# Patient Record
Sex: MALE | Race: WHITE | NOT HISPANIC OR LATINO | Employment: OTHER | URBAN - NONMETROPOLITAN AREA
[De-identification: names, ages, dates, MRNs, and addresses within clinical notes are randomized per-mention and may not be internally consistent; named-entity substitution may affect disease eponyms.]

---

## 2017-01-04 ENCOUNTER — TELEPHONE (OUTPATIENT)
Dept: FAMILY MEDICINE CLINIC | Facility: CLINIC | Age: 72
End: 2017-01-04

## 2017-01-18 ENCOUNTER — OFFICE VISIT (OUTPATIENT)
Dept: FAMILY MEDICINE CLINIC | Facility: CLINIC | Age: 72
End: 2017-01-18

## 2017-01-18 VITALS
HEART RATE: 95 BPM | BODY MASS INDEX: 26.94 KG/M2 | OXYGEN SATURATION: 97 % | SYSTOLIC BLOOD PRESSURE: 120 MMHG | DIASTOLIC BLOOD PRESSURE: 77 MMHG | TEMPERATURE: 97.9 F | WEIGHT: 172 LBS | RESPIRATION RATE: 17 BRPM

## 2017-01-18 DIAGNOSIS — E03.8 ADULT ONSET HYPOTHYROIDISM: ICD-10-CM

## 2017-01-18 DIAGNOSIS — D64.89 ANEMIA DUE TO OTHER CAUSE: ICD-10-CM

## 2017-01-18 DIAGNOSIS — N18.30 CHRONIC KIDNEY DISEASE, STAGE III (MODERATE) (HCC): ICD-10-CM

## 2017-01-18 DIAGNOSIS — E78.2 MIXED HYPERLIPIDEMIA: Primary | ICD-10-CM

## 2017-01-18 DIAGNOSIS — D63.1 ANEMIA IN CHRONIC KIDNEY DISEASE (CODE): ICD-10-CM

## 2017-01-18 PROCEDURE — 99213 OFFICE O/P EST LOW 20 MIN: CPT | Performed by: INTERNAL MEDICINE

## 2017-01-18 NOTE — MR AVS SNAPSHOT
Nestor Molinad Popeye   1/18/2017 1:00 PM   Office Visit    Dept Phone:  [phone]   Encounter #:  [id number]    Provider:  Roxanne Slater MD   Department:  [facility]                Your Full Care Plan              Your Updated Medication List          This list is accurate as of: 1/18/17  1:40 PM.  Always use your most recent med list.                Alcohol Wipes 70 % pads   1 pad 3 (Three) Times a Day.       allopurinol 100 MG tablet   Commonly known as:  ZYLOPRIM   Take 1 tablet by mouth Daily.       calcium acetate 667 MG capsule capsule   Commonly known as:  PHOS BINDER)       cephalexin 500 MG capsule   Commonly known as:  KEFLEX   Take 1 capsule by mouth Daily for 10 days.       doxazosin 4 MG tablet   Commonly known as:  CARDURA       ferrous sulfate 325 (65 FE) MG tablet       finasteride 5 MG tablet   Commonly known as:  PROSCAR   Take 1 tablet by mouth Daily.       FLUZONE HIGH-DOSE 0.5 ML suspension prefilled syringe injection   Generic drug:  influenza vac split high-dose       KEHINDE-PAYAL PO               You Were Diagnosed With        Codes Comments    Mixed hyperlipidemia    -  Primary ICD-10-CM: E78.2  ICD-9-CM: 272.2     Adult onset hypothyroidism     ICD-10-CM: E03.8  ICD-9-CM: 244.8     Chronic kidney disease, stage III (moderate)     ICD-10-CM: N18.3  ICD-9-CM: 585.3     Anemia due to other cause     ICD-10-CM: D64.89     Anemia in chronic kidney disease (CODE)     ICD-10-CM: D63.1  ICD-9-CM: 285.21, 585.9       Instructions     None    Patient Instructions History      Upcoming Appointments     Visit Type Date Time Department    OFFICE VISIT 1/18/2017  1:00 PM [facility]    IN OFFICE PROCEDURE 1/23/2017 10:30 AM [facility]    OFFICE VISIT 4/4/2017  2:00 PM [facility]      MyChart Signup     Our records indicate that you have declined [facility] MyChart signup. If you would like to sign up for MyChart,  please email [email or web address] or call [phone] to obtain an activation code.             Other Info from Your Visit           Your Appointments     Jan 23, 2017 10:30 AM EST   IN OFFICE PROCEDURE with NURSE UROLOGY [facility]   [facility] (--)    [address]  [facility] [id number] [phone]           Bring medication list, test results, and radiology films that apply.            Apr 04, 2017  2:00 PM EDT   Office Visit with Roxanne Slater MD   [facility] (--)    [address]  [facility] [place] [address] [phone]           Arrive 15 minutes prior to appointment.              Allergies     No Known Allergies      Reason for Visit     Hypertension           Vital Signs     Blood Pressure Pulse Temperature Respirations Weight Oxygen Saturation    120/77 95 97.9 °F (36.6 °C) (Oral) 17 172 lb (78 kg) 97%    Body Mass Index Smoking Status                26.94 kg/m2 Former Smoker          Problems and Diagnoses Noted     Mixed hyperlipidemia    Adult onset hypothyroidism        Chronic kidney disease, stage III (moderate)        Anemia due to other cause        Anemia in chronic kidney disease (CODE)

## 2017-01-18 NOTE — PROGRESS NOTES
Subjective   Nestor Nye is a 71 y.o. male.     Chief Complaint   Patient presents with   • Hypertension   • Hyperlipidemia       History of Present Illness   Patient did follow-up on the blood pressure which is stable.  His heart rate is noted to be elevated.  He is also to follow-up on his cholesterol is due for lab work.  He is also to follow-up on his thyroid.  He stopped taking thyroid medication.  Patient is also to follow-up on his kidney functions and his anemia secondary to it, he saw the nephrologist.  He also follows up with a urologist for his chronic catheter change secondary to his prostatic enlargement, he declines a colonoscopy and he has never had one    The following portions of the patient's history were reviewed and updated as appropriate: allergies, current medications, past family history, past medical history, past social history, past surgical history and problem list.    Review of Systems   Constitutional: Negative for appetite change, fatigue and fever.   HENT: Negative for congestion, ear discharge, ear pain, sinus pressure and sore throat.    Eyes: Negative for pain and discharge.   Respiratory: Negative for cough, shortness of breath and wheezing.    Cardiovascular: Negative for chest pain, palpitations and leg swelling.   Gastrointestinal: Negative for abdominal pain, constipation, diarrhea, nausea and vomiting.   Endocrine: Negative for cold intolerance and heat intolerance.   Genitourinary: Negative for dysuria and flank pain.   Musculoskeletal: Negative for arthralgias and joint swelling.   Skin: Negative for pallor and rash.   Allergic/Immunologic: Negative for environmental allergies and food allergies.   Neurological: Negative for dizziness, weakness and numbness.   Hematological: Negative for adenopathy. Does not bruise/bleed easily.   Psychiatric/Behavioral: Negative for behavioral problems and dysphoric mood. The patient is not nervous/anxious.          Current  Outpatient Prescriptions:   •  Alcohol Swabs (ALCOHOL WIPES) 70 % pads, 1 pad 3 (Three) Times a Day., Disp: 100 each, Rfl: 11  •  allopurinol (ZYLOPRIM) 100 MG tablet, Take 1 tablet by mouth Daily., Disp: 30 tablet, Rfl: 6  •  B Complex-C-Folic Acid (KEHINDE-PAYAL PO), Take 1 tablet by mouth Daily., Disp: , Rfl:   •  calcium acetate (PHOS BINDER,) 667 MG capsule capsule, 667 mg 3 (Three) Times a Day., Disp: , Rfl:   •  doxazosin (CARDURA) 4 MG tablet, Take 4 mg by mouth 2 (Two) Times a Day., Disp: , Rfl:   •  ferrous sulfate 325 (65 FE) MG tablet, Take 325 mg by mouth 2 (Two) Times a Day., Disp: , Rfl:   •  finasteride (PROSCAR) 5 MG tablet, Take 1 tablet by mouth Daily., Disp: 90 tablet, Rfl: 3  •  cephalexin (KEFLEX) 500 MG capsule, Take 1 capsule by mouth Daily for 10 days., Disp: 30 capsule, Rfl: 11  •  FLUZONE HIGH-DOSE 0.5 ML suspension prefilled syringe injection, , Disp: , Rfl:     Objective     Blood pressure 120/77, pulse 95, temperature 97.9 °F (36.6 °C), temperature source Oral, resp. rate 17, weight 172 lb (78 kg), SpO2 97 %.    Physical Exam   Constitutional: He is oriented to person, place, and time. He appears well-developed and well-nourished. No distress.   HENT:   Head: Normocephalic and atraumatic.   Right Ear: External ear normal.   Left Ear: External ear normal.   Nose: Nose normal.   Mouth/Throat: Oropharynx is clear and moist.   Eyes: Conjunctivae and EOM are normal. Pupils are equal, round, and reactive to light.   Neck: Normal range of motion. Neck supple. No thyromegaly present.   Cardiovascular: Normal rate, regular rhythm and normal heart sounds.    Pulmonary/Chest: Effort normal. No respiratory distress.   Abdominal: Soft. He exhibits no distension. There is no tenderness. There is no guarding.   Musculoskeletal: Normal range of motion. He exhibits no edema.   Lymphadenopathy:     He has no cervical adenopathy.   Neurological: He is alert and oriented to person, place, and time.   No gross  motor or sensory deficits   Skin: Skin is warm and dry. He is not diaphoretic. No erythema.   Psychiatric: He has a normal mood and affect.   Nursing note and vitals reviewed.      Results for orders placed or performed during the hospital encounter of 12/20/16   CBC (Hemogram)   Result Value Ref Range    WBC 4.7 (L) 4.8 - 10.8 THOUS    RBC 3.25 (L) 4.70 - 6.10 m/uL    Hemoglobin 10.0 (L) 14.0 - 18.0 g/dL    Hematocrit 31 (L) 42 - 52 %    MCV 96.0 (H) 80.0 - 94.0 fL    MCH 30.8 27.0 - 31.0 uug    MCHC 32.1 30.0 - 37.0 g/dL    RDW 13.0 11.5 - 14.5 %    Platelets 247 130 - 400 THOUS   CBC (Hemogram)   Result Value Ref Range    WBC 5.6 4.8 - 10.8 THOUS    RBC 3.38 (L) 4.70 - 6.10 m/uL    Hemoglobin 10.8 (L) 14.0 - 18.0 g/dL    Hematocrit 33 (L) 42 - 52 %    MCV 96.4 (H) 80.0 - 94.0 fL    MCH 32.0 (H) 27.0 - 31.0 uug    MCHC 33.1 30.0 - 37.0 g/dL    RDW 13.2 11.5 - 14.5 %    Platelets 234 130 - 400 THOUS         Assessment/Plan   Nestor was seen today for hypertension and hyperlipidemia.    Diagnoses and all orders for this visit:    Mixed hyperlipidemia  -     CBC & Differential; Standing  -     Comprehensive Metabolic Panel; Standing  -     Lipid Panel; Standing    Adult onset hypothyroidism  -     TSH; Standing    Chronic kidney disease, stage III (moderate)    Anemia due to other cause  -     Ferritin; Standing    Anemia in chronic kidney disease (CODE)   -     Ferritin; Standing    plan:  1.  Mixed hyperlipidemia: We will obtain fasting CMP and lipid panel.  Diet and excise counseled  2. Cri stage III: We will obtain labs, to follow-up with nephrology  3.anemia: We will obtain labs, most secondary to his kidney disease, patient declines colonoscopy.  He has never had one  4.  Hypothyroidism: We will obtain TSH         Roxanne Slater MD

## 2017-01-23 ENCOUNTER — LAB (OUTPATIENT)
Dept: LAB | Facility: HOSPITAL | Age: 72
End: 2017-01-23

## 2017-01-23 DIAGNOSIS — E78.2 MIXED HYPERLIPIDEMIA: ICD-10-CM

## 2017-01-23 LAB
BASOPHILS # BLD AUTO: 0.07 10*3/MM3 (ref 0–0.2)
BASOPHILS NFR BLD AUTO: 1.3 % (ref 0–2.5)
DEPRECATED RDW RBC AUTO: 44.2 FL (ref 37–54)
EOSINOPHIL # BLD AUTO: 0.3 10*3/MM3 (ref 0–0.7)
EOSINOPHIL NFR BLD AUTO: 5.5 % (ref 0–7)
ERYTHROCYTE [DISTWIDTH] IN BLOOD BY AUTOMATED COUNT: 12.9 % (ref 11.5–14.5)
HCT VFR BLD AUTO: 36.7 % (ref 42–52)
HGB BLD-MCNC: 12.1 G/DL (ref 14–18)
IMM GRANULOCYTES # BLD: 0.01 10*3/MM3 (ref 0–0.06)
IMM GRANULOCYTES NFR BLD: 0.2 % (ref 0–0.6)
LYMPHOCYTES # BLD AUTO: 1.25 10*3/MM3 (ref 0.6–3.4)
LYMPHOCYTES NFR BLD AUTO: 23 % (ref 10–50)
MCH RBC QN AUTO: 31.2 PG (ref 27–31)
MCHC RBC AUTO-ENTMCNC: 33 G/DL (ref 30–37)
MCV RBC AUTO: 94.6 FL (ref 80–94)
MONOCYTES # BLD AUTO: 0.92 10*3/MM3 (ref 0–0.9)
MONOCYTES NFR BLD AUTO: 16.9 % (ref 0–12)
NEUTROPHILS # BLD AUTO: 2.88 10*3/MM3 (ref 2–6.9)
NEUTROPHILS NFR BLD AUTO: 53.1 % (ref 37–80)
NRBC BLD MANUAL-RTO: 0 /100 WBC (ref 0–0)
PLATELET # BLD AUTO: 256 10*3/MM3 (ref 130–400)
PMV BLD AUTO: 10.9 FL (ref 6–12)
RBC # BLD AUTO: 3.88 10*6/MM3 (ref 4.7–6.1)
WBC NRBC COR # BLD: 5.43 10*3/MM3 (ref 4.8–10.8)

## 2017-01-23 PROCEDURE — 85025 COMPLETE CBC W/AUTO DIFF WBC: CPT | Performed by: INTERNAL MEDICINE

## 2017-01-23 PROCEDURE — 36415 COLL VENOUS BLD VENIPUNCTURE: CPT

## 2017-02-22 ENCOUNTER — PROCEDURE VISIT (OUTPATIENT)
Dept: UROLOGY | Facility: CLINIC | Age: 72
End: 2017-02-22

## 2017-02-22 DIAGNOSIS — R33.9 URINARY RETENTION: Primary | ICD-10-CM

## 2017-02-22 PROCEDURE — 51702 INSERT TEMP BLADDER CATH: CPT | Performed by: UROLOGY

## 2017-03-22 ENCOUNTER — PROCEDURE VISIT (OUTPATIENT)
Dept: UROLOGY | Facility: CLINIC | Age: 72
End: 2017-03-22

## 2017-03-22 DIAGNOSIS — R33.9 URINARY RETENTION: Primary | ICD-10-CM

## 2017-03-22 PROCEDURE — 51702 INSERT TEMP BLADDER CATH: CPT | Performed by: UROLOGY

## 2017-03-31 ENCOUNTER — TELEPHONE (OUTPATIENT)
Dept: FAMILY MEDICINE CLINIC | Facility: CLINIC | Age: 72
End: 2017-03-31

## 2017-03-31 RX ORDER — DOXAZOSIN MESYLATE 4 MG/1
4 TABLET ORAL 2 TIMES DAILY
Qty: 180 TABLET | Refills: 3 | Status: SHIPPED | OUTPATIENT
Start: 2017-03-31 | End: 2018-05-30 | Stop reason: SDUPTHER

## 2017-04-04 ENCOUNTER — OFFICE VISIT (OUTPATIENT)
Dept: FAMILY MEDICINE CLINIC | Facility: CLINIC | Age: 72
End: 2017-04-04

## 2017-04-04 VITALS
OXYGEN SATURATION: 96 % | TEMPERATURE: 97.7 F | WEIGHT: 174.5 LBS | BODY MASS INDEX: 27.33 KG/M2 | DIASTOLIC BLOOD PRESSURE: 84 MMHG | SYSTOLIC BLOOD PRESSURE: 122 MMHG | HEART RATE: 79 BPM | RESPIRATION RATE: 16 BRPM

## 2017-04-04 DIAGNOSIS — Z23 NEED FOR PNEUMOCOCCAL VACCINATION: ICD-10-CM

## 2017-04-04 DIAGNOSIS — N18.30 CHRONIC KIDNEY DISEASE, STAGE III (MODERATE) (HCC): ICD-10-CM

## 2017-04-04 DIAGNOSIS — M62.838 MUSCLE SPASMS OF NECK: ICD-10-CM

## 2017-04-04 DIAGNOSIS — E78.00 PURE HYPERCHOLESTEROLEMIA: Primary | ICD-10-CM

## 2017-04-04 PROCEDURE — 99214 OFFICE O/P EST MOD 30 MIN: CPT | Performed by: INTERNAL MEDICINE

## 2017-04-04 PROCEDURE — 90732 PPSV23 VACC 2 YRS+ SUBQ/IM: CPT | Performed by: INTERNAL MEDICINE

## 2017-04-04 PROCEDURE — G0009 ADMIN PNEUMOCOCCAL VACCINE: HCPCS | Performed by: INTERNAL MEDICINE

## 2017-04-04 RX ORDER — CYCLOBENZAPRINE HCL 10 MG
10 TABLET ORAL NIGHTLY PRN
Qty: 30 TABLET | Refills: 0 | Status: SHIPPED | OUTPATIENT
Start: 2017-04-04 | End: 2017-09-25

## 2017-04-04 NOTE — PROGRESS NOTES
Subjective   Nestor Nye is a 71 y.o. male.     Chief Complaint   Patient presents with   • Hyperlipidemia   • Hypertension       History of Present Illness   Patient did follow-up on the blood pressure which is stable.   He is also to follow-up on his cholesterol is due for lab work. He is also to follow-up on his thyroid. He stopped taking thyroid medication. Patient is also to follow-up on his kidney functions and his anemia secondary to it , he sees urology and nephrology , he has a stacy in place with bag , he declines a colonoscopy , he is diue for labs , he complains of neck muscle spasm and sometimes in his hands and toes, he needs a pneumovax    The following portions of the patient's history were reviewed and updated as appropriate: allergies, current medications, past family history, past medical history, past social history, past surgical history and problem list.    Review of Systems   Constitutional: Negative for appetite change, fatigue and fever.   HENT: Negative for congestion, ear discharge, ear pain, sinus pressure and sore throat.    Eyes: Negative for pain and discharge.   Respiratory: Negative for cough, shortness of breath and wheezing.    Cardiovascular: Negative for chest pain, palpitations and leg swelling.   Gastrointestinal: Negative for abdominal pain, constipation, diarrhea, nausea and vomiting.   Endocrine: Negative for cold intolerance and heat intolerance.   Genitourinary: Negative for dysuria and flank pain.   Musculoskeletal: Negative for arthralgias and joint swelling.        Muscle spasm   Skin: Negative for pallor and rash.   Allergic/Immunologic: Negative for environmental allergies and food allergies.   Neurological: Negative for dizziness, weakness and numbness.   Hematological: Negative for adenopathy. Does not bruise/bleed easily.   Psychiatric/Behavioral: Negative for behavioral problems and dysphoric mood. The patient is not nervous/anxious.          Current  Outpatient Prescriptions:   •  Alcohol Swabs (ALCOHOL WIPES) 70 % pads, 1 pad 3 (Three) Times a Day., Disp: 100 each, Rfl: 11  •  allopurinol (ZYLOPRIM) 100 MG tablet, Take 1 tablet by mouth Daily., Disp: 30 tablet, Rfl: 6  •  doxazosin (CARDURA) 4 MG tablet, Take 1 tablet by mouth 2 (Two) Times a Day., Disp: 180 tablet, Rfl: 3  •  ferrous sulfate 325 (65 FE) MG tablet, Take 325 mg by mouth Daily With Breakfast., Disp: , Rfl:   •  finasteride (PROSCAR) 5 MG tablet, Take 1 tablet by mouth Daily., Disp: 90 tablet, Rfl: 3  •  cyclobenzaprine (FLEXERIL) 10 MG tablet, Take 1 tablet by mouth At Night As Needed for Muscle Spasms., Disp: 30 tablet, Rfl: 0    Objective     Blood pressure 122/84, pulse 79, temperature 97.7 °F (36.5 °C), temperature source Oral, resp. rate 16, weight 174 lb 8 oz (79.2 kg), SpO2 96 %.    Physical Exam   Constitutional: He is oriented to person, place, and time. He appears well-developed and well-nourished. No distress.   HENT:   Head: Normocephalic and atraumatic.   Right Ear: External ear normal.   Left Ear: External ear normal.   Nose: Nose normal.   Mouth/Throat: Oropharynx is clear and moist.   Eyes: Conjunctivae and EOM are normal. Pupils are equal, round, and reactive to light.   Neck: Normal range of motion. Neck supple. No thyromegaly present.   Cardiovascular: Normal rate, regular rhythm and normal heart sounds.    Pulmonary/Chest: Effort normal. No respiratory distress.   Abdominal: Soft. He exhibits no distension. There is no tenderness. There is no guarding.   Musculoskeletal: He exhibits no edema.   Lymphadenopathy:     He has no cervical adenopathy.   Neurological: He is alert and oriented to person, place, and time.   No gross motor or sensory deficits   Skin: Skin is warm and dry. He is not diaphoretic. No erythema.   Psychiatric: He has a normal mood and affect.   Nursing note and vitals reviewed.      Results for orders placed or performed in visit on 01/23/17   CBC Auto  Differential   Result Value Ref Range    WBC 5.43 4.80 - 10.80 10*3/mm3    RBC 3.88 (L) 4.70 - 6.10 10*6/mm3    Hemoglobin 12.1 (L) 14.0 - 18.0 g/dL    Hematocrit 36.7 (L) 42.0 - 52.0 %    MCV 94.6 (H) 80.0 - 94.0 fL    MCH 31.2 (H) 27.0 - 31.0 pg    MCHC 33.0 30.0 - 37.0 g/dL    RDW 12.9 11.5 - 14.5 %    RDW-SD 44.2 37.0 - 54.0 fl    MPV 10.9 6.0 - 12.0 fL    Platelets 256 130 - 400 10*3/mm3    Neutrophil % 53.1 37.0 - 80.0 %    Lymphocyte % 23.0 10.0 - 50.0 %    Monocyte % 16.9 (H) 0.0 - 12.0 %    Eosinophil % 5.5 0.0 - 7.0 %    Basophil % 1.3 0.0 - 2.5 %    Immature Grans % 0.2 0.0 - 0.6 %    Neutrophils, Absolute 2.88 2.00 - 6.90 10*3/mm3    Lymphocytes, Absolute 1.25 0.60 - 3.40 10*3/mm3    Monocytes, Absolute 0.92 (H) 0.00 - 0.90 10*3/mm3    Eosinophils, Absolute 0.30 0.00 - 0.70 10*3/mm3    Basophils, Absolute 0.07 0.00 - 0.20 10*3/mm3    Immature Grans, Absolute 0.01 0.00 - 0.06 10*3/mm3    nRBC 0.0 0.0 - 0.0 /100 WBC         Assessment/Plan   Nestor was seen today for hyperlipidemia and hypertension.    Diagnoses and all orders for this visit:    Pure hypercholesterolemia    Chronic kidney disease, stage III (moderate)    Muscle spasms of neck  -     XR Spine Cervical 2 or 3 View; Future    Need for pneumococcal vaccination  -     Pneumococcal Polysaccharide Vaccine 23-Valent Greater Than or Equal To 1yo Subcutaneous / IM    Other orders  -     cyclobenzaprine (FLEXERIL) 10 MG tablet; Take 1 tablet by mouth At Night As Needed for Muscle Spasms.        plan:  1. Mixed hyperlipidemia: We will obtain fasting CMP and lipid panel. Diet and excise counseled  2. Cri stage III: We will obtain labs, to follow-up with nephrology  3. Muscle spasm : will give a trial with muscle relaxant and get neck xray  4. Need for pneumovax : given today       Roxanne Slater MD

## 2017-04-10 ENCOUNTER — HOSPITAL ENCOUNTER (OUTPATIENT)
Dept: GENERAL RADIOLOGY | Facility: HOSPITAL | Age: 72
Discharge: HOME | End: 2017-04-10
Admitting: INTERNAL MEDICINE

## 2017-04-10 ENCOUNTER — LAB (OUTPATIENT)
Dept: LAB | Facility: HOSPITAL | Age: 72
End: 2017-04-10

## 2017-04-10 DIAGNOSIS — E78.2 MIXED HYPERLIPIDEMIA: ICD-10-CM

## 2017-04-10 DIAGNOSIS — D64.89 ANEMIA DUE TO OTHER CAUSE: ICD-10-CM

## 2017-04-10 DIAGNOSIS — D63.1 ANEMIA IN CHRONIC KIDNEY DISEASE (CODE): ICD-10-CM

## 2017-04-10 DIAGNOSIS — E03.8 ADULT ONSET HYPOTHYROIDISM: ICD-10-CM

## 2017-04-10 DIAGNOSIS — M62.838 MUSCLE SPASMS OF NECK: ICD-10-CM

## 2017-04-10 LAB
ALBUMIN SERPL-MCNC: 4.5 G/DL (ref 3.5–5)
ALBUMIN/GLOB SERPL: 1.5 G/DL (ref 1–2)
ALP SERPL-CCNC: 86 U/L (ref 38–126)
ALT SERPL W P-5'-P-CCNC: 26 U/L (ref 13–69)
ANION GAP SERPL CALCULATED.3IONS-SCNC: 11.8 MMOL/L
AST SERPL-CCNC: 25 U/L (ref 15–46)
BASOPHILS # BLD AUTO: 0.08 10*3/MM3 (ref 0–0.2)
BASOPHILS NFR BLD AUTO: 1.2 % (ref 0–2.5)
BILIRUB SERPL-MCNC: 1 MG/DL (ref 0.2–1.3)
BUN BLD-MCNC: 29 MG/DL (ref 7–20)
BUN/CREAT SERPL: 12.6 (ref 6.3–21.9)
CALCIUM SPEC-SCNC: 9.6 MG/DL (ref 8.4–10.2)
CHLORIDE SERPL-SCNC: 109 MMOL/L (ref 98–107)
CHOLEST SERPL-MCNC: 187 MG/DL (ref 0–199)
CO2 SERPL-SCNC: 29 MMOL/L (ref 26–30)
CREAT BLD-MCNC: 2.3 MG/DL (ref 0.6–1.3)
DEPRECATED RDW RBC AUTO: 45.5 FL (ref 37–54)
EOSINOPHIL # BLD AUTO: 0.34 10*3/MM3 (ref 0–0.7)
EOSINOPHIL NFR BLD AUTO: 4.9 % (ref 0–7)
ERYTHROCYTE [DISTWIDTH] IN BLOOD BY AUTOMATED COUNT: 12.7 % (ref 11.5–14.5)
FERRITIN SERPL-MCNC: 178 NG/ML (ref 17.9–464)
GFR SERPL CREATININE-BSD FRML MDRD: 28 ML/MIN/1.73
GLOBULIN UR ELPH-MCNC: 3.1 GM/DL
GLUCOSE BLD-MCNC: 98 MG/DL (ref 74–98)
HCT VFR BLD AUTO: 42 % (ref 42–52)
HDLC SERPL-MCNC: 39 MG/DL (ref 40–60)
HGB BLD-MCNC: 14.1 G/DL (ref 14–18)
IMM GRANULOCYTES # BLD: 0.01 10*3/MM3 (ref 0–0.06)
IMM GRANULOCYTES NFR BLD: 0.1 % (ref 0–0.6)
LDLC SERPL CALC-MCNC: 118 MG/DL (ref 0–99)
LDLC/HDLC SERPL: 3.02 {RATIO}
LYMPHOCYTES # BLD AUTO: 1.92 10*3/MM3 (ref 0.6–3.4)
LYMPHOCYTES NFR BLD AUTO: 27.7 % (ref 10–50)
MCH RBC QN AUTO: 32.2 PG (ref 27–31)
MCHC RBC AUTO-ENTMCNC: 33.6 G/DL (ref 30–37)
MCV RBC AUTO: 95.9 FL (ref 80–94)
MONOCYTES # BLD AUTO: 0.64 10*3/MM3 (ref 0–0.9)
MONOCYTES NFR BLD AUTO: 9.2 % (ref 0–12)
NEUTROPHILS # BLD AUTO: 3.94 10*3/MM3 (ref 2–6.9)
NEUTROPHILS NFR BLD AUTO: 56.9 % (ref 37–80)
NRBC BLD MANUAL-RTO: 0 /100 WBC (ref 0–0)
PLATELET # BLD AUTO: 259 10*3/MM3 (ref 130–400)
PMV BLD AUTO: 10.5 FL (ref 6–12)
POTASSIUM BLD-SCNC: 4.8 MMOL/L (ref 3.5–5.1)
PROT SERPL-MCNC: 7.6 G/DL (ref 6.3–8.2)
RBC # BLD AUTO: 4.38 10*6/MM3 (ref 4.7–6.1)
SODIUM BLD-SCNC: 145 MMOL/L (ref 137–145)
TRIGL SERPL-MCNC: 151 MG/DL
TSH SERPL DL<=0.05 MIU/L-ACNC: 8.11 MIU/ML (ref 0.47–4.68)
VLDLC SERPL-MCNC: 30.2 MG/DL
WBC NRBC COR # BLD: 6.93 10*3/MM3 (ref 4.8–10.8)

## 2017-04-10 PROCEDURE — 80053 COMPREHEN METABOLIC PANEL: CPT | Performed by: INTERNAL MEDICINE

## 2017-04-10 PROCEDURE — 80061 LIPID PANEL: CPT | Performed by: INTERNAL MEDICINE

## 2017-04-10 PROCEDURE — 85025 COMPLETE CBC W/AUTO DIFF WBC: CPT | Performed by: INTERNAL MEDICINE

## 2017-04-10 PROCEDURE — 36415 COLL VENOUS BLD VENIPUNCTURE: CPT

## 2017-04-10 PROCEDURE — 82728 ASSAY OF FERRITIN: CPT | Performed by: INTERNAL MEDICINE

## 2017-04-10 PROCEDURE — 84443 ASSAY THYROID STIM HORMONE: CPT | Performed by: INTERNAL MEDICINE

## 2017-04-10 PROCEDURE — 72040 X-RAY EXAM NECK SPINE 2-3 VW: CPT

## 2017-04-14 RX ORDER — LEVOTHYROXINE SODIUM 0.03 MG/1
25 TABLET ORAL DAILY
Qty: 30 TABLET | Refills: 0 | Status: SHIPPED | OUTPATIENT
Start: 2017-04-14 | End: 2017-05-18 | Stop reason: SDUPTHER

## 2017-04-14 NOTE — PROGRESS NOTES
Changed dose on thyroid medication , sent new prescription , with no refills, recheck tsh in one month

## 2017-04-19 ENCOUNTER — PROCEDURE VISIT (OUTPATIENT)
Dept: UROLOGY | Facility: CLINIC | Age: 72
End: 2017-04-19

## 2017-04-19 DIAGNOSIS — R33.9 URINARY RETENTION: Primary | ICD-10-CM

## 2017-04-19 PROCEDURE — 51702 INSERT TEMP BLADDER CATH: CPT | Performed by: UROLOGY

## 2017-04-19 NOTE — PROGRESS NOTES
PATIENT CAME IN FOR A MONTHLY CATH CHANGE, STATING HE HAS SOME IRRITATION AROUND HIS PENIS, I TOLD HIM HE COULD TRY USING A LITTLE NEOSPORIN TO SEE IF IT HELPS.

## 2017-05-11 RX ORDER — LEVOTHYROXINE SODIUM 0.03 MG/1
25 TABLET ORAL DAILY
Qty: 30 TABLET | Refills: 0 | OUTPATIENT
Start: 2017-05-11

## 2017-05-17 ENCOUNTER — PROCEDURE VISIT (OUTPATIENT)
Dept: UROLOGY | Facility: CLINIC | Age: 72
End: 2017-05-17

## 2017-05-17 ENCOUNTER — LAB (OUTPATIENT)
Dept: LAB | Facility: HOSPITAL | Age: 72
End: 2017-05-17

## 2017-05-17 VITALS
RESPIRATION RATE: 18 BRPM | SYSTOLIC BLOOD PRESSURE: 133 MMHG | HEART RATE: 68 BPM | OXYGEN SATURATION: 95 % | DIASTOLIC BLOOD PRESSURE: 70 MMHG

## 2017-05-17 DIAGNOSIS — D63.1 ANEMIA IN CHRONIC KIDNEY DISEASE (CODE): ICD-10-CM

## 2017-05-17 DIAGNOSIS — E03.8 ADULT ONSET HYPOTHYROIDISM: ICD-10-CM

## 2017-05-17 DIAGNOSIS — E78.2 MIXED HYPERLIPIDEMIA: ICD-10-CM

## 2017-05-17 DIAGNOSIS — R33.9 URINARY RETENTION: Primary | ICD-10-CM

## 2017-05-17 DIAGNOSIS — D64.89 ANEMIA DUE TO OTHER CAUSE: ICD-10-CM

## 2017-05-17 LAB
ALBUMIN SERPL-MCNC: 4.2 G/DL (ref 3.5–5)
ALBUMIN/GLOB SERPL: 1.6 G/DL (ref 1–2)
ALP SERPL-CCNC: 83 U/L (ref 38–126)
ALT SERPL W P-5'-P-CCNC: 22 U/L (ref 13–69)
ANION GAP SERPL CALCULATED.3IONS-SCNC: 13.3 MMOL/L
AST SERPL-CCNC: 22 U/L (ref 15–46)
BASOPHILS # BLD AUTO: 0.09 10*3/MM3 (ref 0–0.2)
BASOPHILS NFR BLD AUTO: 1.4 % (ref 0–2.5)
BILIRUB SERPL-MCNC: 0.8 MG/DL (ref 0.2–1.3)
BUN BLD-MCNC: 26 MG/DL (ref 7–20)
BUN/CREAT SERPL: 11.8 (ref 6.3–21.9)
CALCIUM SPEC-SCNC: 9 MG/DL (ref 8.4–10.2)
CHLORIDE SERPL-SCNC: 108 MMOL/L (ref 98–107)
CHOLEST SERPL-MCNC: 164 MG/DL (ref 0–199)
CO2 SERPL-SCNC: 25 MMOL/L (ref 26–30)
CREAT BLD-MCNC: 2.2 MG/DL (ref 0.6–1.3)
DEPRECATED RDW RBC AUTO: 43.8 FL (ref 37–54)
EOSINOPHIL # BLD AUTO: 0.39 10*3/MM3 (ref 0–0.7)
EOSINOPHIL NFR BLD AUTO: 6.2 % (ref 0–7)
ERYTHROCYTE [DISTWIDTH] IN BLOOD BY AUTOMATED COUNT: 12.4 % (ref 11.5–14.5)
FERRITIN SERPL-MCNC: 134 NG/ML (ref 17.9–464)
GFR SERPL CREATININE-BSD FRML MDRD: 30 ML/MIN/1.73
GLOBULIN UR ELPH-MCNC: 2.7 GM/DL
GLUCOSE BLD-MCNC: 95 MG/DL (ref 74–98)
HCT VFR BLD AUTO: 38.6 % (ref 42–52)
HDLC SERPL-MCNC: 37 MG/DL (ref 40–60)
HGB BLD-MCNC: 13.5 G/DL (ref 14–18)
IMM GRANULOCYTES # BLD: 0.02 10*3/MM3 (ref 0–0.06)
IMM GRANULOCYTES NFR BLD: 0.3 % (ref 0–0.6)
LDLC SERPL CALC-MCNC: 98 MG/DL (ref 0–99)
LDLC/HDLC SERPL: 2.64 {RATIO}
LYMPHOCYTES # BLD AUTO: 1.79 10*3/MM3 (ref 0.6–3.4)
LYMPHOCYTES NFR BLD AUTO: 28.4 % (ref 10–50)
MCH RBC QN AUTO: 33.3 PG (ref 27–31)
MCHC RBC AUTO-ENTMCNC: 35 G/DL (ref 30–37)
MCV RBC AUTO: 95.1 FL (ref 80–94)
MONOCYTES # BLD AUTO: 0.63 10*3/MM3 (ref 0–0.9)
MONOCYTES NFR BLD AUTO: 10 % (ref 0–12)
NEUTROPHILS # BLD AUTO: 3.39 10*3/MM3 (ref 2–6.9)
NEUTROPHILS NFR BLD AUTO: 53.7 % (ref 37–80)
NRBC BLD MANUAL-RTO: 0 /100 WBC (ref 0–0)
PLATELET # BLD AUTO: 222 10*3/MM3 (ref 130–400)
PMV BLD AUTO: 10.8 FL (ref 6–12)
POTASSIUM BLD-SCNC: 4.3 MMOL/L (ref 3.5–5.1)
PROT SERPL-MCNC: 6.9 G/DL (ref 6.3–8.2)
RBC # BLD AUTO: 4.06 10*6/MM3 (ref 4.7–6.1)
SODIUM BLD-SCNC: 142 MMOL/L (ref 137–145)
TRIGL SERPL-MCNC: 147 MG/DL
TSH SERPL DL<=0.05 MIU/L-ACNC: 6.57 MIU/ML (ref 0.47–4.68)
VLDLC SERPL-MCNC: 29.4 MG/DL
WBC NRBC COR # BLD: 6.31 10*3/MM3 (ref 4.8–10.8)

## 2017-05-17 PROCEDURE — 36415 COLL VENOUS BLD VENIPUNCTURE: CPT

## 2017-05-17 PROCEDURE — 80061 LIPID PANEL: CPT | Performed by: INTERNAL MEDICINE

## 2017-05-17 PROCEDURE — 80053 COMPREHEN METABOLIC PANEL: CPT | Performed by: INTERNAL MEDICINE

## 2017-05-17 PROCEDURE — 82728 ASSAY OF FERRITIN: CPT | Performed by: INTERNAL MEDICINE

## 2017-05-17 PROCEDURE — 85025 COMPLETE CBC W/AUTO DIFF WBC: CPT | Performed by: INTERNAL MEDICINE

## 2017-05-17 PROCEDURE — 51702 INSERT TEMP BLADDER CATH: CPT | Performed by: UROLOGY

## 2017-05-17 PROCEDURE — 84443 ASSAY THYROID STIM HORMONE: CPT | Performed by: INTERNAL MEDICINE

## 2017-05-17 RX ORDER — CEPHALEXIN 500 MG/1
CAPSULE ORAL
Refills: 11 | COMMUNITY
Start: 2017-05-10 | End: 2017-12-19 | Stop reason: SDUPTHER

## 2017-05-18 RX ORDER — LEVOTHYROXINE SODIUM 0.05 MG/1
50 TABLET ORAL DAILY
Qty: 30 TABLET | Refills: 0 | Status: SHIPPED | OUTPATIENT
Start: 2017-05-18 | End: 2017-06-03 | Stop reason: SDUPTHER

## 2017-06-02 RX ORDER — LEVOTHYROXINE SODIUM 0.05 MG/1
TABLET ORAL
Qty: 30 TABLET | Refills: 0 | OUTPATIENT
Start: 2017-06-02

## 2017-06-05 RX ORDER — LEVOTHYROXINE SODIUM 0.05 MG/1
TABLET ORAL
Qty: 30 TABLET | Refills: 2 | Status: SHIPPED | OUTPATIENT
Start: 2017-06-05 | End: 2017-09-29 | Stop reason: SDUPTHER

## 2017-06-19 ENCOUNTER — OFFICE VISIT (OUTPATIENT)
Dept: UROLOGY | Facility: CLINIC | Age: 72
End: 2017-06-19

## 2017-06-19 VITALS
TEMPERATURE: 98.5 F | WEIGHT: 174 LBS | HEART RATE: 85 BPM | SYSTOLIC BLOOD PRESSURE: 136 MMHG | HEIGHT: 69 IN | BODY MASS INDEX: 25.77 KG/M2 | DIASTOLIC BLOOD PRESSURE: 87 MMHG | OXYGEN SATURATION: 94 %

## 2017-06-19 DIAGNOSIS — R33.9 URINARY RETENTION: ICD-10-CM

## 2017-06-19 DIAGNOSIS — R97.20 ELEVATED PROSTATE SPECIFIC ANTIGEN (PSA): Primary | ICD-10-CM

## 2017-06-19 DIAGNOSIS — N31.2 BLADDER, ATONIC: ICD-10-CM

## 2017-06-19 DIAGNOSIS — N40.1 BPH (BENIGN PROSTATIC HYPERTROPHY) WITH URINARY OBSTRUCTION: ICD-10-CM

## 2017-06-19 DIAGNOSIS — N13.8 BPH (BENIGN PROSTATIC HYPERTROPHY) WITH URINARY OBSTRUCTION: ICD-10-CM

## 2017-06-19 PROCEDURE — 99213 OFFICE O/P EST LOW 20 MIN: CPT | Performed by: UROLOGY

## 2017-06-19 PROCEDURE — 51702 INSERT TEMP BLADDER CATH: CPT | Performed by: UROLOGY

## 2017-06-19 NOTE — PROGRESS NOTES
Chief Complaint  Urinary Retention (6 month fup.)        CHERYL Nye is a 71 y.o. male who returns today for follow-up of his urinary retention that was present for years associated with BPH but more recently with gross overdistention of the bladder.  After 3 L was obtained with catheterization is not likely he will ever void normally again.  He had been on intermittent self catheter prior to coming here and was miserable and frankly seldom performed at frequent enough.  He is delighted with the indwelling catheter is being changed on a monthly basis through our office.  He continues his daily Proscar to shrink the gland and Keflex for chronic suppression.  His creatinine was as high as 20 1. but with catheter drainage and resolution of the hydronephrosis it's coming down to 2.  PSA was markedly elevated at 9.4 on previous check.    Vitals:    06/19/17 1118   BP: 136/87   Pulse: 85   Temp: 98.5 °F (36.9 °C)   SpO2: 94%       Past Medical History  Past Medical History:   Diagnosis Date   • Acute maxillary sinusitis    • Vocal cord anomaly        Past Surgical History  Past Surgical History:   Procedure Laterality Date   • BACK SURGERY     • BRAIN SURGERY     • EYE SURGERY     • OTHER SURGICAL HISTORY      Arm Excision       Medications  has a current medication list which includes the following prescription(s): alcohol wipes, allopurinol, cephalexin, cyclobenzaprine, doxazosin, ferrous sulfate, finasteride, and levothyroxine.      Allergies  No Known Allergies    Social History  Social History     Social History Narrative       Family History  He has no family history of bladder or kidney cancer  He has no family history of kidney stones      AUA Symptom Score:      Review of Systems  Review of Systems    Physical Exam  Physical Exam   Constitutional: He is oriented to person, place, and time. He appears well-developed and well-nourished.   HENT:   Head: Normocephalic and atraumatic.   Neck: Normal range of motion.    Pulmonary/Chest: Effort normal. No respiratory distress.   Abdominal: Soft. He exhibits no distension and no mass. There is no tenderness. No hernia.   Genitourinary: Rectum normal and prostate normal.   Musculoskeletal: Normal range of motion.   Lymphadenopathy:     He has no cervical adenopathy.   Neurological: He is alert and oriented to person, place, and time.   Skin: Skin is warm and dry.   Psychiatric: He has a normal mood and affect. His behavior is normal.   Vitals reviewed.      Labs Recent and today in the office:  Results for orders placed or performed in visit on 05/17/17   Ferritin   Result Value Ref Range    Ferritin 134.00 17.90 - 464.00 ng/mL   Comprehensive Metabolic Panel   Result Value Ref Range    Glucose 95 74 - 98 mg/dL    BUN 26 (H) 7 - 20 mg/dL    Creatinine 2.20 (H) 0.60 - 1.30 mg/dL    Sodium 142 137 - 145 mmol/L    Potassium 4.3 3.5 - 5.1 mmol/L    Chloride 108 (H) 98 - 107 mmol/L    CO2 25.0 (L) 26.0 - 30.0 mmol/L    Calcium 9.0 8.4 - 10.2 mg/dL    Total Protein 6.9 6.3 - 8.2 g/dL    Albumin 4.20 3.50 - 5.00 g/dL    ALT (SGPT) 22 13 - 69 U/L    AST (SGOT) 22 15 - 46 U/L    Alkaline Phosphatase 83 38 - 126 U/L    Total Bilirubin 0.8 0.2 - 1.3 mg/dL    eGFR Non African Amer 30 (L) >60 mL/min/1.73    Globulin 2.7 gm/dL    A/G Ratio 1.6 1.0 - 2.0 g/dL    BUN/Creatinine Ratio 11.8 6.3 - 21.9    Anion Gap 13.3 mmol/L   Lipid Panel   Result Value Ref Range    Total Cholesterol 164 0 - 199 mg/dL    Triglycerides 147 <150 mg/dL    HDL Cholesterol 37 (L) 40 - 60 mg/dL    LDL Cholesterol  98 0 - 99 mg/dL    VLDL Cholesterol 29.4 mg/dL    LDL/HDL Ratio 2.64    TSH   Result Value Ref Range    TSH 6.570 (H) 0.470 - 4.680 mIU/mL         Assessment & Plan  Fortunately his digital rectal exam is benign today so I recommended a total to free PSA.  He will continue his monthly catheter changes and chronic suppression and return to see me in 6 months.

## 2017-06-20 LAB
PSA FREE MFR SERPL: 25 %
PSA FREE SERPL-MCNC: 1.25 NG/ML
PSA SERPL-MCNC: 5 NG/ML (ref 0–4)

## 2017-06-24 ENCOUNTER — RESULTS ENCOUNTER (OUTPATIENT)
Dept: UROLOGY | Facility: CLINIC | Age: 72
End: 2017-06-24

## 2017-06-24 DIAGNOSIS — R97.20 ELEVATED PROSTATE SPECIFIC ANTIGEN (PSA): ICD-10-CM

## 2017-07-06 RX ORDER — ALLOPURINOL 100 MG/1
TABLET ORAL
Qty: 30 TABLET | Refills: 6 | Status: SHIPPED | OUTPATIENT
Start: 2017-07-06 | End: 2017-09-25

## 2017-07-19 ENCOUNTER — PROCEDURE VISIT (OUTPATIENT)
Dept: UROLOGY | Facility: CLINIC | Age: 72
End: 2017-07-19

## 2017-07-19 DIAGNOSIS — R33.9 URINARY RETENTION: Primary | ICD-10-CM

## 2017-07-19 PROCEDURE — 51702 INSERT TEMP BLADDER CATH: CPT | Performed by: UROLOGY

## 2017-07-19 NOTE — PROGRESS NOTES
Patient came in for monthly cath change, patient states he has noticed a strange smell to his urine, so culture is sent.

## 2017-07-21 LAB
BACTERIA UR CULT: NORMAL
BACTERIA UR CULT: NORMAL

## 2017-08-16 ENCOUNTER — PROCEDURE VISIT (OUTPATIENT)
Dept: UROLOGY | Facility: CLINIC | Age: 72
End: 2017-08-16

## 2017-08-16 DIAGNOSIS — R33.9 URINARY RETENTION: Primary | ICD-10-CM

## 2017-08-16 PROCEDURE — 51702 INSERT TEMP BLADDER CATH: CPT | Performed by: UROLOGY

## 2017-09-12 DIAGNOSIS — R33.9 URINARY RETENTION: Primary | ICD-10-CM

## 2017-09-13 ENCOUNTER — PROCEDURE VISIT (OUTPATIENT)
Dept: UROLOGY | Facility: CLINIC | Age: 72
End: 2017-09-13

## 2017-09-13 VITALS
HEART RATE: 89 BPM | DIASTOLIC BLOOD PRESSURE: 76 MMHG | SYSTOLIC BLOOD PRESSURE: 118 MMHG | TEMPERATURE: 98.7 F | OXYGEN SATURATION: 98 %

## 2017-09-13 DIAGNOSIS — R33.9 URINARY RETENTION: ICD-10-CM

## 2017-09-13 PROCEDURE — 51702 INSERT TEMP BLADDER CATH: CPT | Performed by: UROLOGY

## 2017-09-13 NOTE — PROGRESS NOTES
Patient came in for monthly cath change and patient is doing well, and tolerated the procedure well.

## 2017-09-25 ENCOUNTER — OFFICE VISIT (OUTPATIENT)
Dept: FAMILY MEDICINE CLINIC | Facility: CLINIC | Age: 72
End: 2017-09-25

## 2017-09-25 VITALS
HEART RATE: 89 BPM | SYSTOLIC BLOOD PRESSURE: 115 MMHG | DIASTOLIC BLOOD PRESSURE: 76 MMHG | OXYGEN SATURATION: 96 % | WEIGHT: 166 LBS | RESPIRATION RATE: 16 BRPM | BODY MASS INDEX: 24.51 KG/M2 | TEMPERATURE: 97.7 F

## 2017-09-25 DIAGNOSIS — E03.8 ADULT ONSET HYPOTHYROIDISM: ICD-10-CM

## 2017-09-25 DIAGNOSIS — N18.2 CHRONIC KIDNEY DISEASE, STAGE II (MILD): ICD-10-CM

## 2017-09-25 DIAGNOSIS — I10 BENIGN ESSENTIAL HYPERTENSION: Primary | ICD-10-CM

## 2017-09-25 LAB
ALBUMIN SERPL-MCNC: 4.5 G/DL (ref 3.5–5)
ALBUMIN/GLOB SERPL: 1.9 G/DL (ref 1–2)
ALP SERPL-CCNC: 83 U/L (ref 38–126)
ALT SERPL-CCNC: 28 U/L (ref 13–69)
AST SERPL-CCNC: 21 U/L (ref 15–46)
BASOPHILS # BLD AUTO: 0.07 10*3/MM3 (ref 0–0.2)
BASOPHILS NFR BLD AUTO: 0.9 % (ref 0–2.5)
BILIRUB SERPL-MCNC: 0.7 MG/DL (ref 0.2–1.3)
BUN SERPL-MCNC: 34 MG/DL (ref 7–20)
BUN/CREAT SERPL: 17.9 (ref 6.3–21.9)
CALCIUM SERPL-MCNC: 9.7 MG/DL (ref 8.4–10.2)
CHLORIDE SERPL-SCNC: 108 MMOL/L (ref 98–107)
CO2 SERPL-SCNC: 25 MMOL/L (ref 26–30)
CREAT SERPL-MCNC: 1.9 MG/DL (ref 0.6–1.3)
EOSINOPHIL # BLD AUTO: 0.17 10*3/MM3 (ref 0–0.7)
EOSINOPHIL NFR BLD AUTO: 2.2 % (ref 0–7)
ERYTHROCYTE [DISTWIDTH] IN BLOOD BY AUTOMATED COUNT: 13 % (ref 11.5–14.5)
GLOBULIN SER CALC-MCNC: 2.4 GM/DL
GLUCOSE SERPL-MCNC: 98 MG/DL (ref 74–98)
HCT VFR BLD AUTO: 42.4 % (ref 42–52)
HGB BLD-MCNC: 13.9 G/DL (ref 14–18)
IMM GRANULOCYTES # BLD: 0.03 10*3/MM3 (ref 0–0.06)
IMM GRANULOCYTES NFR BLD: 0.4 % (ref 0–0.6)
LYMPHOCYTES # BLD AUTO: 1.54 10*3/MM3 (ref 0.6–3.4)
LYMPHOCYTES NFR BLD AUTO: 19.8 % (ref 10–50)
MCH RBC QN AUTO: 31.7 PG (ref 27–31)
MCHC RBC AUTO-ENTMCNC: 32.8 G/DL (ref 30–37)
MCV RBC AUTO: 96.6 FL (ref 80–94)
MONOCYTES # BLD AUTO: 0.65 10*3/MM3 (ref 0–0.9)
MONOCYTES NFR BLD AUTO: 8.3 % (ref 0–12)
NEUTROPHILS # BLD AUTO: 5.33 10*3/MM3 (ref 2–6.9)
NEUTROPHILS NFR BLD AUTO: 68.4 % (ref 37–80)
NRBC BLD AUTO-RTO: 0 /100 WBC (ref 0–0)
PLATELET # BLD AUTO: 249 10*3/MM3 (ref 130–400)
POTASSIUM SERPL-SCNC: 5.3 MMOL/L (ref 3.5–5.1)
PROT SERPL-MCNC: 6.9 G/DL (ref 6.3–8.2)
RBC # BLD AUTO: 4.39 10*6/MM3 (ref 4.7–6.1)
SODIUM SERPL-SCNC: 145 MMOL/L (ref 137–145)
TSH SERPL DL<=0.005 MIU/L-ACNC: 3.82 MIU/ML (ref 0.47–4.68)
WBC # BLD AUTO: 7.79 10*3/MM3 (ref 4.8–10.8)

## 2017-09-25 PROCEDURE — 99213 OFFICE O/P EST LOW 20 MIN: CPT | Performed by: INTERNAL MEDICINE

## 2017-09-25 NOTE — PROGRESS NOTES
Subjective   Nestor Nye is a 71 y.o. male.     Chief Complaint   Patient presents with   • Hypothyroidism       History of Present Illness   Patient is here to follow-up on his blood pressure which is stable.  He is also due for labs for his thyroid.  He follows up with urology for his indwelling urinary catheter and prostatic enlargement ?  He is currently on Proscar.  I will inform him.  He takes Cardura for his prostate and does not follow his blood pressure, but it does help his blood pressure.  He has been on it for a while , he will discuss the same with his urologist    The following portions of the patient's history were reviewed and updated as appropriate: allergies, current medications, past family history, past medical history, past social history, past surgical history and problem list.    Review of Systems   Constitutional: Negative for appetite change, fatigue and fever.   HENT: Negative for congestion, ear discharge, ear pain, sinus pressure and sore throat.    Eyes: Negative for pain and discharge.   Respiratory: Negative for cough, shortness of breath and wheezing.    Cardiovascular: Negative for chest pain, palpitations and leg swelling.   Gastrointestinal: Negative for abdominal pain, constipation, diarrhea, nausea and vomiting.   Endocrine: Negative for cold intolerance and heat intolerance.   Genitourinary: Negative for dysuria and flank pain.   Musculoskeletal: Negative for arthralgias and joint swelling.   Skin: Negative for pallor and rash.   Allergic/Immunologic: Negative for environmental allergies and food allergies.   Neurological: Negative for dizziness, weakness and numbness.   Hematological: Negative for adenopathy. Does not bruise/bleed easily.   Psychiatric/Behavioral: Negative for behavioral problems and dysphoric mood. The patient is not nervous/anxious.          Current Outpatient Prescriptions:   •  Alcohol Swabs (ALCOHOL WIPES) 70 % pads, 1 pad 3 (Three) Times a Day.,  Disp: 100 each, Rfl: 11  •  cephalexin (KEFLEX) 500 MG capsule, , Disp: , Rfl: 11  •  doxazosin (CARDURA) 4 MG tablet, Take 1 tablet by mouth 2 (Two) Times a Day., Disp: 180 tablet, Rfl: 3  •  finasteride (PROSCAR) 5 MG tablet, Take 1 tablet by mouth Daily., Disp: 90 tablet, Rfl: 3  •  levothyroxine (SYNTHROID, LEVOTHROID) 50 MCG tablet, TAKE ONE TABLET BY MOUTH EVERY DAY, Disp: 30 tablet, Rfl: 2    Objective     Blood pressure 115/76, pulse 89, temperature 97.7 °F (36.5 °C), temperature source Oral, resp. rate 16, weight 166 lb (75.3 kg), SpO2 96 %.    Physical Exam   Constitutional: He is oriented to person, place, and time. He appears well-developed and well-nourished. No distress.   HENT:   Head: Normocephalic and atraumatic.   Right Ear: External ear normal.   Left Ear: External ear normal.   Nose: Nose normal.   Mouth/Throat: Oropharynx is clear and moist.   Eyes: Conjunctivae and EOM are normal. Pupils are equal, round, and reactive to light.   Neck: Normal range of motion. Neck supple. No thyromegaly present.   Cardiovascular: Normal rate, regular rhythm and normal heart sounds.    Pulmonary/Chest: Effort normal. No respiratory distress.   Abdominal: Soft. He exhibits no distension. There is no tenderness. There is no guarding.   Musculoskeletal: Normal range of motion. He exhibits no edema.   Lymphadenopathy:     He has no cervical adenopathy.   Neurological: He is alert and oriented to person, place, and time.   No gross motor or sensory deficits   Skin: Skin is warm and dry. He is not diaphoretic. No erythema.   Psychiatric: He has a normal mood and affect.   Nursing note and vitals reviewed.      Results for orders placed or performed in visit on 09/25/17   Comprehensive Metabolic Panel   Result Value Ref Range    Glucose 98 74 - 98 mg/dL    BUN 34 (H) 7 - 20 mg/dL    Creatinine 1.90 (H) 0.60 - 1.30 mg/dL    eGFR Non African Am 35 (L) >60 mL/min/1.73    eGFR African Am 43 (L) >60 mL/min/1.73     BUN/Creatinine Ratio 17.9 6.3 - 21.9    Sodium 145 137 - 145 mmol/L    Potassium 5.3 (H) 3.5 - 5.1 mmol/L    Chloride 108 (H) 98 - 107 mmol/L    Total CO2 25.0 (L) 26.0 - 30.0 mmol/L    Calcium 9.7 8.4 - 10.2 mg/dL    Total Protein 6.9 6.3 - 8.2 g/dL    Albumin 4.50 3.50 - 5.00 g/dL    Globulin 2.4 gm/dL    A/G Ratio 1.9 1.0 - 2.0 g/dL    Total Bilirubin 0.7 0.2 - 1.3 mg/dL    Alkaline Phosphatase 83 38 - 126 U/L    AST (SGOT) 21 15 - 46 U/L    ALT (SGPT) 28 13 - 69 U/L   TSH   Result Value Ref Range    TSH 3.820 0.470 - 4.680 mIU/mL   CBC & Differential   Result Value Ref Range    WBC 7.79 4.80 - 10.80 10*3/mm3    RBC 4.39 (L) 4.70 - 6.10 10*6/mm3    Hemoglobin 13.9 (L) 14.0 - 18.0 g/dL    Hematocrit 42.4 42.0 - 52.0 %    MCV 96.6 (H) 80.0 - 94.0 fL    MCH 31.7 (H) 27.0 - 31.0 pg    MCHC 32.8 30.0 - 37.0 g/dL    RDW 13.0 11.5 - 14.5 %    Platelets 249 130 - 400 10*3/mm3    Neutrophil Rel % 68.4 37.0 - 80.0 %    Lymphocyte Rel % 19.8 10.0 - 50.0 %    Monocyte Rel % 8.3 0.0 - 12.0 %    Eosinophil Rel % 2.2 0.0 - 7.0 %    Basophil Rel % 0.9 0.0 - 2.5 %    Neutrophils Absolute 5.33 2.00 - 6.90 10*3/mm3    Lymphocytes Absolute 1.54 0.60 - 3.40 10*3/mm3    Monocytes Absolute 0.65 0.00 - 0.90 10*3/mm3    Eosinophils Absolute 0.17 0.00 - 0.70 10*3/mm3    Basophils Absolute 0.07 0.00 - 0.20 10*3/mm3    Immature Granulocyte Rel % 0.4 0.0 - 0.6 %    Immature Grans Absolute 0.03 0.00 - 0.06 10*3/mm3    nRBC 0.0 0.0 - 0.0 /100 WBC         Assessment/Plan   Nestor was seen today for hypothyroidism.    Diagnoses and all orders for this visit:    Benign essential hypertension    Adult onset hypothyroidism  -     TSH    Chronic kidney disease, stage II (mild)  -     CBC & Differential  -     Comprehensive Metabolic Panel    Other orders  -     Cancel: Ferritin      Plan:  1. Benign essential hypertension: Improved.  Patient is on Cardura for his prostate and it is helping him with   His blood pressure will, monitor vitals and obtain  labs  2.  CRI, stage II: We will obtain labs  3.  Hypothyroidism: We will obtain labs           Roxanne Slater MD

## 2017-09-29 DIAGNOSIS — N18.2 CHRONIC KIDNEY DISEASE, STAGE II (MILD): Primary | ICD-10-CM

## 2017-09-29 RX ORDER — LEVOTHYROXINE SODIUM 0.05 MG/1
50 TABLET ORAL DAILY
Qty: 90 TABLET | Refills: 3 | Status: SHIPPED | OUTPATIENT
Start: 2017-09-29 | End: 2018-10-04 | Stop reason: SDUPTHER

## 2017-09-29 NOTE — PROGRESS NOTES
tsh ok , refilled thyroid medication , slight anemia may be related to his kidneys, fax labs to dr chew

## 2017-10-11 ENCOUNTER — PROCEDURE VISIT (OUTPATIENT)
Dept: UROLOGY | Facility: CLINIC | Age: 72
End: 2017-10-11

## 2017-10-11 DIAGNOSIS — R33.9 URINARY RETENTION: ICD-10-CM

## 2017-10-11 PROCEDURE — 51702 INSERT TEMP BLADDER CATH: CPT | Performed by: UROLOGY

## 2017-10-11 PROCEDURE — A5112 URINARY LEG BAG: HCPCS | Performed by: UROLOGY

## 2017-11-15 ENCOUNTER — PROCEDURE VISIT (OUTPATIENT)
Dept: UROLOGY | Facility: CLINIC | Age: 72
End: 2017-11-15

## 2017-11-15 DIAGNOSIS — R33.9 URINARY RETENTION: Primary | ICD-10-CM

## 2017-11-15 PROCEDURE — 51702 INSERT TEMP BLADDER CATH: CPT | Performed by: UROLOGY

## 2017-12-13 DIAGNOSIS — N40.1 BENIGN PROSTATIC HYPERPLASIA WITH URINARY OBSTRUCTION: ICD-10-CM

## 2017-12-13 DIAGNOSIS — N13.8 BENIGN PROSTATIC HYPERPLASIA WITH URINARY OBSTRUCTION: ICD-10-CM

## 2017-12-14 RX ORDER — FINASTERIDE 5 MG/1
TABLET, FILM COATED ORAL
Qty: 90 TABLET | Refills: 3 | Status: SHIPPED | OUTPATIENT
Start: 2017-12-14 | End: 2018-11-28 | Stop reason: SDUPTHER

## 2017-12-19 DIAGNOSIS — N39.0 CHRONIC UTI (URINARY TRACT INFECTION): Primary | ICD-10-CM

## 2017-12-20 ENCOUNTER — PROCEDURE VISIT (OUTPATIENT)
Dept: UROLOGY | Facility: CLINIC | Age: 72
End: 2017-12-20

## 2017-12-20 VITALS
TEMPERATURE: 98 F | OXYGEN SATURATION: 96 % | DIASTOLIC BLOOD PRESSURE: 71 MMHG | SYSTOLIC BLOOD PRESSURE: 118 MMHG | RESPIRATION RATE: 16 BRPM | HEART RATE: 71 BPM

## 2017-12-20 DIAGNOSIS — R33.9 URINARY RETENTION: Primary | ICD-10-CM

## 2017-12-20 PROCEDURE — 51702 INSERT TEMP BLADDER CATH: CPT | Performed by: UROLOGY

## 2017-12-20 RX ORDER — CEPHALEXIN 500 MG/1
CAPSULE ORAL
Qty: 30 CAPSULE | Refills: 11 | Status: SHIPPED | OUTPATIENT
Start: 2017-12-20 | End: 2018-02-26

## 2017-12-20 NOTE — PROGRESS NOTES
Patient came in for his monthly stacy cath change. Patient is doing well and tolerated the cath change well, he will return in 4 weeks for cath change.

## 2018-01-19 ENCOUNTER — PROCEDURE VISIT (OUTPATIENT)
Dept: UROLOGY | Facility: CLINIC | Age: 73
End: 2018-01-19

## 2018-01-19 ENCOUNTER — LAB (OUTPATIENT)
Dept: LAB | Facility: HOSPITAL | Age: 73
End: 2018-01-19

## 2018-01-19 VITALS
WEIGHT: 165 LBS | TEMPERATURE: 98.2 F | OXYGEN SATURATION: 99 % | RESPIRATION RATE: 18 BRPM | BODY MASS INDEX: 24.37 KG/M2 | SYSTOLIC BLOOD PRESSURE: 117 MMHG | DIASTOLIC BLOOD PRESSURE: 65 MMHG | HEART RATE: 78 BPM

## 2018-01-19 DIAGNOSIS — N40.2 PROSTATE NODULE: ICD-10-CM

## 2018-01-19 DIAGNOSIS — E78.2 MIXED HYPERLIPIDEMIA: ICD-10-CM

## 2018-01-19 DIAGNOSIS — R33.9 URINARY RETENTION: Primary | ICD-10-CM

## 2018-01-19 LAB
BASOPHILS # BLD AUTO: 0.1 10*3/MM3 (ref 0–0.2)
BASOPHILS NFR BLD AUTO: 1 % (ref 0–2.5)
DEPRECATED RDW RBC AUTO: 43.8 FL (ref 37–54)
EOSINOPHIL # BLD AUTO: 0.35 10*3/MM3 (ref 0–0.7)
EOSINOPHIL NFR BLD AUTO: 3.4 % (ref 0–7)
ERYTHROCYTE [DISTWIDTH] IN BLOOD BY AUTOMATED COUNT: 12.6 % (ref 11.5–14.5)
HCT VFR BLD AUTO: 41.6 % (ref 42–52)
HGB BLD-MCNC: 13.9 G/DL (ref 14–18)
IMM GRANULOCYTES # BLD: 0.04 10*3/MM3 (ref 0–0.06)
IMM GRANULOCYTES NFR BLD: 0.4 % (ref 0–0.6)
LYMPHOCYTES # BLD AUTO: 2.18 10*3/MM3 (ref 0.6–3.4)
LYMPHOCYTES NFR BLD AUTO: 21.2 % (ref 10–50)
MCH RBC QN AUTO: 31.7 PG (ref 27–31)
MCHC RBC AUTO-ENTMCNC: 33.4 G/DL (ref 30–37)
MCV RBC AUTO: 95 FL (ref 80–94)
MONOCYTES # BLD AUTO: 0.91 10*3/MM3 (ref 0–0.9)
MONOCYTES NFR BLD AUTO: 8.8 % (ref 0–12)
NEUTROPHILS # BLD AUTO: 6.71 10*3/MM3 (ref 2–6.9)
NEUTROPHILS NFR BLD AUTO: 65.2 % (ref 37–80)
NRBC BLD MANUAL-RTO: 0 /100 WBC (ref 0–0)
PLATELET # BLD AUTO: 244 10*3/MM3 (ref 130–400)
PMV BLD AUTO: 10.5 FL (ref 6–12)
RBC # BLD AUTO: 4.38 10*6/MM3 (ref 4.7–6.1)
WBC NRBC COR # BLD: 10.29 10*3/MM3 (ref 4.8–10.8)

## 2018-01-19 PROCEDURE — 99214 OFFICE O/P EST MOD 30 MIN: CPT | Performed by: UROLOGY

## 2018-01-19 PROCEDURE — 85025 COMPLETE CBC W/AUTO DIFF WBC: CPT

## 2018-01-19 PROCEDURE — 36415 COLL VENOUS BLD VENIPUNCTURE: CPT

## 2018-01-19 RX ORDER — ALLOPURINOL 100 MG/1
TABLET ORAL
COMMUNITY
Start: 2017-12-28 | End: 2019-01-30

## 2018-01-22 ENCOUNTER — OFFICE VISIT (OUTPATIENT)
Dept: INTERNAL MEDICINE | Facility: CLINIC | Age: 73
End: 2018-01-22

## 2018-01-22 VITALS
HEART RATE: 89 BPM | DIASTOLIC BLOOD PRESSURE: 83 MMHG | RESPIRATION RATE: 16 BRPM | TEMPERATURE: 98.3 F | SYSTOLIC BLOOD PRESSURE: 123 MMHG | WEIGHT: 177 LBS | OXYGEN SATURATION: 97 % | BODY MASS INDEX: 26.14 KG/M2

## 2018-01-22 DIAGNOSIS — E78.00 PURE HYPERCHOLESTEROLEMIA: ICD-10-CM

## 2018-01-22 DIAGNOSIS — E03.8 ADULT ONSET HYPOTHYROIDISM: ICD-10-CM

## 2018-01-22 DIAGNOSIS — I10 BENIGN ESSENTIAL HYPERTENSION: Primary | ICD-10-CM

## 2018-01-22 PROCEDURE — 99214 OFFICE O/P EST MOD 30 MIN: CPT | Performed by: INTERNAL MEDICINE

## 2018-01-22 RX ORDER — FERROUS SULFATE 325(65) MG
325 TABLET ORAL
COMMUNITY

## 2018-01-22 NOTE — PROGRESS NOTES
Subjective   Nestor Nye is a 72 y.o. male.     Chief Complaint   Patient presents with   • Hypertension   • Hypothyroidism   • Hyperlipidemia       History of Present Illness   Hpi:  Patient is here to follow up  on the  blood pressure and cholesterol, thyroid     . The patient  is taking the blood pressure  medications as prescribed and  has had no side effects. The patient  also needs refills on  medications . The patient  is also here to follow up  on  the  cholesterol  and  is trying to follow a diet , he saw dr frances and has a foleys in place , he has an appointment with nephrology    The following portions of the patient's history were reviewed and updated as appropriate: allergies, current medications, past family history, past medical history, past social history, past surgical history and problem list.    Review of Systems   Constitutional: Negative for appetite change, fatigue and fever.   HENT: Negative for congestion, ear discharge, ear pain, sinus pressure and sore throat.    Eyes: Negative for pain and discharge.   Respiratory: Negative for cough, shortness of breath and wheezing.    Cardiovascular: Negative for chest pain, palpitations and leg swelling.   Gastrointestinal: Negative for abdominal pain, constipation, diarrhea, nausea and vomiting.   Endocrine: Negative for cold intolerance and heat intolerance.   Genitourinary: Negative for dysuria and flank pain.   Musculoskeletal: Negative for arthralgias and joint swelling.   Skin: Negative for pallor and rash.   Allergic/Immunologic: Negative for environmental allergies and food allergies.   Neurological: Negative for dizziness, weakness and numbness.   Hematological: Negative for adenopathy. Does not bruise/bleed easily.   Psychiatric/Behavioral: Negative for behavioral problems and dysphoric mood. The patient is not nervous/anxious.          Current Outpatient Prescriptions:   •  Alcohol Swabs (ALCOHOL WIPES) 70 % pads, 1 pad 3 (Three)  Times a Day., Disp: 100 each, Rfl: 11  •  allopurinol (ZYLOPRIM) 100 MG tablet, , Disp: , Rfl:   •  cephalexin (KEFLEX) 500 MG capsule, TAKE ONE CAPSULE EVERY DAY, Disp: 30 capsule, Rfl: 11  •  doxazosin (CARDURA) 4 MG tablet, Take 1 tablet by mouth 2 (Two) Times a Day., Disp: 180 tablet, Rfl: 3  •  ferrous sulfate 325 (65 FE) MG tablet, Take 325 mg by mouth Daily With Breakfast., Disp: , Rfl:   •  finasteride (PROSCAR) 5 MG tablet, TAKE ONE TABLET EVERY DAY, Disp: 90 tablet, Rfl: 3  •  levothyroxine (SYNTHROID, LEVOTHROID) 50 MCG tablet, Take 1 tablet by mouth Daily., Disp: 90 tablet, Rfl: 3    Objective     Blood pressure 123/83, pulse 89, temperature 98.3 °F (36.8 °C), temperature source Oral, resp. rate 16, weight 80.3 kg (177 lb), SpO2 97 %.    Physical Exam   Constitutional: He is oriented to person, place, and time. He appears well-developed and well-nourished. No distress.   HENT:   Head: Normocephalic and atraumatic.   Right Ear: External ear normal.   Left Ear: External ear normal.   Nose: Nose normal.   Mouth/Throat: Oropharynx is clear and moist.   Eyes: Conjunctivae and EOM are normal. Pupils are equal, round, and reactive to light.   Neck: Normal range of motion. Neck supple. No thyromegaly present.   Cardiovascular: Normal rate, regular rhythm and normal heart sounds.    Pulmonary/Chest: Effort normal. No respiratory distress.   Abdominal: Soft. He exhibits no distension. There is no tenderness. There is no guarding.    Gillespie with cathetor   Musculoskeletal: Normal range of motion. He exhibits no edema.   Lymphadenopathy:     He has no cervical adenopathy.   Neurological: He is alert and oriented to person, place, and time.   No gross motor or sensory deficits   Skin: Skin is warm and dry. He is not diaphoretic. No erythema.   Psychiatric: He has a normal mood and affect.   Nursing note and vitals reviewed.      Results for orders placed or performed in visit on 01/19/18   CBC Auto Differential    Result Value Ref Range    WBC 10.29 4.80 - 10.80 10*3/mm3    RBC 4.38 (L) 4.70 - 6.10 10*6/mm3    Hemoglobin 13.9 (L) 14.0 - 18.0 g/dL    Hematocrit 41.6 (L) 42.0 - 52.0 %    MCV 95.0 (H) 80.0 - 94.0 fL    MCH 31.7 (H) 27.0 - 31.0 pg    MCHC 33.4 30.0 - 37.0 g/dL    RDW 12.6 11.5 - 14.5 %    RDW-SD 43.8 37.0 - 54.0 fl    MPV 10.5 6.0 - 12.0 fL    Platelets 244 130 - 400 10*3/mm3    Neutrophil % 65.2 37.0 - 80.0 %    Lymphocyte % 21.2 10.0 - 50.0 %    Monocyte % 8.8 0.0 - 12.0 %    Eosinophil % 3.4 0.0 - 7.0 %    Basophil % 1.0 0.0 - 2.5 %    Immature Grans % 0.4 0.0 - 0.6 %    Neutrophils, Absolute 6.71 2.00 - 6.90 10*3/mm3    Lymphocytes, Absolute 2.18 0.60 - 3.40 10*3/mm3    Monocytes, Absolute 0.91 (H) 0.00 - 0.90 10*3/mm3    Eosinophils, Absolute 0.35 0.00 - 0.70 10*3/mm3    Basophils, Absolute 0.10 0.00 - 0.20 10*3/mm3    Immature Grans, Absolute 0.04 0.00 - 0.06 10*3/mm3    nRBC 0.0 0.0 - 0.0 /100 WBC         Assessment/Plan   Nestor was seen today for hypertension, hypothyroidism and hyperlipidemia.    Diagnoses and all orders for this visit:    Benign essential hypertension    Pure hypercholesterolemia  -     CBC & Differential  -     Comprehensive Metabolic Panel  -     Lipid Panel    Adult onset hypothyroidism  -     TSH    Plan:  1.  Benign essential hypertension: Will continue current medication, low-sodium diet advised  2.mixed hyperlipidemia: We will obtain fasting CMP and lipid panel.  Diet and excise counseled, will continue current medication  3.  Hypothyroidism: We will obtain TSH and continue levothyroxine             Roxanne Slater MD

## 2018-02-09 RX ORDER — ALLOPURINOL 100 MG/1
TABLET ORAL
Qty: 30 TABLET | Refills: 6 | Status: SHIPPED | OUTPATIENT
Start: 2018-02-09 | End: 2018-05-23 | Stop reason: SDUPTHER

## 2018-02-21 ENCOUNTER — PROCEDURE VISIT (OUTPATIENT)
Dept: UROLOGY | Facility: CLINIC | Age: 73
End: 2018-02-21

## 2018-02-21 DIAGNOSIS — R33.9 URINARY RETENTION: Primary | ICD-10-CM

## 2018-02-21 PROCEDURE — 51702 INSERT TEMP BLADDER CATH: CPT | Performed by: UROLOGY

## 2018-02-21 NOTE — PROGRESS NOTES
Patient came in for monthly cath change and tolerated the procedure well. Patient will return in 1 month for his next cath change.

## 2018-02-26 ENCOUNTER — OFFICE VISIT (OUTPATIENT)
Dept: INTERNAL MEDICINE | Facility: CLINIC | Age: 73
End: 2018-02-26

## 2018-02-26 VITALS
RESPIRATION RATE: 16 BRPM | HEART RATE: 84 BPM | WEIGHT: 178 LBS | BODY MASS INDEX: 26.29 KG/M2 | OXYGEN SATURATION: 98 % | DIASTOLIC BLOOD PRESSURE: 73 MMHG | SYSTOLIC BLOOD PRESSURE: 114 MMHG | TEMPERATURE: 98 F

## 2018-02-26 DIAGNOSIS — Z00.00 ROUTINE GENERAL MEDICAL EXAMINATION AT A HEALTH CARE FACILITY: Primary | ICD-10-CM

## 2018-02-26 DIAGNOSIS — E03.8 ADULT ONSET HYPOTHYROIDISM: ICD-10-CM

## 2018-02-26 PROCEDURE — G0438 PPPS, INITIAL VISIT: HCPCS | Performed by: INTERNAL MEDICINE

## 2018-02-26 RX ORDER — CEPHALEXIN 500 MG/1
500 CAPSULE ORAL DAILY
COMMUNITY
End: 2018-06-06

## 2018-02-26 NOTE — PATIENT INSTRUCTIONS

## 2018-02-26 NOTE — PROGRESS NOTES
QUICK REFERENCE INFORMATION:  The ABCs of the Annual Wellness Visit    Initial Medicare Wellness Visit    HEALTH RISK ASSESSMENT    1945    Recent Hospitalizations:  No hospitalization(s) within the last year..        Current Medical Providers:  Patient Care Team:  Roxanne Slater MD as PCP - General (Geriatric Medicine)        Smoking Status:  History   Smoking Status   • Former Smoker   Smokeless Tobacco   • Not on file     Comment: Quit 2013       Alcohol Consumption:  History   Alcohol use Not on file       Depression Screen:   PHQ-2/PHQ-9 Depression Screening 2/26/2018   Little interest or pleasure in doing things 0   Feeling down, depressed, or hopeless 1   Total Score 1       Health Habits and Functional and Cognitive Screening:  Functional & Cognitive Status 2/26/2018   Do you have difficulty preparing food and eating? No   Do you have difficulty bathing yourself, getting dressed or grooming yourself? No   Do you have difficulty using the toilet? No   Do you have difficulty moving around from place to place? No   Do you have trouble with steps or getting out of a bed or a chair? No   In the past year have you fallen or experienced a near fall? No   Current Diet Well Balanced Diet   Dental Exam Not up to date   Eye Exam Not up to date   Exercise (times per week) 2 times per week   Current Exercise Activities Include Yard Work   Do you need help using the phone?  No   Are you deaf or do you have serious difficulty hearing?  No   Do you need help with transportation? Yes   Do you need help shopping? Yes   Do you need help preparing meals?  No   Do you need help with housework?  No   Do you need help with laundry? No   Do you need help taking your medications? Yes   Do you need help managing money? No   Have you felt unusual stress, anger or loneliness in the last month? Yes   Who do you live with? Alone   If you need help, do you have trouble finding someone available to you? No   Have you been bothered in  the last four weeks by sexual problems? No   Do you have difficulty concentrating, remembering or making decisions? No           Does the patient have evidence of cognitive impairment? No    Asiprin use counseling: Does not need ASA (and currently is not on it)      Recent Lab Results:  Reviewed labs  Visual Acuity:  No exam data present  [facility]    Age-appropriate Screening Schedule:  Refer to the list below for future screening recommendations based on patient's age, sex and/or medical conditions. Orders for these recommended tests are listed in the plan section. The patient has been provided with a written plan.    Health Maintenance   Topic Date Due   • TDAP/TD VACCINES (1 - Tdap) 11/11/1965   • ZOSTER VACCINE  11/04/2016   • PNEUMOCOCCAL VACCINES (65+ LOW/MEDIUM RISK) (2 of 2 - PCV13) 04/04/2018   • LIPID PANEL  05/17/2018   • COLONOSCOPY  04/04/2027   • INFLUENZA VACCINE  Completed        Subjective    Chief Complaint   Patient presents with   • Annual Exam   • Hypothyroidism       History of Present Illness    Nestor Nye is a 72 y.o. male who presents for an  Initial Annual Wellness Visit.Patient is also to follow-up on his thyroid.  He takes his medications as prescribed.  His labs have been reviewed today    The following portions of the patient's history were reviewed and updated as appropriate: allergies, current medications, past family history, past medical history, past social history, past surgical history and problem list.    Outpatient Medications Prior to Visit   Medication Sig Dispense Refill   • Alcohol Swabs (ALCOHOL WIPES) 70 % pads 1 pad 3 (Three) Times a Day. 100 each 11   • allopurinol (ZYLOPRIM) 100 MG tablet      • allopurinol (ZYLOPRIM) 100 MG tablet TAKE ONE TABLET BY MOUTH EVERY DAY 30 tablet 6   • doxazosin (CARDURA) 4 MG tablet Take 1 tablet by mouth 2 (Two) Times a Day. 180 tablet 3   • ferrous sulfate 325 (65 FE) MG tablet Take 325 mg by mouth Daily With  Breakfast.     • finasteride (PROSCAR) 5 MG tablet TAKE ONE TABLET EVERY DAY 90 tablet 3   • levothyroxine (SYNTHROID, LEVOTHROID) 50 MCG tablet Take 1 tablet by mouth Daily. 90 tablet 3   • cephalexin (KEFLEX) 500 MG capsule TAKE ONE CAPSULE EVERY DAY 30 capsule 11     No facility-administered medications prior to visit.        Patient Active Problem List   Diagnosis   • BPH (benign prostatic hyperplasia)   • Hypothyroidism   • Mixed hyperlipidemia   • Elevated PSA   • Benign essential hypertension       Advance Care Planning:  has NO advance directive - information provided to the patient today    Identification of Risk Factors:  Risk factors include: weight , unhealthy diet, cardiovascular risk, inactivity and polypharmacy.    Review of Systems   Constitutional: Negative for appetite change, fatigue and fever.   HENT: Negative for congestion, ear discharge, ear pain, sinus pressure and sore throat.    Eyes: Negative for pain and discharge.   Respiratory: Negative for cough, shortness of breath and wheezing.    Cardiovascular: Negative for chest pain, palpitations and leg swelling.   Gastrointestinal: Negative for abdominal pain, constipation, diarrhea, nausea and vomiting.   Endocrine: Negative for cold intolerance and heat intolerance.   Genitourinary: Negative for dysuria and flank pain.   Musculoskeletal: Negative for arthralgias and joint swelling.   Skin: Negative for pallor and rash.   Allergic/Immunologic: Negative for environmental allergies and food allergies.   Neurological: Negative for dizziness, weakness and numbness.   Hematological: Negative for adenopathy. Does not bruise/bleed easily.   Psychiatric/Behavioral: Negative for behavioral problems and dysphoric mood. The patient is not nervous/anxious.        Compared to one year ago, the patient feels his physical health is the same.  Compared to one year ago, the patient feels his mental health is the same.    Objective     Physical Exam    Constitutional: He is oriented to person, place, and time. He appears well-developed and well-nourished. No distress.   HENT:   Head: Normocephalic and atraumatic.   Right Ear: External ear normal.   Left Ear: External ear normal.   Nose: Nose normal.   Mouth/Throat: Oropharynx is clear and moist.   Eyes: Conjunctivae and EOM are normal. Pupils are equal, round, and reactive to light.   Neck: Normal range of motion. Neck supple. No thyromegaly present.   Cardiovascular: Normal rate, regular rhythm and normal heart sounds.    Pulmonary/Chest: Effort normal. No respiratory distress.   Abdominal: Soft. He exhibits no distension. There is no tenderness. There is no guarding.   Musculoskeletal: Normal range of motion. He exhibits no edema.   Lymphadenopathy:     He has no cervical adenopathy.   Neurological: He is alert and oriented to person, place, and time.   No gross motor or sensory deficits   Skin: Skin is warm and dry. He is not diaphoretic. No erythema.   Psychiatric: He has a normal mood and affect.   Nursing note and vitals reviewed.      Vitals:    02/26/18 1546   BP: 114/73   Pulse: 84   Resp: 16   Temp: 98 °F (36.7 °C)   TempSrc: Oral   SpO2: 98%   Weight: 80.7 kg (178 lb)       Body mass index is 26.29 kg/(m^2).  Discussed the patient's BMI with him. BMI is within normal parameters. No follow-up required.    Assessment/Plan   Patient Self-Management and Personalized Health Advice  The patient has been provided with information about: diet, exercise, weight management, prevention of cardiac or vascular disease and the relationship between weight and GERD and preventive services including:   · Diabetes screening, see lab orders, Exercise counseling provided, Fall Risk assessment done, Nutrition counseling provided.    Visit Diagnoses:    ICD-10-CM ICD-9-CM   1. Routine general medical examination at a health care facility Z00.00 V70.0   2. Adult onset hypothyroidism E03.8 244.8       plan:  1.physical:  Physical exam conducted today, reviewed fasting lab work,   Impression: healthy adult Patient. Currently, patient eats a healthy diet. Screening lab work includes glucose, lipid profile and 25-hydroxyvitamin D. The risks and benefits of immunizations were discussed and immunizations are up to date. Advice and education were given regarding nutrition and aerobic exercise. Patient discussion: discussed with the patient. , initial  Medicare Wellness Visit, with preventive exam as well as age and risk appropriate counseling completed.   Advance Directive Planning: paperwork and instructions were given to the patient.   Patient Discussion: plan discussed with the patient, follow-up visit needed in one year.   Medication Review and medication list updated  2. Hypothyroid : will get tsh and continue levothyroxine    No orders of the defined types were placed in this encounter.      Outpatient Encounter Prescriptions as of 2/26/2018   Medication Sig Dispense Refill   • Alcohol Swabs (ALCOHOL WIPES) 70 % pads 1 pad 3 (Three) Times a Day. 100 each 11   • allopurinol (ZYLOPRIM) 100 MG tablet      • allopurinol (ZYLOPRIM) 100 MG tablet TAKE ONE TABLET BY MOUTH EVERY DAY 30 tablet 6   • cephalexin (KEFLEX) 500 MG capsule Take 500 mg by mouth Daily.     • doxazosin (CARDURA) 4 MG tablet Take 1 tablet by mouth 2 (Two) Times a Day. 180 tablet 3   • ferrous sulfate 325 (65 FE) MG tablet Take 325 mg by mouth Daily With Breakfast.     • finasteride (PROSCAR) 5 MG tablet TAKE ONE TABLET EVERY DAY 90 tablet 3   • levothyroxine (SYNTHROID, LEVOTHROID) 50 MCG tablet Take 1 tablet by mouth Daily. 90 tablet 3   • [DISCONTINUED] cephalexin (KEFLEX) 500 MG capsule TAKE ONE CAPSULE EVERY DAY 30 capsule 11     No facility-administered encounter medications on file as of 2/26/2018.        Reviewed use of high risk medication in the elderly: yes  Reviewed for potential of harmful drug interactions in the elderly: yes    Follow Up:  Return in 1  year (on 2/26/2019) for Medicare Wellness.     An After Visit Summary and PPPS with all of these plans were given to the patient.

## 2018-02-27 ENCOUNTER — TELEPHONE (OUTPATIENT)
Dept: INTERNAL MEDICINE | Facility: CLINIC | Age: 73
End: 2018-02-27

## 2018-02-27 DIAGNOSIS — Z12.11 COLON CANCER SCREENING: Primary | ICD-10-CM

## 2018-03-01 ENCOUNTER — RESULTS ENCOUNTER (OUTPATIENT)
Dept: INTERNAL MEDICINE | Facility: CLINIC | Age: 73
End: 2018-03-01

## 2018-03-01 DIAGNOSIS — Z12.11 COLON CANCER SCREENING: ICD-10-CM

## 2018-03-22 ENCOUNTER — PROCEDURE VISIT (OUTPATIENT)
Dept: UROLOGY | Facility: CLINIC | Age: 73
End: 2018-03-22

## 2018-03-22 VITALS
OXYGEN SATURATION: 97 % | TEMPERATURE: 98.3 F | HEART RATE: 75 BPM | SYSTOLIC BLOOD PRESSURE: 126 MMHG | DIASTOLIC BLOOD PRESSURE: 74 MMHG | RESPIRATION RATE: 16 BRPM

## 2018-03-22 DIAGNOSIS — R33.9 URINARY RETENTION: Primary | ICD-10-CM

## 2018-03-22 PROCEDURE — 51702 INSERT TEMP BLADDER CATH: CPT | Performed by: UROLOGY

## 2018-04-18 ENCOUNTER — PROCEDURE VISIT (OUTPATIENT)
Dept: UROLOGY | Facility: CLINIC | Age: 73
End: 2018-04-18

## 2018-04-18 VITALS
HEART RATE: 73 BPM | DIASTOLIC BLOOD PRESSURE: 74 MMHG | SYSTOLIC BLOOD PRESSURE: 126 MMHG | TEMPERATURE: 98.5 F | OXYGEN SATURATION: 97 %

## 2018-04-18 DIAGNOSIS — R33.9 URINARY RETENTION: Primary | ICD-10-CM

## 2018-04-18 PROCEDURE — 51702 INSERT TEMP BLADDER CATH: CPT | Performed by: UROLOGY

## 2018-05-23 ENCOUNTER — PROCEDURE VISIT (OUTPATIENT)
Dept: UROLOGY | Facility: CLINIC | Age: 73
End: 2018-05-23

## 2018-05-23 ENCOUNTER — LAB (OUTPATIENT)
Dept: LAB | Facility: HOSPITAL | Age: 73
End: 2018-05-23

## 2018-05-23 VITALS
DIASTOLIC BLOOD PRESSURE: 74 MMHG | TEMPERATURE: 98.3 F | SYSTOLIC BLOOD PRESSURE: 126 MMHG | HEART RATE: 79 BPM | OXYGEN SATURATION: 97 %

## 2018-05-23 DIAGNOSIS — E78.2 MIXED HYPERLIPIDEMIA: ICD-10-CM

## 2018-05-23 DIAGNOSIS — R33.9 URINARY RETENTION: Primary | ICD-10-CM

## 2018-05-23 LAB
ALBUMIN SERPL-MCNC: 4.3 G/DL (ref 3.5–5)
ALBUMIN/GLOB SERPL: 1.7 G/DL (ref 1–2)
ALP SERPL-CCNC: 93 U/L (ref 38–126)
ALT SERPL W P-5'-P-CCNC: 29 U/L (ref 13–69)
ANION GAP SERPL CALCULATED.3IONS-SCNC: 18.2 MMOL/L (ref 10–20)
AST SERPL-CCNC: 19 U/L (ref 15–46)
BASOPHILS # BLD AUTO: 0.08 10*3/MM3 (ref 0–0.2)
BASOPHILS NFR BLD AUTO: 1.1 % (ref 0–2.5)
BILIRUB SERPL-MCNC: 0.6 MG/DL (ref 0.2–1.3)
BUN BLD-MCNC: 29 MG/DL (ref 7–20)
BUN/CREAT SERPL: 17.1 (ref 6.3–21.9)
CALCIUM SPEC-SCNC: 9.2 MG/DL (ref 8.4–10.2)
CHLORIDE SERPL-SCNC: 102 MMOL/L (ref 98–107)
CHOLEST SERPL-MCNC: 159 MG/DL (ref 0–199)
CO2 SERPL-SCNC: 27 MMOL/L (ref 26–30)
CREAT BLD-MCNC: 1.7 MG/DL (ref 0.6–1.3)
DEPRECATED RDW RBC AUTO: 44.9 FL (ref 37–54)
EOSINOPHIL # BLD AUTO: 0.26 10*3/MM3 (ref 0–0.7)
EOSINOPHIL NFR BLD AUTO: 3.6 % (ref 0–7)
ERYTHROCYTE [DISTWIDTH] IN BLOOD BY AUTOMATED COUNT: 12.8 % (ref 11.5–14.5)
GFR SERPL CREATININE-BSD FRML MDRD: 40 ML/MIN/1.73
GLOBULIN UR ELPH-MCNC: 2.5 GM/DL
GLUCOSE BLD-MCNC: 92 MG/DL (ref 74–98)
HCT VFR BLD AUTO: 41.4 % (ref 42–52)
HDLC SERPL-MCNC: 38 MG/DL (ref 40–60)
HGB BLD-MCNC: 14 G/DL (ref 14–18)
IMM GRANULOCYTES # BLD: 0.02 10*3/MM3 (ref 0–0.06)
IMM GRANULOCYTES NFR BLD: 0.3 % (ref 0–0.6)
LDLC SERPL CALC-MCNC: 97 MG/DL (ref 0–99)
LDLC/HDLC SERPL: 2.55 {RATIO}
LYMPHOCYTES # BLD AUTO: 1.77 10*3/MM3 (ref 0.6–3.4)
LYMPHOCYTES NFR BLD AUTO: 24.8 % (ref 10–50)
MCH RBC QN AUTO: 32.2 PG (ref 27–31)
MCHC RBC AUTO-ENTMCNC: 33.8 G/DL (ref 30–37)
MCV RBC AUTO: 95.2 FL (ref 80–94)
MONOCYTES # BLD AUTO: 0.71 10*3/MM3 (ref 0–0.9)
MONOCYTES NFR BLD AUTO: 9.9 % (ref 0–12)
NEUTROPHILS # BLD AUTO: 4.3 10*3/MM3 (ref 2–6.9)
NEUTROPHILS NFR BLD AUTO: 60.3 % (ref 37–80)
NRBC BLD MANUAL-RTO: 0 /100 WBC (ref 0–0)
PLATELET # BLD AUTO: 227 10*3/MM3 (ref 130–400)
PMV BLD AUTO: 10.7 FL (ref 6–12)
POTASSIUM BLD-SCNC: 5.2 MMOL/L (ref 3.5–5.1)
PROT SERPL-MCNC: 6.8 G/DL (ref 6.3–8.2)
RBC # BLD AUTO: 4.35 10*6/MM3 (ref 4.7–6.1)
SODIUM BLD-SCNC: 142 MMOL/L (ref 137–145)
TRIGL SERPL-MCNC: 121 MG/DL
TSH SERPL DL<=0.05 MIU/L-ACNC: 4.57 MIU/ML (ref 0.47–4.68)
VLDLC SERPL-MCNC: 24.2 MG/DL
WBC NRBC COR # BLD: 7.14 10*3/MM3 (ref 4.8–10.8)

## 2018-05-23 PROCEDURE — 84154 ASSAY OF PSA FREE: CPT | Performed by: UROLOGY

## 2018-05-23 PROCEDURE — 36415 COLL VENOUS BLD VENIPUNCTURE: CPT | Performed by: INTERNAL MEDICINE

## 2018-05-23 PROCEDURE — 84153 ASSAY OF PSA TOTAL: CPT | Performed by: UROLOGY

## 2018-05-23 PROCEDURE — 84443 ASSAY THYROID STIM HORMONE: CPT | Performed by: INTERNAL MEDICINE

## 2018-05-23 PROCEDURE — 80061 LIPID PANEL: CPT | Performed by: INTERNAL MEDICINE

## 2018-05-23 PROCEDURE — 80053 COMPREHEN METABOLIC PANEL: CPT | Performed by: INTERNAL MEDICINE

## 2018-05-23 PROCEDURE — 51702 INSERT TEMP BLADDER CATH: CPT | Performed by: UROLOGY

## 2018-05-23 PROCEDURE — 85025 COMPLETE CBC W/AUTO DIFF WBC: CPT

## 2018-05-24 LAB
PSA FREE MFR SERPL: 24.8 %
PSA FREE SERPL-MCNC: 1.09 NG/ML
PSA SERPL-MCNC: 4.4 NG/ML (ref 0–4)

## 2018-05-30 RX ORDER — DOXAZOSIN MESYLATE 4 MG/1
TABLET ORAL
Qty: 180 TABLET | Refills: 3 | Status: SHIPPED | OUTPATIENT
Start: 2018-05-30 | End: 2019-06-06 | Stop reason: SDUPTHER

## 2018-06-06 ENCOUNTER — OFFICE VISIT (OUTPATIENT)
Dept: INTERNAL MEDICINE | Facility: CLINIC | Age: 73
End: 2018-06-06

## 2018-06-06 VITALS
OXYGEN SATURATION: 97 % | SYSTOLIC BLOOD PRESSURE: 103 MMHG | BODY MASS INDEX: 26.58 KG/M2 | TEMPERATURE: 98 F | WEIGHT: 180 LBS | RESPIRATION RATE: 18 BRPM | HEART RATE: 90 BPM | DIASTOLIC BLOOD PRESSURE: 68 MMHG

## 2018-06-06 DIAGNOSIS — E78.2 MIXED HYPERLIPIDEMIA: Primary | ICD-10-CM

## 2018-06-06 DIAGNOSIS — E03.8 ADULT ONSET HYPOTHYROIDISM: ICD-10-CM

## 2018-06-06 DIAGNOSIS — N18.2 CHRONIC KIDNEY DISEASE, STAGE II (MILD): ICD-10-CM

## 2018-06-06 PROCEDURE — 99214 OFFICE O/P EST MOD 30 MIN: CPT | Performed by: INTERNAL MEDICINE

## 2018-06-06 NOTE — PROGRESS NOTES
Subjective   Nestor Nye is a 72 y.o. male.     Chief Complaint   Patient presents with   • Hyperlipidemia   • Hypothyroidism   • Chronic Kidney Disease       History of Present Illness   HPI:   Patient is here to follow-up on  the cri , he sees nephrology and urology , he has an indwelling stacy ,   Patient is also here to follow-up on   cholesterol and thyroid. Patient had labwork done    The following portions of the patient's history were reviewed and updated as appropriate: allergies, current medications, past family history, past medical history, past social history, past surgical history and problem list.    Review of Systems   Constitutional: Negative for appetite change, fatigue and fever.   HENT: Negative for congestion, ear discharge, ear pain, sinus pressure and sore throat.    Eyes: Negative for pain and discharge.   Respiratory: Negative for cough, shortness of breath and wheezing.    Cardiovascular: Negative for chest pain, palpitations and leg swelling.   Gastrointestinal: Negative for abdominal pain, constipation, diarrhea, nausea and vomiting.   Endocrine: Negative for cold intolerance and heat intolerance.   Genitourinary: Negative for dysuria and flank pain.   Musculoskeletal: Negative for arthralgias and joint swelling.   Skin: Negative for pallor and rash.   Allergic/Immunologic: Negative for environmental allergies and food allergies.   Neurological: Negative for dizziness, weakness and numbness.   Hematological: Negative for adenopathy. Does not bruise/bleed easily.   Psychiatric/Behavioral: Negative for behavioral problems and dysphoric mood. The patient is not nervous/anxious.          Current Outpatient Prescriptions:   •  Alcohol Swabs (ALCOHOL WIPES) 70 % pads, 1 pad 3 (Three) Times a Day., Disp: 100 each, Rfl: 11  •  allopurinol (ZYLOPRIM) 100 MG tablet, , Disp: , Rfl:   •  doxazosin (CARDURA) 4 MG tablet, TAKE ONE TABLET TWICE DAILY, Disp: 180 tablet, Rfl: 3  •  ferrous  sulfate 325 (65 FE) MG tablet, Take 325 mg by mouth Daily With Breakfast., Disp: , Rfl:   •  finasteride (PROSCAR) 5 MG tablet, TAKE ONE TABLET EVERY DAY, Disp: 90 tablet, Rfl: 3  •  levothyroxine (SYNTHROID, LEVOTHROID) 50 MCG tablet, Take 1 tablet by mouth Daily., Disp: 90 tablet, Rfl: 3    Objective     Blood pressure 103/68, pulse 90, temperature 98 °F (36.7 °C), temperature source Oral, resp. rate 18, weight 81.6 kg (180 lb), SpO2 97 %.    Physical Exam   Constitutional: He is oriented to person, place, and time. He appears well-developed and well-nourished. No distress.   HENT:   Head: Normocephalic and atraumatic.   Right Ear: External ear normal.   Left Ear: External ear normal.   Nose: Nose normal.   Mouth/Throat: Oropharynx is clear and moist.   Eyes: Conjunctivae and EOM are normal. Pupils are equal, round, and reactive to light.   Neck: Normal range of motion. Neck supple. No thyromegaly present.   Cardiovascular: Normal rate, regular rhythm and normal heart sounds.    Pulmonary/Chest: Effort normal. No respiratory distress.   Abdominal: Soft. He exhibits no distension. There is no tenderness. There is no guarding.   Musculoskeletal: Normal range of motion. He exhibits no edema.   Lymphadenopathy:     He has no cervical adenopathy.   Neurological: He is alert and oriented to person, place, and time.   No gross motor or sensory deficits   Skin: Skin is warm and dry. He is not diaphoretic. No erythema.   Psychiatric: He has a normal mood and affect.   Nursing note and vitals reviewed.      Results for orders placed or performed in visit on 05/23/18   CBC Auto Differential   Result Value Ref Range    WBC 7.14 4.80 - 10.80 10*3/mm3    RBC 4.35 (L) 4.70 - 6.10 10*6/mm3    Hemoglobin 14.0 14.0 - 18.0 g/dL    Hematocrit 41.4 (L) 42.0 - 52.0 %    MCV 95.2 (H) 80.0 - 94.0 fL    MCH 32.2 (H) 27.0 - 31.0 pg    MCHC 33.8 30.0 - 37.0 g/dL    RDW 12.8 11.5 - 14.5 %    RDW-SD 44.9 37.0 - 54.0 fl    MPV 10.7 6.0 - 12.0  fL    Platelets 227 130 - 400 10*3/mm3    Neutrophil % 60.3 37.0 - 80.0 %    Lymphocyte % 24.8 10.0 - 50.0 %    Monocyte % 9.9 0.0 - 12.0 %    Eosinophil % 3.6 0.0 - 7.0 %    Basophil % 1.1 0.0 - 2.5 %    Immature Grans % 0.3 0.0 - 0.6 %    Neutrophils, Absolute 4.30 2.00 - 6.90 10*3/mm3    Lymphocytes, Absolute 1.77 0.60 - 3.40 10*3/mm3    Monocytes, Absolute 0.71 0.00 - 0.90 10*3/mm3    Eosinophils, Absolute 0.26 0.00 - 0.70 10*3/mm3    Basophils, Absolute 0.08 0.00 - 0.20 10*3/mm3    Immature Grans, Absolute 0.02 0.00 - 0.06 10*3/mm3    nRBC 0.0 0.0 - 0.0 /100 WBC         Assessment/Plan   Nestor was seen today for hyperlipidemia, hypothyroidism and chronic kidney disease.    Diagnoses and all orders for this visit:    Mixed hyperlipidemia  -     CBC & Differential  -     Comprehensive Metabolic Panel  -     Lipid Panel    Adult onset hypothyroidism  -     TSH    Chronic kidney disease, stage II (mild)        Plan:  1. Cri stage 2 : labs reviewed, keep nephrology appointment    Creatinine 1.7  2. mixed hyperlipidemia: reviewed fasting CMP and lipid panel.  Diet and excise counseled, will continue current medication ,ldl 97  3.  Hypothyroid : reviewed labs   , continue levothyroxine         Roxanne Slater MD

## 2018-06-27 ENCOUNTER — PROCEDURE VISIT (OUTPATIENT)
Dept: UROLOGY | Facility: CLINIC | Age: 73
End: 2018-06-27

## 2018-06-27 DIAGNOSIS — R33.9 URINARY RETENTION: Primary | ICD-10-CM

## 2018-06-27 PROCEDURE — 51702 INSERT TEMP BLADDER CATH: CPT | Performed by: UROLOGY

## 2018-06-27 NOTE — PROGRESS NOTES
Patient came in for monthly cath change, patient tolerated the procedure well without any complications.

## 2018-07-18 ENCOUNTER — LAB (OUTPATIENT)
Dept: LAB | Facility: HOSPITAL | Age: 73
End: 2018-07-18

## 2018-07-18 ENCOUNTER — TRANSCRIBE ORDERS (OUTPATIENT)
Dept: LAB | Facility: HOSPITAL | Age: 73
End: 2018-07-18

## 2018-07-18 DIAGNOSIS — N18.30 CHRONIC KIDNEY DISEASE, STAGE III (MODERATE) (HCC): Primary | ICD-10-CM

## 2018-07-18 DIAGNOSIS — N18.30 CHRONIC KIDNEY DISEASE, STAGE III (MODERATE) (HCC): ICD-10-CM

## 2018-07-18 LAB
ALBUMIN SERPL-MCNC: 4.4 G/DL (ref 3.5–5)
ANION GAP SERPL CALCULATED.3IONS-SCNC: 11.1 MMOL/L (ref 10–20)
BUN BLD-MCNC: 25 MG/DL (ref 7–20)
BUN/CREAT SERPL: 14.7 (ref 6.3–21.9)
CALCIUM SPEC-SCNC: 9.2 MG/DL (ref 8.4–10.2)
CHLORIDE SERPL-SCNC: 105 MMOL/L (ref 98–107)
CO2 SERPL-SCNC: 29 MMOL/L (ref 26–30)
CREAT BLD-MCNC: 1.7 MG/DL (ref 0.6–1.3)
GFR SERPL CREATININE-BSD FRML MDRD: 40 ML/MIN/1.73
GLUCOSE BLD-MCNC: 57 MG/DL (ref 74–98)
PHOSPHATE SERPL-MCNC: 3.7 MG/DL (ref 2.5–4.5)
POTASSIUM BLD-SCNC: 4.1 MMOL/L (ref 3.5–5.1)
SODIUM BLD-SCNC: 141 MMOL/L (ref 137–145)
URATE SERPL-MCNC: 5.2 MG/DL (ref 2.5–8.5)

## 2018-07-18 PROCEDURE — 36415 COLL VENOUS BLD VENIPUNCTURE: CPT

## 2018-07-18 PROCEDURE — 83970 ASSAY OF PARATHORMONE: CPT

## 2018-07-18 PROCEDURE — 84550 ASSAY OF BLOOD/URIC ACID: CPT

## 2018-07-18 PROCEDURE — 80069 RENAL FUNCTION PANEL: CPT

## 2018-07-19 ENCOUNTER — PROCEDURE VISIT (OUTPATIENT)
Dept: UROLOGY | Facility: CLINIC | Age: 73
End: 2018-07-19

## 2018-07-19 VITALS
SYSTOLIC BLOOD PRESSURE: 100 MMHG | HEIGHT: 69 IN | DIASTOLIC BLOOD PRESSURE: 60 MMHG | OXYGEN SATURATION: 96 % | HEART RATE: 90 BPM | TEMPERATURE: 97.2 F

## 2018-07-19 DIAGNOSIS — N40.1 BPH WITH URINARY OBSTRUCTION: ICD-10-CM

## 2018-07-19 DIAGNOSIS — N13.8 BPH WITH URINARY OBSTRUCTION: ICD-10-CM

## 2018-07-19 DIAGNOSIS — N31.2 BLADDER, ATONIC: Primary | ICD-10-CM

## 2018-07-19 DIAGNOSIS — R33.9 URINARY RETENTION: ICD-10-CM

## 2018-07-19 LAB — PTH-INTACT SERPL-MCNC: 43 PG/ML (ref 15–65)

## 2018-07-19 PROCEDURE — 51702 INSERT TEMP BLADDER CATH: CPT | Performed by: UROLOGY

## 2018-07-19 PROCEDURE — 99213 OFFICE O/P EST LOW 20 MIN: CPT | Performed by: UROLOGY

## 2018-07-19 NOTE — PROGRESS NOTES
Chief Complaint  Urinary Retention (6 month follow-up with cath change) and Elevated PSA        HPI  Popeye is a 72 y.o. male who returns today for follow-up in urinary retention with an indwelling Gillespie catheter.  He had previously had a grossly overdistended bladder and had been using intermittent self catheter but enjoys the convenience of the permanent indwelling catheter and monthly catheter changes.  He is very active working in his garden and has minor irritation on the end of his penis from the Gillespie but no other complaints.    He's had a previous elevated PSA and underwent prostate biopsy by another urologist in the past which was fortunately negative for cancer.  His most recent checkup was in May with a total to free PSA indicating only 8% chance of cancer.  He's previously said he will never consent to another biopsy in any case.    Vitals:    07/19/18 1017   BP: 100/60   Pulse: 90   Temp: 97.2 °F (36.2 °C)   SpO2: 96%       Past Medical History  Past Medical History:   Diagnosis Date   • Acute maxillary sinusitis    • Vocal cord anomaly        Past Surgical History  Past Surgical History:   Procedure Laterality Date   • BACK SURGERY     • BRAIN SURGERY     • EYE SURGERY     • OTHER SURGICAL HISTORY      Arm Excision       Medications  has a current medication list which includes the following prescription(s): alcohol wipes, allopurinol, doxazosin, ferrous sulfate, finasteride, and levothyroxine.      Allergies  No Known Allergies    Social History  Social History     Social History Narrative   • No narrative on file       Family History  He has no family history of bladder or kidney cancer  He has no family history of kidney stones      AUA Symptom Score:      Review of Systems  Review of Systems   Constitutional: Negative.    Genitourinary: Positive for difficulty urinating and penile swelling.   All other systems reviewed and are negative.      Physical Exam  Physical Exam    Labs Recent and today  in the office:  Results for orders placed or performed in visit on 07/18/18   Renal Function Panel   Result Value Ref Range    Glucose 57 (L) 74 - 98 mg/dL    BUN 25 (H) 7 - 20 mg/dL    Creatinine 1.70 (H) 0.60 - 1.30 mg/dL    Sodium 141 137 - 145 mmol/L    Potassium 4.1 3.5 - 5.1 mmol/L    Chloride 105 98 - 107 mmol/L    CO2 29.0 26.0 - 30.0 mmol/L    Calcium 9.2 8.4 - 10.2 mg/dL    Albumin 4.40 3.50 - 5.00 g/dL    Phosphorus 3.7 2.5 - 4.5 mg/dL    Anion Gap 11.1 10.0 - 20.0 mmol/L    BUN/Creatinine Ratio 14.7 6.3 - 21.9    eGFR Non African Amer 40 (L) >60 mL/min/1.73   Uric Acid   Result Value Ref Range    Uric Acid 5.2 2.5 - 8.5 mg/dL         Assessment & Plan  #1 acute urinary retention: He has a permanent indwelling catheter which is being changed on a monthly basis.    #2 elevated PSA/enlarged prostate: Little risk of cancer at this point and it seems to be BPH    #3 neurogenic/atonic bladder: Patient will never void normally again but is happy with the catheter.  15 minutes is spent counseling the patient out of 20 minutes face time.

## 2018-08-15 ENCOUNTER — PROCEDURE VISIT (OUTPATIENT)
Dept: UROLOGY | Facility: CLINIC | Age: 73
End: 2018-08-15

## 2018-08-15 DIAGNOSIS — R35.0 URINARY FREQUENCY: Primary | ICD-10-CM

## 2018-08-15 DIAGNOSIS — R33.9 URINARY RETENTION: ICD-10-CM

## 2018-08-15 PROCEDURE — 51702 INSERT TEMP BLADDER CATH: CPT | Performed by: UROLOGY

## 2018-08-15 RX ORDER — CEPHALEXIN 500 MG/1
500 CAPSULE ORAL DAILY
Refills: 11 | COMMUNITY
Start: 2018-07-25 | End: 2019-01-30 | Stop reason: SDUPTHER

## 2018-08-15 NOTE — PROGRESS NOTES
Patient came in for monthly cath change, patient tolerated the procedure well and continues to take Keflex to prevent infection, patient will return in one month for cath change.

## 2018-09-19 ENCOUNTER — PROCEDURE VISIT (OUTPATIENT)
Dept: UROLOGY | Facility: CLINIC | Age: 73
End: 2018-09-19

## 2018-09-19 VITALS
DIASTOLIC BLOOD PRESSURE: 74 MMHG | SYSTOLIC BLOOD PRESSURE: 124 MMHG | OXYGEN SATURATION: 96 % | TEMPERATURE: 98.2 F | RESPIRATION RATE: 18 BRPM | HEART RATE: 66 BPM

## 2018-09-19 DIAGNOSIS — R33.9 URINARY RETENTION: Primary | ICD-10-CM

## 2018-09-19 PROCEDURE — 51702 INSERT TEMP BLADDER CATH: CPT | Performed by: UROLOGY

## 2018-09-19 NOTE — PROGRESS NOTES
Patient came in for stacy cath change, patient tolerated well and will return in 4 weeks for his next cath change.

## 2018-10-04 RX ORDER — LEVOTHYROXINE SODIUM 0.05 MG/1
TABLET ORAL
Qty: 90 TABLET | Refills: 1 | Status: SHIPPED | OUTPATIENT
Start: 2018-10-04 | End: 2018-10-19 | Stop reason: SDUPTHER

## 2018-10-17 ENCOUNTER — LAB (OUTPATIENT)
Dept: LAB | Facility: HOSPITAL | Age: 73
End: 2018-10-17

## 2018-10-17 ENCOUNTER — PROCEDURE VISIT (OUTPATIENT)
Dept: UROLOGY | Facility: CLINIC | Age: 73
End: 2018-10-17

## 2018-10-17 VITALS
DIASTOLIC BLOOD PRESSURE: 76 MMHG | BODY MASS INDEX: 26.66 KG/M2 | WEIGHT: 180 LBS | SYSTOLIC BLOOD PRESSURE: 134 MMHG | OXYGEN SATURATION: 97 % | HEIGHT: 69 IN | HEART RATE: 69 BPM | TEMPERATURE: 98.2 F

## 2018-10-17 DIAGNOSIS — R33.9 URINARY RETENTION: Primary | ICD-10-CM

## 2018-10-17 DIAGNOSIS — E78.2 MIXED HYPERLIPIDEMIA: ICD-10-CM

## 2018-10-17 LAB
ALBUMIN SERPL-MCNC: 4.3 G/DL (ref 3.5–5)
ALBUMIN/GLOB SERPL: 1.7 G/DL (ref 1–2)
ALP SERPL-CCNC: 70 U/L (ref 38–126)
ALT SERPL W P-5'-P-CCNC: 31 U/L (ref 13–69)
ANION GAP SERPL CALCULATED.3IONS-SCNC: 7.1 MMOL/L (ref 10–20)
AST SERPL-CCNC: 24 U/L (ref 15–46)
BASOPHILS # BLD AUTO: 0.1 10*3/MM3 (ref 0–0.2)
BASOPHILS NFR BLD AUTO: 1.4 % (ref 0–2.5)
BILIRUB SERPL-MCNC: 0.9 MG/DL (ref 0.2–1.3)
BUN BLD-MCNC: 28 MG/DL (ref 7–20)
BUN/CREAT SERPL: 15.6 (ref 6.3–21.9)
CALCIUM SPEC-SCNC: 9 MG/DL (ref 8.4–10.2)
CHLORIDE SERPL-SCNC: 105 MMOL/L (ref 98–107)
CHOLEST SERPL-MCNC: 151 MG/DL (ref 0–199)
CO2 SERPL-SCNC: 29 MMOL/L (ref 26–30)
CREAT BLD-MCNC: 1.8 MG/DL (ref 0.6–1.3)
DEPRECATED RDW RBC AUTO: 45.2 FL (ref 37–54)
EOSINOPHIL # BLD AUTO: 0.36 10*3/MM3 (ref 0–0.7)
EOSINOPHIL NFR BLD AUTO: 4.9 % (ref 0–7)
ERYTHROCYTE [DISTWIDTH] IN BLOOD BY AUTOMATED COUNT: 12.6 % (ref 11.5–14.5)
GFR SERPL CREATININE-BSD FRML MDRD: 37 ML/MIN/1.73
GLOBULIN UR ELPH-MCNC: 2.6 GM/DL
GLUCOSE BLD-MCNC: 144 MG/DL (ref 74–98)
HCT VFR BLD AUTO: 44 % (ref 42–52)
HDLC SERPL-MCNC: 38 MG/DL (ref 40–60)
HGB BLD-MCNC: 14.6 G/DL (ref 14–18)
IMM GRANULOCYTES # BLD: 0.03 10*3/MM3 (ref 0–0.06)
IMM GRANULOCYTES NFR BLD: 0.4 % (ref 0–0.6)
LDLC SERPL CALC-MCNC: 88 MG/DL (ref 0–99)
LDLC/HDLC SERPL: 2.33 {RATIO}
LYMPHOCYTES # BLD AUTO: 1.83 10*3/MM3 (ref 0.6–3.4)
LYMPHOCYTES NFR BLD AUTO: 25 % (ref 10–50)
MCH RBC QN AUTO: 32.4 PG (ref 27–31)
MCHC RBC AUTO-ENTMCNC: 33.2 G/DL (ref 30–37)
MCV RBC AUTO: 97.6 FL (ref 80–94)
MONOCYTES # BLD AUTO: 0.73 10*3/MM3 (ref 0–0.9)
MONOCYTES NFR BLD AUTO: 10 % (ref 0–12)
NEUTROPHILS # BLD AUTO: 4.27 10*3/MM3 (ref 2–6.9)
NEUTROPHILS NFR BLD AUTO: 58.3 % (ref 37–80)
NRBC BLD MANUAL-RTO: 0 /100 WBC (ref 0–0)
PLATELET # BLD AUTO: 212 10*3/MM3 (ref 130–400)
PMV BLD AUTO: 10.4 FL (ref 6–12)
POTASSIUM BLD-SCNC: 4.1 MMOL/L (ref 3.5–5.1)
PROT SERPL-MCNC: 6.9 G/DL (ref 6.3–8.2)
RBC # BLD AUTO: 4.51 10*6/MM3 (ref 4.7–6.1)
SODIUM BLD-SCNC: 137 MMOL/L (ref 137–145)
TRIGL SERPL-MCNC: 123 MG/DL
TSH SERPL DL<=0.05 MIU/L-ACNC: 5.6 MIU/ML (ref 0.47–4.68)
VLDLC SERPL-MCNC: 24.6 MG/DL
WBC NRBC COR # BLD: 7.32 10*3/MM3 (ref 4.8–10.8)

## 2018-10-17 PROCEDURE — 80053 COMPREHEN METABOLIC PANEL: CPT | Performed by: INTERNAL MEDICINE

## 2018-10-17 PROCEDURE — 85025 COMPLETE CBC W/AUTO DIFF WBC: CPT | Performed by: INTERNAL MEDICINE

## 2018-10-17 PROCEDURE — 36415 COLL VENOUS BLD VENIPUNCTURE: CPT | Performed by: INTERNAL MEDICINE

## 2018-10-17 PROCEDURE — 80061 LIPID PANEL: CPT | Performed by: INTERNAL MEDICINE

## 2018-10-17 PROCEDURE — 51702 INSERT TEMP BLADDER CATH: CPT | Performed by: UROLOGY

## 2018-10-17 PROCEDURE — 84443 ASSAY THYROID STIM HORMONE: CPT | Performed by: INTERNAL MEDICINE

## 2018-10-17 NOTE — PROGRESS NOTES
Patient came in for monthly cath change, patient tolerated the procedure well and will return in 1 month.

## 2018-10-19 DIAGNOSIS — E03.9 HYPOTHYROIDISM, UNSPECIFIED TYPE: Primary | ICD-10-CM

## 2018-10-19 RX ORDER — LEVOTHYROXINE SODIUM 0.07 MG/1
75 TABLET ORAL DAILY
Qty: 30 TABLET | Refills: 0 | Status: SHIPPED | OUTPATIENT
Start: 2018-10-19 | End: 2018-11-15 | Stop reason: SDUPTHER

## 2018-10-24 ENCOUNTER — OFFICE VISIT (OUTPATIENT)
Dept: INTERNAL MEDICINE | Facility: CLINIC | Age: 73
End: 2018-10-24

## 2018-10-24 VITALS
RESPIRATION RATE: 18 BRPM | SYSTOLIC BLOOD PRESSURE: 119 MMHG | OXYGEN SATURATION: 99 % | TEMPERATURE: 98 F | HEART RATE: 84 BPM | BODY MASS INDEX: 26.86 KG/M2 | DIASTOLIC BLOOD PRESSURE: 81 MMHG | WEIGHT: 182 LBS

## 2018-10-24 DIAGNOSIS — N18.30 CHRONIC KIDNEY DISEASE, STAGE III (MODERATE) (HCC): ICD-10-CM

## 2018-10-24 DIAGNOSIS — E03.8 ADULT ONSET HYPOTHYROIDISM: ICD-10-CM

## 2018-10-24 DIAGNOSIS — E78.2 MIXED HYPERLIPIDEMIA: Primary | ICD-10-CM

## 2018-10-24 PROCEDURE — 99214 OFFICE O/P EST MOD 30 MIN: CPT | Performed by: INTERNAL MEDICINE

## 2018-10-24 NOTE — PROGRESS NOTES
Subjective   Nestor Nye is a 72 y.o. male.     Chief Complaint   Patient presents with   • Hyperlipidemia   • Hypothyroidism   • Chronic Kidney Disease       History of Present Illness   HPI: Patient is here to follow up on  Thyroid .The patient is also here to follow up on the cholesterol and is trying to follow a diet. The patient had  lab work done .  He got a flu shot , he sees urology for his indwelling cathetor , he also is here to follow up on CRI and sees  Nephrology  In the next few months   Hyperlipidemia   Pertinent negatives include no chest pain or shortness of breath.        The following portions of the patient's history were reviewed and updated as appropriate: allergies, current medications, past family history, past medical history, past social history, past surgical history and problem list.    Review of Systems   Constitutional: Negative for appetite change, fatigue and fever.   HENT: Negative for congestion, ear discharge, ear pain, sinus pressure and sore throat.    Eyes: Negative for pain and discharge.   Respiratory: Negative for cough, shortness of breath and wheezing.    Cardiovascular: Negative for chest pain, palpitations and leg swelling.   Gastrointestinal: Negative for abdominal pain, constipation, diarrhea, nausea and vomiting.   Endocrine: Negative for cold intolerance and heat intolerance.   Genitourinary: Negative for dysuria and flank pain.   Musculoskeletal: Negative for arthralgias and joint swelling.   Skin: Negative for pallor and rash.   Allergic/Immunologic: Negative for environmental allergies and food allergies.   Neurological: Negative for dizziness, weakness and numbness.   Hematological: Negative for adenopathy. Does not bruise/bleed easily.   Psychiatric/Behavioral: Negative for behavioral problems and dysphoric mood. The patient is not nervous/anxious.          Current Outpatient Prescriptions:   •  Alcohol Swabs (ALCOHOL WIPES) 70 % pads, 1 pad 3 (Three)  Times a Day., Disp: 100 each, Rfl: 11  •  allopurinol (ZYLOPRIM) 100 MG tablet, , Disp: , Rfl:   •  cephalexin (KEFLEX) 500 MG capsule, Take 500 mg by mouth Daily., Disp: , Rfl: 11  •  doxazosin (CARDURA) 4 MG tablet, TAKE ONE TABLET TWICE DAILY, Disp: 180 tablet, Rfl: 3  •  ferrous sulfate 325 (65 FE) MG tablet, Take 325 mg by mouth Daily With Breakfast., Disp: , Rfl:   •  finasteride (PROSCAR) 5 MG tablet, TAKE ONE TABLET EVERY DAY, Disp: 90 tablet, Rfl: 3  •  levothyroxine (SYNTHROID, LEVOTHROID) 75 MCG tablet, Take 1 tablet by mouth Daily., Disp: 30 tablet, Rfl: 0    Objective     Blood pressure 119/81, pulse 84, temperature 98 °F (36.7 °C), temperature source Temporal Artery , resp. rate 18, weight 82.6 kg (182 lb), SpO2 99 %.    Physical Exam   Constitutional: He is oriented to person, place, and time. He appears well-developed and well-nourished. No distress.   HENT:   Head: Normocephalic and atraumatic.   Right Ear: External ear normal.   Left Ear: External ear normal.   Nose: Nose normal.   Mouth/Throat: Oropharynx is clear and moist.   Eyes: Pupils are equal, round, and reactive to light. Conjunctivae and EOM are normal.   Neck: Normal range of motion. Neck supple. No thyromegaly present.   Cardiovascular: Normal rate, regular rhythm and normal heart sounds.    Pulmonary/Chest: Effort normal. No respiratory distress.   Abdominal: Soft. He exhibits no distension. There is no tenderness. There is no guarding.   Musculoskeletal: Normal range of motion. He exhibits no edema.   Lymphadenopathy:     He has no cervical adenopathy.   Neurological: He is alert and oriented to person, place, and time.   No gross motor or sensory deficits   Skin: Skin is warm and dry. He is not diaphoretic. No erythema.   Psychiatric: He has a normal mood and affect.   Nursing note and vitals reviewed.      Results for orders placed or performed in visit on 07/18/18   PTH, Intact   Result Value Ref Range    PTH, Intact 43 15 - 65  pg/mL   Renal Function Panel   Result Value Ref Range    Glucose 57 (L) 74 - 98 mg/dL    BUN 25 (H) 7 - 20 mg/dL    Creatinine 1.70 (H) 0.60 - 1.30 mg/dL    Sodium 141 137 - 145 mmol/L    Potassium 4.1 3.5 - 5.1 mmol/L    Chloride 105 98 - 107 mmol/L    CO2 29.0 26.0 - 30.0 mmol/L    Calcium 9.2 8.4 - 10.2 mg/dL    Albumin 4.40 3.50 - 5.00 g/dL    Phosphorus 3.7 2.5 - 4.5 mg/dL    Anion Gap 11.1 10.0 - 20.0 mmol/L    BUN/Creatinine Ratio 14.7 6.3 - 21.9    eGFR Non African Amer 40 (L) >60 mL/min/1.73   Uric Acid   Result Value Ref Range    Uric Acid 5.2 2.5 - 8.5 mg/dL         Assessment/Plan   Nestor was seen today for hyperlipidemia, hypothyroidism and chronic kidney disease.    Diagnoses and all orders for this visit:    Mixed hyperlipidemia  -     CBC & Differential  -     Comprehensive Metabolic Panel  -     Lipid Panel    Adult onset hypothyroidism  -     TSH  -     TSH    Chronic kidney disease, stage III (moderate) (CMS/[facility])        Plan:  1.   mixed hyperlipidemia:  reviewed  fasting CMP and lipid panel.  Diet and excise counseled,     2 . hypothyroidism:  reviewed  tsh , and continue levothyroxine  3. Cri stage 3 : per nephrology , creatinine 1.8 , oral hydration ,          Roxanne Slater MD

## 2018-10-25 ENCOUNTER — TELEPHONE (OUTPATIENT)
Dept: INTERNAL MEDICINE | Facility: CLINIC | Age: 73
End: 2018-10-25

## 2018-10-25 DIAGNOSIS — E03.9 HYPOTHYROIDISM, UNSPECIFIED TYPE: Primary | ICD-10-CM

## 2018-11-15 RX ORDER — LEVOTHYROXINE SODIUM 0.07 MG/1
TABLET ORAL
Qty: 30 TABLET | Refills: 3 | Status: SHIPPED | OUTPATIENT
Start: 2018-11-15 | End: 2018-11-21 | Stop reason: SDUPTHER

## 2018-11-21 ENCOUNTER — APPOINTMENT (OUTPATIENT)
Dept: LAB | Facility: HOSPITAL | Age: 73
End: 2018-11-21

## 2018-11-21 ENCOUNTER — PROCEDURE VISIT (OUTPATIENT)
Dept: UROLOGY | Facility: CLINIC | Age: 73
End: 2018-11-21

## 2018-11-21 DIAGNOSIS — R33.9 URINARY RETENTION: Primary | ICD-10-CM

## 2018-11-21 LAB — TSH SERPL DL<=0.05 MIU/L-ACNC: 3.65 MIU/ML (ref 0.47–4.68)

## 2018-11-21 PROCEDURE — 84443 ASSAY THYROID STIM HORMONE: CPT | Performed by: INTERNAL MEDICINE

## 2018-11-21 PROCEDURE — 36415 COLL VENOUS BLD VENIPUNCTURE: CPT | Performed by: INTERNAL MEDICINE

## 2018-11-21 PROCEDURE — 51702 INSERT TEMP BLADDER CATH: CPT | Performed by: UROLOGY

## 2018-11-21 RX ORDER — LEVOTHYROXINE SODIUM 0.07 MG/1
75 TABLET ORAL DAILY
Qty: 30 TABLET | Refills: 5 | Status: SHIPPED | OUTPATIENT
Start: 2018-11-21 | End: 2019-04-18 | Stop reason: SDUPTHER

## 2018-11-21 NOTE — PROGRESS NOTES
Patient came in for monthly cath change, patient is doing well and tolerated the cath change well.

## 2018-11-28 DIAGNOSIS — N40.1 BENIGN PROSTATIC HYPERPLASIA WITH URINARY OBSTRUCTION: ICD-10-CM

## 2018-11-28 DIAGNOSIS — N13.8 BENIGN PROSTATIC HYPERPLASIA WITH URINARY OBSTRUCTION: ICD-10-CM

## 2018-11-28 RX ORDER — FINASTERIDE 5 MG/1
TABLET, FILM COATED ORAL
Qty: 90 TABLET | Refills: 3 | Status: SHIPPED | OUTPATIENT
Start: 2018-11-28 | End: 2019-06-04 | Stop reason: SDUPTHER

## 2018-12-05 DIAGNOSIS — N39.0 CHRONIC UTI (URINARY TRACT INFECTION): ICD-10-CM

## 2018-12-06 RX ORDER — CEPHALEXIN 500 MG/1
CAPSULE ORAL
Qty: 30 CAPSULE | Refills: 11 | Status: SHIPPED | OUTPATIENT
Start: 2018-12-06 | End: 2019-12-09 | Stop reason: SDUPTHER

## 2018-12-19 ENCOUNTER — PROCEDURE VISIT (OUTPATIENT)
Dept: UROLOGY | Facility: CLINIC | Age: 73
End: 2018-12-19

## 2018-12-19 DIAGNOSIS — R33.9 URINARY RETENTION: Primary | ICD-10-CM

## 2018-12-19 PROCEDURE — 51702 INSERT TEMP BLADDER CATH: CPT | Performed by: UROLOGY

## 2018-12-19 NOTE — PROGRESS NOTES
Patient came in for monthly cath change, patient is doing well and will return in a month to follow up with  and cath change.

## 2019-01-24 ENCOUNTER — LAB (OUTPATIENT)
Dept: LAB | Facility: HOSPITAL | Age: 74
End: 2019-01-24

## 2019-01-24 ENCOUNTER — OFFICE VISIT (OUTPATIENT)
Dept: UROLOGY | Facility: CLINIC | Age: 74
End: 2019-01-24

## 2019-01-24 DIAGNOSIS — R33.9 URINARY RETENTION: ICD-10-CM

## 2019-01-24 DIAGNOSIS — E78.2 MIXED HYPERLIPIDEMIA: ICD-10-CM

## 2019-01-24 DIAGNOSIS — R97.20 ELEVATED PROSTATE SPECIFIC ANTIGEN (PSA): ICD-10-CM

## 2019-01-24 DIAGNOSIS — N31.2 ATONIC NEUROGENIC BLADDER: ICD-10-CM

## 2019-01-24 DIAGNOSIS — R33.8 ACUTE URINARY RETENTION: Primary | ICD-10-CM

## 2019-01-24 LAB
ALBUMIN SERPL-MCNC: 4.5 G/DL (ref 3.5–5)
ALBUMIN/GLOB SERPL: 1.7 G/DL (ref 1–2)
ALP SERPL-CCNC: 77 U/L (ref 38–126)
ALT SERPL W P-5'-P-CCNC: 24 U/L (ref 13–69)
ANION GAP SERPL CALCULATED.3IONS-SCNC: 11.7 MMOL/L (ref 10–20)
AST SERPL-CCNC: 21 U/L (ref 15–46)
BASOPHILS # BLD AUTO: 0.09 10*3/MM3 (ref 0–0.2)
BASOPHILS NFR BLD AUTO: 1.3 % (ref 0–2.5)
BILIRUB SERPL-MCNC: 0.9 MG/DL (ref 0.2–1.3)
BUN BLD-MCNC: 27 MG/DL (ref 7–20)
BUN/CREAT SERPL: 15.9 (ref 6.3–21.9)
CALCIUM SPEC-SCNC: 9.2 MG/DL (ref 8.4–10.2)
CHLORIDE SERPL-SCNC: 105 MMOL/L (ref 98–107)
CHOLEST SERPL-MCNC: 172 MG/DL (ref 0–199)
CO2 SERPL-SCNC: 29 MMOL/L (ref 26–30)
CREAT BLD-MCNC: 1.7 MG/DL (ref 0.6–1.3)
DEPRECATED RDW RBC AUTO: 44.8 FL (ref 37–54)
EOSINOPHIL # BLD AUTO: 0.41 10*3/MM3 (ref 0–0.7)
EOSINOPHIL NFR BLD AUTO: 6 % (ref 0–7)
ERYTHROCYTE [DISTWIDTH] IN BLOOD BY AUTOMATED COUNT: 12.5 % (ref 11.5–14.5)
GFR SERPL CREATININE-BSD FRML MDRD: 40 ML/MIN/1.73
GLOBULIN UR ELPH-MCNC: 2.6 GM/DL
GLUCOSE BLD-MCNC: 93 MG/DL (ref 74–98)
HCT VFR BLD AUTO: 47 % (ref 42–52)
HDLC SERPL-MCNC: 38 MG/DL (ref 40–60)
HGB BLD-MCNC: 15.8 G/DL (ref 14–18)
IMM GRANULOCYTES # BLD AUTO: 0.01 10*3/MM3 (ref 0–0.06)
IMM GRANULOCYTES NFR BLD AUTO: 0.1 % (ref 0–0.6)
LDLC SERPL CALC-MCNC: 108 MG/DL (ref 0–99)
LDLC/HDLC SERPL: 2.83 {RATIO}
LYMPHOCYTES # BLD AUTO: 2.14 10*3/MM3 (ref 0.6–3.4)
LYMPHOCYTES NFR BLD AUTO: 31.4 % (ref 10–50)
MCH RBC QN AUTO: 32.6 PG (ref 27–31)
MCHC RBC AUTO-ENTMCNC: 33.6 G/DL (ref 30–37)
MCV RBC AUTO: 96.9 FL (ref 80–94)
MONOCYTES # BLD AUTO: 0.66 10*3/MM3 (ref 0–0.9)
MONOCYTES NFR BLD AUTO: 9.7 % (ref 0–12)
NEUTROPHILS # BLD AUTO: 3.5 10*3/MM3 (ref 2–6.9)
NEUTROPHILS NFR BLD AUTO: 51.5 % (ref 37–80)
NRBC BLD AUTO-RTO: 0 /100 WBC (ref 0–0)
PLATELET # BLD AUTO: 240 10*3/MM3 (ref 130–400)
PMV BLD AUTO: 10.3 FL (ref 6–12)
POTASSIUM BLD-SCNC: 4.7 MMOL/L (ref 3.5–5.1)
PROT SERPL-MCNC: 7.1 G/DL (ref 6.3–8.2)
RBC # BLD AUTO: 4.85 10*6/MM3 (ref 4.7–6.1)
SODIUM BLD-SCNC: 141 MMOL/L (ref 137–145)
TRIGL SERPL-MCNC: 132 MG/DL
TSH SERPL DL<=0.05 MIU/L-ACNC: 2.94 MIU/ML (ref 0.47–4.68)
VLDLC SERPL-MCNC: 26.4 MG/DL
WBC NRBC COR # BLD: 6.81 10*3/MM3 (ref 4.8–10.8)

## 2019-01-24 PROCEDURE — 85025 COMPLETE CBC W/AUTO DIFF WBC: CPT

## 2019-01-24 PROCEDURE — 80053 COMPREHEN METABOLIC PANEL: CPT | Performed by: INTERNAL MEDICINE

## 2019-01-24 PROCEDURE — 36415 COLL VENOUS BLD VENIPUNCTURE: CPT | Performed by: INTERNAL MEDICINE

## 2019-01-24 PROCEDURE — 51702 INSERT TEMP BLADDER CATH: CPT | Performed by: UROLOGY

## 2019-01-24 PROCEDURE — 99213 OFFICE O/P EST LOW 20 MIN: CPT | Performed by: UROLOGY

## 2019-01-24 PROCEDURE — 80061 LIPID PANEL: CPT | Performed by: INTERNAL MEDICINE

## 2019-01-24 PROCEDURE — 84443 ASSAY THYROID STIM HORMONE: CPT | Performed by: INTERNAL MEDICINE

## 2019-01-24 NOTE — PROGRESS NOTES
Chief Complaint  Urinary Retention (follow up 6 months and cath change.)        CHERYL Nye is a 73 y.o. male who returns today for follow-up of chronic urinary retention.  At one time his bladder was grossly overdistended with several liters and therefore is felt he has such a decompensated bladder he'll never void again.  He performed intermittent self catheter proximally 1 year but was unable to void a substantial amount spontaneously.  He enjoys the convenience of the indwelling catheter and does not want to resume his intermittent self catheter which was problematic and cause some bleeding for him.  He understands the complication of chronic infection from the indwelling Gillespie but he's actually done pretty well on daily Keflex.  She takes finasteride to keep the gland from growing.  He's had a elevation of his PSA in the past but previous biopsy done elsewhere was negative for cancer and he refuses repeating.  Today he declines digital rectal exam.    There were no vitals filed for this visit.    Past Medical History  Past Medical History:   Diagnosis Date   • Acute maxillary sinusitis    • Vocal cord anomaly        Past Surgical History  Past Surgical History:   Procedure Laterality Date   • BACK SURGERY     • BRAIN SURGERY     • EYE SURGERY     • OTHER SURGICAL HISTORY      Arm Excision       Medications  has a current medication list which includes the following prescription(s): alcohol wipes, cephalexin, cephalexin, doxazosin, ferrous sulfate, finasteride, levothyroxine, and allopurinol.      Allergies  No Known Allergies    Social History  Social History     Social History Narrative   • Not on file       Family History  He has no family history of bladder or kidney cancer  He has no family history of kidney stones      AUA Symptom Score:      Review of Systems  Review of Systems   Constitutional: Negative.    Genitourinary: Positive for difficulty urinating.   All other systems reviewed and are  negative.      Physical Exam  Physical Exam    Labs Recent and today in the office:  Results for orders placed or performed in visit on 01/24/19   CBC Auto Differential   Result Value Ref Range    WBC 6.81 4.80 - 10.80 10*3/mm3    RBC 4.85 4.70 - 6.10 10*6/mm3    Hemoglobin 15.8 14.0 - 18.0 g/dL    Hematocrit 47.0 42.0 - 52.0 %    MCV 96.9 (H) 80.0 - 94.0 fL    MCH 32.6 (H) 27.0 - 31.0 pg    MCHC 33.6 30.0 - 37.0 g/dL    RDW 12.5 11.5 - 14.5 %    RDW-SD 44.8 37.0 - 54.0 fl    MPV 10.3 6.0 - 12.0 fL    Platelets 240 130 - 400 10*3/mm3    Neutrophil % 51.5 37.0 - 80.0 %    Lymphocyte % 31.4 10.0 - 50.0 %    Monocyte % 9.7 0.0 - 12.0 %    Eosinophil % 6.0 0.0 - 7.0 %    Basophil % 1.3 0.0 - 2.5 %    Immature Grans % 0.1 0.0 - 0.6 %    Neutrophils, Absolute 3.50 2.00 - 6.90 10*3/mm3    Lymphocytes, Absolute 2.14 0.60 - 3.40 10*3/mm3    Monocytes, Absolute 0.66 0.00 - 0.90 10*3/mm3    Eosinophils, Absolute 0.41 0.00 - 0.70 10*3/mm3    Basophils, Absolute 0.09 0.00 - 0.20 10*3/mm3    Immature Grans, Absolute 0.01 0.00 - 0.06 10*3/mm3    nRBC 0.0 0.0 - 0.0 /100 WBC         Assessment & Plan  #1 chronic urinary retention: This is due primarily to a decompensated bladder probably with some prosthetic obstruction.  He has failed voiding trials and is unwilling to do intermittent self catheter so he will return monthly for catheter changes through the nurse in to see me in 6 months.    #2 elevated PSA: He's had previous biopsy was negative and refuses digital rectal exam and consideration of additional biopsy at this time.  He'll return in 6 months and we'll want to repeat his PSA.  15 minutes is spent counseling the patient out of a 20 minute visit.

## 2019-01-24 NOTE — PROGRESS NOTES
Patient also had his stacy cath changed today without any complications, patient takes daily keflex and this will cover his cath change.

## 2019-01-30 ENCOUNTER — OFFICE VISIT (OUTPATIENT)
Dept: INTERNAL MEDICINE | Facility: CLINIC | Age: 74
End: 2019-01-30

## 2019-01-30 VITALS
SYSTOLIC BLOOD PRESSURE: 109 MMHG | OXYGEN SATURATION: 97 % | DIASTOLIC BLOOD PRESSURE: 78 MMHG | RESPIRATION RATE: 16 BRPM | BODY MASS INDEX: 26.78 KG/M2 | TEMPERATURE: 98.1 F | WEIGHT: 180.8 LBS | HEIGHT: 69 IN | HEART RATE: 97 BPM

## 2019-01-30 DIAGNOSIS — E78.2 MIXED HYPERLIPIDEMIA: Primary | ICD-10-CM

## 2019-01-30 DIAGNOSIS — E03.8 ADULT ONSET HYPOTHYROIDISM: ICD-10-CM

## 2019-01-30 DIAGNOSIS — N18.30 CHRONIC KIDNEY DISEASE, STAGE III (MODERATE) (HCC): ICD-10-CM

## 2019-01-30 PROCEDURE — 99214 OFFICE O/P EST MOD 30 MIN: CPT | Performed by: INTERNAL MEDICINE

## 2019-02-20 ENCOUNTER — PROCEDURE VISIT (OUTPATIENT)
Dept: UROLOGY | Facility: CLINIC | Age: 74
End: 2019-02-20

## 2019-02-20 VITALS
DIASTOLIC BLOOD PRESSURE: 76 MMHG | TEMPERATURE: 98.3 F | OXYGEN SATURATION: 96 % | HEART RATE: 84 BPM | SYSTOLIC BLOOD PRESSURE: 136 MMHG

## 2019-02-20 DIAGNOSIS — R33.9 URINARY RETENTION: Primary | ICD-10-CM

## 2019-02-20 PROCEDURE — 51702 INSERT TEMP BLADDER CATH: CPT | Performed by: UROLOGY

## 2019-02-20 RX ORDER — PNEUMOCOCCAL 13-VALENT CONJUGATE VACCINE 2.2; 2.2; 2.2; 2.2; 2.2; 4.4; 2.2; 2.2; 2.2; 2.2; 2.2; 2.2; 2.2 UG/.5ML; UG/.5ML; UG/.5ML; UG/.5ML; UG/.5ML; UG/.5ML; UG/.5ML; UG/.5ML; UG/.5ML; UG/.5ML; UG/.5ML; UG/.5ML; UG/.5ML
INJECTION, SUSPENSION INTRAMUSCULAR
COMMUNITY
Start: 2019-02-09 | End: 2020-02-04

## 2019-02-20 NOTE — PROGRESS NOTES
Patient came in for monthly cath change and tolerated the procedure well. Patient will retuen in 4 weeks for his next stacy change, he also takes keflex at home to cover his cath.

## 2019-03-20 ENCOUNTER — PROCEDURE VISIT (OUTPATIENT)
Dept: UROLOGY | Facility: CLINIC | Age: 74
End: 2019-03-20

## 2019-03-20 DIAGNOSIS — R33.9 URINARY RETENTION: Primary | ICD-10-CM

## 2019-03-20 PROCEDURE — 51702 INSERT TEMP BLADDER CATH: CPT | Performed by: UROLOGY

## 2019-03-20 NOTE — PROGRESS NOTES
Patient came in for monthly cath change, patient tolerated the chage well without any complications. Patient has Keflex at home to take to cover his cath and will return in 4 weeks for his next change.

## 2019-04-16 ENCOUNTER — LAB (OUTPATIENT)
Dept: LAB | Facility: HOSPITAL | Age: 74
End: 2019-04-16

## 2019-04-16 DIAGNOSIS — E78.2 MIXED HYPERLIPIDEMIA: ICD-10-CM

## 2019-04-16 LAB
ALBUMIN SERPL-MCNC: 4.5 G/DL (ref 3.5–5)
ALBUMIN/GLOB SERPL: 1.8 G/DL (ref 1–2)
ALP SERPL-CCNC: 78 U/L (ref 38–126)
ALT SERPL W P-5'-P-CCNC: 27 U/L (ref 13–69)
ANION GAP SERPL CALCULATED.3IONS-SCNC: 17 MMOL/L (ref 10–20)
AST SERPL-CCNC: 20 U/L (ref 15–46)
BASOPHILS # BLD AUTO: 0.08 10*3/MM3 (ref 0–0.2)
BASOPHILS NFR BLD AUTO: 1.1 % (ref 0–1.5)
BILIRUB SERPL-MCNC: 0.8 MG/DL (ref 0.2–1.3)
BUN BLD-MCNC: 22 MG/DL (ref 7–20)
BUN/CREAT SERPL: 13.8 (ref 6.3–21.9)
CALCIUM SPEC-SCNC: 9.6 MG/DL (ref 8.4–10.2)
CHLORIDE SERPL-SCNC: 104 MMOL/L (ref 98–107)
CHOLEST SERPL-MCNC: 182 MG/DL (ref 0–199)
CO2 SERPL-SCNC: 28 MMOL/L (ref 26–30)
CREAT BLD-MCNC: 1.6 MG/DL (ref 0.6–1.3)
DEPRECATED RDW RBC AUTO: 44 FL (ref 37–54)
EOSINOPHIL # BLD AUTO: 0.27 10*3/MM3 (ref 0–0.4)
EOSINOPHIL NFR BLD AUTO: 3.8 % (ref 0.3–6.2)
ERYTHROCYTE [DISTWIDTH] IN BLOOD BY AUTOMATED COUNT: 12.5 % (ref 12.3–15.4)
GFR SERPL CREATININE-BSD FRML MDRD: 43 ML/MIN/1.73
GLOBULIN UR ELPH-MCNC: 2.5 GM/DL
GLUCOSE BLD-MCNC: 89 MG/DL (ref 74–98)
HCT VFR BLD AUTO: 46.7 % (ref 37.5–51)
HDLC SERPL-MCNC: 39 MG/DL (ref 40–60)
HGB BLD-MCNC: 15.8 G/DL (ref 13–17.7)
IMM GRANULOCYTES # BLD AUTO: 0.01 10*3/MM3 (ref 0–0.05)
IMM GRANULOCYTES NFR BLD AUTO: 0.1 % (ref 0–0.5)
LDLC SERPL CALC-MCNC: 113 MG/DL (ref 0–99)
LDLC/HDLC SERPL: 2.91 {RATIO}
LYMPHOCYTES # BLD AUTO: 2.04 10*3/MM3 (ref 0.7–3.1)
LYMPHOCYTES NFR BLD AUTO: 29.1 % (ref 19.6–45.3)
MCH RBC QN AUTO: 32.3 PG (ref 26.6–33)
MCHC RBC AUTO-ENTMCNC: 33.8 G/DL (ref 31.5–35.7)
MCV RBC AUTO: 95.5 FL (ref 79–97)
MONOCYTES # BLD AUTO: 0.73 10*3/MM3 (ref 0.1–0.9)
MONOCYTES NFR BLD AUTO: 10.4 % (ref 5–12)
NEUTROPHILS # BLD AUTO: 3.89 10*3/MM3 (ref 1.4–7)
NEUTROPHILS NFR BLD AUTO: 55.5 % (ref 42.7–76)
NRBC BLD AUTO-RTO: 0 /100 WBC (ref 0–0)
PLATELET # BLD AUTO: 219 10*3/MM3 (ref 140–450)
PMV BLD AUTO: 10.4 FL (ref 6–12)
POTASSIUM BLD-SCNC: 5 MMOL/L (ref 3.5–5.1)
PROT SERPL-MCNC: 7 G/DL (ref 6.3–8.2)
RBC # BLD AUTO: 4.89 10*6/MM3 (ref 4.14–5.8)
SODIUM BLD-SCNC: 144 MMOL/L (ref 137–145)
TRIGL SERPL-MCNC: 148 MG/DL
TSH SERPL DL<=0.05 MIU/L-ACNC: 4.06 MIU/ML (ref 0.47–4.68)
VLDLC SERPL-MCNC: 29.6 MG/DL
WBC NRBC COR # BLD: 7.02 10*3/MM3 (ref 3.4–10.8)

## 2019-04-16 PROCEDURE — 80053 COMPREHEN METABOLIC PANEL: CPT | Performed by: INTERNAL MEDICINE

## 2019-04-16 PROCEDURE — 80061 LIPID PANEL: CPT | Performed by: INTERNAL MEDICINE

## 2019-04-16 PROCEDURE — 36415 COLL VENOUS BLD VENIPUNCTURE: CPT | Performed by: INTERNAL MEDICINE

## 2019-04-16 PROCEDURE — 84443 ASSAY THYROID STIM HORMONE: CPT | Performed by: INTERNAL MEDICINE

## 2019-04-16 PROCEDURE — 85025 COMPLETE CBC W/AUTO DIFF WBC: CPT

## 2019-04-17 ENCOUNTER — PROCEDURE VISIT (OUTPATIENT)
Dept: UROLOGY | Facility: CLINIC | Age: 74
End: 2019-04-17

## 2019-04-17 DIAGNOSIS — R33.9 URINARY RETENTION: Primary | ICD-10-CM

## 2019-04-17 PROCEDURE — 51702 INSERT TEMP BLADDER CATH: CPT | Performed by: UROLOGY

## 2019-04-18 RX ORDER — LEVOTHYROXINE SODIUM 0.07 MG/1
75 TABLET ORAL DAILY
Qty: 30 TABLET | Refills: 6 | Status: SHIPPED | OUTPATIENT
Start: 2019-04-18 | End: 2019-08-27 | Stop reason: SDUPTHER

## 2019-04-23 ENCOUNTER — OFFICE VISIT (OUTPATIENT)
Dept: INTERNAL MEDICINE | Facility: CLINIC | Age: 74
End: 2019-04-23

## 2019-04-23 VITALS
OXYGEN SATURATION: 98 % | RESPIRATION RATE: 16 BRPM | HEART RATE: 86 BPM | BODY MASS INDEX: 27.11 KG/M2 | TEMPERATURE: 97 F | HEIGHT: 69 IN | SYSTOLIC BLOOD PRESSURE: 103 MMHG | DIASTOLIC BLOOD PRESSURE: 70 MMHG | WEIGHT: 183 LBS

## 2019-04-23 DIAGNOSIS — E03.8 ADULT ONSET HYPOTHYROIDISM: ICD-10-CM

## 2019-04-23 DIAGNOSIS — Z00.00 ROUTINE GENERAL MEDICAL EXAMINATION AT A HEALTH CARE FACILITY: Primary | ICD-10-CM

## 2019-04-23 DIAGNOSIS — E78.2 MIXED HYPERLIPIDEMIA: ICD-10-CM

## 2019-04-23 PROCEDURE — G0439 PPPS, SUBSEQ VISIT: HCPCS | Performed by: INTERNAL MEDICINE

## 2019-04-23 NOTE — PROGRESS NOTES
QUICK REFERENCE INFORMATION:  The ABCs of the Annual Wellness Visit    Subsequent Medicare Wellness Visit     HEALTH RISK ASSESSMENT    : 1945    Recent Hospitalizations:  No hospitalization(s) within the last year..      Current Medical Providers:  Patient Care Team:  Roxanne Slater MD as PCP - General (Geriatric Medicine)        Smoking Status:  Social History     Tobacco Use   Smoking Status Light Tobacco Smoker   • Types: Cigarettes, Electronic Cigarette   Smokeless Tobacco Never Used   Tobacco Comment    Quit        Alcohol Consumption:  Social History     Substance and Sexual Activity   Alcohol Use No   • Frequency: Never       Depression Screen:   PHQ-2/PHQ-9 Depression Screening 2019   Little interest or pleasure in doing things 0   Feeling down, depressed, or hopeless 0   Total Score 0       Health Habits and Functional and Cognitive Screening:  Functional & Cognitive Status 2019   Do you have difficulty preparing food and eating? No   Do you have difficulty bathing yourself, getting dressed or grooming yourself? No   Do you have difficulty using the toilet? No   Do you have difficulty moving around from place to place? No   Do you have trouble with steps or getting out of a bed or a chair? No   In the past year have you fallen or experienced a near fall? No   Current Diet Well Balanced Diet   Dental Exam Up to date   Eye Exam Not up to date   Exercise (times per week) 5 times per week   Current Exercise Activities Include Housecleaning   Do you need help using the phone?  No   Are you deaf or do you have serious difficulty hearing?  Yes   Do you need help with transportation? No   Do you need help shopping? No   Do you need help preparing meals?  No   Do you need help with housework?  No   Do you need help with laundry? No   Do you need help taking your medications? No   Do you need help managing money? No   Do you ever drive or ride in a car without wearing a seat belt? No   Have  you felt unusual stress, anger or loneliness in the last month? No   Who do you live with? Alone   If you need help, do you have trouble finding someone available to you? No   Have you been bothered in the last four weeks by sexual problems? No   Do you have difficulty concentrating, remembering or making decisions? -           Does the patient have evidence of cognitive impairment? No    Asiprin use counseling: Does not need ASA (and currently is not on it)      Recent Lab Results:    Lab Results   Component Value Date    GLU 98 09/25/2017        Lab Results   Component Value Date    CHOL 182 04/16/2019    TRIG 148 04/16/2019    HDL 39 (L) 04/16/2019    VLDL 29.6 04/16/2019    LDLHDL 2.91 04/16/2019           Age-appropriate Screening Schedule:  Refer to the list below for future screening recommendations based on patient's age, sex and/or medical conditions. Orders for these recommended tests are listed in the plan section. The patient has been provided with a written plan.    Health Maintenance   Topic Date Due   • TDAP/TD VACCINES (1 - Tdap) 11/11/1965   • ZOSTER VACCINE (1 of 2) 11/11/1996   • INFLUENZA VACCINE  08/01/2019   • LIPID PANEL  04/16/2020   • COLONOSCOPY  04/04/2027   • PNEUMOCOCCAL VACCINES (65+ LOW/MEDIUM RISK)  Completed        Chief Complaint   Patient presents with   • Medicare Wellness-subsequent   • Hyperlipidemia   • Hypothyroidism       Subjective   History of Present Illness    Nestor Nye is a 73 y.o. male who presents for an Annual Wellness Visit.   The patient is also here to follow up on the cholesterol and is trying to follow a diet. The patient is also here to follow up on thyroid and is  due to get lab work done .  The patient also needs refills on medications .   Hyperlipidemia   Pertinent negatives include no chest pain or shortness of breath.     The following portions of the patient's history were reviewed and updated as appropriate: allergies, current medications,  past family history, past medical history, past social history, past surgical history and problem list.    Outpatient Medications Prior to Visit   Medication Sig Dispense Refill   • Alcohol Swabs (ALCOHOL WIPES) 70 % pads 1 pad 3 (Three) Times a Day. 100 each 11   • cephalexin (KEFLEX) 500 MG capsule TAKE ONE CAPSULE BY MOUTH EVERY DAY 30 capsule 11   • doxazosin (CARDURA) 4 MG tablet TAKE ONE TABLET TWICE DAILY 180 tablet 3   • ferrous sulfate 325 (65 FE) MG tablet Take 325 mg by mouth Daily With Breakfast.     • finasteride (PROSCAR) 5 MG tablet TAKE ONE TABLET BY MOUTH EVERY DAY 90 tablet 3   • levothyroxine (SYNTHROID, LEVOTHROID) 75 MCG tablet Take 1 tablet by mouth Daily. 30 tablet 6   • PREVNAR 13 vaccine        No facility-administered medications prior to visit.        Patient Active Problem List   Diagnosis   • BPH (benign prostatic hyperplasia)   • Hypothyroidism   • Mixed hyperlipidemia   • Elevated PSA   • Benign essential hypertension       Advance Care Planning:  Patient does not have an advance directive - information provided to the patient today    Identification of Risk Factors:  Risk factors include: weight , unhealthy diet, cardiovascular risk, inactivity, increased fall risk, inadequate social support, isolation, depression and vision limitations.    Review of Systems   Constitutional: Negative for appetite change, fatigue and fever.   HENT: Negative for congestion, ear discharge, ear pain, sinus pressure and sore throat.    Eyes: Negative for pain and discharge.   Respiratory: Negative for cough, shortness of breath and wheezing.    Cardiovascular: Negative for chest pain, palpitations and leg swelling.   Gastrointestinal: Negative for abdominal pain, constipation, diarrhea, nausea and vomiting.   Endocrine: Negative for cold intolerance and heat intolerance.   Genitourinary: Negative for dysuria and flank pain.   Musculoskeletal: Negative for arthralgias and joint swelling.   Skin: Negative  "for pallor and rash.   Allergic/Immunologic: Negative for environmental allergies and food allergies.   Neurological: Negative for dizziness, weakness and numbness.   Hematological: Negative for adenopathy. Does not bruise/bleed easily.   Psychiatric/Behavioral: Negative for behavioral problems and dysphoric mood. The patient is not nervous/anxious.        Compared to one year ago, the patient feels his physical health is the same.  Compared to one year ago, the patient feels his mental health is the same.    Objective     Physical Exam   Constitutional: He is oriented to person, place, and time. He appears well-developed and well-nourished. No distress.   HENT:   Head: Normocephalic and atraumatic.   Right Ear: External ear normal.   Left Ear: External ear normal.   Nose: Nose normal.   Mouth/Throat: Oropharynx is clear and moist.   Eyes: Conjunctivae and EOM are normal. Pupils are equal, round, and reactive to light.   Neck: Normal range of motion. Neck supple. No thyromegaly present.   Cardiovascular: Normal rate, regular rhythm and normal heart sounds.   Pulmonary/Chest: Effort normal. No respiratory distress.   Abdominal: Soft. He exhibits no distension. There is no tenderness. There is no guarding.   Musculoskeletal: Normal range of motion. He exhibits no edema.   Lymphadenopathy:     He has no cervical adenopathy.   Neurological: He is alert and oriented to person, place, and time.   No gross motor or sensory deficits   Skin: Skin is warm and dry. He is not diaphoretic. No erythema.   Psychiatric: He has a normal mood and affect.   Nursing note and vitals reviewed.      Vitals:    04/23/19 1319   BP: 103/70   Pulse: 86   Resp: 16   Temp: 97 °F (36.1 °C)   SpO2: 98%   Weight: 83 kg (183 lb)   Height: 175.3 cm (69.02\")       Patient's Body mass index is 27.01 kg/m². BMI is within normal parameters. No follow-up required..      Assessment/Plan   Patient Self-Management and Personalized Health Advice  The patient " has been provided with information about: diet, exercise, weight management and fall prevention and preventive services including:   · Counseling for cardiovascular disease risk reduction, Exercise counseling provided, Fall Risk assessment done, Fall Risk plan of care done, Hepatitis B vaccine, Influenza vaccine, Nutrition counseling provided, Pneumococcal vaccine .    Visit Diagnoses:    ICD-10-CM ICD-9-CM   1. Routine general medical examination at a health care facility Z00.00 V70.0   2. Mixed hyperlipidemia E78.2 272.2   3. Adult onset hypothyroidism E03.8 244.8     Plan:  1.physical: Physical exam conducted today, reviewed fasting lab work,   Impression: healthy adult Patient. Currently, patient eats a healthy diet. Screening lab work includes glucose, lipid profile and complete blood count . The risks and benefits of immunizations were discussed and immunizations are up to date. Advice and education were given regarding nutrition and aerobic exercise. Patient discussion: discussed with the patient.  Annual Wellness Visit  with preventive exam as well as age and risk appropriate counseling completed.   Advance Directive Planning: paperwork and instructions were given to the patient.   Patient Discussion: plan discussed with the patient, follow-up visit needed in one year.   Medication Review and medication list updated   2.mixed hyperlipidemia:  reviewed  fasting CMP and lipid panel.  Diet and exercise counseled,     3.  hypothyroidism:  reviewed  tsh , and continue levothyroxine    Orders Placed This Encounter   Procedures   • Comprehensive Metabolic Panel   • Lipid Panel   • TSH   • CBC & Differential     Order Specific Question:   Manual Differential     Answer:   No       Outpatient Encounter Medications as of 4/23/2019   Medication Sig Dispense Refill   • Alcohol Swabs (ALCOHOL WIPES) 70 % pads 1 pad 3 (Three) Times a Day. 100 each 11   • cephalexin (KEFLEX) 500 MG capsule TAKE ONE CAPSULE BY MOUTH EVERY  DAY 30 capsule 11   • doxazosin (CARDURA) 4 MG tablet TAKE ONE TABLET TWICE DAILY 180 tablet 3   • ferrous sulfate 325 (65 FE) MG tablet Take 325 mg by mouth Daily With Breakfast.     • finasteride (PROSCAR) 5 MG tablet TAKE ONE TABLET BY MOUTH EVERY DAY 90 tablet 3   • levothyroxine (SYNTHROID, LEVOTHROID) 75 MCG tablet Take 1 tablet by mouth Daily. 30 tablet 6   • PREVNAR 13 vaccine        No facility-administered encounter medications on file as of 4/23/2019.        Reviewed use of high risk medication in the elderly: yes  Reviewed for potential of harmful drug interactions in the elderly: yes    Follow Up:  Return in 1 year (on 4/23/2020) for Medicare Wellness.     An After Visit Summary and PPPS with all of these plans were given to the patient.

## 2019-04-23 NOTE — PATIENT INSTRUCTIONS
Exercising to Stay Healthy  Exercising regularly is important. It has many health benefits, such as:  · Improving your overall fitness, flexibility, and endurance.  · Increasing your bone density.  · Helping with weight control.  · Decreasing your body fat.  · Increasing your muscle strength.  · Reducing stress and tension.  · Improving your overall health.    In order to become healthy and stay healthy, it is recommended that you do moderate-intensity and vigorous-intensity exercise. You can tell that you are exercising at a moderate intensity if you have a higher heart rate and faster breathing, but you are still able to hold a conversation. You can tell that you are exercising at a vigorous intensity if you are breathing much harder and faster and cannot hold a conversation while exercising.  How often should I exercise?  Choose an activity that you enjoy and set realistic goals. Your health care provider can help you to make an activity plan that works for you. Exercise regularly as directed by your health care provider. This may include:  · Doing resistance training twice each week, such as:  ? Push-ups.  ? Sit-ups.  ? Lifting weights.  ? Using resistance bands.  · Doing a given intensity of exercise for a given amount of time. Choose from these options:  ? 150 minutes of moderate-intensity exercise every week.  ? 75 minutes of vigorous-intensity exercise every week.  ? A mix of moderate-intensity and vigorous-intensity exercise every week.    Children, pregnant women, people who are out of shape, people who are overweight, and older adults may need to consult a health care provider for individual recommendations. If you have any sort of medical condition, be sure to consult your health care provider before starting a new exercise program.  What are some exercise ideas?  Some moderate-intensity exercise ideas include:  · Walking at a rate of 1 mile in 15  minutes.  · Biking.  · Hiking.  · Golfing.  · Dancing.    Some vigorous-intensity exercise ideas include:  · Walking at a rate of at least 4.5 miles per hour.  · Jogging or running at a rate of 5 miles per hour.  · Biking at a rate of at least 10 miles per hour.  · Lap swimming.  · Roller-skating or in-line skating.  · Cross-country skiing.  · Vigorous competitive sports, such as football, basketball, and soccer.  · Jumping rope.  · Aerobic dancing.    What are some everyday activities that can help me to get exercise?  · Yard work, such as:  ? Pushing a .  ? Raking and bagging leaves.  · Washing and waxing your car.  · Pushing a stroller.  · Shoveling snow.  · Gardening.  · Washing windows or floors.  How can I be more active in my day-to-day activities?  · Use the stairs instead of the elevator.  · Take a walk during your lunch break.  · If you drive, park your car farther away from work or school.  · If you take public transportation, get off one stop early and walk the rest of the way.  · Make all of your phone calls while standing up and walking around.  · Get up, stretch, and walk around every 30 minutes throughout the day.  What guidelines should I follow while exercising?  · Do not exercise so much that you hurt yourself, feel dizzy, or get very short of breath.  · Consult your health care provider before starting a new exercise program.  · Wear comfortable clothes and shoes with good support.  · Drink plenty of water while you exercise to prevent dehydration or heat stroke. Body water is lost during exercise and must be replaced.  · Work out until you breathe faster and your heart beats faster.  This information is not intended to replace advice given to you by your health care provider. Make sure you discuss any questions you have with your health care provider.  Document Released: 01/20/2012 Document Revised: 05/25/2017 Document Reviewed: 05/21/2015  [employer] Interactive Patient Education © 2018  [employer]    Medicare Wellness  Personal Prevention Plan of Service     Date of Office Visit:  2019  Encounter Provider:  Roxanne Slater MD  Place of Service:  [facility]  Patient Name: Nestor Nye  :  1945    As part of the Medicare Wellness portion of your visit today, we are providing you with this personalized preventive plan of services (PPPS). This plan is based upon recommendations of the [place] Preventive Services Task Force (USPSTF) and the Advisory Committee on Immunization Practices (ACIP).    This lists the preventive care services that should be considered, and provides dates of when you are due. Items listed as completed are up-to-date and do not require any further intervention.    Health Maintenance   Topic Date Due   • TDAP/TD VACCINES (1 - Tdap) 1965   • ZOSTER VACCINE (1 of 2) 1996   • HEPATITIS C SCREENING  2016   • AAA SCREEN (ONE-TIME)  2016   • MEDICARE ANNUAL WELLNESS  2019   • INFLUENZA VACCINE  2019   • LIPID PANEL  2020   • COLONOSCOPY  2027   • PNEUMOCOCCAL VACCINES (65+ LOW/MEDIUM RISK)  Completed       Orders Placed This Encounter   Procedures   • Comprehensive Metabolic Panel   • Lipid Panel   • TSH   • CBC & Differential     Order Specific Question:   Manual Differential     Answer:   No       Return in 1 year (on 2020) for Medicare Wellness.

## 2019-05-15 ENCOUNTER — PROCEDURE VISIT (OUTPATIENT)
Dept: UROLOGY | Facility: CLINIC | Age: 74
End: 2019-05-15

## 2019-05-15 DIAGNOSIS — R33.9 URINARY RETENTION: Primary | ICD-10-CM

## 2019-05-15 PROCEDURE — 51702 INSERT TEMP BLADDER CATH: CPT | Performed by: UROLOGY

## 2019-05-15 NOTE — PROGRESS NOTES
Patient came in for monthly cath change and tolerated the procedure well, he has antibiotics at home that he takes daily to prevent uti.

## 2019-06-04 DIAGNOSIS — N40.1 BENIGN PROSTATIC HYPERPLASIA WITH URINARY OBSTRUCTION: ICD-10-CM

## 2019-06-04 DIAGNOSIS — N13.8 BENIGN PROSTATIC HYPERPLASIA WITH URINARY OBSTRUCTION: ICD-10-CM

## 2019-06-04 RX ORDER — FINASTERIDE 5 MG/1
5 TABLET, FILM COATED ORAL DAILY
Qty: 90 TABLET | Refills: 3 | Status: SHIPPED | OUTPATIENT
Start: 2019-06-04 | End: 2021-04-20 | Stop reason: SDUPTHER

## 2019-06-06 RX ORDER — DOXAZOSIN MESYLATE 4 MG/1
4 TABLET ORAL 2 TIMES DAILY
Qty: 180 TABLET | Refills: 3 | Status: SHIPPED | OUTPATIENT
Start: 2019-06-06 | End: 2020-03-16 | Stop reason: SDUPTHER

## 2019-06-19 ENCOUNTER — PROCEDURE VISIT (OUTPATIENT)
Dept: UROLOGY | Facility: CLINIC | Age: 74
End: 2019-06-19

## 2019-06-19 DIAGNOSIS — R33.9 URINARY RETENTION: Primary | ICD-10-CM

## 2019-06-19 PROCEDURE — 51702 INSERT TEMP BLADDER CATH: CPT | Performed by: UROLOGY

## 2019-06-19 NOTE — PROGRESS NOTES
Patient came in for monthly cath change, tolerated the procedure well and he takes daily keflex to prevent infection. Patient will return in one month for cath change and to see  for follow up.

## 2019-07-25 ENCOUNTER — OFFICE VISIT (OUTPATIENT)
Dept: UROLOGY | Facility: CLINIC | Age: 74
End: 2019-07-25

## 2019-07-25 VITALS
WEIGHT: 183 LBS | TEMPERATURE: 97.8 F | HEART RATE: 80 BPM | BODY MASS INDEX: 27.01 KG/M2 | OXYGEN SATURATION: 96 % | SYSTOLIC BLOOD PRESSURE: 122 MMHG | DIASTOLIC BLOOD PRESSURE: 76 MMHG

## 2019-07-25 DIAGNOSIS — N40.1 BPH WITH URINARY OBSTRUCTION: ICD-10-CM

## 2019-07-25 DIAGNOSIS — R97.20 ELEVATED PROSTATE SPECIFIC ANTIGEN (PSA): ICD-10-CM

## 2019-07-25 DIAGNOSIS — R33.8 ACUTE URINARY RETENTION: Primary | ICD-10-CM

## 2019-07-25 DIAGNOSIS — N13.8 BPH WITH URINARY OBSTRUCTION: ICD-10-CM

## 2019-07-25 PROCEDURE — 99213 OFFICE O/P EST LOW 20 MIN: CPT | Performed by: UROLOGY

## 2019-07-25 NOTE — PROGRESS NOTES
Chief Complaint  Urinary Retention (6 month follow up with  and also monthly cath change.)        CHERYL Nye is a 73 y.o. male who returns today for follow-up of 2 urological concerns.  He is in chronic urinary retention having had his bladder grossly over distended at one point with several liters in the bladder.  He tried intermittent self cath for a year but was unable to void a substantial amount and has found a great convenience in the indwelling Gillespie catheter.  Is being changed on a monthly basis through our office.  He states he is followed by Dr. Snow for chronic renal insufficiency which is improved since insertion of the Gillespie catheter.  He continues his Keflex 1 Daily to help prevent infection and Proscar to keep the gland from growing.  He has a past history of elevated PSA but previous biopsy was negative for cancer.  He declines my offer of digital rectal exam today because he has diarrhea and is afraid of having accidents on the way home.  This is always been benign in the past.    Vitals:    07/25/19 1052   BP: 122/76   Pulse: 80   Temp: 97.8 °F (36.6 °C)   SpO2: 96%       Past Medical History  Past Medical History:   Diagnosis Date   • Acute maxillary sinusitis    • Vocal cord anomaly        Past Surgical History  Past Surgical History:   Procedure Laterality Date   • BACK SURGERY     • BRAIN SURGERY     • EYE SURGERY     • OTHER SURGICAL HISTORY      Arm Excision       Medications  has a current medication list which includes the following prescription(s): alcohol wipes, cephalexin, doxazosin, ferrous sulfate, finasteride, levothyroxine, and prevnar 13.      Allergies  No Known Allergies    Social History  Social History     Social History Narrative   • Not on file       Family History  He has no family history of bladder or kidney cancer  He has no family history of kidney stones      AUA Symptom Score:      Review of Systems  Review of Systems   Constitutional: Negative.     Genitourinary: Positive for difficulty urinating.   All other systems reviewed and are negative.      Physical Exam  Physical Exam   Constitutional: He is oriented to person, place, and time. He appears well-developed and well-nourished.   HENT:   Head: Normocephalic and atraumatic.   Neck: Normal range of motion.   Pulmonary/Chest: Effort normal. No respiratory distress.   Abdominal: Soft. He exhibits no distension and no mass. There is no tenderness. No hernia.   Musculoskeletal: Normal range of motion.   Lymphadenopathy:     He has no cervical adenopathy.   Neurological: He is alert and oriented to person, place, and time.   Skin: Skin is warm and dry.   Psychiatric: He has a normal mood and affect. His behavior is normal.   Vitals reviewed.      Labs Recent and today in the office:  Results for orders placed or performed in visit on 04/16/19   CBC Auto Differential   Result Value Ref Range    WBC 7.02 3.40 - 10.80 10*3/mm3    RBC 4.89 4.14 - 5.80 10*6/mm3    Hemoglobin 15.8 13.0 - 17.7 g/dL    Hematocrit 46.7 37.5 - 51.0 %    MCV 95.5 79.0 - 97.0 fL    MCH 32.3 26.6 - 33.0 pg    MCHC 33.8 31.5 - 35.7 g/dL    RDW 12.5 12.3 - 15.4 %    RDW-SD 44.0 37.0 - 54.0 fl    MPV 10.4 6.0 - 12.0 fL    Platelets 219 140 - 450 10*3/mm3    Neutrophil % 55.5 42.7 - 76.0 %    Lymphocyte % 29.1 19.6 - 45.3 %    Monocyte % 10.4 5.0 - 12.0 %    Eosinophil % 3.8 0.3 - 6.2 %    Basophil % 1.1 0.0 - 1.5 %    Immature Grans % 0.1 0.0 - 0.5 %    Neutrophils, Absolute 3.89 1.40 - 7.00 10*3/mm3    Lymphocytes, Absolute 2.04 0.70 - 3.10 10*3/mm3    Monocytes, Absolute 0.73 0.10 - 0.90 10*3/mm3    Eosinophils, Absolute 0.27 0.00 - 0.40 10*3/mm3    Basophils, Absolute 0.08 0.00 - 0.20 10*3/mm3    Immature Grans, Absolute 0.01 0.00 - 0.05 10*3/mm3    nRBC 0.0 0.0 - 0.0 /100 WBC         Assessment & Plan  Acute urinary retention: The patient has been unable to void for years since his bladder was grossly over distended so he has a  decompensated bladder in addition to static obstruction at this point.  He has failed voiding trials and does not like intermittent self cath so he has an indwelling Gillespie catheter changed on a monthly basis.  He understands the need for hygiene and taking Keflex to help minimize the infection.    BPH with obstruction/elevated PSA: Digital rectal exam is been benign in the past but he declines today because of diarrhea.  He has not had a PSA checked recently but has had previous biopsy was negative for cancer.  Blood is repeated today and unless there is a exponential change she will return on a routine basis.  10 minutes is spent counseling the patient out of a 30-minute visit.

## 2019-07-26 LAB
PSA FREE MFR SERPL: 23.6 %
PSA FREE SERPL-MCNC: 1.18 NG/ML
PSA SERPL-MCNC: 5 NG/ML (ref 0–4)

## 2019-08-20 ENCOUNTER — LAB (OUTPATIENT)
Dept: LAB | Facility: HOSPITAL | Age: 74
End: 2019-08-20

## 2019-08-20 DIAGNOSIS — E78.2 MIXED HYPERLIPIDEMIA: ICD-10-CM

## 2019-08-20 LAB
ALBUMIN SERPL-MCNC: 4.7 G/DL (ref 3.5–5.2)
ALBUMIN/GLOB SERPL: 2.2 G/DL
ALP SERPL-CCNC: 83 U/L (ref 39–117)
ALT SERPL W P-5'-P-CCNC: 11 U/L (ref 1–41)
ANION GAP SERPL CALCULATED.3IONS-SCNC: 12.9 MMOL/L (ref 5–15)
AST SERPL-CCNC: 12 U/L (ref 1–40)
BASOPHILS # BLD AUTO: 0.07 10*3/MM3 (ref 0–0.2)
BASOPHILS NFR BLD AUTO: 1.2 % (ref 0–1.5)
BILIRUB SERPL-MCNC: 0.9 MG/DL (ref 0.2–1.2)
BUN BLD-MCNC: 19 MG/DL (ref 8–23)
BUN/CREAT SERPL: 12.4 (ref 7–25)
CALCIUM SPEC-SCNC: 9.2 MG/DL (ref 8.6–10.5)
CHLORIDE SERPL-SCNC: 103 MMOL/L (ref 98–107)
CHOLEST SERPL-MCNC: 152 MG/DL (ref 0–200)
CO2 SERPL-SCNC: 25.1 MMOL/L (ref 22–29)
CREAT BLD-MCNC: 1.53 MG/DL (ref 0.76–1.27)
DEPRECATED RDW RBC AUTO: 47 FL (ref 37–54)
EOSINOPHIL # BLD AUTO: 0.24 10*3/MM3 (ref 0–0.4)
EOSINOPHIL NFR BLD AUTO: 4 % (ref 0.3–6.2)
ERYTHROCYTE [DISTWIDTH] IN BLOOD BY AUTOMATED COUNT: 12.9 % (ref 12.3–15.4)
GFR SERPL CREATININE-BSD FRML MDRD: 45 ML/MIN/1.73
GLOBULIN UR ELPH-MCNC: 2.1 GM/DL
GLUCOSE BLD-MCNC: 93 MG/DL (ref 65–99)
HCT VFR BLD AUTO: 48.5 % (ref 37.5–51)
HDLC SERPL-MCNC: 42 MG/DL (ref 40–60)
HGB BLD-MCNC: 15.5 G/DL (ref 13–17.7)
IMM GRANULOCYTES # BLD AUTO: 0.01 10*3/MM3 (ref 0–0.05)
IMM GRANULOCYTES NFR BLD AUTO: 0.2 % (ref 0–0.5)
LDLC SERPL CALC-MCNC: 97 MG/DL (ref 0–100)
LDLC/HDLC SERPL: 2.31 {RATIO}
LYMPHOCYTES # BLD AUTO: 1.57 10*3/MM3 (ref 0.7–3.1)
LYMPHOCYTES NFR BLD AUTO: 25.9 % (ref 19.6–45.3)
MCH RBC QN AUTO: 31.4 PG (ref 26.6–33)
MCHC RBC AUTO-ENTMCNC: 32 G/DL (ref 31.5–35.7)
MCV RBC AUTO: 98.4 FL (ref 79–97)
MONOCYTES # BLD AUTO: 0.62 10*3/MM3 (ref 0.1–0.9)
MONOCYTES NFR BLD AUTO: 10.2 % (ref 5–12)
NEUTROPHILS # BLD AUTO: 3.56 10*3/MM3 (ref 1.7–7)
NEUTROPHILS NFR BLD AUTO: 58.5 % (ref 42.7–76)
NRBC BLD AUTO-RTO: 0 /100 WBC (ref 0–0.2)
PLATELET # BLD AUTO: 236 10*3/MM3 (ref 140–450)
PMV BLD AUTO: 10.9 FL (ref 6–12)
POTASSIUM BLD-SCNC: 4.9 MMOL/L (ref 3.5–5.2)
PROT SERPL-MCNC: 6.8 G/DL (ref 6–8.5)
RBC # BLD AUTO: 4.93 10*6/MM3 (ref 4.14–5.8)
SODIUM BLD-SCNC: 141 MMOL/L (ref 136–145)
TRIGL SERPL-MCNC: 65 MG/DL (ref 0–150)
TSH SERPL DL<=0.05 MIU/L-ACNC: 2.98 MIU/ML (ref 0.27–4.2)
VLDLC SERPL-MCNC: 13 MG/DL (ref 5–40)
WBC NRBC COR # BLD: 6.07 10*3/MM3 (ref 3.4–10.8)

## 2019-08-20 PROCEDURE — 84443 ASSAY THYROID STIM HORMONE: CPT | Performed by: INTERNAL MEDICINE

## 2019-08-20 PROCEDURE — 85025 COMPLETE CBC W/AUTO DIFF WBC: CPT

## 2019-08-20 PROCEDURE — 80053 COMPREHEN METABOLIC PANEL: CPT | Performed by: INTERNAL MEDICINE

## 2019-08-20 PROCEDURE — 80061 LIPID PANEL: CPT | Performed by: INTERNAL MEDICINE

## 2019-08-20 PROCEDURE — 36415 COLL VENOUS BLD VENIPUNCTURE: CPT | Performed by: INTERNAL MEDICINE

## 2019-08-21 ENCOUNTER — PROCEDURE VISIT (OUTPATIENT)
Dept: UROLOGY | Facility: CLINIC | Age: 74
End: 2019-08-21

## 2019-08-21 VITALS
HEART RATE: 86 BPM | OXYGEN SATURATION: 96 % | WEIGHT: 183 LBS | BODY MASS INDEX: 27.11 KG/M2 | DIASTOLIC BLOOD PRESSURE: 81 MMHG | SYSTOLIC BLOOD PRESSURE: 126 MMHG | HEIGHT: 69 IN

## 2019-08-21 DIAGNOSIS — R33.9 URINARY RETENTION: Primary | ICD-10-CM

## 2019-08-21 PROCEDURE — 51702 INSERT TEMP BLADDER CATH: CPT | Performed by: UROLOGY

## 2019-08-21 NOTE — PROGRESS NOTES
Patient came in for monthly cath change today, patient takes a daily antibiotic to prevent infection.  Patient tolerated the cath change well and will return in 4 weeks.

## 2019-08-27 ENCOUNTER — OFFICE VISIT (OUTPATIENT)
Dept: INTERNAL MEDICINE | Facility: CLINIC | Age: 74
End: 2019-08-27

## 2019-08-27 VITALS
TEMPERATURE: 97.1 F | RESPIRATION RATE: 16 BRPM | OXYGEN SATURATION: 97 % | BODY MASS INDEX: 27.11 KG/M2 | HEART RATE: 86 BPM | HEIGHT: 69 IN | SYSTOLIC BLOOD PRESSURE: 135 MMHG | DIASTOLIC BLOOD PRESSURE: 90 MMHG | WEIGHT: 183 LBS

## 2019-08-27 DIAGNOSIS — E03.8 ADULT ONSET HYPOTHYROIDISM: ICD-10-CM

## 2019-08-27 DIAGNOSIS — E78.2 MIXED HYPERLIPIDEMIA: Primary | ICD-10-CM

## 2019-08-27 DIAGNOSIS — N18.30 CHRONIC KIDNEY DISEASE, STAGE III (MODERATE) (HCC): ICD-10-CM

## 2019-08-27 PROCEDURE — 99214 OFFICE O/P EST MOD 30 MIN: CPT | Performed by: INTERNAL MEDICINE

## 2019-08-27 RX ORDER — LEVOTHYROXINE SODIUM 0.07 MG/1
75 TABLET ORAL DAILY
Qty: 30 TABLET | Refills: 6 | Status: SHIPPED | OUTPATIENT
Start: 2019-08-27 | End: 2020-01-29 | Stop reason: SDUPTHER

## 2019-08-27 NOTE — PROGRESS NOTES
Subjective   Nestor Nye is a 73 y.o. male.     Chief Complaint   Patient presents with   • Hyperlipidemia   • Hypothyroidism   • Chronic Kidney Disease       History of Present Illness   HPI: Patient is here to follow up on the   thyroid and cri , he saw nephrology and urology  The patient is also here to follow up on the cholesterol and is trying to follow a diet. The patient had  lab work done .     Hyperlipidemia   Pertinent negatives include no chest pain or shortness of breath.       The following portions of the patient's history were reviewed and updated as appropriate: allergies, current medications, past family history, past medical history, past social history, past surgical history and problem list.    Review of Systems   Constitutional: Negative for appetite change, fatigue and fever.   HENT: Negative for congestion, ear discharge, ear pain, sinus pressure and sore throat.    Eyes: Negative for pain and discharge.   Respiratory: Negative for cough, shortness of breath and wheezing.    Cardiovascular: Negative for chest pain, palpitations and leg swelling.   Gastrointestinal: Negative for abdominal pain, constipation, diarrhea, nausea and vomiting.   Endocrine: Negative for cold intolerance and heat intolerance.   Genitourinary: Negative for dysuria and flank pain.   Musculoskeletal: Negative for arthralgias and joint swelling.   Skin: Negative for pallor and rash.   Allergic/Immunologic: Negative for environmental allergies and food allergies.   Neurological: Negative for dizziness, weakness and numbness.   Hematological: Negative for adenopathy. Does not bruise/bleed easily.   Psychiatric/Behavioral: Negative for behavioral problems and dysphoric mood. The patient is not nervous/anxious.          Current Outpatient Medications:   •  Alcohol Swabs (ALCOHOL WIPES) 70 % pads, 1 pad 3 (Three) Times a Day., Disp: 100 each, Rfl: 11  •  cephalexin (KEFLEX) 500 MG capsule, TAKE ONE CAPSULE BY MOUTH  "EVERY DAY, Disp: 30 capsule, Rfl: 11  •  doxazosin (CARDURA) 4 MG tablet, Take 1 tablet by mouth 2 (Two) Times a Day., Disp: 180 tablet, Rfl: 3  •  ferrous sulfate 325 (65 FE) MG tablet, Take 325 mg by mouth Daily With Breakfast., Disp: , Rfl:   •  finasteride (PROSCAR) 5 MG tablet, Take 1 tablet by mouth Daily., Disp: 90 tablet, Rfl: 3  •  levothyroxine (SYNTHROID, LEVOTHROID) 75 MCG tablet, Take 1 tablet by mouth Daily., Disp: 30 tablet, Rfl: 6  •  PREVNAR 13 vaccine, , Disp: , Rfl:     Objective     Blood pressure 135/90, pulse 86, temperature 97.1 °F (36.2 °C), resp. rate 16, height 175.3 cm (69.02\"), weight 83 kg (183 lb), SpO2 97 %.    Physical Exam   Constitutional: He is oriented to person, place, and time. He appears well-developed and well-nourished. No distress.   HENT:   Head: Normocephalic and atraumatic.   Right Ear: External ear normal.   Left Ear: External ear normal.   Nose: Nose normal.   Mouth/Throat: Oropharynx is clear and moist.   Eyes: Conjunctivae and EOM are normal. Pupils are equal, round, and reactive to light.    Bilateral eye lid deformity   Neck: Normal range of motion. Neck supple. No thyromegaly present.   Cardiovascular: Normal rate, regular rhythm and normal heart sounds.   Pulmonary/Chest: Effort normal. No respiratory distress.   Abdominal: Soft. He exhibits no distension. There is no tenderness. There is no guarding.   Musculoskeletal: Normal range of motion. He exhibits no edema.   Lymphadenopathy:     He has no cervical adenopathy.   Neurological: He is alert and oriented to person, place, and time.   No gross motor or sensory deficits   Skin: Skin is warm and dry. He is not diaphoretic. No erythema.   Psychiatric: He has a normal mood and affect.   Nursing note and vitals reviewed.    Patient's Body mass index is 27.01 kg/m². BMI is within normal parameters. No follow-up required..      Results for orders placed or performed in visit on 08/20/19   CBC Auto Differential "   Result Value Ref Range    WBC 6.07 3.40 - 10.80 10*3/mm3    RBC 4.93 4.14 - 5.80 10*6/mm3    Hemoglobin 15.5 13.0 - 17.7 g/dL    Hematocrit 48.5 37.5 - 51.0 %    MCV 98.4 (H) 79.0 - 97.0 fL    MCH 31.4 26.6 - 33.0 pg    MCHC 32.0 31.5 - 35.7 g/dL    RDW 12.9 12.3 - 15.4 %    RDW-SD 47.0 37.0 - 54.0 fl    MPV 10.9 6.0 - 12.0 fL    Platelets 236 140 - 450 10*3/mm3    Neutrophil % 58.5 42.7 - 76.0 %    Lymphocyte % 25.9 19.6 - 45.3 %    Monocyte % 10.2 5.0 - 12.0 %    Eosinophil % 4.0 0.3 - 6.2 %    Basophil % 1.2 0.0 - 1.5 %    Immature Grans % 0.2 0.0 - 0.5 %    Neutrophils, Absolute 3.56 1.70 - 7.00 10*3/mm3    Lymphocytes, Absolute 1.57 0.70 - 3.10 10*3/mm3    Monocytes, Absolute 0.62 0.10 - 0.90 10*3/mm3    Eosinophils, Absolute 0.24 0.00 - 0.40 10*3/mm3    Basophils, Absolute 0.07 0.00 - 0.20 10*3/mm3    Immature Grans, Absolute 0.01 0.00 - 0.05 10*3/mm3    nRBC 0.0 0.0 - 0.2 /100 WBC         Assessment/Plan   Nestor was seen today for hyperlipidemia, hypothyroidism and chronic kidney disease.    Diagnoses and all orders for this visit:    Mixed hyperlipidemia  -     CBC & Differential  -     Comprehensive Metabolic Panel  -     Lipid Panel  -     CBC & Differential  -     Comprehensive Metabolic Panel  -     Lipid Panel    Adult onset hypothyroidism  -     TSH  -     TSH    Chronic kidney disease, stage III (moderate) (CMS/HCC)    Other orders  -     levothyroxine (SYNTHROID, LEVOTHROID) 75 MCG tablet; Take 1 tablet by mouth Daily.        Plan:  1.   .mixed hyperlipidemia:  reviewed  fasting CMP and lipid panel.  Diet and exercise counseled,    2 hypothyroidism:  reviewed  tsh , and continue levothyroxine  3. Cri stage 3 : creatinine 1.5 , monitor bmp , per nephrology, oral hydration          Roxanne D'Mcduffie, MD

## 2019-09-18 ENCOUNTER — PROCEDURE VISIT (OUTPATIENT)
Dept: UROLOGY | Facility: CLINIC | Age: 74
End: 2019-09-18

## 2019-09-18 DIAGNOSIS — R33.9 URINARY RETENTION: Primary | ICD-10-CM

## 2019-09-18 PROCEDURE — 51702 INSERT TEMP BLADDER CATH: CPT | Performed by: UROLOGY

## 2019-09-18 NOTE — PROGRESS NOTES
Patient in office today for monthly catheter change due to urinary retention. Patient tolerated procedure well. Patient will return in 1 month for montly change.

## 2019-10-16 ENCOUNTER — PROCEDURE VISIT (OUTPATIENT)
Dept: UROLOGY | Facility: CLINIC | Age: 74
End: 2019-10-16

## 2019-10-16 DIAGNOSIS — R33.9 URINARY RETENTION: Primary | ICD-10-CM

## 2019-10-16 PROCEDURE — 51702 INSERT TEMP BLADDER CATH: CPT | Performed by: UROLOGY

## 2019-11-20 ENCOUNTER — PROCEDURE VISIT (OUTPATIENT)
Dept: UROLOGY | Facility: CLINIC | Age: 74
End: 2019-11-20

## 2019-11-20 DIAGNOSIS — R33.9 URINARY RETENTION: Primary | ICD-10-CM

## 2019-11-20 PROCEDURE — 51702 INSERT TEMP BLADDER CATH: CPT | Performed by: UROLOGY

## 2019-12-09 DIAGNOSIS — N39.0 CHRONIC UTI (URINARY TRACT INFECTION): ICD-10-CM

## 2019-12-18 ENCOUNTER — PROCEDURE VISIT (OUTPATIENT)
Dept: UROLOGY | Facility: CLINIC | Age: 74
End: 2019-12-18

## 2019-12-18 DIAGNOSIS — R33.9 URINARY RETENTION: Primary | ICD-10-CM

## 2019-12-18 PROCEDURE — 51702 INSERT TEMP BLADDER CATH: CPT | Performed by: UROLOGY

## 2019-12-18 NOTE — PROGRESS NOTES
Patient came in for monthly cath change and tolerated the procedure well, and has antibiotics at home to cover the procedure, patient will return in one month.

## 2020-01-06 RX ORDER — CEPHALEXIN 500 MG/1
500 CAPSULE ORAL DAILY
Qty: 30 CAPSULE | Refills: 11 | Status: SHIPPED | OUTPATIENT
Start: 2020-01-06 | End: 2021-01-14

## 2020-01-15 ENCOUNTER — PROCEDURE VISIT (OUTPATIENT)
Dept: UROLOGY | Facility: CLINIC | Age: 75
End: 2020-01-15

## 2020-01-15 DIAGNOSIS — R33.9 URINARY RETENTION: ICD-10-CM

## 2020-01-15 PROCEDURE — 51702 INSERT TEMP BLADDER CATH: CPT | Performed by: UROLOGY

## 2020-01-15 NOTE — PROGRESS NOTES
Patient came in for monthly cath change and tolerated the procedure well, and has antibiotics at home to cover the procedure, patient will return in one month.  BH/CMA

## 2020-01-27 ENCOUNTER — APPOINTMENT (OUTPATIENT)
Dept: LAB | Facility: HOSPITAL | Age: 75
End: 2020-01-27

## 2020-01-27 LAB
ALBUMIN SERPL-MCNC: 4.5 G/DL (ref 3.5–5.2)
ALBUMIN/GLOB SERPL: 1.9 G/DL
ALP SERPL-CCNC: 94 U/L (ref 39–117)
ALT SERPL W P-5'-P-CCNC: 13 U/L (ref 1–41)
ANION GAP SERPL CALCULATED.3IONS-SCNC: 12.2 MMOL/L (ref 5–15)
AST SERPL-CCNC: 14 U/L (ref 1–40)
BASOPHILS # BLD AUTO: 0.04 10*3/MM3 (ref 0–0.2)
BASOPHILS NFR BLD AUTO: 0.6 % (ref 0–1.5)
BILIRUB SERPL-MCNC: 0.8 MG/DL (ref 0.2–1.2)
BUN BLD-MCNC: 21 MG/DL (ref 8–23)
BUN/CREAT SERPL: 14 (ref 7–25)
CALCIUM SPEC-SCNC: 9.2 MG/DL (ref 8.6–10.5)
CHLORIDE SERPL-SCNC: 100 MMOL/L (ref 98–107)
CHOLEST SERPL-MCNC: 178 MG/DL (ref 0–200)
CO2 SERPL-SCNC: 25.8 MMOL/L (ref 22–29)
CREAT BLD-MCNC: 1.5 MG/DL (ref 0.76–1.27)
DEPRECATED RDW RBC AUTO: 41.7 FL (ref 37–54)
EOSINOPHIL # BLD AUTO: 0.2 10*3/MM3 (ref 0–0.4)
EOSINOPHIL NFR BLD AUTO: 3.1 % (ref 0.3–6.2)
ERYTHROCYTE [DISTWIDTH] IN BLOOD BY AUTOMATED COUNT: 12.5 % (ref 12.3–15.4)
GFR SERPL CREATININE-BSD FRML MDRD: 46 ML/MIN/1.73
GLOBULIN UR ELPH-MCNC: 2.4 GM/DL
GLUCOSE BLD-MCNC: 96 MG/DL (ref 65–99)
HCT VFR BLD AUTO: 48.4 % (ref 37.5–51)
HDLC SERPL-MCNC: 36 MG/DL (ref 40–60)
HGB BLD-MCNC: 16.5 G/DL (ref 13–17.7)
IMM GRANULOCYTES # BLD AUTO: 0.01 10*3/MM3 (ref 0–0.05)
IMM GRANULOCYTES NFR BLD AUTO: 0.2 % (ref 0–0.5)
LDLC SERPL CALC-MCNC: 118 MG/DL (ref 0–100)
LDLC/HDLC SERPL: 3.29 {RATIO}
LYMPHOCYTES # BLD AUTO: 2.1 10*3/MM3 (ref 0.7–3.1)
LYMPHOCYTES NFR BLD AUTO: 32.8 % (ref 19.6–45.3)
MCH RBC QN AUTO: 31.6 PG (ref 26.6–33)
MCHC RBC AUTO-ENTMCNC: 34.1 G/DL (ref 31.5–35.7)
MCV RBC AUTO: 92.7 FL (ref 79–97)
MONOCYTES # BLD AUTO: 0.58 10*3/MM3 (ref 0.1–0.9)
MONOCYTES NFR BLD AUTO: 9 % (ref 5–12)
NEUTROPHILS # BLD AUTO: 3.48 10*3/MM3 (ref 1.7–7)
NEUTROPHILS NFR BLD AUTO: 54.3 % (ref 42.7–76)
NRBC BLD AUTO-RTO: 0 /100 WBC (ref 0–0.2)
PLATELET # BLD AUTO: 236 10*3/MM3 (ref 140–450)
PMV BLD AUTO: 10.8 FL (ref 6–12)
POTASSIUM BLD-SCNC: 4.7 MMOL/L (ref 3.5–5.2)
PROT SERPL-MCNC: 6.9 G/DL (ref 6–8.5)
RBC # BLD AUTO: 5.22 10*6/MM3 (ref 4.14–5.8)
SODIUM BLD-SCNC: 138 MMOL/L (ref 136–145)
TRIGL SERPL-MCNC: 118 MG/DL (ref 0–150)
TSH SERPL DL<=0.05 MIU/L-ACNC: 4.47 UIU/ML (ref 0.27–4.2)
VLDLC SERPL-MCNC: 23.6 MG/DL (ref 5–40)
WBC NRBC COR # BLD: 6.41 10*3/MM3 (ref 3.4–10.8)

## 2020-01-27 PROCEDURE — 84443 ASSAY THYROID STIM HORMONE: CPT | Performed by: INTERNAL MEDICINE

## 2020-01-27 PROCEDURE — 80061 LIPID PANEL: CPT | Performed by: INTERNAL MEDICINE

## 2020-01-27 PROCEDURE — 85025 COMPLETE CBC W/AUTO DIFF WBC: CPT | Performed by: INTERNAL MEDICINE

## 2020-01-27 PROCEDURE — 80053 COMPREHEN METABOLIC PANEL: CPT | Performed by: INTERNAL MEDICINE

## 2020-01-27 PROCEDURE — 36415 COLL VENOUS BLD VENIPUNCTURE: CPT | Performed by: INTERNAL MEDICINE

## 2020-01-29 DIAGNOSIS — E03.9 HYPOTHYROIDISM, UNSPECIFIED TYPE: Primary | ICD-10-CM

## 2020-01-29 RX ORDER — LEVOTHYROXINE SODIUM 88 UG/1
88 TABLET ORAL DAILY
Qty: 30 TABLET | Refills: 0 | Status: SHIPPED | OUTPATIENT
Start: 2020-01-29 | End: 2020-02-20 | Stop reason: SDUPTHER

## 2020-02-04 ENCOUNTER — OFFICE VISIT (OUTPATIENT)
Dept: INTERNAL MEDICINE | Facility: CLINIC | Age: 75
End: 2020-02-04

## 2020-02-04 VITALS
HEART RATE: 96 BPM | WEIGHT: 181 LBS | HEIGHT: 69 IN | SYSTOLIC BLOOD PRESSURE: 99 MMHG | RESPIRATION RATE: 16 BRPM | BODY MASS INDEX: 26.81 KG/M2 | TEMPERATURE: 97.8 F | OXYGEN SATURATION: 98 % | DIASTOLIC BLOOD PRESSURE: 81 MMHG

## 2020-02-04 DIAGNOSIS — E78.2 MIXED HYPERLIPIDEMIA: ICD-10-CM

## 2020-02-04 DIAGNOSIS — I10 BENIGN ESSENTIAL HYPERTENSION: Primary | ICD-10-CM

## 2020-02-04 DIAGNOSIS — E03.8 ADULT ONSET HYPOTHYROIDISM: ICD-10-CM

## 2020-02-04 DIAGNOSIS — N18.2 CHRONIC KIDNEY DISEASE, STAGE II (MILD): ICD-10-CM

## 2020-02-04 PROCEDURE — 99214 OFFICE O/P EST MOD 30 MIN: CPT | Performed by: INTERNAL MEDICINE

## 2020-02-04 NOTE — PROGRESS NOTES
Subjective   Nestor Nye is a 74 y.o. male.     Chief Complaint   Patient presents with   • Hypertension   • Hyperlipidemia   • Hypothyroidism   • Chronic Kidney Disease       History of Present Illness   HPI: Patient is here to follow up on the blood pressure  The patient is taking the blood pressure medications as prescribed and has had no side effects. The patient is also here to follow up on the cholesterol and is trying to follow a diet. The patient is also to follow-up on thyroid and CRI and had   lab work done .  The patient also needs refills on medications .   Hyperlipidemia   Pertinent negatives include no chest pain or shortness of breath.   Hypertension   Pertinent negatives include no chest pain, palpitations or shortness of breath.    The following portions of the patient's history were reviewed and updated as appropriate: allergies, current medications, past family history, past medical history, past social history, past surgical history and problem list.    Review of Systems   Constitutional: Negative for appetite change, fatigue and fever.   HENT: Negative for congestion, ear discharge, ear pain, sinus pressure and sore throat.    Eyes: Negative for pain and discharge.   Respiratory: Negative for cough, shortness of breath and wheezing.    Cardiovascular: Negative for chest pain, palpitations and leg swelling.   Gastrointestinal: Negative for abdominal pain, constipation, diarrhea, nausea and vomiting.   Endocrine: Negative for cold intolerance and heat intolerance.   Genitourinary: Negative for dysuria and flank pain.   Musculoskeletal: Negative for arthralgias and joint swelling.   Skin: Negative for pallor and rash.   Allergic/Immunologic: Negative for environmental allergies and food allergies.   Neurological: Negative for dizziness, weakness and numbness.   Hematological: Negative for adenopathy. Does not bruise/bleed easily.   Psychiatric/Behavioral: Negative for behavioral problems  "and dysphoric mood. The patient is not nervous/anxious.          Current Outpatient Medications:   •  Alcohol Swabs (ALCOHOL WIPES) 70 % pads, 1 pad 3 (Three) Times a Day., Disp: 100 each, Rfl: 11  •  cephalexin (KEFLEX) 500 MG capsule, Take 1 capsule by mouth Daily., Disp: 30 capsule, Rfl: 11  •  doxazosin (CARDURA) 4 MG tablet, Take 1 tablet by mouth 2 (Two) Times a Day., Disp: 180 tablet, Rfl: 3  •  ferrous sulfate 325 (65 FE) MG tablet, Take 325 mg by mouth Daily With Breakfast., Disp: , Rfl:   •  finasteride (PROSCAR) 5 MG tablet, Take 1 tablet by mouth Daily., Disp: 90 tablet, Rfl: 3  •  levothyroxine (SYNTHROID, LEVOTHROID) 88 MCG tablet, Take 1 tablet by mouth Daily., Disp: 30 tablet, Rfl: 0    Objective     Blood pressure 99/81, pulse 96, temperature 97.8 °F (36.6 °C), resp. rate 16, height 175.3 cm (69.02\"), weight 82.1 kg (181 lb), SpO2 98 %.    Physical Exam   Constitutional: He is oriented to person, place, and time. He appears well-developed and well-nourished. No distress.   HENT:   Head: Normocephalic and atraumatic.   Right Ear: External ear normal.   Left Ear: External ear normal.   Nose: Nose normal.   Mouth/Throat: Oropharynx is clear and moist.   Eyes: Pupils are equal, round, and reactive to light. Conjunctivae and EOM are normal.   Neck: Normal range of motion. Neck supple. No thyromegaly present.   Cardiovascular: Normal rate, regular rhythm and normal heart sounds.   Pulmonary/Chest: Effort normal. No respiratory distress.   Abdominal: Soft. He exhibits no distension. There is no tenderness. There is no guarding.   Musculoskeletal: Normal range of motion. He exhibits no edema.   Lymphadenopathy:     He has no cervical adenopathy.   Neurological: He is alert and oriented to person, place, and time.   No gross motor or sensory deficits   Skin: Skin is warm and dry. He is not diaphoretic. No erythema.   Psychiatric: He has a normal mood and affect.   Nursing note and vitals " reviewed.    Patient's Body mass index is 26.72 kg/m². BMI is within normal parameters. No follow-up required..      Results for orders placed or performed in visit on 08/27/19   Comprehensive Metabolic Panel   Result Value Ref Range    Glucose 96 65 - 99 mg/dL    BUN 21 8 - 23 mg/dL    Creatinine 1.50 (H) 0.76 - 1.27 mg/dL    Sodium 138 136 - 145 mmol/L    Potassium 4.7 3.5 - 5.2 mmol/L    Chloride 100 98 - 107 mmol/L    CO2 25.8 22.0 - 29.0 mmol/L    Calcium 9.2 8.6 - 10.5 mg/dL    Total Protein 6.9 6.0 - 8.5 g/dL    Albumin 4.50 3.50 - 5.20 g/dL    ALT (SGPT) 13 1 - 41 U/L    AST (SGOT) 14 1 - 40 U/L    Alkaline Phosphatase 94 39 - 117 U/L    Total Bilirubin 0.8 0.2 - 1.2 mg/dL    eGFR Non African Amer 46 (L) >60 mL/min/1.73    Globulin 2.4 gm/dL    A/G Ratio 1.9 g/dL    BUN/Creatinine Ratio 14.0 7.0 - 25.0    Anion Gap 12.2 5.0 - 15.0 mmol/L   Lipid Panel   Result Value Ref Range    Total Cholesterol 178 0 - 200 mg/dL    Triglycerides 118 0 - 150 mg/dL    HDL Cholesterol 36 (L) 40 - 60 mg/dL    LDL Cholesterol  118 (H) 0 - 100 mg/dL    VLDL Cholesterol 23.6 5 - 40 mg/dL    LDL/HDL Ratio 3.29    TSH   Result Value Ref Range    TSH 4.470 (H) 0.270 - 4.200 uIU/mL   CBC Auto Differential   Result Value Ref Range    WBC 6.41 3.40 - 10.80 10*3/mm3    RBC 5.22 4.14 - 5.80 10*6/mm3    Hemoglobin 16.5 13.0 - 17.7 g/dL    Hematocrit 48.4 37.5 - 51.0 %    MCV 92.7 79.0 - 97.0 fL    MCH 31.6 26.6 - 33.0 pg    MCHC 34.1 31.5 - 35.7 g/dL    RDW 12.5 12.3 - 15.4 %    RDW-SD 41.7 37.0 - 54.0 fl    MPV 10.8 6.0 - 12.0 fL    Platelets 236 140 - 450 10*3/mm3    Neutrophil % 54.3 42.7 - 76.0 %    Lymphocyte % 32.8 19.6 - 45.3 %    Monocyte % 9.0 5.0 - 12.0 %    Eosinophil % 3.1 0.3 - 6.2 %    Basophil % 0.6 0.0 - 1.5 %    Immature Grans % 0.2 0.0 - 0.5 %    Neutrophils, Absolute 3.48 1.70 - 7.00 10*3/mm3    Lymphocytes, Absolute 2.10 0.70 - 3.10 10*3/mm3    Monocytes, Absolute 0.58 0.10 - 0.90 10*3/mm3    Eosinophils, Absolute 0.20  0.00 - 0.40 10*3/mm3    Basophils, Absolute 0.04 0.00 - 0.20 10*3/mm3    Immature Grans, Absolute 0.01 0.00 - 0.05 10*3/mm3    nRBC 0.0 0.0 - 0.2 /100 WBC         Assessment/Plan   Nestor was seen today for hypertension, hyperlipidemia, hypothyroidism and chronic kidney disease.    Diagnoses and all orders for this visit:    Benign essential hypertension    Mixed hyperlipidemia  -     Cancel: CBC & Differential  -     Cancel: Comprehensive Metabolic Panel  -     Cancel: Lipid Panel  -     CBC & Differential  -     Comprehensive Metabolic Panel  -     Lipid Panel    Adult onset hypothyroidism  -     Cancel: TSH  -     TSH  -     TSH    Chronic kidney disease, stage II (mild)      Plan:  1.  Benign essential hypertension: Will continue current medication, low-sodium diet advised  2.mixed hyperlipidemia:  reviewed  fasting CMP and lipid panel.  Diet and exercise counseled,   3. Cri : oral hydration , monitor bmp , creatinine 1.5  4. hypothyroidism:  reviewed  tsh , and continue levothyroxine             Roxanne Slater MD

## 2020-02-18 RX ORDER — LEVOTHYROXINE SODIUM 88 UG/1
88 TABLET ORAL DAILY
Qty: 30 TABLET | Refills: 0 | Status: CANCELLED | OUTPATIENT
Start: 2020-02-18

## 2020-02-19 ENCOUNTER — APPOINTMENT (OUTPATIENT)
Dept: LAB | Facility: HOSPITAL | Age: 75
End: 2020-02-19

## 2020-02-19 LAB — TSH SERPL DL<=0.05 MIU/L-ACNC: 2.55 UIU/ML (ref 0.27–4.2)

## 2020-02-19 PROCEDURE — 84443 ASSAY THYROID STIM HORMONE: CPT | Performed by: INTERNAL MEDICINE

## 2020-02-20 RX ORDER — LEVOTHYROXINE SODIUM 88 UG/1
88 TABLET ORAL DAILY
Qty: 90 TABLET | Refills: 2 | Status: SHIPPED | OUTPATIENT
Start: 2020-02-20 | End: 2020-05-19 | Stop reason: SDUPTHER

## 2020-03-16 RX ORDER — DOXAZOSIN MESYLATE 4 MG/1
4 TABLET ORAL 2 TIMES DAILY
Qty: 180 TABLET | Refills: 3 | Status: SHIPPED | OUTPATIENT
Start: 2020-03-16 | End: 2021-01-06 | Stop reason: SDUPTHER

## 2020-03-16 NOTE — TELEPHONE ENCOUNTER
[facility] CALLED REQUESTING REFILL FOR   doxazosin (CARDURA) 4 MG tablet.    CALL BACK: [phone]     [id number] - [place], [place] - [address] [place] [address]

## 2020-03-18 ENCOUNTER — PROCEDURE VISIT (OUTPATIENT)
Dept: UROLOGY | Facility: CLINIC | Age: 75
End: 2020-03-18

## 2020-03-18 DIAGNOSIS — R33.9 URINARY RETENTION: Primary | ICD-10-CM

## 2020-03-18 PROCEDURE — 51702 INSERT TEMP BLADDER CATH: CPT | Performed by: UROLOGY

## 2020-03-18 NOTE — PROGRESS NOTES
Patient came in for monthly cath change and tolerated the procedure well, patient will return in 1 month.

## 2020-04-15 ENCOUNTER — PROCEDURE VISIT (OUTPATIENT)
Dept: UROLOGY | Facility: CLINIC | Age: 75
End: 2020-04-15

## 2020-04-15 VITALS — WEIGHT: 181 LBS | BODY MASS INDEX: 26.81 KG/M2 | RESPIRATION RATE: 17 BRPM | HEIGHT: 69 IN | TEMPERATURE: 97.6 F

## 2020-04-15 DIAGNOSIS — R33.9 URINARY RETENTION: ICD-10-CM

## 2020-04-15 PROCEDURE — 51702 INSERT TEMP BLADDER CATH: CPT | Performed by: UROLOGY

## 2020-04-15 NOTE — PROGRESS NOTES
Patient came in for monthly catheter change and tolerated the procedure well. He has antibiotics at home to cover the procedure, patient will return in one month.  NC

## 2020-05-05 ENCOUNTER — APPOINTMENT (OUTPATIENT)
Dept: LAB | Facility: HOSPITAL | Age: 75
End: 2020-05-05

## 2020-05-05 LAB
ALBUMIN SERPL-MCNC: 4.2 G/DL (ref 3.5–5.2)
ALBUMIN/GLOB SERPL: 1.4 G/DL
ALP SERPL-CCNC: 84 U/L (ref 39–117)
ALT SERPL W P-5'-P-CCNC: 14 U/L (ref 1–41)
ANION GAP SERPL CALCULATED.3IONS-SCNC: 12.3 MMOL/L (ref 5–15)
AST SERPL-CCNC: 16 U/L (ref 1–40)
BASOPHILS # BLD AUTO: 0.08 10*3/MM3 (ref 0–0.2)
BASOPHILS NFR BLD AUTO: 1 % (ref 0–1.5)
BILIRUB SERPL-MCNC: 0.8 MG/DL (ref 0.2–1.2)
BUN BLD-MCNC: 26 MG/DL (ref 8–23)
BUN/CREAT SERPL: 16.7 (ref 7–25)
CALCIUM SPEC-SCNC: 9.2 MG/DL (ref 8.6–10.5)
CHLORIDE SERPL-SCNC: 103 MMOL/L (ref 98–107)
CHOLEST SERPL-MCNC: 174 MG/DL (ref 0–200)
CO2 SERPL-SCNC: 24.7 MMOL/L (ref 22–29)
CREAT BLD-MCNC: 1.56 MG/DL (ref 0.76–1.27)
DEPRECATED RDW RBC AUTO: 43.9 FL (ref 37–54)
EOSINOPHIL # BLD AUTO: 0.39 10*3/MM3 (ref 0–0.4)
EOSINOPHIL NFR BLD AUTO: 5.1 % (ref 0.3–6.2)
ERYTHROCYTE [DISTWIDTH] IN BLOOD BY AUTOMATED COUNT: 12.7 % (ref 12.3–15.4)
GFR SERPL CREATININE-BSD FRML MDRD: 44 ML/MIN/1.73
GLOBULIN UR ELPH-MCNC: 2.9 GM/DL
GLUCOSE BLD-MCNC: 102 MG/DL (ref 65–99)
HCT VFR BLD AUTO: 48.2 % (ref 37.5–51)
HDLC SERPL-MCNC: 42 MG/DL (ref 40–60)
HGB BLD-MCNC: 16.3 G/DL (ref 13–17.7)
IMM GRANULOCYTES # BLD AUTO: 0.02 10*3/MM3 (ref 0–0.05)
IMM GRANULOCYTES NFR BLD AUTO: 0.3 % (ref 0–0.5)
LDLC SERPL CALC-MCNC: 108 MG/DL (ref 0–100)
LDLC/HDLC SERPL: 2.58 {RATIO}
LYMPHOCYTES # BLD AUTO: 2.33 10*3/MM3 (ref 0.7–3.1)
LYMPHOCYTES NFR BLD AUTO: 30.2 % (ref 19.6–45.3)
MCH RBC QN AUTO: 32 PG (ref 26.6–33)
MCHC RBC AUTO-ENTMCNC: 33.8 G/DL (ref 31.5–35.7)
MCV RBC AUTO: 94.5 FL (ref 79–97)
MONOCYTES # BLD AUTO: 0.74 10*3/MM3 (ref 0.1–0.9)
MONOCYTES NFR BLD AUTO: 9.6 % (ref 5–12)
NEUTROPHILS # BLD AUTO: 4.15 10*3/MM3 (ref 1.7–7)
NEUTROPHILS NFR BLD AUTO: 53.8 % (ref 42.7–76)
NRBC BLD AUTO-RTO: 0 /100 WBC (ref 0–0.2)
PLATELET # BLD AUTO: 252 10*3/MM3 (ref 140–450)
PMV BLD AUTO: 10.9 FL (ref 6–12)
POTASSIUM BLD-SCNC: 5 MMOL/L (ref 3.5–5.2)
PROT SERPL-MCNC: 7.1 G/DL (ref 6–8.5)
RBC # BLD AUTO: 5.1 10*6/MM3 (ref 4.14–5.8)
SODIUM BLD-SCNC: 140 MMOL/L (ref 136–145)
TRIGL SERPL-MCNC: 119 MG/DL (ref 0–150)
TSH SERPL DL<=0.05 MIU/L-ACNC: 2.95 UIU/ML (ref 0.27–4.2)
VLDLC SERPL-MCNC: 23.8 MG/DL (ref 5–40)
WBC NRBC COR # BLD: 7.71 10*3/MM3 (ref 3.4–10.8)

## 2020-05-05 PROCEDURE — 84443 ASSAY THYROID STIM HORMONE: CPT | Performed by: INTERNAL MEDICINE

## 2020-05-05 PROCEDURE — 80053 COMPREHEN METABOLIC PANEL: CPT | Performed by: INTERNAL MEDICINE

## 2020-05-05 PROCEDURE — 85025 COMPLETE CBC W/AUTO DIFF WBC: CPT | Performed by: INTERNAL MEDICINE

## 2020-05-05 PROCEDURE — 36415 COLL VENOUS BLD VENIPUNCTURE: CPT | Performed by: INTERNAL MEDICINE

## 2020-05-05 PROCEDURE — 80061 LIPID PANEL: CPT | Performed by: INTERNAL MEDICINE

## 2020-05-19 ENCOUNTER — OFFICE VISIT (OUTPATIENT)
Dept: INTERNAL MEDICINE | Facility: CLINIC | Age: 75
End: 2020-05-19

## 2020-05-19 DIAGNOSIS — E78.2 MIXED HYPERLIPIDEMIA: ICD-10-CM

## 2020-05-19 DIAGNOSIS — Z00.00 ROUTINE GENERAL MEDICAL EXAMINATION AT A HEALTH CARE FACILITY: Primary | ICD-10-CM

## 2020-05-19 DIAGNOSIS — E03.8 ADULT ONSET HYPOTHYROIDISM: ICD-10-CM

## 2020-05-19 DIAGNOSIS — I10 BENIGN ESSENTIAL HYPERTENSION: ICD-10-CM

## 2020-05-19 PROCEDURE — G0439 PPPS, SUBSEQ VISIT: HCPCS | Performed by: INTERNAL MEDICINE

## 2020-05-19 RX ORDER — LEVOTHYROXINE SODIUM 88 UG/1
88 TABLET ORAL DAILY
Qty: 90 TABLET | Refills: 2 | Status: SHIPPED | OUTPATIENT
Start: 2020-05-19 | End: 2021-04-16

## 2020-05-19 NOTE — PROGRESS NOTES
The ABCs of the Annual Wellness Visit  Subsequent Medicare Wellness Visit    Chief Complaint   Patient presents with   • Medicare Wellness-subsequent   • Hypertension   • Hyperlipidemia   • Hypothyroidism       Subjective   History of Present Illness:  Nestor Nye is a 74 y.o. male who presents for a Subsequent Medicare Wellness Visit. Patient is here to follow up on the blood pressure  The patient is taking the blood pressure medications as prescribed and has had no side effects. The patient is also here to follow up on the cholesterol and is trying to follow a diet. The patient is also to follow-up on thyroid and  Had  lab work done .  The patient also needs refills on medications .   Hyperlipidemia   Pertinent negatives include no chest pain or shortness of breath.   Hypertension   Pertinent negatives include no chest pain, palpitations or shortness of breath.    HEALTH RISK ASSESSMENT    Recent Hospitalizations:  No hospitalization(s) within the last year.    Current Medical Providers:  Patient Care Team:  Roxanne Slater MD as PCP - General (Geriatric Medicine)    Smoking Status:  Social History     Tobacco Use   Smoking Status Light Tobacco Smoker   • Types: Cigarettes, Electronic Cigarette   Smokeless Tobacco Never Used   Tobacco Comment    Quit 2013       Alcohol Consumption:  Social History     Substance and Sexual Activity   Alcohol Use No   • Frequency: Never       Depression Screen:   PHQ-2/PHQ-9 Depression Screening 5/19/2020   Little interest or pleasure in doing things 0   Feeling down, depressed, or hopeless 0   Total Score 0       Fall Risk Screen:  PILARADI Fall Risk Assessment was completed, and patient is at LOW risk for falls.Assessment completed on:5/19/2020    Health Habits and Functional and Cognitive Screening:  Functional & Cognitive Status 5/19/2020   Do you have difficulty preparing food and eating? No   Do you have difficulty bathing yourself, getting dressed or grooming yourself?  No   Do you have difficulty using the toilet? No   Do you have difficulty moving around from place to place? No   Do you have trouble with steps or getting out of a bed or a chair? No   Current Diet Well Balanced Diet   Dental Exam Not up to date   Eye Exam Up to date   Exercise (times per week) 0 times per week   Current Exercise Activities Include No Regular Exercise   Do you need help using the phone?  No   Are you deaf or do you have serious difficulty hearing?  No   Do you need help with transportation? No   Do you need help shopping? No   Do you need help preparing meals?  No   Do you need help with housework?  No   Do you need help with laundry? No   Do you need help taking your medications? No   Do you need help managing money? No   Do you ever drive or ride in a car without wearing a seat belt? No   Have you felt unusual stress, anger or loneliness in the last month? No   Who do you live with? Alone   If you need help, do you have trouble finding someone available to you? No   Have you been bothered in the last four weeks by sexual problems? No   Do you have difficulty concentrating, remembering or making decisions? No         Does the patient have evidence of cognitive impairment? No    Asprin use counseling:Does not need ASA (and currently is not on it)    Age-appropriate Screening Schedule:  Refer to the list below for future screening recommendations based on patient's age, sex and/or medical conditions. Orders for these recommended tests are listed in the plan section. The patient has been provided with a written plan.    Health Maintenance   Topic Date Due   • TDAP/TD VACCINES (1 - Tdap) 11/11/1956   • ZOSTER VACCINE (1 of 2) 11/11/1996   • INFLUENZA VACCINE  08/01/2020   • LIPID PANEL  05/05/2021   • COLONOSCOPY  04/04/2027   • PNEUMOCOCCAL VACCINE (65+ HIGH RISK)  Completed          The following portions of the patient's history were reviewed and updated as appropriate: allergies, current  medications, past family history, past medical history, past social history, past surgical history and problem list.    Outpatient Medications Prior to Visit   Medication Sig Dispense Refill   • Alcohol Swabs (ALCOHOL WIPES) 70 % pads 1 pad 3 (Three) Times a Day. 100 each 11   • cephalexin (KEFLEX) 500 MG capsule Take 1 capsule by mouth Daily. 30 capsule 11   • doxazosin (CARDURA) 4 MG tablet Take 1 tablet by mouth 2 (Two) Times a Day. 180 tablet 3   • ferrous sulfate 325 (65 FE) MG tablet Take 325 mg by mouth Daily With Breakfast.     • finasteride (PROSCAR) 5 MG tablet Take 1 tablet by mouth Daily. 90 tablet 3   • levothyroxine (SYNTHROID, LEVOTHROID) 88 MCG tablet Take 1 tablet by mouth Daily. 90 tablet 2     No facility-administered medications prior to visit.        Patient Active Problem List   Diagnosis   • BPH (benign prostatic hyperplasia)   • Hypothyroidism   • Mixed hyperlipidemia   • Elevated PSA   • Benign essential hypertension       Advanced Care Planning:  ACP discussion was held with the patient during this visit. Patient does not have an advance directive, information provided.    Review of Systems   Constitutional: Negative for appetite change, fatigue and fever.   HENT: Negative for congestion, ear discharge, ear pain, sinus pressure and sore throat.    Eyes: Negative for pain and discharge.   Respiratory: Negative for cough, shortness of breath and wheezing.    Cardiovascular: Negative for chest pain, palpitations and leg swelling.   Gastrointestinal: Negative for abdominal pain, constipation, diarrhea, nausea and vomiting.   Endocrine: Negative for cold intolerance and heat intolerance.   Genitourinary: Negative for dysuria and flank pain.   Musculoskeletal: Negative for arthralgias and joint swelling.   Skin: Negative for pallor and rash.   Allergic/Immunologic: Negative for environmental allergies and food allergies.   Neurological: Negative for dizziness, weakness and numbness.  "  Hematological: Negative for adenopathy. Does not bruise/bleed easily.   Psychiatric/Behavioral: Negative for behavioral problems and dysphoric mood. The patient is not nervous/anxious.        Compared to one year ago, the patient feels his physical health is the same.  Compared to one year ago, the patient feels his mental health is the same.    Reviewed chart for potential of high risk medication in the elderly: yes  Reviewed chart for potential of harmful drug interactions in the elderly:yes    Objective         Vitals:    05/19/20 1033   BP: 100/70   Pulse: 88   Resp: 12   Temp: 97.5 °F (36.4 °C)   TempSrc: Temporal   SpO2: 98%   Weight: 82.5 kg (181 lb 12.8 oz)   Height: 175.3 cm (69.02\")   PainSc: 0-No pain       Body mass index is 26.83 kg/m².  Discussed the patient's BMI with him. The BMI is in the acceptable range.    Physical Exam   Constitutional: He is oriented to person, place, and time. He appears well-developed and well-nourished. No distress.   HENT:   Head: Normocephalic and atraumatic.   Right Ear: External ear normal.   Left Ear: External ear normal.   Nose: Nose normal.   Mouth/Throat: Oropharynx is clear and moist.   Eyes: Pupils are equal, round, and reactive to light. EOM are normal. Right conjunctiva is injected. Left conjunctiva is injected.   Neck: Normal range of motion. Neck supple. No thyromegaly present.   Cardiovascular: Normal rate, regular rhythm and normal heart sounds.   Pulmonary/Chest: Effort normal and breath sounds normal. No respiratory distress. He has no wheezes.   Abdominal: Soft. Bowel sounds are normal. He exhibits no distension. There is no tenderness. There is no guarding.   Musculoskeletal: Normal range of motion. He exhibits no edema.   Lymphadenopathy:     He has no cervical adenopathy.   Neurological: He is alert and oriented to person, place, and time.   No gross  motor or sensory deficits   Skin: Skin is warm. He is not diaphoretic. No erythema.   Psychiatric: He " has a normal mood and affect.   Nursing note and vitals reviewed.      Lab Results   Component Value Date    TRIG 119 05/05/2020    HDL 42 05/05/2020     (H) 05/05/2020    VLDL 23.8 05/05/2020        Assessment/Plan   Medicare Risks and Personalized Health Plan  CMS Preventative Services Quick Reference  Advance Directive Discussion  Cardiovascular risk  Dementia/Memory   Diabetic Lab Screening   Fall Risk  Glaucoma Risk  Hearing Problem    The above risks/problems have been discussed with the patient.  Pertinent information has been shared with the patient in the After Visit Summary.  Follow up plans and orders are seen below in the Assessment/Plan Section.    Diagnoses and all orders for this visit:    1. Routine general medical examination at a health care facility (Primary)    2. Benign essential hypertension    3. Mixed hyperlipidemia  -     CBC & Differential  -     Comprehensive Metabolic Panel  -     Lipid Panel    4. Adult onset hypothyroidism  -     TSH    Other orders  -     levothyroxine (SYNTHROID, LEVOTHROID) 88 MCG tablet; Take 1 tablet by mouth Daily.  Dispense: 90 tablet; Refill: 2      Plan:  1.physical: Physical exam conducted today, reviewed fasting lab work,   Impression: healthy adult Patient. Currently, patient eats a healthy diet. Screening lab work includes glucose, lipid profile and complete blood count . The risks and benefits of immunizations were discussed and immunizations are up to date. Advice and education were given regarding nutrition and aerobic exercise. Patient discussion: discussed with the patient.  Annual Wellness Visit  with preventive exam as well as age and risk appropriate counseling completed.   Advance Directive Planning: paperwork and instructions were given to the patient.   Patient Discussion: plan discussed with the patient, follow-up visit needed in one year. Medication Review and medication list updated  2.  Benign essential hypertension: Will continue  current medication, low-sodium diet advised, Counseled to regularly check BP at home with goal averaging <130/80.   3.mixed hyperlipidemia:  reviewed  fasting CMP and lipid panel.  Diet and exercise counseled,  Will continue current medications   4. hypothyroidism:  reviewed  tsh , and continue levothyroxine    Follow Up:  Return in 1 year (on 5/19/2021) for Medicare Wellness.     An After Visit Summary and PPPS were given to the patient.

## 2020-05-19 NOTE — PATIENT INSTRUCTIONS
Medicare Wellness  Personal Prevention Plan of Service     Date of Office Visit:  2020  Encounter Provider:  Roxanne Slater MD  Place of Service:  [facility]  Patient Name: Nestor Nye  :  1945    As part of the Medicare Wellness portion of your visit today, we are providing you with this personalized preventive plan of services (PPPS). This plan is based upon recommendations of the [place] Preventive Services Task Force (USPSTF) and the Advisory Committee on Immunization Practices (ACIP).    This lists the preventive care services that should be considered, and provides dates of when you are due. Items listed as completed are up-to-date and do not require any further intervention.    Health Maintenance   Topic Date Due   • TDAP/TD VACCINES (1 - Tdap) 1957   • ZOSTER VACCINE (1 of 2) 1996   • HEPATITIS C SCREENING  2016   • AAA SCREEN (ONE-TIME)  2016   • INFLUENZA VACCINE  2020   • LIPID PANEL  2021   • MEDICARE ANNUAL WELLNESS  2021   • COLONOSCOPY  2027   • PNEUMOCOCCAL VACCINE (65+ HIGH RISK)  Completed       Orders Placed This Encounter   Procedures   • Comprehensive Metabolic Panel   • Lipid Panel   • TSH   • CBC & Differential     Order Specific Question:   Manual Differential     Answer:   No       Return in about 19 weeks (around 2020).          Exercising to Stay Healthy  To become healthy and stay healthy, it is recommended that you do moderate-intensity and vigorous-intensity exercise. You can tell that you are exercising at a moderate intensity if your heart starts beating faster and you start breathing faster but can still hold a conversation. You can tell that you are exercising at a vigorous intensity if you are breathing much harder and faster and cannot hold a conversation while exercising.  Exercising regularly is important. It has many health benefits, such as:  · Improving overall  fitness, flexibility, and endurance.  · Increasing bone density.  · Helping with weight control.  · Decreasing body fat.  · Increasing muscle strength.  · Reducing stress and tension.  · Improving overall health.  How often should I exercise?  Choose an activity that you enjoy, and set realistic goals. Your health care provider can help you make an activity plan that works for you.  Exercise regularly as told by your health care provider. This may include:  · Doing strength training two times a week, such as:  ? Lifting weights.  ? Using resistance bands.  ? Push-ups.  ? Sit-ups.  ? Yoga.  · Doing a certain intensity of exercise for a given amount of time. Choose from these options:  ? A total of 150 minutes of moderate-intensity exercise every week.  ? A total of 75 minutes of vigorous-intensity exercise every week.  ? A mix of moderate-intensity and vigorous-intensity exercise every week.  Children, pregnant women, people who have not exercised regularly, people who are overweight, and older adults may need to talk with a health care provider about what activities are safe to do. If you have a medical condition, be sure to talk with your health care provider before you start a new exercise program.  What are some exercise ideas?  Moderate-intensity exercise ideas include:  · Walking 1 mile (1.6 km) in about 15 minutes.  · Biking.  · Hiking.  · Golfing.  · Dancing.  · Water aerobics.  Vigorous-intensity exercise ideas include:  · Walking 4.5 miles (7.2 km) or more in about 1 hour.  · Jogging or running 5 miles (8 km) in about 1 hour.  · Biking 10 miles (16.1 km) or more in about 1 hour.  · Lap swimming.  · Roller-skating or in-line skating.  · Cross-country skiing.  · Vigorous competitive sports, such as football, basketball, and soccer.  · Jumping rope.  · Aerobic dancing.  What are some everyday activities that can help me to get exercise?  · Yard work, such as:  ? Pushing a .  ? Raking and bagging  leaves.  · Washing your car.  · Pushing a stroller.  · Shoveling snow.  · Gardening.  · Washing windows or floors.  How can I be more active in my day-to-day activities?  · Use stairs instead of an elevator.  · Take a walk during your lunch break.  · If you drive, park your car farther away from your work or school.  · If you take public transportation, get off one stop early and walk the rest of the way.  · Stand up or walk around during all of your indoor phone calls.  · Get up, stretch, and walk around every 30 minutes throughout the day.  · Enjoy exercise with a friend. Support to continue exercising will help you keep a regular routine of activity.  What guidelines can I follow while exercising?  · Before you start a new exercise program, talk with your health care provider.  · Do not exercise so much that you hurt yourself, feel dizzy, or get very short of breath.  · Wear comfortable clothes and wear shoes with good support.  · Drink plenty of water while you exercise to prevent dehydration or heat stroke.  · Work out until your breathing and your heartbeat get faster.  Where to find more information  · [place] Department of Health and Human Services: www.hhs.gov  · Centers for Disease Control and Prevention (CDC): www.cdc.gov  Summary  · Exercising regularly is important. It will improve your overall fitness, flexibility, and endurance.  · Regular exercise also will improve your overall health. It can help you control your weight, reduce stress, and improve your bone density.  · Do not exercise so much that you hurt yourself, feel dizzy, or get very short of breath.  · Before you start a new exercise program, talk with your health care provider.  This information is not intended to replace advice given to you by your health care provider. Make sure you discuss any questions you have with your health care provider.  Document Released: 01/20/2012 Document Revised: 11/08/2018 Document Reviewed: 11/08/2018  [employer]  Interactive Patient Education © 2020 [employer]      Exercising to Stay Healthy  To become healthy and stay healthy, it is recommended that you do moderate-intensity and vigorous-intensity exercise. You can tell that you are exercising at a moderate intensity if your heart starts beating faster and you start breathing faster but can still hold a conversation. You can tell that you are exercising at a vigorous intensity if you are breathing much harder and faster and cannot hold a conversation while exercising.  Exercising regularly is important. It has many health benefits, such as:  · Improving overall fitness, flexibility, and endurance.  · Increasing bone density.  · Helping with weight control.  · Decreasing body fat.  · Increasing muscle strength.  · Reducing stress and tension.  · Improving overall health.  How often should I exercise?  Choose an activity that you enjoy, and set realistic goals. Your health care provider can help you make an activity plan that works for you.  Exercise regularly as told by your health care provider. This may include:  · Doing strength training two times a week, such as:  ? Lifting weights.  ? Using resistance bands.  ? Push-ups.  ? Sit-ups.  ? Yoga.  · Doing a certain intensity of exercise for a given amount of time. Choose from these options:  ? A total of 150 minutes of moderate-intensity exercise every week.  ? A total of 75 minutes of vigorous-intensity exercise every week.  ? A mix of moderate-intensity and vigorous-intensity exercise every week.  Children, pregnant women, people who have not exercised regularly, people who are overweight, and older adults may need to talk with a health care provider about what activities are safe to do. If you have a medical condition, be sure to talk with your health care provider before you start a new exercise program.  What are some exercise ideas?  Moderate-intensity exercise ideas include:  · Walking 1 mile (1.6 km) in about 15  minutes.  · Biking.  · Hiking.  · Golfing.  · Dancing.  · Water aerobics.  Vigorous-intensity exercise ideas include:  · Walking 4.5 miles (7.2 km) or more in about 1 hour.  · Jogging or running 5 miles (8 km) in about 1 hour.  · Biking 10 miles (16.1 km) or more in about 1 hour.  · Lap swimming.  · Roller-skating or in-line skating.  · Cross-country skiing.  · Vigorous competitive sports, such as football, basketball, and soccer.  · Jumping rope.  · Aerobic dancing.  What are some everyday activities that can help me to get exercise?  · Yard work, such as:  ? Pushing a .  ? Raking and bagging leaves.  · Washing your car.  · Pushing a stroller.  · Shoveling snow.  · Gardening.  · Washing windows or floors.  How can I be more active in my day-to-day activities?  · Use stairs instead of an elevator.  · Take a walk during your lunch break.  · If you drive, park your car farther away from your work or school.  · If you take public transportation, get off one stop early and walk the rest of the way.  · Stand up or walk around during all of your indoor phone calls.  · Get up, stretch, and walk around every 30 minutes throughout the day.  · Enjoy exercise with a friend. Support to continue exercising will help you keep a regular routine of activity.  What guidelines can I follow while exercising?  · Before you start a new exercise program, talk with your health care provider.  · Do not exercise so much that you hurt yourself, feel dizzy, or get very short of breath.  · Wear comfortable clothes and wear shoes with good support.  · Drink plenty of water while you exercise to prevent dehydration or heat stroke.  · Work out until your breathing and your heartbeat get faster.  Where to find more information  · U.[place]. Department of Health and Human Services: www.hhs.gov  · Centers for Disease Control and Prevention (CDC): www.cdc.gov  Summary  · Exercising regularly is important. It will improve your overall fitness,  flexibility, and endurance.  · Regular exercise also will improve your overall health. It can help you control your weight, reduce stress, and improve your bone density.  · Do not exercise so much that you hurt yourself, feel dizzy, or get very short of breath.  · Before you start a new exercise program, talk with your health care provider.  This information is not intended to replace advice given to you by your health care provider. Make sure you discuss any questions you have with your health care provider.  Document Released: 01/20/2012 Document Revised: 11/30/2018 Document Reviewed: 11/08/2018  Elsevier Patient Education © 2020 [employer]

## 2020-05-20 ENCOUNTER — PROCEDURE VISIT (OUTPATIENT)
Dept: UROLOGY | Facility: CLINIC | Age: 75
End: 2020-05-20

## 2020-05-20 DIAGNOSIS — R33.9 URINARY RETENTION: Primary | ICD-10-CM

## 2020-05-20 PROCEDURE — 51702 INSERT TEMP BLADDER CATH: CPT | Performed by: UROLOGY

## 2020-05-20 NOTE — PROGRESS NOTES
Patient came in for monthly cath change and tolerated the procedure well, patient states he is doing well. Patient will return in 4 weeks for cath change.

## 2020-05-26 VITALS
BODY MASS INDEX: 26.93 KG/M2 | OXYGEN SATURATION: 98 % | SYSTOLIC BLOOD PRESSURE: 100 MMHG | DIASTOLIC BLOOD PRESSURE: 70 MMHG | HEIGHT: 69 IN | HEART RATE: 88 BPM | WEIGHT: 181.8 LBS | TEMPERATURE: 97.5 F | RESPIRATION RATE: 12 BRPM

## 2020-06-17 ENCOUNTER — PROCEDURE VISIT (OUTPATIENT)
Dept: UROLOGY | Facility: CLINIC | Age: 75
End: 2020-06-17

## 2020-06-17 DIAGNOSIS — R33.9 URINARY RETENTION: ICD-10-CM

## 2020-06-17 PROCEDURE — 51702 INSERT TEMP BLADDER CATH: CPT | Performed by: UROLOGY

## 2020-06-17 NOTE — PROGRESS NOTES
Patient came in for monthly cath change and tolerated the procedure well, patient states he is doing well. Patient will return to clinic  in 4 weeks for cath change.  PABLITO Loja

## 2020-08-19 ENCOUNTER — PROCEDURE VISIT (OUTPATIENT)
Dept: UROLOGY | Facility: CLINIC | Age: 75
End: 2020-08-19

## 2020-08-19 DIAGNOSIS — R33.9 URINARY RETENTION: Primary | ICD-10-CM

## 2020-08-19 PROCEDURE — 51702 INSERT TEMP BLADDER CATH: CPT | Performed by: UROLOGY

## 2020-08-26 ENCOUNTER — TELEPHONE (OUTPATIENT)
Dept: UROLOGY | Facility: CLINIC | Age: 75
End: 2020-08-26

## 2020-08-26 NOTE — TELEPHONE ENCOUNTER
Patient called stating he has a little blood around the cath and seen some in his underwear and wanted to make sure there is no big cause for concern. I told patient the cath is probably irritating or causing the prostate to bleed a little and that I would check with  to see what he wants to do.

## 2020-08-27 ENCOUNTER — TELEPHONE (OUTPATIENT)
Dept: UROLOGY | Facility: CLINIC | Age: 75
End: 2020-08-27

## 2020-08-27 NOTE — TELEPHONE ENCOUNTER
Mr. Nye had some bleeding after the last catheter change and requested further discussion.  On our telephone conversation today he is asking about the possibility of a UroLift procedure to see if we can get his catheter out.  My concern is his bladder has been grossly over distended in the past and I would recommend urodynamic evaluation to see if he has adequate bladder function to even consider a procedure on his prostate.  He is very interested so we will set up urodynamic evaluation and he can follow-up with Dr. Rey to see if he is a candidate for UroLift procedure.

## 2020-08-28 ENCOUNTER — TELEPHONE (OUTPATIENT)
Dept: UROLOGY | Facility: CLINIC | Age: 75
End: 2020-08-28

## 2020-09-16 ENCOUNTER — PROCEDURE VISIT (OUTPATIENT)
Dept: UROLOGY | Facility: CLINIC | Age: 75
End: 2020-09-16

## 2020-09-16 DIAGNOSIS — R33.9 URINARY RETENTION: ICD-10-CM

## 2020-09-16 PROCEDURE — 51702 INSERT TEMP BLADDER CATH: CPT | Performed by: UROLOGY

## 2020-09-16 NOTE — PROGRESS NOTES
Patient came in for monthly cath change, patient did well and has antibiotics at home that he takes for each cath change.

## 2020-09-22 ENCOUNTER — LAB (OUTPATIENT)
Dept: LAB | Facility: HOSPITAL | Age: 75
End: 2020-09-22

## 2020-09-22 LAB
ALBUMIN SERPL-MCNC: 4.5 G/DL (ref 3.5–5.2)
ALBUMIN/GLOB SERPL: 1.7 G/DL
ALP SERPL-CCNC: 93 U/L (ref 39–117)
ALT SERPL W P-5'-P-CCNC: 13 U/L (ref 1–41)
ANION GAP SERPL CALCULATED.3IONS-SCNC: 8.2 MMOL/L (ref 5–15)
AST SERPL-CCNC: 13 U/L (ref 1–40)
BASOPHILS # BLD AUTO: 0.1 10*3/MM3 (ref 0–0.2)
BASOPHILS NFR BLD AUTO: 1.4 % (ref 0–1.5)
BILIRUB SERPL-MCNC: 0.8 MG/DL (ref 0–1.2)
BUN SERPL-MCNC: 21 MG/DL (ref 8–23)
BUN/CREAT SERPL: 14.2 (ref 7–25)
CALCIUM SPEC-SCNC: 9.3 MG/DL (ref 8.6–10.5)
CHLORIDE SERPL-SCNC: 105 MMOL/L (ref 98–107)
CHOLEST SERPL-MCNC: 166 MG/DL (ref 0–200)
CO2 SERPL-SCNC: 27.8 MMOL/L (ref 22–29)
CREAT SERPL-MCNC: 1.48 MG/DL (ref 0.76–1.27)
DEPRECATED RDW RBC AUTO: 43.9 FL (ref 37–54)
EOSINOPHIL # BLD AUTO: 0.27 10*3/MM3 (ref 0–0.4)
EOSINOPHIL NFR BLD AUTO: 3.7 % (ref 0.3–6.2)
ERYTHROCYTE [DISTWIDTH] IN BLOOD BY AUTOMATED COUNT: 12.7 % (ref 12.3–15.4)
GFR SERPL CREATININE-BSD FRML MDRD: 47 ML/MIN/1.73
GLOBULIN UR ELPH-MCNC: 2.6 GM/DL
GLUCOSE SERPL-MCNC: 96 MG/DL (ref 65–99)
HCT VFR BLD AUTO: 48.2 % (ref 37.5–51)
HDLC SERPL-MCNC: 40 MG/DL (ref 40–60)
HGB BLD-MCNC: 16.4 G/DL (ref 13–17.7)
IMM GRANULOCYTES # BLD AUTO: 0.02 10*3/MM3 (ref 0–0.05)
IMM GRANULOCYTES NFR BLD AUTO: 0.3 % (ref 0–0.5)
LDLC SERPL CALC-MCNC: 107 MG/DL (ref 0–100)
LDLC/HDLC SERPL: 2.68 {RATIO}
LYMPHOCYTES # BLD AUTO: 2.2 10*3/MM3 (ref 0.7–3.1)
LYMPHOCYTES NFR BLD AUTO: 30.4 % (ref 19.6–45.3)
MCH RBC QN AUTO: 32.2 PG (ref 26.6–33)
MCHC RBC AUTO-ENTMCNC: 34 G/DL (ref 31.5–35.7)
MCV RBC AUTO: 94.5 FL (ref 79–97)
MONOCYTES # BLD AUTO: 0.74 10*3/MM3 (ref 0.1–0.9)
MONOCYTES NFR BLD AUTO: 10.2 % (ref 5–12)
NEUTROPHILS NFR BLD AUTO: 3.91 10*3/MM3 (ref 1.7–7)
NEUTROPHILS NFR BLD AUTO: 54 % (ref 42.7–76)
NRBC BLD AUTO-RTO: 0 /100 WBC (ref 0–0.2)
PLATELET # BLD AUTO: 250 10*3/MM3 (ref 140–450)
PMV BLD AUTO: 10.8 FL (ref 6–12)
POTASSIUM SERPL-SCNC: 4.6 MMOL/L (ref 3.5–5.2)
PROT SERPL-MCNC: 7.1 G/DL (ref 6–8.5)
RBC # BLD AUTO: 5.1 10*6/MM3 (ref 4.14–5.8)
SODIUM SERPL-SCNC: 141 MMOL/L (ref 136–145)
TRIGL SERPL-MCNC: 94 MG/DL (ref 0–150)
TSH SERPL DL<=0.05 MIU/L-ACNC: 2.85 UIU/ML (ref 0.27–4.2)
VLDLC SERPL-MCNC: 18.8 MG/DL (ref 5–40)
WBC # BLD AUTO: 7.24 10*3/MM3 (ref 3.4–10.8)

## 2020-09-22 PROCEDURE — 80061 LIPID PANEL: CPT | Performed by: INTERNAL MEDICINE

## 2020-09-22 PROCEDURE — 85025 COMPLETE CBC W/AUTO DIFF WBC: CPT | Performed by: INTERNAL MEDICINE

## 2020-09-22 PROCEDURE — 80053 COMPREHEN METABOLIC PANEL: CPT | Performed by: INTERNAL MEDICINE

## 2020-09-22 PROCEDURE — 36415 COLL VENOUS BLD VENIPUNCTURE: CPT | Performed by: INTERNAL MEDICINE

## 2020-09-22 PROCEDURE — 84443 ASSAY THYROID STIM HORMONE: CPT | Performed by: INTERNAL MEDICINE

## 2020-09-29 ENCOUNTER — OFFICE VISIT (OUTPATIENT)
Dept: INTERNAL MEDICINE | Facility: CLINIC | Age: 75
End: 2020-09-29

## 2020-09-29 VITALS
HEIGHT: 69 IN | WEIGHT: 183 LBS | OXYGEN SATURATION: 98 % | HEART RATE: 86 BPM | RESPIRATION RATE: 16 BRPM | TEMPERATURE: 98 F | DIASTOLIC BLOOD PRESSURE: 73 MMHG | SYSTOLIC BLOOD PRESSURE: 104 MMHG | BODY MASS INDEX: 27.11 KG/M2

## 2020-09-29 DIAGNOSIS — E78.2 MIXED HYPERLIPIDEMIA: ICD-10-CM

## 2020-09-29 DIAGNOSIS — E03.8 ADULT ONSET HYPOTHYROIDISM: ICD-10-CM

## 2020-09-29 DIAGNOSIS — I10 BENIGN ESSENTIAL HYPERTENSION: Primary | ICD-10-CM

## 2020-09-29 PROCEDURE — 99214 OFFICE O/P EST MOD 30 MIN: CPT | Performed by: INTERNAL MEDICINE

## 2020-10-21 ENCOUNTER — PROCEDURE VISIT (OUTPATIENT)
Dept: UROLOGY | Facility: CLINIC | Age: 75
End: 2020-10-21

## 2020-10-21 DIAGNOSIS — R33.9 URINARY RETENTION: Primary | ICD-10-CM

## 2020-10-21 PROCEDURE — 51702 INSERT TEMP BLADDER CATH: CPT | Performed by: UROLOGY

## 2020-11-10 ENCOUNTER — PROCEDURE VISIT (OUTPATIENT)
Dept: UROLOGY | Facility: CLINIC | Age: 75
End: 2020-11-10

## 2020-11-10 DIAGNOSIS — R33.9 URINARY RETENTION: Primary | ICD-10-CM

## 2020-11-10 PROCEDURE — 51702 INSERT TEMP BLADDER CATH: CPT | Performed by: UROLOGY

## 2020-11-10 NOTE — PROGRESS NOTES
Patient called stating his cath was giving him problems, felt some pain up inside the bladder area. Patient came in and after I changed his stacy it began to drain a lot of urine and the patient had immediate relief, I told the patient I believe his cath was either kinked off or stopped up although the urine was clear and had no foul odor or blood. Patient will return in four week for his next cath change.  AR/CMA

## 2020-12-06 ENCOUNTER — HOSPITAL ENCOUNTER (EMERGENCY)
Facility: HOSPITAL | Age: 75
Discharge: HOME | End: 2020-12-06
Attending: EMERGENCY MEDICINE | Admitting: EMERGENCY MEDICINE

## 2020-12-06 VITALS
WEIGHT: 181.2 LBS | SYSTOLIC BLOOD PRESSURE: 104 MMHG | HEART RATE: 101 BPM | RESPIRATION RATE: 20 BRPM | TEMPERATURE: 97.8 F | DIASTOLIC BLOOD PRESSURE: 85 MMHG | BODY MASS INDEX: 26.84 KG/M2 | OXYGEN SATURATION: 96 % | HEIGHT: 69 IN

## 2020-12-06 DIAGNOSIS — T83.091A OBSTRUCTION OF FOLEY CATHETER, INITIAL ENCOUNTER (HCC): Primary | ICD-10-CM

## 2020-12-06 DIAGNOSIS — N39.0 ACUTE UTI: ICD-10-CM

## 2020-12-06 LAB
BACTERIA UR QL AUTO: ABNORMAL /HPF
BILIRUB UR QL STRIP: NEGATIVE
CLARITY UR: ABNORMAL
COLOR UR: YELLOW
GLUCOSE UR STRIP-MCNC: NEGATIVE MG/DL
HGB UR QL STRIP.AUTO: ABNORMAL
HYALINE CASTS UR QL AUTO: ABNORMAL /LPF
KETONES UR QL STRIP: NEGATIVE
LEUKOCYTE ESTERASE UR QL STRIP.AUTO: ABNORMAL
NITRITE UR QL STRIP: NEGATIVE
PH UR STRIP.AUTO: 7.5 [PH] (ref 5–8)
PROT UR QL STRIP: ABNORMAL
RBC # UR: ABNORMAL /HPF
REF LAB TEST METHOD: ABNORMAL
SP GR UR STRIP: 1.01 (ref 1–1.03)
SQUAMOUS #/AREA URNS HPF: ABNORMAL /HPF
UROBILINOGEN UR QL STRIP: ABNORMAL
WBC UR QL AUTO: ABNORMAL /HPF

## 2020-12-06 PROCEDURE — 81001 URINALYSIS AUTO W/SCOPE: CPT | Performed by: NURSE PRACTITIONER

## 2020-12-06 PROCEDURE — P9612 CATHETERIZE FOR URINE SPEC: HCPCS

## 2020-12-06 PROCEDURE — 87081 CULTURE SCREEN ONLY: CPT | Performed by: NURSE PRACTITIONER

## 2020-12-06 PROCEDURE — 87186 SC STD MICRODIL/AGAR DIL: CPT | Performed by: NURSE PRACTITIONER

## 2020-12-06 PROCEDURE — 87077 CULTURE AEROBIC IDENTIFY: CPT | Performed by: NURSE PRACTITIONER

## 2020-12-06 PROCEDURE — 99283 EMERGENCY DEPT VISIT LOW MDM: CPT

## 2020-12-06 PROCEDURE — 87086 URINE CULTURE/COLONY COUNT: CPT | Performed by: NURSE PRACTITIONER

## 2020-12-06 RX ORDER — CEPHALEXIN 500 MG/1
500 CAPSULE ORAL 2 TIMES DAILY
Qty: 14 CAPSULE | Refills: 0 | Status: SHIPPED | OUTPATIENT
Start: 2020-12-06 | End: 2021-02-02

## 2020-12-06 NOTE — ED NOTES
Attempted to flush pts cath. unsuccessful. cath. was d/c and a new cath. was placed with sterile technique. Pt tolerated well. Urine specimen sent to lab.      Zora Hammer RN  12/06/20 8428

## 2020-12-06 NOTE — ED PROVIDER NOTES
"Subjective   History of Present Illness  This is a 75 year old male who comes in today complaining of low urine output since this morning. He reports he has a indwelling urinary catheter and it occasionally gets \"clogged\" and has to be changed. He denies any fever or chills. He does complain of dysuria.   Review of Systems   Constitutional: Negative.    HENT: Negative.    Eyes: Negative.    Respiratory: Negative.    Cardiovascular: Negative.    Gastrointestinal: Negative.    Genitourinary: Positive for difficulty urinating and dysuria.   Skin: Negative.    Neurological: Negative.    Psychiatric/Behavioral: Negative.        Past Medical History:   Diagnosis Date   • Acute maxillary sinusitis    • Renal disorder    • Vocal cord anomaly        No Known Allergies    Past Surgical History:   Procedure Laterality Date   • BACK SURGERY     • BRAIN SURGERY     • EYE SURGERY     • OTHER SURGICAL HISTORY      Arm Excision       Family History   Problem Relation Age of Onset   • Arthritis Mother    • Prostate cancer Brother    • Arthritis Father        Social History     Socioeconomic History   • Marital status:      Spouse name: Not on file   • Number of children: Not on file   • Years of education: Not on file   • Highest education level: Not on file   Tobacco Use   • Smoking status: Light Tobacco Smoker     Types: Cigarettes, Electronic Cigarette   • Smokeless tobacco: Never Used   • Tobacco comment: Quit 2013   Substance and Sexual Activity   • Alcohol use: No     Frequency: Never   • Drug use: No   • Sexual activity: Defer           Objective   Physical Exam  Vitals signs and nursing note reviewed.   Constitutional:       Appearance: Normal appearance. He is normal weight.   Neurological:      Mental Status: He is alert.     GEN: No acute distress  Head: Normocephalic, atraumatic  Eyes: Pupils equal round reactive to light  ENT: Posterior pharynx normal in appearance, oral mucosa is moist  Chest: Nontender to " palpation  Cardiovascular: Regular rate  Lungs: Clear to auscultation bilaterally  Abdomen: Soft, nontender, nondistended, no peritoneal signs, trace of urine in leg bag  Extremities: No edema, normal appearance  Neuro: GCS 15  Psych: Mood and affect are appropriate      Procedures           ED Course  ED Course as of Dec 06 1429   Sun Dec 06, 2020   1428 Gillespie catheter changed and noted UTI on urinalysis. I have advised him to follow up with his PCP in the next 24-48 hours. I have given him strict return to care instructions and he is agreeable to this plan of care.     [TW]      ED Course User Index  [TW] María Rodríguez, LANNY                                           MDM  Number of Diagnoses or Management Options     Amount and/or Complexity of Data Reviewed  Clinical lab tests: ordered and reviewed  Review and summarize past medical records: yes  Discuss the patient with other providers: yes    Risk of Complications, Morbidity, and/or Mortality  Presenting problems: low  Diagnostic procedures: low  Management options: low        Final diagnoses:   Obstruction of Gillespie catheter, initial encounter (CMS/[facility])   Acute UTI            María Rodríguez APRN  12/06/20 1429       María Rodríguez APRN  12/06/20 1429

## 2020-12-09 LAB
BACTERIA SPEC AEROBE CULT: ABNORMAL
BACTERIA SPEC AEROBE CULT: ABNORMAL

## 2020-12-16 ENCOUNTER — PROCEDURE VISIT (OUTPATIENT)
Dept: UROLOGY | Facility: CLINIC | Age: 75
End: 2020-12-16

## 2020-12-16 DIAGNOSIS — R33.9 URINARY RETENTION: Primary | ICD-10-CM

## 2020-12-16 PROCEDURE — 51702 INSERT TEMP BLADDER CATH: CPT | Performed by: UROLOGY

## 2021-01-06 RX ORDER — DOXAZOSIN MESYLATE 4 MG/1
4 TABLET ORAL 2 TIMES DAILY
Qty: 180 TABLET | Refills: 1 | Status: SHIPPED | OUTPATIENT
Start: 2021-01-06 | End: 2021-06-15 | Stop reason: SDUPTHER

## 2021-01-06 NOTE — TELEPHONE ENCOUNTER
DELETE AFTER REVIEWING: Telephone encounter to be sent to the clinical pool.    Pharmacy Name: [id number] - [place], [place] - [address] [phone] [facility] [phone]      Pharmacy representative name: SHERRY    Pharmacy representative phone number: [phone]    What medication are you calling in regards to: doxazosin (CARDURA) 4 MG tablet    What question does the pharmacy have: NEEDS REFILL    Who is the provider that prescribed the medication: CYNDY BACK    Additional notes:    Problem: Discharge Planning  Goal: *Discharge to safe environment  Outcome: Progressing Towards Goal

## 2021-01-12 ENCOUNTER — LAB (OUTPATIENT)
Dept: LAB | Facility: HOSPITAL | Age: 76
End: 2021-01-12

## 2021-01-12 ENCOUNTER — HOSPITAL ENCOUNTER (EMERGENCY)
Facility: HOSPITAL | Age: 76
Discharge: HOME | End: 2021-01-12
Attending: EMERGENCY MEDICINE | Admitting: EMERGENCY MEDICINE

## 2021-01-12 VITALS
SYSTOLIC BLOOD PRESSURE: 128 MMHG | OXYGEN SATURATION: 98 % | DIASTOLIC BLOOD PRESSURE: 89 MMHG | WEIGHT: 178 LBS | HEIGHT: 68 IN | BODY MASS INDEX: 26.98 KG/M2 | HEART RATE: 96 BPM | RESPIRATION RATE: 18 BRPM | TEMPERATURE: 97.8 F

## 2021-01-12 DIAGNOSIS — T83.9XXA FOLEY CATHETER PROBLEM, INITIAL ENCOUNTER (HCC): Primary | ICD-10-CM

## 2021-01-12 LAB
ALBUMIN SERPL-MCNC: 4.6 G/DL (ref 3.5–5.2)
ALBUMIN/GLOB SERPL: 2 G/DL
ALP SERPL-CCNC: 89 U/L (ref 39–117)
ALT SERPL W P-5'-P-CCNC: 13 U/L (ref 1–41)
ANION GAP SERPL CALCULATED.3IONS-SCNC: 10.1 MMOL/L (ref 5–15)
AST SERPL-CCNC: 15 U/L (ref 1–40)
BASOPHILS # BLD AUTO: 0.12 10*3/MM3 (ref 0–0.2)
BASOPHILS NFR BLD AUTO: 1.4 % (ref 0–1.5)
BILIRUB SERPL-MCNC: 0.6 MG/DL (ref 0–1.2)
BUN SERPL-MCNC: 21 MG/DL (ref 8–23)
BUN/CREAT SERPL: 13.2 (ref 7–25)
CALCIUM SPEC-SCNC: 9.2 MG/DL (ref 8.6–10.5)
CHLORIDE SERPL-SCNC: 101 MMOL/L (ref 98–107)
CHOLEST SERPL-MCNC: 175 MG/DL (ref 0–200)
CO2 SERPL-SCNC: 26.9 MMOL/L (ref 22–29)
CREAT SERPL-MCNC: 1.59 MG/DL (ref 0.76–1.27)
DEPRECATED RDW RBC AUTO: 42.6 FL (ref 37–54)
EOSINOPHIL # BLD AUTO: 0.15 10*3/MM3 (ref 0–0.4)
EOSINOPHIL NFR BLD AUTO: 1.8 % (ref 0.3–6.2)
ERYTHROCYTE [DISTWIDTH] IN BLOOD BY AUTOMATED COUNT: 12.4 % (ref 12.3–15.4)
GFR SERPL CREATININE-BSD FRML MDRD: 43 ML/MIN/1.73
GLOBULIN UR ELPH-MCNC: 2.3 GM/DL
GLUCOSE SERPL-MCNC: 95 MG/DL (ref 65–99)
HCT VFR BLD AUTO: 47.2 % (ref 37.5–51)
HDLC SERPL-MCNC: 44 MG/DL (ref 40–60)
HGB BLD-MCNC: 16.4 G/DL (ref 13–17.7)
IMM GRANULOCYTES # BLD AUTO: 0.04 10*3/MM3 (ref 0–0.05)
IMM GRANULOCYTES NFR BLD AUTO: 0.5 % (ref 0–0.5)
LDLC SERPL CALC-MCNC: 114 MG/DL (ref 0–100)
LDLC/HDLC SERPL: 2.55 {RATIO}
LYMPHOCYTES # BLD AUTO: 1.43 10*3/MM3 (ref 0.7–3.1)
LYMPHOCYTES NFR BLD AUTO: 17.1 % (ref 19.6–45.3)
MCH RBC QN AUTO: 32.8 PG (ref 26.6–33)
MCHC RBC AUTO-ENTMCNC: 34.7 G/DL (ref 31.5–35.7)
MCV RBC AUTO: 94.4 FL (ref 79–97)
MONOCYTES # BLD AUTO: 0.76 10*3/MM3 (ref 0.1–0.9)
MONOCYTES NFR BLD AUTO: 9.1 % (ref 5–12)
NEUTROPHILS NFR BLD AUTO: 5.86 10*3/MM3 (ref 1.7–7)
NEUTROPHILS NFR BLD AUTO: 70.1 % (ref 42.7–76)
NRBC BLD AUTO-RTO: 0 /100 WBC (ref 0–0.2)
PLATELET # BLD AUTO: 253 10*3/MM3 (ref 140–450)
PMV BLD AUTO: 10.9 FL (ref 6–12)
POTASSIUM SERPL-SCNC: 4.5 MMOL/L (ref 3.5–5.2)
PROT SERPL-MCNC: 6.9 G/DL (ref 6–8.5)
RBC # BLD AUTO: 5 10*6/MM3 (ref 4.14–5.8)
SODIUM SERPL-SCNC: 138 MMOL/L (ref 136–145)
TRIGL SERPL-MCNC: 94 MG/DL (ref 0–150)
TSH SERPL DL<=0.05 MIU/L-ACNC: 3.25 UIU/ML (ref 0.27–4.2)
VLDLC SERPL-MCNC: 17 MG/DL (ref 5–40)
WBC # BLD AUTO: 8.36 10*3/MM3 (ref 3.4–10.8)

## 2021-01-12 PROCEDURE — 80053 COMPREHEN METABOLIC PANEL: CPT | Performed by: INTERNAL MEDICINE

## 2021-01-12 PROCEDURE — 99283 EMERGENCY DEPT VISIT LOW MDM: CPT

## 2021-01-12 PROCEDURE — 99282 EMERGENCY DEPT VISIT SF MDM: CPT

## 2021-01-12 PROCEDURE — 96372 THER/PROPH/DIAG INJ SC/IM: CPT

## 2021-01-12 PROCEDURE — 80061 LIPID PANEL: CPT | Performed by: INTERNAL MEDICINE

## 2021-01-12 PROCEDURE — 51702 INSERT TEMP BLADDER CATH: CPT

## 2021-01-12 PROCEDURE — 85025 COMPLETE CBC W/AUTO DIFF WBC: CPT | Performed by: INTERNAL MEDICINE

## 2021-01-12 PROCEDURE — 36415 COLL VENOUS BLD VENIPUNCTURE: CPT | Performed by: INTERNAL MEDICINE

## 2021-01-12 PROCEDURE — 84443 ASSAY THYROID STIM HORMONE: CPT | Performed by: INTERNAL MEDICINE

## 2021-01-12 RX ORDER — LIDOCAINE HYDROCHLORIDE 20 MG/ML
JELLY TOPICAL AS NEEDED
Status: DISCONTINUED | OUTPATIENT
Start: 2021-01-12 | End: 2021-01-12 | Stop reason: HOSPADM

## 2021-01-12 RX ADMIN — LIDOCAINE HYDROCHLORIDE: 20 JELLY TOPICAL at 09:06

## 2021-01-12 NOTE — ED PROVIDER NOTES
Subjective   History of Present Illness    Chief Complaint: Urinary retention  History of Present Illness: 75-year-old male with longstanding Gillespie catheter, history of BPH, here with obstructive flow from his catheter which began this morning.  Complains of feeling of distention, urge to urinate, states that he has had issues where catheter has to be changed periodically.  Was last changed a month ago.  No reported fever vomiting hematuria  Onset: This morning  Duration: Persist  Exacerbating / Alleviating factors: None  Associated symptoms: None      Nurses Notes reviewed and agree, including vitals, allergies, social history and prior medical history.     REVIEW OF SYSTEMS: All systems reviewed and not pertinent unless noted.    Positive for: Gillespie catheter complication, urinary retention,     Negative for: Fever flank pain hematuria cloudy urine, nausea vomiting  Review of Systems    Past Medical History:   Diagnosis Date   • Acute maxillary sinusitis    • Renal disorder    • Vocal cord anomaly        No Known Allergies    Past Surgical History:   Procedure Laterality Date   • BACK SURGERY     • BRAIN SURGERY     • EYE SURGERY     • OTHER SURGICAL HISTORY      Arm Excision       Family History   Problem Relation Age of Onset   • Arthritis Mother    • Prostate cancer Brother    • Arthritis Father        Social History     Socioeconomic History   • Marital status:      Spouse name: Not on file   • Number of children: Not on file   • Years of education: Not on file   • Highest education level: Not on file   Tobacco Use   • Smoking status: Light Tobacco Smoker     Types: Cigarettes, Electronic Cigarette   • Smokeless tobacco: Never Used   • Tobacco comment: Quit 2013   Substance and Sexual Activity   • Alcohol use: No     Frequency: Never   • Drug use: No   • Sexual activity: Defer           Objective   Physical Exam    GENERAL APPEARANCE: Well developed, 75-year-old male,  in mild discomfort.  VITAL SIGNS:  per nursing, reviewed and noted  SKIN: exposed skin with no rashes, ulcerations or petechiae.  Head: Normocephalic, atraumatic.   EYES: perrla. EOMI.  ENT: Normal voice.  Patient maintained wearing a mask throughout patient encounter due to coronavirus pandemic  LUNGS:  No increased work of breathing. No retractions.   CARDIOVASCULAR:  regular rate and rhythm, no murmurs.  Good Peripheral pulses. Good cap refill to extremities.   ABDOMEN: Soft, prepubic tenderness to palpation normal bowel sounds. No hernia. No ascites.  MUSCULOSKELETAL:  No tenderness. Full ROM. Strength and tone normal.  NEUROLOGIC: Alert, oriented x 3. No gross deficits. GCS 15.   NECK: Supple, symmetric. No tenderness, no masses. Full ROM  Back: full rom, no paraspinal spasm. No CVA tenderness.   PSYCH: appropriate affect.  : Bladder tenderness with distention, no CVA tenderness      Procedures     No attending physician procedures were performed on this patient.      ED Course      Patient Gillespie catheter change by nursing staff with 16 [place] coudé.  Successful urine output 500 mL.  Appears well nontoxic.  Deferred urinalysis as patient has longstanding Gillespie catheter and colonization likely.  Advised outpatient follow-up with urologist and return precautions were discussed.                                     MDM    Final diagnoses:   Gillespie catheter problem, initial encounter (CMS/[facility])            Edward Beck DO  01/12/21 0902

## 2021-01-13 DIAGNOSIS — N39.0 CHRONIC UTI (URINARY TRACT INFECTION): ICD-10-CM

## 2021-01-14 RX ORDER — CEPHALEXIN 500 MG/1
CAPSULE ORAL
Qty: 30 CAPSULE | Refills: 11 | Status: SHIPPED | OUTPATIENT
Start: 2021-01-14 | End: 2021-02-02

## 2021-01-21 ENCOUNTER — HOSPITAL ENCOUNTER (EMERGENCY)
Facility: HOSPITAL | Age: 76
Discharge: HOME | End: 2021-01-21
Attending: EMERGENCY MEDICINE | Admitting: EMERGENCY MEDICINE

## 2021-01-21 VITALS
HEART RATE: 96 BPM | WEIGHT: 178 LBS | SYSTOLIC BLOOD PRESSURE: 110 MMHG | TEMPERATURE: 97.6 F | DIASTOLIC BLOOD PRESSURE: 81 MMHG | BODY MASS INDEX: 26.98 KG/M2 | HEIGHT: 68 IN | RESPIRATION RATE: 18 BRPM | OXYGEN SATURATION: 95 %

## 2021-01-21 DIAGNOSIS — T83.511A URINARY TRACT INFECTION ASSOCIATED WITH INDWELLING URETHRAL CATHETER, INITIAL ENCOUNTER (HCC): ICD-10-CM

## 2021-01-21 DIAGNOSIS — N39.0 URINARY TRACT INFECTION ASSOCIATED WITH INDWELLING URETHRAL CATHETER, INITIAL ENCOUNTER (HCC): ICD-10-CM

## 2021-01-21 DIAGNOSIS — R33.8 ACUTE URINARY RETENTION: Primary | ICD-10-CM

## 2021-01-21 LAB
BACTERIA UR QL AUTO: ABNORMAL /HPF
BILIRUB UR QL STRIP: NEGATIVE
CLARITY UR: ABNORMAL
COLOR UR: YELLOW
GLUCOSE UR STRIP-MCNC: NEGATIVE MG/DL
HGB UR QL STRIP.AUTO: ABNORMAL
HYALINE CASTS UR QL AUTO: ABNORMAL /LPF
KETONES UR QL STRIP: NEGATIVE
LEUKOCYTE ESTERASE UR QL STRIP.AUTO: ABNORMAL
NITRITE UR QL STRIP: POSITIVE
PH UR STRIP.AUTO: 6.5 [PH] (ref 5–8)
PROT UR QL STRIP: ABNORMAL
RBC # UR: ABNORMAL /HPF
REF LAB TEST METHOD: ABNORMAL
SP GR UR STRIP: 1.01 (ref 1–1.03)
SQUAMOUS #/AREA URNS HPF: ABNORMAL /HPF
UROBILINOGEN UR QL STRIP: ABNORMAL
WBC UR QL AUTO: ABNORMAL /HPF

## 2021-01-21 PROCEDURE — 51702 INSERT TEMP BLADDER CATH: CPT

## 2021-01-21 PROCEDURE — 81001 URINALYSIS AUTO W/SCOPE: CPT | Performed by: EMERGENCY MEDICINE

## 2021-01-21 PROCEDURE — 99283 EMERGENCY DEPT VISIT LOW MDM: CPT

## 2021-01-21 PROCEDURE — 87086 URINE CULTURE/COLONY COUNT: CPT | Performed by: EMERGENCY MEDICINE

## 2021-01-21 RX ORDER — SULFAMETHOXAZOLE AND TRIMETHOPRIM 800; 160 MG/1; MG/1
1 TABLET ORAL ONCE
Status: COMPLETED | OUTPATIENT
Start: 2021-01-21 | End: 2021-01-21

## 2021-01-21 RX ORDER — LIDOCAINE HYDROCHLORIDE 20 MG/ML
JELLY TOPICAL AS NEEDED
Status: DISCONTINUED | OUTPATIENT
Start: 2021-01-21 | End: 2021-01-22 | Stop reason: HOSPADM

## 2021-01-21 RX ORDER — SULFAMETHOXAZOLE AND TRIMETHOPRIM 800; 160 MG/1; MG/1
1 TABLET ORAL 2 TIMES DAILY
Qty: 6 TABLET | Refills: 0 | Status: SHIPPED | OUTPATIENT
Start: 2021-01-21 | End: 2021-02-02

## 2021-01-21 RX ADMIN — SULFAMETHOXAZOLE AND TRIMETHOPRIM 160 MG: 800; 160 TABLET ORAL at 23:27

## 2021-01-21 RX ADMIN — LIDOCAINE HYDROCHLORIDE 1 ML: 20 JELLY TOPICAL at 21:31

## 2021-01-22 NOTE — ED PROVIDER NOTES
Subjective   History of Present Illness    Chief Complaint: Urinary retention  History of Present Illness: 75-year-old male with chronic indwelling Gillespie, presents with acute urinary retention for the past 2 to 3 hours.  States that he has his Gillespie catheter changed monthly but did not have an appointment with his urologist until the 10th.  Has had issues with Gillespie complications in the past  Onset: Today  Duration: Persist  Exacerbating / Alleviating factors: None  Associated symptoms: None      Nurses Notes reviewed and agree, including vitals, allergies, social history and prior medical history.     REVIEW OF SYSTEMS: All systems reviewed and not pertinent unless noted.    Positive for: Urinary retention with indwelling Gillespie    Negative for: Fever flank pain hematuria  Review of Systems    Past Medical History:   Diagnosis Date   • Acute maxillary sinusitis    • Renal disorder    • Vocal cord anomaly        No Known Allergies    Past Surgical History:   Procedure Laterality Date   • BACK SURGERY     • BRAIN SURGERY     • EYE SURGERY     • OTHER SURGICAL HISTORY      Arm Excision       Family History   Problem Relation Age of Onset   • Arthritis Mother    • Prostate cancer Brother    • Arthritis Father        Social History     Socioeconomic History   • Marital status:      Spouse name: Not on file   • Number of children: Not on file   • Years of education: Not on file   • Highest education level: Not on file   Tobacco Use   • Smoking status: Light Tobacco Smoker     Types: Cigarettes, Electronic Cigarette   • Smokeless tobacco: Never Used   • Tobacco comment: Quit 2013   Substance and Sexual Activity   • Alcohol use: No     Frequency: Never   • Drug use: No   • Sexual activity: Defer           Objective   Physical Exam    GENERAL APPEARANCE: Well developed, 75-year-old white male,  in no acute distress.  VITAL SIGNS: per nursing, reviewed and noted  SKIN: exposed skin with no rashes, ulcerations or  petechiae.  Head: Normocephalic, atraumatic.   EYES: perrla. EOMI.  ENT: Normal voice.  Patient maintained wearing a mask throughout patient encounter due to coronavirus pandemic  LUNGS:  No increased work of breathing. No retractions.   CARDIOVASCULAR:  regular rate and rhythm, no murmurs.  Good Peripheral pulses. Good cap refill to extremities.   ABDOMEN: Soft, nontender, normal bowel sounds. No hernia. No ascites.  MUSCULOSKELETAL:  No tenderness. Full ROM. Strength and tone normal.  NEUROLOGIC: Alert, oriented x 3. No gross deficits. GCS 15.   NECK: Supple, symmetric. No tenderness, no masses. Full ROM  Back: full rom, no paraspinal spasm. No CVA tenderness.   PSYCH: appropriate affect.  : no bladder tenderness or distention, no CVA tenderness only catheter in place with no urine output           ED Course  ED Course as of Jan 21 2259   Thu Jan 21, 2021   2259 WBC, UA(!): 6-12 [PF]   2259 Bacteria, UA(!): 1+ [PF]   2259 Blood, UA(!): Large (3+) [PF]   2259 Protein, UA(!): 100 mg/dL (2+) [PF]   2259 Leukocytes, UA(!): Large (3+) [PF]   2259 Nitrite, UA(!): Positive [PF]      ED Course User Index  [PF] Edward Beck DO      Patient had Gillespie catheter change with successful urine output.     Urinalysis suggestive of UTI given large leukocytes positive nitrites large blood 612 white cells 1+ bacteria.  Will add Bactrim.  Outpatient follow return precautions discussed.  Urine culture pending.                                [facility]    Final diagnoses:   Acute urinary retention   Urinary tract infection associated with indwelling urethral catheter, initial encounter (CMS/[facility])            Edward Beck DO  01/21/21 2259

## 2021-01-23 LAB — BACTERIA SPEC AEROBE CULT: ABNORMAL

## 2021-02-02 ENCOUNTER — OFFICE VISIT (OUTPATIENT)
Dept: INTERNAL MEDICINE | Facility: CLINIC | Age: 76
End: 2021-02-02

## 2021-02-02 VITALS
TEMPERATURE: 97.3 F | BODY MASS INDEX: 26.83 KG/M2 | WEIGHT: 177 LBS | DIASTOLIC BLOOD PRESSURE: 73 MMHG | RESPIRATION RATE: 16 BRPM | SYSTOLIC BLOOD PRESSURE: 115 MMHG | HEIGHT: 68 IN | HEART RATE: 93 BPM | OXYGEN SATURATION: 97 %

## 2021-02-02 DIAGNOSIS — E03.8 ADULT ONSET HYPOTHYROIDISM: ICD-10-CM

## 2021-02-02 DIAGNOSIS — I10 BENIGN ESSENTIAL HYPERTENSION: Primary | ICD-10-CM

## 2021-02-02 DIAGNOSIS — E78.2 MIXED HYPERLIPIDEMIA: ICD-10-CM

## 2021-02-02 DIAGNOSIS — N18.30 STAGE 3 CHRONIC KIDNEY DISEASE, UNSPECIFIED WHETHER STAGE 3A OR 3B CKD (HCC): ICD-10-CM

## 2021-02-02 PROCEDURE — 99214 OFFICE O/P EST MOD 30 MIN: CPT | Performed by: INTERNAL MEDICINE

## 2021-02-02 NOTE — PATIENT INSTRUCTIONS
MyPlate from USDA    MyPlate is an outline of a general healthy diet based on the 2010 Dietary Guidelines for Americans, from the U.[place]. Department of Agriculture (USDA). It sets guidelines for how much food you should eat from each food group based on your age, sex, and level of physical activity.  What are tips for following MyPlate?  To follow MyPlate recommendations:  · Eat a wide variety of fruits and vegetables, grains, and protein foods.  · Serve smaller portions and eat less food throughout the day.  · Limit portion sizes to avoid overeating.  · Enjoy your food.  · Get at least 150 minutes of exercise every week. This is about 30 minutes each day, 5 or more days per week.  It can be difficult to have every meal look like MyPlate. Think about MyPlate as eating guidelines for an entire day, rather than each individual meal.  Fruits and vegetables  · Make half of your plate fruits and vegetables.  · Eat many different colors of fruits and vegetables each day.  · For a 2,000 calorie daily food plan, eat:  ? 2½ cups of vegetables every day.  ? 2 cups of fruit every day.  · 1 cup is equal to:  ? 1 cup raw or cooked vegetables.  ? 1 cup raw fruit.  ? 1 medium-sized orange, apple, or banana.  ? 1 cup 100% fruit or vegetable juice.  ? 2 cups raw leafy greens, such as lettuce, spinach, or kale.  ? ½ cup dried fruit.  Grains  · One fourth of your plate should be grains.  · Make at least half of the grains you eat each day whole grains.  · For a 2,000 calorie daily food plan, eat 6 oz of grains every day.  · 1 oz is equal to:  ? 1 slice bread.  ? 1 cup cereal.  ? ½ cup cooked rice, cereal, or pasta.  Protein  · One fourth of your plate should be protein.  · Eat a wide variety of protein foods, including meat, poultry, fish, eggs, beans, nuts, and tofu.  · For a 2,000 calorie daily food plan, eat 5½ oz of protein every day.  · 1 oz is equal to:  ? 1 oz meat, poultry, or fish.  ? ¼ cup cooked beans.  ? 1 egg.  ? ½ oz nuts  or seeds.  ? 1 Tbsp peanut butter.  Dairy  · Drink fat-free or low-fat (1%) milk.  · Eat or drink dairy as a side to meals.  · For a 2,000 calorie daily food plan, eat or drink 3 cups of dairy every day.  · 1 cup is equal to:  ? 1 cup milk, yogurt, cottage cheese, or soy milk (soy beverage).  ? 2 oz processed cheese.  ? 1½ oz natural cheese.  Fats, oils, salt, and sugars  · Only small amounts of oils are recommended.  · Avoid foods that are high in calories and low in nutritional value (empty calories), like foods high in fat or added sugars.  · Choose foods that are low in salt (sodium). Choose foods that have less than 140 milligrams (mg) of sodium per serving.  · Drink water instead of sugary drinks. Drink enough water each day to keep your urine pale yellow.  Where to find support  · Work with your health care provider or a nutrition specialist (dietitian) to develop a customized eating plan that is right for you.  · Download an lizy (mobile application) to help you track your daily food intake.  Where to find more information  · Go to ChooseMyPlate.gov for more information.  Summary  · MyPlate is a general guideline for healthy eating from the USDA. It is based on the 2010 Dietary Guidelines for Americans.  · In general, fruits and vegetables should take up ½ of your plate, grains should take up ¼ of your plate, and protein should take up ¼ of your plate.  This information is not intended to replace advice given to you by your health care provider. Make sure you discuss any questions you have with your health care provider.  Document Revised: 05/21/2020 Document Reviewed: 03/19/2018  Elsevier Patient Education © 2020 [employer] Inc.

## 2021-02-02 NOTE — PROGRESS NOTES
"Chief Complaint  Hypertension, Hyperlipidemia, Chronic Kidney Disease, and Hypothyroidism    Subjective          Nestor Nye presents to [facility] for   History of Present Illness  HPI: Patient is here to follow up on the blood pressure  The patient is taking the blood pressure medications as prescribed and has had no side effects. The patient is also here to follow up on the cholesterol and is trying to follow a diet. The patient is  also here to follow up on thyroid and  Cri and had lab work done .  The patient also needs refills on medications .  He follows up with urology as he has a indwelling Gillespie catheter and has repeated UTIs, he also sees nephrology  Hyperlipidemia   Pertinent negatives include no chest pain or shortness of breath.   Hypertension   Pertinent negatives include no chest pain, palpitations or shortness of breath.    Objective   Vital Signs:   /73   Pulse 93   Temp 97.3 °F (36.3 °C)   Resp 16   Ht 172.7 cm (67.99\")   Wt 80.3 kg (177 lb)   SpO2 97%   BMI 26.92 kg/m²     Physical Exam  Vitals signs and nursing note reviewed.   Constitutional:       General: He is not in acute distress.     Appearance: Normal appearance. He is not diaphoretic.   HENT:      Head: Normocephalic and atraumatic.      Right Ear: External ear normal.      Left Ear: External ear normal.      Nose: Nose normal.   Eyes:      Extraocular Movements: Extraocular movements intact.      Comments: Lower Eye lid eversion and conjuctiva injection , bilateral   Neck:      Musculoskeletal: Neck supple.      Trachea: Trachea normal.   Cardiovascular:      Rate and Rhythm: Normal rate and regular rhythm.      Heart sounds: Normal heart sounds.   Pulmonary:      Effort: Pulmonary effort is normal. No respiratory distress.   Abdominal:      General: Abdomen is flat.   Musculoskeletal:      Comments: Moves all limbs   Skin:     General: Skin is warm and dry.      Findings: No " erythema.   Neurological:      Mental Status: He is alert and oriented to person, place, and time.      Comments: No gross motor or sensory deficits        Result Review :     Common labs    Common Labsle 5/5/20 5/5/20 5/5/20 9/22/20 9/22/20 9/22/20 1/12/21 1/12/21 1/12/21    1003 1003 1003 0958 0958 0958 0935 0935 0935   BUN  26 (A)   21   21    Creatinine  1.56 (A)   1.48 (A)   1.59 (A)    eGFR Non  Am  44 (A)   47 (A)   43 (A)    Sodium  140   141   138    Potassium  5.0   4.6   4.5    Chloride  103   105   101    Calcium  9.2   9.3   9.2    Albumin  4.20   4.50   4.60    Total Bilirubin  0.8   0.8   0.6    Alkaline Phosphatase  84   93   89    AST (SGOT)  16   13   15    ALT (SGPT)  14   13   13    WBC   7.71 7.24   8.36     Hemoglobin   16.3 16.4   16.4     Hematocrit   48.2 48.2   47.2     Platelets   252 250   253     Triglycerides 119     94   94   HDL Cholesterol 42     40   44   LDL Cholesterol  108 (A)     107 (A)   114 (A)   (A) Abnormal value       Comments are available for some flowsheets but are not being displayed.                     Assessment and Plan    Problem List Items Addressed This Visit        Cardiac and Vasculature    Mixed hyperlipidemia    Relevant Orders    CBC & Differential    Comprehensive Metabolic Panel    Lipid Panel    Benign essential hypertension - Primary      Other Visit Diagnoses     Adult onset hypothyroidism        Relevant Orders    TSH    Stage 3 chronic kidney disease, unspecified whether stage 3a or 3b CKD (CMS/[facility])          Plan:  1.  Benign essential hypertension: Will continue current medication, low-sodium diet advised, Counseled to regularly check BP at home with goal averaging <130/80.   2.mixed hyperlipidemia:  reviewed  fasting CMP and lipid panel.  Diet and exercise counseled,  Will continue current medications  3.  hypothyroidism:  reviewed  tsh , and continue levothyroxine  4. CRI stage 3 : Labs reviewed, oral hydration, to continue per  nephrology  I spent 30 minutes caring for Nestor on this date of service. This time includes time spent by me in the following activities:preparing for the visit, reviewing tests, performing a medically appropriate examination and/or evaluation , counseling and educating the patient/family/caregiver, ordering medications, tests, or procedures and documenting information in the medical record  Follow Up   Return in about 4 months (around 6/3/2021) for Medicare Wellness.  Patient was given instructions and counseling regarding his condition or for health maintenance advice. Please see specific information pulled into the AVS if appropriate.

## 2021-02-10 ENCOUNTER — PROCEDURE VISIT (OUTPATIENT)
Dept: UROLOGY | Facility: CLINIC | Age: 76
End: 2021-02-10

## 2021-02-10 DIAGNOSIS — N40.0 BENIGN PROSTATIC HYPERPLASIA, UNSPECIFIED WHETHER LOWER URINARY TRACT SYMPTOMS PRESENT: ICD-10-CM

## 2021-02-10 PROCEDURE — 51701 INSERT BLADDER CATHETER: CPT | Performed by: UROLOGY

## 2021-02-10 NOTE — PROGRESS NOTES
Chief Complaint  Urinary Retention (monthly cath change/talk to Dr. Jones.)        CHERYL Nye is a 75 y.o. male who and chronic urinary retention but has been handled for some time with an indwelling Gillespie catheter.  He failed several voiding trials and has a history of his bladder with prolonged gross over distention and probable decompensation.  He was informed about the option of a UroLift procedure but I had suggested he has a low chance of success in light of his poor bladder function and had recommended urodynamics prior to the procedure.  Unfortunately that has become unavailable since of the COVID-19 restrictions.  For now he will continue his monthly catheter change and return in 6 months for follow-up.    There were no vitals filed for this visit.    Past Medical History  Past Medical History:   Diagnosis Date   • Acute maxillary sinusitis    • Renal disorder    • Vocal cord anomaly        Past Surgical History  Past Surgical History:   Procedure Laterality Date   • BACK SURGERY     • BRAIN SURGERY     • EYE SURGERY     • OTHER SURGICAL HISTORY      Arm Excision       Medications  has a current medication list which includes the following prescription(s): alcohol wipes, doxazosin, ferrous sulfate, finasteride, and levothyroxine.      Allergies  No Known Allergies    Social History  Social History     Socioeconomic History   • Marital status:      Spouse name: Not on file   • Number of children: Not on file   • Years of education: Not on file   • Highest education level: Not on file   Tobacco Use   • Smoking status: Light Tobacco Smoker     Types: Cigarettes, Electronic Cigarette   • Smokeless tobacco: Never Used   • Tobacco comment: Quit 2013   Substance and Sexual Activity   • Alcohol use: No     Frequency: Never   • Drug use: No   • Sexual activity: Defer       Family History  He has no family history of bladder or kidney cancer  He has no family history of kidney stones      AUA Symptom  Score:      Review of Systems  Review of Systems    Physical Exam  Physical Exam    Labs Recent and today in the office:  Results for orders placed or performed during the hospital encounter of 01/21/21   Urine Culture - Urine, Urine, Random Void    Specimen: Urine, Random Void   Result Value Ref Range    Urine Culture >100,000 CFU/mL Mixed Gram Negative Karrie (A)    Urinalysis With Culture If Indicated - Urine, Random Void    Specimen: Urine, Random Void   Result Value Ref Range    Color, UA Yellow Yellow, Straw    Appearance, UA Turbid (A) Clear    pH, UA 6.5 5.0 - 8.0    Specific Gravity, UA 1.015 1.005 - 1.030    Glucose, UA Negative Negative    Ketones, UA Negative Negative    Bilirubin, UA Negative Negative    Blood, UA Large (3+) (A) Negative    Protein,  mg/dL (2+) (A) Negative    Leuk Esterase, UA Large (3+) (A) Negative    Nitrite, UA Positive (A) Negative    Urobilinogen, UA 0.2 E.U./dL 0.2 - 1.0 E.U./dL   Urinalysis, Microscopic Only - Urine, Random Void    Specimen: Urine, Random Void   Result Value Ref Range    RBC, UA 6-12 (A) None Seen /HPF    WBC, UA 6-12 (A) None Seen /HPF    Bacteria, UA 1+ (A) None Seen /HPF    Squamous Epithelial Cells, UA 0-2 None Seen, 0-2 /HPF    Hyaline Casts, UA None Seen None Seen /LPF    Methodology Manual Light Microscopy          Assessment & Plan  Chronic urinary retention: Return on a monthly basis for catheter insertion and in 6 months to see the provider.

## 2021-02-23 ENCOUNTER — TRANSCRIBE ORDERS (OUTPATIENT)
Dept: LAB | Facility: HOSPITAL | Age: 76
End: 2021-02-23

## 2021-02-23 ENCOUNTER — LAB (OUTPATIENT)
Dept: LAB | Facility: HOSPITAL | Age: 76
End: 2021-02-23

## 2021-02-23 DIAGNOSIS — N18.30 MALIGNANT HYPERTENSIVE HEART AND CHRONIC KIDNEY DISEASE STAGE III (HCC): ICD-10-CM

## 2021-02-23 DIAGNOSIS — N18.30 MALIGNANT HYPERTENSIVE HEART AND CHRONIC KIDNEY DISEASE STAGE III (HCC): Primary | ICD-10-CM

## 2021-02-23 DIAGNOSIS — I13.10 MALIGNANT HYPERTENSIVE HEART AND CHRONIC KIDNEY DISEASE STAGE III (HCC): Primary | ICD-10-CM

## 2021-02-23 DIAGNOSIS — I13.10 MALIGNANT HYPERTENSIVE HEART AND CHRONIC KIDNEY DISEASE STAGE III (HCC): ICD-10-CM

## 2021-02-23 LAB
BASOPHILS # BLD AUTO: 0.06 10*3/MM3 (ref 0–0.2)
BASOPHILS NFR BLD AUTO: 1 % (ref 0–1.5)
DEPRECATED RDW RBC AUTO: 44.6 FL (ref 37–54)
EOSINOPHIL # BLD AUTO: 0.11 10*3/MM3 (ref 0–0.4)
EOSINOPHIL NFR BLD AUTO: 1.9 % (ref 0.3–6.2)
ERYTHROCYTE [DISTWIDTH] IN BLOOD BY AUTOMATED COUNT: 12.5 % (ref 12.3–15.4)
HCT VFR BLD AUTO: 45.7 % (ref 37.5–51)
HGB BLD-MCNC: 15.6 G/DL (ref 13–17.7)
IMM GRANULOCYTES # BLD AUTO: 0.01 10*3/MM3 (ref 0–0.05)
IMM GRANULOCYTES NFR BLD AUTO: 0.2 % (ref 0–0.5)
LYMPHOCYTES # BLD AUTO: 1.87 10*3/MM3 (ref 0.7–3.1)
LYMPHOCYTES NFR BLD AUTO: 31.7 % (ref 19.6–45.3)
MCH RBC QN AUTO: 32.8 PG (ref 26.6–33)
MCHC RBC AUTO-ENTMCNC: 34.1 G/DL (ref 31.5–35.7)
MCV RBC AUTO: 96.2 FL (ref 79–97)
MONOCYTES # BLD AUTO: 0.62 10*3/MM3 (ref 0.1–0.9)
MONOCYTES NFR BLD AUTO: 10.5 % (ref 5–12)
NEUTROPHILS NFR BLD AUTO: 3.22 10*3/MM3 (ref 1.7–7)
NEUTROPHILS NFR BLD AUTO: 54.7 % (ref 42.7–76)
NRBC BLD AUTO-RTO: 0 /100 WBC (ref 0–0.2)
PLATELET # BLD AUTO: 245 10*3/MM3 (ref 140–450)
PMV BLD AUTO: 11.1 FL (ref 6–12)
RBC # BLD AUTO: 4.75 10*6/MM3 (ref 4.14–5.8)
WBC # BLD AUTO: 5.89 10*3/MM3 (ref 3.4–10.8)

## 2021-02-23 PROCEDURE — 85025 COMPLETE CBC W/AUTO DIFF WBC: CPT | Performed by: INTERNAL MEDICINE

## 2021-02-23 PROCEDURE — 80061 LIPID PANEL: CPT | Performed by: INTERNAL MEDICINE

## 2021-02-23 PROCEDURE — 36415 COLL VENOUS BLD VENIPUNCTURE: CPT | Performed by: INTERNAL MEDICINE

## 2021-02-23 PROCEDURE — 83970 ASSAY OF PARATHORMONE: CPT

## 2021-02-23 PROCEDURE — 80053 COMPREHEN METABOLIC PANEL: CPT | Performed by: INTERNAL MEDICINE

## 2021-02-23 PROCEDURE — 84550 ASSAY OF BLOOD/URIC ACID: CPT

## 2021-02-23 PROCEDURE — 84443 ASSAY THYROID STIM HORMONE: CPT | Performed by: INTERNAL MEDICINE

## 2021-02-24 LAB
ALBUMIN SERPL-MCNC: 4.4 G/DL (ref 3.5–5.2)
ALBUMIN/GLOB SERPL: 1.8 G/DL
ALP SERPL-CCNC: 92 U/L (ref 39–117)
ALT SERPL W P-5'-P-CCNC: 8 U/L (ref 1–41)
ANION GAP SERPL CALCULATED.3IONS-SCNC: 7.9 MMOL/L (ref 5–15)
AST SERPL-CCNC: 12 U/L (ref 1–40)
BILIRUB SERPL-MCNC: 0.8 MG/DL (ref 0–1.2)
BUN SERPL-MCNC: 17 MG/DL (ref 8–23)
BUN/CREAT SERPL: 12.1 (ref 7–25)
CALCIUM SPEC-SCNC: 9.1 MG/DL (ref 8.6–10.5)
CHLORIDE SERPL-SCNC: 102 MMOL/L (ref 98–107)
CHOLEST SERPL-MCNC: 158 MG/DL (ref 0–200)
CO2 SERPL-SCNC: 26.1 MMOL/L (ref 22–29)
CREAT SERPL-MCNC: 1.41 MG/DL (ref 0.76–1.27)
GFR SERPL CREATININE-BSD FRML MDRD: 49 ML/MIN/1.73
GLOBULIN UR ELPH-MCNC: 2.4 GM/DL
GLUCOSE SERPL-MCNC: 66 MG/DL (ref 65–99)
HDLC SERPL-MCNC: 38 MG/DL (ref 40–60)
LDLC SERPL CALC-MCNC: 103 MG/DL (ref 0–100)
LDLC/HDLC SERPL: 2.68 {RATIO}
POTASSIUM SERPL-SCNC: 4.9 MMOL/L (ref 3.5–5.2)
PROT SERPL-MCNC: 6.8 G/DL (ref 6–8.5)
PTH-INTACT SERPL-MCNC: 59.1 PG/ML (ref 15–65)
SODIUM SERPL-SCNC: 136 MMOL/L (ref 136–145)
TRIGL SERPL-MCNC: 90 MG/DL (ref 0–150)
TSH SERPL DL<=0.05 MIU/L-ACNC: 2.47 UIU/ML (ref 0.27–4.2)
URATE SERPL-MCNC: 6.3 MG/DL (ref 3.4–7)
VLDLC SERPL-MCNC: 17 MG/DL (ref 5–40)

## 2021-03-09 ENCOUNTER — HOSPITAL ENCOUNTER (EMERGENCY)
Facility: HOSPITAL | Age: 76
Discharge: HOME | End: 2021-03-09
Attending: STUDENT IN AN ORGANIZED HEALTH CARE EDUCATION/TRAINING PROGRAM | Admitting: STUDENT IN AN ORGANIZED HEALTH CARE EDUCATION/TRAINING PROGRAM

## 2021-03-09 VITALS
SYSTOLIC BLOOD PRESSURE: 133 MMHG | HEIGHT: 68 IN | RESPIRATION RATE: 18 BRPM | TEMPERATURE: 98.9 F | HEART RATE: 108 BPM | WEIGHT: 176.6 LBS | DIASTOLIC BLOOD PRESSURE: 92 MMHG | BODY MASS INDEX: 26.76 KG/M2 | OXYGEN SATURATION: 96 %

## 2021-03-09 DIAGNOSIS — R33.9 URINARY RETENTION: Primary | ICD-10-CM

## 2021-03-09 DIAGNOSIS — T83.9XXA FOLEY CATHETER PROBLEM, INITIAL ENCOUNTER (HCC): ICD-10-CM

## 2021-03-09 PROCEDURE — 99282 EMERGENCY DEPT VISIT SF MDM: CPT

## 2021-03-10 NOTE — ED PROVIDER NOTES
Subjective   75-year-old male who presents with a Gillespie that is not working.  The patient has had an indwelling catheter for years and follows with Dr. Jones monthly to have it swapped out.  He states the last time it became blocked like this he had to have the catheter swapped.  Currently he does not report pain, but does state it will become painful if he does not get this taken care of.  Patient has no other symptoms at this time.  He has no fever, chills, sweats, chest pain, shortness of breath no abdominal pain, nausea vomiting or diarrhea.          Review of Systems   All other systems reviewed and are negative.      Past Medical History:   Diagnosis Date   • Acute maxillary sinusitis    • Renal disorder    • Vocal cord anomaly        No Known Allergies    Past Surgical History:   Procedure Laterality Date   • BACK SURGERY     • BRAIN SURGERY     • EYE SURGERY     • OTHER SURGICAL HISTORY      Arm Excision       Family History   Problem Relation Age of Onset   • Arthritis Mother    • Prostate cancer Brother    • Arthritis Father        Social History     Socioeconomic History   • Marital status:      Spouse name: Not on file   • Number of children: Not on file   • Years of education: Not on file   • Highest education level: Not on file   Tobacco Use   • Smoking status: Light Tobacco Smoker     Types: Cigarettes, Electronic Cigarette   • Smokeless tobacco: Never Used   • Tobacco comment: Quit 2013   Substance and Sexual Activity   • Alcohol use: No   • Drug use: No   • Sexual activity: Defer           Objective   Physical Exam  Vitals and nursing note reviewed.     GEN: No acute distress  Head: Normocephalic, atraumatic  Eyes: Pupils equal round reactive to light  ENT: Posterior pharynx normal in appearance, oral mucosa is moist  Chest: Nontender to palpation  Cardiovascular: Mild tachycardia in the low 100s  Lungs: Clear to auscultation bilaterally  Abdomen: Soft, nontender, nondistended, no peritoneal  signs  Extremities: No edema, normal appearance  Neuro: GCS 15  Psych: Mood and affect are appropriate    Procedures           ED Course                                           MDM  Number of Diagnoses or Management Options  Diagnosis management comments: We will swap out the patient's Gillespie.  Instructed to follow-up with urology as planned.       Amount and/or Complexity of Data Reviewed  Decide to obtain previous medical records or to obtain history from someone other than the patient: yes  Obtain history from someone other than the patient: yes  Review and summarize past medical records: yes        Final diagnoses:   Urinary retention   Gillespie catheter problem, initial encounter (CMS/[facility])            Tahir Curry MD  03/09/21 [id number]       Tahir Curry MD  03/09/21 [id number]

## 2021-03-19 ENCOUNTER — TELEPHONE (OUTPATIENT)
Dept: UROLOGY | Facility: CLINIC | Age: 76
End: 2021-03-19

## 2021-03-19 ENCOUNTER — PROCEDURE VISIT (OUTPATIENT)
Dept: UROLOGY | Facility: CLINIC | Age: 76
End: 2021-03-19

## 2021-03-19 VITALS
OXYGEN SATURATION: 96 % | WEIGHT: 176 LBS | BODY MASS INDEX: 26.67 KG/M2 | RESPIRATION RATE: 16 BRPM | DIASTOLIC BLOOD PRESSURE: 82 MMHG | HEART RATE: 86 BPM | TEMPERATURE: 97.3 F | SYSTOLIC BLOOD PRESSURE: 136 MMHG | HEIGHT: 68 IN

## 2021-03-19 DIAGNOSIS — R33.9 URINARY RETENTION: ICD-10-CM

## 2021-03-19 PROCEDURE — 99214 OFFICE O/P EST MOD 30 MIN: CPT | Performed by: UROLOGY

## 2021-03-19 PROCEDURE — 51798 US URINE CAPACITY MEASURE: CPT | Performed by: UROLOGY

## 2021-03-19 PROCEDURE — 51702 INSERT TEMP BLADDER CATH: CPT | Performed by: UROLOGY

## 2021-03-19 RX ORDER — CEPHALEXIN 500 MG/1
CAPSULE ORAL
COMMUNITY
Start: 2021-03-17 | End: 2021-09-14

## 2021-03-19 RX ORDER — NITROFURANTOIN 25; 75 MG/1; MG/1
100 CAPSULE ORAL 2 TIMES DAILY
Qty: 14 CAPSULE | Refills: 0 | Status: SHIPPED | OUTPATIENT
Start: 2021-03-19 | End: 2021-05-27

## 2021-03-19 NOTE — PROGRESS NOTES
Chief Complaint  Chief Complaint   Patient presents with   • Cath Change due to not draining/cath flushed     Pain/tenderness in the bladder area.        Referring Provider:  No ref. provider found    HPI  Mr. Nye is a 75 y.o. male with below past medical history who presents with dysfunction of his chronic indwelling urinary catheter. He has followed with my partner, Dr. Jones, in the past and has had chronic monthly catheter changes for some time for suspected neurogenic bladder/urinary retention. He comes in today due to his catheter being clogged and not draining. This was changed and is now draining, though he does complain of some suprapubic soreness.     He has an elevated PSA, but has had a prior negative biopsy with Dr. Magana. He states that he has had an indwelling catheter for approximately 2 years now with Dr. Jones. He has been interested in bladder outlet procedures, but there was concern about him having neurogenic bladder. He has never had urodynamics. He used to self-catheterize multiple times a day, but had resistance, difficulty, and bleeding in the past. As such, he has been on the chronic Gillespie catheter for quite a w    Past Medical History  Past Medical History:   Diagnosis Date   • Acute maxillary sinusitis    • Renal disorder    • Vocal cord anomaly        Past Surgical History  Past Surgical History:   Procedure Laterality Date   • BACK SURGERY     • BRAIN SURGERY     • EYE SURGERY     • OTHER SURGICAL HISTORY      Arm Excision       Medications    Current Outpatient Medications:   •  Alcohol Swabs (ALCOHOL WIPES) 70 % pads, 1 pad 3 (Three) Times a Day., Disp: 100 each, Rfl: 11  •  cephalexin (KEFLEX) 500 MG capsule, , Disp: , Rfl:   •  doxazosin (CARDURA) 4 MG tablet, Take 1 tablet by mouth 2 (Two) Times a Day., Disp: 180 tablet, Rfl: 1  •  ferrous sulfate 325 (65 FE) MG tablet, Take 325 mg by mouth Daily With Breakfast., Disp: , Rfl:   •  finasteride (PROSCAR) 5 MG tablet, Take 1  "tablet by mouth Daily., Disp: 90 tablet, Rfl: 3  •  levothyroxine (SYNTHROID, LEVOTHROID) 88 MCG tablet, Take 1 tablet by mouth Daily., Disp: 90 tablet, Rfl: 2    Allergies  No Known Allergies    Social History  Social History     Socioeconomic History   • Marital status:      Spouse name: Not on file   • Number of children: Not on file   • Years of education: Not on file   • Highest education level: Not on file   Tobacco Use   • Smoking status: Light Tobacco Smoker     Types: Cigarettes, Electronic Cigarette   • Smokeless tobacco: Never Used   • Tobacco comment: Quit 2013   Substance and Sexual Activity   • Alcohol use: No   • Drug use: No   • Sexual activity: Defer       Family History  Family History   Problem Relation Age of Onset   • Arthritis Mother    • Prostate cancer Brother    • Arthritis Father        Review of Systems  Review of systems was notable for hypertension and elevated PSA.     Physical Exam  Visit Vitals  /82   Pulse 86   Temp 97.3 °F (36.3 °C) (Temporal)   Resp 16   Ht 172.7 cm (68\")   Wt 79.8 kg (176 lb)   SpO2 96%   BMI 26.76 kg/m²     Physical exam was performed and was notable for normal habitus and sunken eyes.    Genitourinary  Deferred      Labs  Lab Results   Component Value Date    PSA 5.0 (H) 07/25/2019    PSA 4.4 (H) 05/23/2018    PSA 5.0 (H) 06/19/2017       Lab Results   Component Value Date    GLUCOSE 66 02/23/2021    CALCIUM 9.1 02/23/2021     02/23/2021    K 4.9 02/23/2021    CO2 26.1 02/23/2021     02/23/2021    BUN 17 02/23/2021    CREATININE 1.41 (H) 02/23/2021    EGFRIFAFRI 43 (L) 09/25/2017    EGFRIFNONA 49 (L) 02/23/2021    BCR 12.1 02/23/2021    ANIONGAP 7.9 02/23/2021       Lab Results   Component Value Date    WBC 5.89 02/23/2021    HGB 15.6 02/23/2021    HCT 45.7 02/23/2021    MCV 96.2 02/23/2021     02/23/2021       Brief Urine Lab Results  (Last result in the past 365 days)      Color   Clarity   Blood   Leuk Est   Nitrite   Protein  "  CREAT   Urine HCG        01/21/21 2140 Yellow Turbid Large (3+) Large (3+) Positive 100 mg/dL (2+)                Radiologic Studies  No Images in the past 120 days found..    PVR  Post-void residual was measured after the catheter was placed to confirm that it was in the correct location - 20 cc      Assessment  Mr. Nye is a 75 y.o. male who presents with longstanding urinary retention in his indwelling Gillespie catheter, after a failed clean intermittent catheterization several years ago. He is interested to see if he still has any residual bladder function or detrusor activity and to see if he could possibly benefit from a bladder outlet procedure. He has experienced acute worsening of his chronic condition.    Plan  1.  Follow up in 4 weeks for cystoscopy and urodynamics to evaluate his outlet as well as evaluate any detrusor function.  2. He is at risk for urinary infection due to his chronic indwelling catheter, therefore, I will prescribe prophylactic antibiotics prior to his cystoscopy.    Scribed for Prieto eRy MD by ENOC GUTIERREZ.  03/19/21   19:55 EDT    I have personally performed the services described in this document as scribed by the above individual, and it is both accurate and complete.  Prieto Rey MD  3/20/2021  21:25 EDT

## 2021-04-14 ENCOUNTER — PROCEDURE VISIT (OUTPATIENT)
Dept: UROLOGY | Facility: CLINIC | Age: 76
End: 2021-04-14

## 2021-04-14 VITALS
DIASTOLIC BLOOD PRESSURE: 78 MMHG | HEIGHT: 68 IN | RESPIRATION RATE: 16 BRPM | OXYGEN SATURATION: 96 % | BODY MASS INDEX: 26.67 KG/M2 | HEART RATE: 74 BPM | SYSTOLIC BLOOD PRESSURE: 136 MMHG | TEMPERATURE: 98.2 F | WEIGHT: 176 LBS

## 2021-04-14 DIAGNOSIS — R33.9 URINARY RETENTION: Primary | ICD-10-CM

## 2021-04-14 PROCEDURE — 51702 INSERT TEMP BLADDER CATH: CPT | Performed by: UROLOGY

## 2021-04-14 NOTE — PROGRESS NOTES
Patient came in for monthly cath change today stating he has done very well since his last cath change without any trips to the ER. Patient tolerated the cath procedure well and will return in four weeks.

## 2021-04-16 DIAGNOSIS — E03.9 HYPOTHYROIDISM, UNSPECIFIED TYPE: Primary | ICD-10-CM

## 2021-04-16 RX ORDER — LEVOTHYROXINE SODIUM 88 UG/1
TABLET ORAL
Qty: 90 TABLET | Refills: 1 | Status: SHIPPED | OUTPATIENT
Start: 2021-04-16 | End: 2021-06-15 | Stop reason: SDUPTHER

## 2021-04-16 RX ORDER — DOXAZOSIN MESYLATE 4 MG/1
TABLET ORAL
Qty: 180 TABLET | Refills: 3 | OUTPATIENT
Start: 2021-04-16

## 2021-04-20 DIAGNOSIS — N13.8 BENIGN PROSTATIC HYPERPLASIA WITH URINARY OBSTRUCTION: ICD-10-CM

## 2021-04-20 DIAGNOSIS — N40.1 BENIGN PROSTATIC HYPERPLASIA WITH URINARY OBSTRUCTION: ICD-10-CM

## 2021-04-21 RX ORDER — FINASTERIDE 5 MG/1
5 TABLET, FILM COATED ORAL DAILY
Qty: 90 TABLET | Refills: 3 | Status: SHIPPED | OUTPATIENT
Start: 2021-04-21 | End: 2021-11-29 | Stop reason: SDUPTHER

## 2021-04-22 ENCOUNTER — PROCEDURE VISIT (OUTPATIENT)
Dept: UROLOGY | Facility: CLINIC | Age: 76
End: 2021-04-22

## 2021-04-22 DIAGNOSIS — R33.9 URINARY RETENTION: Primary | ICD-10-CM

## 2021-04-22 PROCEDURE — 52000 CYSTOURETHROSCOPY: CPT | Performed by: UROLOGY

## 2021-04-22 NOTE — PROGRESS NOTES
Chief Complaint  No chief complaint on file.       Referring Provider:  No ref. provider found    HPI  Mr. Nye is a 75 y.o. male with below past medical history who presents with dysfunction of his chronic indwelling urinary catheter. He has followed with my partner, Dr. Jones, in the past and has had chronic monthly catheter changes for some time for suspected neurogenic bladder/urinary retention. He comes in today due to his catheter being clogged and not draining. This was changed and is now draining, though he does complain of some suprapubic soreness.     He has an elevated PSA, but has had a prior negative biopsy with Dr. Jones. He states that he has had an indwelling catheter for approximately 2 years now with Dr. Jones. He has been interested in bladder outlet procedures, but there was concern about him having neurogenic bladder. He has never had urodynamics. He used to self-catheterize multiple times a day, but had resistance, difficulty, and bleeding in the past. As such, he has been on the chronic Gillespie catheter for quite a w    Past Medical History  Past Medical History:   Diagnosis Date   • Acute maxillary sinusitis    • Renal disorder    • Vocal cord anomaly        Past Surgical History  Past Surgical History:   Procedure Laterality Date   • BACK SURGERY     • BRAIN SURGERY     • EYE SURGERY     • OTHER SURGICAL HISTORY      Arm Excision       Medications    Current Outpatient Medications:   •  Alcohol Swabs (ALCOHOL WIPES) 70 % pads, 1 pad 3 (Three) Times a Day., Disp: 100 each, Rfl: 11  •  cephalexin (KEFLEX) 500 MG capsule, , Disp: , Rfl:   •  doxazosin (CARDURA) 4 MG tablet, Take 1 tablet by mouth 2 (Two) Times a Day., Disp: 180 tablet, Rfl: 1  •  ferrous sulfate 325 (65 FE) MG tablet, Take 325 mg by mouth Daily With Breakfast., Disp: , Rfl:   •  finasteride (PROSCAR) 5 MG tablet, Take 1 tablet by mouth Daily., Disp: 90 tablet, Rfl: 3  •  levothyroxine (SYNTHROID, LEVOTHROID) 88 MCG tablet,  TAKE ONE TABLET BY MOUTH EVERY DAY, Disp: 90 tablet, Rfl: 1  •  nitrofurantoin, macrocrystal-monohydrate, (Macrobid) 100 MG capsule, Take 1 capsule by mouth 2 (Two) Times a Day. Start the day prior to the cystoscopy, Disp: 14 capsule, Rfl: 0    Allergies  No Known Allergies    Social History  Social History     Socioeconomic History   • Marital status:      Spouse name: Not on file   • Number of children: Not on file   • Years of education: Not on file   • Highest education level: Not on file   Tobacco Use   • Smoking status: Light Tobacco Smoker     Types: Cigarettes, Electronic Cigarette   • Smokeless tobacco: Never Used   • Tobacco comment: Quit 2013   Substance and Sexual Activity   • Alcohol use: No   • Drug use: No   • Sexual activity: Defer       Family History  Family History   Problem Relation Age of Onset   • Arthritis Mother    • Prostate cancer Brother    • Arthritis Father        Review of Systems  Review of systems was notable for hypertension and elevated PSA.     Physical Exam  There were no vitals taken for this visit.  Physical exam was performed and was notable for normal habitus and sunken eyes.    Genitourinary  Deferred      Labs  Lab Results   Component Value Date    PSA 5.0 (H) 07/25/2019    PSA 4.4 (H) 05/23/2018    PSA 5.0 (H) 06/19/2017       Lab Results   Component Value Date    GLUCOSE 66 02/23/2021    CALCIUM 9.1 02/23/2021     02/23/2021    K 4.9 02/23/2021    CO2 26.1 02/23/2021     02/23/2021    BUN 17 02/23/2021    CREATININE 1.41 (H) 02/23/2021    EGFRIFAFRI 43 (L) 09/25/2017    EGFRIFNONA 49 (L) 02/23/2021    BCR 12.1 02/23/2021    ANIONGAP 7.9 02/23/2021       Lab Results   Component Value Date    WBC 5.89 02/23/2021    HGB 15.6 02/23/2021    HCT 45.7 02/23/2021    MCV 96.2 02/23/2021     02/23/2021       Brief Urine Lab Results  (Last result in the past 365 days)      Color   Clarity   Blood   Leuk Est   Nitrite   Protein   CREAT   Urine HCG         01/21/21 2140 Yellow Turbid Large (3+) Large (3+) Positive 100 mg/dL (2+)                Radiologic Studies  No Images in the past 120 days found..      Preprocedure diagnosis  Urinary retention    Postprocedure diagnosis  Same    Procedure  Flexible Cystourethroscopy    Attending surgeon  Prieto Rey MD    Anesthesia  2% lidocaine jelly intraurethrally    Complications  None    Indications  75 y.o. male undergoing a flexible cystoscopy for the above mentioned indications.  Informed consent was obtained.      Findings  Cystoscopic findings included one right and left ureteral orifice in the normal anatomic position with normal bladder mucosa and no tumors, masses or stones. The urethral urothelium was within normal limits with no strictures.  There was a very large prominent median lobe.  The lateral lobes were also very large and obstructive in appearance.      Procedure  The patient was placed in supine position and prepped and draped in sterile fashion with lidocaine jelly per urethra for anesthesia.  A timeout was performed.  The 14F flexible cystoscope was lubricated and gently placed through the penile urethra and into the bladder.  The bladder was completely visualized.  The cystoscope was retroflexed and the bladder neck and prostate visualized.  The cystoscope was slowly withdrawn while visualizing the urethra and the procedure terminated.  The patient tolerated the procedure well.      TRUS OF PROSTATE    Preoperative diagnosis  LUTS    Postoperative diagnosis  Same    Procedure  1.  Transrectal ultrasound of the prostate    Attending Surgeon  Prieto Rey MD    Anesthesia  2% lidocaine jelly, intrarectal instillation, 10mL    Complications  None    Specimen  None    Indications  Mr. Nye is a 75 y.o. male with LUTS.  He presents for prostate ultrasound to evaluate prostate size.    Procedure  The patient was positioned and prepped in a left lateral position with lower extremities flexed.   Lidocaine jelly, 2%, was injected per rectum. A digital rectal exam was performed which demonstrated a smooth prostate without nodules or induration. The ThetaRay E8CS rectal ultrasound probe was slowly introduced into the rectum without difficulty.  The prostate and seminal vesicles were inspected systematically using cross and sagittal views with the ultrasound. A median lobe was  seen.  The dimensions of the prostate were measured, for a calculated volume of 144 mL.  The seminal vesicles appeared normal.  The rectal ultrasound probe was removed.  The patient tolerated the procedure well.    A Gillespie catheter was replaced after the procedures.      Assessment  Mr. Nye is a 75 y.o. male who presents with longstanding urinary retention  indwelling Gillespie catheter, after a failed clean intermittent catheterization several years ago. He is interested to see if he still has any residual bladder function or detrusor activity and to see if he could possibly benefit from a bladder outlet procedure. He has experienced acute worsening of his chronic condition.  Cystoscopy today they demonstrated a very large median lobe and large lateral lobes, and transrectal ultrasound demonstrated that his prostate was 144 cc in size.    Plan  1.  Follow up for urodynamics to evaluate detrusor function.  Then he will see me afterwards and we will discuss simple prostatectomy if his bladder has any function left.

## 2021-05-27 ENCOUNTER — OFFICE VISIT (OUTPATIENT)
Dept: UROLOGY | Facility: CLINIC | Age: 76
End: 2021-05-27

## 2021-05-27 VITALS
HEIGHT: 68 IN | WEIGHT: 176 LBS | OXYGEN SATURATION: 99 % | RESPIRATION RATE: 17 BRPM | BODY MASS INDEX: 26.67 KG/M2 | TEMPERATURE: 97.1 F | HEART RATE: 89 BPM

## 2021-05-27 DIAGNOSIS — E03.8 ADULT ONSET HYPOTHYROIDISM: ICD-10-CM

## 2021-05-27 DIAGNOSIS — E78.2 MIXED HYPERLIPIDEMIA: Primary | ICD-10-CM

## 2021-05-27 DIAGNOSIS — R33.9 URINARY RETENTION: Primary | ICD-10-CM

## 2021-05-27 PROCEDURE — 99214 OFFICE O/P EST MOD 30 MIN: CPT | Performed by: UROLOGY

## 2021-05-27 RX ORDER — NITROFURANTOIN 25; 75 MG/1; MG/1
100 CAPSULE ORAL DAILY
Qty: 30 CAPSULE | Refills: 3 | Status: SHIPPED | OUTPATIENT
Start: 2021-05-27 | End: 2021-09-24 | Stop reason: SDUPTHER

## 2021-05-27 RX ORDER — SODIUM CHLORIDE 9 MG/ML
100 INJECTION, SOLUTION INTRAVENOUS CONTINUOUS
Status: CANCELLED | OUTPATIENT
Start: 2021-05-27

## 2021-05-27 NOTE — PROGRESS NOTES
Chief Complaint  Chief Complaint   Patient presents with   • Follow-up     UDS results        Referring Provider:  No ref. provider found    HPI  Mr. Nye is a 75 y.o. male with below past medical history who presents with dysfunction of his chronic indwelling urinary catheter. He has followed with my partner, Dr. Jones, in the past and has had chronic monthly catheter changes for some time for suspected neurogenic bladder/urinary retention. He comes in today due to his catheter being clogged and not draining. This was changed and is now draining, though he does complain of some suprapubic soreness.     He has an elevated PSA, but has had a prior negative biopsy with Dr. Jones. He states that he has had an indwelling catheter for approximately 2 years now with Dr. Jones. He has been interested in bladder outlet procedures, but there was concern about him having neurogenic bladder.     He now presents for follow-up.  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    Past Medical History  Past Medical History:   Diagnosis Date   • Acute maxillary sinusitis    • Renal disorder    • Vocal cord anomaly      Past Surgical History  Past Surgical History:   Procedure Laterality Date   • BACK SURGERY     • BRAIN SURGERY     • EYE SURGERY     • OTHER SURGICAL HISTORY      Arm Excision       Medications    Current Outpatient Medications:   •  Alcohol Swabs (ALCOHOL WIPES) 70 % pads, 1 pad 3 (Three) Times a Day., Disp: 100 each, Rfl: 11  •  cephalexin (KEFLEX) 500 MG capsule, , Disp: , Rfl:   •  doxazosin (CARDURA) 4 MG tablet, Take 1 tablet by mouth 2 (Two) Times a Day., Disp: 180 tablet, Rfl: 1  •  ferrous sulfate 325 (65 FE) MG tablet, Take 325 mg by mouth Daily With Breakfast., Disp: , Rfl:   •  finasteride (PROSCAR) 5 MG tablet, Take 1 tablet by mouth Daily., Disp: 90 tablet, Rfl: 3  •  levothyroxine (SYNTHROID, LEVOTHROID) 88 MCG tablet, TAKE ONE TABLET BY MOUTH  "EVERY DAY, Disp: 90 tablet, Rfl: 1  •  nitrofurantoin, macrocrystal-monohydrate, (Macrobid) 100 MG capsule, Take 1 capsule by mouth 2 (Two) Times a Day. Start the day prior to the cystoscopy, Disp: 14 capsule, Rfl: 0    Allergies  No Known Allergies    Social History  Social History     Socioeconomic History   • Marital status:      Spouse name: Not on file   • Number of children: Not on file   • Years of education: Not on file   • Highest education level: Not on file   Tobacco Use   • Smoking status: Light Tobacco Smoker     Types: Cigarettes, Electronic Cigarette   • Smokeless tobacco: Never Used   • Tobacco comment: Quit 2013   Substance and Sexual Activity   • Alcohol use: No   • Drug use: No   • Sexual activity: Defer       Family History  Family History   Problem Relation Age of Onset   • Arthritis Mother    • Prostate cancer Brother    • Arthritis Father        Review of Systems  Review of systems was notable for hypertension and elevated PSA.     Physical Exam  Visit Vitals  Pulse 89   Temp 97.1 °F (36.2 °C)   Resp 17   Ht 172.7 cm (67.99\")   Wt 79.8 kg (176 lb)   SpO2 99%   BMI 26.77 kg/m²     Physical exam was performed and was notable for normal habitus and sunken eyes.      Labs  Lab Results   Component Value Date    PSA 5.0 (H) 07/25/2019    PSA 4.4 (H) 05/23/2018    PSA 5.0 (H) 06/19/2017       Lab Results   Component Value Date    GLUCOSE 66 02/23/2021    CALCIUM 9.1 02/23/2021     02/23/2021    K 4.9 02/23/2021    CO2 26.1 02/23/2021     02/23/2021    BUN 17 02/23/2021    CREATININE 1.41 (H) 02/23/2021    EGFRIFAFRI 43 (L) 09/25/2017    EGFRIFNONA 49 (L) 02/23/2021    BCR 12.1 02/23/2021    ANIONGAP 7.9 02/23/2021       Lab Results   Component Value Date    WBC 5.89 02/23/2021    HGB 15.6 02/23/2021    HCT 45.7 02/23/2021    MCV 96.2 02/23/2021     02/23/2021       Brief Urine Lab Results  (Last result in the past 365 days)      Color   Clarity   Blood   Leuk Est   Nitrite  "  Protein   CREAT   Urine HCG        01/21/21 2140 Yellow Turbid Large (3+) Large (3+) Positive 100 mg/dL (2+)                Radiologic Studies  No Images in the past 120 days found..      Assessment  Mr. Nye is a 75 y.o. male who presents with longstanding urinary retention  indwelling Gillespie catheter, after a failed clean intermittent catheterization (I consider this a severe exacerbation of his chronic illness). He is interested to see if he still has any residual bladder function or detrusor activity and to see if he could possibly benefit from a bladder outlet procedure. He has experienced acute worsening of his chronic condition.  Cystoscopy  they demonstrated a very large median lobe and large lateral lobes, and transrectal ultrasound demonstrated that his prostate was 144 cc in size.  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    Plan  1.  Schedule simple prostatectomy 8/17.  This is a major elective surgery.  Risk factors are advanced age.

## 2021-06-08 ENCOUNTER — LAB (OUTPATIENT)
Dept: LAB | Facility: HOSPITAL | Age: 76
End: 2021-06-08

## 2021-06-08 LAB
ALBUMIN SERPL-MCNC: 4.3 G/DL (ref 3.5–5.2)
ALBUMIN/GLOB SERPL: 1.7 G/DL
ALP SERPL-CCNC: 94 U/L (ref 39–117)
ALT SERPL W P-5'-P-CCNC: 12 U/L (ref 1–41)
ANION GAP SERPL CALCULATED.3IONS-SCNC: 9.4 MMOL/L (ref 5–15)
AST SERPL-CCNC: 12 U/L (ref 1–40)
BASOPHILS # BLD AUTO: 0.09 10*3/MM3 (ref 0–0.2)
BASOPHILS NFR BLD AUTO: 1 % (ref 0–1.5)
BILIRUB SERPL-MCNC: 0.4 MG/DL (ref 0–1.2)
BUN SERPL-MCNC: 13 MG/DL (ref 8–23)
BUN/CREAT SERPL: 9 (ref 7–25)
CALCIUM SPEC-SCNC: 9 MG/DL (ref 8.6–10.5)
CHLORIDE SERPL-SCNC: 104 MMOL/L (ref 98–107)
CHOLEST SERPL-MCNC: 152 MG/DL (ref 0–200)
CO2 SERPL-SCNC: 26.6 MMOL/L (ref 22–29)
CREAT SERPL-MCNC: 1.44 MG/DL (ref 0.76–1.27)
DEPRECATED RDW RBC AUTO: 45.6 FL (ref 37–54)
EOSINOPHIL # BLD AUTO: 0.31 10*3/MM3 (ref 0–0.4)
EOSINOPHIL NFR BLD AUTO: 3.4 % (ref 0.3–6.2)
ERYTHROCYTE [DISTWIDTH] IN BLOOD BY AUTOMATED COUNT: 12.8 % (ref 12.3–15.4)
GFR SERPL CREATININE-BSD FRML MDRD: 48 ML/MIN/1.73
GLOBULIN UR ELPH-MCNC: 2.6 GM/DL
GLUCOSE SERPL-MCNC: 67 MG/DL (ref 65–99)
HCT VFR BLD AUTO: 43.6 % (ref 37.5–51)
HDLC SERPL-MCNC: 39 MG/DL (ref 40–60)
HGB BLD-MCNC: 14.8 G/DL (ref 13–17.7)
IMM GRANULOCYTES # BLD AUTO: 0.04 10*3/MM3 (ref 0–0.05)
IMM GRANULOCYTES NFR BLD AUTO: 0.4 % (ref 0–0.5)
LDLC SERPL CALC-MCNC: 92 MG/DL (ref 0–100)
LDLC/HDLC SERPL: 2.3 {RATIO}
LYMPHOCYTES # BLD AUTO: 1.69 10*3/MM3 (ref 0.7–3.1)
LYMPHOCYTES NFR BLD AUTO: 18.4 % (ref 19.6–45.3)
MCH RBC QN AUTO: 33 PG (ref 26.6–33)
MCHC RBC AUTO-ENTMCNC: 33.9 G/DL (ref 31.5–35.7)
MCV RBC AUTO: 97.1 FL (ref 79–97)
MONOCYTES # BLD AUTO: 0.96 10*3/MM3 (ref 0.1–0.9)
MONOCYTES NFR BLD AUTO: 10.5 % (ref 5–12)
NEUTROPHILS NFR BLD AUTO: 6.08 10*3/MM3 (ref 1.7–7)
NEUTROPHILS NFR BLD AUTO: 66.3 % (ref 42.7–76)
NRBC BLD AUTO-RTO: 0 /100 WBC (ref 0–0.2)
PLATELET # BLD AUTO: 252 10*3/MM3 (ref 140–450)
PMV BLD AUTO: 10.9 FL (ref 6–12)
POTASSIUM SERPL-SCNC: 4.6 MMOL/L (ref 3.5–5.2)
PROT SERPL-MCNC: 6.9 G/DL (ref 6–8.5)
RBC # BLD AUTO: 4.49 10*6/MM3 (ref 4.14–5.8)
SODIUM SERPL-SCNC: 140 MMOL/L (ref 136–145)
TRIGL SERPL-MCNC: 116 MG/DL (ref 0–150)
VLDLC SERPL-MCNC: 21 MG/DL (ref 5–40)
WBC # BLD AUTO: 9.17 10*3/MM3 (ref 3.4–10.8)

## 2021-06-08 PROCEDURE — 85025 COMPLETE CBC W/AUTO DIFF WBC: CPT | Performed by: INTERNAL MEDICINE

## 2021-06-08 PROCEDURE — 84443 ASSAY THYROID STIM HORMONE: CPT | Performed by: INTERNAL MEDICINE

## 2021-06-08 PROCEDURE — 36415 COLL VENOUS BLD VENIPUNCTURE: CPT | Performed by: INTERNAL MEDICINE

## 2021-06-08 PROCEDURE — 80061 LIPID PANEL: CPT | Performed by: INTERNAL MEDICINE

## 2021-06-08 PROCEDURE — 80053 COMPREHEN METABOLIC PANEL: CPT | Performed by: INTERNAL MEDICINE

## 2021-06-09 LAB — TSH SERPL DL<=0.05 MIU/L-ACNC: 2.42 UIU/ML (ref 0.27–4.2)

## 2021-06-15 ENCOUNTER — OFFICE VISIT (OUTPATIENT)
Dept: INTERNAL MEDICINE | Facility: CLINIC | Age: 76
End: 2021-06-15

## 2021-06-15 VITALS
SYSTOLIC BLOOD PRESSURE: 114 MMHG | HEART RATE: 87 BPM | TEMPERATURE: 97.3 F | BODY MASS INDEX: 26.07 KG/M2 | HEIGHT: 68 IN | OXYGEN SATURATION: 95 % | RESPIRATION RATE: 14 BRPM | WEIGHT: 172 LBS | DIASTOLIC BLOOD PRESSURE: 73 MMHG

## 2021-06-15 DIAGNOSIS — N18.30 STAGE 3 CHRONIC KIDNEY DISEASE, UNSPECIFIED WHETHER STAGE 3A OR 3B CKD (HCC): ICD-10-CM

## 2021-06-15 DIAGNOSIS — I10 BENIGN ESSENTIAL HYPERTENSION: ICD-10-CM

## 2021-06-15 DIAGNOSIS — E78.2 MIXED HYPERLIPIDEMIA: ICD-10-CM

## 2021-06-15 DIAGNOSIS — Z00.00 ROUTINE GENERAL MEDICAL EXAMINATION AT A HEALTH CARE FACILITY: Primary | ICD-10-CM

## 2021-06-15 DIAGNOSIS — E03.8 ADULT ONSET HYPOTHYROIDISM: ICD-10-CM

## 2021-06-15 PROCEDURE — G0439 PPPS, SUBSEQ VISIT: HCPCS | Performed by: INTERNAL MEDICINE

## 2021-06-15 PROCEDURE — 99214 OFFICE O/P EST MOD 30 MIN: CPT | Performed by: INTERNAL MEDICINE

## 2021-06-15 PROCEDURE — 1170F FXNL STATUS ASSESSED: CPT | Performed by: INTERNAL MEDICINE

## 2021-06-15 PROCEDURE — 1159F MED LIST DOCD IN RCRD: CPT | Performed by: INTERNAL MEDICINE

## 2021-06-15 RX ORDER — DOXAZOSIN MESYLATE 4 MG/1
4 TABLET ORAL 2 TIMES DAILY
Qty: 180 TABLET | Refills: 2 | Status: SHIPPED | OUTPATIENT
Start: 2021-06-15 | End: 2022-06-23

## 2021-06-15 RX ORDER — LEVOTHYROXINE SODIUM 88 UG/1
88 TABLET ORAL DAILY
Qty: 90 TABLET | Refills: 2 | Status: SHIPPED | OUTPATIENT
Start: 2021-06-15 | End: 2022-07-20 | Stop reason: SDUPTHER

## 2021-06-15 NOTE — PROGRESS NOTES
The ABCs of the Annual Wellness Visit  Subsequent Medicare Wellness Visit    Chief Complaint   Patient presents with   • Medicare Wellness-subsequent   • Hyperlipidemia   • Hypothyroidism       Subjective   History of Present Illness:  Nestor Nye is a 75 y.o. male who presents for a Subsequent Medicare Wellness Visit and physical.  Patient is here to follow-up on blood pressure and is taking medication as prescribed is also to follow-up on cholesterol and thyroid and had lab work done he is also to follow-up on CKD and sees nephrology, he follows up with urology for BPH and has an indwelling Gillespie's catheter and is scheduled in August for a prostatectomy    HEALTH RISK ASSESSMENT    Recent Hospitalizations:  No hospitalization(s) within the last year.    Current Medical Providers:  Patient Care Team:  Roxanne Slater MD as PCP - General (Internal Medicine)    Smoking Status:  Social History     Tobacco Use   Smoking Status Light Tobacco Smoker   • Types: Cigarettes, Electronic Cigarette   Smokeless Tobacco Never Used   Tobacco Comment    Quit 2013       Alcohol Consumption:  Social History     Substance and Sexual Activity   Alcohol Use No       Depression Screen:   PHQ-2/PHQ-9 Depression Screening 6/15/2021   Little interest or pleasure in doing things 0   Feeling down, depressed, or hopeless 0   Total Score 0       Fall Risk Screen:  STEADI Fall Risk Assessment was completed, and patient is at LOW risk for falls.Assessment completed on:6/15/2021    Health Habits and Functional and Cognitive Screening:  Functional & Cognitive Status 6/15/2021   Do you have difficulty preparing food and eating? No   Do you have difficulty bathing yourself, getting dressed or grooming yourself? No   Do you have difficulty using the toilet? No   Do you have difficulty moving around from place to place? No   Do you have trouble with steps or getting out of a bed or a chair? No   Current Diet Well Balanced Diet   Dental Exam  Not up to date   Eye Exam Not up to date   Exercise (times per week) 5 times per week   Current Exercise Activities Include Yard Work   Do you need help using the phone?  No   Are you deaf or do you have serious difficulty hearing?  No   Do you need help with transportation? Yes   Do you need help shopping? No   Do you need help preparing meals?  No   Do you need help with housework?  No   Do you need help with laundry? No   Do you need help taking your medications? No   Do you need help managing money? No   Do you ever drive or ride in a car without wearing a seat belt? No   Have you felt unusual stress, anger or loneliness in the last month? No   Who do you live with? Alone   If you need help, do you have trouble finding someone available to you? No   Have you been bothered in the last four weeks by sexual problems? No   Do you have difficulty concentrating, remembering or making decisions? No         Does the patient have evidence of cognitive impairment? No    Asprin use counseling:Does not need ASA (and currently is not on it)    Age-appropriate Screening Schedule:  Refer to the list below for future screening recommendations based on patient's age, sex and/or medical conditions. Orders for these recommended tests are listed in the plan section. The patient has been provided with a written plan.    Health Maintenance   Topic Date Due   • TDAP/TD VACCINES (1 - Tdap) Never done   • ZOSTER VACCINE (1 of 2) Never done   • INFLUENZA VACCINE  08/01/2021   • LIPID PANEL  06/08/2022          The following portions of the patient's history were reviewed and updated as appropriate: allergies, current medications, past family history, past medical history, past social history, past surgical history and problem list.    Outpatient Medications Prior to Visit   Medication Sig Dispense Refill   • Alcohol Swabs (ALCOHOL WIPES) 70 % pads 1 pad 3 (Three) Times a Day. 100 each 11   • cephalexin (KEFLEX) 500 MG capsule      •  ferrous sulfate 325 (65 FE) MG tablet Take 325 mg by mouth Daily With Breakfast.     • finasteride (PROSCAR) 5 MG tablet Take 1 tablet by mouth Daily. 90 tablet 3   • nitrofurantoin, macrocrystal-monohydrate, (Macrobid) 100 MG capsule Take 1 capsule by mouth Daily. 30 capsule 3   • doxazosin (CARDURA) 4 MG tablet Take 1 tablet by mouth 2 (Two) Times a Day. 180 tablet 1   • levothyroxine (SYNTHROID, LEVOTHROID) 88 MCG tablet TAKE ONE TABLET BY MOUTH EVERY DAY 90 tablet 1     No facility-administered medications prior to visit.       Patient Active Problem List   Diagnosis   • BPH (benign prostatic hyperplasia)   • Hypothyroidism   • Mixed hyperlipidemia   • Elevated PSA   • Benign essential hypertension   • Urinary retention       Advanced Care Planning:  ACP discussion was held with the patient during this visit. Patient does not have an advance directive, declines further assistance.    Review of Systems   Constitutional: Negative for appetite change, fatigue and fever.   HENT: Negative for congestion, ear discharge, ear pain, sinus pressure and sore throat.    Eyes: Negative for pain and discharge.   Respiratory: Negative for cough, shortness of breath and wheezing.    Cardiovascular: Negative for chest pain, palpitations and leg swelling.   Gastrointestinal: Negative for abdominal pain, constipation, diarrhea, nausea and vomiting.   Endocrine: Negative for cold intolerance and heat intolerance.   Genitourinary: Negative for dysuria and flank pain.   Musculoskeletal: Negative for arthralgias and joint swelling.   Skin: Negative for pallor and rash.   Allergic/Immunologic: Negative for environmental allergies and food allergies.   Neurological: Negative for dizziness, weakness and numbness.   Hematological: Negative for adenopathy. Does not bruise/bleed easily.   Psychiatric/Behavioral: Negative for behavioral problems and dysphoric mood. The patient is not nervous/anxious.        Compared to one year ago, the  "patient feels his physical health is worse.  Compared to one year ago, the patient feels his mental health is the same.    Reviewed chart for potential of high risk medication in the elderly: not applicable  Reviewed chart for potential of harmful drug interactions in the elderly:not applicable    Objective         Vitals:    06/15/21 1501   BP: 114/73   Pulse: 87   Resp: 14   Temp: 97.3 °F (36.3 °C)   SpO2: 95%   Weight: 78 kg (172 lb)   Height: 172.7 cm (67.99\")   PainSc: 0-No pain       Body mass index is 26.16 kg/m².  Discussed the patient's BMI with him. The BMI is in the acceptable range.    Physical Exam  Vitals and nursing note reviewed.   Constitutional:       General: He is not in acute distress.     Appearance: Normal appearance. He is not diaphoretic.   HENT:      Head: Normocephalic and atraumatic.      Right Ear: External ear normal.      Left Ear: External ear normal.      Nose: Nose normal.   Eyes:      Comments: Lower Eye lid eversion and conjuctiva injection , bilateral   Neck:      Trachea: Trachea normal.   Cardiovascular:      Rate and Rhythm: Normal rate and regular rhythm.      Heart sounds: Normal heart sounds.   Pulmonary:      Effort: Pulmonary effort is normal. No respiratory distress.   Abdominal:      General: Abdomen is flat.   Musculoskeletal:      Cervical back: Neck supple.      Comments: Moves all limbs   Skin:     General: Skin is warm and dry.      Findings: No erythema.   Neurological:      Mental Status: He is alert and oriented to person, place, and time.      Comments: No gross motor or sensory deficits         Lab Results   Component Value Date    TRIG 116 06/08/2021    HDL 39 (L) 06/08/2021    LDL 92 06/08/2021    VLDL 21 06/08/2021        Assessment/Plan   Medicare Risks and Personalized Health Plan  CMS Preventative Services Quick Reference  Advance Directive Discussion  Cardiovascular risk  Diabetic Lab Screening   Fall Risk  Glaucoma Risk  Hearing Problem  Prostate " Cancer Screening     The above risks/problems have been discussed with the patient.  Pertinent information has been shared with the patient in the After Visit Summary.  Follow up plans and orders are seen below in the Assessment/Plan Section.    Diagnoses and all orders for this visit:    1. Routine general medical examination at a health care facility (Primary)    2. Benign essential hypertension    3. Mixed hyperlipidemia  -     CBC & Differential  -     Comprehensive Metabolic Panel  -     Lipid Panel    4. Adult onset hypothyroidism  -     levothyroxine (SYNTHROID, LEVOTHROID) 88 MCG tablet; Take 1 tablet by mouth Daily.  Dispense: 90 tablet; Refill: 2  -     TSH    5. Stage 3 chronic kidney disease, unspecified whether stage 3a or 3b CKD (CMS/HCC)    Other orders  -     doxazosin (CARDURA) 4 MG tablet; Take 1 tablet by mouth 2 (Two) Times a Day.  Dispense: 180 tablet; Refill: 2    Plan:  1.physical: Physical exam conducted today, reviewed fasting lab work,   Impression: healthy adult Patient. Currently, patient eats a healthy diet. Screening lab work includes glucose, lipid profile and complete blood count . The risks and benefits of immunizations were discussed and immunizations are up to date. Advice and education were given regarding nutrition and aerobic exercise. Patient discussion: discussed with the patient.  Annual Wellness Visit, physical with preventive exam as well as age and risk appropriate counseling completed.   Advance Directive Planning: paperwork and instructions were given to the patient.   Patient Discussion: plan discussed with the patient, follow-up visit needed in one year. Medication Review and medication list updated  2.  Benign essential hypertension: Will continue current medication, low-sodium diet advised, Counseled to regularly check BP at home with goal averaging <130/80.   3.mixed hyperlipidemia:  reviewed  fasting CMP and lipid panel.  Diet and exercise counseled,  Will continue  current medications  4. hypothyroidism:  reviewed  tsh , and continue levothyroxine  5.  Stage III CKD: To follow-up with nephrology, labs reviewed  Follow Up:  Return in 1 year (on 6/15/2022) for Medicare Wellness.     An After Visit Summary and PPPS were given to the patient.

## 2021-06-15 NOTE — PATIENT INSTRUCTIONS
Medicare Wellness  Personal Prevention Plan of Service     Date of Office Visit:  06/15/2021  Encounter Provider:  Roxanne Slater MD  Place of Service:  [facility]  Patient Name: Nestor Nye  :  1945    As part of the Medicare Wellness portion of your visit today, we are providing you with this personalized preventive plan of services (PPPS). This plan is based upon recommendations of the [place] Preventive Services Task Force (USPSTF) and the Advisory Committee on Immunization Practices (ACIP).    This lists the preventive care services that should be considered, and provides dates of when you are due. Items listed as completed are up-to-date and do not require any further intervention.    Health Maintenance   Topic Date Due   • TDAP/TD VACCINES (1 - Tdap) Never done   • ZOSTER VACCINE (1 of 2) Never done   • HEPATITIS C SCREENING  Never done   • AAA SCREEN (ONE-TIME)  Never done   • COVID-19 Vaccine (2 - Moderna 2-dose series) 2021   • ANNUAL WELLNESS VISIT  2021   • INFLUENZA VACCINE  2021   • LIPID PANEL  2022   • COLORECTAL CANCER SCREENING  2027   • Pneumococcal Vaccine 65+  Completed       No orders of the defined types were placed in this encounter.      No follow-ups on file.      MyPlate from USDA    MyPlate is an outline of a general healthy diet based on the 2010 Dietary Guidelines for Americans, from the U.S. Department of Agriculture (USDA). It sets guidelines for how much food you should eat from each food group based on your age, sex, and level of physical activity.  What are tips for following MyPlate?  To follow MyPlate recommendations:  · Eat a wide variety of fruits and vegetables, grains, and protein foods.  · Serve smaller portions and eat less food throughout the day.  · Limit portion sizes to avoid overeating.  · Enjoy your food.  · Get at least 150 minutes of exercise every week. This is about 30 minutes  each day, 5 or more days per week.  It can be difficult to have every meal look like MyPlate. Think about MyPlate as eating guidelines for an entire day, rather than each individual meal.  Fruits and vegetables  · Make half of your plate fruits and vegetables.  · Eat many different colors of fruits and vegetables each day.  · For a 2,000 calorie daily food plan, eat:  ? 2½ cups of vegetables every day.  ? 2 cups of fruit every day.  · 1 cup is equal to:  ? 1 cup raw or cooked vegetables.  ? 1 cup raw fruit.  ? 1 medium-sized orange, apple, or banana.  ? 1 cup 100% fruit or vegetable juice.  ? 2 cups raw leafy greens, such as lettuce, spinach, or kale.  ? ½ cup dried fruit.  Grains  · One fourth of your plate should be grains.  · Make at least half of the grains you eat each day whole grains.  · For a 2,000 calorie daily food plan, eat 6 oz of grains every day.  · 1 oz is equal to:  ? 1 slice bread.  ? 1 cup cereal.  ? ½ cup cooked rice, cereal, or pasta.  Protein  · One fourth of your plate should be protein.  · Eat a wide variety of protein foods, including meat, poultry, fish, eggs, beans, nuts, and tofu.  · For a 2,000 calorie daily food plan, eat 5½ oz of protein every day.  · 1 oz is equal to:  ? 1 oz meat, poultry, or fish.  ? ¼ cup cooked beans.  ? 1 egg.  ? ½ oz nuts or seeds.  ? 1 Tbsp peanut butter.  Dairy  · Drink fat-free or low-fat (1%) milk.  · Eat or drink dairy as a side to meals.  · For a 2,000 calorie daily food plan, eat or drink 3 cups of dairy every day.  · 1 cup is equal to:  ? 1 cup milk, yogurt, cottage cheese, or soy milk (soy beverage).  ? 2 oz processed cheese.  ? 1½ oz natural cheese.  Fats, oils, salt, and sugars  · Only small amounts of oils are recommended.  · Avoid foods that are high in calories and low in nutritional value (empty calories), like foods high in fat or added sugars.  · Choose foods that are low in salt (sodium). Choose foods that have less than 140 milligrams (mg) of  sodium per serving.  · Drink water instead of sugary drinks. Drink enough water each day to keep your urine pale yellow.  Where to find support  · Work with your health care provider or a nutrition specialist (dietitian) to develop a customized eating plan that is right for you.  · Download an lizy (mobile application) to help you track your daily food intake.  Where to find more information  · Go to ChooseMyPlate.gov for more information.  Summary  · MyPlate is a general guideline for healthy eating from the USDA. It is based on the 2010 Dietary Guidelines for Americans.  · In general, fruits and vegetables should take up ½ of your plate, grains should take up ¼ of your plate, and protein should take up ¼ of your plate.  This information is not intended to replace advice given to you by your health care provider. Make sure you discuss any questions you have with your health care provider.  Document Revised: 05/21/2020 Document Reviewed: 03/19/2018  [employer] Patient Education © 2021 [employer]      Exercising to Stay Healthy  To become healthy and stay healthy, it is recommended that you do moderate-intensity and vigorous-intensity exercise. You can tell that you are exercising at a moderate intensity if your heart starts beating faster and you start breathing faster but can still hold a conversation. You can tell that you are exercising at a vigorous intensity if you are breathing much harder and faster and cannot hold a conversation while exercising.  Exercising regularly is important. It has many health benefits, such as:  · Improving overall fitness, flexibility, and endurance.  · Increasing bone density.  · Helping with weight control.  · Decreasing body fat.  · Increasing muscle strength.  · Reducing stress and tension.  · Improving overall health.  How often should I exercise?  Choose an activity that you enjoy, and set realistic goals. Your health care provider can help you make an activity plan that works  for you.  Exercise regularly as told by your health care provider. This may include:  · Doing strength training two times a week, such as:  ? Lifting weights.  ? Using resistance bands.  ? Push-ups.  ? Sit-ups.  ? Yoga.  · Doing a certain intensity of exercise for a given amount of time. Choose from these options:  ? A total of 150 minutes of moderate-intensity exercise every week.  ? A total of 75 minutes of vigorous-intensity exercise every week.  ? A mix of moderate-intensity and vigorous-intensity exercise every week.  Children, pregnant women, people who have not exercised regularly, people who are overweight, and older adults may need to talk with a health care provider about what activities are safe to do. If you have a medical condition, be sure to talk with your health care provider before you start a new exercise program.  What are some exercise ideas?  Moderate-intensity exercise ideas include:  · Walking 1 mile (1.6 km) in about 15 minutes.  · Biking.  · Hiking.  · Golfing.  · Dancing.  · Water aerobics.  Vigorous-intensity exercise ideas include:  · Walking 4.5 miles (7.2 km) or more in about 1 hour.  · Jogging or running 5 miles (8 km) in about 1 hour.  · Biking 10 miles (16.1 km) or more in about 1 hour.  · Lap swimming.  · Roller-skating or in-line skating.  · Cross-country skiing.  · Vigorous competitive sports, such as football, basketball, and soccer.  · Jumping rope.  · Aerobic dancing.  What are some everyday activities that can help me to get exercise?  · Yard work, such as:  ? Pushing a .  ? Raking and bagging leaves.  · Washing your car.  · Pushing a stroller.  · Shoveling snow.  · Gardening.  · Washing windows or floors.  How can I be more active in my day-to-day activities?  · Use stairs instead of an elevator.  · Take a walk during your lunch break.  · If you drive, park your car farther away from your work or school.  · If you take public transportation, get off one stop early  and walk the rest of the way.  · Stand up or walk around during all of your indoor phone calls.  · Get up, stretch, and walk around every 30 minutes throughout the day.  · Enjoy exercise with a friend. Support to continue exercising will help you keep a regular routine of activity.  What guidelines can I follow while exercising?  · Before you start a new exercise program, talk with your health care provider.  · Do not exercise so much that you hurt yourself, feel dizzy, or get very short of breath.  · Wear comfortable clothes and wear shoes with good support.  · Drink plenty of water while you exercise to prevent dehydration or heat stroke.  · Work out until your breathing and your heartbeat get faster.  Where to find more information  · [place] Department of Health and Human Services: www.hhs.gov  · Centers for Disease Control and Prevention (CDC): www.cdc.gov  Summary  · Exercising regularly is important. It will improve your overall fitness, flexibility, and endurance.  · Regular exercise also will improve your overall health. It can help you control your weight, reduce stress, and improve your bone density.  · Do not exercise so much that you hurt yourself, feel dizzy, or get very short of breath.  · Before you start a new exercise program, talk with your health care provider.  This information is not intended to replace advice given to you by your health care provider. Make sure you discuss any questions you have with your health care provider.  Document Revised: 11/30/2018 Document Reviewed: 11/08/2018  Elsevier Patient Education © 2021 [employer]

## 2021-07-07 ENCOUNTER — PROCEDURE VISIT (OUTPATIENT)
Dept: UROLOGY | Facility: CLINIC | Age: 76
End: 2021-07-07

## 2021-07-07 DIAGNOSIS — R33.9 URINARY RETENTION: ICD-10-CM

## 2021-07-07 PROCEDURE — 51702 INSERT TEMP BLADDER CATH: CPT | Performed by: UROLOGY

## 2021-07-07 NOTE — PROGRESS NOTES
Patient came in for monthly stacy cath change, urine was clear and pt is having no problems or complications, pt is covered with oral antibiotics at home. Patient will return in 4 weeks. AR/CMA

## 2021-08-04 ENCOUNTER — PROCEDURE VISIT (OUTPATIENT)
Dept: UROLOGY | Facility: CLINIC | Age: 76
End: 2021-08-04

## 2021-08-04 VITALS — BODY MASS INDEX: 26.07 KG/M2 | HEIGHT: 68 IN | WEIGHT: 172 LBS

## 2021-08-04 DIAGNOSIS — R33.9 URINARY RETENTION: Primary | ICD-10-CM

## 2021-08-04 PROCEDURE — 99024 POSTOP FOLLOW-UP VISIT: CPT | Performed by: UROLOGY

## 2021-08-10 ENCOUNTER — APPOINTMENT (OUTPATIENT)
Dept: PREADMISSION TESTING | Facility: HOSPITAL | Age: 76
End: 2021-08-10

## 2021-08-20 ENCOUNTER — APPOINTMENT (OUTPATIENT)
Dept: LAB | Facility: HOSPITAL | Age: 76
End: 2021-08-20

## 2021-09-01 ENCOUNTER — OFFICE VISIT (OUTPATIENT)
Dept: UROLOGY | Facility: CLINIC | Age: 76
End: 2021-09-01

## 2021-09-01 DIAGNOSIS — R33.9 URINARY RETENTION: Primary | ICD-10-CM

## 2021-09-01 PROCEDURE — 51701 INSERT BLADDER CATHETER: CPT | Performed by: UROLOGY

## 2021-09-01 NOTE — PROGRESS NOTES
Patient came in for a month stacy cath change, pt tolerated the procedure well and sterile technique was performed. Patient has antibiotics at home to cover cath changes, pt will return in 4 weeks and as needed for care.

## 2021-09-07 ENCOUNTER — TELEPHONE (OUTPATIENT)
Dept: UROLOGY | Facility: CLINIC | Age: 76
End: 2021-09-07

## 2021-09-07 NOTE — TELEPHONE ENCOUNTER
Have him come see me in the office next week.  His surgery is not until the end of the month.  If he is having any severe pain or swelling he needs to come in to the office sooner.

## 2021-09-07 NOTE — TELEPHONE ENCOUNTER
Nestor Peters called stating he had a little accident with a shopping cart that hit him in the testicle area and pushed his testicle up inside of him, he wants to know if you can still do his prostatectomy surgery that is scheduled later this week. Pt has not been seen for this accident.  Please Advise.  Yong

## 2021-09-14 ENCOUNTER — APPOINTMENT (OUTPATIENT)
Dept: PREADMISSION TESTING | Facility: HOSPITAL | Age: 76
End: 2021-09-14

## 2021-09-14 ENCOUNTER — PRE-ADMISSION TESTING (OUTPATIENT)
Dept: PREADMISSION TESTING | Facility: HOSPITAL | Age: 76
End: 2021-09-14

## 2021-09-14 VITALS — WEIGHT: 171 LBS | BODY MASS INDEX: 25.33 KG/M2 | HEIGHT: 69 IN

## 2021-09-14 LAB
QT INTERVAL: 360 MS
QTC INTERVAL: 442 MS

## 2021-09-14 PROCEDURE — 85025 COMPLETE CBC W/AUTO DIFF WBC: CPT | Performed by: UROLOGY

## 2021-09-14 PROCEDURE — 93005 ELECTROCARDIOGRAM TRACING: CPT

## 2021-09-14 PROCEDURE — 80048 BASIC METABOLIC PNL TOTAL CA: CPT | Performed by: UROLOGY

## 2021-09-14 NOTE — NURSING NOTE
Patient presents for Pat visit for surgery of Super pubic Prostatectomy with Dr. Rey on 9/28/21. Patient denies any heart history or high blood pressure, just problem he is being seen for (enlarged prostate). Preliminary EKG reads Normal Sinus Rhythm, Possible Left atrial enlargement, Inferior infarct, age undetermined.  EKG and patient history called to Adrien Bonner CRNA who states patient okay for planned surgery.

## 2021-09-23 ENCOUNTER — OFFICE VISIT (OUTPATIENT)
Dept: UROLOGY | Facility: CLINIC | Age: 76
End: 2021-09-23

## 2021-09-23 VITALS
SYSTOLIC BLOOD PRESSURE: 106 MMHG | HEART RATE: 113 BPM | WEIGHT: 171 LBS | DIASTOLIC BLOOD PRESSURE: 78 MMHG | TEMPERATURE: 96.6 F | OXYGEN SATURATION: 94 % | HEIGHT: 69 IN | BODY MASS INDEX: 25.33 KG/M2

## 2021-09-23 DIAGNOSIS — K40.90 NON-RECURRENT UNILATERAL INGUINAL HERNIA WITHOUT OBSTRUCTION OR GANGRENE: Primary | ICD-10-CM

## 2021-09-23 PROCEDURE — 99214 OFFICE O/P EST MOD 30 MIN: CPT | Performed by: UROLOGY

## 2021-09-23 NOTE — PROGRESS NOTES
"Chief Complaint   Patient presents with   • Follow-up     Patient in office for Follow Up on Retention.        HPI  Ms. Nye is a 75 y.o. male with history below in assessment, who presents for follow up.     At this visit he complains of acute new right groin pain, and thinks he may have a hernia.    Past Medical History:   Diagnosis Date   • Acute maxillary sinusitis    • Arthritis    • Enlarged prostate    • Gout    • Renal disorder    • Rheumatoid arthritis (CMS/HCC)    • Vocal cord anomaly 2007    had problem with enlarge vocal cord after low back surgery, changed his voice since then       Past Surgical History:   Procedure Laterality Date   • BACK SURGERY     • BELPHAROPTOSIS REPAIR     • BRAIN SURGERY      electrical shock which resulted with fall and head injury   • OTHER SURGICAL HISTORY Right     Arm Excision-metal after injury         Current Outpatient Medications:   •  Alcohol Swabs (ALCOHOL WIPES) 70 % pads, 1 pad 3 (Three) Times a Day., Disp: 100 each, Rfl: 11  •  doxazosin (CARDURA) 4 MG tablet, Take 1 tablet by mouth 2 (Two) Times a Day., Disp: 180 tablet, Rfl: 2  •  ferrous sulfate 325 (65 FE) MG tablet, Take 325 mg by mouth Daily With Breakfast., Disp: , Rfl:   •  finasteride (PROSCAR) 5 MG tablet, Take 1 tablet by mouth Daily., Disp: 90 tablet, Rfl: 3  •  levothyroxine (SYNTHROID, LEVOTHROID) 88 MCG tablet, Take 1 tablet by mouth Daily., Disp: 90 tablet, Rfl: 2  •  nitrofurantoin, macrocrystal-monohydrate, (Macrobid) 100 MG capsule, Take 1 capsule by mouth Daily., Disp: 30 capsule, Rfl: 3     Physical Exam  Visit Vitals  /78   Pulse 113   Temp 96.6 °F (35.9 °C)   Ht 175.3 cm (69\")   Wt 77.6 kg (171 lb)   SpO2 94%   BMI 25.25 kg/m²     Physical exam demonstrates right inguinal hernia.    Labs  Brief Urine Lab Results  (Last result in the past 365 days)      Color   Clarity   Blood   Leuk Est   Nitrite   Protein   CREAT   Urine HCG        01/21/21 2140 Yellow Turbid Large (3+) Large (3+) " Positive 100 mg/dL (2+)               Lab Results   Component Value Date    GLUCOSE 90 09/14/2021    CALCIUM 9.0 09/14/2021     09/14/2021    K 4.2 09/14/2021    CO2 23.1 09/14/2021     09/14/2021    BUN 16 09/14/2021    CREATININE 1.87 (H) 09/14/2021    EGFRIFAFRI 43 (L) 09/25/2017    EGFRIFNONA 35 (L) 09/14/2021    BCR 8.6 09/14/2021    ANIONGAP 11.9 09/14/2021       Lab Results   Component Value Date    WBC 6.36 09/14/2021    HGB 13.9 09/14/2021    HCT 40.6 09/14/2021    MCV 94.9 09/14/2021     09/14/2021       Urine Culture    Urine Culture 12/6/20 12/6/20 1/21/21    1411 1411    Urine Culture 50,000 CFU/mL Klebsiella pneumoniae CRE (A) 50,000 CFU/mL Morganella morganii ssp sibonii (A) >100,000 CFU/mL Mixed Gram Negative Karrie (A)   (A) Abnormal value       Comments are available for some flowsheets but are not being displayed.              Lab Results   Component Value Date    PSA 5.0 (H) 07/25/2019    PSA 4.4 (H) 05/23/2018    PSA 5.0 (H) 06/19/2017       No results found for: TESTOSTERONE     Radiographic Studies  No Images in the past 120 days found..    I have reviewed above labs and imaging.     Assessment  75 y.o. male with longstanding urinary retention  indwelling Gillespie catheter, after a failed clean intermittent catheterization (I consider this a severe exacerbation of his chronic illness). He is interested to see if he still has any residual bladder function or detrusor activity and to see if he could possibly benefit from a bladder outlet procedure. He has experienced acute worsening of his chronic condition.  Cystoscopy demonstrated a very large median lobe and large lateral lobes, and transrectal ultrasound demonstrated that his prostate was 144 cc in size.  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    He recently had trauma with a shopping cart and presents today for evaluation prior to his upcoming major elective surgery.  He complains of new  onset right groin pain and has a new hernia on exam.  This is causing moderate to severe pain at times.    Plan  1.  I have discussed his case with Dr. Rosey Glover with general surgery, and she has recommended hernia repair prior to scheduled open simple prostatectomy in November.  The external mesh that was placed would not be near the prostatectomy incision.  The bladder would be open at the time of prostatectomy, so hernia repair would not be ideal in that setting, additionally patient is in pain.  I will refer him to her.

## 2021-09-24 ENCOUNTER — APPOINTMENT (OUTPATIENT)
Dept: LAB | Facility: HOSPITAL | Age: 76
End: 2021-09-24

## 2021-09-24 DIAGNOSIS — R33.9 URINARY RETENTION: ICD-10-CM

## 2021-09-24 RX ORDER — NITROFURANTOIN 25; 75 MG/1; MG/1
100 CAPSULE ORAL DAILY
Qty: 30 CAPSULE | Refills: 3 | Status: SHIPPED | OUTPATIENT
Start: 2021-09-24 | End: 2022-04-11 | Stop reason: SDUPTHER

## 2021-09-27 ENCOUNTER — TELEPHONE (OUTPATIENT)
Dept: INTERNAL MEDICINE | Facility: CLINIC | Age: 76
End: 2021-09-27

## 2021-09-27 NOTE — TELEPHONE ENCOUNTER
Caller: Nestor Nye    Relationship: Self    Best call back number: [phone]    What is the best time to reach you: ANYTIME     Who are you requesting to speak with (clinical staff, provider,  specific staff member): DOCTOR NAZANIN    What was the call regarding: PATIENT STATES THAT HE HAD A VERY LITTLE BOWEL MOVEMENT THIS MORNING AND HE HAS HERNIA. HE WOULD LIKE SOME ADVICE ON WHAT HE SHOULD DO TO KEEP FROM HAVING ANY  DAMAGE TO HIS BOWELS    Do you require a callback: YES

## 2021-10-04 NOTE — PROGRESS NOTES
Subjective   Nestor Nye is a 75 y.o. male.   Chief Complaint   Patient presents with   • Hernia     right inguinal      History of Present Illness   Mr. Nye is a 75-year-old gentleman referred for evaluation of a right inguinal hernia.  He has a history of longstanding urinary retention, and currently has an indwelling Gillespie catheter after failing trial of CIC at home.  He is under the care of urology for this problem, and is scheduled to undergo prostatectomy in the future.  He recently was seen by his urologist in preparation for this procedure and noted that he had sustained trauma to the groin area after being hit with a shopping cart after which time he developed acute right-sided inguinal pain, and was found to have hernia on exam.  The patient reports that this occurred about 3 weeks ago.  He has never had a prior hernia repair.    The following portions of the patient's history were reviewed and updated as appropriate: allergies, current medications, past family history, past medical history, past social history, past surgical history and problem list.    Patient Active Problem List   Diagnosis   • BPH (benign prostatic hyperplasia)   • Hypothyroidism   • Mixed hyperlipidemia   • Elevated PSA   • Benign essential hypertension   • Urinary retention       Past Medical History:   Diagnosis Date   • Acute maxillary sinusitis    • Arthritis    • Enlarged prostate    • Gout    • Renal disorder    • Rheumatoid arthritis (HCC)    • Vocal cord anomaly 2007    had problem with enlarge vocal cord after low back surgery, changed his voice since then       Past Surgical History:   Procedure Laterality Date   • BELPHAROPTOSIS REPAIR Bilateral    • CRANIOTOMY FOR SUBDURAL HEMATOMA      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     • LUMBAR SPINE SURGERY     • SKIN GRAFT Right     RUE skin  grafting after electrocution   • THORACIC SPINE SURGERY         Medications:     Current Outpatient Medications:   •  Alcohol Swabs (ALCOHOL WIPES) 70 % pads, 1 pad 3 (Three) Times a Day., Disp: 100 each, Rfl: 11  •  doxazosin (CARDURA) 4 MG tablet, Take 1 tablet by mouth 2 (Two) Times a Day., Disp: 180 tablet, Rfl: 2  •  ferrous sulfate 325 (65 FE) MG tablet, Take 325 mg by mouth Daily With Breakfast., Disp: , Rfl:   •  finasteride (PROSCAR) 5 MG tablet, Take 1 tablet by mouth Daily., Disp: 90 tablet, Rfl: 3  •  levothyroxine (SYNTHROID, LEVOTHROID) 88 MCG tablet, Take 1 tablet by mouth Daily., Disp: 90 tablet, Rfl: 2  •  nitrofurantoin, macrocrystal-monohydrate, (Macrobid) 100 MG capsule, Take 1 capsule by mouth Daily., Disp: 30 capsule, Rfl: 3    Allergies:   No Known Allergies      Family History   Problem Relation Age of Onset   • Arthritis Mother    • Prostate cancer Brother    • Arthritis Father        Social History     Socioeconomic History   • Marital status:    Tobacco Use   • Smoking status: Heavy Tobacco Smoker     Packs/day: 0.50     Years: 30.00     Pack years: 15.00     Types: Cigarettes, Electronic Cigarette   • Smokeless tobacco: Never Used   • Tobacco comment: Quit 2013   Vaping Use   • Vaping Use: Some days   • Substances: Nicotine   • Devices: Pre-filled or refillable cartridge   Substance and Sexual Activity   • Drug use: No   • Sexual activity: Defer       Review of Systems   Constitutional: Negative for chills, fever and unexpected weight change.   HENT: Negative for trouble swallowing and voice change.    Eyes: Negative for visual disturbance.   Respiratory: Negative for apnea, cough, chest tightness, shortness of breath and wheezing.    Cardiovascular: Negative for chest pain, palpitations and leg swelling.   Gastrointestinal: Negative for abdominal distention, abdominal pain, anal bleeding, blood in stool, constipation, diarrhea, nausea, rectal pain and vomiting.   Endocrine:  "Negative for cold intolerance and heat intolerance.   Genitourinary: Negative for difficulty urinating, dysuria, flank pain, scrotal swelling and testicular pain.   Musculoskeletal: Negative for back pain, gait problem and joint swelling.   Skin: Negative for color change, rash and wound.   Neurological: Negative for dizziness, syncope, speech difficulty, weakness, numbness and headaches.   Hematological: Negative for adenopathy. Does not bruise/bleed easily.   Psychiatric/Behavioral: Negative for confusion. The patient is not nervous/anxious.        Objective    /68   Pulse 93   Temp 98 °F (36.7 °C) (Temporal)   Resp 18   Ht 175.3 cm (69\")   Wt 75.7 kg (166 lb 12.8 oz)   SpO2 98%   BMI 24.63 kg/m²     Physical Exam  Constitutional:       Appearance: He is well-developed.   HENT:      Head: Normocephalic and atraumatic.   Eyes:      General: No scleral icterus.     Pupils: Pupils are equal, round, and reactive to light.   Cardiovascular:      Rate and Rhythm: Regular rhythm.   Pulmonary:      Effort: Pulmonary effort is normal.   Abdominal:      General: There is no distension.      Palpations: Abdomen is soft.      Tenderness: There is no abdominal tenderness.      Hernia: A hernia is present. Hernia is present in the right inguinal area.      Comments: Moderate right inguinal hernia, reducible.   Skin:     General: Skin is warm and dry.   Neurological:      Mental Status: He is alert and oriented to person, place, and time.   Psychiatric:         Behavior: Behavior normal.         Assessment/Plan   Diagnoses and all orders for this visit:    1. Non-recurrent unilateral inguinal hernia without obstruction or gangrene (Primary)          Mr. Nye 75-year-old gentleman with a moderate sized right inguinal hernia, which is reducible on exam in the office today.  He is having symptoms related to this, specifically pain, and we discussed surgical repair.  We discussed the risk, benefits, and alternatives " to the surgical procedure including recurrent hernias, bleeding, infection, chronic groin or testicular pain, and the use of mesh prosthesis, and complications related to this such as chronic infection, fistula formation, the need for explantation, etc.  We also discussed the risk related to anesthesia.  He understands this and is willing to proceed.  My office will make arrangements for scheduling, and preadmission testing.

## 2021-10-05 ENCOUNTER — TELEPHONE (OUTPATIENT)
Dept: SURGERY | Facility: CLINIC | Age: 76
End: 2021-10-05

## 2021-10-05 ENCOUNTER — OFFICE VISIT (OUTPATIENT)
Dept: SURGERY | Facility: CLINIC | Age: 76
End: 2021-10-05

## 2021-10-05 VITALS
BODY MASS INDEX: 24.71 KG/M2 | TEMPERATURE: 98 F | HEART RATE: 93 BPM | DIASTOLIC BLOOD PRESSURE: 68 MMHG | OXYGEN SATURATION: 98 % | RESPIRATION RATE: 18 BRPM | HEIGHT: 69 IN | WEIGHT: 166.8 LBS | SYSTOLIC BLOOD PRESSURE: 120 MMHG

## 2021-10-05 DIAGNOSIS — K40.90 NON-RECURRENT UNILATERAL INGUINAL HERNIA WITHOUT OBSTRUCTION OR GANGRENE: Primary | ICD-10-CM

## 2021-10-05 PROCEDURE — 99203 OFFICE O/P NEW LOW 30 MIN: CPT | Performed by: SURGERY

## 2021-10-20 ENCOUNTER — APPOINTMENT (OUTPATIENT)
Dept: PREADMISSION TESTING | Facility: HOSPITAL | Age: 76
End: 2021-10-20

## 2021-10-22 DIAGNOSIS — Z01.818 PREOP TESTING: Primary | ICD-10-CM

## 2021-10-27 ENCOUNTER — PROCEDURE VISIT (OUTPATIENT)
Dept: UROLOGY | Facility: CLINIC | Age: 76
End: 2021-10-27

## 2021-10-27 DIAGNOSIS — R33.9 URINARY RETENTION: Primary | ICD-10-CM

## 2021-10-27 PROCEDURE — 51701 INSERT BLADDER CATHETER: CPT | Performed by: UROLOGY

## 2021-10-27 NOTE — PROGRESS NOTES
Patient here for catheter change. This was done without complications. 16F coude tip catheter was placed.

## 2021-10-28 ENCOUNTER — PREP FOR SURGERY (OUTPATIENT)
Dept: OTHER | Facility: HOSPITAL | Age: 76
End: 2021-10-28

## 2021-10-28 ENCOUNTER — PRE-ADMISSION TESTING (OUTPATIENT)
Dept: PREADMISSION TESTING | Facility: HOSPITAL | Age: 76
End: 2021-10-28

## 2021-10-28 VITALS — HEIGHT: 69 IN | WEIGHT: 166.8 LBS | BODY MASS INDEX: 24.71 KG/M2

## 2021-10-28 DIAGNOSIS — Z01.818 PRE-OP TESTING: Primary | ICD-10-CM

## 2021-10-28 DIAGNOSIS — K40.90 RIGHT INGUINAL HERNIA: Primary | ICD-10-CM

## 2021-10-28 LAB
DEPRECATED RDW RBC AUTO: 44 FL (ref 37–54)
ERYTHROCYTE [DISTWIDTH] IN BLOOD BY AUTOMATED COUNT: 12.6 % (ref 12.3–15.4)
HCT VFR BLD AUTO: 41.2 % (ref 37.5–51)
HGB BLD-MCNC: 14.1 G/DL (ref 13–17.7)
MCH RBC QN AUTO: 32.6 PG (ref 26.6–33)
MCHC RBC AUTO-ENTMCNC: 34.2 G/DL (ref 31.5–35.7)
MCV RBC AUTO: 95.2 FL (ref 79–97)
PLATELET # BLD AUTO: 263 10*3/MM3 (ref 140–450)
PMV BLD AUTO: 10.5 FL (ref 6–12)
RBC # BLD AUTO: 4.33 10*6/MM3 (ref 4.14–5.8)
WBC # BLD AUTO: 7.56 10*3/MM3 (ref 3.4–10.8)

## 2021-10-28 PROCEDURE — 36415 COLL VENOUS BLD VENIPUNCTURE: CPT

## 2021-10-28 PROCEDURE — C9803 HOPD COVID-19 SPEC COLLECT: HCPCS

## 2021-10-28 PROCEDURE — 85027 COMPLETE CBC AUTOMATED: CPT

## 2021-10-28 PROCEDURE — U0004 COV-19 TEST NON-CDC HGH THRU: HCPCS

## 2021-10-28 RX ORDER — SODIUM CHLORIDE 0.9 % (FLUSH) 0.9 %
10 SYRINGE (ML) INJECTION EVERY 12 HOURS SCHEDULED
Status: CANCELLED | OUTPATIENT
Start: 2021-10-28

## 2021-10-28 RX ORDER — HEPARIN SODIUM 5000 [USP'U]/ML
5000 INJECTION, SOLUTION INTRAVENOUS; SUBCUTANEOUS ONCE
Status: CANCELLED | OUTPATIENT
Start: 2021-10-28 | End: 2021-10-28

## 2021-10-28 RX ORDER — CEFAZOLIN SODIUM 2 G/50ML
2 SOLUTION INTRAVENOUS ONCE
Status: CANCELLED | OUTPATIENT
Start: 2021-10-28 | End: 2021-10-28

## 2021-10-28 RX ORDER — SODIUM CHLORIDE, SODIUM LACTATE, POTASSIUM CHLORIDE, CALCIUM CHLORIDE 600; 310; 30; 20 MG/100ML; MG/100ML; MG/100ML; MG/100ML
50 INJECTION, SOLUTION INTRAVENOUS CONTINUOUS
Status: CANCELLED | OUTPATIENT
Start: 2021-10-28

## 2021-10-28 RX ORDER — SODIUM CHLORIDE 0.9 % (FLUSH) 0.9 %
10 SYRINGE (ML) INJECTION AS NEEDED
Status: CANCELLED | OUTPATIENT
Start: 2021-10-28

## 2021-10-29 LAB — SARS-COV-2 RNA PNL SPEC NAA+PROBE: NOT DETECTED

## 2021-11-01 ENCOUNTER — APPOINTMENT (OUTPATIENT)
Dept: LAB | Facility: HOSPITAL | Age: 76
End: 2021-11-01

## 2021-11-01 ENCOUNTER — HOSPITAL ENCOUNTER (OUTPATIENT)
Facility: HOSPITAL | Age: 76
Setting detail: HOSPITAL OUTPATIENT SURGERY
Discharge: HOME | End: 2021-11-01
Attending: SURGERY | Admitting: SURGERY

## 2021-11-01 ENCOUNTER — ANESTHESIA EVENT (OUTPATIENT)
Dept: PERIOP | Facility: HOSPITAL | Age: 76
End: 2021-11-01

## 2021-11-01 ENCOUNTER — ANESTHESIA (OUTPATIENT)
Dept: PERIOP | Facility: HOSPITAL | Age: 76
End: 2021-11-01

## 2021-11-01 VITALS
SYSTOLIC BLOOD PRESSURE: 131 MMHG | TEMPERATURE: 97.9 F | HEART RATE: 84 BPM | RESPIRATION RATE: 18 BRPM | OXYGEN SATURATION: 94 % | DIASTOLIC BLOOD PRESSURE: 75 MMHG

## 2021-11-01 DIAGNOSIS — K40.90 RIGHT INGUINAL HERNIA: ICD-10-CM

## 2021-11-01 PROCEDURE — 94799 UNLISTED PULMONARY SVC/PX: CPT

## 2021-11-01 PROCEDURE — 88302 TISSUE EXAM BY PATHOLOGIST: CPT | Performed by: SURGERY

## 2021-11-01 PROCEDURE — 25010000002 HYDROMORPHONE 1 MG/ML SOLUTION: Performed by: NURSE ANESTHETIST, CERTIFIED REGISTERED

## 2021-11-01 PROCEDURE — 0 CEFAZOLIN SODIUM-DEXTROSE 2-3 GM-%(50ML) RECONSTITUTED SOLUTION: Performed by: SURGERY

## 2021-11-01 PROCEDURE — 25010000002 HEPARIN (PORCINE) PER 1000 UNITS: Performed by: SURGERY

## 2021-11-01 PROCEDURE — 25010000002 DEXAMETHASONE PER 1 MG: Performed by: NURSE ANESTHETIST, CERTIFIED REGISTERED

## 2021-11-01 PROCEDURE — C1781 MESH (IMPLANTABLE): HCPCS | Performed by: SURGERY

## 2021-11-01 PROCEDURE — 49505 PRP I/HERN INIT REDUC >5 YR: CPT | Performed by: SURGERY

## 2021-11-01 PROCEDURE — 25010000002 PROPOFOL 200 MG/20ML EMULSION: Performed by: NURSE ANESTHETIST, CERTIFIED REGISTERED

## 2021-11-01 PROCEDURE — 0 LIDOCAINE 1 % SOLUTION: Performed by: SURGERY

## 2021-11-01 PROCEDURE — 25010000002 FENTANYL CITRATE (PF) 100 MCG/2ML SOLUTION: Performed by: NURSE ANESTHETIST, CERTIFIED REGISTERED

## 2021-11-01 PROCEDURE — 25010000002 ONDANSETRON PER 1 MG: Performed by: NURSE ANESTHETIST, CERTIFIED REGISTERED

## 2021-11-01 DEVICE — MESH PROLN 3X6: Type: IMPLANTABLE DEVICE | Site: INGUINAL | Status: FUNCTIONAL

## 2021-11-01 RX ORDER — ONDANSETRON 2 MG/ML
4 INJECTION INTRAMUSCULAR; INTRAVENOUS ONCE AS NEEDED
Status: DISCONTINUED | OUTPATIENT
Start: 2021-11-01 | End: 2021-11-01 | Stop reason: HOSPADM

## 2021-11-01 RX ORDER — HEPARIN SODIUM 5000 [USP'U]/ML
5000 INJECTION, SOLUTION INTRAVENOUS; SUBCUTANEOUS ONCE
Status: COMPLETED | OUTPATIENT
Start: 2021-11-01 | End: 2021-11-01

## 2021-11-01 RX ORDER — LIDOCAINE HYDROCHLORIDE 10 MG/ML
INJECTION, SOLUTION INFILTRATION; PERINEURAL AS NEEDED
Status: DISCONTINUED | OUTPATIENT
Start: 2021-11-01 | End: 2021-11-01 | Stop reason: HOSPADM

## 2021-11-01 RX ORDER — IPRATROPIUM BROMIDE AND ALBUTEROL SULFATE 2.5; .5 MG/3ML; MG/3ML
3 SOLUTION RESPIRATORY (INHALATION) ONCE AS NEEDED
Status: DISCONTINUED | OUTPATIENT
Start: 2021-11-01 | End: 2021-11-01 | Stop reason: HOSPADM

## 2021-11-01 RX ORDER — DEXAMETHASONE SODIUM PHOSPHATE 4 MG/ML
INJECTION, SOLUTION INTRA-ARTICULAR; INTRALESIONAL; INTRAMUSCULAR; INTRAVENOUS; SOFT TISSUE AS NEEDED
Status: DISCONTINUED | OUTPATIENT
Start: 2021-11-01 | End: 2021-11-01 | Stop reason: SURG

## 2021-11-01 RX ORDER — SODIUM CHLORIDE 0.9 % (FLUSH) 0.9 %
10 SYRINGE (ML) INJECTION AS NEEDED
Status: DISCONTINUED | OUTPATIENT
Start: 2021-11-01 | End: 2021-11-01 | Stop reason: HOSPADM

## 2021-11-01 RX ORDER — HYDROCODONE BITARTRATE AND ACETAMINOPHEN 7.5; 325 MG/1; MG/1
1 TABLET ORAL ONCE AS NEEDED
Status: DISCONTINUED | OUTPATIENT
Start: 2021-11-01 | End: 2021-11-01 | Stop reason: HOSPADM

## 2021-11-01 RX ORDER — BUPIVACAINE HYDROCHLORIDE 2.5 MG/ML
INJECTION, SOLUTION EPIDURAL; INFILTRATION; INTRACAUDAL AS NEEDED
Status: DISCONTINUED | OUTPATIENT
Start: 2021-11-01 | End: 2021-11-01 | Stop reason: HOSPADM

## 2021-11-01 RX ORDER — LIDOCAINE HYDROCHLORIDE 20 MG/ML
INJECTION, SOLUTION INTRAVENOUS AS NEEDED
Status: DISCONTINUED | OUTPATIENT
Start: 2021-11-01 | End: 2021-11-01 | Stop reason: SURG

## 2021-11-01 RX ORDER — CEFAZOLIN SODIUM 2 G/50ML
2 SOLUTION INTRAVENOUS ONCE
Status: COMPLETED | OUTPATIENT
Start: 2021-11-01 | End: 2021-11-01

## 2021-11-01 RX ORDER — ONDANSETRON 2 MG/ML
INJECTION INTRAMUSCULAR; INTRAVENOUS AS NEEDED
Status: DISCONTINUED | OUTPATIENT
Start: 2021-11-01 | End: 2021-11-01 | Stop reason: SURG

## 2021-11-01 RX ORDER — FENTANYL CITRATE 50 UG/ML
INJECTION, SOLUTION INTRAMUSCULAR; INTRAVENOUS AS NEEDED
Status: DISCONTINUED | OUTPATIENT
Start: 2021-11-01 | End: 2021-11-01 | Stop reason: SURG

## 2021-11-01 RX ORDER — SODIUM CHLORIDE 0.9 % (FLUSH) 0.9 %
10 SYRINGE (ML) INJECTION EVERY 12 HOURS SCHEDULED
Status: DISCONTINUED | OUTPATIENT
Start: 2021-11-01 | End: 2021-11-01 | Stop reason: HOSPADM

## 2021-11-01 RX ORDER — BUPIVACAINE HCL/0.9 % NACL/PF 0.125 %
PLASTIC BAG, INJECTION (ML) EPIDURAL AS NEEDED
Status: DISCONTINUED | OUTPATIENT
Start: 2021-11-01 | End: 2021-11-01 | Stop reason: SURG

## 2021-11-01 RX ORDER — HYDROCODONE BITARTRATE AND ACETAMINOPHEN 7.5; 325 MG/1; MG/1
1 TABLET ORAL EVERY 4 HOURS PRN
Qty: 15 TABLET | Refills: 0 | Status: SHIPPED | OUTPATIENT
Start: 2021-11-01 | End: 2022-03-28

## 2021-11-01 RX ORDER — SODIUM CHLORIDE, SODIUM LACTATE, POTASSIUM CHLORIDE, CALCIUM CHLORIDE 600; 310; 30; 20 MG/100ML; MG/100ML; MG/100ML; MG/100ML
50 INJECTION, SOLUTION INTRAVENOUS CONTINUOUS
Status: DISCONTINUED | OUTPATIENT
Start: 2021-11-01 | End: 2021-11-01 | Stop reason: HOSPADM

## 2021-11-01 RX ORDER — PROPOFOL 10 MG/ML
INJECTION, EMULSION INTRAVENOUS AS NEEDED
Status: DISCONTINUED | OUTPATIENT
Start: 2021-11-01 | End: 2021-11-01 | Stop reason: SURG

## 2021-11-01 RX ORDER — MAGNESIUM HYDROXIDE 1200 MG/15ML
LIQUID ORAL AS NEEDED
Status: DISCONTINUED | OUTPATIENT
Start: 2021-11-01 | End: 2021-11-01 | Stop reason: HOSPADM

## 2021-11-01 RX ORDER — EPHEDRINE SULFATE 5 MG/ML
INJECTION INTRAVENOUS AS NEEDED
Status: DISCONTINUED | OUTPATIENT
Start: 2021-11-01 | End: 2021-11-01 | Stop reason: SURG

## 2021-11-01 RX ADMIN — Medication 100 MCG: at 13:29

## 2021-11-01 RX ADMIN — FENTANYL CITRATE 50 MCG: 50 INJECTION INTRAMUSCULAR; INTRAVENOUS at 13:59

## 2021-11-01 RX ADMIN — DEXAMETHASONE SODIUM PHOSPHATE 4 MG: 4 INJECTION, SOLUTION INTRAMUSCULAR; INTRAVENOUS at 14:42

## 2021-11-01 RX ADMIN — CEFAZOLIN SODIUM 2 G: 2 SOLUTION INTRAVENOUS at 13:28

## 2021-11-01 RX ADMIN — HYDROMORPHONE HYDROCHLORIDE 0.25 MG: 1 INJECTION, SOLUTION INTRAMUSCULAR; INTRAVENOUS; SUBCUTANEOUS at 15:23

## 2021-11-01 RX ADMIN — FENTANYL CITRATE 50 MCG: 50 INJECTION INTRAMUSCULAR; INTRAVENOUS at 13:43

## 2021-11-01 RX ADMIN — ONDANSETRON 4 MG: 2 INJECTION INTRAMUSCULAR; INTRAVENOUS at 14:42

## 2021-11-01 RX ADMIN — EPHEDRINE SULFATE 10 MG: 5 INJECTION INTRAVENOUS at 14:16

## 2021-11-01 RX ADMIN — EPHEDRINE SULFATE 10 MG: 5 INJECTION INTRAVENOUS at 13:31

## 2021-11-01 RX ADMIN — LIDOCAINE HYDROCHLORIDE 60 MG: 20 INJECTION, SOLUTION INTRAVENOUS at 13:23

## 2021-11-01 RX ADMIN — SODIUM CHLORIDE, POTASSIUM CHLORIDE, SODIUM LACTATE AND CALCIUM CHLORIDE 50 ML/HR: 600; 310; 30; 20 INJECTION, SOLUTION INTRAVENOUS at 11:40

## 2021-11-01 RX ADMIN — HEPARIN SODIUM 5000 UNITS: 5000 INJECTION INTRAVENOUS; SUBCUTANEOUS at 13:12

## 2021-11-01 RX ADMIN — PROPOFOL 150 MG: 10 INJECTION, EMULSION INTRAVENOUS at 13:23

## 2021-11-01 NOTE — DISCHARGE INSTRUCTIONS
PATIENT INSTRUCTIONS  HERNIA    FOLLOW-UP: Please make an appointment with your physician in 1 week. Call your physician immediately if you have any fevers greater than 102.5 degrees Fahrenheit, drainage from your wound that is not clear or looks infected, persistent bleeding, increasing abdominal pain, problems urinating, or persistent nausea/vomiting.     WOUND CARE INSTRUCTIONS: Keep a dry clean dressing on the wound if there is drainage. The initial bandage may be removed after 24 hours. Once the wound has quit draining, you may leave it open to air. If clothing rubs against the wound or causes irritation and the wound is not draining, you may cover it with a dry dressing during the daytime. Try to keep the wound dry, and avoid ointments on the wound unless directed to do so. If the wound becomes bright red and painful or starts to drain infected material that is not clear, please contact your physician immediately. If the wound is mildly pink and has a thick firm ridge underneath it, this is normal and is referred to as a healing ridge. This will resolve over the next 4-6 weeks.    DIET: You may eat any foods that you can tolerate. It is a good idea to eat a high fiber diet, and take in plenty of fluids to prevent constipation. If you do become constipated, you may want to take a mild laxative or take Dulcolax tablets on a daily basis until your bowel habits are regular. After recent abdominal surgery, constipation can be very uncomfortable and straining.    ACTIVITY: You are encouraged to cough and take deep breaths, or if you were given one, use your incentive spirometer every 15-30 minutes when awake. This will help prevent respiratory complications and low grade fevers post-operatively. You may want to hug a pillow when coughing and sneezing to add additional support to the surgical area which will decrease pain during these times. You are encouraged to walk and engage in light activity for the next two  weeks. You should not lift more than 20 pounds during this time frame as it could put you at increased risk for a hernia recurrence. Twenty pounds is roughly equivalent to a plastic bag of groceries.     MEDICATIONS: Try to take narcotic medications and anti-inflammatory medications, such as Tylenol, ibuprofen, Naprosyn, etc., with food. This will minimize stomach upset from the medication. Should you develop nausea and vomiting from the pain medication, or develop a rash, please discontinue the medication and contact your physician. You should not drive, make important decisions, or operate machinery when taking narcotic pain medication.    QUESTIONS: Please feel free to call your physician or the hospital  if you have any questions, and they will be glad to assist you.      ===================================================================================================  Please follow all post op instructions and follow up appointment time from your physician's office included in your discharge packet.    Use your ice pack as instructed, do not use continuously.    Follow your physicians instructions as previously directed.    REST TODAY    No pushing, pulling, tugging,  heavy lifting, or strenuous activity.  No major decision making, driving, or drinking alcoholic beverages for 24 hours. ( due to the medications you have  received)  Always use good hand hygiene/washing techniques.  NO driving while taking pain medications.    * if you have an incision:  Check your incision area every day for signs of infection.   Check for:  * more redness, swelling, or pain  *more fluid or blood  *warmth  *pus or bad smell.          May splint abdomen when moving, coughing, sneezing, laughing, or increasing activity as comfort measure.      Apply ice to incision site as directed, remove, and reapply.  Do not use ice continuously.

## 2021-11-01 NOTE — ANESTHESIA POSTPROCEDURE EVALUATION
Patient: Nestor Nye    Procedure Summary     Date: 11/01/21 Room / Location: [facility] OR 4 / [facility] OR    Anesthesia Start: 1317 Anesthesia Stop:     Procedure: INGUINAL HERNIA REPAIR WITH MESH AND EXCISION OF SPERMATIC CORD LIPOMA (Right Abdomen) Diagnosis:       Right inguinal hernia      (Right inguinal hernia [K40.90])    Surgeons: Rosey Glover MD Provider: Peggy Wells CRNA    Anesthesia Type: general ASA Status: 3          Anesthesia Type: general    Vitals  HR 76  Sat 100  Rersp 12  /72  Temp 97.1      Post Anesthesia Care and Evaluation    Patient location during evaluation: bedside  Patient participation: complete - patient participated  Level of consciousness: awake and alert  Pain score: 0  Pain management: adequate  Airway patency: patent  Anesthetic complications: No anesthetic complications  PONV Status: none  Cardiovascular status: acceptable  Respiratory status: acceptable and face mask  Hydration status: acceptable

## 2021-11-01 NOTE — ANESTHESIA PREPROCEDURE EVALUATION
Anesthesia Evaluation     Patient summary reviewed and Nursing notes reviewed   no history of anesthetic complications:  NPO Solid Status: > 8 hours  NPO Liquid Status: > 8 hours           Airway   Mallampati: II  TM distance: >3 FB  Neck ROM: full  no difficulty expected  Dental - normal exam     Pulmonary - normal exam   (+) a smoker Current Abstained day of surgery,   Cardiovascular - normal exam    ECG reviewed  Rhythm: regular  Rate: normal    (+) hypertension, hyperlipidemia,       Neuro/Psych- negative ROS  GI/Hepatic/Renal/Endo    (+)   renal disease CRI,     Musculoskeletal     Abdominal    Substance History - negative use     OB/GYN negative ob/gyn ROS         Other   arthritis,      ROS/Med Hx Other: Denies any cardiac problems.  Has stacy catheter change every month due to enlarged prostate.    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                  Anesthesia Plan    ASA 3     general   (Risks and benefits discussed including risk of aspiration, recall and dental damage. All patient questions answered.    Patient told that either a breathing mask or a breathing tube will be used to manage the airway.    Will continue with plan of care.)  intravenous induction     Anesthetic plan, all risks, benefits, and alternatives have been provided, discussed and informed consent has been obtained with: patient.

## 2021-11-03 NOTE — PROGRESS NOTES
"Subjective   Nestor Charles Nye is a 75 y.o. male.   Chief Complaint   Patient presents with   • Post-op Follow-up     Right inguinal hernia 1 week     History of Present Illness   Mr. Nye is a 76-year-old gentleman who comes the office today for routine postoperative follow-up after undergoing a right inguinal hernia repair with mesh with excision of a spermatic cord lipoma.  This was performed on 11/1/2021.  Pathology demonstrated a benign hernia sac with adjacent benign fibroadipose tissue.    The patient has no particular complaints today, and reports that he is doing well.  He does have some residual soreness in the groin, as expected.  He denies fevers or chills.  He denies drainage from the wound.  He denies difficulty with tolerating a diet or moving his bowels.    The following portions of the patient's history were reviewed and updated as appropriate: allergies, current medications, past family history, past medical history, past social history, past surgical history and problem list.      Objective    /64   Pulse 98   Temp 97.3 °F (36.3 °C) (Temporal)   Resp 18   Ht 175.3 cm (69\")   Wt 75.7 kg (166 lb 14.2 oz)   SpO2 99%   BMI 24.65 kg/m²     Physical Exam  Constitutional:       Appearance: He is well-developed.   HENT:      Head: Normocephalic and atraumatic.   Cardiovascular:      Rate and Rhythm: Regular rhythm.   Pulmonary:      Effort: Pulmonary effort is normal.   Abdominal:      General: There is no distension.      Palpations: Abdomen is soft.      Tenderness: There is no abdominal tenderness.      Comments: Right groin incision with palpable healing ridge.  Steri-Strips in place.  No periwound cellulitis or erythema.  No significant swelling of the genitalia.  No ecchymoses in this region.   Skin:     General: Skin is warm and dry.   Neurological:      Mental Status: He is alert and oriented to person, place, and time.   Psychiatric:         Behavior: Behavior normal. "         Assessment/Plan   Diagnoses and all orders for this visit:    1. S/P inguinal hernia repair using synthetic patch (Primary)    I had the pleasure of seeing Nestor Nye in follow-up today for the first  postoperative visit following right inguinal hernia repair with mesh and excision of the spermatic cord lipoma.  Overall, Nestor Nye  is enjoying uncomplicated recovery.  We discussed the patient's ongoing activity restrictions, and management of his surgical site.  I will plan to see him back in my office in 2 weeks for what I anticipate will be his final follow-up visit.  He knows to call with any problems in the interim.

## 2021-11-06 LAB
LAB AP CASE REPORT: NORMAL
PATH REPORT.FINAL DX SPEC: NORMAL

## 2021-11-09 ENCOUNTER — OFFICE VISIT (OUTPATIENT)
Dept: SURGERY | Facility: CLINIC | Age: 76
End: 2021-11-09

## 2021-11-09 VITALS
WEIGHT: 166.89 LBS | HEART RATE: 98 BPM | DIASTOLIC BLOOD PRESSURE: 64 MMHG | SYSTOLIC BLOOD PRESSURE: 128 MMHG | RESPIRATION RATE: 18 BRPM | BODY MASS INDEX: 24.72 KG/M2 | TEMPERATURE: 97.3 F | OXYGEN SATURATION: 99 % | HEIGHT: 69 IN

## 2021-11-09 DIAGNOSIS — Z87.19 S/P INGUINAL HERNIA REPAIR USING SYNTHETIC PATCH: Primary | ICD-10-CM

## 2021-11-09 DIAGNOSIS — Z98.890 S/P INGUINAL HERNIA REPAIR USING SYNTHETIC PATCH: Primary | ICD-10-CM

## 2021-11-09 PROCEDURE — 99024 POSTOP FOLLOW-UP VISIT: CPT | Performed by: SURGERY

## 2021-11-23 ENCOUNTER — OFFICE VISIT (OUTPATIENT)
Dept: SURGERY | Facility: CLINIC | Age: 76
End: 2021-11-23

## 2021-11-23 VITALS
SYSTOLIC BLOOD PRESSURE: 118 MMHG | HEART RATE: 87 BPM | RESPIRATION RATE: 18 BRPM | WEIGHT: 166.89 LBS | DIASTOLIC BLOOD PRESSURE: 68 MMHG | OXYGEN SATURATION: 98 % | BODY MASS INDEX: 24.72 KG/M2 | HEIGHT: 69 IN | TEMPERATURE: 97.3 F

## 2021-11-23 DIAGNOSIS — Z98.890 S/P INGUINAL HERNIA REPAIR USING SYNTHETIC PATCH: Primary | ICD-10-CM

## 2021-11-23 DIAGNOSIS — Z87.19 S/P INGUINAL HERNIA REPAIR USING SYNTHETIC PATCH: Primary | ICD-10-CM

## 2021-11-23 PROBLEM — K40.90 RIGHT INGUINAL HERNIA: Status: RESOLVED | Noted: 2021-10-28 | Resolved: 2021-11-23

## 2021-11-23 PROCEDURE — 99024 POSTOP FOLLOW-UP VISIT: CPT | Performed by: SURGERY

## 2021-11-24 NOTE — OP NOTE
PROCEDURE DATE: 11/1/2021    SURGEON: Rosey Glover MD, FACS    PREOPERATIVE DIAGNOSIS: Right inguinal hernia    POSTOPERATIVE DIAGNOSIS: Same and spermatic cord lipoma    PROCEDURE: Right inguinal hernia repair with mesh, excision of spermatic cord lipoma    ANESTHESIA: General    EBL: minimal    SPECIMENS: [08]   A Hernia, Sac Tissue · TISSUE PATHOLOGY EXAM   Celena Sung RN 11/1/21 7934 No    Description: Hernia sac and lipoma of spermatic cord    This specimen was not marked as sent.         INDICATIONS FOR THE PROCEDURE:  Mr. Nye is a 76-year-old gentleman recently sent to me for evaluation of a bulge in the right groin.  He was found on exam to have a symptomatic, but reducible right inguinal hernia.  It was noted at that time that he was using a chronic indwelling Gillespie catheter for management of urinary retention.  He was offered repair of his symptomatic inguinal hernia, and counseled regarding the risk, benefits, and alternatives to the proposed surgical procedure.  He elected to proceed with repair, and is now being brought to the operating room for the scheduled surgical procedure having provided informed consent.    DESCRIPTION OF THE PROCEDURE:  The patient was seen and examined in the preoperative holding area on the day of surgery and there are no changes to the medical history.  The patient's right side was marked preoperatively in the holding area and the patient was then taken to the operating room where he was placed supine on the operating table.  A timeout was performed using the WHO checklist.    Following the satisfactory induction of anesthesia a local field block was produced by raising skin wheals along the proposed skin incision is well as along the vertical line lateral to that as well as a horizontal line superior to it.  A skin wheal was also raised one centimeter lateral and superior to the anterior superior iliac spine and a fascial injection of lidocaine was made to block  the ilioinguinal nerve.  Additional local anesthesia was injected throughout the procedure under the external oblique aponeurosis, at the internal ring, and as needed.  A total of 20 mL's of lidocaine were injected.    A skin crease incision was then made using a 10 blade knife and deepened through Wong's and Camper's fascia using the Bovie electrocautery.  Hemostasis was ensured along the way.  The aponeurosis of the external oblique was encountered and was cleaned in order to expose the external ring.  An incision was then made in the midportion of the external oblique aponeurosis following the direction of its fibers.  Once this was done flaps of the external oblique were developed cephalad and inferiorly taking care to avoid the ilioinguinal nerve.    The spermatic cord was identified and was gently dissected free at the level of the pubic tubercle and encircled with a Penrose drain.  Attention was then directed to the anteromedial aspect of the cord, where an indirect hernia sac was identified.  The sac was carefully dissected free of the cord down to the level of the internal ring.  The vas and the testicular vessels were identified and protected from harm.  A lipomatous mass was found along the cord, and this was excised with cautery and passed off the field as specimen.  The sac was opened and its contents were reduced.  A finger was passed into the peritoneal cavity and the floor of the inguinal canal was assessed and found to be weak.  The hernia sac was then ligated using a 3-0 Vicryl suture.  The redundant sac was excised and submitted to pathology for further evaluation.  The stump of the sac was checked for hemostasis and then allowed to retract into the abdomen.     Attention was then turned to the floor of the inguinal canal, which appeared to be grossly weakened with a direct hernia defect.  A polypropylene mesh was then cut to the appropriate size with an oval medial portion of the longitudinal  lateral opening.  Beginning at the pubic tubercle, the mesh was sutured to the inguinal ligament inferiorly, and the conjoined tendon superiorly using an interrupted 0 Prolene suture.  Care was taken to ensure the mesh was placed in a relaxed fashion avoiding excessive tension, and that no neurovascular structures were caught in the repair.  Laterally, the tails of mesh were crossed and the internal ring was re-created allowing for the passage of my fifth finger tip.    Hemostasis was again checked and found to be excellent.  The penrose drain was removed.  The external oblique aponeurosis was closed with a running suture of 2-0 Vicryl.  Care was again taken to avoid the ilioinguinal nerve.  Wong's fascia was then closed with running 3-0 Vicryl suture and the skin was closed using a 4-0 Vicryl placed in a subcuticular fashion.  Mastisol and Steri-Strips were applied to the wound.  Additional 0.25% Marcaine was injected for postoperative analgesia.  The testis was then pulled back down into its anatomic position in the scrotum.  The patient tolerated the procedure well and there were no immediate complications.      All sponge, needle, and instrument  counts were correct the conclusion of the procedure.  Dr. Glover was present scrubbed for the procedure and its entirety.  The patient was awakened from anesthesia and taken recovery room in good condition

## 2021-11-29 DIAGNOSIS — N13.8 BENIGN PROSTATIC HYPERPLASIA WITH URINARY OBSTRUCTION: ICD-10-CM

## 2021-11-29 DIAGNOSIS — N40.1 BENIGN PROSTATIC HYPERPLASIA WITH URINARY OBSTRUCTION: ICD-10-CM

## 2021-11-29 RX ORDER — FINASTERIDE 5 MG/1
5 TABLET, FILM COATED ORAL DAILY
Qty: 90 TABLET | Refills: 3 | Status: SHIPPED | OUTPATIENT
Start: 2021-11-29 | End: 2022-12-20

## 2021-12-21 ENCOUNTER — TELEPHONE (OUTPATIENT)
Dept: UROLOGY | Facility: CLINIC | Age: 76
End: 2021-12-21

## 2021-12-21 NOTE — TELEPHONE ENCOUNTER
[facility] called regarding Nestor Nye. The last refill for Macrobid ran out this month, they want to know if he needs a refill or to discontinue. Please advise.

## 2021-12-29 ENCOUNTER — PROCEDURE VISIT (OUTPATIENT)
Dept: UROLOGY | Facility: CLINIC | Age: 76
End: 2021-12-29

## 2021-12-29 DIAGNOSIS — R33.9 URINARY RETENTION: ICD-10-CM

## 2021-12-29 PROCEDURE — 51701 INSERT BLADDER CATHETER: CPT | Performed by: UROLOGY

## 2021-12-29 NOTE — PROGRESS NOTES
Patient in office for monthly catheter change visit. Old catheter was removed and a new 16fr coude one was placed. Patient tolerated procedure well no complications noted in office.  PABLITO Loja

## 2022-02-05 ENCOUNTER — HOSPITAL ENCOUNTER (EMERGENCY)
Facility: HOSPITAL | Age: 77
Discharge: HOME | End: 2022-02-05
Attending: FAMILY MEDICINE | Admitting: FAMILY MEDICINE

## 2022-02-05 VITALS
HEIGHT: 67 IN | HEART RATE: 88 BPM | OXYGEN SATURATION: 100 % | BODY MASS INDEX: 25.11 KG/M2 | DIASTOLIC BLOOD PRESSURE: 87 MMHG | TEMPERATURE: 98.1 F | RESPIRATION RATE: 18 BRPM | SYSTOLIC BLOOD PRESSURE: 133 MMHG | WEIGHT: 160 LBS

## 2022-02-05 DIAGNOSIS — T83.9XXA PROBLEM WITH FOLEY CATHETER, INITIAL ENCOUNTER: Primary | ICD-10-CM

## 2022-02-05 PROCEDURE — 99282 EMERGENCY DEPT VISIT SF MDM: CPT

## 2022-02-05 NOTE — ED PROVIDER NOTES
Subjective   76-year-old male with past medical history of arthritis, BPH, gout, rheumatoid arthritis presents emergency department with a Gillespie catheter dysfunction.  Patient reports that he gets his Gillespie catheter changed monthly.  Patient reports that this morning around 3:30 AM he noticed that his Gillespie catheter was clogged and not draining.  Patient reports he continued to have bladder spasms and discomfort so he decided to come in and be evaluated and get his Gillespie catheter changed.  Patient reports that he has been here before for the same thing.      History provided by:  Patient  Illness  Location:  Gillespie cath not draining  Severity:  Moderate  Onset quality:  Gradual  Timing:  Constant  Progression:  Unchanged  Chronicity:  Recurrent  Context:  See hpi  Relieved by:  Nothing  Worsened by:  Nothing  Ineffective treatments:  None  Associated symptoms: no abdominal pain, no chest pain and no fever        Review of Systems   Constitutional: Negative.  Negative for fever.   HENT: Negative.    Respiratory: Negative.    Cardiovascular: Negative.  Negative for chest pain.   Gastrointestinal: Negative.  Negative for abdominal pain.   Endocrine: Negative.    Genitourinary: Negative.  Negative for dysuria.   Skin: Negative.    Neurological: Negative.    Psychiatric/Behavioral: Negative.    All other systems reviewed and are negative.      Past Medical History:   Diagnosis Date   • Acute maxillary sinusitis    • Arthritis    • Disease of thyroid gland    • Enlarged prostate    • Gout    • Renal disorder    • Rheumatoid arthritis (HCC)    • Right inguinal hernia 10/28/2021    Added automatically from request for surgery [id number]   • Vocal cord anomaly 2007    had problem with enlarge vocal cord after low back surgery, changed his voice since then       No Known Allergies    Past Surgical History:   Procedure Laterality Date   • BELPHAROPTOSIS REPAIR Bilateral    • CRANIOTOMY FOR SUBDURAL HEMATOMA      resulted from 20  foot 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     • INGUINAL HERNIA REPAIR Right 11/1/2021    Procedure: INGUINAL HERNIA REPAIR WITH MESH AND EXCISION OF SPERMATIC CORD LIPOMA;  Surgeon: Rosey Glover MD;  Location: [facility];  Service: General;  Laterality: Right;   • LUMBAR SPINE SURGERY     • SKIN GRAFT Right     RUE skin grafting after electrocution   • THORACIC SPINE SURGERY         Family History   Problem Relation Age of Onset   • Arthritis Mother    • Prostate cancer Brother    • Arthritis Father        Social History     Socioeconomic History   • Marital status:    Tobacco Use   • Smoking status: Heavy Tobacco Smoker     Packs/day: 0.50     Years: 30.00     Pack years: 15.00     Types: Cigarettes, Electronic Cigarette   • Smokeless tobacco: Never Used   • Tobacco comment: Quit 2013   Vaping Use   • Vaping Use: Some days   • Substances: Nicotine   • Devices: Pre-filled or refillable cartridge   Substance and Sexual Activity   • Drug use: No   • Sexual activity: Defer           Objective   Physical Exam  Vitals and nursing note reviewed.   Constitutional:       Appearance: Normal appearance.   HENT:      Nose: Nose normal.      Mouth/Throat:      Mouth: Mucous membranes are moist.   Eyes:      Extraocular Movements: Extraocular movements intact.      Pupils: Pupils are equal, round, and reactive to light.   Cardiovascular:      Rate and Rhythm: Normal rate and regular rhythm.   Pulmonary:      Effort: Pulmonary effort is normal.      Breath sounds: Normal breath sounds.   Abdominal:      General: Abdomen is flat. Bowel sounds are normal.      Palpations: Abdomen is soft.   Musculoskeletal:         General: Normal range of motion.      Cervical back: Normal range of motion.   Skin:     General: Skin is warm.      Capillary Refill: Capillary refill takes less than 2 seconds.   Neurological:      General:  No focal deficit present.      Mental Status: He is alert.   Psychiatric:         Mood and Affect: Mood normal.         Behavior: Behavior normal.         Thought Content: Thought content normal.         Judgment: Judgment normal.         Procedures           ED Course                                                 MDM  Number of Diagnoses or Management Options  Problem with Gillespie catheter, initial encounter (HCC): new and requires workup     Amount and/or Complexity of Data Reviewed  Clinical lab tests: reviewed  Tests in the radiology section of CPT®: reviewed  Tests in the medicine section of CPT®: reviewed  Independent visualization of images, tracings, or specimens: yes        Final diagnoses:   Problem with Gillespie catheter, initial encounter (HCC)       ED Disposition  ED Disposition     ED Disposition Condition Comment    Discharge Stable           Roxanne Slater MD  [address] [place] [id number] [phone]               Medication List      No changes were made to your prescriptions during this visit.          Valerie Oswald DO  02/07/22 0746

## 2022-02-07 ENCOUNTER — TELEPHONE (OUTPATIENT)
Dept: UROLOGY | Facility: CLINIC | Age: 77
End: 2022-02-07

## 2022-02-07 NOTE — TELEPHONE ENCOUNTER
Caller: ERVIN MACKEY    Relationship to patient: SELF    Best call back number: [phone]    Patient is needing: PATIENT HAD AN EMERGENCY CATHETER CHANGE ON 2/5/22 AND STATED THAT HE HAS AN APPT WITH DR. SNOW ON 2/16/22, BUT NEEDS TO RESCHEDULE THE APPT TO AROUND 3/2/22. PATIENT IS REQUESTING A CALL BACK TO SCHEDULE.

## 2022-02-18 ENCOUNTER — TELEPHONE (OUTPATIENT)
Dept: UROLOGY | Facility: CLINIC | Age: 77
End: 2022-02-18

## 2022-02-18 NOTE — TELEPHONE ENCOUNTER
Caller: ERVIN MACKEY    Relationship to patient: SELF    Best call back number: [phone]    Patient is needing: HAS APPT FOR CATH ON 3/2 BUT WOULD LIKE TO CONSULT WITH DR SNOW AT SAME APPT.  HE IS WAITING FOR YOU CALL

## 2022-03-21 ENCOUNTER — TELEPHONE (OUTPATIENT)
Dept: UROLOGY | Facility: CLINIC | Age: 77
End: 2022-03-21

## 2022-03-21 NOTE — TELEPHONE ENCOUNTER
Hub staff attempted to follow warm transfer process and was unsuccessful     Caller: Nestor Nye    Relationship to patient: Self    Best call back number: [phone]  Patient is needing: PT WOULD LIKE A SOONER APPT. PER PT IS HAVING PROBLEMS WITH CATH

## 2022-03-22 ENCOUNTER — PROCEDURE VISIT (OUTPATIENT)
Dept: UROLOGY | Facility: CLINIC | Age: 77
End: 2022-03-22

## 2022-03-22 DIAGNOSIS — R33.9 URINARY RETENTION: ICD-10-CM

## 2022-03-22 PROCEDURE — 51701 INSERT BLADDER CATHETER: CPT | Performed by: PHYSICIAN ASSISTANT

## 2022-03-28 ENCOUNTER — OFFICE VISIT (OUTPATIENT)
Dept: INTERNAL MEDICINE | Facility: CLINIC | Age: 77
End: 2022-03-28

## 2022-03-28 VITALS
HEART RATE: 93 BPM | SYSTOLIC BLOOD PRESSURE: 119 MMHG | DIASTOLIC BLOOD PRESSURE: 77 MMHG | HEIGHT: 67 IN | TEMPERATURE: 96.9 F | OXYGEN SATURATION: 96 % | WEIGHT: 160 LBS | RESPIRATION RATE: 16 BRPM | BODY MASS INDEX: 25.11 KG/M2

## 2022-03-28 DIAGNOSIS — R19.8 CHANGE IN BOWEL FUNCTION: ICD-10-CM

## 2022-03-28 DIAGNOSIS — R63.4 WEIGHT LOSS: ICD-10-CM

## 2022-03-28 DIAGNOSIS — R21 RASH AND NONSPECIFIC SKIN ERUPTION: ICD-10-CM

## 2022-03-28 DIAGNOSIS — I10 BENIGN ESSENTIAL HYPERTENSION: Primary | ICD-10-CM

## 2022-03-28 PROCEDURE — 99214 OFFICE O/P EST MOD 30 MIN: CPT | Performed by: INTERNAL MEDICINE

## 2022-03-28 NOTE — PROGRESS NOTES
"Chief Complaint  Hypertension    Subjective          Nestor Nye presents to [facility]  History of Present Illness  HPI: Patient is here to follow up on the blood pressure  The patient is taking the blood pressure medications as prescribed and has had no side effects. The patient is also here  Complains of change oin bowel habits with diarrhea and constipation , he denies abdomen pain . He has never had a colonoscopy , only had a cologuard, he states he had a right inguinal hernia in  and then his GI symptoms started , he complains of a rash on his left hand and right forearm and back  Hypertension   Pertinent negatives include no chest pain, palpitations or shortness of breath.    Objective      Vital Signs:   /77   Pulse 93   Temp 96.9 °F (36.1 °C)   Resp 16   Ht 170.2 cm (67.01\")   Wt 72.6 kg (160 lb)   SpO2 96%   BMI 25.05 kg/m²     BMI is above normal parameters. Recommendations: educational material discussed/shared in after visit summary, exercise counseling/recommendations and nutrition counseling/recommendations       Physical Exam  Vitals and nursing note reviewed.   Constitutional:       General: He is not in acute distress.     Appearance: Normal appearance. He is not diaphoretic.   HENT:      Head: Normocephalic and atraumatic.      Right Ear: External ear normal.      Left Ear: External ear normal.      Nose: Nose normal.   Eyes:      Extraocular Movements: Extraocular movements intact.      Conjunctiva/sclera: Conjunctivae normal.   Neck:      Trachea: Trachea normal.   Cardiovascular:      Rate and Rhythm: Normal rate and regular rhythm.      Heart sounds: Normal heart sounds.   Pulmonary:      Effort: Pulmonary effort is normal. No respiratory distress.   Abdominal:      General: Abdomen is flat.   Musculoskeletal:      Cervical back: Neck supple.      Comments: Moves all limbs   Skin:     General: Skin is warm and dry.      Findings: " Rash present. No erythema.      Comments: Left hand, right forearm and back- rash   Neurological:      Mental Status: He is alert and oriented to person, place, and time.      Comments: No gross motor or sensory deficits        Result Review :     CMP    CMP 6/8/21 9/14/21   Glucose 67 90   BUN 13 16   Creatinine 1.44 (A) 1.87 (A)   eGFR Non  Am 48 (A) 35 (A)   Sodium 140 138   Potassium 4.6 4.2   Chloride 104 103   Calcium 9.0 9.0   Albumin 4.30    Total Bilirubin 0.4    Alkaline Phosphatase 94    AST (SGOT) 12    ALT (SGPT) 12    (A) Abnormal value            CBC    CBC 6/8/21 9/14/21 10/28/21   WBC 9.17 6.36 7.56   RBC 4.49 4.28 4.33   Hemoglobin 14.8 13.9 14.1   Hematocrit 43.6 40.6 41.2   MCV 97.1 (A) 94.9 95.2   MCH 33.0 32.5 32.6   MCHC 33.9 34.2 34.2   RDW 12.8 12.9 12.6   Platelets 252 238 263   (A) Abnormal value                 Admission (Discharged) with Rosey Glover MD (11/01/2021)       Assessment and Plan    Diagnoses and all orders for this visit:    1. Benign essential hypertension (Primary)    2. Change in bowel function  -     Ambulatory Referral to Gastroenterology  -     CT Abdomen Pelvis Without Contrast    3. Weight loss  -     Ambulatory Referral to Gastroenterology  -     CT Abdomen Pelvis Without Contrast    4. Rash and nonspecific skin eruption  -     Ambulatory Referral to Dermatology    Plan:  1.  Benign essential hypertension: Will continue current medication, low-sodium diet advised, Counseled to regularly check BP at home with goal averaging <130/80.   2.  Change in bowel habits : refer to gi and will get ct scans  3. weight loss: refer to gi and will get ct scans, will obtain labs  4. Rash : refer to dermatology  I spent 30 minutes caring for Nestor on this date of service. This time includes time spent by me in the following activities:preparing for the visit, reviewing tests, performing a medically appropriate examination and/or evaluation , counseling and educating the  patient/family/caregiver, ordering medications, tests, or procedures and documenting information in the medical record  Follow Up    Patient was given instructions and counseling regarding his condition or for health maintenance advice. Please see specific information pulled into the AVS if appropriate.

## 2022-03-30 ENCOUNTER — TELEPHONE (OUTPATIENT)
Dept: NEUROLOGY | Facility: OTHER | Age: 77
End: 2022-03-30

## 2022-04-03 ENCOUNTER — HOSPITAL ENCOUNTER (EMERGENCY)
Facility: HOSPITAL | Age: 77
Discharge: HOME | End: 2022-04-03
Attending: EMERGENCY MEDICINE | Admitting: EMERGENCY MEDICINE

## 2022-04-03 VITALS
HEIGHT: 68 IN | SYSTOLIC BLOOD PRESSURE: 122 MMHG | DIASTOLIC BLOOD PRESSURE: 77 MMHG | OXYGEN SATURATION: 96 % | BODY MASS INDEX: 25.01 KG/M2 | TEMPERATURE: 97.8 F | HEART RATE: 84 BPM | WEIGHT: 165 LBS | RESPIRATION RATE: 16 BRPM

## 2022-04-03 DIAGNOSIS — T83.9XXA PROBLEM WITH FOLEY CATHETER, INITIAL ENCOUNTER: Primary | ICD-10-CM

## 2022-04-03 LAB
BACTERIA UR QL AUTO: ABNORMAL /HPF
BILIRUB UR QL STRIP: NEGATIVE
CLARITY UR: ABNORMAL
COLOR UR: YELLOW
GLUCOSE UR STRIP-MCNC: NEGATIVE MG/DL
HGB UR QL STRIP.AUTO: ABNORMAL
HYALINE CASTS UR QL AUTO: ABNORMAL /LPF
KETONES UR QL STRIP: NEGATIVE
LEUKOCYTE ESTERASE UR QL STRIP.AUTO: ABNORMAL
NITRITE UR QL STRIP: POSITIVE
PH UR STRIP.AUTO: 7 [PH] (ref 5–8)
PROT UR QL STRIP: NEGATIVE
RBC # UR STRIP: ABNORMAL /HPF
REF LAB TEST METHOD: ABNORMAL
SP GR UR STRIP: <=1.005 (ref 1–1.03)
SQUAMOUS #/AREA URNS HPF: ABNORMAL /HPF
UROBILINOGEN UR QL STRIP: ABNORMAL
WBC # UR STRIP: ABNORMAL /HPF

## 2022-04-03 PROCEDURE — P9612 CATHETERIZE FOR URINE SPEC: HCPCS

## 2022-04-03 PROCEDURE — 51702 INSERT TEMP BLADDER CATH: CPT

## 2022-04-03 PROCEDURE — 99283 EMERGENCY DEPT VISIT LOW MDM: CPT

## 2022-04-03 PROCEDURE — 81001 URINALYSIS AUTO W/SCOPE: CPT | Performed by: PHYSICIAN ASSISTANT

## 2022-04-03 PROCEDURE — 87086 URINE CULTURE/COLONY COUNT: CPT | Performed by: PHYSICIAN ASSISTANT

## 2022-04-03 NOTE — ED PROVIDER NOTES
"Subjective   Chief Complaint: Problem with urinary catheter  History of Present Illness: 76-year-old male comes in for evaluation of the complaint.  He has a history of BPH and has a chronic indwelling Gillespie catheter.  He states he gets this changed once a month.  He did change week ago.  He states since then he has had difficulty with clogging with \"sediment\" he denies any abdominal pain but states he has had some spasms due to the urine flowing.  He states he will have to push on his abdomen and bear down forcefully to try to advance urine through the catheter.  He is here requesting his catheter to be changed.  Onset: Today  Timing: Ongoing  Exacerbating / Alleviating factors: None  Associated symptoms: None      Nurses Notes reviewed and agree, including vitals, allergies, social history and prior medical history.          Review of Systems   Constitutional: Negative.    HENT: Negative.    Eyes: Negative.    Respiratory: Negative.    Cardiovascular: Negative.    Gastrointestinal: Negative.    Genitourinary: Positive for difficulty urinating.   Musculoskeletal: Negative.    Skin: Negative.    Allergic/Immunologic: Negative.    Neurological: Negative.    Psychiatric/Behavioral: Negative.    All other systems reviewed and are negative.      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     • INGUINAL HERNIA REPAIR Right 11/1/2021    Procedure: INGUINAL HERNIA REPAIR WITH MESH AND EXCISION OF SPERMATIC CORD LIPOMA;  Surgeon: Rosey Glover MD;  Location: [facility];  Service: General;  Laterality: Right;   • LUMBAR SPINE SURGERY     • SKIN GRAFT Right     RUE skin grafting after electrocution   • THORACIC SPINE SURGERY         Family History   Problem Relation Age of Onset   • Arthritis Mother    • Prostate cancer Brother    • Arthritis Father        Social History     Socioeconomic History   • Marital status:    Tobacco Use   • Smoking status: Heavy Tobacco Smoker     Packs/day: 0.50     Years: 30.00     Pack years: 15.00     Types: Cigarettes, Electronic Cigarette   • Smokeless tobacco: Never Used   • Tobacco comment: Quit 2013   Vaping Use   • Vaping Use: Some days   • Substances: Nicotine   • Devices: Pre-filled or refillable cartridge   Substance and Sexual Activity   • Drug use: No   • Sexual activity: Defer           Objective   Physical Exam  Vitals and nursing note reviewed.   Constitutional:       General: He is not in acute distress.     Appearance: Normal appearance. He is normal weight. He is not ill-appearing, toxic-appearing or diaphoretic.   HENT:      Head: Normocephalic and atraumatic.      Nose: Nose normal.   Eyes:      Extraocular Movements: Extraocular movements intact.   Cardiovascular:      Rate and Rhythm: Normal rate and regular rhythm.   Pulmonary:      Effort: Pulmonary effort is normal.   Abdominal:      General: Abdomen is flat.      Palpations: Abdomen is soft.      Tenderness: There is no abdominal tenderness.   Genitourinary:     Penis: Normal.       Comments: Gillespie catheter in place without signs of complication  Musculoskeletal:         General: Normal range of motion.      Cervical back: Normal range of motion.   Skin:     General: Skin is warm and dry.   Neurological:      General: No focal deficit  present.      Mental Status: He is alert. Mental status is at baseline.   Psychiatric:         Mood and Affect: Mood normal.         Behavior: Behavior normal.         Procedures           ED Course  ED Course as of 04/03/22 2020   Sun Apr 03, 2022 2004 Nitrite, UA(!): Positive [TM]   2007 Leukocytes, UA(!): Large (3+) [TM]   2007 Nitrite, UA(!): Positive [TM]      ED Course User Index  [TM] Norman Herbert PA-C                                                 [facility]    Final diagnoses:   Problem with Gillespie catheter, initial encounter (HCC)       ED Disposition  ED Disposition     ED Disposition   Discharge    Condition   Stable    Comment   --             Prieto Rey MD  [address] [address] [phone]      As needed    [facility] Emergency Department  [address] [facility] [address] [phone]    If symptoms worsen         Medication List      No changes were made to your prescriptions during this visit.          Norman Herbert PA-C  04/03/22 2020

## 2022-04-05 ENCOUNTER — HOSPITAL ENCOUNTER (OUTPATIENT)
Dept: CT IMAGING | Facility: HOSPITAL | Age: 77
Discharge: HOME | End: 2022-04-05

## 2022-04-05 ENCOUNTER — LAB (OUTPATIENT)
Dept: LAB | Facility: HOSPITAL | Age: 77
End: 2022-04-05

## 2022-04-05 LAB
ALBUMIN SERPL-MCNC: 4.8 G/DL (ref 3.5–5.2)
ALBUMIN/GLOB SERPL: 2.2 G/DL
ALP SERPL-CCNC: 85 U/L (ref 39–117)
ALT SERPL W P-5'-P-CCNC: 13 U/L (ref 1–41)
ANION GAP SERPL CALCULATED.3IONS-SCNC: 10.3 MMOL/L (ref 5–15)
AST SERPL-CCNC: 16 U/L (ref 1–40)
BACTERIA SPEC AEROBE CULT: NORMAL
BASOPHILS # BLD AUTO: 0.09 10*3/MM3 (ref 0–0.2)
BASOPHILS NFR BLD AUTO: 1.4 % (ref 0–1.5)
BILIRUB SERPL-MCNC: 0.8 MG/DL (ref 0–1.2)
BUN SERPL-MCNC: 20 MG/DL (ref 8–23)
BUN/CREAT SERPL: 11.8 (ref 7–25)
CALCIUM SPEC-SCNC: 9.5 MG/DL (ref 8.6–10.5)
CHLORIDE SERPL-SCNC: 104 MMOL/L (ref 98–107)
CHOLEST SERPL-MCNC: 174 MG/DL (ref 0–200)
CO2 SERPL-SCNC: 25.7 MMOL/L (ref 22–29)
CREAT SERPL-MCNC: 1.69 MG/DL (ref 0.76–1.27)
DEPRECATED RDW RBC AUTO: 45.8 FL (ref 37–54)
EGFRCR SERPLBLD CKD-EPI 2021: 41.8 ML/MIN/1.73
EOSINOPHIL # BLD AUTO: 0.11 10*3/MM3 (ref 0–0.4)
EOSINOPHIL NFR BLD AUTO: 1.7 % (ref 0.3–6.2)
ERYTHROCYTE [DISTWIDTH] IN BLOOD BY AUTOMATED COUNT: 12.6 % (ref 12.3–15.4)
GLOBULIN UR ELPH-MCNC: 2.2 GM/DL
GLUCOSE SERPL-MCNC: 103 MG/DL (ref 65–99)
HCT VFR BLD AUTO: 46.5 % (ref 37.5–51)
HDLC SERPL-MCNC: 45 MG/DL (ref 40–60)
HGB BLD-MCNC: 15.4 G/DL (ref 13–17.7)
IMM GRANULOCYTES # BLD AUTO: 0.02 10*3/MM3 (ref 0–0.05)
IMM GRANULOCYTES NFR BLD AUTO: 0.3 % (ref 0–0.5)
LDLC SERPL CALC-MCNC: 112 MG/DL (ref 0–100)
LDLC/HDLC SERPL: 2.46 {RATIO}
LYMPHOCYTES # BLD AUTO: 1.91 10*3/MM3 (ref 0.7–3.1)
LYMPHOCYTES NFR BLD AUTO: 29 % (ref 19.6–45.3)
MCH RBC QN AUTO: 32.8 PG (ref 26.6–33)
MCHC RBC AUTO-ENTMCNC: 33.1 G/DL (ref 31.5–35.7)
MCV RBC AUTO: 98.9 FL (ref 79–97)
MONOCYTES # BLD AUTO: 0.66 10*3/MM3 (ref 0.1–0.9)
MONOCYTES NFR BLD AUTO: 10 % (ref 5–12)
NEUTROPHILS NFR BLD AUTO: 3.79 10*3/MM3 (ref 1.7–7)
NEUTROPHILS NFR BLD AUTO: 57.6 % (ref 42.7–76)
NRBC BLD AUTO-RTO: 0 /100 WBC (ref 0–0.2)
PLATELET # BLD AUTO: 254 10*3/MM3 (ref 140–450)
PMV BLD AUTO: 11 FL (ref 6–12)
POTASSIUM SERPL-SCNC: 5 MMOL/L (ref 3.5–5.2)
PROT SERPL-MCNC: 7 G/DL (ref 6–8.5)
RBC # BLD AUTO: 4.7 10*6/MM3 (ref 4.14–5.8)
SODIUM SERPL-SCNC: 140 MMOL/L (ref 136–145)
TRIGL SERPL-MCNC: 91 MG/DL (ref 0–150)
TSH SERPL DL<=0.05 MIU/L-ACNC: 3.02 UIU/ML (ref 0.27–4.2)
VLDLC SERPL-MCNC: 17 MG/DL (ref 5–40)
WBC NRBC COR # BLD: 6.58 10*3/MM3 (ref 3.4–10.8)

## 2022-04-05 PROCEDURE — 85025 COMPLETE CBC W/AUTO DIFF WBC: CPT | Performed by: INTERNAL MEDICINE

## 2022-04-05 PROCEDURE — 80053 COMPREHEN METABOLIC PANEL: CPT | Performed by: INTERNAL MEDICINE

## 2022-04-05 PROCEDURE — 36415 COLL VENOUS BLD VENIPUNCTURE: CPT | Performed by: INTERNAL MEDICINE

## 2022-04-05 PROCEDURE — 84443 ASSAY THYROID STIM HORMONE: CPT | Performed by: INTERNAL MEDICINE

## 2022-04-05 PROCEDURE — 80061 LIPID PANEL: CPT | Performed by: INTERNAL MEDICINE

## 2022-04-05 PROCEDURE — 74176 CT ABD & PELVIS W/O CONTRAST: CPT

## 2022-04-11 ENCOUNTER — OFFICE VISIT (OUTPATIENT)
Dept: UROLOGY | Facility: CLINIC | Age: 77
End: 2022-04-11

## 2022-04-11 VITALS
SYSTOLIC BLOOD PRESSURE: 110 MMHG | WEIGHT: 165 LBS | HEART RATE: 93 BPM | HEIGHT: 68 IN | DIASTOLIC BLOOD PRESSURE: 80 MMHG | BODY MASS INDEX: 25.01 KG/M2 | TEMPERATURE: 96.4 F | OXYGEN SATURATION: 98 %

## 2022-04-11 DIAGNOSIS — K59.00 CONSTIPATION, UNSPECIFIED CONSTIPATION TYPE: Primary | ICD-10-CM

## 2022-04-11 DIAGNOSIS — R33.9 URINARY RETENTION: ICD-10-CM

## 2022-04-11 PROCEDURE — 99214 OFFICE O/P EST MOD 30 MIN: CPT | Performed by: UROLOGY

## 2022-04-11 RX ORDER — NITROFURANTOIN 25; 75 MG/1; MG/1
100 CAPSULE ORAL DAILY
Qty: 30 CAPSULE | Refills: 3 | Status: SHIPPED | OUTPATIENT
Start: 2022-04-11 | End: 2022-08-15

## 2022-04-11 RX ORDER — POLYETHYLENE GLYCOL 3350 17 G/17G
17 POWDER, FOR SOLUTION ORAL DAILY
Qty: 30 PACKET | Refills: 11 | Status: SHIPPED | OUTPATIENT
Start: 2022-04-11 | End: 2022-07-27

## 2022-04-11 RX ORDER — SODIUM CHLORIDE 9 MG/ML
125 INJECTION, SOLUTION INTRAVENOUS CONTINUOUS
Status: CANCELLED | OUTPATIENT
Start: 2022-04-11

## 2022-04-11 NOTE — PROGRESS NOTES
Chief Complaint   Patient presents with   • Benign Prostatic Hypertrophy     Patient here to discuss prostatectomy.        HPI  Ms. Nye is a 76 y.o. male with history below in assessment, who presents for follow up.     At this visit patient continues to be catheter dependent.    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     • INGUINAL HERNIA REPAIR Right 11/1/2021    Procedure: INGUINAL HERNIA REPAIR WITH MESH AND EXCISION OF SPERMATIC CORD LIPOMA;  Surgeon: Rosey Glover MD;  Location: [facility];  Service: General;  Laterality: Right;   • LUMBAR SPINE SURGERY     • SKIN GRAFT Right     RUE skin grafting after electrocution   • THORACIC SPINE SURGERY           Current Outpatient Medications:   •  Alcohol Swabs (ALCOHOL WIPES) 70 % pads, 1 pad 3 (Three) Times a Day., Disp: 100 each, Rfl: 11  •  doxazosin (CARDURA) 4 MG tablet, Take 1 tablet by mouth 2 (Two) Times a Day., Disp: 180 tablet, Rfl: 2  •  ferrous sulfate 325 (65 FE) MG tablet, Take 325 mg by mouth Daily With Breakfast., Disp: , Rfl:   •  finasteride (PROSCAR) 5 MG tablet, Take 1 tablet by mouth Daily., Disp: 90 tablet, Rfl: 3  •  levothyroxine (SYNTHROID, LEVOTHROID) 88 MCG tablet, Take 1 tablet by mouth Daily., Disp: 90 tablet, Rfl: 2  •  nitrofurantoin, macrocrystal-monohydrate, (Macrobid)  "100 MG capsule, Take 1 capsule by mouth Daily., Disp: 30 capsule, Rfl: 3     Physical Exam  Visit Vitals  /80 (BP Location: Right arm, Patient Position: Sitting, Cuff Size: Adult)   Pulse 93   Temp 96.4 °F (35.8 °C) (Infrared)   Ht 172.7 cm (68\")   Wt 74.8 kg (165 lb)   SpO2 98%   BMI 25.09 kg/m²       Labs  Brief Urine Lab Results  (Last result in the past 365 days)      Color   Clarity   Blood   Leuk Est   Nitrite   Protein   CREAT   Urine HCG        04/03/22 1954 Yellow   Cloudy   Small (1+)   Large (3+)   Positive   Negative                 Lab Results   Component Value Date    GLUCOSE 103 (H) 04/05/2022    CALCIUM 9.5 04/05/2022     04/05/2022    K 5.0 04/05/2022    CO2 25.7 04/05/2022     04/05/2022    BUN 20 04/05/2022    CREATININE 1.69 (H) 04/05/2022    EGFRIFAFRI 43 (L) 09/25/2017    EGFRIFNONA 35 (L) 09/14/2021    BCR 11.8 04/05/2022    ANIONGAP 10.3 04/05/2022       Lab Results   Component Value Date    WBC 6.58 04/05/2022    HGB 15.4 04/05/2022    HCT 46.5 04/05/2022    MCV 98.9 (H) 04/05/2022     04/05/2022       Urine Culture    Urine Culture 4/3/22   Urine Culture 50,000 CFU/mL Mixed Karrie Isolated              Lab Results   Component Value Date    PSA 5.0 (H) 07/25/2019    PSA 4.4 (H) 05/23/2018    PSA 5.0 (H) 06/19/2017             Radiographic Studies  CT Abdomen Pelvis Without Contrast    Result Date: 4/5/2022  No acute intra-abdominal or pelvic process.     443.06 mGy.cm. 10.37 mGy  This study was performed with techniques to keep radiation doses as low as reasonably achievable (ALARA). Individualized dose reduction techniques using automated exposure control or adjustment of mA and/or kV according to the patient size were employed.  This report was signed and finalized on 4/5/2022 11:13 AM by Jean Claude Whitney M.D..        I have reviewed above labs and imaging.     Assessment  76 y.o. male with history of chronic lower urinary tract symptoms, urinary retention, large " prostate over 140 cc.  He was found to have a hernia that was repaired by Dr. Glover on 11/1/2021. Dependent on indwelling catheter.  This is exacerbation of his chronic condition.  Would very much like to schedule simple prostatectomy as soon as possible.    Plan  1. Schedule Simple prostatectomy 5/25/22 w/ dr glover.  This is elective major surgery.  I have refilled his daily prophylactic Macrobid until then due to indwelling catheter, which is a risk factor for postoperative infection.

## 2022-04-13 ENCOUNTER — APPOINTMENT (OUTPATIENT)
Dept: CT IMAGING | Facility: HOSPITAL | Age: 77
End: 2022-04-13

## 2022-04-13 ENCOUNTER — APPOINTMENT (OUTPATIENT)
Dept: GENERAL RADIOLOGY | Facility: HOSPITAL | Age: 77
End: 2022-04-13

## 2022-04-13 ENCOUNTER — HOSPITAL ENCOUNTER (INPATIENT)
Facility: HOSPITAL | Age: 77
LOS: 9 days | Discharge: HOME HEALTH | End: 2022-04-22
Attending: EMERGENCY MEDICINE | Admitting: SURGERY

## 2022-04-13 DIAGNOSIS — N39.0 ACUTE UTI: ICD-10-CM

## 2022-04-13 DIAGNOSIS — K56.609 SMALL BOWEL OBSTRUCTION: Primary | ICD-10-CM

## 2022-04-13 DIAGNOSIS — R33.9 URINARY RETENTION: ICD-10-CM

## 2022-04-13 LAB
ALBUMIN SERPL-MCNC: 4.5 G/DL (ref 3.5–5.2)
ALBUMIN/GLOB SERPL: 1.9 G/DL
ALP SERPL-CCNC: 99 U/L (ref 39–117)
ALT SERPL W P-5'-P-CCNC: 11 U/L (ref 1–41)
ANION GAP SERPL CALCULATED.3IONS-SCNC: 11.4 MMOL/L (ref 5–15)
AST SERPL-CCNC: 14 U/L (ref 1–40)
BACTERIA UR QL AUTO: ABNORMAL /HPF
BASOPHILS # BLD AUTO: 0.02 10*3/MM3 (ref 0–0.2)
BASOPHILS NFR BLD AUTO: 0.2 % (ref 0–1.5)
BILIRUB SERPL-MCNC: 0.8 MG/DL (ref 0–1.2)
BILIRUB UR QL STRIP: NEGATIVE
BUN SERPL-MCNC: 22 MG/DL (ref 8–23)
BUN/CREAT SERPL: 13.3 (ref 7–25)
CALCIUM SPEC-SCNC: 9.3 MG/DL (ref 8.6–10.5)
CHLORIDE SERPL-SCNC: 99 MMOL/L (ref 98–107)
CLARITY UR: ABNORMAL
CO2 SERPL-SCNC: 24.6 MMOL/L (ref 22–29)
COLOR UR: YELLOW
CREAT SERPL-MCNC: 1.65 MG/DL (ref 0.76–1.27)
DEPRECATED RDW RBC AUTO: 43.5 FL (ref 37–54)
EGFRCR SERPLBLD CKD-EPI 2021: 43 ML/MIN/1.73
EOSINOPHIL # BLD AUTO: 0 10*3/MM3 (ref 0–0.4)
EOSINOPHIL NFR BLD AUTO: 0 % (ref 0.3–6.2)
ERYTHROCYTE [DISTWIDTH] IN BLOOD BY AUTOMATED COUNT: 12.5 % (ref 12.3–15.4)
GLOBULIN UR ELPH-MCNC: 2.4 GM/DL
GLUCOSE SERPL-MCNC: 128 MG/DL (ref 65–99)
GLUCOSE UR STRIP-MCNC: NEGATIVE MG/DL
HCT VFR BLD AUTO: 44.4 % (ref 37.5–51)
HGB BLD-MCNC: 15.5 G/DL (ref 13–17.7)
HGB UR QL STRIP.AUTO: ABNORMAL
HYALINE CASTS UR QL AUTO: ABNORMAL /LPF
IMM GRANULOCYTES # BLD AUTO: 0.05 10*3/MM3 (ref 0–0.05)
IMM GRANULOCYTES NFR BLD AUTO: 0.5 % (ref 0–0.5)
KETONES UR QL STRIP: ABNORMAL
LEUKOCYTE ESTERASE UR QL STRIP.AUTO: ABNORMAL
LIPASE SERPL-CCNC: 57 U/L (ref 13–60)
LYMPHOCYTES # BLD AUTO: 0.89 10*3/MM3 (ref 0.7–3.1)
LYMPHOCYTES NFR BLD AUTO: 8.8 % (ref 19.6–45.3)
MCH RBC QN AUTO: 33 PG (ref 26.6–33)
MCHC RBC AUTO-ENTMCNC: 34.9 G/DL (ref 31.5–35.7)
MCV RBC AUTO: 94.7 FL (ref 79–97)
MONOCYTES # BLD AUTO: 0.54 10*3/MM3 (ref 0.1–0.9)
MONOCYTES NFR BLD AUTO: 5.3 % (ref 5–12)
NEUTROPHILS NFR BLD AUTO: 8.62 10*3/MM3 (ref 1.7–7)
NEUTROPHILS NFR BLD AUTO: 85.2 % (ref 42.7–76)
NITRITE UR QL STRIP: POSITIVE
NRBC BLD AUTO-RTO: 0 /100 WBC (ref 0–0.2)
PH UR STRIP.AUTO: >=9 [PH] (ref 5–8)
PLATELET # BLD AUTO: 242 10*3/MM3 (ref 140–450)
PMV BLD AUTO: 10.1 FL (ref 6–12)
POTASSIUM SERPL-SCNC: 4.7 MMOL/L (ref 3.5–5.2)
PROT SERPL-MCNC: 6.9 G/DL (ref 6–8.5)
PROT UR QL STRIP: ABNORMAL
RBC # BLD AUTO: 4.69 10*6/MM3 (ref 4.14–5.8)
RBC # UR STRIP: ABNORMAL /HPF
REF LAB TEST METHOD: ABNORMAL
SODIUM SERPL-SCNC: 135 MMOL/L (ref 136–145)
SP GR UR STRIP: 1.02 (ref 1–1.03)
SQUAMOUS #/AREA URNS HPF: ABNORMAL /HPF
TRI-PHOS CRY URNS QL MICRO: ABNORMAL /HPF
UROBILINOGEN UR QL STRIP: ABNORMAL
WBC # UR STRIP: ABNORMAL /HPF
WBC NRBC COR # BLD: 10.12 10*3/MM3 (ref 3.4–10.8)

## 2022-04-13 PROCEDURE — 87635 SARS-COV-2 COVID-19 AMP PRB: CPT | Performed by: FAMILY MEDICINE

## 2022-04-13 PROCEDURE — 25010000002 HEPARIN (PORCINE) PER 1000 UNITS: Performed by: FAMILY MEDICINE

## 2022-04-13 PROCEDURE — 80053 COMPREHEN METABOLIC PANEL: CPT

## 2022-04-13 PROCEDURE — 87086 URINE CULTURE/COLONY COUNT: CPT | Performed by: FAMILY MEDICINE

## 2022-04-13 PROCEDURE — 87186 SC STD MICRODIL/AGAR DIL: CPT | Performed by: FAMILY MEDICINE

## 2022-04-13 PROCEDURE — 74176 CT ABD & PELVIS W/O CONTRAST: CPT

## 2022-04-13 PROCEDURE — 87077 CULTURE AEROBIC IDENTIFY: CPT | Performed by: FAMILY MEDICINE

## 2022-04-13 PROCEDURE — 83690 ASSAY OF LIPASE: CPT

## 2022-04-13 PROCEDURE — 99222 1ST HOSP IP/OBS MODERATE 55: CPT | Performed by: SURGERY

## 2022-04-13 PROCEDURE — 81001 URINALYSIS AUTO W/SCOPE: CPT

## 2022-04-13 PROCEDURE — 25010000002 ONDANSETRON PER 1 MG

## 2022-04-13 PROCEDURE — 99223 1ST HOSP IP/OBS HIGH 75: CPT | Performed by: FAMILY MEDICINE

## 2022-04-13 PROCEDURE — 74018 RADEX ABDOMEN 1 VIEW: CPT

## 2022-04-13 PROCEDURE — 25010000002 CEFTRIAXONE SODIUM-DEXTROSE 1-3.74 GM-%(50ML) RECONSTITUTED SOLUTION

## 2022-04-13 PROCEDURE — 25010000002 MORPHINE PER 10 MG: Performed by: EMERGENCY MEDICINE

## 2022-04-13 PROCEDURE — 99284 EMERGENCY DEPT VISIT MOD MDM: CPT

## 2022-04-13 PROCEDURE — 85025 COMPLETE CBC W/AUTO DIFF WBC: CPT

## 2022-04-13 RX ORDER — ONDANSETRON 2 MG/ML
4 INJECTION INTRAMUSCULAR; INTRAVENOUS EVERY 6 HOURS PRN
Status: DISCONTINUED | OUTPATIENT
Start: 2022-04-13 | End: 2022-04-16

## 2022-04-13 RX ORDER — CEFTRIAXONE 1 G/50ML
1 INJECTION, SOLUTION INTRAVENOUS EVERY 24 HOURS
Status: DISCONTINUED | OUTPATIENT
Start: 2022-04-14 | End: 2022-04-16 | Stop reason: ALTCHOICE

## 2022-04-13 RX ORDER — LEVOTHYROXINE SODIUM 88 UG/1
88 TABLET ORAL DAILY
Status: DISCONTINUED | OUTPATIENT
Start: 2022-04-14 | End: 2022-04-16

## 2022-04-13 RX ORDER — FINASTERIDE 5 MG/1
5 TABLET, FILM COATED ORAL DAILY
Status: DISCONTINUED | OUTPATIENT
Start: 2022-04-14 | End: 2022-04-16

## 2022-04-13 RX ORDER — SODIUM CHLORIDE 0.9 % (FLUSH) 0.9 %
3 SYRINGE (ML) INJECTION EVERY 12 HOURS SCHEDULED
Status: DISCONTINUED | OUTPATIENT
Start: 2022-04-13 | End: 2022-04-16

## 2022-04-13 RX ORDER — ACETAMINOPHEN 160 MG/5ML
650 SOLUTION ORAL EVERY 4 HOURS PRN
Status: DISCONTINUED | OUTPATIENT
Start: 2022-04-13 | End: 2022-04-16

## 2022-04-13 RX ORDER — ACETAMINOPHEN 325 MG/1
650 TABLET ORAL EVERY 4 HOURS PRN
Status: DISCONTINUED | OUTPATIENT
Start: 2022-04-13 | End: 2022-04-16

## 2022-04-13 RX ORDER — ACETAMINOPHEN 650 MG/1
650 SUPPOSITORY RECTAL EVERY 4 HOURS PRN
Status: DISCONTINUED | OUTPATIENT
Start: 2022-04-13 | End: 2022-04-16

## 2022-04-13 RX ORDER — TERAZOSIN 5 MG/1
5 CAPSULE ORAL NIGHTLY
Refills: 2 | Status: DISCONTINUED | OUTPATIENT
Start: 2022-04-13 | End: 2022-04-16

## 2022-04-13 RX ORDER — HEPARIN SODIUM 5000 [USP'U]/ML
5000 INJECTION, SOLUTION INTRAVENOUS; SUBCUTANEOUS EVERY 12 HOURS SCHEDULED
Status: DISCONTINUED | OUTPATIENT
Start: 2022-04-13 | End: 2022-04-16

## 2022-04-13 RX ORDER — MORPHINE SULFATE 4 MG/ML
4 INJECTION, SOLUTION INTRAMUSCULAR; INTRAVENOUS ONCE
Status: COMPLETED | OUTPATIENT
Start: 2022-04-13 | End: 2022-04-13

## 2022-04-13 RX ORDER — SODIUM CHLORIDE 0.9 % (FLUSH) 0.9 %
10 SYRINGE (ML) INJECTION AS NEEDED
Status: DISCONTINUED | OUTPATIENT
Start: 2022-04-13 | End: 2022-04-16

## 2022-04-13 RX ORDER — POTASSIUM CHLORIDE 750 MG/1
40 CAPSULE, EXTENDED RELEASE ORAL ONCE
Status: DISCONTINUED | OUTPATIENT
Start: 2022-04-13 | End: 2022-04-13

## 2022-04-13 RX ORDER — SODIUM CHLORIDE 0.9 % (FLUSH) 0.9 %
3-10 SYRINGE (ML) INJECTION AS NEEDED
Status: DISCONTINUED | OUTPATIENT
Start: 2022-04-13 | End: 2022-04-16

## 2022-04-13 RX ORDER — PANTOPRAZOLE SODIUM 40 MG/10ML
40 INJECTION, POWDER, LYOPHILIZED, FOR SOLUTION INTRAVENOUS
Status: DISCONTINUED | OUTPATIENT
Start: 2022-04-14 | End: 2022-04-16

## 2022-04-13 RX ORDER — SODIUM CHLORIDE 9 MG/ML
100 INJECTION, SOLUTION INTRAVENOUS CONTINUOUS
Status: DISCONTINUED | OUTPATIENT
Start: 2022-04-13 | End: 2022-04-16

## 2022-04-13 RX ORDER — CEFTRIAXONE 1 G/50ML
1 INJECTION, SOLUTION INTRAVENOUS ONCE
Status: COMPLETED | OUTPATIENT
Start: 2022-04-13 | End: 2022-04-13

## 2022-04-13 RX ORDER — ONDANSETRON 2 MG/ML
4 INJECTION INTRAMUSCULAR; INTRAVENOUS ONCE
Status: COMPLETED | OUTPATIENT
Start: 2022-04-13 | End: 2022-04-13

## 2022-04-13 RX ADMIN — SODIUM CHLORIDE 100 ML/HR: 9 INJECTION, SOLUTION INTRAVENOUS at 22:51

## 2022-04-13 RX ADMIN — Medication 3 ML: at 22:51

## 2022-04-13 RX ADMIN — ONDANSETRON 4 MG: 2 INJECTION INTRAMUSCULAR; INTRAVENOUS at 17:47

## 2022-04-13 RX ADMIN — CEFTRIAXONE 1 G: 1 INJECTION, SOLUTION INTRAVENOUS at 19:49

## 2022-04-13 RX ADMIN — SODIUM CHLORIDE 500 ML: 9 INJECTION, SOLUTION INTRAVENOUS at 17:45

## 2022-04-13 RX ADMIN — HEPARIN SODIUM 5000 UNITS: 5000 INJECTION INTRAVENOUS; SUBCUTANEOUS at 22:51

## 2022-04-13 RX ADMIN — MORPHINE SULFATE 4 MG: 4 INJECTION, SOLUTION INTRAMUSCULAR; INTRAVENOUS at 19:29

## 2022-04-14 ENCOUNTER — APPOINTMENT (OUTPATIENT)
Dept: GENERAL RADIOLOGY | Facility: HOSPITAL | Age: 77
End: 2022-04-14

## 2022-04-14 LAB
ALBUMIN SERPL-MCNC: 4.1 G/DL (ref 3.5–5.2)
ALBUMIN/GLOB SERPL: 1.9 G/DL
ALP SERPL-CCNC: 91 U/L (ref 39–117)
ALT SERPL W P-5'-P-CCNC: 10 U/L (ref 1–41)
ANION GAP SERPL CALCULATED.3IONS-SCNC: 8.8 MMOL/L (ref 5–15)
AST SERPL-CCNC: 14 U/L (ref 1–40)
BACTERIA UR QL AUTO: ABNORMAL /HPF
BASOPHILS # BLD AUTO: 0.03 10*3/MM3 (ref 0–0.2)
BASOPHILS NFR BLD AUTO: 0.3 % (ref 0–1.5)
BILIRUB SERPL-MCNC: 0.7 MG/DL (ref 0–1.2)
BILIRUB UR QL STRIP: NEGATIVE
BUN SERPL-MCNC: 20 MG/DL (ref 8–23)
BUN/CREAT SERPL: 12.3 (ref 7–25)
CALCIUM SPEC-SCNC: 8.8 MG/DL (ref 8.6–10.5)
CHLORIDE SERPL-SCNC: 106 MMOL/L (ref 98–107)
CLARITY UR: ABNORMAL
CO2 SERPL-SCNC: 26.2 MMOL/L (ref 22–29)
COLOR UR: YELLOW
CREAT SERPL-MCNC: 1.62 MG/DL (ref 0.76–1.27)
DEPRECATED RDW RBC AUTO: 44.7 FL (ref 37–54)
EGFRCR SERPLBLD CKD-EPI 2021: 44 ML/MIN/1.73
EOSINOPHIL # BLD AUTO: 0.05 10*3/MM3 (ref 0–0.4)
EOSINOPHIL NFR BLD AUTO: 0.6 % (ref 0.3–6.2)
ERYTHROCYTE [DISTWIDTH] IN BLOOD BY AUTOMATED COUNT: 12.7 % (ref 12.3–15.4)
GLOBULIN UR ELPH-MCNC: 2.2 GM/DL
GLUCOSE SERPL-MCNC: 98 MG/DL (ref 65–99)
GLUCOSE UR STRIP-MCNC: NEGATIVE MG/DL
HCT VFR BLD AUTO: 42.8 % (ref 37.5–51)
HGB BLD-MCNC: 14.6 G/DL (ref 13–17.7)
HGB UR QL STRIP.AUTO: ABNORMAL
HYALINE CASTS UR QL AUTO: ABNORMAL /LPF
IMM GRANULOCYTES # BLD AUTO: 0.02 10*3/MM3 (ref 0–0.05)
IMM GRANULOCYTES NFR BLD AUTO: 0.2 % (ref 0–0.5)
KETONES UR QL STRIP: NEGATIVE
LEUKOCYTE ESTERASE UR QL STRIP.AUTO: ABNORMAL
LYMPHOCYTES # BLD AUTO: 1.47 10*3/MM3 (ref 0.7–3.1)
LYMPHOCYTES NFR BLD AUTO: 16.9 % (ref 19.6–45.3)
MCH RBC QN AUTO: 32.5 PG (ref 26.6–33)
MCHC RBC AUTO-ENTMCNC: 34.1 G/DL (ref 31.5–35.7)
MCV RBC AUTO: 95.3 FL (ref 79–97)
MONOCYTES # BLD AUTO: 0.77 10*3/MM3 (ref 0.1–0.9)
MONOCYTES NFR BLD AUTO: 8.8 % (ref 5–12)
NEUTROPHILS NFR BLD AUTO: 6.37 10*3/MM3 (ref 1.7–7)
NEUTROPHILS NFR BLD AUTO: 73.2 % (ref 42.7–76)
NITRITE UR QL STRIP: POSITIVE
NRBC BLD AUTO-RTO: 0 /100 WBC (ref 0–0.2)
PH UR STRIP.AUTO: >=9 [PH] (ref 5–8)
PLATELET # BLD AUTO: 203 10*3/MM3 (ref 140–450)
PMV BLD AUTO: 11 FL (ref 6–12)
POTASSIUM SERPL-SCNC: 4.8 MMOL/L (ref 3.5–5.2)
PROT SERPL-MCNC: 6.3 G/DL (ref 6–8.5)
PROT UR QL STRIP: ABNORMAL
RBC # BLD AUTO: 4.49 10*6/MM3 (ref 4.14–5.8)
RBC # UR STRIP: ABNORMAL /HPF
REF LAB TEST METHOD: ABNORMAL
SARS-COV-2 RNA PNL SPEC NAA+PROBE: NOT DETECTED
SODIUM SERPL-SCNC: 141 MMOL/L (ref 136–145)
SP GR UR STRIP: 1.01 (ref 1–1.03)
SQUAMOUS #/AREA URNS HPF: ABNORMAL /HPF
TRI-PHOS CRY URNS QL MICRO: ABNORMAL /HPF
UROBILINOGEN UR QL STRIP: ABNORMAL
WBC # UR STRIP: ABNORMAL /HPF
WBC NRBC COR # BLD: 8.71 10*3/MM3 (ref 3.4–10.8)

## 2022-04-14 PROCEDURE — 25010000002 HEPARIN (PORCINE) PER 1000 UNITS: Performed by: FAMILY MEDICINE

## 2022-04-14 PROCEDURE — 81001 URINALYSIS AUTO W/SCOPE: CPT | Performed by: FAMILY MEDICINE

## 2022-04-14 PROCEDURE — 74022 RADEX COMPL AQT ABD SERIES: CPT

## 2022-04-14 PROCEDURE — 25010000002 CEFTRIAXONE SODIUM-DEXTROSE 1-3.74 GM-%(50ML) RECONSTITUTED SOLUTION: Performed by: FAMILY MEDICINE

## 2022-04-14 PROCEDURE — 80053 COMPREHEN METABOLIC PANEL: CPT | Performed by: FAMILY MEDICINE

## 2022-04-14 PROCEDURE — 99232 SBSQ HOSP IP/OBS MODERATE 35: CPT | Performed by: SURGERY

## 2022-04-14 PROCEDURE — 85025 COMPLETE CBC W/AUTO DIFF WBC: CPT | Performed by: FAMILY MEDICINE

## 2022-04-14 PROCEDURE — 25010000002 MORPHINE SULFATE (PF) 2 MG/ML SOLUTION: Performed by: FAMILY MEDICINE

## 2022-04-14 PROCEDURE — 87086 URINE CULTURE/COLONY COUNT: CPT | Performed by: FAMILY MEDICINE

## 2022-04-14 PROCEDURE — 99232 SBSQ HOSP IP/OBS MODERATE 35: CPT | Performed by: NURSE PRACTITIONER

## 2022-04-14 PROCEDURE — 97165 OT EVAL LOW COMPLEX 30 MIN: CPT

## 2022-04-14 PROCEDURE — 97161 PT EVAL LOW COMPLEX 20 MIN: CPT

## 2022-04-14 RX ORDER — IPRATROPIUM BROMIDE AND ALBUTEROL SULFATE 2.5; .5 MG/3ML; MG/3ML
3 SOLUTION RESPIRATORY (INHALATION)
Status: DISCONTINUED | OUTPATIENT
Start: 2022-04-14 | End: 2022-04-14

## 2022-04-14 RX ORDER — MORPHINE SULFATE 2 MG/ML
2 INJECTION, SOLUTION INTRAMUSCULAR; INTRAVENOUS EVERY 4 HOURS PRN
Status: DISCONTINUED | OUTPATIENT
Start: 2022-04-14 | End: 2022-04-16

## 2022-04-14 RX ORDER — NALOXONE HCL 0.4 MG/ML
0.4 VIAL (ML) INJECTION
Status: DISCONTINUED | OUTPATIENT
Start: 2022-04-14 | End: 2022-04-16

## 2022-04-14 RX ADMIN — Medication 3 ML: at 20:15

## 2022-04-14 RX ADMIN — Medication 3 ML: at 09:51

## 2022-04-14 RX ADMIN — HEPARIN SODIUM 5000 UNITS: 5000 INJECTION INTRAVENOUS; SUBCUTANEOUS at 09:52

## 2022-04-14 RX ADMIN — TERAZOSIN HYDROCHLORIDE 5 MG: 5 CAPSULE ORAL at 20:14

## 2022-04-14 RX ADMIN — CEFTRIAXONE 1 G: 1 INJECTION, SOLUTION INTRAVENOUS at 20:14

## 2022-04-14 RX ADMIN — SODIUM CHLORIDE 100 ML/HR: 9 INJECTION, SOLUTION INTRAVENOUS at 09:22

## 2022-04-14 RX ADMIN — MORPHINE SULFATE 2 MG: 2 INJECTION, SOLUTION INTRAMUSCULAR; INTRAVENOUS at 04:20

## 2022-04-14 RX ADMIN — SODIUM CHLORIDE 100 ML/HR: 9 INJECTION, SOLUTION INTRAVENOUS at 21:22

## 2022-04-14 RX ADMIN — FINASTERIDE 5 MG: 5 TABLET, FILM COATED ORAL at 09:51

## 2022-04-14 RX ADMIN — PANTOPRAZOLE SODIUM 40 MG: 40 INJECTION, POWDER, FOR SOLUTION INTRAVENOUS at 05:53

## 2022-04-14 RX ADMIN — MORPHINE SULFATE 2 MG: 2 INJECTION, SOLUTION INTRAMUSCULAR; INTRAVENOUS at 21:22

## 2022-04-14 RX ADMIN — HEPARIN SODIUM 5000 UNITS: 5000 INJECTION INTRAVENOUS; SUBCUTANEOUS at 20:14

## 2022-04-15 ENCOUNTER — ANESTHESIA EVENT (OUTPATIENT)
Dept: PERIOP | Facility: HOSPITAL | Age: 77
End: 2022-04-15

## 2022-04-15 ENCOUNTER — APPOINTMENT (OUTPATIENT)
Dept: CT IMAGING | Facility: HOSPITAL | Age: 77
End: 2022-04-15

## 2022-04-15 LAB
ANION GAP SERPL CALCULATED.3IONS-SCNC: 10.4 MMOL/L (ref 5–15)
BACTERIA SPEC AEROBE CULT: NORMAL
BUN SERPL-MCNC: 20 MG/DL (ref 8–23)
BUN/CREAT SERPL: 12.7 (ref 7–25)
CALCIUM SPEC-SCNC: 8.6 MG/DL (ref 8.6–10.5)
CHLORIDE SERPL-SCNC: 105 MMOL/L (ref 98–107)
CO2 SERPL-SCNC: 24.6 MMOL/L (ref 22–29)
CREAT SERPL-MCNC: 1.57 MG/DL (ref 0.76–1.27)
DEPRECATED RDW RBC AUTO: 44.9 FL (ref 37–54)
EGFRCR SERPLBLD CKD-EPI 2021: 45.7 ML/MIN/1.73
ERYTHROCYTE [DISTWIDTH] IN BLOOD BY AUTOMATED COUNT: 12.7 % (ref 12.3–15.4)
GLUCOSE SERPL-MCNC: 97 MG/DL (ref 65–99)
HCT VFR BLD AUTO: 42.8 % (ref 37.5–51)
HGB BLD-MCNC: 14.5 G/DL (ref 13–17.7)
MCH RBC QN AUTO: 32.5 PG (ref 26.6–33)
MCHC RBC AUTO-ENTMCNC: 33.9 G/DL (ref 31.5–35.7)
MCV RBC AUTO: 96 FL (ref 79–97)
PLATELET # BLD AUTO: 224 10*3/MM3 (ref 140–450)
PMV BLD AUTO: 10.2 FL (ref 6–12)
POTASSIUM SERPL-SCNC: 4.4 MMOL/L (ref 3.5–5.2)
RBC # BLD AUTO: 4.46 10*6/MM3 (ref 4.14–5.8)
SODIUM SERPL-SCNC: 140 MMOL/L (ref 136–145)
WBC NRBC COR # BLD: 7.59 10*3/MM3 (ref 3.4–10.8)

## 2022-04-15 PROCEDURE — 25010000002 MORPHINE SULFATE (PF) 2 MG/ML SOLUTION: Performed by: FAMILY MEDICINE

## 2022-04-15 PROCEDURE — 74177 CT ABD & PELVIS W/CONTRAST: CPT

## 2022-04-15 PROCEDURE — 25010000002 CEFTRIAXONE SODIUM-DEXTROSE 1-3.74 GM-%(50ML) RECONSTITUTED SOLUTION: Performed by: FAMILY MEDICINE

## 2022-04-15 PROCEDURE — 25010000002 HEPARIN (PORCINE) PER 1000 UNITS: Performed by: FAMILY MEDICINE

## 2022-04-15 PROCEDURE — 85027 COMPLETE CBC AUTOMATED: CPT | Performed by: NURSE PRACTITIONER

## 2022-04-15 PROCEDURE — 0 DIATRIZOATE MEGLUMINE & SODIUM PER 1 ML: Performed by: FAMILY MEDICINE

## 2022-04-15 PROCEDURE — 25010000002 IOPAMIDOL 61 % SOLUTION: Performed by: FAMILY MEDICINE

## 2022-04-15 PROCEDURE — 99024 POSTOP FOLLOW-UP VISIT: CPT | Performed by: SURGERY

## 2022-04-15 PROCEDURE — 80048 BASIC METABOLIC PNL TOTAL CA: CPT | Performed by: NURSE PRACTITIONER

## 2022-04-15 PROCEDURE — 99232 SBSQ HOSP IP/OBS MODERATE 35: CPT | Performed by: NURSE PRACTITIONER

## 2022-04-15 RX ADMIN — Medication 3 ML: at 08:06

## 2022-04-15 RX ADMIN — HEPARIN SODIUM 5000 UNITS: 5000 INJECTION INTRAVENOUS; SUBCUTANEOUS at 21:09

## 2022-04-15 RX ADMIN — FINASTERIDE 5 MG: 5 TABLET, FILM COATED ORAL at 08:05

## 2022-04-15 RX ADMIN — MORPHINE SULFATE 2 MG: 2 INJECTION, SOLUTION INTRAMUSCULAR; INTRAVENOUS at 16:11

## 2022-04-15 RX ADMIN — CEFTRIAXONE 1 G: 1 INJECTION, SOLUTION INTRAVENOUS at 21:09

## 2022-04-15 RX ADMIN — LEVOTHYROXINE SODIUM 88 MCG: 88 TABLET ORAL at 05:43

## 2022-04-15 RX ADMIN — IOPAMIDOL 100 ML: 612 INJECTION, SOLUTION INTRAVENOUS at 16:27

## 2022-04-15 RX ADMIN — PANTOPRAZOLE SODIUM 40 MG: 40 INJECTION, POWDER, FOR SOLUTION INTRAVENOUS at 05:43

## 2022-04-15 RX ADMIN — HEPARIN SODIUM 5000 UNITS: 5000 INJECTION INTRAVENOUS; SUBCUTANEOUS at 08:05

## 2022-04-15 RX ADMIN — Medication 3 ML: at 21:10

## 2022-04-15 RX ADMIN — SODIUM CHLORIDE 100 ML/HR: 9 INJECTION, SOLUTION INTRAVENOUS at 21:12

## 2022-04-15 RX ADMIN — DIATRIZOATE MEGLUMINE AND DIATRIZOATE SODIUM 30 ML: 660; 100 LIQUID ORAL; RECTAL at 16:26

## 2022-04-16 ENCOUNTER — ANESTHESIA EVENT CONVERTED (OUTPATIENT)
Dept: ANESTHESIOLOGY | Facility: HOSPITAL | Age: 77
End: 2022-04-16

## 2022-04-16 ENCOUNTER — ANESTHESIA (OUTPATIENT)
Dept: PERIOP | Facility: HOSPITAL | Age: 77
End: 2022-04-16

## 2022-04-16 PROBLEM — N39.0 ACUTE UTI: Status: ACTIVE | Noted: 2022-04-16

## 2022-04-16 LAB
ANION GAP SERPL CALCULATED.3IONS-SCNC: 10 MMOL/L (ref 5–15)
BACTERIA SPEC AEROBE CULT: NORMAL
BUN SERPL-MCNC: 18 MG/DL (ref 8–23)
BUN/CREAT SERPL: 12.7 (ref 7–25)
CALCIUM SPEC-SCNC: 8.5 MG/DL (ref 8.6–10.5)
CHLORIDE SERPL-SCNC: 106 MMOL/L (ref 98–107)
CO2 SERPL-SCNC: 23 MMOL/L (ref 22–29)
CREAT SERPL-MCNC: 1.42 MG/DL (ref 0.76–1.27)
DEPRECATED RDW RBC AUTO: 43 FL (ref 37–54)
EGFRCR SERPLBLD CKD-EPI 2021: 51.5 ML/MIN/1.73
ERYTHROCYTE [DISTWIDTH] IN BLOOD BY AUTOMATED COUNT: 12.3 % (ref 12.3–15.4)
GLUCOSE SERPL-MCNC: 91 MG/DL (ref 65–99)
HCT VFR BLD AUTO: 38.9 % (ref 37.5–51)
HGB BLD-MCNC: 13.6 G/DL (ref 13–17.7)
MCH RBC QN AUTO: 32.9 PG (ref 26.6–33)
MCHC RBC AUTO-ENTMCNC: 35 G/DL (ref 31.5–35.7)
MCV RBC AUTO: 94.2 FL (ref 79–97)
PLATELET # BLD AUTO: 214 10*3/MM3 (ref 140–450)
PMV BLD AUTO: 10.6 FL (ref 6–12)
POTASSIUM SERPL-SCNC: 4.5 MMOL/L (ref 3.5–5.2)
RBC # BLD AUTO: 4.13 10*6/MM3 (ref 4.14–5.8)
SODIUM SERPL-SCNC: 139 MMOL/L (ref 136–145)
WBC NRBC COR # BLD: 7.71 10*3/MM3 (ref 3.4–10.8)

## 2022-04-16 PROCEDURE — 25010000002 FENTANYL CITRATE (PF) 250 MCG/5ML SOLUTION: Performed by: NURSE ANESTHETIST, CERTIFIED REGISTERED

## 2022-04-16 PROCEDURE — 99232 SBSQ HOSP IP/OBS MODERATE 35: CPT | Performed by: NURSE PRACTITIONER

## 2022-04-16 PROCEDURE — 80048 BASIC METABOLIC PNL TOTAL CA: CPT | Performed by: NURSE PRACTITIONER

## 2022-04-16 PROCEDURE — 25010000002 ONDANSETRON PER 1 MG: Performed by: NURSE ANESTHETIST, CERTIFIED REGISTERED

## 2022-04-16 PROCEDURE — 88307 TISSUE EXAM BY PATHOLOGIST: CPT | Performed by: SURGERY

## 2022-04-16 PROCEDURE — 25010000002 DEXAMETHASONE PER 1 MG: Performed by: NURSE ANESTHETIST, CERTIFIED REGISTERED

## 2022-04-16 PROCEDURE — 25010000002 HYDROMORPHONE PER 4 MG: Performed by: NURSE ANESTHETIST, CERTIFIED REGISTERED

## 2022-04-16 PROCEDURE — 0 CEFAZOLIN SODIUM-DEXTROSE 1-4 GM-%(50ML) RECONSTITUTED SOLUTION: Performed by: SURGERY

## 2022-04-16 PROCEDURE — 99232 SBSQ HOSP IP/OBS MODERATE 35: CPT | Performed by: SURGERY

## 2022-04-16 PROCEDURE — 25010000002 PROPOFOL 200 MG/20ML EMULSION: Performed by: NURSE ANESTHETIST, CERTIFIED REGISTERED

## 2022-04-16 PROCEDURE — C1889 IMPLANT/INSERT DEVICE, NOC: HCPCS | Performed by: SURGERY

## 2022-04-16 PROCEDURE — 85027 COMPLETE CBC AUTOMATED: CPT | Performed by: NURSE PRACTITIONER

## 2022-04-16 PROCEDURE — 0DSA0ZZ REPOSITION JEJUNUM, OPEN APPROACH: ICD-10-PCS | Performed by: SURGERY

## 2022-04-16 PROCEDURE — 25010000002 ROPIVACAINE PER 1 MG: Performed by: NURSE ANESTHETIST, CERTIFIED REGISTERED

## 2022-04-16 PROCEDURE — 0DBA0ZZ EXCISION OF JEJUNUM, OPEN APPROACH: ICD-10-PCS | Performed by: SURGERY

## 2022-04-16 PROCEDURE — 25010000002 MORPHINE SULFATE (PF) 2 MG/ML SOLUTION: Performed by: FAMILY MEDICINE

## 2022-04-16 PROCEDURE — 25010000002 SUCCINYLCHOLINE PER 20 MG: Performed by: NURSE ANESTHETIST, CERTIFIED REGISTERED

## 2022-04-16 PROCEDURE — 25010000002 HYDROMORPHONE 1 MG/ML SOLUTION: Performed by: SURGERY

## 2022-04-16 PROCEDURE — 25010000002 MORPHINE PER 10 MG: Performed by: FAMILY MEDICINE

## 2022-04-16 PROCEDURE — 44120 REMOVAL OF SMALL INTESTINE: CPT | Performed by: SURGERY

## 2022-04-16 PROCEDURE — 25010000002 PIPERACILLIN SOD-TAZOBACTAM PER 1 G: Performed by: SURGERY

## 2022-04-16 PROCEDURE — 44050 REDUCE BOWEL OBSTRUCTION: CPT | Performed by: SURGERY

## 2022-04-16 DEVICE — PROXIMATE LINEAR CUTTER RELOAD, BLUE, 75MM
Type: IMPLANTABLE DEVICE | Site: ABDOMEN | Status: FUNCTIONAL
Brand: PROXIMATE

## 2022-04-16 DEVICE — PROXIMATE RELOADABLE LINEAR CUTTER WITH SAFETY LOCK-OUT, 75MM
Type: IMPLANTABLE DEVICE | Site: ABDOMEN | Status: FUNCTIONAL
Brand: PROXIMATE

## 2022-04-16 RX ORDER — HYDROMORPHONE HCL 110MG/55ML
1 PATIENT CONTROLLED ANALGESIA SYRINGE INTRAVENOUS
Status: DISCONTINUED | OUTPATIENT
Start: 2022-04-16 | End: 2022-04-16

## 2022-04-16 RX ORDER — PROPOFOL 10 MG/ML
INJECTION, EMULSION INTRAVENOUS AS NEEDED
Status: DISCONTINUED | OUTPATIENT
Start: 2022-04-16 | End: 2022-04-16 | Stop reason: SURG

## 2022-04-16 RX ORDER — ONDANSETRON 2 MG/ML
INJECTION INTRAMUSCULAR; INTRAVENOUS AS NEEDED
Status: DISCONTINUED | OUTPATIENT
Start: 2022-04-16 | End: 2022-04-16 | Stop reason: SURG

## 2022-04-16 RX ORDER — SODIUM CHLORIDE 9 MG/ML
INJECTION, SOLUTION INTRAVENOUS CONTINUOUS PRN
Status: DISCONTINUED | OUTPATIENT
Start: 2022-04-16 | End: 2022-04-16 | Stop reason: SURG

## 2022-04-16 RX ORDER — CEFAZOLIN SODIUM 2 G/50ML
2 SOLUTION INTRAVENOUS
Status: DISCONTINUED | OUTPATIENT
Start: 2022-04-16 | End: 2022-04-16

## 2022-04-16 RX ORDER — DEXAMETHASONE SODIUM PHOSPHATE 4 MG/ML
INJECTION, SOLUTION INTRA-ARTICULAR; INTRALESIONAL; INTRAMUSCULAR; INTRAVENOUS; SOFT TISSUE AS NEEDED
Status: DISCONTINUED | OUTPATIENT
Start: 2022-04-16 | End: 2022-04-16 | Stop reason: SURG

## 2022-04-16 RX ORDER — FENTANYL CITRATE 50 UG/ML
INJECTION, SOLUTION INTRAMUSCULAR; INTRAVENOUS AS NEEDED
Status: DISCONTINUED | OUTPATIENT
Start: 2022-04-16 | End: 2022-04-16 | Stop reason: SURG

## 2022-04-16 RX ORDER — SODIUM CHLORIDE 0.9 % (FLUSH) 0.9 %
3 SYRINGE (ML) INJECTION EVERY 12 HOURS SCHEDULED
Status: DISCONTINUED | OUTPATIENT
Start: 2022-04-16 | End: 2022-04-22 | Stop reason: HOSPADM

## 2022-04-16 RX ORDER — PANTOPRAZOLE SODIUM 40 MG/10ML
40 INJECTION, POWDER, LYOPHILIZED, FOR SOLUTION INTRAVENOUS
Status: DISCONTINUED | OUTPATIENT
Start: 2022-04-16 | End: 2022-04-16 | Stop reason: SDUPTHER

## 2022-04-16 RX ORDER — SUCCINYLCHOLINE CHLORIDE 20 MG/ML
INJECTION INTRAMUSCULAR; INTRAVENOUS AS NEEDED
Status: DISCONTINUED | OUTPATIENT
Start: 2022-04-16 | End: 2022-04-16 | Stop reason: SURG

## 2022-04-16 RX ORDER — CEFAZOLIN SODIUM 1 G/50ML
1 SOLUTION INTRAVENOUS ONCE
Status: COMPLETED | OUTPATIENT
Start: 2022-04-16 | End: 2022-04-16

## 2022-04-16 RX ORDER — DEXTROSE AND SODIUM CHLORIDE 5; .45 G/100ML; G/100ML
100 INJECTION, SOLUTION INTRAVENOUS CONTINUOUS
Status: DISCONTINUED | OUTPATIENT
Start: 2022-04-16 | End: 2022-04-20

## 2022-04-16 RX ORDER — ROCURONIUM BROMIDE 10 MG/ML
INJECTION, SOLUTION INTRAVENOUS AS NEEDED
Status: DISCONTINUED | OUTPATIENT
Start: 2022-04-16 | End: 2022-04-16 | Stop reason: SURG

## 2022-04-16 RX ORDER — MAGNESIUM HYDROXIDE 1200 MG/15ML
LIQUID ORAL AS NEEDED
Status: DISCONTINUED | OUTPATIENT
Start: 2022-04-16 | End: 2022-04-16 | Stop reason: HOSPADM

## 2022-04-16 RX ORDER — NALOXONE HCL 0.4 MG/ML
0.1 VIAL (ML) INJECTION
Status: DISCONTINUED | OUTPATIENT
Start: 2022-04-16 | End: 2022-04-22 | Stop reason: HOSPADM

## 2022-04-16 RX ORDER — ROPIVACAINE HYDROCHLORIDE 2 MG/ML
INJECTION, SOLUTION EPIDURAL; INFILTRATION; PERINEURAL
Status: COMPLETED | OUTPATIENT
Start: 2022-04-16 | End: 2022-04-16

## 2022-04-16 RX ORDER — SODIUM CHLORIDE 0.9 % (FLUSH) 0.9 %
3-10 SYRINGE (ML) INJECTION AS NEEDED
Status: DISCONTINUED | OUTPATIENT
Start: 2022-04-16 | End: 2022-04-22 | Stop reason: HOSPADM

## 2022-04-16 RX ORDER — LIDOCAINE HYDROCHLORIDE 20 MG/ML
INJECTION, SOLUTION INTRAVENOUS AS NEEDED
Status: DISCONTINUED | OUTPATIENT
Start: 2022-04-16 | End: 2022-04-16 | Stop reason: SURG

## 2022-04-16 RX ORDER — SODIUM CHLORIDE 9 MG/ML
100 INJECTION, SOLUTION INTRAVENOUS CONTINUOUS
Status: DISCONTINUED | OUTPATIENT
Start: 2022-04-16 | End: 2022-04-16

## 2022-04-16 RX ORDER — HYDROMORPHONE HCL 110MG/55ML
PATIENT CONTROLLED ANALGESIA SYRINGE INTRAVENOUS AS NEEDED
Status: DISCONTINUED | OUTPATIENT
Start: 2022-04-16 | End: 2022-04-16 | Stop reason: SURG

## 2022-04-16 RX ORDER — SODIUM CHLORIDE 9 MG/ML
125 INJECTION, SOLUTION INTRAVENOUS CONTINUOUS
Status: DISCONTINUED | OUTPATIENT
Start: 2022-04-16 | End: 2022-04-16

## 2022-04-16 RX ORDER — NALOXONE HCL 0.4 MG/ML
0.1 VIAL (ML) INJECTION
Status: DISCONTINUED | OUTPATIENT
Start: 2022-04-16 | End: 2022-04-16

## 2022-04-16 RX ADMIN — SODIUM CHLORIDE 100 ML/HR: 9 INJECTION, SOLUTION INTRAVENOUS at 07:15

## 2022-04-16 RX ADMIN — DEXAMETHASONE SODIUM PHOSPHATE 4 MG: 4 INJECTION, SOLUTION INTRAMUSCULAR; INTRAVENOUS at 10:03

## 2022-04-16 RX ADMIN — FENTANYL CITRATE 100 MCG: 50 INJECTION, SOLUTION INTRAMUSCULAR; INTRAVENOUS at 10:04

## 2022-04-16 RX ADMIN — ONDANSETRON 4 MG: 2 INJECTION INTRAMUSCULAR; INTRAVENOUS at 10:03

## 2022-04-16 RX ADMIN — HYDROMORPHONE HYDROCHLORIDE 1 MG: 1 INJECTION, SOLUTION INTRAMUSCULAR; INTRAVENOUS; SUBCUTANEOUS at 23:04

## 2022-04-16 RX ADMIN — MORPHINE SULFATE 2 MG: 2 INJECTION, SOLUTION INTRAMUSCULAR; INTRAVENOUS at 08:59

## 2022-04-16 RX ADMIN — PANTOPRAZOLE SODIUM 40 MG: 40 INJECTION, POWDER, FOR SOLUTION INTRAVENOUS at 07:14

## 2022-04-16 RX ADMIN — DEXTROSE AND SODIUM CHLORIDE 100 ML/HR: 5; 450 INJECTION, SOLUTION INTRAVENOUS at 23:04

## 2022-04-16 RX ADMIN — HYDROMORPHONE HYDROCHLORIDE 0.5 MG: 2 INJECTION, SOLUTION INTRAMUSCULAR; INTRAVENOUS; SUBCUTANEOUS at 10:37

## 2022-04-16 RX ADMIN — DEXTROSE AND SODIUM CHLORIDE 100 ML/HR: 5; 450 INJECTION, SOLUTION INTRAVENOUS at 11:37

## 2022-04-16 RX ADMIN — Medication 3 ML: at 13:27

## 2022-04-16 RX ADMIN — FENTANYL CITRATE 100 MCG: 50 INJECTION, SOLUTION INTRAMUSCULAR; INTRAVENOUS at 09:44

## 2022-04-16 RX ADMIN — HYDROMORPHONE HYDROCHLORIDE 0.5 MG: 2 INJECTION, SOLUTION INTRAMUSCULAR; INTRAVENOUS; SUBCUTANEOUS at 10:24

## 2022-04-16 RX ADMIN — TAZOBACTAM SODIUM AND PIPERACILLIN SODIUM 3.38 G: 375; 3 INJECTION, SOLUTION INTRAVENOUS at 15:48

## 2022-04-16 RX ADMIN — FENTANYL CITRATE 50 MCG: 50 INJECTION, SOLUTION INTRAMUSCULAR; INTRAVENOUS at 10:11

## 2022-04-16 RX ADMIN — ROPIVACAINE HYDROCHLORIDE 50 ML: 2 INJECTION, SOLUTION EPIDURAL; INFILTRATION at 10:48

## 2022-04-16 RX ADMIN — MORPHINE SULFATE 2 MG: 2 INJECTION, SOLUTION INTRAMUSCULAR; INTRAVENOUS at 02:26

## 2022-04-16 RX ADMIN — ROCURONIUM BROMIDE 30 MG: 10 INJECTION INTRAVENOUS at 10:03

## 2022-04-16 RX ADMIN — HYDROMORPHONE HYDROCHLORIDE 0.5 MG: 2 INJECTION, SOLUTION INTRAMUSCULAR; INTRAVENOUS; SUBCUTANEOUS at 10:20

## 2022-04-16 RX ADMIN — PROPOFOL 150 MG: 10 INJECTION, EMULSION INTRAVENOUS at 09:46

## 2022-04-16 RX ADMIN — SODIUM CHLORIDE: 9 INJECTION, SOLUTION INTRAVENOUS at 09:24

## 2022-04-16 RX ADMIN — SUGAMMADEX 300 MG: 100 INJECTION, SOLUTION INTRAVENOUS at 10:42

## 2022-04-16 RX ADMIN — SUCCINYLCHOLINE CHLORIDE 160 MG: 20 INJECTION, SOLUTION INTRAMUSCULAR; INTRAVENOUS at 09:46

## 2022-04-16 RX ADMIN — Medication 3 ML: at 09:05

## 2022-04-16 RX ADMIN — HYDROMORPHONE HYDROCHLORIDE 1 MG: 1 INJECTION, SOLUTION INTRAMUSCULAR; INTRAVENOUS; SUBCUTANEOUS at 16:03

## 2022-04-16 RX ADMIN — CEFAZOLIN SODIUM 1 G: 1 SOLUTION INTRAVENOUS at 09:36

## 2022-04-16 RX ADMIN — HYDROMORPHONE HYDROCHLORIDE 0.5 MG: 2 INJECTION, SOLUTION INTRAMUSCULAR; INTRAVENOUS; SUBCUTANEOUS at 10:30

## 2022-04-16 RX ADMIN — LIDOCAINE HYDROCHLORIDE 60 MG: 20 INJECTION, SOLUTION INTRAVENOUS at 09:44

## 2022-04-16 NOTE — ANESTHESIA POSTPROCEDURE EVALUATION
Patient: Nestor Nye    Procedure Summary     Date: 04/16/22 Room / Location: [facility] OR  /  [facility]    Anesthesia Start: 0926 Anesthesia Stop: 1058    Procedure: LAPAROTOMY EXPLORATORY WITH SMALL BOWEL OBSTRUCTION (N/A Abdomen) Diagnosis:       Small bowel obstruction (HCC)      (Small bowel obstruction (HCC) [K56.609])    Surgeons: Maximo Bennett MD Provider: Chaz Ross CRNA    Anesthesia Type: general, general with block ASA Status: 3 - Emergent          Anesthesia Type: general, general with block    Vitals  Vitals Value Taken Time   /77 04/16/22 1058   Temp     Pulse 86 04/16/22 1058   Resp     SpO2 95 % 04/16/22 1058   Vitals shown include unvalidated device data.        Post Anesthesia Care and Evaluation    Patient location during evaluation: PACU  Patient participation: complete - patient participated  Level of consciousness: awake and alert and sleepy but conscious  Pain score: 2  Pain management: adequate  Airway patency: patent  Anesthetic complications: No anesthetic complications  PONV Status: none  Cardiovascular status: acceptable  Respiratory status: acceptable and face mask  Hydration status: acceptable

## 2022-04-16 NOTE — ANESTHESIA PROCEDURE NOTES
Peripheral Block      Patient reassessed immediately prior to procedure    Reason for block: at surgeon's request and post-op pain management  Preanesthetic Checklist  Completed: patient identified, IV checked, site marked, risks and benefits discussed, surgical consent, monitors and equipment checked, pre-op evaluation and timeout performed  Prep:  Pt Position: supine  Sterile barriers:gloves, cap, sterile barriers and mask  Prep: ChloraPrep  Patient monitoring: blood pressure monitoring, continuous pulse oximetry and EKG  Procedure    Guidance:ultrasound guided  Images:still images obtained    Laterality:Bilateral  Block Type:TAP  Injection Technique:single-shot  Needle Type:echogenic  Needle Gauge:21 G  Resistance on Injection: none    Medications Used: ropivacaine (NAROPIN) 0.2% injection, 50 mL      Medications  Comment:Block Injection: LA dose divided between Right and Left Block  Adjuncts per total volume of LA:    Decadron 10 mg PSF      If required, intravenous sedation was given -- see meds on anesthesia record.    Post Assessment  Injection Assessment: negative aspiration for heme, no paresthesia on injection and incremental injection  Patient Tolerance:comfortable throughout block  Complications:no  Additional Notes  Procedure:      BILATERAL TAP BLOCKS                             Patient analgesia was achieved with General Anesthesia    The pt was placed in the Supine Position and under Ultrasound guidance, an echogenic or touhy needle was advanced with Normal Saline hydro dissection of tissue.  The Internal Oblique and Transversus Abdominus muscles were visualized.  At or before the aponeurosis of Internal Oblique, the local anesthetic spread was visualized in the Transversus Abdominus Plane. Injection was made incrementally with aspiration every 5 mls.  There was no intravascular injection;  injection pressure was normal; there was no neural injection; and the procedure was completed without  difficulty.

## 2022-04-16 NOTE — ANESTHESIA PREPROCEDURE EVALUATION
Anesthesia Evaluation     Patient summary reviewed and Nursing notes reviewed   NPO Solid Status: > 8 hours  NPO Liquid Status: > 8 hours           Airway   Mallampati: II  TM distance: >3 FB  Neck ROM: full  Possible difficult intubation  Dental      Pulmonary    (+) a smoker Former, COPD, shortness of breath,     ROS comment: Vocal cord damage form previous surgery   Cardiovascular     (+) hypertension, hyperlipidemia,       Neuro/Psych  GI/Hepatic/Renal/Endo    (+)   renal disease, thyroid problem     Musculoskeletal     Abdominal    Substance History      OB/GYN          Other   arthritis,                      Anesthesia Plan    ASA 3 - emergent     general and general with block     intravenous induction     Anesthetic plan, all risks, benefits, and alternatives have been provided, discussed and informed consent has been obtained with: patient.    Plan discussed with CRNA.        CODE STATUS:    Code Status (Patient has no pulse and is not breathing): CPR (Attempt to Resuscitate)  Medical Interventions (Patient has pulse or is breathing): Full Support

## 2022-04-16 NOTE — ANESTHESIA PROCEDURE NOTES
Airway  Urgency: elective    Date/Time: 4/16/2022 9:33 AM  Airway not difficult    General Information and Staff    Patient location during procedure: OR  CRNA: Chaz Ross CRNA    Indications and Patient Condition  Indications for airway management: airway protection    Preoxygenated: yes  MILS maintained throughout  Mask difficulty assessment: 0 - not attempted    Final Airway Details  Final airway type: endotracheal airway      Successful airway: ETT  Cuffed: yes   Successful intubation technique: video laryngoscopy  Endotracheal tube insertion site: oral  Blade: Roche  Blade size: 2  ETT size (mm): 7.5  Cormack-Lehane Classification: grade I - full view of glottis  Placement verified by: chest auscultation and capnometry   Number of attempts at approach: 1  Assessment: lips, teeth, and gum same as pre-op and atraumatic intubation    Additional Comments  Abnormal vocal cords noted

## 2022-04-17 LAB
ANION GAP SERPL CALCULATED.3IONS-SCNC: 10.9 MMOL/L (ref 5–15)
BASOPHILS # BLD AUTO: 0.01 10*3/MM3 (ref 0–0.2)
BASOPHILS NFR BLD AUTO: 0.1 % (ref 0–1.5)
BUN SERPL-MCNC: 20 MG/DL (ref 8–23)
BUN/CREAT SERPL: 13.2 (ref 7–25)
CALCIUM SPEC-SCNC: 8.2 MG/DL (ref 8.6–10.5)
CHLORIDE SERPL-SCNC: 106 MMOL/L (ref 98–107)
CO2 SERPL-SCNC: 20.1 MMOL/L (ref 22–29)
CREAT SERPL-MCNC: 1.52 MG/DL (ref 0.76–1.27)
DEPRECATED RDW RBC AUTO: 43.5 FL (ref 37–54)
EGFRCR SERPLBLD CKD-EPI 2021: 47.5 ML/MIN/1.73
EOSINOPHIL # BLD AUTO: 0 10*3/MM3 (ref 0–0.4)
EOSINOPHIL NFR BLD AUTO: 0 % (ref 0.3–6.2)
ERYTHROCYTE [DISTWIDTH] IN BLOOD BY AUTOMATED COUNT: 12.5 % (ref 12.3–15.4)
GLUCOSE SERPL-MCNC: 126 MG/DL (ref 65–99)
HCT VFR BLD AUTO: 38.1 % (ref 37.5–51)
HGB BLD-MCNC: 13.2 G/DL (ref 13–17.7)
IMM GRANULOCYTES # BLD AUTO: 0.07 10*3/MM3 (ref 0–0.05)
IMM GRANULOCYTES NFR BLD AUTO: 0.6 % (ref 0–0.5)
LYMPHOCYTES # BLD AUTO: 0.99 10*3/MM3 (ref 0.7–3.1)
LYMPHOCYTES NFR BLD AUTO: 7.8 % (ref 19.6–45.3)
MCH RBC QN AUTO: 32.8 PG (ref 26.6–33)
MCHC RBC AUTO-ENTMCNC: 34.6 G/DL (ref 31.5–35.7)
MCV RBC AUTO: 94.8 FL (ref 79–97)
MONOCYTES # BLD AUTO: 1.53 10*3/MM3 (ref 0.1–0.9)
MONOCYTES NFR BLD AUTO: 12 % (ref 5–12)
NEUTROPHILS NFR BLD AUTO: 10.12 10*3/MM3 (ref 1.7–7)
NEUTROPHILS NFR BLD AUTO: 79.5 % (ref 42.7–76)
NRBC BLD AUTO-RTO: 0 /100 WBC (ref 0–0.2)
PLATELET # BLD AUTO: 204 10*3/MM3 (ref 140–450)
PMV BLD AUTO: 10.4 FL (ref 6–12)
POTASSIUM SERPL-SCNC: 4.4 MMOL/L (ref 3.5–5.2)
RBC # BLD AUTO: 4.02 10*6/MM3 (ref 4.14–5.8)
SODIUM SERPL-SCNC: 137 MMOL/L (ref 136–145)
WBC NRBC COR # BLD: 12.72 10*3/MM3 (ref 3.4–10.8)

## 2022-04-17 PROCEDURE — 25010000002 PIPERACILLIN SOD-TAZOBACTAM PER 1 G: Performed by: SURGERY

## 2022-04-17 PROCEDURE — 80048 BASIC METABOLIC PNL TOTAL CA: CPT | Performed by: SURGERY

## 2022-04-17 PROCEDURE — 25010000002 HYDROMORPHONE 1 MG/ML SOLUTION: Performed by: SURGERY

## 2022-04-17 PROCEDURE — 99232 SBSQ HOSP IP/OBS MODERATE 35: CPT | Performed by: NURSE PRACTITIONER

## 2022-04-17 PROCEDURE — 99024 POSTOP FOLLOW-UP VISIT: CPT | Performed by: SURGERY

## 2022-04-17 PROCEDURE — 85025 COMPLETE CBC W/AUTO DIFF WBC: CPT | Performed by: SURGERY

## 2022-04-17 RX ADMIN — HYDROMORPHONE HYDROCHLORIDE 1 MG: 1 INJECTION, SOLUTION INTRAMUSCULAR; INTRAVENOUS; SUBCUTANEOUS at 19:11

## 2022-04-17 RX ADMIN — TAZOBACTAM SODIUM AND PIPERACILLIN SODIUM 3.38 G: 375; 3 INJECTION, SOLUTION INTRAVENOUS at 23:05

## 2022-04-17 RX ADMIN — HYDROMORPHONE HYDROCHLORIDE 1 MG: 1 INJECTION, SOLUTION INTRAMUSCULAR; INTRAVENOUS; SUBCUTANEOUS at 07:53

## 2022-04-17 RX ADMIN — TAZOBACTAM SODIUM AND PIPERACILLIN SODIUM 3.38 G: 375; 3 INJECTION, SOLUTION INTRAVENOUS at 02:15

## 2022-04-17 RX ADMIN — HYDROMORPHONE HYDROCHLORIDE 1 MG: 1 INJECTION, SOLUTION INTRAMUSCULAR; INTRAVENOUS; SUBCUTANEOUS at 23:02

## 2022-04-17 RX ADMIN — TAZOBACTAM SODIUM AND PIPERACILLIN SODIUM 3.38 G: 375; 3 INJECTION, SOLUTION INTRAVENOUS at 07:52

## 2022-04-17 RX ADMIN — TAZOBACTAM SODIUM AND PIPERACILLIN SODIUM 3.38 G: 375; 3 INJECTION, SOLUTION INTRAVENOUS at 17:16

## 2022-04-17 RX ADMIN — HYDROMORPHONE HYDROCHLORIDE 1 MG: 1 INJECTION, SOLUTION INTRAMUSCULAR; INTRAVENOUS; SUBCUTANEOUS at 03:20

## 2022-04-17 RX ADMIN — Medication 3 ML: at 08:05

## 2022-04-17 RX ADMIN — HYDROMORPHONE HYDROCHLORIDE 1 MG: 1 INJECTION, SOLUTION INTRAMUSCULAR; INTRAVENOUS; SUBCUTANEOUS at 13:28

## 2022-04-17 RX ADMIN — DEXTROSE AND SODIUM CHLORIDE 100 ML/HR: 5; 450 INJECTION, SOLUTION INTRAVENOUS at 19:12

## 2022-04-18 LAB
ANION GAP SERPL CALCULATED.3IONS-SCNC: 7.8 MMOL/L (ref 5–15)
BUN SERPL-MCNC: 17 MG/DL (ref 8–23)
BUN/CREAT SERPL: 10.4 (ref 7–25)
CALCIUM SPEC-SCNC: 8.3 MG/DL (ref 8.6–10.5)
CHLORIDE SERPL-SCNC: 107 MMOL/L (ref 98–107)
CO2 SERPL-SCNC: 23.2 MMOL/L (ref 22–29)
CREAT SERPL-MCNC: 1.64 MG/DL (ref 0.76–1.27)
DEPRECATED RDW RBC AUTO: 44.1 FL (ref 37–54)
EGFRCR SERPLBLD CKD-EPI 2021: 43.4 ML/MIN/1.73
ERYTHROCYTE [DISTWIDTH] IN BLOOD BY AUTOMATED COUNT: 12.7 % (ref 12.3–15.4)
GLUCOSE SERPL-MCNC: 97 MG/DL (ref 65–99)
HCT VFR BLD AUTO: 38.5 % (ref 37.5–51)
HGB BLD-MCNC: 13.5 G/DL (ref 13–17.7)
MCH RBC QN AUTO: 33.1 PG (ref 26.6–33)
MCHC RBC AUTO-ENTMCNC: 35.1 G/DL (ref 31.5–35.7)
MCV RBC AUTO: 94.4 FL (ref 79–97)
PLATELET # BLD AUTO: 223 10*3/MM3 (ref 140–450)
PMV BLD AUTO: 10.8 FL (ref 6–12)
POTASSIUM SERPL-SCNC: 4 MMOL/L (ref 3.5–5.2)
RBC # BLD AUTO: 4.08 10*6/MM3 (ref 4.14–5.8)
SODIUM SERPL-SCNC: 138 MMOL/L (ref 136–145)
WBC NRBC COR # BLD: 8.99 10*3/MM3 (ref 3.4–10.8)

## 2022-04-18 PROCEDURE — 25010000002 HYDROMORPHONE 1 MG/ML SOLUTION: Performed by: SURGERY

## 2022-04-18 PROCEDURE — 94799 UNLISTED PULMONARY SVC/PX: CPT

## 2022-04-18 PROCEDURE — 94761 N-INVAS EAR/PLS OXIMETRY MLT: CPT

## 2022-04-18 PROCEDURE — 80048 BASIC METABOLIC PNL TOTAL CA: CPT | Performed by: SURGERY

## 2022-04-18 PROCEDURE — 94640 AIRWAY INHALATION TREATMENT: CPT

## 2022-04-18 PROCEDURE — 85027 COMPLETE CBC AUTOMATED: CPT | Performed by: SURGERY

## 2022-04-18 PROCEDURE — 25010000002 PIPERACILLIN SOD-TAZOBACTAM PER 1 G: Performed by: SURGERY

## 2022-04-18 PROCEDURE — 99024 POSTOP FOLLOW-UP VISIT: CPT | Performed by: SURGERY

## 2022-04-18 PROCEDURE — 99232 SBSQ HOSP IP/OBS MODERATE 35: CPT | Performed by: NURSE PRACTITIONER

## 2022-04-18 RX ORDER — IPRATROPIUM BROMIDE AND ALBUTEROL SULFATE 2.5; .5 MG/3ML; MG/3ML
3 SOLUTION RESPIRATORY (INHALATION)
Status: DISCONTINUED | OUTPATIENT
Start: 2022-04-18 | End: 2022-04-19

## 2022-04-18 RX ADMIN — IPRATROPIUM BROMIDE AND ALBUTEROL SULFATE 3 ML: 2.5; .5 SOLUTION RESPIRATORY (INHALATION) at 12:20

## 2022-04-18 RX ADMIN — HYDROMORPHONE HYDROCHLORIDE 1 MG: 1 INJECTION, SOLUTION INTRAMUSCULAR; INTRAVENOUS; SUBCUTANEOUS at 20:45

## 2022-04-18 RX ADMIN — IPRATROPIUM BROMIDE AND ALBUTEROL SULFATE 3 ML: 2.5; .5 SOLUTION RESPIRATORY (INHALATION) at 19:49

## 2022-04-18 RX ADMIN — TAZOBACTAM SODIUM AND PIPERACILLIN SODIUM 3.38 G: 375; 3 INJECTION, SOLUTION INTRAVENOUS at 08:01

## 2022-04-18 RX ADMIN — Medication 3 ML: at 09:00

## 2022-04-18 RX ADMIN — Medication 3 ML: at 20:46

## 2022-04-18 RX ADMIN — HYDROMORPHONE HYDROCHLORIDE 1 MG: 1 INJECTION, SOLUTION INTRAMUSCULAR; INTRAVENOUS; SUBCUTANEOUS at 10:01

## 2022-04-18 RX ADMIN — DEXTROSE AND SODIUM CHLORIDE 100 ML/HR: 5; 450 INJECTION, SOLUTION INTRAVENOUS at 05:48

## 2022-04-19 LAB
ANION GAP SERPL CALCULATED.3IONS-SCNC: 11.2 MMOL/L (ref 5–15)
BUN SERPL-MCNC: 17 MG/DL (ref 8–23)
BUN/CREAT SERPL: 12 (ref 7–25)
CALCIUM SPEC-SCNC: 8.6 MG/DL (ref 8.6–10.5)
CHLORIDE SERPL-SCNC: 104 MMOL/L (ref 98–107)
CO2 SERPL-SCNC: 22.8 MMOL/L (ref 22–29)
CREAT SERPL-MCNC: 1.42 MG/DL (ref 0.76–1.27)
EGFRCR SERPLBLD CKD-EPI 2021: 51.5 ML/MIN/1.73
GLUCOSE SERPL-MCNC: 90 MG/DL (ref 65–99)
POTASSIUM SERPL-SCNC: 4.2 MMOL/L (ref 3.5–5.2)
SODIUM SERPL-SCNC: 138 MMOL/L (ref 136–145)

## 2022-04-19 PROCEDURE — 80048 BASIC METABOLIC PNL TOTAL CA: CPT | Performed by: NURSE PRACTITIONER

## 2022-04-19 PROCEDURE — 99232 SBSQ HOSP IP/OBS MODERATE 35: CPT | Performed by: NURSE PRACTITIONER

## 2022-04-19 PROCEDURE — 25010000002 HYDROMORPHONE 1 MG/ML SOLUTION: Performed by: SURGERY

## 2022-04-19 RX ORDER — IPRATROPIUM BROMIDE AND ALBUTEROL SULFATE 2.5; .5 MG/3ML; MG/3ML
3 SOLUTION RESPIRATORY (INHALATION) EVERY 6 HOURS PRN
Status: DISCONTINUED | OUTPATIENT
Start: 2022-04-19 | End: 2022-04-22 | Stop reason: HOSPADM

## 2022-04-19 RX ADMIN — HYDROMORPHONE HYDROCHLORIDE 1 MG: 1 INJECTION, SOLUTION INTRAMUSCULAR; INTRAVENOUS; SUBCUTANEOUS at 09:49

## 2022-04-19 RX ADMIN — HYDROMORPHONE HYDROCHLORIDE 1 MG: 1 INJECTION, SOLUTION INTRAMUSCULAR; INTRAVENOUS; SUBCUTANEOUS at 19:55

## 2022-04-19 RX ADMIN — Medication 3 ML: at 19:56

## 2022-04-19 RX ADMIN — DEXTROSE AND SODIUM CHLORIDE 100 ML/HR: 5; 450 INJECTION, SOLUTION INTRAVENOUS at 19:59

## 2022-04-20 LAB
ANION GAP SERPL CALCULATED.3IONS-SCNC: 7.5 MMOL/L (ref 5–15)
BUN SERPL-MCNC: 12 MG/DL (ref 8–23)
BUN/CREAT SERPL: 9.8 (ref 7–25)
CALCIUM SPEC-SCNC: 8.6 MG/DL (ref 8.6–10.5)
CHLORIDE SERPL-SCNC: 106 MMOL/L (ref 98–107)
CO2 SERPL-SCNC: 22.5 MMOL/L (ref 22–29)
CREAT SERPL-MCNC: 1.22 MG/DL (ref 0.76–1.27)
EGFRCR SERPLBLD CKD-EPI 2021: 61.8 ML/MIN/1.73
GLUCOSE SERPL-MCNC: 109 MG/DL (ref 65–99)
POTASSIUM SERPL-SCNC: 4.1 MMOL/L (ref 3.5–5.2)
SODIUM SERPL-SCNC: 136 MMOL/L (ref 136–145)

## 2022-04-20 PROCEDURE — 80048 BASIC METABOLIC PNL TOTAL CA: CPT | Performed by: NURSE PRACTITIONER

## 2022-04-20 PROCEDURE — 99024 POSTOP FOLLOW-UP VISIT: CPT | Performed by: SURGERY

## 2022-04-20 PROCEDURE — 99232 SBSQ HOSP IP/OBS MODERATE 35: CPT | Performed by: NURSE PRACTITIONER

## 2022-04-20 PROCEDURE — 25010000002 HYDROMORPHONE 1 MG/ML SOLUTION: Performed by: SURGERY

## 2022-04-20 RX ADMIN — DEXTROSE AND SODIUM CHLORIDE 100 ML/HR: 5; 450 INJECTION, SOLUTION INTRAVENOUS at 05:55

## 2022-04-20 RX ADMIN — HYDROMORPHONE HYDROCHLORIDE 1 MG: 1 INJECTION, SOLUTION INTRAMUSCULAR; INTRAVENOUS; SUBCUTANEOUS at 18:49

## 2022-04-20 RX ADMIN — Medication 3 ML: at 20:45

## 2022-04-20 RX ADMIN — HYDROMORPHONE HYDROCHLORIDE 1 MG: 1 INJECTION, SOLUTION INTRAMUSCULAR; INTRAVENOUS; SUBCUTANEOUS at 20:43

## 2022-04-20 RX ADMIN — HYDROMORPHONE HYDROCHLORIDE 1 MG: 1 INJECTION, SOLUTION INTRAMUSCULAR; INTRAVENOUS; SUBCUTANEOUS at 05:55

## 2022-04-21 LAB
ANION GAP SERPL CALCULATED.3IONS-SCNC: 8.3 MMOL/L (ref 5–15)
BUN SERPL-MCNC: 15 MG/DL (ref 8–23)
BUN/CREAT SERPL: 11.6 (ref 7–25)
CALCIUM SPEC-SCNC: 8.9 MG/DL (ref 8.6–10.5)
CHLORIDE SERPL-SCNC: 104 MMOL/L (ref 98–107)
CO2 SERPL-SCNC: 24.7 MMOL/L (ref 22–29)
CREAT SERPL-MCNC: 1.29 MG/DL (ref 0.76–1.27)
DEPRECATED RDW RBC AUTO: 43.7 FL (ref 37–54)
EGFRCR SERPLBLD CKD-EPI 2021: 57.8 ML/MIN/1.73
ERYTHROCYTE [DISTWIDTH] IN BLOOD BY AUTOMATED COUNT: 12.5 % (ref 12.3–15.4)
GLUCOSE SERPL-MCNC: 95 MG/DL (ref 65–99)
HCT VFR BLD AUTO: 40 % (ref 37.5–51)
HGB BLD-MCNC: 13.8 G/DL (ref 13–17.7)
LAB AP CASE REPORT: NORMAL
MCH RBC QN AUTO: 33.1 PG (ref 26.6–33)
MCHC RBC AUTO-ENTMCNC: 34.5 G/DL (ref 31.5–35.7)
MCV RBC AUTO: 95.9 FL (ref 79–97)
PATH REPORT.FINAL DX SPEC: NORMAL
PLATELET # BLD AUTO: 258 10*3/MM3 (ref 140–450)
PMV BLD AUTO: 10.1 FL (ref 6–12)
POTASSIUM SERPL-SCNC: 3.8 MMOL/L (ref 3.5–5.2)
RBC # BLD AUTO: 4.17 10*6/MM3 (ref 4.14–5.8)
SODIUM SERPL-SCNC: 137 MMOL/L (ref 136–145)
WBC NRBC COR # BLD: 8.9 10*3/MM3 (ref 3.4–10.8)

## 2022-04-21 PROCEDURE — 25010000002 ONDANSETRON PER 1 MG: Performed by: NURSE PRACTITIONER

## 2022-04-21 PROCEDURE — 25010000002 HYDROMORPHONE 1 MG/ML SOLUTION: Performed by: SURGERY

## 2022-04-21 PROCEDURE — 80048 BASIC METABOLIC PNL TOTAL CA: CPT | Performed by: NURSE PRACTITIONER

## 2022-04-21 PROCEDURE — 99024 POSTOP FOLLOW-UP VISIT: CPT | Performed by: SURGERY

## 2022-04-21 PROCEDURE — 85027 COMPLETE CBC AUTOMATED: CPT | Performed by: NURSE PRACTITIONER

## 2022-04-21 PROCEDURE — 99232 SBSQ HOSP IP/OBS MODERATE 35: CPT | Performed by: NURSE PRACTITIONER

## 2022-04-21 RX ORDER — ONDANSETRON 2 MG/ML
4 INJECTION INTRAMUSCULAR; INTRAVENOUS EVERY 6 HOURS PRN
Status: DISCONTINUED | OUTPATIENT
Start: 2022-04-21 | End: 2022-04-22 | Stop reason: HOSPADM

## 2022-04-21 RX ORDER — HYDROCODONE BITARTRATE AND ACETAMINOPHEN 7.5; 325 MG/1; MG/1
1 TABLET ORAL EVERY 6 HOURS PRN
Qty: 15 TABLET | Refills: 0 | Status: ON HOLD | OUTPATIENT
Start: 2022-04-21 | End: 2022-05-09 | Stop reason: SDUPTHER

## 2022-04-21 RX ADMIN — HYDROMORPHONE HYDROCHLORIDE 1 MG: 1 INJECTION, SOLUTION INTRAMUSCULAR; INTRAVENOUS; SUBCUTANEOUS at 19:54

## 2022-04-21 RX ADMIN — HYDROMORPHONE HYDROCHLORIDE 1 MG: 1 INJECTION, SOLUTION INTRAMUSCULAR; INTRAVENOUS; SUBCUTANEOUS at 05:33

## 2022-04-21 RX ADMIN — ONDANSETRON 4 MG: 2 INJECTION INTRAMUSCULAR; INTRAVENOUS at 11:06

## 2022-04-21 RX ADMIN — Medication 3 ML: at 19:55

## 2022-04-21 RX ADMIN — Medication 3 ML: at 09:25

## 2022-04-22 ENCOUNTER — HOME HEALTH ADMISSION (OUTPATIENT)
Dept: HOME HEALTH SERVICES | Facility: HOME HEALTHCARE | Age: 77
End: 2022-04-22

## 2022-04-22 ENCOUNTER — READMISSION MANAGEMENT (OUTPATIENT)
Dept: CALL CENTER | Facility: HOSPITAL | Age: 77
End: 2022-04-22

## 2022-04-22 ENCOUNTER — APPOINTMENT (OUTPATIENT)
Dept: GENERAL RADIOLOGY | Facility: HOSPITAL | Age: 77
End: 2022-04-22

## 2022-04-22 VITALS
TEMPERATURE: 97.4 F | DIASTOLIC BLOOD PRESSURE: 72 MMHG | OXYGEN SATURATION: 91 % | RESPIRATION RATE: 20 BRPM | SYSTOLIC BLOOD PRESSURE: 134 MMHG | HEART RATE: 71 BPM | WEIGHT: 157.63 LBS | BODY MASS INDEX: 23.89 KG/M2 | HEIGHT: 68 IN

## 2022-04-22 LAB
ANION GAP SERPL CALCULATED.3IONS-SCNC: 7.6 MMOL/L (ref 5–15)
BUN SERPL-MCNC: 16 MG/DL (ref 8–23)
BUN/CREAT SERPL: 11 (ref 7–25)
CALCIUM SPEC-SCNC: 8.6 MG/DL (ref 8.6–10.5)
CHLORIDE SERPL-SCNC: 105 MMOL/L (ref 98–107)
CO2 SERPL-SCNC: 27.4 MMOL/L (ref 22–29)
CREAT SERPL-MCNC: 1.46 MG/DL (ref 0.76–1.27)
DEPRECATED RDW RBC AUTO: 42.1 FL (ref 37–54)
EGFRCR SERPLBLD CKD-EPI 2021: 49.8 ML/MIN/1.73
ERYTHROCYTE [DISTWIDTH] IN BLOOD BY AUTOMATED COUNT: 12.1 % (ref 12.3–15.4)
GLUCOSE SERPL-MCNC: 90 MG/DL (ref 65–99)
HCT VFR BLD AUTO: 39 % (ref 37.5–51)
HGB BLD-MCNC: 13.6 G/DL (ref 13–17.7)
MCH RBC QN AUTO: 32.7 PG (ref 26.6–33)
MCHC RBC AUTO-ENTMCNC: 34.9 G/DL (ref 31.5–35.7)
MCV RBC AUTO: 93.8 FL (ref 79–97)
PLATELET # BLD AUTO: 261 10*3/MM3 (ref 140–450)
PMV BLD AUTO: 10.2 FL (ref 6–12)
POTASSIUM SERPL-SCNC: 4.3 MMOL/L (ref 3.5–5.2)
RBC # BLD AUTO: 4.16 10*6/MM3 (ref 4.14–5.8)
SODIUM SERPL-SCNC: 140 MMOL/L (ref 136–145)
WBC NRBC COR # BLD: 7.81 10*3/MM3 (ref 3.4–10.8)

## 2022-04-22 PROCEDURE — 99024 POSTOP FOLLOW-UP VISIT: CPT | Performed by: SURGERY

## 2022-04-22 PROCEDURE — 99238 HOSP IP/OBS DSCHRG MGMT 30/<: CPT | Performed by: NURSE PRACTITIONER

## 2022-04-22 PROCEDURE — 80048 BASIC METABOLIC PNL TOTAL CA: CPT | Performed by: NURSE PRACTITIONER

## 2022-04-22 PROCEDURE — 85027 COMPLETE CBC AUTOMATED: CPT | Performed by: NURSE PRACTITIONER

## 2022-04-22 PROCEDURE — 74022 RADEX COMPL AQT ABD SERIES: CPT

## 2022-04-23 NOTE — OUTREACH NOTE
Prep Survey    Flowsheet Row Responses   [facility] patient discharged from? [facility]   Is LACE score < 7 ? No   Emergency Room discharge w/ pulse ox? No   Eligibility [facility]   Date of Admission 04/13/22   Date of Discharge 04/22/22   Discharge Disposition Home-Health Care Sv   Discharge diagnosis SBO s/p exploratory lap with adhesive band and small bowel resection, UTI   Does the patient have one of the following disease processes/diagnoses(primary or secondary)? General Surgery   Does the patient have Home health ordered? Yes   What is the Home health agency?   [facility]    Is there a DME ordered? No   Prep survey completed? Yes          JULY VIRK - Registered Nurse

## 2022-04-24 ENCOUNTER — HOME CARE VISIT (OUTPATIENT)
Dept: HOME HEALTH SERVICES | Facility: HOME HEALTHCARE | Age: 77
End: 2022-04-24

## 2022-04-24 VITALS
HEART RATE: 76 BPM | RESPIRATION RATE: 16 BRPM | HEIGHT: 68 IN | TEMPERATURE: 98.2 F | BODY MASS INDEX: 23.49 KG/M2 | SYSTOLIC BLOOD PRESSURE: 126 MMHG | OXYGEN SATURATION: 95 % | DIASTOLIC BLOOD PRESSURE: 78 MMHG | WEIGHT: 155 LBS

## 2022-04-24 PROCEDURE — G0299 HHS/HOSPICE OF RN EA 15 MIN: HCPCS

## 2022-04-25 ENCOUNTER — TRANSITIONAL CARE MANAGEMENT TELEPHONE ENCOUNTER (OUTPATIENT)
Dept: CALL CENTER | Facility: HOSPITAL | Age: 77
End: 2022-04-25

## 2022-04-25 NOTE — HOME HEALTH
Pt is a 75 yo white male admitted to [facility] for services of SN/PT due to recent hospitalization from 4/13-4/22/22 due to UTI. Pt had complaints of Abd pain and found a small bowel obstruction and on 4/16/22 had an ex-lap with adhesive band & small bowel resection. Pt has chronic urinary retention with chronic stacy. Pt lives alone and has a brother that assist him as needed. Pt has a cane but does not use at this time. Pt has an incision to his midabdomen, at umbilicus that has 11 staples intact. No s/sx of infection noted. Pt stated he smokes 3 cigars a day.

## 2022-04-25 NOTE — OUTREACH NOTE
Call Center TCM Note    Flowsheet Row Responses   [facility] patient discharged from? [facility]   Does the patient have one of the following disease processes/diagnoses(primary or secondary)? General Surgery   TCM attempt successful? Yes   Call start time 1540   Call end time 1544   Discharge diagnosis SBO s/p exploratory lap with adhesive band and small bowel resection, UTI   Meds reviewed with patient/caregiver? Yes   Is the patient having any side effects they believe may be caused by any medication additions or changes? No   Does the patient have all medications related to this admission filled (includes all antibiotics, pain medications, etc.) Yes   Is the patient taking all medications as directed (includes completed medication regime)? Yes   Does the patient have a follow up appointment scheduled with their surgeon? Yes  [Surgeon 4/27/22]   Has the patient kept scheduled appointments due by today? Yes   Comments Hospital PCP FOLLOW UP APPOINTMENT IS 4/27/22@0900am   What is the Home health agency?   [facility]    Has home health visited the patient within 72 hours of discharge? Yes   Home health comments PT, OT and Nursing services ordered   Psychosocial issues? No   Comments Patient has chronic f/c    Did the patient receive a copy of their discharge instructions? Yes   Nursing interventions Reviewed instructions with patient   What is the patient's perception of their health status since discharge? Improving  [Patient reports he is weak after surgery, hospitalization.  He has minimal pain and only takes narcotics at bedtime, has had a BM QD since discharge and tolerating po intake w/o incident. ]   Nursing interventions Nurse provided patient education  [routine post-op care reviewed]   Is the patient /caregiver able to teach back basic post-op care? Continue use of incentive spirometry at least 1 week post discharge, Lifting as instructed by MD in discharge instructions, Practice 'cough and deep  breath', Keep incision areas clean,dry and protected, No tub bath, swimming, or hot tub until instructed by MD   Is the patient/caregiver able to teach back signs and symptoms of incisional infection? Increased redness, swelling or pain at the incisonal site, Increased drainage or bleeding, Incisional warmth, Pus or odor from incision, Fever  [no s/s infection noted to site]   Is the patient/caregiver able to teach back steps to recovery at home? Set small, achievable goals for return to baseline health, Rest and rebuild strength, gradually increase activity   Is the patient/caregiver able to teach back the hierarchy of who to call/visit for symptoms/problems? PCP, Specialist, Home health nurse, Urgent Care, ED, 911 Yes   TCM call completed? Yes          Erin Bell RN    4/25/2022, 15:48 EDT

## 2022-04-26 ENCOUNTER — HOME CARE VISIT (OUTPATIENT)
Dept: HOME HEALTH SERVICES | Facility: HOME HEALTHCARE | Age: 77
End: 2022-04-26

## 2022-04-26 VITALS
TEMPERATURE: 98 F | SYSTOLIC BLOOD PRESSURE: 110 MMHG | OXYGEN SATURATION: 96 % | HEART RATE: 90 BPM | RESPIRATION RATE: 16 BRPM | DIASTOLIC BLOOD PRESSURE: 64 MMHG

## 2022-04-26 PROCEDURE — G0495 RN CARE TRAIN/EDU IN HH: HCPCS

## 2022-04-26 NOTE — HOME HEALTH
"Patient reports \"feeling better but sore\". Patient has several follow up dr simeon tomorrow. Patient goes to urologist to get catheter change. pt requesta d/c from agency."

## 2022-04-27 ENCOUNTER — OFFICE VISIT (OUTPATIENT)
Dept: SURGERY | Facility: CLINIC | Age: 77
End: 2022-04-27

## 2022-04-27 ENCOUNTER — HOME CARE VISIT (OUTPATIENT)
Dept: HOME HEALTH SERVICES | Facility: HOME HEALTHCARE | Age: 77
End: 2022-04-27

## 2022-04-27 VITALS
SYSTOLIC BLOOD PRESSURE: 120 MMHG | BODY MASS INDEX: 23.31 KG/M2 | HEART RATE: 69 BPM | HEIGHT: 68 IN | TEMPERATURE: 97.5 F | DIASTOLIC BLOOD PRESSURE: 64 MMHG | RESPIRATION RATE: 18 BRPM | OXYGEN SATURATION: 97 % | WEIGHT: 153.8 LBS

## 2022-04-27 DIAGNOSIS — Z48.89 POSTOPERATIVE VISIT: Primary | ICD-10-CM

## 2022-04-27 PROCEDURE — 99024 POSTOP FOLLOW-UP VISIT: CPT | Performed by: SURGERY

## 2022-04-27 NOTE — PROGRESS NOTES
"Patient: Nestor Nye    YOB: 1945    Date: 04/27/2022    Primary Care Provider: Roxanne Slater MD    Chief Complaint   Patient presents with   • Post-op     exploratory laparoscopy with small bowel resection       History of present illness:  I saw the patient in the office today as a followup from their recent exploratory laparoscopy with small bowel resection which was done 04/16/2022.  Pathology report showed serosal fibrous adhesions with mild acute inflammation.  They state that they have done well and are having no complaints.    Vital Signs:   Vitals:    04/27/22 1327   BP: 120/64   Pulse: 69   Resp: 18   Temp: 97.5 °F (36.4 °C)   TempSrc: Temporal   SpO2: 97%   Weight: 69.8 kg (153 lb 12.8 oz)   Height: 172.7 cm (68\")       Physical Exam:   General Appearance:    Alert, cooperative, in no acute distress   Abdomen:     no masses, no organomegaly, soft non-tender, non-distended, no guarding, wounds are well healed     Assessment / Plan:    1. Postoperative visit        I did discuss the situation with the patient today in the office and they have done well from their recent exploratory laparoscopy with small bowel resection.  I have released the patient back to normal activity, they understand that they need to be careful about heavy lifting.  I need to see the patient back in the office only if they are having further problems, they know to call me if they are.      Electronically signed by Maximo Bennett MD  04/27/22                    "

## 2022-04-28 NOTE — CASE COMMUNICATION
Patient missed a PT home visit for evaluation from [facility] on 4/27/2022.     Reason:patient refused visit/would not be home/at an MD appt.      For your records only.   As per home health protocol, MD must be notified of missed/cancelled visits; therefore the prescribed frequency was not met.

## 2022-05-03 ENCOUNTER — READMISSION MANAGEMENT (OUTPATIENT)
Dept: CALL CENTER | Facility: HOSPITAL | Age: 77
End: 2022-05-03

## 2022-05-03 NOTE — OUTREACH NOTE
General Surgery Week 2 Survey    Flowsheet Row Responses   [facility] discharged from? [facility]   Does the patient have one of the following disease processes/diagnoses(primary or secondary)? General Surgery   Week 2 attempt successful? Yes   Call start time 1017   Call end time 1020   Discharge diagnosis SBO s/p exploratory lap with adhesive band and small bowel resection, UTI   Is patient permission given to speak with other caregiver? No   Meds reviewed with patient/caregiver? Yes   Is the patient taking all medications as directed (includes completed medication regime)? Yes   Does the patient have a follow up appointment scheduled with their surgeon? Yes   Has the patient kept scheduled appointments due by today? Yes   What is the Home health agency?   [facility]    DME comments Patient reports that he uses a cane when he is out of the house.    Psychosocial issues? No   Did the patient receive a copy of their discharge instructions? Yes   Nursing interventions Reviewed instructions with patient   What is the patient's perception of their health status since discharge? Improving   Nursing interventions Nurse provided patient education   Is the patient /caregiver able to teach back basic post-op care? Keep incision areas clean,dry and protected, Do not remove steri-strips   Is the patient/caregiver able to teach back signs and symptoms of incisional infection? Increased redness, swelling or pain at the incisonal site, Increased drainage or bleeding, Fever   Is the patient/caregiver able to teach back steps to recovery at home? Set small, achievable goals for return to baseline health, Rest and rebuild strength, gradually increase activity, Eat a well-balance diet   Is the patient/caregiver able to teach back the hierarchy of who to call/visit for symptoms/problems? PCP, Specialist, Home health nurse, Urgent Care, ED, 911 Yes   Week 2 call completed? Yes   Wrap up additional comments Patient reports  that he is doing well. Denies any further questions or needs today.           LENORE GRACE - Registered Nurse

## 2022-05-04 ENCOUNTER — APPOINTMENT (OUTPATIENT)
Dept: GENERAL RADIOLOGY | Facility: HOSPITAL | Age: 77
End: 2022-05-04

## 2022-05-04 ENCOUNTER — ANESTHESIA EVENT (OUTPATIENT)
Dept: PERIOP | Facility: HOSPITAL | Age: 77
End: 2022-05-04

## 2022-05-04 ENCOUNTER — APPOINTMENT (OUTPATIENT)
Dept: CT IMAGING | Facility: HOSPITAL | Age: 77
End: 2022-05-04

## 2022-05-04 ENCOUNTER — HOSPITAL ENCOUNTER (INPATIENT)
Facility: HOSPITAL | Age: 77
LOS: 5 days | Discharge: HOME | End: 2022-05-09
Attending: EMERGENCY MEDICINE | Admitting: SURGERY

## 2022-05-04 ENCOUNTER — TELEPHONE (OUTPATIENT)
Dept: SURGERY | Facility: CLINIC | Age: 77
End: 2022-05-04

## 2022-05-04 ENCOUNTER — READMISSION MANAGEMENT (OUTPATIENT)
Dept: CALL CENTER | Facility: HOSPITAL | Age: 77
End: 2022-05-04

## 2022-05-04 ENCOUNTER — ANESTHESIA (OUTPATIENT)
Dept: PERIOP | Facility: HOSPITAL | Age: 77
End: 2022-05-04

## 2022-05-04 DIAGNOSIS — E86.0 DEHYDRATION: ICD-10-CM

## 2022-05-04 DIAGNOSIS — N17.9 ACUTE KIDNEY INJURY: ICD-10-CM

## 2022-05-04 DIAGNOSIS — R33.9 URINARY RETENTION: ICD-10-CM

## 2022-05-04 DIAGNOSIS — R11.2 NAUSEA AND VOMITING, UNSPECIFIED VOMITING TYPE: ICD-10-CM

## 2022-05-04 DIAGNOSIS — K56.609 SMALL BOWEL OBSTRUCTION: Primary | ICD-10-CM

## 2022-05-04 DIAGNOSIS — I95.9 HYPOTENSION, UNSPECIFIED HYPOTENSION TYPE: ICD-10-CM

## 2022-05-04 LAB
ABO GROUP BLD: NORMAL
ABO GROUP BLD: NORMAL
ALBUMIN SERPL-MCNC: 4.4 G/DL (ref 3.5–5.2)
ALBUMIN/GLOB SERPL: 1.4 G/DL
ALP SERPL-CCNC: 103 U/L (ref 39–117)
ALT SERPL W P-5'-P-CCNC: 8 U/L (ref 1–41)
ANION GAP SERPL CALCULATED.3IONS-SCNC: 15.4 MMOL/L (ref 5–15)
AST SERPL-CCNC: 11 U/L (ref 1–40)
BACTERIA UR QL AUTO: ABNORMAL /HPF
BASOPHILS # BLD AUTO: 0.06 10*3/MM3 (ref 0–0.2)
BASOPHILS NFR BLD AUTO: 0.6 % (ref 0–1.5)
BILIRUB SERPL-MCNC: 0.7 MG/DL (ref 0–1.2)
BILIRUB UR QL STRIP: NEGATIVE
BLD GP AB SCN SERPL QL: NEGATIVE
BUN SERPL-MCNC: 22 MG/DL (ref 8–23)
BUN/CREAT SERPL: 10.7 (ref 7–25)
CALCIUM SPEC-SCNC: 10.2 MG/DL (ref 8.6–10.5)
CHLORIDE SERPL-SCNC: 99 MMOL/L (ref 98–107)
CLARITY UR: ABNORMAL
CO2 SERPL-SCNC: 25.6 MMOL/L (ref 22–29)
COLOR UR: YELLOW
CREAT SERPL-MCNC: 2.06 MG/DL (ref 0.76–1.27)
D DIMER PPP FEU-MCNC: 2.79 MCGFEU/ML (ref 0–0.57)
D-LACTATE SERPL-SCNC: 1.1 MMOL/L (ref 0.5–2)
D-LACTATE SERPL-SCNC: 3.6 MMOL/L (ref 0.5–2)
DEPRECATED RDW RBC AUTO: 43.8 FL (ref 37–54)
EGFRCR SERPLBLD CKD-EPI 2021: 33 ML/MIN/1.73
EOSINOPHIL # BLD AUTO: 0.02 10*3/MM3 (ref 0–0.4)
EOSINOPHIL NFR BLD AUTO: 0.2 % (ref 0.3–6.2)
ERYTHROCYTE [DISTWIDTH] IN BLOOD BY AUTOMATED COUNT: 12.4 % (ref 12.3–15.4)
GLOBULIN UR ELPH-MCNC: 3.1 GM/DL
GLUCOSE SERPL-MCNC: 129 MG/DL (ref 65–99)
GLUCOSE UR STRIP-MCNC: ABNORMAL MG/DL
HCT VFR BLD AUTO: 45.7 % (ref 37.5–51)
HGB BLD-MCNC: 15.6 G/DL (ref 13–17.7)
HGB UR QL STRIP.AUTO: NEGATIVE
HOLD SPECIMEN: NORMAL
HOLD SPECIMEN: NORMAL
HYALINE CASTS UR QL AUTO: ABNORMAL /LPF
IMM GRANULOCYTES # BLD AUTO: 0.03 10*3/MM3 (ref 0–0.05)
IMM GRANULOCYTES NFR BLD AUTO: 0.3 % (ref 0–0.5)
KETONES UR QL STRIP: NEGATIVE
LEUKOCYTE ESTERASE UR QL STRIP.AUTO: ABNORMAL
LYMPHOCYTES # BLD AUTO: 1.67 10*3/MM3 (ref 0.7–3.1)
LYMPHOCYTES NFR BLD AUTO: 17.6 % (ref 19.6–45.3)
MCH RBC QN AUTO: 32.6 PG (ref 26.6–33)
MCHC RBC AUTO-ENTMCNC: 34.1 G/DL (ref 31.5–35.7)
MCV RBC AUTO: 95.4 FL (ref 79–97)
MONOCYTES # BLD AUTO: 0.76 10*3/MM3 (ref 0.1–0.9)
MONOCYTES NFR BLD AUTO: 8 % (ref 5–12)
NEUTROPHILS NFR BLD AUTO: 6.95 10*3/MM3 (ref 1.7–7)
NEUTROPHILS NFR BLD AUTO: 73.3 % (ref 42.7–76)
NITRITE UR QL STRIP: POSITIVE
NRBC BLD AUTO-RTO: 0 /100 WBC (ref 0–0.2)
PH UR STRIP.AUTO: >=9 [PH] (ref 5–8)
PLATELET # BLD AUTO: 386 10*3/MM3 (ref 140–450)
PMV BLD AUTO: 10 FL (ref 6–12)
POTASSIUM SERPL-SCNC: 4.2 MMOL/L (ref 3.5–5.2)
PROT SERPL-MCNC: 7.5 G/DL (ref 6–8.5)
PROT UR QL STRIP: ABNORMAL
RBC # BLD AUTO: 4.79 10*6/MM3 (ref 4.14–5.8)
RBC # UR STRIP: ABNORMAL /HPF
REF LAB TEST METHOD: ABNORMAL
RH BLD: POSITIVE
RH BLD: POSITIVE
SARS-COV-2 RNA PNL SPEC NAA+PROBE: NOT DETECTED
SODIUM SERPL-SCNC: 140 MMOL/L (ref 136–145)
SP GR UR STRIP: 1.02 (ref 1–1.03)
SQUAMOUS #/AREA URNS HPF: ABNORMAL /HPF
T&S EXPIRATION DATE: NORMAL
TRI-PHOS CRY URNS QL MICRO: ABNORMAL /HPF
UROBILINOGEN UR QL STRIP: ABNORMAL
WBC # UR STRIP: ABNORMAL /HPF
WBC NRBC COR # BLD: 9.49 10*3/MM3 (ref 3.4–10.8)
WHOLE BLOOD HOLD SPECIMEN: NORMAL
WHOLE BLOOD HOLD SPECIMEN: NORMAL

## 2022-05-04 PROCEDURE — 25010000002 SUCCINYLCHOLINE PER 20 MG: Performed by: NURSE ANESTHETIST, CERTIFIED REGISTERED

## 2022-05-04 PROCEDURE — 99222 1ST HOSP IP/OBS MODERATE 55: CPT | Performed by: FAMILY MEDICINE

## 2022-05-04 PROCEDURE — 87186 SC STD MICRODIL/AGAR DIL: CPT | Performed by: FAMILY MEDICINE

## 2022-05-04 PROCEDURE — 87086 URINE CULTURE/COLONY COUNT: CPT | Performed by: FAMILY MEDICINE

## 2022-05-04 PROCEDURE — 25010000002 DEXAMETHASONE PER 1 MG: Performed by: NURSE ANESTHETIST, CERTIFIED REGISTERED

## 2022-05-04 PROCEDURE — 25010000002 ONDANSETRON PER 1 MG: Performed by: EMERGENCY MEDICINE

## 2022-05-04 PROCEDURE — 44120 REMOVAL OF SMALL INTESTINE: CPT | Performed by: SURGERY

## 2022-05-04 PROCEDURE — 80053 COMPREHEN METABOLIC PANEL: CPT | Performed by: EMERGENCY MEDICINE

## 2022-05-04 PROCEDURE — 93005 ELECTROCARDIOGRAM TRACING: CPT

## 2022-05-04 PROCEDURE — 86901 BLOOD TYPING SEROLOGIC RH(D): CPT

## 2022-05-04 PROCEDURE — 99223 1ST HOSP IP/OBS HIGH 75: CPT | Performed by: SURGERY

## 2022-05-04 PROCEDURE — 25010000002 HYDROMORPHONE 1 MG/ML SOLUTION: Performed by: NURSE ANESTHETIST, CERTIFIED REGISTERED

## 2022-05-04 PROCEDURE — 25010000002 ONDANSETRON PER 1 MG: Performed by: NURSE ANESTHETIST, CERTIFIED REGISTERED

## 2022-05-04 PROCEDURE — 0DJD4ZZ INSPECTION OF LOWER INTESTINAL TRACT, PERCUTANEOUS ENDOSCOPIC APPROACH: ICD-10-PCS | Performed by: SURGERY

## 2022-05-04 PROCEDURE — 25010000002 FENTANYL CITRATE (PF) 100 MCG/2ML SOLUTION: Performed by: NURSE ANESTHETIST, CERTIFIED REGISTERED

## 2022-05-04 PROCEDURE — 25010000002 MIDAZOLAM PER 1MG: Performed by: NURSE ANESTHETIST, CERTIFIED REGISTERED

## 2022-05-04 PROCEDURE — 25010000002 PIPERACILLIN SOD-TAZOBACTAM PER 1 G: Performed by: EMERGENCY MEDICINE

## 2022-05-04 PROCEDURE — 25010000002 HYDROMORPHONE 1 MG/ML SOLUTION: Performed by: SURGERY

## 2022-05-04 PROCEDURE — 87077 CULTURE AEROBIC IDENTIFY: CPT | Performed by: FAMILY MEDICINE

## 2022-05-04 PROCEDURE — 71275 CT ANGIOGRAPHY CHEST: CPT

## 2022-05-04 PROCEDURE — 25010000002 IOPAMIDOL 61 % SOLUTION: Performed by: EMERGENCY MEDICINE

## 2022-05-04 PROCEDURE — 0DBA0ZZ EXCISION OF JEJUNUM, OPEN APPROACH: ICD-10-PCS | Performed by: SURGERY

## 2022-05-04 PROCEDURE — 25010000002 PROPOFOL 200 MG/20ML EMULSION: Performed by: NURSE ANESTHETIST, CERTIFIED REGISTERED

## 2022-05-04 PROCEDURE — 88307 TISSUE EXAM BY PATHOLOGIST: CPT

## 2022-05-04 PROCEDURE — 85025 COMPLETE CBC W/AUTO DIFF WBC: CPT | Performed by: EMERGENCY MEDICINE

## 2022-05-04 PROCEDURE — 86900 BLOOD TYPING SEROLOGIC ABO: CPT | Performed by: UROLOGY

## 2022-05-04 PROCEDURE — 25010000002 MORPHINE PER 10 MG: Performed by: EMERGENCY MEDICINE

## 2022-05-04 PROCEDURE — 86850 RBC ANTIBODY SCREEN: CPT | Performed by: UROLOGY

## 2022-05-04 PROCEDURE — 74177 CT ABD & PELVIS W/CONTRAST: CPT

## 2022-05-04 PROCEDURE — 83605 ASSAY OF LACTIC ACID: CPT | Performed by: EMERGENCY MEDICINE

## 2022-05-04 PROCEDURE — 74018 RADEX ABDOMEN 1 VIEW: CPT

## 2022-05-04 PROCEDURE — 86900 BLOOD TYPING SEROLOGIC ABO: CPT

## 2022-05-04 PROCEDURE — 81001 URINALYSIS AUTO W/SCOPE: CPT | Performed by: EMERGENCY MEDICINE

## 2022-05-04 PROCEDURE — 85379 FIBRIN DEGRADATION QUANT: CPT | Performed by: EMERGENCY MEDICINE

## 2022-05-04 PROCEDURE — 87635 SARS-COV-2 COVID-19 AMP PRB: CPT | Performed by: EMERGENCY MEDICINE

## 2022-05-04 PROCEDURE — 86901 BLOOD TYPING SEROLOGIC RH(D): CPT | Performed by: UROLOGY

## 2022-05-04 PROCEDURE — 99284 EMERGENCY DEPT VISIT MOD MDM: CPT

## 2022-05-04 DEVICE — PROXIMATE RELOADABLE LINEAR CUTTER WITH SAFETY LOCK-OUT, 75MM
Type: IMPLANTABLE DEVICE | Site: ABDOMEN | Status: FUNCTIONAL
Brand: PROXIMATE

## 2022-05-04 DEVICE — PROXIMATE LINEAR CUTTER RELOAD, BLUE, 75MM
Type: IMPLANTABLE DEVICE | Site: ABDOMEN | Status: FUNCTIONAL
Brand: PROXIMATE

## 2022-05-04 RX ORDER — SODIUM CHLORIDE 0.9 % (FLUSH) 0.9 %
3-10 SYRINGE (ML) INJECTION AS NEEDED
Status: DISCONTINUED | OUTPATIENT
Start: 2022-05-04 | End: 2022-05-09 | Stop reason: HOSPADM

## 2022-05-04 RX ORDER — MORPHINE SULFATE 4 MG/ML
4 INJECTION, SOLUTION INTRAMUSCULAR; INTRAVENOUS ONCE
Status: COMPLETED | OUTPATIENT
Start: 2022-05-04 | End: 2022-05-04

## 2022-05-04 RX ORDER — SODIUM CHLORIDE 0.9 % (FLUSH) 0.9 %
3 SYRINGE (ML) INJECTION EVERY 12 HOURS SCHEDULED
Status: DISCONTINUED | OUTPATIENT
Start: 2022-05-04 | End: 2022-05-09 | Stop reason: HOSPADM

## 2022-05-04 RX ORDER — BUPIVACAINE HCL/0.9 % NACL/PF 0.125 %
PLASTIC BAG, INJECTION (ML) EPIDURAL AS NEEDED
Status: DISCONTINUED | OUTPATIENT
Start: 2022-05-04 | End: 2022-05-04 | Stop reason: SURG

## 2022-05-04 RX ORDER — MAGNESIUM HYDROXIDE 1200 MG/15ML
LIQUID ORAL AS NEEDED
Status: DISCONTINUED | OUTPATIENT
Start: 2022-05-04 | End: 2022-05-04 | Stop reason: HOSPADM

## 2022-05-04 RX ORDER — NALOXONE HCL 0.4 MG/ML
0.1 VIAL (ML) INJECTION
Status: DISCONTINUED | OUTPATIENT
Start: 2022-05-04 | End: 2022-05-09 | Stop reason: HOSPADM

## 2022-05-04 RX ORDER — SODIUM CHLORIDE 0.9 % (FLUSH) 0.9 %
10 SYRINGE (ML) INJECTION AS NEEDED
Status: DISCONTINUED | OUTPATIENT
Start: 2022-05-04 | End: 2022-05-04

## 2022-05-04 RX ORDER — ONDANSETRON 2 MG/ML
4 INJECTION INTRAMUSCULAR; INTRAVENOUS ONCE AS NEEDED
Status: DISCONTINUED | OUTPATIENT
Start: 2022-05-04 | End: 2022-05-04 | Stop reason: HOSPADM

## 2022-05-04 RX ORDER — DEXTROSE AND SODIUM CHLORIDE 5; .45 G/100ML; G/100ML
50 INJECTION, SOLUTION INTRAVENOUS CONTINUOUS
Status: DISCONTINUED | OUTPATIENT
Start: 2022-05-04 | End: 2022-05-09

## 2022-05-04 RX ORDER — HYDROMORPHONE HCL 110MG/55ML
1 PATIENT CONTROLLED ANALGESIA SYRINGE INTRAVENOUS
Status: DISCONTINUED | OUTPATIENT
Start: 2022-05-04 | End: 2022-05-04

## 2022-05-04 RX ORDER — SODIUM CHLORIDE 9 MG/ML
125 INJECTION, SOLUTION INTRAVENOUS CONTINUOUS
Status: DISCONTINUED | OUTPATIENT
Start: 2022-05-04 | End: 2022-05-04

## 2022-05-04 RX ORDER — FENTANYL CITRATE 50 UG/ML
INJECTION, SOLUTION INTRAMUSCULAR; INTRAVENOUS AS NEEDED
Status: DISCONTINUED | OUTPATIENT
Start: 2022-05-04 | End: 2022-05-04 | Stop reason: SURG

## 2022-05-04 RX ORDER — ONDANSETRON 2 MG/ML
4 INJECTION INTRAMUSCULAR; INTRAVENOUS ONCE
Status: COMPLETED | OUTPATIENT
Start: 2022-05-04 | End: 2022-05-04

## 2022-05-04 RX ORDER — BUPIVACAINE HYDROCHLORIDE 5 MG/ML
INJECTION, SOLUTION EPIDURAL; INTRACAUDAL AS NEEDED
Status: DISCONTINUED | OUTPATIENT
Start: 2022-05-04 | End: 2022-05-04 | Stop reason: HOSPADM

## 2022-05-04 RX ORDER — ROCURONIUM BROMIDE 10 MG/ML
INJECTION, SOLUTION INTRAVENOUS AS NEEDED
Status: DISCONTINUED | OUTPATIENT
Start: 2022-05-04 | End: 2022-05-04 | Stop reason: SURG

## 2022-05-04 RX ORDER — DEXAMETHASONE SODIUM PHOSPHATE 4 MG/ML
INJECTION, SOLUTION INTRA-ARTICULAR; INTRALESIONAL; INTRAMUSCULAR; INTRAVENOUS; SOFT TISSUE AS NEEDED
Status: DISCONTINUED | OUTPATIENT
Start: 2022-05-04 | End: 2022-05-04 | Stop reason: SURG

## 2022-05-04 RX ORDER — PROPOFOL 10 MG/ML
INJECTION, EMULSION INTRAVENOUS AS NEEDED
Status: DISCONTINUED | OUTPATIENT
Start: 2022-05-04 | End: 2022-05-04 | Stop reason: SURG

## 2022-05-04 RX ORDER — LIDOCAINE HYDROCHLORIDE 20 MG/ML
INJECTION, SOLUTION INTRAVENOUS AS NEEDED
Status: DISCONTINUED | OUTPATIENT
Start: 2022-05-04 | End: 2022-05-04 | Stop reason: SURG

## 2022-05-04 RX ORDER — ONDANSETRON 2 MG/ML
INJECTION INTRAMUSCULAR; INTRAVENOUS AS NEEDED
Status: DISCONTINUED | OUTPATIENT
Start: 2022-05-04 | End: 2022-05-04 | Stop reason: SURG

## 2022-05-04 RX ORDER — SUCCINYLCHOLINE CHLORIDE 20 MG/ML
INJECTION INTRAMUSCULAR; INTRAVENOUS AS NEEDED
Status: DISCONTINUED | OUTPATIENT
Start: 2022-05-04 | End: 2022-05-04 | Stop reason: SURG

## 2022-05-04 RX ORDER — EPHEDRINE SULFATE 5 MG/ML
INJECTION INTRAVENOUS AS NEEDED
Status: DISCONTINUED | OUTPATIENT
Start: 2022-05-04 | End: 2022-05-04 | Stop reason: SURG

## 2022-05-04 RX ORDER — SODIUM CHLORIDE, SODIUM LACTATE, POTASSIUM CHLORIDE, CALCIUM CHLORIDE 600; 310; 30; 20 MG/100ML; MG/100ML; MG/100ML; MG/100ML
INJECTION, SOLUTION INTRAVENOUS CONTINUOUS PRN
Status: DISCONTINUED | OUTPATIENT
Start: 2022-05-04 | End: 2022-05-04 | Stop reason: SURG

## 2022-05-04 RX ORDER — MIDAZOLAM HYDROCHLORIDE 2 MG/2ML
INJECTION, SOLUTION INTRAMUSCULAR; INTRAVENOUS AS NEEDED
Status: DISCONTINUED | OUTPATIENT
Start: 2022-05-04 | End: 2022-05-04 | Stop reason: SURG

## 2022-05-04 RX ADMIN — SODIUM CHLORIDE 1000 ML: 9 INJECTION, SOLUTION INTRAVENOUS at 10:21

## 2022-05-04 RX ADMIN — HYDROMORPHONE HYDROCHLORIDE 0.5 MG: 1 INJECTION, SOLUTION INTRAMUSCULAR; INTRAVENOUS; SUBCUTANEOUS at 20:31

## 2022-05-04 RX ADMIN — HYDROMORPHONE HYDROCHLORIDE 1 MG: 1 INJECTION, SOLUTION INTRAMUSCULAR; INTRAVENOUS; SUBCUTANEOUS at 23:17

## 2022-05-04 RX ADMIN — DEXAMETHASONE SODIUM PHOSPHATE 8 MG: 4 INJECTION, SOLUTION INTRAMUSCULAR; INTRAVENOUS at 18:52

## 2022-05-04 RX ADMIN — ROCURONIUM BROMIDE 10 MG: 10 INJECTION INTRAVENOUS at 18:50

## 2022-05-04 RX ADMIN — TAZOBACTAM SODIUM AND PIPERACILLIN SODIUM 3.38 G: 375; 3 INJECTION, SOLUTION INTRAVENOUS at 14:47

## 2022-05-04 RX ADMIN — EPHEDRINE SULFATE 10 MG: 5 INJECTION INTRAVENOUS at 18:55

## 2022-05-04 RX ADMIN — HYDROMORPHONE HYDROCHLORIDE 0.5 MG: 1 INJECTION, SOLUTION INTRAMUSCULAR; INTRAVENOUS; SUBCUTANEOUS at 21:14

## 2022-05-04 RX ADMIN — ROCURONIUM BROMIDE 40 MG: 10 INJECTION INTRAVENOUS at 19:06

## 2022-05-04 RX ADMIN — Medication 3 ML: at 22:00

## 2022-05-04 RX ADMIN — PROPOFOL 150 MG: 10 INJECTION, EMULSION INTRAVENOUS at 18:50

## 2022-05-04 RX ADMIN — SODIUM CHLORIDE, POTASSIUM CHLORIDE, SODIUM LACTATE AND CALCIUM CHLORIDE: 600; 310; 30; 20 INJECTION, SOLUTION INTRAVENOUS at 18:41

## 2022-05-04 RX ADMIN — SUGAMMADEX 200 MG: 100 INJECTION, SOLUTION INTRAVENOUS at 20:08

## 2022-05-04 RX ADMIN — SODIUM CHLORIDE, POTASSIUM CHLORIDE, SODIUM LACTATE AND CALCIUM CHLORIDE: 600; 310; 30; 20 INJECTION, SOLUTION INTRAVENOUS at 20:03

## 2022-05-04 RX ADMIN — HYDROMORPHONE HYDROCHLORIDE 0.5 MG: 1 INJECTION, SOLUTION INTRAMUSCULAR; INTRAVENOUS; SUBCUTANEOUS at 20:45

## 2022-05-04 RX ADMIN — FENTANYL CITRATE 100 MCG: 50 INJECTION INTRAMUSCULAR; INTRAVENOUS at 18:41

## 2022-05-04 RX ADMIN — Medication 100 MCG: at 19:05

## 2022-05-04 RX ADMIN — LIDOCAINE HYDROCHLORIDE 40 MG: 20 INJECTION, SOLUTION INTRAVENOUS at 18:50

## 2022-05-04 RX ADMIN — MORPHINE SULFATE 4 MG: 4 INJECTION, SOLUTION INTRAMUSCULAR; INTRAVENOUS at 17:10

## 2022-05-04 RX ADMIN — ONDANSETRON 4 MG: 2 INJECTION INTRAMUSCULAR; INTRAVENOUS at 18:52

## 2022-05-04 RX ADMIN — MORPHINE SULFATE 4 MG: 4 INJECTION, SOLUTION INTRAMUSCULAR; INTRAVENOUS at 13:59

## 2022-05-04 RX ADMIN — ONDANSETRON 4 MG: 2 INJECTION INTRAMUSCULAR; INTRAVENOUS at 10:21

## 2022-05-04 RX ADMIN — SUCCINYLCHOLINE CHLORIDE 160 MG: 20 INJECTION, SOLUTION INTRAMUSCULAR; INTRAVENOUS at 18:50

## 2022-05-04 RX ADMIN — SODIUM CHLORIDE, POTASSIUM CHLORIDE, SODIUM LACTATE AND CALCIUM CHLORIDE: 600; 310; 30; 20 INJECTION, SOLUTION INTRAVENOUS at 19:09

## 2022-05-04 RX ADMIN — IOPAMIDOL 100 ML: 612 INJECTION, SOLUTION INTRAVENOUS at 11:14

## 2022-05-04 RX ADMIN — SODIUM CHLORIDE 125 ML/HR: 9 INJECTION, SOLUTION INTRAVENOUS at 14:47

## 2022-05-04 RX ADMIN — ONDANSETRON 4 MG: 2 INJECTION INTRAMUSCULAR; INTRAVENOUS at 17:09

## 2022-05-04 RX ADMIN — EPHEDRINE SULFATE 10 MG: 5 INJECTION INTRAVENOUS at 18:58

## 2022-05-04 RX ADMIN — ONDANSETRON 4 MG: 2 INJECTION INTRAMUSCULAR; INTRAVENOUS at 12:55

## 2022-05-04 RX ADMIN — MIDAZOLAM HYDROCHLORIDE 2 MG: 1 INJECTION, SOLUTION INTRAMUSCULAR; INTRAVENOUS at 18:41

## 2022-05-04 RX ADMIN — SODIUM CHLORIDE 1000 ML: 9 INJECTION, SOLUTION INTRAVENOUS at 12:24

## 2022-05-04 NOTE — TELEPHONE ENCOUNTER
Pt is s/p exploratory lap with small bowel resection performed 04/16/2022, he called the office c/o abdominal pain with nausea.  I asked pt to go to [facility] for evaluation.  Pt stated that he will go asap, Dr. Bennett was informed.

## 2022-05-04 NOTE — ANESTHESIA PROCEDURE NOTES
Airway  Urgency: elective    Date/Time: 5/4/2022 6:51 PM  Airway not difficult    General Information and Staff    Patient location during procedure: OR  CRNA/CAA: Figueroa Low, RENEA    Indications and Patient Condition  Indications for airway management: airway protection    Preoxygenated: yes  Mask difficulty assessment: 0 - not attempted    Final Airway Details  Final airway type: endotracheal airway      Successful airway: ETT  Cuffed: yes   Successful intubation technique: direct laryngoscopy and RSI  Endotracheal tube insertion site: oral  Blade: Roche  Blade size: 2  ETT size (mm): 7.0  Cormack-Lehane Classification: grade I - full view of glottis  Placement verified by: chest auscultation and capnometry   Measured from: lips  ETT/EBT  to lips (cm): 21  Number of attempts at approach: 1    Additional Comments  Dentition and Lips as preoperative assesment

## 2022-05-04 NOTE — ANESTHESIA PREPROCEDURE EVALUATION
Anesthesia Evaluation     Patient summary reviewed and Nursing notes reviewed   no history of anesthetic complications:  NPO Solid Status: > 8 hours  NPO Liquid Status: > 8 hours           Airway   Mallampati: II  TM distance: >3 FB  Neck ROM: full  Possible difficult intubation  Dental - normal exam     Pulmonary    (+) a smoker Former, COPD, shortness of breath,     ROS comment: Vocal cord damage form previous surgery   Cardiovascular - normal exam    ECG reviewed    (+) hypertension well controlled less than 2 medications, hyperlipidemia,     ROS comment: EKG 2021: Normal sinus rhythm  Possible Left atrial enlargement  Inferior infarct , age undetermined  Abnormal ECG    Neuro/Psych- negative ROS  GI/Hepatic/Renal/Endo    (+)   renal disease, thyroid problem hypothyroidism    Musculoskeletal     Abdominal    Substance History      OB/GYN          Other   arthritis (RA),      ROS/Med Hx Other: Vocal cord anomaly    15.6/45.7  K 4.2      Phys Exam Other: NGT in place                Anesthesia Plan    ASA 3 - emergent     general   Rapid sequence(Risks and benefits discussed including risk of aspiration, recall and dental damage. All patient questions answered. Will continue with POC.)  intravenous induction     Anesthetic plan, all risks, benefits, and alternatives have been provided, discussed and informed consent has been obtained with: patient.    Plan discussed with CRNA.        CODE STATUS:

## 2022-05-05 LAB
ANION GAP SERPL CALCULATED.3IONS-SCNC: 13.8 MMOL/L (ref 5–15)
BASOPHILS # BLD AUTO: 0.02 10*3/MM3 (ref 0–0.2)
BASOPHILS NFR BLD AUTO: 0.2 % (ref 0–1.5)
BUN SERPL-MCNC: 23 MG/DL (ref 8–23)
BUN/CREAT SERPL: 12.7 (ref 7–25)
CALCIUM SPEC-SCNC: 8.5 MG/DL (ref 8.6–10.5)
CHLORIDE SERPL-SCNC: 105 MMOL/L (ref 98–107)
CO2 SERPL-SCNC: 22.2 MMOL/L (ref 22–29)
CREAT SERPL-MCNC: 1.81 MG/DL (ref 0.76–1.27)
DEPRECATED RDW RBC AUTO: 45.4 FL (ref 37–54)
EGFRCR SERPLBLD CKD-EPI 2021: 38.5 ML/MIN/1.73
EOSINOPHIL # BLD AUTO: 0 10*3/MM3 (ref 0–0.4)
EOSINOPHIL NFR BLD AUTO: 0 % (ref 0.3–6.2)
ERYTHROCYTE [DISTWIDTH] IN BLOOD BY AUTOMATED COUNT: 12.7 % (ref 12.3–15.4)
GLUCOSE SERPL-MCNC: 139 MG/DL (ref 65–99)
HCT VFR BLD AUTO: 37.4 % (ref 37.5–51)
HGB BLD-MCNC: 12.8 G/DL (ref 13–17.7)
IMM GRANULOCYTES # BLD AUTO: 0.05 10*3/MM3 (ref 0–0.05)
IMM GRANULOCYTES NFR BLD AUTO: 0.4 % (ref 0–0.5)
LYMPHOCYTES # BLD AUTO: 0.75 10*3/MM3 (ref 0.7–3.1)
LYMPHOCYTES NFR BLD AUTO: 6.5 % (ref 19.6–45.3)
MCH RBC QN AUTO: 33.2 PG (ref 26.6–33)
MCHC RBC AUTO-ENTMCNC: 34.2 G/DL (ref 31.5–35.7)
MCV RBC AUTO: 96.9 FL (ref 79–97)
MONOCYTES # BLD AUTO: 0.84 10*3/MM3 (ref 0.1–0.9)
MONOCYTES NFR BLD AUTO: 7.3 % (ref 5–12)
NEUTROPHILS NFR BLD AUTO: 85.6 % (ref 42.7–76)
NEUTROPHILS NFR BLD AUTO: 9.86 10*3/MM3 (ref 1.7–7)
NRBC BLD AUTO-RTO: 0 /100 WBC (ref 0–0.2)
PLATELET # BLD AUTO: 299 10*3/MM3 (ref 140–450)
PMV BLD AUTO: 10.4 FL (ref 6–12)
POTASSIUM SERPL-SCNC: 5 MMOL/L (ref 3.5–5.2)
RBC # BLD AUTO: 3.86 10*6/MM3 (ref 4.14–5.8)
SODIUM SERPL-SCNC: 141 MMOL/L (ref 136–145)
WBC NRBC COR # BLD: 11.52 10*3/MM3 (ref 3.4–10.8)

## 2022-05-05 PROCEDURE — 99232 SBSQ HOSP IP/OBS MODERATE 35: CPT | Performed by: NURSE PRACTITIONER

## 2022-05-05 PROCEDURE — 99024 POSTOP FOLLOW-UP VISIT: CPT | Performed by: SURGERY

## 2022-05-05 PROCEDURE — 25010000002 MORPHINE SULFATE (PF) 2 MG/ML SOLUTION: Performed by: SURGERY

## 2022-05-05 PROCEDURE — 85025 COMPLETE CBC W/AUTO DIFF WBC: CPT | Performed by: SURGERY

## 2022-05-05 PROCEDURE — 80048 BASIC METABOLIC PNL TOTAL CA: CPT | Performed by: SURGERY

## 2022-05-05 PROCEDURE — 25010000002 PIPERACILLIN SOD-TAZOBACTAM PER 1 G: Performed by: FAMILY MEDICINE

## 2022-05-05 PROCEDURE — 25010000002 HYDROMORPHONE 1 MG/ML SOLUTION: Performed by: SURGERY

## 2022-05-05 PROCEDURE — 25010000002 MORPHINE PER 10 MG: Performed by: SURGERY

## 2022-05-05 RX ORDER — MORPHINE SULFATE 2 MG/ML
2 INJECTION, SOLUTION INTRAMUSCULAR; INTRAVENOUS
Status: DISCONTINUED | OUTPATIENT
Start: 2022-05-05 | End: 2022-05-06

## 2022-05-05 RX ORDER — LEVOTHYROXINE SODIUM 88 UG/1
88 TABLET ORAL DAILY
Status: DISCONTINUED | OUTPATIENT
Start: 2022-05-05 | End: 2022-05-09 | Stop reason: HOSPADM

## 2022-05-05 RX ADMIN — HYDROMORPHONE HYDROCHLORIDE 1 MG: 1 INJECTION, SOLUTION INTRAMUSCULAR; INTRAVENOUS; SUBCUTANEOUS at 21:37

## 2022-05-05 RX ADMIN — HYDROMORPHONE HYDROCHLORIDE 1 MG: 1 INJECTION, SOLUTION INTRAMUSCULAR; INTRAVENOUS; SUBCUTANEOUS at 01:45

## 2022-05-05 RX ADMIN — HYDROMORPHONE HYDROCHLORIDE 1 MG: 1 INJECTION, SOLUTION INTRAMUSCULAR; INTRAVENOUS; SUBCUTANEOUS at 05:09

## 2022-05-05 RX ADMIN — HYDROMORPHONE HYDROCHLORIDE 1 MG: 1 INJECTION, SOLUTION INTRAMUSCULAR; INTRAVENOUS; SUBCUTANEOUS at 23:42

## 2022-05-05 RX ADMIN — HYDROMORPHONE HYDROCHLORIDE 1 MG: 1 INJECTION, SOLUTION INTRAMUSCULAR; INTRAVENOUS; SUBCUTANEOUS at 11:28

## 2022-05-05 RX ADMIN — DEXTROSE AND SODIUM CHLORIDE 100 ML/HR: 5; 450 INJECTION, SOLUTION INTRAVENOUS at 18:19

## 2022-05-05 RX ADMIN — MORPHINE SULFATE 2 MG: 2 INJECTION, SOLUTION INTRAMUSCULAR; INTRAVENOUS at 18:19

## 2022-05-05 RX ADMIN — TAZOBACTAM SODIUM AND PIPERACILLIN SODIUM 3.38 G: 375; 3 INJECTION, SOLUTION INTRAVENOUS at 01:46

## 2022-05-05 RX ADMIN — TAZOBACTAM SODIUM AND PIPERACILLIN SODIUM 3.38 G: 375; 3 INJECTION, SOLUTION INTRAVENOUS at 08:32

## 2022-05-05 RX ADMIN — HYDROMORPHONE HYDROCHLORIDE 1 MG: 1 INJECTION, SOLUTION INTRAMUSCULAR; INTRAVENOUS; SUBCUTANEOUS at 16:43

## 2022-05-05 RX ADMIN — HYDROMORPHONE HYDROCHLORIDE 1 MG: 1 INJECTION, SOLUTION INTRAMUSCULAR; INTRAVENOUS; SUBCUTANEOUS at 19:32

## 2022-05-05 RX ADMIN — HYDROMORPHONE HYDROCHLORIDE 1 MG: 1 INJECTION, SOLUTION INTRAMUSCULAR; INTRAVENOUS; SUBCUTANEOUS at 07:12

## 2022-05-05 RX ADMIN — Medication 3 ML: at 08:32

## 2022-05-05 RX ADMIN — TAZOBACTAM SODIUM AND PIPERACILLIN SODIUM 3.38 G: 375; 3 INJECTION, SOLUTION INTRAVENOUS at 16:37

## 2022-05-05 RX ADMIN — MORPHINE SULFATE 2 MG: 2 INJECTION, SOLUTION INTRAMUSCULAR; INTRAVENOUS at 09:26

## 2022-05-05 RX ADMIN — MORPHINE SULFATE 2 MG: 2 INJECTION, SOLUTION INTRAMUSCULAR; INTRAVENOUS at 13:41

## 2022-05-05 NOTE — ANESTHESIA POSTPROCEDURE EVALUATION
Patient: Nestor Nye    Procedure Summary     Date: 05/04/22 Room / Location: [facility] OR 4 / [facility]    Anesthesia Start: 1841 Anesthesia Stop:     Procedure: DIAGNOSTIC LAPAROSCOPY CONVERTED TO EXPLORATORY LAPAROTOMY (N/A Abdomen) Diagnosis:       Small bowel obstruction (HCC)      (Small bowel obstruction (HCC) [K56.609])    Surgeons: Maximo Bennett MD Provider: Figueroa Low CRNA    Anesthesia Type: general ASA Status: 3 - Emergent          Anesthesia Type: general    Vitals  Temp 97.3  HR 93  Sat 99  Resp 15  /88        Post Anesthesia Care and Evaluation    Patient location during evaluation: PACU  Patient participation: complete - patient participated  Level of consciousness: awake and alert and sleepy but conscious  Pain score: 0  Pain management: satisfactory to patient  Airway patency: patent  Anesthetic complications: No anesthetic complications  PONV Status: none  Cardiovascular status: acceptable and stable  Respiratory status: acceptable and face mask  Hydration status: acceptable

## 2022-05-05 NOTE — OUTREACH NOTE
General Surgery Week 3 Survey    Flowsheet Row Responses   [facility] [place], operated by [facility] facility patient discharged from? [facility]   Does the patient have one of the following disease processes/diagnoses(primary or secondary)? General Surgery   Week 3 attempt successful? No   Revoke Readmitted          MARCY KNIGHT - Registered Nurse

## 2022-05-06 LAB
ANION GAP SERPL CALCULATED.3IONS-SCNC: 7.7 MMOL/L (ref 5–15)
BUN SERPL-MCNC: 17 MG/DL (ref 8–23)
BUN/CREAT SERPL: 9.4 (ref 7–25)
CALCIUM SPEC-SCNC: 8.3 MG/DL (ref 8.6–10.5)
CHLORIDE SERPL-SCNC: 105 MMOL/L (ref 98–107)
CO2 SERPL-SCNC: 28.3 MMOL/L (ref 22–29)
CREAT SERPL-MCNC: 1.81 MG/DL (ref 0.76–1.27)
DEPRECATED RDW RBC AUTO: 45.1 FL (ref 37–54)
EGFRCR SERPLBLD CKD-EPI 2021: 38.5 ML/MIN/1.73
ERYTHROCYTE [DISTWIDTH] IN BLOOD BY AUTOMATED COUNT: 12.5 % (ref 12.3–15.4)
GLUCOSE SERPL-MCNC: 97 MG/DL (ref 65–99)
HCT VFR BLD AUTO: 36.6 % (ref 37.5–51)
HGB BLD-MCNC: 12.2 G/DL (ref 13–17.7)
MCH RBC QN AUTO: 32.6 PG (ref 26.6–33)
MCHC RBC AUTO-ENTMCNC: 33.3 G/DL (ref 31.5–35.7)
MCV RBC AUTO: 97.9 FL (ref 79–97)
PLATELET # BLD AUTO: 288 10*3/MM3 (ref 140–450)
PMV BLD AUTO: 10.1 FL (ref 6–12)
POTASSIUM SERPL-SCNC: 3.9 MMOL/L (ref 3.5–5.2)
RBC # BLD AUTO: 3.74 10*6/MM3 (ref 4.14–5.8)
REF LAB TEST METHOD: NORMAL
SODIUM SERPL-SCNC: 141 MMOL/L (ref 136–145)
WBC NRBC COR # BLD: 9.31 10*3/MM3 (ref 3.4–10.8)

## 2022-05-06 PROCEDURE — 25010000002 HYDROMORPHONE 1 MG/ML SOLUTION: Performed by: SURGERY

## 2022-05-06 PROCEDURE — 80048 BASIC METABOLIC PNL TOTAL CA: CPT | Performed by: SURGERY

## 2022-05-06 PROCEDURE — 85027 COMPLETE CBC AUTOMATED: CPT | Performed by: SURGERY

## 2022-05-06 PROCEDURE — 97161 PT EVAL LOW COMPLEX 20 MIN: CPT

## 2022-05-06 PROCEDURE — 99232 SBSQ HOSP IP/OBS MODERATE 35: CPT | Performed by: NURSE PRACTITIONER

## 2022-05-06 PROCEDURE — 99024 POSTOP FOLLOW-UP VISIT: CPT | Performed by: SURGERY

## 2022-05-06 PROCEDURE — 25010000002 MORPHINE PER 10 MG: Performed by: SURGERY

## 2022-05-06 PROCEDURE — 25010000002 PIPERACILLIN SOD-TAZOBACTAM PER 1 G: Performed by: FAMILY MEDICINE

## 2022-05-06 PROCEDURE — 97165 OT EVAL LOW COMPLEX 30 MIN: CPT

## 2022-05-06 RX ORDER — MORPHINE SULFATE 2 MG/ML
2 INJECTION, SOLUTION INTRAMUSCULAR; INTRAVENOUS
Status: DISCONTINUED | OUTPATIENT
Start: 2022-05-06 | End: 2022-05-09

## 2022-05-06 RX ADMIN — MORPHINE SULFATE 2 MG: 2 INJECTION, SOLUTION INTRAMUSCULAR; INTRAVENOUS at 20:30

## 2022-05-06 RX ADMIN — TAZOBACTAM SODIUM AND PIPERACILLIN SODIUM 3.38 G: 375; 3 INJECTION, SOLUTION INTRAVENOUS at 02:18

## 2022-05-06 RX ADMIN — DEXTROSE AND SODIUM CHLORIDE 100 ML/HR: 5; 450 INJECTION, SOLUTION INTRAVENOUS at 10:46

## 2022-05-06 RX ADMIN — Medication 3 ML: at 08:35

## 2022-05-06 RX ADMIN — DEXTROSE AND SODIUM CHLORIDE 100 ML/HR: 5; 450 INJECTION, SOLUTION INTRAVENOUS at 22:09

## 2022-05-06 RX ADMIN — TAZOBACTAM SODIUM AND PIPERACILLIN SODIUM 3.38 G: 375; 3 INJECTION, SOLUTION INTRAVENOUS at 16:30

## 2022-05-06 RX ADMIN — HYDROMORPHONE HYDROCHLORIDE 1 MG: 1 INJECTION, SOLUTION INTRAMUSCULAR; INTRAVENOUS; SUBCUTANEOUS at 02:19

## 2022-05-06 RX ADMIN — LEVOTHYROXINE SODIUM 88 MCG: 88 TABLET ORAL at 08:47

## 2022-05-06 RX ADMIN — TAZOBACTAM SODIUM AND PIPERACILLIN SODIUM 3.38 G: 375; 3 INJECTION, SOLUTION INTRAVENOUS at 08:34

## 2022-05-06 RX ADMIN — HYDROMORPHONE HYDROCHLORIDE 1 MG: 1 INJECTION, SOLUTION INTRAMUSCULAR; INTRAVENOUS; SUBCUTANEOUS at 04:42

## 2022-05-06 RX ADMIN — MORPHINE SULFATE 2 MG: 2 INJECTION, SOLUTION INTRAMUSCULAR; INTRAVENOUS at 10:46

## 2022-05-06 RX ADMIN — Medication 3 ML: at 20:30

## 2022-05-07 LAB
ANION GAP SERPL CALCULATED.3IONS-SCNC: 9.7 MMOL/L (ref 5–15)
BACTERIA SPEC AEROBE CULT: ABNORMAL
BUN SERPL-MCNC: 14 MG/DL (ref 8–23)
BUN/CREAT SERPL: 9.5 (ref 7–25)
CALCIUM SPEC-SCNC: 7.9 MG/DL (ref 8.6–10.5)
CHLORIDE SERPL-SCNC: 106 MMOL/L (ref 98–107)
CO2 SERPL-SCNC: 23.3 MMOL/L (ref 22–29)
CREAT SERPL-MCNC: 1.47 MG/DL (ref 0.76–1.27)
DEPRECATED RDW RBC AUTO: 43.2 FL (ref 37–54)
EGFRCR SERPLBLD CKD-EPI 2021: 49.4 ML/MIN/1.73
ERYTHROCYTE [DISTWIDTH] IN BLOOD BY AUTOMATED COUNT: 12.3 % (ref 12.3–15.4)
GLUCOSE SERPL-MCNC: 105 MG/DL (ref 65–99)
HCT VFR BLD AUTO: 34.8 % (ref 37.5–51)
HGB BLD-MCNC: 11.8 G/DL (ref 13–17.7)
MCH RBC QN AUTO: 32.4 PG (ref 26.6–33)
MCHC RBC AUTO-ENTMCNC: 33.9 G/DL (ref 31.5–35.7)
MCV RBC AUTO: 95.6 FL (ref 79–97)
PLATELET # BLD AUTO: 256 10*3/MM3 (ref 140–450)
PMV BLD AUTO: 9.9 FL (ref 6–12)
POTASSIUM SERPL-SCNC: 3.9 MMOL/L (ref 3.5–5.2)
RBC # BLD AUTO: 3.64 10*6/MM3 (ref 4.14–5.8)
SODIUM SERPL-SCNC: 139 MMOL/L (ref 136–145)
WBC NRBC COR # BLD: 7.95 10*3/MM3 (ref 3.4–10.8)

## 2022-05-07 PROCEDURE — 25010000002 MORPHINE SULFATE (PF) 2 MG/ML SOLUTION: Performed by: SURGERY

## 2022-05-07 PROCEDURE — 80048 BASIC METABOLIC PNL TOTAL CA: CPT | Performed by: SURGERY

## 2022-05-07 PROCEDURE — 25010000002 PIPERACILLIN SOD-TAZOBACTAM PER 1 G: Performed by: FAMILY MEDICINE

## 2022-05-07 PROCEDURE — 99024 POSTOP FOLLOW-UP VISIT: CPT | Performed by: SURGERY

## 2022-05-07 PROCEDURE — 99232 SBSQ HOSP IP/OBS MODERATE 35: CPT | Performed by: NURSE PRACTITIONER

## 2022-05-07 PROCEDURE — 97110 THERAPEUTIC EXERCISES: CPT

## 2022-05-07 PROCEDURE — 85027 COMPLETE CBC AUTOMATED: CPT | Performed by: NURSE PRACTITIONER

## 2022-05-07 RX ADMIN — LEVOTHYROXINE SODIUM 88 MCG: 88 TABLET ORAL at 08:42

## 2022-05-07 RX ADMIN — MORPHINE SULFATE 2 MG: 2 INJECTION, SOLUTION INTRAMUSCULAR; INTRAVENOUS at 15:27

## 2022-05-07 RX ADMIN — TAZOBACTAM SODIUM AND PIPERACILLIN SODIUM 3.38 G: 375; 3 INJECTION, SOLUTION INTRAVENOUS at 08:45

## 2022-05-07 RX ADMIN — DEXTROSE AND SODIUM CHLORIDE 100 ML/HR: 5; 450 INJECTION, SOLUTION INTRAVENOUS at 08:45

## 2022-05-07 RX ADMIN — Medication 3 ML: at 08:42

## 2022-05-07 RX ADMIN — TAZOBACTAM SODIUM AND PIPERACILLIN SODIUM 3.38 G: 375; 3 INJECTION, SOLUTION INTRAVENOUS at 01:26

## 2022-05-07 RX ADMIN — DEXTROSE AND SODIUM CHLORIDE 50 ML/HR: 5; 450 INJECTION, SOLUTION INTRAVENOUS at 21:39

## 2022-05-07 RX ADMIN — TAZOBACTAM SODIUM AND PIPERACILLIN SODIUM 3.38 G: 375; 3 INJECTION, SOLUTION INTRAVENOUS at 17:53

## 2022-05-07 RX ADMIN — Medication 3 ML: at 20:26

## 2022-05-08 LAB
ANION GAP SERPL CALCULATED.3IONS-SCNC: 9.2 MMOL/L (ref 5–15)
BUN SERPL-MCNC: 11 MG/DL (ref 8–23)
BUN/CREAT SERPL: 7.4 (ref 7–25)
CALCIUM SPEC-SCNC: 8.2 MG/DL (ref 8.6–10.5)
CHLORIDE SERPL-SCNC: 106 MMOL/L (ref 98–107)
CO2 SERPL-SCNC: 23.8 MMOL/L (ref 22–29)
CREAT SERPL-MCNC: 1.49 MG/DL (ref 0.76–1.27)
EGFRCR SERPLBLD CKD-EPI 2021: 48.6 ML/MIN/1.73
GLUCOSE SERPL-MCNC: 100 MG/DL (ref 65–99)
POTASSIUM SERPL-SCNC: 3.9 MMOL/L (ref 3.5–5.2)
SODIUM SERPL-SCNC: 139 MMOL/L (ref 136–145)

## 2022-05-08 PROCEDURE — 25010000002 CEFTRIAXONE SODIUM-DEXTROSE 1-3.74 GM-%(50ML) RECONSTITUTED SOLUTION: Performed by: NURSE PRACTITIONER

## 2022-05-08 PROCEDURE — 25010000002 PIPERACILLIN SOD-TAZOBACTAM PER 1 G: Performed by: FAMILY MEDICINE

## 2022-05-08 PROCEDURE — 25010000002 ENOXAPARIN PER 10 MG: Performed by: NURSE PRACTITIONER

## 2022-05-08 PROCEDURE — 99232 SBSQ HOSP IP/OBS MODERATE 35: CPT | Performed by: NURSE PRACTITIONER

## 2022-05-08 PROCEDURE — 99024 POSTOP FOLLOW-UP VISIT: CPT | Performed by: SURGERY

## 2022-05-08 PROCEDURE — 25010000002 MORPHINE PER 10 MG: Performed by: SURGERY

## 2022-05-08 PROCEDURE — 80048 BASIC METABOLIC PNL TOTAL CA: CPT | Performed by: NURSE PRACTITIONER

## 2022-05-08 RX ORDER — CEFTRIAXONE 1 G/50ML
1 INJECTION, SOLUTION INTRAVENOUS EVERY 24 HOURS
Status: DISCONTINUED | OUTPATIENT
Start: 2022-05-08 | End: 2022-05-09

## 2022-05-08 RX ORDER — ENOXAPARIN SODIUM 100 MG/ML
40 INJECTION SUBCUTANEOUS EVERY 24 HOURS
Status: DISCONTINUED | OUTPATIENT
Start: 2022-05-08 | End: 2022-05-09 | Stop reason: HOSPADM

## 2022-05-08 RX ADMIN — MORPHINE SULFATE 2 MG: 2 INJECTION, SOLUTION INTRAMUSCULAR; INTRAVENOUS at 21:02

## 2022-05-08 RX ADMIN — Medication 3 ML: at 21:02

## 2022-05-08 RX ADMIN — DEXTROSE AND SODIUM CHLORIDE 50 ML/HR: 5; 450 INJECTION, SOLUTION INTRAVENOUS at 16:04

## 2022-05-08 RX ADMIN — ENOXAPARIN SODIUM 40 MG: 40 INJECTION SUBCUTANEOUS at 13:59

## 2022-05-08 RX ADMIN — LEVOTHYROXINE SODIUM 88 MCG: 88 TABLET ORAL at 08:33

## 2022-05-08 RX ADMIN — TAZOBACTAM SODIUM AND PIPERACILLIN SODIUM 3.38 G: 375; 3 INJECTION, SOLUTION INTRAVENOUS at 02:09

## 2022-05-08 RX ADMIN — MORPHINE SULFATE 2 MG: 2 INJECTION, SOLUTION INTRAMUSCULAR; INTRAVENOUS at 02:09

## 2022-05-08 RX ADMIN — MORPHINE SULFATE 2 MG: 2 INJECTION, SOLUTION INTRAMUSCULAR; INTRAVENOUS at 12:48

## 2022-05-08 RX ADMIN — CEFTRIAXONE 1 G: 1 INJECTION, SOLUTION INTRAVENOUS at 16:04

## 2022-05-08 RX ADMIN — Medication 3 ML: at 08:33

## 2022-05-08 RX ADMIN — TAZOBACTAM SODIUM AND PIPERACILLIN SODIUM 3.38 G: 375; 3 INJECTION, SOLUTION INTRAVENOUS at 08:33

## 2022-05-09 ENCOUNTER — READMISSION MANAGEMENT (OUTPATIENT)
Dept: CALL CENTER | Facility: HOSPITAL | Age: 77
End: 2022-05-09

## 2022-05-09 VITALS
DIASTOLIC BLOOD PRESSURE: 90 MMHG | WEIGHT: 169.53 LBS | TEMPERATURE: 98 F | SYSTOLIC BLOOD PRESSURE: 111 MMHG | BODY MASS INDEX: 25.69 KG/M2 | HEART RATE: 62 BPM | RESPIRATION RATE: 18 BRPM | OXYGEN SATURATION: 93 % | HEIGHT: 68 IN

## 2022-05-09 LAB
ANION GAP SERPL CALCULATED.3IONS-SCNC: 10.2 MMOL/L (ref 5–15)
BUN SERPL-MCNC: 10 MG/DL (ref 8–23)
BUN/CREAT SERPL: 7 (ref 7–25)
CALCIUM SPEC-SCNC: 8.5 MG/DL (ref 8.6–10.5)
CHLORIDE SERPL-SCNC: 104 MMOL/L (ref 98–107)
CO2 SERPL-SCNC: 24.8 MMOL/L (ref 22–29)
CREAT SERPL-MCNC: 1.43 MG/DL (ref 0.76–1.27)
DEPRECATED RDW RBC AUTO: 40.7 FL (ref 37–54)
EGFRCR SERPLBLD CKD-EPI 2021: 51.1 ML/MIN/1.73
ERYTHROCYTE [DISTWIDTH] IN BLOOD BY AUTOMATED COUNT: 11.9 % (ref 12.3–15.4)
GLUCOSE SERPL-MCNC: 98 MG/DL (ref 65–99)
HCT VFR BLD AUTO: 36.3 % (ref 37.5–51)
HGB BLD-MCNC: 12.7 G/DL (ref 13–17.7)
MCH RBC QN AUTO: 32.6 PG (ref 26.6–33)
MCHC RBC AUTO-ENTMCNC: 35 G/DL (ref 31.5–35.7)
MCV RBC AUTO: 93.3 FL (ref 79–97)
PLATELET # BLD AUTO: 290 10*3/MM3 (ref 140–450)
PMV BLD AUTO: 10.1 FL (ref 6–12)
POTASSIUM SERPL-SCNC: 3.8 MMOL/L (ref 3.5–5.2)
RBC # BLD AUTO: 3.89 10*6/MM3 (ref 4.14–5.8)
SODIUM SERPL-SCNC: 139 MMOL/L (ref 136–145)
WBC NRBC COR # BLD: 6.3 10*3/MM3 (ref 3.4–10.8)

## 2022-05-09 PROCEDURE — 25010000002 ENOXAPARIN PER 10 MG: Performed by: NURSE PRACTITIONER

## 2022-05-09 PROCEDURE — 99232 SBSQ HOSP IP/OBS MODERATE 35: CPT | Performed by: NURSE PRACTITIONER

## 2022-05-09 PROCEDURE — 25010000002 MORPHINE PER 10 MG: Performed by: SURGERY

## 2022-05-09 PROCEDURE — 97110 THERAPEUTIC EXERCISES: CPT

## 2022-05-09 PROCEDURE — 85027 COMPLETE CBC AUTOMATED: CPT | Performed by: NURSE PRACTITIONER

## 2022-05-09 PROCEDURE — 80048 BASIC METABOLIC PNL TOTAL CA: CPT | Performed by: NURSE PRACTITIONER

## 2022-05-09 PROCEDURE — 99024 POSTOP FOLLOW-UP VISIT: CPT | Performed by: SURGERY

## 2022-05-09 RX ORDER — CEFDINIR 300 MG/1
300 CAPSULE ORAL 2 TIMES DAILY
Qty: 6 CAPSULE | Refills: 0 | Status: SHIPPED | OUTPATIENT
Start: 2022-05-09 | End: 2022-07-27

## 2022-05-09 RX ORDER — CEFTRIAXONE 1 G/50ML
1 INJECTION, SOLUTION INTRAVENOUS EVERY 24 HOURS
Status: DISCONTINUED | OUTPATIENT
Start: 2022-05-09 | End: 2022-05-09 | Stop reason: HOSPADM

## 2022-05-09 RX ORDER — HYDROCODONE BITARTRATE AND ACETAMINOPHEN 7.5; 325 MG/1; MG/1
1 TABLET ORAL EVERY 6 HOURS PRN
Qty: 15 TABLET | Refills: 0 | Status: SHIPPED | OUTPATIENT
Start: 2022-05-09 | End: 2022-07-27

## 2022-05-09 RX ADMIN — LEVOTHYROXINE SODIUM 88 MCG: 88 TABLET ORAL at 08:42

## 2022-05-09 RX ADMIN — MORPHINE SULFATE 2 MG: 2 INJECTION, SOLUTION INTRAMUSCULAR; INTRAVENOUS at 12:19

## 2022-05-09 RX ADMIN — Medication 3 ML: at 08:42

## 2022-05-09 RX ADMIN — ENOXAPARIN SODIUM 40 MG: 40 INJECTION SUBCUTANEOUS at 13:00

## 2022-05-09 NOTE — OUTREACH NOTE
Prep Survey    Flowsheet Row Responses   [facility] facility patient discharged from? [place]   Is LACE score < 7 ? No   Emergency Room discharge w/ pulse ox? No   Eligibility [facility]   Date of Admission 05/04/22   Date of Discharge 05/09/22   Discharge Disposition Home or Self Care   Discharge diagnosis DIAGNOSTIC LAPAROSCOPY  Small bowel obstruction Acute UTI   Does the patient have one of the following disease processes/diagnoses(primary or secondary)? General Surgery   Does the patient have Home health ordered? No   Is there a DME ordered? No   Prep survey completed? Yes          LENORE URENA - Registered Nurse

## 2022-05-10 ENCOUNTER — TRANSITIONAL CARE MANAGEMENT TELEPHONE ENCOUNTER (OUTPATIENT)
Dept: CALL CENTER | Facility: HOSPITAL | Age: 77
End: 2022-05-10

## 2022-05-10 NOTE — OUTREACH NOTE
Call Center TCM Note    Flowsheet Row Responses   [facility] patient discharged from? [facility]   Does the patient have one of the following disease processes/diagnoses(primary or secondary)? General Surgery   TCM attempt successful? Yes   Call start time 1001   Call end time 1011   Discharge diagnosis DIAGNOSTIC LAPAROSCOPY  Small bowel obstruction Acute UTI   Person spoke with today (if not patient) and relationship Patient   Meds reviewed with patient/caregiver? Yes   Is the patient having any side effects they believe may be caused by any medication additions or changes? No   Does the patient have all medications related to this admission filled (includes all antibiotics, pain medications, etc.) Yes   Is the patient taking all medications as directed (includes completed medication regime)? Yes   Does the patient have a follow up appointment scheduled with their surgeon? Yes  [5/16/22]   Has the patient kept scheduled appointments due by today? N/A   Comments [facility] appt on 5/16/22 at 10:00 AM   What is the Home health agency?   [facility]    Has home health visited the patient within 72 hours of discharge? No   Psychosocial issues? No   Did the patient receive a copy of their discharge instructions? Yes   Nursing interventions Reviewed instructions with patient   What is the patient's perception of their health status since discharge? Improving   Nursing interventions Nurse provided patient education   Is the patient /caregiver able to teach back basic post-op care? Take showers only when approved by MD-sponge bathe until then, No tub bath, swimming, or hot tub until instructed by MD, Keep incision areas clean,dry and protected, Lifting as instructed by MD in discharge instructions   Is the patient/caregiver able to teach back signs and symptoms of incisional infection? Increased redness, swelling or pain at the incisonal site, Increased drainage or bleeding, Incisional warmth, Pus or odor from  incision, Fever   Is the patient/caregiver able to teach back steps to recovery at home? Rest and rebuild strength, gradually increase activity, Eat a well-balance diet   If the patient is a current smoker, are they able to teach back resources for cessation? Not a smoker   Is the patient/caregiver able to teach back the hierarchy of who to call/visit for symptoms/problems? PCP, Specialist, Home health nurse, Urgent Care, ED, 911 Yes   TCM call completed? Yes   Wrap up additional comments Pt states he is doing better with less pain, but feels a little weak/shaky. Reviewed AVS with patient, and educated pt on diet. Pt verbalized understanding. Pt verified PCP hospital fu appt/Post-Op appt on 5/16/22. Questions/concerns addressed.          Radha Mccartney RN    5/10/2022, 10:14 EDT

## 2022-05-13 NOTE — PROGRESS NOTES
Patient: Nestor Nye    YOB: 1945    Date: 05/16/2022    Primary Care Provider: Roxanne Slater MD    Chief Complaint   Patient presents with   • Follow-up     Bowel obstruction       SUBJECTIVE:    History of present illness:   The patient is in the office today for a follow up after hospitalization for a bowel obstruction. He still complains of weakness. He states he has abdominal pain. He states bowels are moving normal.     The following portions of the patient's history were reviewed and updated as appropriate: allergies, current medications, past family history, past medical history, past social history, past surgical history and problem list.    Review of Systems   Constitutional: Negative for chills, fever and unexpected weight change.   HENT: Negative for trouble swallowing and voice change.    Eyes: Negative for visual disturbance.   Respiratory: Negative for apnea, cough, chest tightness, shortness of breath and wheezing.    Cardiovascular: Negative for chest pain, palpitations and leg swelling.   Gastrointestinal: Negative for abdominal distention, abdominal pain, anal bleeding, blood in stool, constipation, diarrhea, nausea, rectal pain and vomiting.   Endocrine: Negative for cold intolerance and heat intolerance.   Genitourinary: Negative for difficulty urinating, dysuria, flank pain, scrotal swelling and testicular pain.   Musculoskeletal: Negative for back pain, gait problem and joint swelling.   Skin: Negative for color change, rash and wound.   Neurological: Negative for dizziness, syncope, speech difficulty, weakness, numbness and headaches.   Hematological: Negative for adenopathy. Does not bruise/bleed easily.   Psychiatric/Behavioral: Negative for confusion. The patient is not nervous/anxious.        History:  Past Medical History:   Diagnosis Date   • Acute maxillary sinusitis    • Arthritis    • Disease of thyroid gland    • Enlarged prostate    • GERD  (gastroesophageal reflux disease)    • Gout    • Impaired functional mobility, balance, gait, and endurance    • PONV (postoperative nausea and vomiting)    • Renal disorder    • Rheumatoid arthritis (HCC)    • Right inguinal hernia 10/28/2021    Added automatically from request for surgery [id number]   • Vocal cord anomaly 2007    had problem with enlarge vocal cord after low back surgery, changed his voice since then       Past Surgical History:   Procedure Laterality Date   • ABDOMINAL SURGERY     • BACK SURGERY     • BELPHAROPTOSIS REPAIR Bilateral    • BRAIN SURGERY     • COLON SURGERY     • CRANIOTOMY FOR SUBDURAL HEMATOMA      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   • DIAGNOSTIC LAPAROSCOPY N/A 5/4/2022    Procedure: DIAGNOSTIC LAPAROSCOPY CONVERTED TO EXPLORATORY LAPAROTOMY WITH SMALL BOWEL RESECTION;  Surgeon: Maximo Bennett MD;  Location: [facility] OR;  Service: General;  Laterality: N/A;  CONVERTED TO EXPLORATORY LAPAROTOMY AT 1912   • EXPLORATORY LAPAROTOMY N/A 04/16/2022    Procedure: LAPAROTOMY EXPLORATORY WITH SMALL BOWEL OBSTRUCTION;  Surgeon: Maximo Bennett MD;  Location: [facility] OR;  Service: General;  Laterality: N/A;   • EYE SURGERY      on upper etelids   • FOREIGN BODY REMOVAL Right     removal of metal fragments from RUQ after electrocution and fall.     • HERNIA REPAIR     • INGUINAL HERNIA REPAIR Right 11/01/2021    Procedure: INGUINAL HERNIA REPAIR WITH MESH AND EXCISION OF SPERMATIC CORD LIPOMA;  Surgeon: Rosey Glover MD;  Location: [facility];  Service: General;  Laterality: Right;   • LUMBAR SPINE SURGERY     • SKIN GRAFT Right     RUE skin grafting after electrocution   • THORACIC SPINE SURGERY         Family History   Problem Relation Age of Onset   • Arthritis Mother    • Prostate cancer Brother    • Arthritis Father        Social History     Tobacco Use   • Smoking status: Heavy Tobacco Smoker     Packs/day: 0.50     Years: 30.00     Pack years: 15.00     Types:  "Cigarettes, Electronic Cigarette   • Smokeless tobacco: Never Used   • Tobacco comment: Quit 2013   Vaping Use   • Vaping Use: Some days   • Substances: Nicotine   Substance Use Topics   • Alcohol use: Not Currently   • Drug use: No       Medications:   Current Outpatient Medications:   •  doxazosin (CARDURA) 4 MG tablet, Take 1 tablet by mouth 2 (Two) Times a Day., Disp: 180 tablet, Rfl: 2  •  ferrous sulfate 325 (65 FE) MG tablet, Take 325 mg by mouth Daily With Breakfast., Disp: , Rfl:   •  finasteride (PROSCAR) 5 MG tablet, Take 1 tablet by mouth Daily., Disp: 90 tablet, Rfl: 3  •  HYDROcodone-acetaminophen (NORCO) 7.5-325 MG per tablet, Take 1 tablet by mouth Every 6 (Six) Hours As Needed for Moderate Pain, Disp: 15 tablet, Rfl: 0  •  levothyroxine (SYNTHROID, LEVOTHROID) 88 MCG tablet, Take 1 tablet by mouth Daily., Disp: 90 tablet, Rfl: 2  •  nitrofurantoin, macrocrystal-monohydrate, (Macrobid) 100 MG capsule, Take 1 capsule by mouth Daily., Disp: 30 capsule, Rfl: 3  •  Alcohol Swabs (ALCOHOL WIPES) 70 % pads, 1 pad 3 (Three) Times a Day. (Patient taking differently: 1 pad 3 (Three) Times a Day. Cleans around stacy catheter with them.), Disp: 100 each, Rfl: 11  •  cefdinir (OMNICEF) 300 MG capsule, Take 1 capsule by mouth 2 (Two) Times a Day., Disp: 6 capsule, Rfl: 0  •  ondansetron ODT (ZOFRAN-ODT) 8 MG disintegrating tablet, Place 1 tablet on the tongue Every 8 (Eight) Hours As Needed for Nausea or Vomiting., Disp: 21 tablet, Rfl: 0  •  polyethylene glycol (MIRALAX) 17 g packet, Take 17 g by mouth Daily., Disp: 30 packet, Rfl: 11       Allergies: No Known Allergies    OBJECTIVE:    Vital Signs:  Vitals:    05/16/22 1557   BP: 102/70   Pulse: 85   Temp: 98.2 °F (36.8 °C)   SpO2: 97%   Weight: 66.5 kg (146 lb 9.6 oz)   Height: 172.7 cm (68\")       Physical Exam:   General Appearance:    Alert, cooperative, in no acute distress   Head:    Normocephalic, without obvious abnormality, atraumatic   Eyes:           "  Lids and lashes normal, conjunctivae and sclerae normal, no   icterus, no pallor, corneas clear, PERRL   Ears:    Ears appear intact with no abnormalities noted   Throat:   No oral lesions, no thrush, oral mucosa moist   Neck:   No adenopathy, supple, trachea midline, no thyromegaly,  no JVD   Lungs:     Clear to auscultation,respirations regular, even and                  unlabored    Heart:    Regular rhythm and normal rate, normal S1 and S2, no            murmur   Abdomen:     no masses, no organomegaly, soft non-tender, non-distended, no guarding, wounds look good, no redness or drainage.   Extremities:   Moves all extremities well, no edema, no cyanosis, no             redness   Pulses:   Pulses palpable and equal bilaterally   Skin:   No bleeding, bruising or rash   Lymph nodes:   No palpable adenopathy   Neurologic:   Cranial nerves 2 - 12 grossly intact, sensation intact  Psychiatric: No evidence of depression or anxiety   Results Review:   I reviewed the patient's new clinical results.    Review of Systems was reviewed and confirmed as accurate as documented by the MA.    ASSESSMENT/PLAN:    1. Postoperative visit        I recommend a colonoscopy for further evaluation. The procedure was explained as well as the risks which include but are not limited to bleeding, infection, perforation, abdominal pain etc. The patient understands these risks and the procedure and wishes to proceed.  Patient went to limit activity for 4 more weeks.  Follow-up as needed.     Electronically signed by Jie Adrian MD  05/16/22  10:31 EDT

## 2022-05-16 ENCOUNTER — OFFICE VISIT (OUTPATIENT)
Dept: SURGERY | Facility: CLINIC | Age: 77
End: 2022-05-16

## 2022-05-16 ENCOUNTER — OFFICE VISIT (OUTPATIENT)
Dept: INTERNAL MEDICINE | Facility: CLINIC | Age: 77
End: 2022-05-16

## 2022-05-16 VITALS
TEMPERATURE: 98.2 F | WEIGHT: 146.6 LBS | OXYGEN SATURATION: 97 % | BODY MASS INDEX: 22.22 KG/M2 | SYSTOLIC BLOOD PRESSURE: 102 MMHG | HEIGHT: 68 IN | DIASTOLIC BLOOD PRESSURE: 70 MMHG | HEART RATE: 85 BPM

## 2022-05-16 VITALS
HEIGHT: 68 IN | TEMPERATURE: 97.8 F | BODY MASS INDEX: 21.82 KG/M2 | WEIGHT: 144 LBS | SYSTOLIC BLOOD PRESSURE: 118 MMHG | HEART RATE: 82 BPM | DIASTOLIC BLOOD PRESSURE: 62 MMHG | OXYGEN SATURATION: 94 %

## 2022-05-16 DIAGNOSIS — Z09 HOSPITAL DISCHARGE FOLLOW-UP: Primary | ICD-10-CM

## 2022-05-16 DIAGNOSIS — N40.0 ENLARGED PROSTATE: ICD-10-CM

## 2022-05-16 DIAGNOSIS — R63.4 WEIGHT LOSS: ICD-10-CM

## 2022-05-16 DIAGNOSIS — E03.9 HYPOTHYROIDISM, UNSPECIFIED TYPE: ICD-10-CM

## 2022-05-16 DIAGNOSIS — R97.20 ELEVATED PSA: ICD-10-CM

## 2022-05-16 DIAGNOSIS — N18.30 STAGE 3 CHRONIC KIDNEY DISEASE, UNSPECIFIED WHETHER STAGE 3A OR 3B CKD: ICD-10-CM

## 2022-05-16 DIAGNOSIS — Z90.49 STATUS POST SMALL BOWEL RESECTION: ICD-10-CM

## 2022-05-16 DIAGNOSIS — Z48.89 POSTOPERATIVE VISIT: Primary | ICD-10-CM

## 2022-05-16 DIAGNOSIS — I10 BENIGN ESSENTIAL HYPERTENSION: ICD-10-CM

## 2022-05-16 LAB
ALBUMIN SERPL-MCNC: 4.1 G/DL (ref 3.5–5.2)
ALBUMIN/GLOB SERPL: 1.6 G/DL
ALP SERPL-CCNC: 73 U/L (ref 39–117)
ALT SERPL-CCNC: 20 U/L (ref 1–41)
AST SERPL-CCNC: 14 U/L (ref 1–40)
BASOPHILS # BLD AUTO: 0.08 10*3/MM3 (ref 0–0.2)
BASOPHILS NFR BLD AUTO: 1.3 % (ref 0–1.5)
BILIRUB SERPL-MCNC: 0.6 MG/DL (ref 0–1.2)
BUN SERPL-MCNC: 23 MG/DL (ref 8–23)
BUN/CREAT SERPL: 13.9 (ref 7–25)
CALCIUM SERPL-MCNC: 9.9 MG/DL (ref 8.6–10.5)
CHLORIDE SERPL-SCNC: 102 MMOL/L (ref 98–107)
CO2 SERPL-SCNC: 24.7 MMOL/L (ref 22–29)
CREAT SERPL-MCNC: 1.65 MG/DL (ref 0.76–1.27)
EGFRCR SERPLBLD CKD-EPI 2021: 43 ML/MIN/1.73
EOSINOPHIL # BLD AUTO: 0.21 10*3/MM3 (ref 0–0.4)
EOSINOPHIL NFR BLD AUTO: 3.4 % (ref 0.3–6.2)
ERYTHROCYTE [DISTWIDTH] IN BLOOD BY AUTOMATED COUNT: 12.6 % (ref 12.3–15.4)
GLOBULIN SER CALC-MCNC: 2.5 GM/DL
GLUCOSE SERPL-MCNC: 102 MG/DL (ref 65–99)
HCT VFR BLD AUTO: 42.2 % (ref 37.5–51)
HGB BLD-MCNC: 13.8 G/DL (ref 13–17.7)
IMM GRANULOCYTES # BLD AUTO: 0.02 10*3/MM3 (ref 0–0.05)
IMM GRANULOCYTES NFR BLD AUTO: 0.3 % (ref 0–0.5)
LYMPHOCYTES # BLD AUTO: 1.81 10*3/MM3 (ref 0.7–3.1)
LYMPHOCYTES NFR BLD AUTO: 29.4 % (ref 19.6–45.3)
MCH RBC QN AUTO: 32.5 PG (ref 26.6–33)
MCHC RBC AUTO-ENTMCNC: 32.7 G/DL (ref 31.5–35.7)
MCV RBC AUTO: 99.3 FL (ref 79–97)
MONOCYTES # BLD AUTO: 0.65 10*3/MM3 (ref 0.1–0.9)
MONOCYTES NFR BLD AUTO: 10.6 % (ref 5–12)
NEUTROPHILS # BLD AUTO: 3.39 10*3/MM3 (ref 1.7–7)
NEUTROPHILS NFR BLD AUTO: 55 % (ref 42.7–76)
NRBC BLD AUTO-RTO: 0 /100 WBC (ref 0–0.2)
PLATELET # BLD AUTO: 279 10*3/MM3 (ref 140–450)
POTASSIUM SERPL-SCNC: 5.2 MMOL/L (ref 3.5–5.2)
PROT SERPL-MCNC: 6.6 G/DL (ref 6–8.5)
RBC # BLD AUTO: 4.25 10*6/MM3 (ref 4.14–5.8)
SODIUM SERPL-SCNC: 139 MMOL/L (ref 136–145)
WBC # BLD AUTO: 6.16 10*3/MM3 (ref 3.4–10.8)

## 2022-05-16 PROCEDURE — 99024 POSTOP FOLLOW-UP VISIT: CPT | Performed by: SURGERY

## 2022-05-16 PROCEDURE — 99214 OFFICE O/P EST MOD 30 MIN: CPT | Performed by: NURSE PRACTITIONER

## 2022-05-16 NOTE — PROGRESS NOTES
Follow Up Office Visit      Patient Name: Nestor Nye  : 1945   MRN: [id number]     Chief Complaint:    Chief Complaint   Patient presents with   • Hospital Follow Up Visit     SBO       History of Present Illness: Nestor Nye is a 76 y.o. male who is here today for follow up of hospital discharge, s/p 2 bowel resections. Eating several small meals a day.  Having bowel movements daily, usually smaller in caliber and looser than prior to surgery.  Today had a normal bowel movement, he feels better since defecation.    Patient is a 76 years old male with a past medical history of enlarged prostate with chronic stacy catheter, rheumatoid arthritis and hypothyroidism who presented to the ER with a chief complaint of intractable nausea and abdominal pain.  Patient was recently admitted to the hospital for small bowel obstruction and had exploratory laparotomy with small bowel resection due to ischemia on 2022 by Dr. Bennett.  Patient was taken to the OR again by Dr. Bennett after he was diagnosed with another small bowel obstruction, patient underwent an exploratory laparotomy with small bowel resection in a different area. Patient has a history of chronic urinary retention with enlarged prostate and has a Stacy catheter chronically.  Patient follows up with Dr. Rey.  Patient was planned for prostatectomy on 2022.       Labs reviewed and were significant for glucose of 129, creatinine 2.06 (was 1.46 on ) lactate 3.6, elevated D-dimer to 2.79.  WBC WNL.  Urinalysis showed nitrates positive, leukocytes 3+, protein 3+, glucose trace, WBC 3-5, bacteria 2+, RBC 6/12. And underwent another exploratory lobotomy CTA chest showed1. No evidence of pulmonary embolism. 2. No evidence of pneumonia CT abdomen with contrast showed Findings compatible with high-grade small bowel obstruction involving mid jejunum and central abdomen.  Patient taken for exploratory laparotomy with  small bowel resection for SBO caused by adhesions to anterior abdominal wall with Dr. Bennett 5/4/2022 and tolerated well. Discharged on 5/9/22.          Subjective      I have reviewed and the following portions of the patient's history were updated as appropriate: past family history, past medical history, past social history, past surgical history and problem list.      Current Outpatient Medications:   •  Alcohol Swabs (ALCOHOL WIPES) 70 % pads, 1 pad 3 (Three) Times a Day. (Patient taking differently: 1 pad 3 (Three) Times a Day. Cleans around stacy catheter with them.), Disp: 100 each, Rfl: 11  •  cefdinir (OMNICEF) 300 MG capsule, Take 1 capsule by mouth 2 (Two) Times a Day., Disp: 6 capsule, Rfl: 0  •  doxazosin (CARDURA) 4 MG tablet, Take 1 tablet by mouth 2 (Two) Times a Day., Disp: 180 tablet, Rfl: 2  •  ferrous sulfate 325 (65 FE) MG tablet, Take 325 mg by mouth Daily With Breakfast., Disp: , Rfl:   •  finasteride (PROSCAR) 5 MG tablet, Take 1 tablet by mouth Daily., Disp: 90 tablet, Rfl: 3  •  HYDROcodone-acetaminophen (NORCO) 7.5-325 MG per tablet, Take 1 tablet by mouth Every 6 (Six) Hours As Needed for Moderate Pain, Disp: 15 tablet, Rfl: 0  •  levoFLOXacin (LEVAQUIN) 250 MG tablet, Take 1 tablet by mouth Daily for 5 days., Disp: 5 tablet, Rfl: 0  •  levothyroxine (SYNTHROID, LEVOTHROID) 88 MCG tablet, Take 1 tablet by mouth Daily., Disp: 90 tablet, Rfl: 2  •  nitrofurantoin, macrocrystal-monohydrate, (Macrobid) 100 MG capsule, Take 1 capsule by mouth Daily., Disp: 30 capsule, Rfl: 3  •  ondansetron ODT (ZOFRAN-ODT) 8 MG disintegrating tablet, Place 1 tablet on the tongue Every 8 (Eight) Hours As Needed for Nausea or Vomiting., Disp: 21 tablet, Rfl: 0  •  polyethylene glycol (MIRALAX) 17 g packet, Take 17 g by mouth Daily., Disp: 30 packet, Rfl: 11    No Known Allergies    Objective     Physical Exam:  Vital Signs:   Vitals:    05/16/22 1011   BP: 118/62   Pulse: 82   Temp: 97.8 °F (36.6 °C)   SpO2:  "94%   Weight: 65.3 kg (144 lb)   Height: 172.7 cm (67.99\")     Body mass index is 21.9 kg/m².    Physical Exam  Constitutional:       Appearance: He is not ill-appearing.   HENT:      Head: Normocephalic.      Right Ear: External ear normal.      Left Ear: External ear normal.   Eyes:      Conjunctiva/sclera: Conjunctivae normal.      Pupils: Pupils are equal, round, and reactive to light.   Cardiovascular:      Rate and Rhythm: Normal rate and regular rhythm.      Pulses:           Radial pulses are 2+ on the right side and 2+ on the left side.        Dorsalis pedis pulses are 2+ on the right side and 2+ on the left side.      Heart sounds: Normal heart sounds.   Pulmonary:      Effort: Pulmonary effort is normal.      Breath sounds: Normal breath sounds.   Abdominal:      General: Bowel sounds are normal.      Palpations: Abdomen is soft.      Tenderness: There is abdominal tenderness.      Comments: Well approximated incisions   Musculoskeletal:      Cervical back: Normal range of motion and neck supple.   Skin:     General: Skin is warm.      Capillary Refill: Capillary refill takes less than 2 seconds.   Neurological:      Mental Status: He is alert and oriented to person, place, and time.      Coordination: Coordination normal.      Gait: Gait normal.   Psychiatric:         Attention and Perception: Attention normal.         Mood and Affect: Mood and affect normal.         Speech: Speech normal.         Behavior: Behavior normal.       Assessment / Plan      Assessment/Plan:   Diagnoses and all orders for this visit:    1. Hospital discharge follow-up (Primary)    2. Benign essential hypertension        - Follow heart healthy diet.  Keep sodium intake < 1500 mg per day.  Avoid processed & fast foods.          - Exercise as tolerated, with a goal of 30 minutes of moderate exercise most days.         - Take medications as prescribed.    3. Weight loss  -     CBC & Differential  - Monitor for continuing weight " loss.  If appetite decreased, return to clinic for further evaluation     4. Stage 3 chronic kidney disease, unspecified whether stage 3a or 3b CKD (HCC)  -     Comprehensive Metabolic Panel    5. Hypothyroidism, unspecified type  -     TSH  - Continue levothyroxine at current dose    6. Status post small bowel resection       -  Follow up with Dr Paz, surgeon     7. Elevated PSA       - Follow up with Dr Rey, urology     8. Enlarged prostate             Follow Up:   No follow-ups on file.    Patient was given instructions and counseling regarding his condition or for health maintenance advice. Please see specific information pulled into the AVS if appropriate.       [facility] [facility]     Please note that portions of this note may have been completed with a voice recognition program. Efforts were made to edit dictation, but occasionally words are mistranscribed.

## 2022-05-18 ENCOUNTER — READMISSION MANAGEMENT (OUTPATIENT)
Dept: CALL CENTER | Facility: HOSPITAL | Age: 77
End: 2022-05-18

## 2022-05-18 NOTE — OUTREACH NOTE
General Surgery Week 2 Survey    Flowsheet Row Responses   [facility] patient discharged from? [facility]   Does the patient have one of the following disease processes/diagnoses(primary or secondary)? General Surgery   Week 2 attempt successful? Yes   Call start time 1518   Call end time 1521   Discharge diagnosis DIAGNOSTIC LAPAROSCOPY  Small bowel obstruction Acute UTI   Meds reviewed with patient/caregiver? Yes   Is the patient having any side effects they believe may be caused by any medication additions or changes? No   Does the patient have all medications related to this admission filled (includes all antibiotics, pain medications, etc.) Yes   Is the patient taking all medications as directed (includes completed medication regime)? Yes   Does the patient have a follow up appointment scheduled with their surgeon? Yes   Has the patient kept scheduled appointments due by today? Yes   What is the Home health agency?   [facility]    Home health comments Patient reports he declined any  services   Did the patient receive a copy of their discharge instructions? Yes   Nursing interventions Reviewed instructions with patient   What is the patient's perception of their health status since discharge? Improving   Nursing interventions Nurse provided patient education   Is the patient/caregiver able to teach back signs and symptoms of incisional infection? Fever, Pus or odor from incision, Increased drainage or bleeding, Increased redness, swelling or pain at the incisonal site   Is the patient/caregiver able to teach back steps to recovery at home? Set small, achievable goals for return to baseline health, Rest and rebuild strength, gradually increase activity, Eat a well-balance diet   If the patient is a current smoker, are they able to teach back resources for cessation? Not a smoker   Is the patient/caregiver able to teach back the hierarchy of who to call/visit for symptoms/problems? PCP, Specialist, Home  health nurse, Urgent Care, ED, 911 Yes   Week 2 call completed? Yes          CHRISTIAN RUIZ - Registered Nurse

## 2022-05-19 PROCEDURE — G0180 MD CERTIFICATION HHA PATIENT: HCPCS | Performed by: INTERNAL MEDICINE

## 2022-05-24 ENCOUNTER — HOSPITAL ENCOUNTER (EMERGENCY)
Facility: HOSPITAL | Age: 77
Discharge: HOME | End: 2022-05-25
Attending: EMERGENCY MEDICINE | Admitting: EMERGENCY MEDICINE

## 2022-05-24 DIAGNOSIS — T83.091A OBSTRUCTED FOLEY CATHETER, INITIAL ENCOUNTER: ICD-10-CM

## 2022-05-24 DIAGNOSIS — R33.9 URINARY RETENTION: Primary | ICD-10-CM

## 2022-05-24 LAB
BACTERIA UR QL AUTO: ABNORMAL /HPF
BILIRUB UR QL STRIP: NEGATIVE
CLARITY UR: CLEAR
COLOR UR: YELLOW
GLUCOSE UR STRIP-MCNC: NEGATIVE MG/DL
HGB UR QL STRIP.AUTO: ABNORMAL
HYALINE CASTS UR QL AUTO: ABNORMAL /LPF
KETONES UR QL STRIP: NEGATIVE
LEUKOCYTE ESTERASE UR QL STRIP.AUTO: ABNORMAL
NITRITE UR QL STRIP: POSITIVE
PH UR STRIP.AUTO: 7.5 [PH] (ref 5–8)
PROT UR QL STRIP: NEGATIVE
RBC # UR STRIP: ABNORMAL /HPF
REF LAB TEST METHOD: ABNORMAL
SP GR UR STRIP: 1.01 (ref 1–1.03)
SQUAMOUS #/AREA URNS HPF: ABNORMAL /HPF
UROBILINOGEN UR QL STRIP: ABNORMAL
WBC # UR STRIP: ABNORMAL /HPF

## 2022-05-24 PROCEDURE — 81001 URINALYSIS AUTO W/SCOPE: CPT | Performed by: EMERGENCY MEDICINE

## 2022-05-24 PROCEDURE — 99283 EMERGENCY DEPT VISIT LOW MDM: CPT

## 2022-05-25 VITALS
SYSTOLIC BLOOD PRESSURE: 132 MMHG | HEART RATE: 92 BPM | HEIGHT: 69 IN | RESPIRATION RATE: 18 BRPM | TEMPERATURE: 98.3 F | WEIGHT: 150 LBS | DIASTOLIC BLOOD PRESSURE: 84 MMHG | OXYGEN SATURATION: 98 % | BODY MASS INDEX: 22.22 KG/M2

## 2022-05-25 RX ORDER — LEVOFLOXACIN 500 MG/1
500 TABLET, FILM COATED ORAL ONCE
Status: COMPLETED | OUTPATIENT
Start: 2022-05-24 | End: 2022-05-25

## 2022-05-25 RX ORDER — LEVOFLOXACIN 250 MG/1
250 TABLET ORAL DAILY
Qty: 5 TABLET | Refills: 0 | Status: SHIPPED | OUTPATIENT
Start: 2022-05-25 | End: 2022-05-30

## 2022-05-25 RX ADMIN — LEVOFLOXACIN 500 MG: 500 TABLET, FILM COATED ORAL at 00:29

## 2022-05-25 NOTE — ED PROVIDER NOTES
Subjective   76-year-old male presenting with Gillespie catheter obstruction.  He states that for the last few hours his Gillespie catheter has not been draining.  He has chronic issues with urinary retention.  He complains of suprapubic discomfort.  No fevers or other complaints.          Review of Systems   Constitutional: Negative.    HENT: Negative.    Eyes: Negative.    Respiratory: Negative.    Cardiovascular: Negative.    Gastrointestinal: Negative.    Genitourinary: Positive for difficulty urinating.   Musculoskeletal: Negative.    Skin: Negative.    Neurological: Negative.    Psychiatric/Behavioral: Negative.        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 surgery, changed his voice since then       No Known Allergies    Past Surgical History:   Procedure Laterality Date   • ABDOMINAL SURGERY     • BACK SURGERY     • BELPHAROPTOSIS REPAIR Bilateral    • BRAIN SURGERY     • COLON SURGERY     • CRANIOTOMY FOR SUBDURAL HEMATOMA      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   • DIAGNOSTIC LAPAROSCOPY N/A 5/4/2022    Procedure: DIAGNOSTIC LAPAROSCOPY CONVERTED TO EXPLORATORY LAPAROTOMY WITH SMALL BOWEL RESECTION;  Surgeon: Maximo Bennett MD;  Location: [facility];  Service: General;  Laterality: N/A;  CONVERTED TO EXPLORATORY LAPAROTOMY AT 1912   • EXPLORATORY LAPAROTOMY N/A 04/16/2022    Procedure: LAPAROTOMY EXPLORATORY WITH SMALL BOWEL OBSTRUCTION;  Surgeon: Maximo Bennett MD;  Location: [facility];  Service: General;   Laterality: N/A;   • EYE SURGERY      on upper etelids   • FOREIGN BODY REMOVAL Right     removal of metal fragments from RUQ after electrocution and fall.     • HERNIA REPAIR     • INGUINAL HERNIA REPAIR Right 11/01/2021    Procedure: INGUINAL HERNIA REPAIR WITH MESH AND EXCISION OF SPERMATIC CORD LIPOMA;  Surgeon: Rosey Glover MD;  Location: [facility];  Service: General;  Laterality: Right;   • LUMBAR SPINE SURGERY     • SKIN GRAFT Right     RUE skin grafting after electrocution   • THORACIC SPINE SURGERY         Family History   Problem Relation Age of Onset   • Arthritis Mother    • Prostate cancer Brother    • Arthritis Father        Social History     Socioeconomic History   • Marital status:    Tobacco Use   • Smoking status: Heavy Tobacco Smoker     Packs/day: 0.50     Years: 30.00     Pack years: 15.00     Types: Cigarettes, Electronic Cigarette   • Smokeless tobacco: Never Used   • Tobacco comment: Quit 2013   Vaping Use   • Vaping Use: Some days   • Substances: Nicotine   Substance and Sexual Activity   • Alcohol use: Not Currently   • Drug use: No   • Sexual activity: Defer           Objective   Physical Exam  Vitals reviewed.   Constitutional:       General: He is not in acute distress.     Appearance: Normal appearance. He is not ill-appearing, toxic-appearing or diaphoretic.   HENT:      Head: Normocephalic and atraumatic.      Right Ear: External ear normal.      Left Ear: External ear normal.      Nose: Nose normal.   Eyes:      Extraocular Movements: Extraocular movements intact.   Cardiovascular:      Rate and Rhythm: Normal rate and regular rhythm.      Pulses: Normal pulses.      Heart sounds: Normal heart sounds.   Pulmonary:      Effort: Pulmonary effort is normal. No respiratory distress.      Breath sounds: Normal breath sounds.   Abdominal:      General: Bowel sounds are normal.      Comments: Mild suprapubic fullness and tenderness   Genitourinary:     Penis: Normal.     Musculoskeletal:         General: Normal range of motion.      Cervical back: Normal range of motion and neck supple.   Skin:     General: Skin is warm and dry.      Capillary Refill: Capillary refill takes less than 2 seconds.      Findings: No rash.   Neurological:      General: No focal deficit present.      Mental Status: He is alert and oriented to person, place, and time.   Psychiatric:         Mood and Affect: Mood normal.         Behavior: Behavior normal.         Procedures           ED Course                                                 MDM  Number of Diagnoses or Management Options  Obstructed Gillespie catheter, initial encounter ([facility])  Urinary retention  Diagnosis management comments: 76-year-old male with urinary retention.  Well-developed, well-nourished elderly man in no distress with exam as above.  He has normal vital signs.  He did not want us to flush his catheter and instead requested that we replace it.  Catheter was replaced with large amount of urine out.  He feels improved.  Urinalysis does appear to be infected, last urine culture was sensitive to fluoroquinolones so we will start course of Levaquin.  I do feel he is safe for discharge home.  He is happy with this plan.    DDx: Urinary retention, UTI      Final diagnoses:   Urinary retention   Obstructed Gillespie catheter, initial encounter ([facility])          Simon Salazar MD  05/25/22 0015

## 2022-05-27 ENCOUNTER — READMISSION MANAGEMENT (OUTPATIENT)
Dept: CALL CENTER | Facility: HOSPITAL | Age: 77
End: 2022-05-27

## 2022-05-27 NOTE — OUTREACH NOTE
General Surgery Week 3 Survey    Flowsheet Row Responses   [facility] patient discharged from? [facility]   Does the patient have one of the following disease processes/diagnoses(primary or secondary)? General Surgery   Week 3 attempt successful? Yes   Call start time 1138   Call end time 1141   Discharge diagnosis DIAGNOSTIC LAPAROSCOPY  Small bowel obstruction Acute UTI   Person spoke with today (if not patient) and relationship Patient   Meds reviewed with patient/caregiver? Yes   Is the patient taking all medications as directed (includes completed medication regime)? Yes   Does the patient have a follow up appointment scheduled with their surgeon? Yes   Has the patient kept scheduled appointments due by today? Yes   Has home health visited the patient within 72 hours of discharge? N/A   Psychosocial issues? No   Did the patient receive a copy of their discharge instructions? Yes   What is the patient's perception of their health status since discharge? Improving   Is the patient/caregiver able to teach back signs and symptoms of incisional infection? --  [Reports healing well. ]   Is the patient/caregiver able to teach back steps to recovery at home? Set small, achievable goals for return to baseline health   Is the patient/caregiver able to teach back the hierarchy of who to call/visit for symptoms/problems? PCP, Specialist, Home health nurse, Urgent Care, ED, 911 Yes   Week 3 call completed? Yes   Wrap up additional comments Denies any questions or needs today.           LENORE GRACE - Registered Nurse

## 2022-05-31 ENCOUNTER — TELEPHONE (OUTPATIENT)
Dept: SURGERY | Facility: OTHER | Age: 77
End: 2022-05-31

## 2022-05-31 NOTE — TELEPHONE ENCOUNTER
Caller: ERVIN MACKEY    Relationship to patient: SELF    Best call back number: [phone]    Patient is needing:NEEDS TO RESCHEDULE CATH CHANGE FOR TOMORROW.  WOULD LIKE TO SCHEDULE FOR June 8TH AT 10:30-PLEASE CALL PATIENT BACK TO CONFIRM  (TRIED TO WARM TRANSFER PATIENT AND NO ONE ANSWERED)

## 2022-06-21 ENCOUNTER — OFFICE VISIT (OUTPATIENT)
Dept: INTERNAL MEDICINE | Facility: CLINIC | Age: 77
End: 2022-06-21

## 2022-06-21 VITALS
HEART RATE: 83 BPM | TEMPERATURE: 97.3 F | SYSTOLIC BLOOD PRESSURE: 116 MMHG | WEIGHT: 148 LBS | RESPIRATION RATE: 16 BRPM | OXYGEN SATURATION: 96 % | HEIGHT: 69 IN | DIASTOLIC BLOOD PRESSURE: 74 MMHG | BODY MASS INDEX: 21.92 KG/M2

## 2022-06-21 DIAGNOSIS — Z91.81 AT MODERATE RISK FOR FALL: ICD-10-CM

## 2022-06-21 DIAGNOSIS — I10 BENIGN ESSENTIAL HYPERTENSION: ICD-10-CM

## 2022-06-21 DIAGNOSIS — E03.8 ADULT ONSET HYPOTHYROIDISM: ICD-10-CM

## 2022-06-21 DIAGNOSIS — Z00.00 MEDICARE ANNUAL WELLNESS VISIT, SUBSEQUENT: Primary | ICD-10-CM

## 2022-06-21 DIAGNOSIS — E78.2 MIXED HYPERLIPIDEMIA: ICD-10-CM

## 2022-06-21 DIAGNOSIS — Z00.00 ROUTINE GENERAL MEDICAL EXAMINATION AT A HEALTH CARE FACILITY: ICD-10-CM

## 2022-06-21 PROCEDURE — 99397 PER PM REEVAL EST PAT 65+ YR: CPT | Performed by: INTERNAL MEDICINE

## 2022-06-21 PROCEDURE — 1170F FXNL STATUS ASSESSED: CPT | Performed by: INTERNAL MEDICINE

## 2022-06-21 PROCEDURE — G0439 PPPS, SUBSEQ VISIT: HCPCS | Performed by: INTERNAL MEDICINE

## 2022-06-21 PROCEDURE — 1159F MED LIST DOCD IN RCRD: CPT | Performed by: INTERNAL MEDICINE

## 2022-06-21 NOTE — PATIENT INSTRUCTIONS
Advance Care Planning and Advance Directives     You make decisions on a daily basis - decisions about where you want to live, your career, your home, your life. Perhaps one of the most important decisions you face is your choice for future medical care. Take time to talk with your family and your healthcare team and start planning today.  Advance Care Planning is a process that can help you:  · Understand possible future healthcare decisions in light of your own experiences  · Reflect on those decision in light of your goals and values  · Discuss your decisions with those closest to you and the healthcare professionals that care for you  · Make a plan by creating a document that reflects your wishes    Surrogate Decision Maker  In the event of a medical emergency, which has left you unable to communicate or to make your own decisions, you would need someone to make decisions for you.  It is important to discuss your preferences for medical treatment with this person while you are in good health.     Qualities of a surrogate decision maker:  • Willing to take on this role and responsibility  • Knows what you want for future medical care  • Willing to follow your wishes even if they don't agree with them  • Able to make difficult medical decisions under stressful circumstances    Advance Directives  These are legal documents you can create that will guide your healthcare team and decision maker(s) when needed. These documents can be stored in the electronic medical record.    · Living Will - a legal document to guide your care if you have a terminal condition or a serious illness and are unable to communicate. States vary by statute in document names/types, but most forms may include one or more of the following:        -  Directions regarding life-prolonging treatments        -  Directions regarding artificially provided nutrition/hydration        -  Choosing a healthcare decision maker        -  Direction  regarding organ/tissue donation    · Durable Power of  for Healthcare - this document names an -in-fact to make medical decisions for you, but it may also allow this person to make personal and financial decisions for you. Please seek the advice of an  if you need this type of document.    **Advance Directives are not required and no one may discriminate against you if you do not sign one.    Medical Orders  Many states allow specific forms/orders signed by your physician to record your wishes for medical treatment in your current state of health. This form, signed in personal communication with your physician, addresses resuscitation and other medical interventions that you may or may not want.      For more information or to schedule a time with a [facility] Advance Care Planning Facilitator contact: [facility] or call [phone] and someone will contact you directly.    Fall Prevention in the Home, Adult  Falls can cause injuries and can affect people from all age groups. There are many simple things that you can do to make your home safe and to help prevent falls. Ask for help when making these changes, if needed.  What actions can I take to prevent falls?  General instructions  · Use good lighting in all rooms. Replace any light bulbs that burn out.  · Turn on lights if it is dark. Use night-lights.  · Place frequently used items in easy-to-reach places. Lower the shelves around your home if necessary.  · Set up furniture so that there are clear paths around it. Avoid moving your furniture around.  · Remove throw rugs and other tripping hazards from the floor.  · Avoid walking on wet floors.  · Fix any uneven floor surfaces.  · Add color or contrast paint or tape to grab bars and handrails in your home. Place contrasting color strips on the first and last steps of stairways.  · When you use a stepladder, make sure that it is completely opened and that the sides are  firmly locked. Have someone hold the ladder while you are using it. Do not climb a closed stepladder.  · Be aware of any and all pets.  What can I do in the bathroom?         · Keep the floor dry. Immediately clean up any water that spills onto the floor.  · Remove soap buildup in the tub or shower on a regular basis.  · Use non-skid mats or decals on the floor of the tub or shower.  · Attach bath mats securely with double-sided, non-slip rug tape.  · If you need to sit down while you are in the shower, use a plastic, non-slip stool.  · Install grab bars by the toilet and in the tub and shower. Do not use towel bars as grab bars.  What can I do in the bedroom?  · Make sure that a bedside light is easy to reach.  · Do not use oversized bedding that drapes onto the floor.  · Have a firm chair that has side arms to use for getting dressed.  What can I do in the kitchen?  · Clean up any spills right away.  · If you need to reach for something above you, use a sturdy step stool that has a grab bar.  · Keep electrical cables out of the way.  · Do not use floor polish or wax that makes floors slippery. If you must use wax, make sure that it is non-skid floor wax.  What can I do in the stairways?  · Do not leave any items on the stairs.  · Make sure that you have a light switch at the top of the stairs and the bottom of the stairs. Have them installed if you do not have them.  · Make sure that there are handrails on both sides of the stairs. Fix handrails that are broken or loose. Make sure that handrails are as long as the stairways.  · Install non-slip stair treads on all stairs in your home.  · Avoid having throw rugs at the top or bottom of stairways, or secure the rugs with carpet tape to prevent them from moving.  · Choose a carpet design that does not hide the edge of steps on the stairway.  · Check any carpeting to make sure that it is firmly attached to the stairs. Fix any carpet that is loose or worn.  What can I  do on the outside of my home?  · Use bright outdoor lighting.  · Regularly repair the edges of walkways and driveways and fix any cracks.  · Remove high doorway thresholds.  · Trim any shrubbery on the main path into your home.  · Regularly check that handrails are securely fastened and in good repair. Both sides of any steps should have handrails.  · Install guardrails along the edges of any raised decks or porches.  · Clear walkways of debris and clutter, including tools and rocks.  · Have leaves, snow, and ice cleared regularly.  · Use sand or salt on walkways during winter months.  · In the garage, clean up any spills right away, including grease or oil spills.  What other actions can I take?  · Wear closed-toe shoes that fit well and support your feet. Wear shoes that have rubber soles or low heels.  · Use mobility aids as needed, such as canes, walkers, scooters, and crutches.  · Review your medicines with your health care provider. Some medicines can cause dizziness or changes in blood pressure, which increase your risk of falling.  Talk with your health care provider about other ways that you can decrease your risk of falls. This may include working with a physical therapist or  to improve your strength, balance, and endurance.  Where to find more information  · Centers for Disease Control and Prevention, [place]: https://www.cdc.gov  · [place] [employer]: https://iq4lkom.dennis.nih.gov  Contact a health care provider if:  · You are afraid of falling at home.  · You feel weak, drowsy, or dizzy at home.  · You fall at home.  Summary  · There are many simple things that you can do to make your home safe and to help prevent falls.  · Ways to make your home safe include removing tripping hazards and installing grab bars in the bathroom.  · Ask for help when making these changes in your home.  This information is not intended to replace advice given to you by your health care provider. Make sure you  discuss any questions you have with your health care provider.  Document Revised: 11/30/2018 Document Reviewed: 08/02/2018  [employer] Patient Education © 2021 [employer]      Sit-to-Stand Exercise    The sit-to-stand exercise (also known as the chair stand or chair rise exercise) strengthens your lower body and helps you maintain or improve your mobility and independence. The goal is to do the sit-to-stand exercise without using your hands. This will be easier as you become stronger. You should always talk with your health care provider before starting any exercise program, especially if you have had recent surgery.  Do the exercise exactly as told by your health care provider and adjust it as directed. It is normal to feel mild stretching, pulling, tightness, or discomfort as you do this exercise, but you should stop right away if you feel sudden pain or your pain gets worse. Do not begin doing this exercise until told by your health care provider.  What the sit-to-stand exercise does  The sit-to-stand exercise helps to strengthen the muscles in your thighs and the muscles in the center of your body that give you stability (core muscles). This exercise is especially helpful if:  · You have had knee or hip surgery.  · You have trouble getting up from a chair, out of a car, or off the toilet.  How to do the sit-to-stand exercise  1. Sit toward the front edge of a sturdy chair without armrests. Your knees should be bent and your feet should be flat on the floor and shoulder-width apart.  2. Place your hands lightly on each side of the seat. Keep your back and neck as straight as possible, with your chest slightly forward.  3. Breathe in slowly. Lean forward and slightly shift your weight to the front of your feet.  4. Breathe out as you slowly stand up. Use your hands as little as possible.  5. Stand and pause for a full breath in and out.  6. Breathe in as you sit down slowly. Tighten your core and abdominal  muscles to control your lowering as much as possible.  7. Breathe out slowly.  8. Do this exercise 10-15 times. If needed, do it fewer times until you build up strength.  9. Rest for 1 minute, then do another set of 10-15 repetitions.  To change the difficulty of the sit-to-stand exercise  · If the exercise is too difficult, use a chair with sturdy armrests, and push off the armrests to help you come to the standing position. You can also use the armrests to help slowly lower yourself back to sitting. As this gets easier, try to use your arms less. You can also place a firm cushion or pillow on the chair to make the surface higher.  · If this exercise is too easy, do not use your arms to help raise or lower yourself. You can also wear a weighted vest, use hand weights, increase your repetitions, or try a lower chair.  General tips  · You may feel tired when starting an exercise routine. This is normal.  · You may have muscle soreness that lasts a few days. This is normal. As you get stronger, you may not feel muscle soreness.  · Use smooth, steady movements.  · Do not  hold your breath during strength exercises. This can cause unsafe changes in your blood pressure.  · Breathe in slowly through your nose, and breathe out slowly through your mouth.  Summary  · Strengthening your lower body is an important step to help you move safely and independently.  · The sit-to-stand exercise helps strengthen the muscles in your thighs and core.  · You should always talk with your health care provider before starting any exercise program, especially if you have had recent surgery.  This information is not intended to replace advice given to you by your health care provider. Make sure you discuss any questions you have with your health care provider.  Document Revised: 10/16/2019 Document Reviewed: 02/08/2018  [employer] Patient Education © 2021 [employer]    You are due for adacel Tdap vaccination. (provides protection against  tetanus, diptheria and whooping cough) Please  get the immunization at your local pharmacy at your earliest convenience. This immunization will next be due in 10 years. Please click on the link for more information about this vaccine.    [facility] Tdap Vaccine Information    You are due for Shingrix vaccination series ( the newest shingles vaccine).  It is a two shot series spaced 2-6 months apart. Please get this vaccine series started at your earliest convenience at your local pharmacy to help avoid shingles outbreak. It is more effective than the old Zostavax vaccine and is recommended even if you have had the Zostavax vaccine in the past.  Once the Shingrix series is completed, it does not need to be repeated.   For more information, please look at the website below:  [facility] Shingrix Vaccine Information      Medicare Wellness  Personal Prevention Plan of Service     Date of Office Visit:    Encounter Provider:  Roxanne Slater MD  Place of Service:  [facility]  Patient Name: Nestor Nye  :  1945    As part of the Medicare Wellness portion of your visit today, we are providing you with this personalized preventive plan of services (PPPS). This plan is based upon recommendations of the [place] Preventive Services Task Force (USPSTF) and the Advisory Committee on Immunization Practices (ACIP).    This lists the preventive care services that should be considered, and provides dates of when you are due. Items listed as completed are up-to-date and do not require any further intervention.    Health Maintenance   Topic Date Due   • TDAP/TD VACCINES (1 - Tdap) Never done   • ZOSTER VACCINE (1 of 2) Never done   • HEPATITIS C SCREENING  Never done   • COVID-19 Vaccine (4 - Booster for Moderna series) 2022   • INFLUENZA VACCINE  10/01/2022   • LIPID PANEL  2023   • ANNUAL WELLNESS VISIT  2023   • COLORECTAL CANCER SCREENING  2027   • Pneumococcal Vaccine  65+  Completed   • AAA SCREEN (ONE-TIME)  Completed       No orders of the defined types were placed in this encounter.      Return in about 1 year (around 6/21/2023) for Medicare Wellness.

## 2022-06-21 NOTE — PROGRESS NOTES
The ABCs of the Annual Wellness Visit  Subsequent Medicare Wellness Visit    Chief Complaint   Patient presents with   • Medicare Wellness-subsequent   • Hypertension   • Hyperlipidemia   • Hypothyroidism      Subjective    History of Present Illness:  Nestor Nye is a 76 y.o. male who presents for a Subsequent Medicare Wellness Visit and physical , Patient is here to follow up on the blood pressure  The patient is taking the blood pressure medications as prescribed and has had no side effects. The patient is also here to follow up on the cholesterol and is trying to follow a diet. The patient is  also here to follow up on thyroid and had  lab work done .    Hyperlipidemia   Pertinent negatives include no chest pain or shortness of breath.   Hypertension   Pertinent negatives include no chest pain, palpitations or shortness of breath.      The following portions of the patient's history were reviewed and   updated as appropriate: allergies, current medications, past family history, past medical history, past social history, past surgical history and problem list.    Compared to one year ago, the patient feels his physical   health is worse.    Compared to one year ago, the patient feels his mental   health is the same.    Recent Hospitalizations:  This patient has had a [facility] admission record on file within the last 365 days.    Current Medical Providers:  Patient Care Team:  Roxanne Slater MD as PCP - General (Internal Medicine)  Rosey Glover MD as Surgeon (General Surgery)    Outpatient Medications Prior to Visit   Medication Sig Dispense Refill   • Alcohol Swabs (ALCOHOL WIPES) 70 % pads 1 pad 3 (Three) Times a Day. (Patient taking differently: 1 pad 3 (Three) Times a Day. Cleans around stacy catheter with them.) 100 each 11   • cefdinir (OMNICEF) 300 MG capsule Take 1 capsule by mouth 2 (Two) Times a Day. 6 capsule 0   • doxazosin (CARDURA) 4 MG tablet Take 1 tablet by mouth 2 (Two)  "Times a Day. 180 tablet 2   • ferrous sulfate 325 (65 FE) MG tablet Take 325 mg by mouth Daily With Breakfast.     • finasteride (PROSCAR) 5 MG tablet Take 1 tablet by mouth Daily. 90 tablet 3   • HYDROcodone-acetaminophen (NORCO) 7.5-325 MG per tablet Take 1 tablet by mouth Every 6 (Six) Hours As Needed for Moderate Pain 15 tablet 0   • levothyroxine (SYNTHROID, LEVOTHROID) 88 MCG tablet Take 1 tablet by mouth Daily. 90 tablet 2   • nitrofurantoin, macrocrystal-monohydrate, (Macrobid) 100 MG capsule Take 1 capsule by mouth Daily. 30 capsule 3   • ondansetron ODT (ZOFRAN-ODT) 8 MG disintegrating tablet Place 1 tablet on the tongue Every 8 (Eight) Hours As Needed for Nausea or Vomiting. 21 tablet 0   • polyethylene glycol (MIRALAX) 17 g packet Take 17 g by mouth Daily. 30 packet 11     No facility-administered medications prior to visit.       Opioid medication/s are on active medication list.  and I have evaluated his active treatment plan and pain score trends (see table).  Vitals:    06/21/22 1459   PainSc: 0-No pain     I have reviewed the chart for potential of high risk medication and harmful drug interactions in the elderly.            Aspirin is not on active medication list.  Aspirin use is not indicated based on review of current medical condition/s. Risk of harm outweighs potential benefits.  .    Patient Active Problem List   Diagnosis   • BPH (benign prostatic hyperplasia)   • Hypothyroidism   • Mixed hyperlipidemia   • Elevated PSA   • Benign essential hypertension   • Urinary retention   • Small bowel obstruction (HCC)   • Acute UTI     Advance Care Planning  Advance Directive is not on file.  ACP discussion was held with the patient during this visit. Patient does not have an advance directive, declines further assistance.          Objective    Vitals:    06/21/22 1459   BP: 116/74   Pulse: 83   Resp: 16   Temp: 97.3 °F (36.3 °C)   SpO2: 96%   Weight: 67.1 kg (148 lb)   Height: 174 cm (68.5\") " "  PainSc: 0-No pain     Estimated body mass index is 22.17 kg/m² as calculated from the following:    Height as of this encounter: 174 cm (68.5\").    Weight as of this encounter: 67.1 kg (148 lb).    BMI is within normal parameters. No other follow-up for BMI required.      Does the patient have evidence of cognitive impairment? No    Physical Exam  Vitals and nursing note reviewed.   Constitutional:       General: He is not in acute distress.     Appearance: Normal appearance. He is not diaphoretic.   HENT:      Head: Normocephalic and atraumatic.      Right Ear: External ear normal.      Left Ear: External ear normal.      Nose: Nose normal.   Eyes:      Comments: Lower Eye lid eversion and conjuctiva injection , bilateral   Neck:      Trachea: Trachea normal.   Cardiovascular:      Rate and Rhythm: Normal rate and regular rhythm.      Heart sounds: Normal heart sounds.   Pulmonary:      Effort: Pulmonary effort is normal. No respiratory distress.   Abdominal:      General: Abdomen is flat.   Musculoskeletal:      Cervical back: Neck supple.      Comments: Moves all limbs   Skin:     General: Skin is warm and dry.      Findings: No erythema.   Neurological:      Mental Status: He is alert and oriented to person, place, and time.      Comments: No gross motor or sensory deficits       Lab Results   Component Value Date    TRIG 91 04/05/2022    HDL 45 04/05/2022     (H) 04/05/2022    VLDL 17 04/05/2022            HEALTH RISK ASSESSMENT    Smoking Status:  Social History     Tobacco Use   Smoking Status Heavy Tobacco Smoker   • Packs/day: 0.50   • Years: 30.00   • Pack years: 15.00   • Types: Cigarettes, Electronic Cigarette   Smokeless Tobacco Never Used   Tobacco Comment    Quit 2013     Alcohol Consumption:  Social History     Substance and Sexual Activity   Alcohol Use Not Currently     Fall Risk Screen:    STEADI Fall Risk Assessment was completed, and patient is at MODERATE risk for falls. Assessment " completed on:6/21/2022    Depression Screening:  PHQ-2/PHQ-9 Depression Screening 6/21/2022   Retired PHQ-9 Total Score -   Retired Total Score -   Little Interest or Pleasure in Doing Things 0-->not at all   Feeling Down, Depressed or Hopeless 0-->not at all   PHQ-9: Brief Depression Severity Measure Score 0       Health Habits and Functional and Cognitive Screening:  Functional & Cognitive Status 6/21/2022   Do you have difficulty preparing food and eating? No   Do you have difficulty bathing yourself, getting dressed or grooming yourself? No   Do you have difficulty using the toilet? No   Do you have difficulty moving around from place to place? No   Do you have trouble with steps or getting out of a bed or a chair? No   Current Diet Well Balanced Diet   Dental Exam Not up to date   Eye Exam Not up to date   Exercise (times per week) 5 times per week   Current Exercises Include House Cleaning;Walking;Other   Current Exercise Activities Include -   Do you need help using the phone?  No   Are you deaf or do you have serious difficulty hearing?  No   Do you need help with transportation? No   Do you need help shopping? No   Do you need help preparing meals?  No   Do you need help with housework?  No   Do you need help with laundry? No   Do you need help taking your medications? No   Do you need help managing money? No   Do you ever drive or ride in a car without wearing a seat belt? No   Have you felt unusual stress, anger or loneliness in the last month? No   Who do you live with? Alone   If you need help, do you have trouble finding someone available to you? No   Have you been bothered in the last four weeks by sexual problems? No   Do you have difficulty concentrating, remembering or making decisions? No       Age-appropriate Screening Schedule:  Refer to the list below for future screening recommendations based on patient's age, sex and/or medical conditions. Orders for these recommended tests are listed in the  plan section. The patient has been provided with a written plan.    Health Maintenance   Topic Date Due   • TDAP/TD VACCINES (1 - Tdap) Never done   • ZOSTER VACCINE (1 of 2) Never done   • INFLUENZA VACCINE  10/01/2022   • LIPID PANEL  04/05/2023              Assessment & Plan   CMS Preventative Services Quick Reference  Risk Factors Identified During Encounter  Cardiovascular Disease  The above risks/problems have been discussed with the patient.  Follow up actions/plans if indicated are seen below in the Assessment/Plan Section.  Pertinent information has been shared with the patient in the After Visit Summary.    Diagnoses and all orders for this visit:    1. Medicare annual wellness visit, subsequent (Primary)    2. At moderate risk for fall    3. Routine general medical examination at a health care facility    4. Benign essential hypertension    5. Mixed hyperlipidemia  -     CBC & Differential  -     Comprehensive Metabolic Panel  -     Lipid Panel    6. Adult onset hypothyroidism  -     TSH    Plan:  1.physical: Physical exam conducted today, reviewed fasting lab work,   Impression: healthy adult Patient. Currently, patient eats a healthy diet. Screening lab work includes glucose, lipid profile and complete blood count . The risks and benefits of immunizations were discussed and immunizations are up to date. Advice and education were given regarding nutrition and aerobic exercise. Patient discussion: discussed with the patient.  Annual Wellness Visit  ,physical  with preventive exam as well as age and risk appropriate counseling completed.   Advance Directive Planning: paperwork and instructions were given to the patient.   Patient Discussion: plan discussed with the patient, follow-up visit needed in one year. Medication Review and medication list updated  2.  Benign essential hypertension: Will continue current medication, low-sodium diet advised, Counseled to regularly check BP at home with goal averaging  <130/80.   3.mixed hyperlipidemia:  reviewed  fasting CMP and lipid panel.  Diet and exercise counseled,   4. hypothyroidism:  reviewed  tsh , and continue levothyroxine      Follow Up:   Return in about 1 year (around 6/21/2023) for Medicare Wellness.     An After Visit Summary and PPPS were made available to the patient.

## 2022-06-22 NOTE — TELEPHONE ENCOUNTER
Rx Refill Note  Requested Prescriptions     Pending Prescriptions Disp Refills   • doxazosin (CARDURA) 4 MG tablet [Pharmacy Med Name: DOXAZOSIN 4 MG TAB 4 Tablet] 180 tablet 2     Sig: TAKE ONE TABLET BY MOUTH TWICE DAILY      Last office visit with prescribing clinician: 6/21/2022      Next office visit with prescribing clinician: 7/27/2022            Bernard Deshpande, LISA  06/22/22, 16:51 EDT

## 2022-06-23 RX ORDER — DOXAZOSIN MESYLATE 4 MG/1
TABLET ORAL
Qty: 180 TABLET | Refills: 2 | Status: SHIPPED | OUTPATIENT
Start: 2022-06-23 | End: 2023-01-31 | Stop reason: SDUPTHER

## 2022-07-07 ENCOUNTER — TELEPHONE (OUTPATIENT)
Dept: UROLOGY | Facility: CLINIC | Age: 77
End: 2022-07-07

## 2022-07-07 NOTE — TELEPHONE ENCOUNTER
Caller: Nestor Nye    Relationship to patient: Self    Best call back number: [phone]    Patient is needing: PATIENT WOULD LIKE TO RESCHEDULE CATH CHANGE APPOINTMENT FROM 7/27/22 TO 8/3 10:10 AM IF AVAILABLE. HUB UNABLE TO SCHEDULE VISIT TYPE.     HUB UNABLE TO WARM TRANSFER BETWEEN 1-2.     PATIENT REQUESTING A CALL BACK.

## 2022-07-19 ENCOUNTER — LAB (OUTPATIENT)
Dept: LAB | Facility: HOSPITAL | Age: 77
End: 2022-07-19

## 2022-07-19 LAB
ALBUMIN SERPL-MCNC: 4.3 G/DL (ref 3.5–5.2)
ALBUMIN/GLOB SERPL: 1.8 G/DL
ALP SERPL-CCNC: 77 U/L (ref 39–117)
ALT SERPL W P-5'-P-CCNC: 11 U/L (ref 1–41)
ANION GAP SERPL CALCULATED.3IONS-SCNC: 10 MMOL/L (ref 5–15)
AST SERPL-CCNC: 12 U/L (ref 1–40)
BASOPHILS # BLD AUTO: 0.06 10*3/MM3 (ref 0–0.2)
BASOPHILS NFR BLD AUTO: 0.9 % (ref 0–1.5)
BILIRUB SERPL-MCNC: 0.6 MG/DL (ref 0–1.2)
BUN SERPL-MCNC: 19 MG/DL (ref 8–23)
BUN/CREAT SERPL: 12.6 (ref 7–25)
CALCIUM SPEC-SCNC: 9.2 MG/DL (ref 8.6–10.5)
CHLORIDE SERPL-SCNC: 104 MMOL/L (ref 98–107)
CHOLEST SERPL-MCNC: 176 MG/DL (ref 0–200)
CO2 SERPL-SCNC: 24 MMOL/L (ref 22–29)
CREAT SERPL-MCNC: 1.51 MG/DL (ref 0.76–1.27)
DEPRECATED RDW RBC AUTO: 45.1 FL (ref 37–54)
EGFRCR SERPLBLD CKD-EPI 2021: 47.9 ML/MIN/1.73
EOSINOPHIL # BLD AUTO: 0.17 10*3/MM3 (ref 0–0.4)
EOSINOPHIL NFR BLD AUTO: 2.7 % (ref 0.3–6.2)
ERYTHROCYTE [DISTWIDTH] IN BLOOD BY AUTOMATED COUNT: 12.7 % (ref 12.3–15.4)
GLOBULIN UR ELPH-MCNC: 2.4 GM/DL
GLUCOSE SERPL-MCNC: 99 MG/DL (ref 65–99)
HCT VFR BLD AUTO: 43.1 % (ref 37.5–51)
HDLC SERPL-MCNC: 47 MG/DL (ref 40–60)
HGB BLD-MCNC: 14.2 G/DL (ref 13–17.7)
IMM GRANULOCYTES # BLD AUTO: 0.01 10*3/MM3 (ref 0–0.05)
IMM GRANULOCYTES NFR BLD AUTO: 0.2 % (ref 0–0.5)
LDLC SERPL CALC-MCNC: 114 MG/DL (ref 0–100)
LDLC/HDLC SERPL: 2.4 {RATIO}
LYMPHOCYTES # BLD AUTO: 1.85 10*3/MM3 (ref 0.7–3.1)
LYMPHOCYTES NFR BLD AUTO: 29 % (ref 19.6–45.3)
MCH RBC QN AUTO: 32.1 PG (ref 26.6–33)
MCHC RBC AUTO-ENTMCNC: 32.9 G/DL (ref 31.5–35.7)
MCV RBC AUTO: 97.3 FL (ref 79–97)
MONOCYTES # BLD AUTO: 0.61 10*3/MM3 (ref 0.1–0.9)
MONOCYTES NFR BLD AUTO: 9.6 % (ref 5–12)
NEUTROPHILS NFR BLD AUTO: 3.67 10*3/MM3 (ref 1.7–7)
NEUTROPHILS NFR BLD AUTO: 57.6 % (ref 42.7–76)
NRBC BLD AUTO-RTO: 0 /100 WBC (ref 0–0.2)
PLATELET # BLD AUTO: 266 10*3/MM3 (ref 140–450)
PMV BLD AUTO: 10.3 FL (ref 6–12)
POTASSIUM SERPL-SCNC: 4.6 MMOL/L (ref 3.5–5.2)
PROT SERPL-MCNC: 6.7 G/DL (ref 6–8.5)
RBC # BLD AUTO: 4.43 10*6/MM3 (ref 4.14–5.8)
SODIUM SERPL-SCNC: 138 MMOL/L (ref 136–145)
TRIGL SERPL-MCNC: 82 MG/DL (ref 0–150)
TSH SERPL DL<=0.05 MIU/L-ACNC: 2.68 UIU/ML (ref 0.27–4.2)
VLDLC SERPL-MCNC: 15 MG/DL (ref 5–40)
WBC NRBC COR # BLD: 6.37 10*3/MM3 (ref 3.4–10.8)

## 2022-07-19 PROCEDURE — 80061 LIPID PANEL: CPT | Performed by: NURSE PRACTITIONER

## 2022-07-19 PROCEDURE — 36415 COLL VENOUS BLD VENIPUNCTURE: CPT | Performed by: INTERNAL MEDICINE

## 2022-07-19 PROCEDURE — 80053 COMPREHEN METABOLIC PANEL: CPT | Performed by: NURSE PRACTITIONER

## 2022-07-19 PROCEDURE — 85025 COMPLETE CBC W/AUTO DIFF WBC: CPT | Performed by: INTERNAL MEDICINE

## 2022-07-19 PROCEDURE — 84443 ASSAY THYROID STIM HORMONE: CPT | Performed by: NURSE PRACTITIONER

## 2022-07-20 DIAGNOSIS — E03.8 ADULT ONSET HYPOTHYROIDISM: ICD-10-CM

## 2022-07-20 RX ORDER — LEVOTHYROXINE SODIUM 88 UG/1
88 TABLET ORAL DAILY
Qty: 90 TABLET | Refills: 2 | Status: SHIPPED | OUTPATIENT
Start: 2022-07-20 | End: 2022-07-27 | Stop reason: SDUPTHER

## 2022-07-27 ENCOUNTER — OFFICE VISIT (OUTPATIENT)
Dept: INTERNAL MEDICINE | Facility: CLINIC | Age: 77
End: 2022-07-27

## 2022-07-27 VITALS
HEART RATE: 92 BPM | TEMPERATURE: 97.1 F | DIASTOLIC BLOOD PRESSURE: 74 MMHG | RESPIRATION RATE: 16 BRPM | HEIGHT: 69 IN | OXYGEN SATURATION: 96 % | SYSTOLIC BLOOD PRESSURE: 106 MMHG | BODY MASS INDEX: 22.07 KG/M2 | WEIGHT: 149 LBS

## 2022-07-27 DIAGNOSIS — E78.2 MIXED HYPERLIPIDEMIA: ICD-10-CM

## 2022-07-27 DIAGNOSIS — I10 BENIGN ESSENTIAL HYPERTENSION: Primary | ICD-10-CM

## 2022-07-27 DIAGNOSIS — E03.9 HYPOTHYROIDISM, UNSPECIFIED TYPE: ICD-10-CM

## 2022-07-27 DIAGNOSIS — E03.8 ADULT ONSET HYPOTHYROIDISM: ICD-10-CM

## 2022-07-27 DIAGNOSIS — N18.30 STAGE 3 CHRONIC KIDNEY DISEASE, UNSPECIFIED WHETHER STAGE 3A OR 3B CKD: ICD-10-CM

## 2022-07-27 PROCEDURE — 99214 OFFICE O/P EST MOD 30 MIN: CPT | Performed by: INTERNAL MEDICINE

## 2022-07-27 RX ORDER — LEVOTHYROXINE SODIUM 88 UG/1
88 TABLET ORAL DAILY
Qty: 90 TABLET | Refills: 2 | Status: SHIPPED | OUTPATIENT
Start: 2022-07-27 | End: 2023-01-31 | Stop reason: SDUPTHER

## 2022-07-27 NOTE — PROGRESS NOTES
"Chief Complaint  Hypertension, Hyperlipidemia, and Hypothyroidism    Subjective        Nestor Nye presents to [facility]  History of Present Illness  HPI: Patient is here to follow up on the blood pressure  The patient is taking the blood pressure medications as prescribed and has had no side effects. The patient is also here to follow up on the cholesterol and is trying to follow a diet. The patient is  also here to follow up on thyroid and  ckd and had lab work done .  The patient also needs refills on medications .   Hyperlipidemia   Pertinent negatives include no chest pain or shortness of breath.   Hypertension   Pertinent negatives include no chest pain, palpitations or shortness of breath.    Objective   Vital Signs:  /74   Pulse 92   Temp 97.1 °F (36.2 °C)   Resp 16   Ht 174 cm (68.5\")   Wt 67.6 kg (149 lb)   SpO2 96%   BMI 22.32 kg/m²   Estimated body mass index is 22.32 kg/m² as calculated from the following:    Height as of this encounter: 174 cm (68.5\").    Weight as of this encounter: 67.6 kg (149 lb).    BMI is within normal parameters. No other follow-up for BMI required.      Physical Exam  Vitals and nursing note reviewed.   Constitutional:       General: He is not in acute distress.     Appearance: Normal appearance. He is not diaphoretic.   HENT:      Head: Normocephalic and atraumatic.      Right Ear: External ear normal.      Left Ear: External ear normal.      Nose: Nose normal.   Eyes:      Comments:  Eye lid dysfunction bilateral  Lower Eye lid eversion and conjuctiva injection , bilateral   Neck:      Trachea: Trachea normal.   Cardiovascular:      Rate and Rhythm: Normal rate and regular rhythm.      Heart sounds: Normal heart sounds.   Pulmonary:      Effort: Pulmonary effort is normal. No respiratory distress.   Abdominal:      General: Abdomen is flat.   Musculoskeletal:      Cervical back: Neck supple.      Comments: Moves all limbs "   Skin:     General: Skin is warm and dry.      Findings: No erythema.   Neurological:      Mental Status: He is alert and oriented to person, place, and time.      Comments: No gross motor or sensory deficits        Result Review :    Common labs    Common Labsle 5/9/22 5/9/22 5/16/22 5/16/22 7/19/22 7/19/22 7/19/22    0620 0620 1106 1106 0910 0910 0910   Glucose  98  102 (A)  99    BUN  10  23  19    Creatinine  1.43 (A)  1.65 (A)  1.51 (A)    Sodium  139  139  138    Potassium  3.8  5.2  4.6    Chloride  104  102  104    Calcium  8.5 (A)  9.9  9.2    Total Protein    6.6      Albumin    4.10  4.30    Total Bilirubin    0.6  0.6    Alkaline Phosphatase    73  77    AST (SGOT)    14  12    ALT (SGPT)    20  11    WBC 6.30  6.16  6.37     Hemoglobin 12.7 (A)  13.8  14.2     Hematocrit 36.3 (A)  42.2  43.1     Platelets 290  279  266     Total Cholesterol       176   Triglycerides       82   HDL Cholesterol       47   LDL Cholesterol        114 (A)   (A) Abnormal value                      Assessment and Plan   Diagnoses and all orders for this visit:    1. Benign essential hypertension (Primary)    2. Mixed hyperlipidemia  -     CBC & Differential  -     Comprehensive Metabolic Panel  -     Lipid Panel    3. Hypothyroidism, unspecified type  -     TSH        -     levothyroxine (SYNTHROID, LEVOTHROID) 88 MCG tablet; Take 1 tablet by mouth Daily.  Dispense: 90 tablet; Refill: 2    4. Stage 3 chronic kidney disease, unspecified whether stage 3a or 3b CKD (HCC)             Plan:  1.  Benign essential hypertension: Will continue current medication, low-sodium diet advised, Counseled to regularly check BP at home with goal averaging <130/80.   2.mixed hyperlipidemia:  reviewed  fasting CMP and lipid panel.  Diet and exercise counseled,  ldl 114  3. impaired glucose   :   reviewed  fasting CMP  and hba1c  , diet and exercise counseled  4. hypothyroidism:  reviewed  tsh , and continue levothyroxine  I spent 30 minutes  caring for  Nestor   on this date of service. This time includes time spent by me in the following activities:preparing for the visit, reviewing tests, performing a medically appropriate examination and/or evaluation , counseling and educating the patient/family/caregiver, ordering medications, tests, or procedures and documenting information in the medical record          Follow Up   Return in about 3 months (around 10/31/2022).  Patient was given instructions and counseling regarding his condition or for health maintenance advice. Please see specific information pulled into the AVS if appropriate.

## 2022-08-15 DIAGNOSIS — R33.9 URINARY RETENTION: ICD-10-CM

## 2022-08-15 RX ORDER — NITROFURANTOIN 25; 75 MG/1; MG/1
CAPSULE ORAL
Qty: 30 CAPSULE | Refills: 3 | Status: SHIPPED | OUTPATIENT
Start: 2022-08-15 | End: 2022-11-11

## 2022-08-16 ENCOUNTER — OFFICE VISIT (OUTPATIENT)
Dept: UROLOGY | Facility: CLINIC | Age: 77
End: 2022-08-16

## 2022-08-16 DIAGNOSIS — R33.9 URINARY RETENTION: ICD-10-CM

## 2022-08-16 PROCEDURE — 99212 OFFICE O/P EST SF 10 MIN: CPT | Performed by: UROLOGY

## 2022-08-16 PROCEDURE — 99024 POSTOP FOLLOW-UP VISIT: CPT | Performed by: UROLOGY

## 2022-08-16 PROCEDURE — 51701 INSERT BLADDER CATHETER: CPT | Performed by: UROLOGY

## 2022-08-16 NOTE — PROGRESS NOTES
Patient in for monthly cath change. Old catheter was removed and a new 16fr coude tip catheter was placed. Patient tolerated the procedure well.  PABLITO Loja

## 2022-08-18 ENCOUNTER — TELEPHONE (OUTPATIENT)
Dept: INTERNAL MEDICINE | Facility: CLINIC | Age: 77
End: 2022-08-18

## 2022-08-18 NOTE — TELEPHONE ENCOUNTER
Pharmacy Name:  [facility]    Pharmacy representative name: [facility]     Pharmacy representative phone number: [phone]    What medication are you calling in regards to: N/A    What question does the pharmacy have: CURRENT ACTIVE MED LIST     Who is the provider that prescribed the medication: D'COSTA    Additional notes: FAX: [phone]

## 2022-10-12 ENCOUNTER — PROCEDURE VISIT (OUTPATIENT)
Dept: UROLOGY | Facility: CLINIC | Age: 77
End: 2022-10-12

## 2022-10-12 DIAGNOSIS — R33.9 URINARY RETENTION: ICD-10-CM

## 2022-10-12 PROCEDURE — 51701 INSERT BLADDER CATHETER: CPT | Performed by: UROLOGY

## 2022-10-12 PROCEDURE — 99024 POSTOP FOLLOW-UP VISIT: CPT | Performed by: UROLOGY

## 2022-10-12 NOTE — PROGRESS NOTES
Patient in office for monthly cath change. We removed old cath, cleaned patient and inserted new 16fr coude tip catheter. Patient tolerated well and he will be back in 4 weeks for another cath change.  ASHLEY Loja

## 2022-10-25 ENCOUNTER — TELEPHONE (OUTPATIENT)
Dept: UROLOGY | Facility: CLINIC | Age: 77
End: 2022-10-25

## 2022-10-25 NOTE — TELEPHONE ENCOUNTER
Caller: JOSE    Relationship: [facility]     Best call back number: [phone]    What medications are you currently taking:   Current Outpatient Medications on File Prior to Visit   Medication Sig Dispense Refill   • Alcohol Swabs (ALCOHOL WIPES) 70 % pads 1 pad 3 (Three) Times a Day. (Patient taking differently: 1 pad 3 (Three) Times a Day. Cleans around stacy catheter with them.) 100 each 11   • doxazosin (CARDURA) 4 MG tablet TAKE ONE TABLET BY MOUTH TWICE DAILY 180 tablet 2   • ferrous sulfate 325 (65 FE) MG tablet Take 325 mg by mouth Daily With Breakfast.     • finasteride (PROSCAR) 5 MG tablet Take 1 tablet by mouth Daily. 90 tablet 3   • levothyroxine (SYNTHROID, LEVOTHROID) 88 MCG tablet Take 1 tablet by mouth Daily. 90 tablet 2   • nitrofurantoin, macrocrystal-monohydrate, (MACROBID) 100 MG capsule TAKE ONE CAPSULE BY MOUTH EVERY DAY 30 capsule 3     No current facility-administered medications on file prior to visit.            Which medication are you concerned about: MACROBID 100 MG     Who prescribed you this medication: MARBELLA RICE PA-C    What are your concerns:  WANTS TO MAKE SURE THAT HE IS SUPPOSED TO STILL TAKE THIS BC IT'S AN ANTIBIOTIC AND HE WAS TAKING IT EVERYDAY. PATIENT CALLED Friday WANTED TO MAKE SURE HE WAS SUPPOSED TO STILL BE TAKING IT.    How long have you had these concerns: 10/21/22

## 2022-10-27 NOTE — TELEPHONE ENCOUNTER
I spoke to Nestor he still comes in for his catheter changes every 4 weeks. He's had 3 surgeries for bowels recently and cant afford another until he gets these paid off. He's on social security so its going to be at least a year he says before he'll be ready to schedule.  NC

## 2022-11-09 ENCOUNTER — PROCEDURE VISIT (OUTPATIENT)
Dept: UROLOGY | Facility: CLINIC | Age: 77
End: 2022-11-09

## 2022-11-09 DIAGNOSIS — R33.9 URINARY RETENTION: ICD-10-CM

## 2022-11-09 PROCEDURE — 99211 OFF/OP EST MAY X REQ PHY/QHP: CPT | Performed by: UROLOGY

## 2022-11-09 NOTE — PROGRESS NOTES
Patient came in for 4 week stacy cath change, patient tolerated well and will return in 4 weeks. Pam VALENZUELA

## 2022-11-11 ENCOUNTER — TELEPHONE (OUTPATIENT)
Dept: UROLOGY | Facility: CLINIC | Age: 77
End: 2022-11-11

## 2022-11-11 NOTE — TELEPHONE ENCOUNTER
Caller: JOSE    Relationship to patient: [facility]    CALL BACK NUMBER : [phone]    Patient is needing: SHE IS WANTING A CALL BACK FROM MARBELLA RICE OR HER NURSE.

## 2022-12-07 ENCOUNTER — PROCEDURE VISIT (OUTPATIENT)
Dept: UROLOGY | Facility: CLINIC | Age: 77
End: 2022-12-07

## 2022-12-07 DIAGNOSIS — R33.9 URINARY RETENTION: ICD-10-CM

## 2022-12-07 PROCEDURE — 51701 INSERT BLADDER CATHETER: CPT | Performed by: UROLOGY

## 2022-12-07 PROCEDURE — 99024 POSTOP FOLLOW-UP VISIT: CPT | Performed by: UROLOGY

## 2022-12-20 DIAGNOSIS — N13.8 BENIGN PROSTATIC HYPERPLASIA WITH URINARY OBSTRUCTION: ICD-10-CM

## 2022-12-20 DIAGNOSIS — N40.1 BENIGN PROSTATIC HYPERPLASIA WITH URINARY OBSTRUCTION: ICD-10-CM

## 2022-12-20 RX ORDER — FINASTERIDE 5 MG/1
TABLET, FILM COATED ORAL
Qty: 30 TABLET | Refills: 3 | Status: SHIPPED | OUTPATIENT
Start: 2022-12-20 | End: 2022-12-30 | Stop reason: SDUPTHER

## 2022-12-29 ENCOUNTER — TELEPHONE (OUTPATIENT)
Dept: UROLOGY | Facility: CLINIC | Age: 77
End: 2022-12-29

## 2022-12-29 NOTE — TELEPHONE ENCOUNTER
Caller: Nestor Nye    Relationship to patient: Self    Best call back number: [phone]    Chief complaint: WANTS TO RESCHEDULE CATH REMOVAL FOR 12/30 SINCE HE HAS TO COME IN THAT DAY FOR FOLLOW UP    Type of visit: IN OFFICE PROCEDURE    Requested date: 12/30    If rescheduling, when is the original appointment: 1/4/23 10:30A    Additional notes: WOULD LIKE CALL BACK

## 2022-12-30 ENCOUNTER — HOME HEALTH ADMISSION (OUTPATIENT)
Dept: HOME HEALTH SERVICES | Facility: HOME HEALTHCARE | Age: 77
End: 2022-12-30

## 2022-12-30 ENCOUNTER — TELEPHONE (OUTPATIENT)
Dept: UROLOGY | Facility: CLINIC | Age: 77
End: 2022-12-30

## 2022-12-30 ENCOUNTER — OFFICE VISIT (OUTPATIENT)
Dept: UROLOGY | Facility: CLINIC | Age: 77
End: 2022-12-30

## 2022-12-30 VITALS
DIASTOLIC BLOOD PRESSURE: 76 MMHG | OXYGEN SATURATION: 91 % | SYSTOLIC BLOOD PRESSURE: 120 MMHG | HEART RATE: 63 BPM | TEMPERATURE: 97 F

## 2022-12-30 DIAGNOSIS — N40.1 BENIGN PROSTATIC HYPERPLASIA WITH URINARY OBSTRUCTION: Primary | ICD-10-CM

## 2022-12-30 DIAGNOSIS — N13.8 BENIGN PROSTATIC HYPERPLASIA WITH URINARY OBSTRUCTION: Primary | ICD-10-CM

## 2022-12-30 DIAGNOSIS — R33.9 URINARY RETENTION: ICD-10-CM

## 2022-12-30 PROCEDURE — 99214 OFFICE O/P EST MOD 30 MIN: CPT | Performed by: UROLOGY

## 2022-12-30 RX ORDER — METHENAMINE HIPPURATE 1000 MG/1
1 TABLET ORAL 2 TIMES DAILY WITH MEALS
Qty: 90 TABLET | Refills: 4 | Status: SHIPPED | OUTPATIENT
Start: 2022-12-30

## 2022-12-30 RX ORDER — NITROFURANTOIN 25; 75 MG/1; MG/1
100 CAPSULE ORAL DAILY
COMMUNITY
Start: 2022-11-14 | End: 2022-12-30 | Stop reason: SDUPTHER

## 2022-12-30 RX ORDER — NITROFURANTOIN 25; 75 MG/1; MG/1
100 CAPSULE ORAL DAILY
Qty: 90 CAPSULE | Refills: 4 | Status: SHIPPED | OUTPATIENT
Start: 2022-12-30 | End: 2022-12-30

## 2022-12-30 RX ORDER — FINASTERIDE 5 MG/1
5 TABLET, FILM COATED ORAL DAILY
Qty: 90 TABLET | Refills: 4 | Status: SHIPPED | OUTPATIENT
Start: 2022-12-30

## 2022-12-30 NOTE — TELEPHONE ENCOUNTER
Caller: JOSE    Relationship to patient: [facility]    Best call back number: [phone]    Patient is needing: SHE IS WANTING A NURSE TO CONTACT HER ABOUT THE PT.

## 2022-12-30 NOTE — PROGRESS NOTES
Chief Complaint   Patient presents with   • Follow-up     Urinary retention         HPI  Ms. Nye is a 77 y.o. male with history below in assessment, who presents for follow up.     At this visit patient continues to struggle with constipation and diarrhea    Past Medical History:   Diagnosis Date   • Acute maxillary sinusitis    • Arthritis    • Disease of thyroid gland    • Enlarged prostate    • GERD (gastroesophageal reflux disease)    • Gout    • Impaired functional mobility, balance, gait, and endurance    • PONV (postoperative nausea and vomiting)    • Renal disorder    • Rheumatoid arthritis (HCC)    • Right inguinal hernia 10/28/2021    Added automatically from request for surgery [id number]   • Vocal cord anomaly 2007    had problem with enlarge vocal cord after low back surgery, changed his voice since then       Past Surgical History:   Procedure Laterality Date   • ABDOMINAL SURGERY     • BACK SURGERY     • BELPHAROPTOSIS REPAIR Bilateral    • BRAIN SURGERY     • COLON SURGERY     • CRANIOTOMY FOR SUBDURAL HEMATOMA      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   • DIAGNOSTIC LAPAROSCOPY N/A 5/4/2022    Procedure: DIAGNOSTIC LAPAROSCOPY CONVERTED TO EXPLORATORY LAPAROTOMY WITH SMALL BOWEL RESECTION;  Surgeon: Maximo Bennett MD;  Location: [facility] OR;  Service: General;  Laterality: N/A;  CONVERTED TO EXPLORATORY LAPAROTOMY AT 1912   • EXPLORATORY LAPAROTOMY N/A 04/16/2022    Procedure: LAPAROTOMY EXPLORATORY WITH SMALL BOWEL OBSTRUCTION;  Surgeon: Maximo Bennett MD;  Location: [facility] OR;  Service: General;  Laterality: N/A;   • EYE SURGERY      on upper etelids   • FOREIGN BODY REMOVAL Right     removal of metal fragments from RUQ after electrocution and fall.     • HERNIA REPAIR     • INGUINAL HERNIA REPAIR Right 11/01/2021    Procedure: INGUINAL HERNIA REPAIR WITH MESH AND EXCISION OF SPERMATIC CORD LIPOMA;  Surgeon: Rosey Glover MD;  Location: [facility];  Service: General;   Laterality: Right;   • LUMBAR SPINE SURGERY     • SKIN GRAFT Right     RUE skin grafting after electrocution   • THORACIC SPINE SURGERY           Current Outpatient Medications:   •  Alcohol Swabs (ALCOHOL WIPES) 70 % pads, 1 pad 3 (Three) Times a Day. (Patient taking differently: 1 pad 3 (Three) Times a Day. Cleans around stacy catheter with them.), Disp: 100 each, Rfl: 11  •  doxazosin (CARDURA) 4 MG tablet, TAKE ONE TABLET BY MOUTH TWICE DAILY, Disp: 180 tablet, Rfl: 2  •  ferrous sulfate 325 (65 FE) MG tablet, Take 325 mg by mouth Daily With Breakfast., Disp: , Rfl:   •  finasteride (PROSCAR) 5 MG tablet, TAKE ONE TABLET BY MOUTH EVERY DAY, Disp: 30 tablet, Rfl: 3  •  levothyroxine (SYNTHROID, LEVOTHROID) 88 MCG tablet, Take 1 tablet by mouth Daily., Disp: 90 tablet, Rfl: 2  •  nitrofurantoin, macrocrystal-monohydrate, (MACROBID) 100 MG capsule, Take 100 mg by mouth Daily., Disp: , Rfl:      Physical Exam  Visit Vitals  /76 (BP Location: Right arm, Patient Position: Sitting, Cuff Size: Adult)   Pulse 63   Temp 97 °F (36.1 °C) (Temporal)   SpO2 91%       Labs  Brief Urine Lab Results  (Last result in the past 365 days)      Color   Clarity   Blood   Leuk Est   Nitrite   Protein   CREAT   Urine HCG        05/24/22 2324 Yellow   Clear   Trace   Large (3+)   Positive   Negative                 Lab Results   Component Value Date    GLUCOSE 99 07/19/2022    CALCIUM 9.2 07/19/2022     07/19/2022    K 4.6 07/19/2022    CO2 24.0 07/19/2022     07/19/2022    BUN 19 07/19/2022    CREATININE 1.51 (H) 07/19/2022    EGFRIFAFRI 43 (L) 09/25/2017    EGFRIFNONA 35 (L) 09/14/2021    BCR 12.6 07/19/2022    ANIONGAP 10.0 07/19/2022       Lab Results   Component Value Date    WBC 6.37 07/19/2022    HGB 14.2 07/19/2022    HCT 43.1 07/19/2022    MCV 97.3 (H) 07/19/2022     07/19/2022       Urine Culture    Urine Culture 4/13/22 4/14/22 5/4/22   Urine Culture >100,000 CFU/mL Mixed Karrie Isolated 50,000 CFU/mL  Normal Urogenital Karrie >100,000 CFU/mL Proteus penneri (A)   (A) Abnormal value               Lab Results   Component Value Date    PSA 5.0 (H) 07/25/2019    PSA 4.4 (H) 05/23/2018    PSA 5.0 (H) 06/19/2017           Radiographic Studies  No Images in the past 120 days found..      I have reviewed above labs and imaging.     Assessment  77 y.o. male with  history of chronic lower urinary tract symptoms, urinary retention, large prostate over 140 cc.  He was found to have a hernia that was repaired by Dr. Glover on 11/1/2021. Dependent on indwelling catheter.  Hx of multiple bowel resections in past year due to obstruction.    No recent interim urinary infections. On Daily macrobid PPx.     Plan  1. FU in 1 yr w/ JOEY   2. Get home health to change out stacy.  Ambulatory referral placed.  3. Switch to BID methenamine from macrobid  4.  If at any point he wants surgery for simple prostatectomy, I would likely refer him to [facility] due to his history of multiple abdominal surgeries.

## 2023-01-03 ENCOUNTER — TELEPHONE (OUTPATIENT)
Dept: INTERNAL MEDICINE | Facility: CLINIC | Age: 78
End: 2023-01-03
Payer: MEDICARE

## 2023-01-03 NOTE — TELEPHONE ENCOUNTER
Caller: Nestor Nye    Relationship: Self    Best call back number:  [phone] AND ADVISE     What is the medical concern/diagnosis:  DIARRHEA  AND DIGESTION   PATIENT HAS HAD 2 SURGERIES  FOR BOWEL     What specialty or service is being requested:  GASTRO       Any additional details:  THE PATIENT  HAS BEEN GETTING WEAK AND SHAKY  IN THE LAST MONTH   PATIENT WANTED TO BE SEEN BY DR BACK IN THE OFFICE BUT NO APPOINTMENTS WERE AVAILABLE UNTIL MARCH

## 2023-01-07 ENCOUNTER — OFFICE VISIT (OUTPATIENT)
Dept: INTERNAL MEDICINE | Facility: CLINIC | Age: 78
End: 2023-01-07
Payer: MEDICARE

## 2023-01-07 VITALS
HEIGHT: 69 IN | DIASTOLIC BLOOD PRESSURE: 76 MMHG | HEART RATE: 99 BPM | WEIGHT: 149 LBS | RESPIRATION RATE: 15 BRPM | BODY MASS INDEX: 22.07 KG/M2 | SYSTOLIC BLOOD PRESSURE: 110 MMHG | OXYGEN SATURATION: 98 % | TEMPERATURE: 98 F

## 2023-01-07 DIAGNOSIS — M54.2 CERVICALGIA: ICD-10-CM

## 2023-01-07 DIAGNOSIS — M13.0 CHRONIC POLYARTHRITIS: ICD-10-CM

## 2023-01-07 DIAGNOSIS — F51.04 INSOMNIA, PSYCHOPHYSIOLOGICAL: ICD-10-CM

## 2023-01-07 DIAGNOSIS — R19.8 CHANGE IN BOWEL MOVEMENT: ICD-10-CM

## 2023-01-07 DIAGNOSIS — M79.642 LEFT HAND PAIN: ICD-10-CM

## 2023-01-07 DIAGNOSIS — R19.7 DIARRHEA, UNSPECIFIED TYPE: Primary | ICD-10-CM

## 2023-01-07 DIAGNOSIS — M62.838 MUSCLE SPASMS OF NECK: ICD-10-CM

## 2023-01-07 PROCEDURE — 99214 OFFICE O/P EST MOD 30 MIN: CPT | Performed by: INTERNAL MEDICINE

## 2023-01-07 PROCEDURE — 96372 THER/PROPH/DIAG INJ SC/IM: CPT | Performed by: INTERNAL MEDICINE

## 2023-01-07 RX ORDER — METHYLPREDNISOLONE SODIUM SUCCINATE 125 MG/2ML
125 INJECTION, POWDER, LYOPHILIZED, FOR SOLUTION INTRAMUSCULAR; INTRAVENOUS ONCE
Status: COMPLETED | OUTPATIENT
Start: 2023-01-07 | End: 2023-01-07

## 2023-01-07 RX ORDER — TRAZODONE HYDROCHLORIDE 50 MG/1
50 TABLET ORAL NIGHTLY
Qty: 30 TABLET | Refills: 5 | Status: SHIPPED | OUTPATIENT
Start: 2023-01-07 | End: 2023-01-31 | Stop reason: SDUPTHER

## 2023-01-07 RX ORDER — BACLOFEN 10 MG/1
10 TABLET ORAL NIGHTLY PRN
Qty: 30 TABLET | Refills: 3 | Status: SHIPPED | OUTPATIENT
Start: 2023-01-07 | End: 2023-01-31 | Stop reason: SDUPTHER

## 2023-01-07 RX ORDER — METHYLPREDNISOLONE 4 MG/1
TABLET ORAL
Qty: 21 TABLET | Refills: 0 | Status: SHIPPED | OUTPATIENT
Start: 2023-01-07 | End: 2023-01-31

## 2023-01-07 RX ADMIN — METHYLPREDNISOLONE SODIUM SUCCINATE 125 MG: 125 INJECTION, POWDER, LYOPHILIZED, FOR SOLUTION INTRAMUSCULAR; INTRAVENOUS at 13:56

## 2023-01-07 NOTE — PROGRESS NOTES
Chief Complaint  Arthritis (Getting worse- in the neck and joints. /Wants a referral to GI- diarrhea /Has a Cath and HH will be coming to see him /Swelling in the left hand, lost of sleep (getting about 2 hours of sleep)), GI Problem, and Insomnia    Subjective        Nestor Nye presents to [facility]  History of Present Illness  HPI:   The patient is   here to follow up on the cholesterol and is trying to follow a diet. The patient is  also here complaining of worsening multiple joint pain and muscle spasm but since the past few days he has been having swelling of his left hand, he also complains of increased watery diarrhea and change in bowel movements, he does have home health changing his  Gillespie's catheter, he also complains of insomnia  Hypertension   Pertinent negatives include no chest pain, palpitations or shortness of breath.    Objective   Vital Signs:  /76   Pulse 99   Temp 98 °F (36.7 °C)   Resp 15   Ht 174 cm (68.5\")   Wt 67.6 kg (149 lb)   SpO2 98%   BMI 22.32 kg/m²   Estimated body mass index is 22.32 kg/m² as calculated from the following:    Height as of this encounter: 174 cm (68.5\").    Weight as of this encounter: 67.6 kg (149 lb).    BMI is within normal parameters. No other follow-up for BMI required.      Physical Exam  Vitals and nursing note reviewed.   Constitutional:       General: He is not in acute distress.     Appearance: Normal appearance. He is not diaphoretic.   HENT:      Head: Normocephalic and atraumatic.      Right Ear: External ear normal.      Left Ear: External ear normal.      Nose: Nose normal.   Eyes:      Comments: Lower Eye lid eversion and conjuctiva injection , bilateral   Neck:      Trachea: Trachea normal.   Cardiovascular:      Rate and Rhythm: Normal rate and regular rhythm.      Heart sounds: Normal heart sounds.   Pulmonary:      Effort: Pulmonary effort is normal. No respiratory distress.   Abdominal:       General: Abdomen is flat.   Musculoskeletal:         General: Swelling, tenderness and deformity present.      Cervical back: Neck supple.      Comments: Moves all limbs  Left hand swelling   Skin:     General: Skin is warm and dry.      Findings: No erythema.   Neurological:      Mental Status: He is alert and oriented to person, place, and time.      Comments: No gross motor or sensory deficits        Result Review :    CMP    CMP 5/9/22 5/16/22 7/19/22   Glucose 98 102 (A) 99   BUN 10 23 19   Creatinine 1.43 (A) 1.65 (A) 1.51 (A)   eGFR 51.1 (A)  47.9 (A)   Sodium 139 139 138   Potassium 3.8 5.2 4.6   Chloride 104 102 104   Calcium 8.5 (A) 9.9 9.2   Total Protein  6.6    Total Protein   6.7   Albumin  4.10 4.30   Globulin  2.5    Globulin   2.4   Total Bilirubin  0.6 0.6   Alkaline Phosphatase  73 77   AST (SGOT)  14 12   ALT (SGPT)  20 11   Albumin/Globulin Ratio   1.8   BUN/Creatinine Ratio 7.0 13.9 12.6   Anion Gap 10.2  10.0   (A) Abnormal value       Comments are available for some flowsheets but are not being displayed.           CBC    CBC 5/9/22 5/16/22 7/19/22   WBC 6.30 6.16 6.37   RBC 3.89 (A) 4.25 4.43   Hemoglobin 12.7 (A) 13.8 14.2   Hematocrit 36.3 (A) 42.2 43.1   MCV 93.3 99.3 (A) 97.3 (A)   MCH 32.6 32.5 32.1   MCHC 35.0 32.7 32.9   RDW 11.9 (A) 12.6 12.7   Platelets 290 279 266   (A) Abnormal value                      Assessment and Plan   Diagnoses and all orders for this visit:    1. Diarrhea, unspecified type (Primary)  -     Ambulatory Referral to Gastroenterology    2. Change in bowel movement  -     Ambulatory Referral to Gastroenterology    3. Left hand pain  -     Diclofenac Sodium (VOLTAREN) 1 % gel gel; Apply 4 g topically to the appropriate area as directed 4 (Four) Times a Day As Needed (joint pain).  Dispense: 100 g; Refill: 5  -     Ambulatory Referral to Rheumatology  -     methylPREDNISolone sodium succinate (SOLU-Medrol) injection 125 mg  -     methylPREDNISolone (MEDROL) 4 MG  dose pack; Take as directed on package instructions.  Dispense: 21 tablet; Refill: 0    4. Insomnia, psychophysiological  -     traZODone (DESYREL) 50 MG tablet; Take 1 tablet by mouth Every Night.  Dispense: 30 tablet; Refill: 5    5. Cervicalgia  -     methylPREDNISolone sodium succinate (SOLU-Medrol) injection 125 mg  -     methylPREDNISolone (MEDROL) 4 MG dose pack; Take as directed on package instructions.  Dispense: 21 tablet; Refill: 0    6. Chronic polyarthritis  -     Diclofenac Sodium (VOLTAREN) 1 % gel gel; Apply 4 g topically to the appropriate area as directed 4 (Four) Times a Day As Needed (joint pain).  Dispense: 100 g; Refill: 5  -     Ambulatory Referral to Rheumatology  -     methylPREDNISolone sodium succinate (SOLU-Medrol) injection 125 mg  -     methylPREDNISolone (MEDROL) 4 MG dose pack; Take as directed on package instructions.  Dispense: 21 tablet; Refill: 0    7. Muscle spasms of neck  -     baclofen (LIORESAL) 10 MG tablet; Take 1 tablet by mouth At Night As Needed for Muscle Spasms.  Dispense: 30 tablet; Refill: 3      Plan:  1.  Diarrhea: We will refer patient to GI  2.  Change in bowel movements: We will refer patient to GI  3.  Left hand pain:Topical Voltaren gel, will refer patient to rheumatology, im Solu-Medrol given in office today, start Medrol Dosepak  4.  Insomnia: Trial with trazodone  5.  Cervicalgia : Topical Voltaren gel, will refer patient to rheumatology, im Solu-Medrol given in office today, start Medrol Dosepak  6.  Chronic polyarthritis: Topical Voltaren gel, will refer patient to rheumatology, im Solu-Medrol given in office today, start Medrol Dosepak  7.  Muscle spasm: As needed muscle relaxant           I spent 30 minutes caring for Nestor on this date of service. This time includes time spent by me in the following activities:preparing for the visit, reviewing tests, performing a medically appropriate examination and/or evaluation , counseling and educating the  patient/family/caregiver, ordering medications, tests, or procedures and documenting information in the medical record  Follow Up   Patient was given instructions and counseling regarding his condition or for health maintenance advice. Please see specific information pulled into the AVS if appropriate.

## 2023-01-23 ENCOUNTER — LAB (OUTPATIENT)
Dept: LAB | Facility: HOSPITAL | Age: 78
End: 2023-01-23
Payer: MEDICARE

## 2023-01-23 LAB
ALBUMIN SERPL-MCNC: 3.8 G/DL (ref 3.5–5.2)
ALBUMIN/GLOB SERPL: 1.5 G/DL
ALP SERPL-CCNC: 88 U/L (ref 39–117)
ALT SERPL W P-5'-P-CCNC: 9 U/L (ref 1–41)
ANION GAP SERPL CALCULATED.3IONS-SCNC: 6 MMOL/L (ref 5–15)
AST SERPL-CCNC: 10 U/L (ref 1–40)
BASOPHILS # BLD AUTO: 0.07 10*3/MM3 (ref 0–0.2)
BASOPHILS NFR BLD AUTO: 0.8 % (ref 0–1.5)
BILIRUB SERPL-MCNC: 0.7 MG/DL (ref 0–1.2)
BUN SERPL-MCNC: 22 MG/DL (ref 8–23)
BUN/CREAT SERPL: 14.8 (ref 7–25)
CALCIUM SPEC-SCNC: 8.9 MG/DL (ref 8.6–10.5)
CHLORIDE SERPL-SCNC: 105 MMOL/L (ref 98–107)
CHOLEST SERPL-MCNC: 145 MG/DL (ref 0–200)
CO2 SERPL-SCNC: 25 MMOL/L (ref 22–29)
CREAT SERPL-MCNC: 1.49 MG/DL (ref 0.76–1.27)
DEPRECATED RDW RBC AUTO: 41.5 FL (ref 37–54)
EGFRCR SERPLBLD CKD-EPI 2021: 48.3 ML/MIN/1.73
EOSINOPHIL # BLD AUTO: 0.1 10*3/MM3 (ref 0–0.4)
EOSINOPHIL NFR BLD AUTO: 1.1 % (ref 0.3–6.2)
ERYTHROCYTE [DISTWIDTH] IN BLOOD BY AUTOMATED COUNT: 12.3 % (ref 12.3–15.4)
GLOBULIN UR ELPH-MCNC: 2.5 GM/DL
GLUCOSE SERPL-MCNC: 96 MG/DL (ref 65–99)
HCT VFR BLD AUTO: 38.1 % (ref 37.5–51)
HDLC SERPL-MCNC: 44 MG/DL (ref 40–60)
HGB BLD-MCNC: 13 G/DL (ref 13–17.7)
IMM GRANULOCYTES # BLD AUTO: 0.04 10*3/MM3 (ref 0–0.05)
IMM GRANULOCYTES NFR BLD AUTO: 0.4 % (ref 0–0.5)
LDLC SERPL CALC-MCNC: 88 MG/DL (ref 0–100)
LDLC/HDLC SERPL: 2.01 {RATIO}
LYMPHOCYTES # BLD AUTO: 1.58 10*3/MM3 (ref 0.7–3.1)
LYMPHOCYTES NFR BLD AUTO: 17.7 % (ref 19.6–45.3)
MCH RBC QN AUTO: 32.2 PG (ref 26.6–33)
MCHC RBC AUTO-ENTMCNC: 34.1 G/DL (ref 31.5–35.7)
MCV RBC AUTO: 94.3 FL (ref 79–97)
MONOCYTES # BLD AUTO: 1.03 10*3/MM3 (ref 0.1–0.9)
MONOCYTES NFR BLD AUTO: 11.5 % (ref 5–12)
NEUTROPHILS NFR BLD AUTO: 6.13 10*3/MM3 (ref 1.7–7)
NEUTROPHILS NFR BLD AUTO: 68.5 % (ref 42.7–76)
NRBC BLD AUTO-RTO: 0 /100 WBC (ref 0–0.2)
PLATELET # BLD AUTO: 270 10*3/MM3 (ref 140–450)
PMV BLD AUTO: 10.6 FL (ref 6–12)
POTASSIUM SERPL-SCNC: 4.6 MMOL/L (ref 3.5–5.2)
PROT SERPL-MCNC: 6.3 G/DL (ref 6–8.5)
RBC # BLD AUTO: 4.04 10*6/MM3 (ref 4.14–5.8)
SODIUM SERPL-SCNC: 136 MMOL/L (ref 136–145)
TRIGL SERPL-MCNC: 62 MG/DL (ref 0–150)
TSH SERPL DL<=0.05 MIU/L-ACNC: 1.65 UIU/ML (ref 0.27–4.2)
VLDLC SERPL-MCNC: 13 MG/DL (ref 5–40)
WBC NRBC COR # BLD: 8.95 10*3/MM3 (ref 3.4–10.8)

## 2023-01-23 PROCEDURE — 80053 COMPREHEN METABOLIC PANEL: CPT | Performed by: INTERNAL MEDICINE

## 2023-01-23 PROCEDURE — 85025 COMPLETE CBC W/AUTO DIFF WBC: CPT | Performed by: INTERNAL MEDICINE

## 2023-01-23 PROCEDURE — 80061 LIPID PANEL: CPT | Performed by: INTERNAL MEDICINE

## 2023-01-23 PROCEDURE — 36415 COLL VENOUS BLD VENIPUNCTURE: CPT | Performed by: INTERNAL MEDICINE

## 2023-01-23 PROCEDURE — 84443 ASSAY THYROID STIM HORMONE: CPT | Performed by: INTERNAL MEDICINE

## 2023-01-31 ENCOUNTER — OFFICE VISIT (OUTPATIENT)
Dept: INTERNAL MEDICINE | Facility: CLINIC | Age: 78
End: 2023-01-31
Payer: MEDICARE

## 2023-01-31 VITALS
DIASTOLIC BLOOD PRESSURE: 65 MMHG | TEMPERATURE: 96.6 F | BODY MASS INDEX: 21.48 KG/M2 | WEIGHT: 145 LBS | HEART RATE: 87 BPM | HEIGHT: 69 IN | SYSTOLIC BLOOD PRESSURE: 97 MMHG | RESPIRATION RATE: 16 BRPM | OXYGEN SATURATION: 97 %

## 2023-01-31 DIAGNOSIS — F51.04 INSOMNIA, PSYCHOPHYSIOLOGICAL: ICD-10-CM

## 2023-01-31 DIAGNOSIS — E78.2 MIXED HYPERLIPIDEMIA: Primary | ICD-10-CM

## 2023-01-31 DIAGNOSIS — E03.8 ADULT ONSET HYPOTHYROIDISM: ICD-10-CM

## 2023-01-31 DIAGNOSIS — M62.838 MUSCLE SPASMS OF NECK: ICD-10-CM

## 2023-01-31 DIAGNOSIS — N18.30 STAGE 3 CHRONIC KIDNEY DISEASE, UNSPECIFIED WHETHER STAGE 3A OR 3B CKD: ICD-10-CM

## 2023-01-31 PROCEDURE — 99214 OFFICE O/P EST MOD 30 MIN: CPT | Performed by: INTERNAL MEDICINE

## 2023-01-31 RX ORDER — DOXAZOSIN MESYLATE 4 MG/1
4 TABLET ORAL NIGHTLY
Qty: 90 TABLET | Refills: 2 | Status: SHIPPED | OUTPATIENT
Start: 2023-01-31

## 2023-01-31 RX ORDER — LEVOTHYROXINE SODIUM 88 UG/1
88 TABLET ORAL DAILY
Qty: 90 TABLET | Refills: 2 | Status: SHIPPED | OUTPATIENT
Start: 2023-01-31

## 2023-01-31 RX ORDER — TRAZODONE HYDROCHLORIDE 50 MG/1
50 TABLET ORAL NIGHTLY
Qty: 30 TABLET | Refills: 5 | Status: SHIPPED | OUTPATIENT
Start: 2023-01-31

## 2023-01-31 RX ORDER — BACLOFEN 10 MG/1
10 TABLET ORAL NIGHTLY PRN
Qty: 30 TABLET | Refills: 3 | Status: SHIPPED | OUTPATIENT
Start: 2023-01-31

## 2023-03-20 ENCOUNTER — TELEPHONE (OUTPATIENT)
Dept: UROLOGY | Facility: CLINIC | Age: 78
End: 2023-03-20

## 2023-03-20 NOTE — TELEPHONE ENCOUNTER
Caller: [facility]     Best call back number: [phone] - ERYN     INCOMING CALL FROM ERYN AT [facility], SAYS SHE IS NEEDING A VERBAL ORDER TO SEE PT TODAY.

## 2023-04-12 ENCOUNTER — TELEPHONE (OUTPATIENT)
Dept: UROLOGY | Facility: CLINIC | Age: 78
End: 2023-04-12

## 2023-04-12 NOTE — TELEPHONE ENCOUNTER
RECEIVED A CALL FROM [facility]. SPOKE WITH GADIEL. SHE CALLED REQUESTING AN INTERNAL LIDOCAINE WITH A SYRINGE FOR PT'S CATH TO BE CHANGED. PER PT OFFICE PROVIDED THIS TREATMENT FOR HOME HEALTH. OFFICE PLEASE ADVISE. THANK YOU.

## 2023-04-13 ENCOUNTER — TELEPHONE (OUTPATIENT)
Dept: UROLOGY | Facility: CLINIC | Age: 78
End: 2023-04-13
Payer: MEDICARE

## 2023-04-13 NOTE — TELEPHONE ENCOUNTER
Caller: Nestor Nye    Relationship to patient: SELF    Best call back number: [phone]    Patient is needing: PT HAD A MESSAGE TO CALL SMITA, PLEASE GIVE PT A CALL BACK ANY TIME SHE CAN.

## 2023-04-14 NOTE — TELEPHONE ENCOUNTER
Patient requesting rx for the lidocaine numbing jelly be sent to the [facility] so they can use with his cath changes.  ASHLEY Loja

## 2023-04-21 NOTE — TELEPHONE ENCOUNTER
Left voicemail for caretenders nurse to call me back so I can figure out where the rx needs to go. I gave all a few tubes from office until we get it worked out.  ASHLEY Loja

## 2023-04-25 ENCOUNTER — TELEPHONE (OUTPATIENT)
Dept: INTERNAL MEDICINE | Facility: CLINIC | Age: 78
End: 2023-04-25

## 2023-04-25 NOTE — TELEPHONE ENCOUNTER
Caller: [facility]    Relationship:     Best call back number: [phone]    What form or medical record are you requesting: [id number]      How would you like to receive the form or medical records (pick-up, mail, fax): FAX  If fax, what is the fax number: [phone]      Additional notes:     CALL AND GIVE ICD-10 CODES OR FILL IN ON THE FORM AND RE-FAX TO   FAX - [phone]

## 2023-04-28 ENCOUNTER — TELEPHONE (OUTPATIENT)
Dept: INTERNAL MEDICINE | Facility: CLINIC | Age: 78
End: 2023-04-28
Payer: MEDICARE

## 2023-05-03 ENCOUNTER — TELEPHONE (OUTPATIENT)
Dept: INTERNAL MEDICINE | Facility: CLINIC | Age: 78
End: 2023-05-03
Payer: MEDICARE

## 2023-05-03 NOTE — TELEPHONE ENCOUNTER
Caller: REP    Relationship: Other    Best call back number: [phone]    What is the best time to reach you: ANY TIME     Who are you requesting to speak with (clinical staff, provider,  specific staff member): CLINICAL    What was the call regarding: NURSE WITH [facility] WOULD LIKE A CALL BACK TO GO OVER PATIENTS MEDICATION LIST, ALSO TO INFORM PATIENTS PCP THAT HE NO LONGER TAKES levothyroxine (SYNTHROID, LEVOTHROID) 88 MCG tablet AS HE STATED IT MAKES HIM DIZZ. PLEASE CALL     Do you require a callback: YES

## 2023-05-05 NOTE — TELEPHONE ENCOUNTER
I dont think the levothyroxine is making him dizzy I think it is the Cardura, I would recommend that he continue taking the levothyroxine and I can see him next week

## 2023-05-05 NOTE — TELEPHONE ENCOUNTER
ERVIN CALLED TO SAY HE STOPPED TAKING levothyroxine (SYNTHROID, LEVOTHROID) 88 MCG tablet AS IT WAS MAKING HIM DIZZY

## 2023-05-05 NOTE — TELEPHONE ENCOUNTER
Spoke with patient, advised of PCP reply.  Natividad can you schedule patient with PCP for next week?

## 2023-05-09 ENCOUNTER — OFFICE VISIT (OUTPATIENT)
Dept: INTERNAL MEDICINE | Facility: CLINIC | Age: 78
End: 2023-05-09
Payer: MEDICARE

## 2023-05-09 VITALS
TEMPERATURE: 97.6 F | OXYGEN SATURATION: 98 % | HEART RATE: 74 BPM | WEIGHT: 150 LBS | RESPIRATION RATE: 16 BRPM | SYSTOLIC BLOOD PRESSURE: 118 MMHG | DIASTOLIC BLOOD PRESSURE: 81 MMHG | BODY MASS INDEX: 22.22 KG/M2 | HEIGHT: 69 IN

## 2023-05-09 DIAGNOSIS — R55 POSTURAL DIZZINESS WITH PRESYNCOPE: ICD-10-CM

## 2023-05-09 DIAGNOSIS — I95.9 TRANSIENT HYPOTENSION: Primary | ICD-10-CM

## 2023-05-09 DIAGNOSIS — R42 POSTURAL DIZZINESS WITH PRESYNCOPE: ICD-10-CM

## 2023-05-09 DIAGNOSIS — N18.30 STAGE 3 CHRONIC KIDNEY DISEASE, UNSPECIFIED WHETHER STAGE 3A OR 3B CKD: ICD-10-CM

## 2023-05-09 DIAGNOSIS — E03.8 ADULT ONSET HYPOTHYROIDISM: ICD-10-CM

## 2023-05-09 PROCEDURE — 1160F RVW MEDS BY RX/DR IN RCRD: CPT | Performed by: INTERNAL MEDICINE

## 2023-05-09 PROCEDURE — 1159F MED LIST DOCD IN RCRD: CPT | Performed by: INTERNAL MEDICINE

## 2023-05-09 PROCEDURE — 3074F SYST BP LT 130 MM HG: CPT | Performed by: INTERNAL MEDICINE

## 2023-05-09 PROCEDURE — 99214 OFFICE O/P EST MOD 30 MIN: CPT | Performed by: INTERNAL MEDICINE

## 2023-05-09 PROCEDURE — 3079F DIAST BP 80-89 MM HG: CPT | Performed by: INTERNAL MEDICINE

## 2023-05-09 RX ORDER — CHOLECALCIFEROL (VITAMIN D3) 125 MCG
5 CAPSULE ORAL
COMMUNITY

## 2023-05-09 NOTE — PROGRESS NOTES
"Chief Complaint  Hypotension, Chronic Kidney Disease, and Hypothyroidism    Subjective        Nestor Nye presents to [facility]  History of Present Illness  Patient is here to follow-up on his blood pressure which is running on the lower side, he also states that he has been feeling dizzy recently but in the morning, he also states that he had a near syncope episode, he does have CKD, he thought it was his thyroid medication so he stopped taking it a few days and then he was advised by the clinic to restart the medication, he is currently on Cardura which have informed the patient has contributed to his hypotensive and presyncope episodes  Objective   Vital Signs:  /81   Pulse 74   Temp 97.6 °F (36.4 °C)   Resp 16   Ht 174 cm (68.5\")   Wt 68 kg (150 lb)   SpO2 98%   BMI 22.47 kg/m²   Estimated body mass index is 22.47 kg/m² as calculated from the following:    Height as of this encounter: 174 cm (68.5\").    Weight as of this encounter: 68 kg (150 lb).       BMI is within normal parameters. No other follow-up for BMI required.      Physical Exam  Vitals and nursing note reviewed.   Constitutional:       General: He is not in acute distress.     Appearance: Normal appearance. He is not diaphoretic.   HENT:      Head: Normocephalic and atraumatic.      Right Ear: External ear normal.      Left Ear: External ear normal.      Nose: Nose normal.   Eyes:      Extraocular Movements: Extraocular movements intact.      Conjunctiva/sclera: Conjunctivae normal.      Comments: Lower Eye lid eversion and conjuctiva injection , bilateral   Neck:      Trachea: Trachea normal.   Cardiovascular:      Rate and Rhythm: Normal rate and regular rhythm.      Heart sounds: Normal heart sounds.   Pulmonary:      Effort: Pulmonary effort is normal. No respiratory distress.   Abdominal:      General: Abdomen is flat.   Musculoskeletal:      Cervical back: Neck supple.      Comments: Moves " all limbs   Skin:     General: Skin is warm and dry.      Findings: No erythema.   Neurological:      Mental Status: He is alert and oriented to person, place, and time.      Comments: No gross motor or sensory deficits        Result Review :    CMP        5/16/2022    11:06 7/19/2022    09:10 1/23/2023    10:31   CMP   Glucose 102   99   96     BUN 23   19   22     Creatinine 1.65   1.51   1.49     EGFR  47.9   48.3     Sodium 139   138   136     Potassium 5.2   4.6   4.6     Chloride 102   104   105     Calcium 9.9   9.2   8.9     Total Protein 6.6       Total Protein  6.7   6.3     Albumin 4.10   4.30   3.8     Globulin 2.5       Globulin  2.4   2.5     Total Bilirubin 0.6   0.6   0.7     Alkaline Phosphatase 73   77   88     AST (SGOT) 14   12   10     ALT (SGPT) 20   11   9     Albumin/Globulin Ratio  1.8   1.5     BUN/Creatinine Ratio 13.9   12.6   14.8     Anion Gap  10.0   6.0       CBC        5/16/2022    11:06 7/19/2022    09:10 1/23/2023    10:31   CBC   WBC 6.16   6.37   8.95     RBC 4.25   4.43   4.04     Hemoglobin 13.8   14.2   13.0     Hematocrit 42.2   43.1   38.1     MCV 99.3   97.3   94.3     MCH 32.5   32.1   32.2     MCHC 32.7   32.9   34.1     RDW 12.6   12.7   12.3     Platelets 279   266   270       TSH        7/19/2022    09:10 1/23/2023    10:31   TSH   TSH 2.680   1.650                    Assessment and Plan   Diagnoses and all orders for this visit:    1. Transient hypotension (Primary)    2. Postural dizziness with presyncope    3. Stage 3 chronic kidney disease, unspecified whether stage 3a or 3b CKD    4. Adult onset hypothyroidism    Plan:  1.transient hypotension: Stop Cardura, oral hydration monitor vitals at home  2.  Postural dizziness with presyncope: Stop Cardura, oral hydration monitor vitals at home  3.  Stage III CKD: Oral hydration, monitor BMP  4.  Hypothyroidism: Advised to continue levothyroxine, TSH reviewed       I spent 30 minutes caring for Nestor on this date of  service. This time includes time spent by me in the following activities:preparing for the visit, reviewing tests, performing a medically appropriate examination and/or evaluation , counseling and educating the patient/family/caregiver, ordering medications, tests, or procedures and documenting information in the medical record  Follow Up    Patient was given instructions and counseling regarding his condition or for health maintenance advice. Please see specific information pulled into the AVS if appropriate.

## 2023-05-15 ENCOUNTER — TELEPHONE (OUTPATIENT)
Dept: INTERNAL MEDICINE | Facility: CLINIC | Age: 78
End: 2023-05-15
Payer: MEDICARE

## 2023-05-15 NOTE — TELEPHONE ENCOUNTER
Spoke with patient, states he was to report B/P if it was < 140/90.  B/P this am 139/92, P 82.  Please advise.

## 2023-05-18 ENCOUNTER — OFFICE VISIT (OUTPATIENT)
Dept: INTERNAL MEDICINE | Facility: CLINIC | Age: 78
End: 2023-05-18
Payer: MEDICARE

## 2023-05-18 VITALS
SYSTOLIC BLOOD PRESSURE: 131 MMHG | HEART RATE: 65 BPM | HEIGHT: 69 IN | DIASTOLIC BLOOD PRESSURE: 84 MMHG | RESPIRATION RATE: 16 BRPM | WEIGHT: 147 LBS | BODY MASS INDEX: 21.77 KG/M2 | TEMPERATURE: 97.9 F | OXYGEN SATURATION: 97 %

## 2023-05-18 DIAGNOSIS — R42 DIZZINESS: ICD-10-CM

## 2023-05-18 DIAGNOSIS — I10 BENIGN ESSENTIAL HYPERTENSION: Primary | ICD-10-CM

## 2023-05-18 PROCEDURE — 1159F MED LIST DOCD IN RCRD: CPT | Performed by: INTERNAL MEDICINE

## 2023-05-18 PROCEDURE — 99213 OFFICE O/P EST LOW 20 MIN: CPT | Performed by: INTERNAL MEDICINE

## 2023-05-18 PROCEDURE — 3075F SYST BP GE 130 - 139MM HG: CPT | Performed by: INTERNAL MEDICINE

## 2023-05-18 PROCEDURE — 1160F RVW MEDS BY RX/DR IN RCRD: CPT | Performed by: INTERNAL MEDICINE

## 2023-05-18 PROCEDURE — 3079F DIAST BP 80-89 MM HG: CPT | Performed by: INTERNAL MEDICINE

## 2023-05-18 NOTE — PROGRESS NOTES
"Chief Complaint  Hypertension    Subjective        Nestor Nye presents to [facility]  History of Present Illness  Patient is here to follow-up on his blood pressure, he was having low blood pressure reading and dizzy spells and his Cardura was discontinued and since then his symptoms have improved, he has an indwelling catheter and is currently on Proscar for his BPH    Objective   Vital Signs:  /84   Pulse 65   Temp 97.9 °F (36.6 °C)   Resp 16   Ht 174 cm (68.5\")   Wt 66.7 kg (147 lb)   SpO2 97%   BMI 22.02 kg/m²   Estimated body mass index is 22.02 kg/m² as calculated from the following:    Height as of this encounter: 174 cm (68.5\").    Weight as of this encounter: 66.7 kg (147 lb).       BMI is within normal parameters. No other follow-up for BMI required.      Physical Exam  Vitals and nursing note reviewed.   Constitutional:       General: He is not in acute distress.     Appearance: Normal appearance. He is not diaphoretic.   HENT:      Head: Normocephalic and atraumatic.      Right Ear: External ear normal.      Left Ear: External ear normal.      Nose: Nose normal.   Eyes:      Extraocular Movements: Extraocular movements intact.      Conjunctiva/sclera: Conjunctivae normal.   Neck:      Trachea: Trachea normal.   Cardiovascular:      Rate and Rhythm: Normal rate and regular rhythm.      Heart sounds: Normal heart sounds.   Pulmonary:      Effort: Pulmonary effort is normal. No respiratory distress.   Abdominal:      General: Abdomen is flat.   Musculoskeletal:      Cervical back: Neck supple.      Comments: Moves all limbs   Skin:     General: Skin is warm and dry.      Findings: No erythema.   Neurological:      Mental Status: He is alert and oriented to person, place, and time.      Comments: No gross motor or sensory deficits        Result Review :    CMP        7/19/2022    09:10 1/23/2023    10:31   CMP   Glucose 99   96     BUN 19   22   "   Creatinine 1.51   1.49     EGFR 47.9   48.3     Sodium 138   136     Potassium 4.6   4.6     Chloride 104   105     Calcium 9.2   8.9     Total Protein 6.7   6.3     Albumin 4.30   3.8     Globulin 2.4   2.5     Total Bilirubin 0.6   0.7     Alkaline Phosphatase 77   88     AST (SGOT) 12   10     ALT (SGPT) 11   9     Albumin/Globulin Ratio 1.8   1.5     BUN/Creatinine Ratio 12.6   14.8     Anion Gap 10.0   6.0       CBC        7/19/2022    09:10 1/23/2023    10:31   CBC   WBC 6.37   8.95     RBC 4.43   4.04     Hemoglobin 14.2   13.0     Hematocrit 43.1   38.1     MCV 97.3   94.3     MCH 32.1   32.2     MCHC 32.9   34.1     RDW 12.7   12.3     Platelets 266   270                    Assessment and Plan   Diagnoses and all orders for this visit:    1. Benign essential hypertension (Primary)    2. Dizziness    Plan:  1.  Benign essential hypertension:   low-sodium diet advised, Counseled to regularly check BP at home with goal averaging <130/80.    2.  Dizziness: Off Cardura and symptoms improved, oral hydration       I spent 20 minutes caring for Nestor on this date of service. This time includes time spent by me in the following activities:preparing for the visit, reviewing tests, performing a medically appropriate examination and/or evaluation , counseling and educating the patient/family/caregiver, ordering medications, tests, or procedures and documenting information in the medical record  Follow Up    Patient was given instructions and counseling regarding his condition or for health maintenance advice. Please see specific information pulled into the AVS if appropriate.

## 2023-05-23 ENCOUNTER — LAB (OUTPATIENT)
Dept: LAB | Facility: HOSPITAL | Age: 78
End: 2023-05-23
Payer: MEDICARE

## 2023-05-23 LAB
ALBUMIN SERPL-MCNC: 4.5 G/DL (ref 3.5–5.2)
ALBUMIN/GLOB SERPL: 2 G/DL
ALP SERPL-CCNC: 80 U/L (ref 39–117)
ALT SERPL W P-5'-P-CCNC: 9 U/L (ref 1–41)
ANION GAP SERPL CALCULATED.3IONS-SCNC: 9 MMOL/L (ref 5–15)
AST SERPL-CCNC: 15 U/L (ref 1–40)
BASOPHILS # BLD AUTO: 0.08 10*3/MM3 (ref 0–0.2)
BASOPHILS NFR BLD AUTO: 1.2 % (ref 0–1.5)
BILIRUB SERPL-MCNC: 0.8 MG/DL (ref 0–1.2)
BUN SERPL-MCNC: 27 MG/DL (ref 8–23)
BUN/CREAT SERPL: 16.6 (ref 7–25)
CALCIUM SPEC-SCNC: 9.3 MG/DL (ref 8.6–10.5)
CHLORIDE SERPL-SCNC: 104 MMOL/L (ref 98–107)
CHOLEST SERPL-MCNC: 149 MG/DL (ref 0–200)
CO2 SERPL-SCNC: 27 MMOL/L (ref 22–29)
CREAT SERPL-MCNC: 1.63 MG/DL (ref 0.76–1.27)
DEPRECATED RDW RBC AUTO: 44.4 FL (ref 37–54)
EGFRCR SERPLBLD CKD-EPI 2021: 43.4 ML/MIN/1.73
EOSINOPHIL # BLD AUTO: 0.22 10*3/MM3 (ref 0–0.4)
EOSINOPHIL NFR BLD AUTO: 3.2 % (ref 0.3–6.2)
ERYTHROCYTE [DISTWIDTH] IN BLOOD BY AUTOMATED COUNT: 13 % (ref 12.3–15.4)
GLOBULIN UR ELPH-MCNC: 2.3 GM/DL
GLUCOSE SERPL-MCNC: 90 MG/DL (ref 65–99)
HCT VFR BLD AUTO: 43.5 % (ref 37.5–51)
HDLC SERPL-MCNC: 47 MG/DL (ref 40–60)
HGB BLD-MCNC: 14.9 G/DL (ref 13–17.7)
IMM GRANULOCYTES # BLD AUTO: 0.02 10*3/MM3 (ref 0–0.05)
IMM GRANULOCYTES NFR BLD AUTO: 0.3 % (ref 0–0.5)
LDLC SERPL CALC-MCNC: 88 MG/DL (ref 0–100)
LDLC/HDLC SERPL: 1.86 {RATIO}
LYMPHOCYTES # BLD AUTO: 2.03 10*3/MM3 (ref 0.7–3.1)
LYMPHOCYTES NFR BLD AUTO: 29.5 % (ref 19.6–45.3)
MCH RBC QN AUTO: 32.3 PG (ref 26.6–33)
MCHC RBC AUTO-ENTMCNC: 34.3 G/DL (ref 31.5–35.7)
MCV RBC AUTO: 94.4 FL (ref 79–97)
MONOCYTES # BLD AUTO: 0.73 10*3/MM3 (ref 0.1–0.9)
MONOCYTES NFR BLD AUTO: 10.6 % (ref 5–12)
NEUTROPHILS NFR BLD AUTO: 3.8 10*3/MM3 (ref 1.7–7)
NEUTROPHILS NFR BLD AUTO: 55.2 % (ref 42.7–76)
NRBC BLD AUTO-RTO: 0 /100 WBC (ref 0–0.2)
PLATELET # BLD AUTO: 237 10*3/MM3 (ref 140–450)
PMV BLD AUTO: 10.9 FL (ref 6–12)
POTASSIUM SERPL-SCNC: 4.5 MMOL/L (ref 3.5–5.2)
PROT SERPL-MCNC: 6.8 G/DL (ref 6–8.5)
RBC # BLD AUTO: 4.61 10*6/MM3 (ref 4.14–5.8)
SODIUM SERPL-SCNC: 140 MMOL/L (ref 136–145)
TRIGL SERPL-MCNC: 72 MG/DL (ref 0–150)
TSH SERPL DL<=0.05 MIU/L-ACNC: 3.16 UIU/ML (ref 0.27–4.2)
VLDLC SERPL-MCNC: 14 MG/DL (ref 5–40)
WBC NRBC COR # BLD: 6.88 10*3/MM3 (ref 3.4–10.8)

## 2023-05-23 PROCEDURE — 84443 ASSAY THYROID STIM HORMONE: CPT | Performed by: INTERNAL MEDICINE

## 2023-05-23 PROCEDURE — 80053 COMPREHEN METABOLIC PANEL: CPT | Performed by: INTERNAL MEDICINE

## 2023-05-23 PROCEDURE — 80061 LIPID PANEL: CPT | Performed by: INTERNAL MEDICINE

## 2023-05-23 PROCEDURE — 85025 COMPLETE CBC W/AUTO DIFF WBC: CPT | Performed by: INTERNAL MEDICINE

## 2023-05-30 ENCOUNTER — OFFICE VISIT (OUTPATIENT)
Dept: INTERNAL MEDICINE | Facility: CLINIC | Age: 78
End: 2023-05-30
Payer: MEDICARE

## 2023-05-30 VITALS
HEART RATE: 85 BPM | BODY MASS INDEX: 22.07 KG/M2 | TEMPERATURE: 97 F | RESPIRATION RATE: 16 BRPM | OXYGEN SATURATION: 98 % | SYSTOLIC BLOOD PRESSURE: 119 MMHG | DIASTOLIC BLOOD PRESSURE: 81 MMHG | HEIGHT: 69 IN | WEIGHT: 149 LBS

## 2023-05-30 DIAGNOSIS — I10 BENIGN ESSENTIAL HYPERTENSION: Primary | ICD-10-CM

## 2023-05-30 DIAGNOSIS — N18.30 STAGE 3 CHRONIC KIDNEY DISEASE, UNSPECIFIED WHETHER STAGE 3A OR 3B CKD: ICD-10-CM

## 2023-05-30 DIAGNOSIS — E03.8 ADULT ONSET HYPOTHYROIDISM: ICD-10-CM

## 2023-05-30 DIAGNOSIS — E78.2 MIXED HYPERLIPIDEMIA: ICD-10-CM

## 2023-05-30 RX ORDER — AMLODIPINE BESYLATE 2.5 MG/1
2.5 TABLET ORAL DAILY
Qty: 90 TABLET | Refills: 1 | Status: SHIPPED | OUTPATIENT
Start: 2023-05-30

## 2023-05-30 NOTE — PROGRESS NOTES
"Chief Complaint  Hypertension (Follow up) and Hyperlipidemia    Subjective        Nestor Nye presents to [facility]  History of Present Illness  HPI: Patient is here to follow up on the blood pressure which is running high at home, he has brought in his blood pressure readings which have been reviewed with him today, he was taken off Cardura which caused dizziness which she was using as a blood pressure medication, the patient is also here to follow up on the cholesterol and is trying to follow a diet. The patient is  also here to follow up on thyroid and CKD and had lab work done .  He follows up with nephrology, the patient also needs refills on medications .   Hyperlipidemia   Pertinent negatives include no chest pain or shortness of breath.   Hypertension   Pertinent negatives include no chest pain, palpitations or shortness of breath.    Objective   Vital Signs:  /81   Pulse 85   Temp 97 °F (36.1 °C)   Resp 16   Ht 174 cm (68.5\")   Wt 67.6 kg (149 lb)   SpO2 98%   BMI 22.32 kg/m²   Estimated body mass index is 22.32 kg/m² as calculated from the following:    Height as of this encounter: 174 cm (68.5\").    Weight as of this encounter: 67.6 kg (149 lb).       BMI is within normal parameters. No other follow-up for BMI required.      Physical Exam  Vitals and nursing note reviewed.   Constitutional:       General: He is not in acute distress.     Appearance: Normal appearance. He is not diaphoretic.   HENT:      Head: Normocephalic and atraumatic.      Right Ear: External ear normal.      Left Ear: External ear normal.      Nose: Nose normal.   Eyes:      Comments: Eye lid dysfunction bilateral  Lower Eye lid eversion and conjuctiva injection , bilateral   Neck:      Trachea: Trachea normal.   Cardiovascular:      Rate and Rhythm: Normal rate and regular rhythm.      Heart sounds: Normal heart sounds.   Pulmonary:      Effort: Pulmonary effort is normal. No " respiratory distress.   Abdominal:      General: Abdomen is flat.   Musculoskeletal:      Cervical back: Neck supple.      Comments: Moves all limbs   Skin:     General: Skin is warm and dry.      Findings: No erythema.   Neurological:      Mental Status: He is alert and oriented to person, place, and time.      Comments: No gross motor or sensory deficits      Result Review :    Common labs          7/19/2022    09:10 1/23/2023    10:31 5/23/2023    10:02   Common Labs   Glucose 99  96  90    BUN 19  22  27    Creatinine 1.51  1.49  1.63    Sodium 138  136  140    Potassium 4.6  4.6  4.5    Chloride 104  105  104    Calcium 9.2  8.9  9.3    Albumin 4.30  3.8  4.5    Total Bilirubin 0.6  0.7  0.8    Alkaline Phosphatase 77  88  80    AST (SGOT) 12  10  15    ALT (SGPT) 11  9  9    WBC 6.37  8.95  6.88    Hemoglobin 14.2  13.0  14.9    Hematocrit 43.1  38.1  43.5    Platelets 266  270  237    Total Cholesterol 176  145  149    Triglycerides 82  62  72    HDL Cholesterol 47  44  47    LDL Cholesterol  114  88  88                   Assessment and Plan   Diagnoses and all orders for this visit:    1. Benign essential hypertension (Primary)  -     amLODIPine (Norvasc) 2.5 MG tablet; Take 1 tablet by mouth Daily.  Dispense: 90 tablet; Refill: 1    2. Mixed hyperlipidemia  -     CBC & Differential  -     Comprehensive Metabolic Panel  -     Lipid Panel    3. Adult onset hypothyroidism  -     TSH    4. Stage 3 chronic kidney disease, unspecified whether stage 3a or 3b CKD    Plan:  1.  Benign essential hypertension: Will start amlodipine 2.5 mg p.o. nightly, low-sodium diet advised, Counseled to regularly check BP at home with goal averaging <130/80.   2.mixed hyperlipidemia:  reviewed  fasting CMP and lipid panel.  Diet and exercise counseled,  Will continue current medications  3.  hypothyroidism:  reviewed  tsh , and continue levothyroxine  4.  CKD stage III: Oral hydration, monitor BMP       I spent 30 minutes caring  eleno Baez on this date of service. This time includes time spent by me in the following activities:preparing for the visit, reviewing tests, performing a medically appropriate examination and/or evaluation , counseling and educating the patient/family/caregiver, ordering medications, tests, or procedures and documenting information in the medical record  Follow Up      Patient was given instructions and counseling regarding his condition or for health maintenance advice. Please see specific information pulled into the AVS if appropriate.

## 2023-08-04 DIAGNOSIS — R33.9 URINARY RETENTION: ICD-10-CM

## 2023-08-04 RX ORDER — METHENAMINE HIPPURATE 1000 MG/1
TABLET ORAL
Qty: 60 TABLET | Refills: 5 | Status: SHIPPED | OUTPATIENT
Start: 2023-08-04

## 2023-08-31 ENCOUNTER — LAB (OUTPATIENT)
Dept: LAB | Facility: HOSPITAL | Age: 78
End: 2023-08-31
Payer: MEDICARE

## 2023-08-31 LAB
ALBUMIN SERPL-MCNC: 4.4 G/DL (ref 3.5–5.2)
ALBUMIN/GLOB SERPL: 1.8 G/DL
ALP SERPL-CCNC: 87 U/L (ref 39–117)
ALT SERPL W P-5'-P-CCNC: 9 U/L (ref 1–41)
ANION GAP SERPL CALCULATED.3IONS-SCNC: 12 MMOL/L (ref 5–15)
AST SERPL-CCNC: 14 U/L (ref 1–40)
BASOPHILS # BLD AUTO: 0.08 10*3/MM3 (ref 0–0.2)
BASOPHILS NFR BLD AUTO: 1.3 % (ref 0–1.5)
BILIRUB SERPL-MCNC: 0.8 MG/DL (ref 0–1.2)
BUN SERPL-MCNC: 19 MG/DL (ref 8–23)
BUN/CREAT SERPL: 11.7 (ref 7–25)
CALCIUM SPEC-SCNC: 9.2 MG/DL (ref 8.6–10.5)
CHLORIDE SERPL-SCNC: 103 MMOL/L (ref 98–107)
CHOLEST SERPL-MCNC: 175 MG/DL (ref 0–200)
CO2 SERPL-SCNC: 25 MMOL/L (ref 22–29)
CREAT SERPL-MCNC: 1.63 MG/DL (ref 0.76–1.27)
DEPRECATED RDW RBC AUTO: 42.9 FL (ref 37–54)
EGFRCR SERPLBLD CKD-EPI 2021: 43.4 ML/MIN/1.73
EOSINOPHIL # BLD AUTO: 0.23 10*3/MM3 (ref 0–0.4)
EOSINOPHIL NFR BLD AUTO: 3.6 % (ref 0.3–6.2)
ERYTHROCYTE [DISTWIDTH] IN BLOOD BY AUTOMATED COUNT: 12.7 % (ref 12.3–15.4)
GLOBULIN UR ELPH-MCNC: 2.5 GM/DL
GLUCOSE SERPL-MCNC: 87 MG/DL (ref 65–99)
HCT VFR BLD AUTO: 42.9 % (ref 37.5–51)
HDLC SERPL-MCNC: 46 MG/DL (ref 40–60)
HGB BLD-MCNC: 14.6 G/DL (ref 13–17.7)
IMM GRANULOCYTES # BLD AUTO: 0.01 10*3/MM3 (ref 0–0.05)
IMM GRANULOCYTES NFR BLD AUTO: 0.2 % (ref 0–0.5)
LDLC SERPL CALC-MCNC: 112 MG/DL (ref 0–100)
LDLC/HDLC SERPL: 2.41 {RATIO}
LYMPHOCYTES # BLD AUTO: 2.23 10*3/MM3 (ref 0.7–3.1)
LYMPHOCYTES NFR BLD AUTO: 35.1 % (ref 19.6–45.3)
MCH RBC QN AUTO: 32.2 PG (ref 26.6–33)
MCHC RBC AUTO-ENTMCNC: 34 G/DL (ref 31.5–35.7)
MCV RBC AUTO: 94.7 FL (ref 79–97)
MONOCYTES # BLD AUTO: 0.69 10*3/MM3 (ref 0.1–0.9)
MONOCYTES NFR BLD AUTO: 10.9 % (ref 5–12)
NEUTROPHILS NFR BLD AUTO: 3.11 10*3/MM3 (ref 1.7–7)
NEUTROPHILS NFR BLD AUTO: 48.9 % (ref 42.7–76)
NRBC BLD AUTO-RTO: 0 /100 WBC (ref 0–0.2)
PLATELET # BLD AUTO: 243 10*3/MM3 (ref 140–450)
PMV BLD AUTO: 10.3 FL (ref 6–12)
POTASSIUM SERPL-SCNC: 4.6 MMOL/L (ref 3.5–5.2)
PROT SERPL-MCNC: 6.9 G/DL (ref 6–8.5)
RBC # BLD AUTO: 4.53 10*6/MM3 (ref 4.14–5.8)
SODIUM SERPL-SCNC: 140 MMOL/L (ref 136–145)
TRIGL SERPL-MCNC: 91 MG/DL (ref 0–150)
TSH SERPL DL<=0.05 MIU/L-ACNC: 3.57 UIU/ML (ref 0.27–4.2)
VLDLC SERPL-MCNC: 17 MG/DL (ref 5–40)
WBC NRBC COR # BLD: 6.35 10*3/MM3 (ref 3.4–10.8)

## 2023-08-31 PROCEDURE — 84443 ASSAY THYROID STIM HORMONE: CPT | Performed by: INTERNAL MEDICINE

## 2023-08-31 PROCEDURE — 85025 COMPLETE CBC W/AUTO DIFF WBC: CPT | Performed by: INTERNAL MEDICINE

## 2023-08-31 PROCEDURE — 80061 LIPID PANEL: CPT | Performed by: INTERNAL MEDICINE

## 2023-08-31 PROCEDURE — 80053 COMPREHEN METABOLIC PANEL: CPT | Performed by: INTERNAL MEDICINE

## 2023-09-07 ENCOUNTER — OFFICE VISIT (OUTPATIENT)
Dept: INTERNAL MEDICINE | Facility: CLINIC | Age: 78
End: 2023-09-07
Payer: MEDICARE

## 2023-09-07 VITALS
TEMPERATURE: 97.5 F | SYSTOLIC BLOOD PRESSURE: 114 MMHG | RESPIRATION RATE: 16 BRPM | OXYGEN SATURATION: 97 % | BODY MASS INDEX: 22.96 KG/M2 | HEART RATE: 75 BPM | WEIGHT: 155 LBS | HEIGHT: 69 IN | DIASTOLIC BLOOD PRESSURE: 76 MMHG

## 2023-09-07 DIAGNOSIS — I10 BENIGN ESSENTIAL HYPERTENSION: Primary | ICD-10-CM

## 2023-09-07 DIAGNOSIS — N18.32 STAGE 3B CHRONIC KIDNEY DISEASE: ICD-10-CM

## 2023-09-07 DIAGNOSIS — E78.2 MIXED HYPERLIPIDEMIA: ICD-10-CM

## 2023-09-07 DIAGNOSIS — E03.8 ADULT ONSET HYPOTHYROIDISM: ICD-10-CM

## 2023-09-07 RX ORDER — AMLODIPINE BESYLATE 2.5 MG/1
2.5 TABLET ORAL DAILY
Qty: 90 TABLET | Refills: 1 | Status: SHIPPED | OUTPATIENT
Start: 2023-09-07

## 2023-09-07 RX ORDER — LEVOTHYROXINE SODIUM 88 UG/1
88 TABLET ORAL DAILY
Qty: 90 TABLET | Refills: 2 | Status: SHIPPED | OUTPATIENT
Start: 2023-09-07

## 2023-09-07 NOTE — PROGRESS NOTES
Patient came in for monthly cath change, patient tolerated the cath change and had no complications, patient is on chronic suppression therapy at home with antibiotics.   Rhofade Counseling: Rhofade is a topical medication which can decrease superficial blood flow where applied. Side effects are uncommon and include stinging, redness and allergic reactions.

## 2023-09-07 NOTE — PROGRESS NOTES
"Chief Complaint  Hypertension, Hyperlipidemia, and Hypothyroidism    Subjective        Nestor Nye presents to [facility]  HPI: Patient is here to follow up on the blood pressure  The patient is taking the blood pressure medications as prescribed and has had no side effects. The patient is also here to follow up on the cholesterol and is trying to follow a diet. The patient is  also here to follow up on thyroid and CKD and had  lab work done .  Please see nephrology, the patient also needs refills on medications .   Hyperlipidemia   Pertinent negatives include no chest pain or shortness of breath.   Hypertension   Pertinent negatives include no chest pain, palpitations or shortness of breath.    Hypertension      Objective   Vital Signs:  /76   Pulse 75   Temp 97.5 °F (36.4 °C)   Resp 16   Ht 174 cm (68.5\")   Wt 70.3 kg (155 lb)   SpO2 97%   BMI 23.22 kg/m²   Estimated body mass index is 23.22 kg/m² as calculated from the following:    Height as of this encounter: 174 cm (68.5\").    Weight as of this encounter: 70.3 kg (155 lb).       BMI is within normal parameters. No other follow-up for BMI required.      Physical Exam  Vitals and nursing note reviewed.   Constitutional:       General: He is not in acute distress.     Appearance: Normal appearance. He is not diaphoretic.   HENT:      Head: Normocephalic and atraumatic.      Right Ear: External ear normal.      Left Ear: External ear normal.      Nose: Nose normal.   Eyes:      Extraocular Movements: Extraocular movements intact.      Conjunctiva/sclera: Conjunctivae normal.      Comments:  Eye lid dysfunction bilateral  Lower Eye lid eversion and conjuctiva injection , bilateral   Neck:      Trachea: Trachea normal.   Cardiovascular:      Rate and Rhythm: Normal rate and regular rhythm.      Heart sounds: Normal heart sounds.   Pulmonary:      Effort: Pulmonary effort is normal. No respiratory distress. "   Abdominal:      General: Abdomen is flat.   Musculoskeletal:      Cervical back: Neck supple.      Comments: Moves all limbs   Skin:     General: Skin is warm and dry.      Findings: No erythema.   Neurological:      Mental Status: He is alert and oriented to person, place, and time.      Comments: No gross motor or sensory deficits      Result Review :  The following data was reviewed by: Roxanne Slater MD on 09/07/2023:  Common labs          1/23/2023    10:31 5/23/2023    10:02 8/31/2023    10:03   Common Labs   Glucose 96  90  87    BUN 22  27  19    Creatinine 1.49  1.63  1.63    Sodium 136  140  140    Potassium 4.6  4.5  4.6    Chloride 105  104  103    Calcium 8.9  9.3  9.2    Albumin 3.8  4.5  4.4    Total Bilirubin 0.7  0.8  0.8    Alkaline Phosphatase 88  80  87    AST (SGOT) 10  15  14    ALT (SGPT) 9  9  9    WBC 8.95  6.88  6.35    Hemoglobin 13.0  14.9  14.6    Hematocrit 38.1  43.5  42.9    Platelets 270  237  243    Total Cholesterol 145  149  175    Triglycerides 62  72  91    HDL Cholesterol 44  47  46    LDL Cholesterol  88  88  112                   Assessment and Plan   Diagnoses and all orders for this visit:    1. Benign essential hypertension (Primary)  -     amLODIPine (Norvasc) 2.5 MG tablet; Take 1 tablet by mouth Daily.  Dispense: 90 tablet; Refill: 1    2. Mixed hyperlipidemia  -     CBC & Differential  -     Comprehensive Metabolic Panel  -     Lipid Panel    3. Adult onset hypothyroidism  -     levothyroxine (SYNTHROID, LEVOTHROID) 88 MCG tablet; Take 1 tablet by mouth Daily.  Dispense: 90 tablet; Refill: 2  -     TSH    4. Stage 3b chronic kidney disease    Plan:  1.  Benign essential hypertension: Will continue current medication, low-sodium diet advised, Counseled to regularly check BP at home with goal averaging <130/80.   2.mixed hyperlipidemia:  reviewed  fasting CMP and lipid panel.  Diet and exercise counseled,     3. hypothyroidism:  reviewed  tsh , and continue  levothyroxine  4.  CKD stage IIIb: Oral hydration, monitor BMP, follow-up with nephrology       I spent 30 minutes caring for Nestor on this date of service. This time includes time spent by me in the following activities:preparing for the visit, reviewing tests, performing a medically appropriate examination and/or evaluation , counseling and educating the patient/family/caregiver, ordering medications, tests, or procedures, and documenting information in the medical record  Follow Up   Return in about 5 months (around 1/24/2024).  Patient was given instructions and counseling regarding his condition or for health maintenance advice. Please see specific information pulled into the AVS if appropriate.

## 2023-11-17 ENCOUNTER — LAB (OUTPATIENT)
Dept: LAB | Facility: HOSPITAL | Age: 78
End: 2023-11-17
Payer: MEDICARE

## 2023-11-17 LAB
ALBUMIN SERPL-MCNC: 4.2 G/DL (ref 3.5–5.2)
ALBUMIN/GLOB SERPL: 1.8 G/DL
ALP SERPL-CCNC: 79 U/L (ref 39–117)
ALT SERPL W P-5'-P-CCNC: 18 U/L (ref 1–41)
ANION GAP SERPL CALCULATED.3IONS-SCNC: 10.2 MMOL/L (ref 5–15)
AST SERPL-CCNC: 17 U/L (ref 1–40)
BASOPHILS # BLD AUTO: 0.1 10*3/MM3 (ref 0–0.2)
BASOPHILS NFR BLD AUTO: 1.4 % (ref 0–1.5)
BILIRUB SERPL-MCNC: 0.7 MG/DL (ref 0–1.2)
BUN SERPL-MCNC: 22 MG/DL (ref 8–23)
BUN/CREAT SERPL: 15.1 (ref 7–25)
CALCIUM SPEC-SCNC: 9.1 MG/DL (ref 8.6–10.5)
CHLORIDE SERPL-SCNC: 106 MMOL/L (ref 98–107)
CHOLEST SERPL-MCNC: 167 MG/DL (ref 0–200)
CO2 SERPL-SCNC: 21.8 MMOL/L (ref 22–29)
CREAT SERPL-MCNC: 1.46 MG/DL (ref 0.76–1.27)
DEPRECATED RDW RBC AUTO: 46.7 FL (ref 37–54)
EGFRCR SERPLBLD CKD-EPI 2021: 49.2 ML/MIN/1.73
EOSINOPHIL # BLD AUTO: 0.25 10*3/MM3 (ref 0–0.4)
EOSINOPHIL NFR BLD AUTO: 3.4 % (ref 0.3–6.2)
ERYTHROCYTE [DISTWIDTH] IN BLOOD BY AUTOMATED COUNT: 13.1 % (ref 12.3–15.4)
GLOBULIN UR ELPH-MCNC: 2.3 GM/DL
GLUCOSE SERPL-MCNC: 85 MG/DL (ref 65–99)
HCT VFR BLD AUTO: 42.5 % (ref 37.5–51)
HDLC SERPL-MCNC: 43 MG/DL (ref 40–60)
HGB BLD-MCNC: 14.5 G/DL (ref 13–17.7)
IMM GRANULOCYTES # BLD AUTO: 0.05 10*3/MM3 (ref 0–0.05)
IMM GRANULOCYTES NFR BLD AUTO: 0.7 % (ref 0–0.5)
LDLC SERPL CALC-MCNC: 100 MG/DL (ref 0–100)
LDLC/HDLC SERPL: 2.26 {RATIO}
LYMPHOCYTES # BLD AUTO: 2.1 10*3/MM3 (ref 0.7–3.1)
LYMPHOCYTES NFR BLD AUTO: 28.8 % (ref 19.6–45.3)
MCH RBC QN AUTO: 33 PG (ref 26.6–33)
MCHC RBC AUTO-ENTMCNC: 34.1 G/DL (ref 31.5–35.7)
MCV RBC AUTO: 96.6 FL (ref 79–97)
MONOCYTES # BLD AUTO: 0.84 10*3/MM3 (ref 0.1–0.9)
MONOCYTES NFR BLD AUTO: 11.5 % (ref 5–12)
NEUTROPHILS NFR BLD AUTO: 3.95 10*3/MM3 (ref 1.7–7)
NEUTROPHILS NFR BLD AUTO: 54.2 % (ref 42.7–76)
NRBC BLD AUTO-RTO: 0 /100 WBC (ref 0–0.2)
PLATELET # BLD AUTO: 242 10*3/MM3 (ref 140–450)
PMV BLD AUTO: 10.7 FL (ref 6–12)
POTASSIUM SERPL-SCNC: 4.8 MMOL/L (ref 3.5–5.2)
PROT SERPL-MCNC: 6.5 G/DL (ref 6–8.5)
RBC # BLD AUTO: 4.4 10*6/MM3 (ref 4.14–5.8)
SODIUM SERPL-SCNC: 138 MMOL/L (ref 136–145)
TRIGL SERPL-MCNC: 135 MG/DL (ref 0–150)
TSH SERPL DL<=0.05 MIU/L-ACNC: 3.31 UIU/ML (ref 0.27–4.2)
VLDLC SERPL-MCNC: 24 MG/DL (ref 5–40)
WBC NRBC COR # BLD AUTO: 7.29 10*3/MM3 (ref 3.4–10.8)

## 2023-11-17 PROCEDURE — 80061 LIPID PANEL: CPT | Performed by: INTERNAL MEDICINE

## 2023-11-17 PROCEDURE — 80053 COMPREHEN METABOLIC PANEL: CPT | Performed by: INTERNAL MEDICINE

## 2023-11-17 PROCEDURE — 85025 COMPLETE CBC W/AUTO DIFF WBC: CPT | Performed by: INTERNAL MEDICINE

## 2023-11-17 PROCEDURE — 84443 ASSAY THYROID STIM HORMONE: CPT | Performed by: INTERNAL MEDICINE

## 2023-12-06 ENCOUNTER — TELEPHONE (OUTPATIENT)
Dept: UROLOGY | Facility: CLINIC | Age: 78
End: 2023-12-06

## 2023-12-06 NOTE — TELEPHONE ENCOUNTER
Hub staff attempted to follow warm transfer process and was unsuccessful     Caller: GADIEL    Relationship to patient: [facility]     Best call back number: [phone]     Patient is needing: GADIEL FROM [facility] CALLED FOR THE PT STATING THAT THEY HAVE RECEIVED SAMPLES OF GLIDO-LIDOCAINE JELLY FROM THE OFFICE IN THE PAST TO HELP WITH THE PTS CATH CHANGES. THEY HAVE BEEN UNABLE TO GET THEM FOR THE PT AND WOULD LIKE TO KNOW IF IT IS AVAILABLE FOR THE PT TO GET SAMPLES OF THIS. PLEASE GIVE GADIEL A CALL BACK TO DISCUSS IF THIS IS A POSSIBILITY OR WHAT OTHER OPTIONS THAT THEY MAY BE ABLE TO GO WITH.

## 2024-01-25 ENCOUNTER — TELEPHONE (OUTPATIENT)
Dept: UROLOGY | Facility: CLINIC | Age: 79
End: 2024-01-25
Payer: MEDICARE

## 2024-01-25 DIAGNOSIS — R33.9 URINARY RETENTION: ICD-10-CM

## 2024-01-25 DIAGNOSIS — N40.1 BENIGN PROSTATIC HYPERPLASIA WITH URINARY OBSTRUCTION: ICD-10-CM

## 2024-01-25 DIAGNOSIS — N13.8 BENIGN PROSTATIC HYPERPLASIA WITH URINARY OBSTRUCTION: ICD-10-CM

## 2024-01-25 NOTE — TELEPHONE ENCOUNTER
Caller: [facility]     Relationship: HOME HEALTH    Best call back number: [phone]    What is the best time to reach you: ANYTIME    Who are you requesting to speak with (clinical staff, provider,  specific staff member): CLINICAL    Do you know the name of the person who called: JONI    What was the call regarding: JONI WITH [facility] CALLED ASKING FOR A ORDER FOR OCCUPATIONAL THERAPY. JONI STATED IT IS OKAY TO LEAVE A VM IF SHE IS IN WITH A PT.

## 2024-01-26 ENCOUNTER — TELEPHONE (OUTPATIENT)
Dept: INTERNAL MEDICINE | Facility: CLINIC | Age: 79
End: 2024-01-26
Payer: MEDICARE

## 2024-01-26 ENCOUNTER — TELEPHONE (OUTPATIENT)
Dept: UROLOGY | Facility: CLINIC | Age: 79
End: 2024-01-26
Payer: MEDICARE

## 2024-01-26 RX ORDER — FINASTERIDE 5 MG/1
5 TABLET, FILM COATED ORAL DAILY
Qty: 90 TABLET | Refills: 0 | Status: SHIPPED | OUTPATIENT
Start: 2024-01-26

## 2024-01-26 RX ORDER — METHENAMINE HIPPURATE 1000 MG/1
TABLET ORAL
Qty: 60 TABLET | Refills: 0 | Status: SHIPPED | OUTPATIENT
Start: 2024-01-26

## 2024-01-26 NOTE — TELEPHONE ENCOUNTER
I spoke to [facility] and let her know Dr. Rey is out of the office and to contact pcp for additional referrals.

## 2024-01-26 NOTE — TELEPHONE ENCOUNTER
Caller: DEIDRA OWEN    Relationship: NURSE    Best call back number:     [phone]       What orders are you requesting (i.e. lab or imaging): VERBAL ORDERS FOR OCCUPATIONAL THERAPY EVALUATION    In what timeframe would the patient need to come in: ASAP

## 2024-01-26 NOTE — TELEPHONE ENCOUNTER
Called patient to try to schedule appointment he will need a appointment for further refills on medicine.    Sai,CMA

## 2024-02-20 ENCOUNTER — OFFICE VISIT (OUTPATIENT)
Dept: INTERNAL MEDICINE | Facility: CLINIC | Age: 79
End: 2024-02-20
Payer: MEDICARE

## 2024-02-20 VITALS
HEIGHT: 69 IN | OXYGEN SATURATION: 96 % | BODY MASS INDEX: 24.44 KG/M2 | SYSTOLIC BLOOD PRESSURE: 122 MMHG | RESPIRATION RATE: 18 BRPM | HEART RATE: 88 BPM | DIASTOLIC BLOOD PRESSURE: 82 MMHG | WEIGHT: 165 LBS | TEMPERATURE: 97.5 F

## 2024-02-20 DIAGNOSIS — E03.8 ADULT ONSET HYPOTHYROIDISM: ICD-10-CM

## 2024-02-20 DIAGNOSIS — E78.2 MIXED HYPERLIPIDEMIA: ICD-10-CM

## 2024-02-20 DIAGNOSIS — I10 BENIGN ESSENTIAL HYPERTENSION: Primary | ICD-10-CM

## 2024-02-20 DIAGNOSIS — N18.32 STAGE 3B CHRONIC KIDNEY DISEASE: ICD-10-CM

## 2024-02-20 RX ORDER — LEVOTHYROXINE SODIUM 88 UG/1
88 TABLET ORAL DAILY
Qty: 90 TABLET | Refills: 2 | Status: SHIPPED | OUTPATIENT
Start: 2024-02-20

## 2024-02-20 RX ORDER — AMLODIPINE BESYLATE 2.5 MG/1
2.5 TABLET ORAL DAILY
Qty: 90 TABLET | Refills: 1 | Status: SHIPPED | OUTPATIENT
Start: 2024-02-20

## 2024-03-04 ENCOUNTER — TELEPHONE (OUTPATIENT)
Dept: UROLOGY | Facility: CLINIC | Age: 79
End: 2024-03-04

## 2024-03-04 NOTE — TELEPHONE ENCOUNTER
Caller: Nestor Nye    Relationship to patient: Self    Best call back number: [phone] OK TO [facility]    Patient is needing: PER DR. SNOW LAST OFFICE VISIT NOTES ON 12/30/22 If at any point he wants surgery for simple prostatectomy, I would likely refer him to [facility] due to his history of multiple abdominal surgeries. PT STATED HE WOULD LIKE TO HAVE DR. SNOW DO HIS SURGERY OR AT LEAST COME IN AND TALK ABOUT THE SURGERY.

## 2024-03-07 ENCOUNTER — TELEPHONE (OUTPATIENT)
Dept: UROLOGY | Facility: CLINIC | Age: 79
End: 2024-03-07

## 2024-03-07 NOTE — TELEPHONE ENCOUNTER
Caller: YUKI     Relationship: [facility] WITH [facility]    Best call back number: [phone] BRAY[place]     What is the best time to reach you: ANY    Who are you requesting to speak with (clinical staff, provider,  specific staff member): DR SNOW    Do you know the name of the person who called: [facility] CALLED OFFICE    What was the call regarding: [facility] CALL TO STATE THAT PT HAS DISCONTINUED SERVICES WITH THE COMPANY. PT'S LAST CATH CHANGE WAS ON 2/28/24. PT WAS TOLD IF HAD ISSUES WITH CATH TO CALL DR OFFICE OR GO TO ER. PLEASE CALL COMPANY BACK WITH ANY QUESTIONS OR TO TALK ABOUT FURTHER STEPS. THANK YOU.

## 2024-03-12 DIAGNOSIS — R33.9 URINARY RETENTION: ICD-10-CM

## 2024-03-13 RX ORDER — METHENAMINE HIPPURATE 1000 MG/1
TABLET ORAL
Qty: 60 TABLET | Refills: 0 | Status: SHIPPED | OUTPATIENT
Start: 2024-03-13

## 2024-03-14 ENCOUNTER — OFFICE VISIT (OUTPATIENT)
Dept: UROLOGY | Facility: CLINIC | Age: 79
End: 2024-03-14
Payer: MEDICARE

## 2024-03-14 VITALS
RESPIRATION RATE: 17 BRPM | TEMPERATURE: 97.2 F | WEIGHT: 164.6 LBS | OXYGEN SATURATION: 92 % | BODY MASS INDEX: 25.83 KG/M2 | HEIGHT: 67 IN | DIASTOLIC BLOOD PRESSURE: 76 MMHG | HEART RATE: 92 BPM | SYSTOLIC BLOOD PRESSURE: 122 MMHG

## 2024-03-14 DIAGNOSIS — N40.1 BPH WITH URINARY OBSTRUCTION: ICD-10-CM

## 2024-03-14 DIAGNOSIS — R33.9 URINARY RETENTION: Primary | ICD-10-CM

## 2024-03-14 DIAGNOSIS — N13.8 BPH WITH URINARY OBSTRUCTION: ICD-10-CM

## 2024-03-14 RX ORDER — SULFAMETHOXAZOLE AND TRIMETHOPRIM 800; 160 MG/1; MG/1
1 TABLET ORAL DAILY
Qty: 5 TABLET | Refills: 0 | Status: SHIPPED | OUTPATIENT
Start: 2024-03-14

## 2024-03-14 NOTE — PROGRESS NOTES
CC  Urinary retention    HPI  Mr. Nye is a 78 y.o. male with history below in assessment, who presents for follow up.     IPSS Questionnaire (AUA-7):  Incomplete emptying  Over the past month, how often have you had a sensation of not emptying your bladder completely after you finished urinating?: Almost always (03/14/24 1133)  Frequency  Over the past month, how often have you had to urinate again less than two hours after you finishied urinating ?: More than half the time (03/14/24 1133)  Intermittency  Over the past month, how often have you found you stopped and started again several time when you urinated ?: More than half the time (03/14/24 1133)  Urgency  Over the last month, how often have you found it difficult  have you found it difficult to postpone urination ?: About half the time (03/14/24 1133)  Weak Stream  Over the past month, how often have you had a weak urinary stream ?: Almost always (03/14/24 1133)  Straining  Over the past month, how often have you had to push or strain to begin urination ?: Almost always (03/14/24 1133)  Nocturia  Over the past month, how many times did you most typically get up to urinate from the time you went to bed until the time you got up in the morning ?: 3 times (03/14/24 1133)  Quality of life due to urinary symptoms  If you were to spend the rest of your life with your urinary condition the way it is now, how would feel about that?: Unhappy (03/14/24 1133)    Scores  Total IPSS Score: (!) 29 (03/14/24 1133)  Total Score = Symptomatic Level: Severely symptomatic: 20-35 (03/14/24 1133)       Past Medical History:   Diagnosis Date    Acute maxillary sinusitis     Arthritis     Disease of thyroid gland     Enlarged prostate     GERD (gastroesophageal reflux disease)     Gout     Impaired functional mobility, balance, gait, and endurance     PONV (postoperative nausea and vomiting)     Renal disorder     Rheumatoid arthritis     Right inguinal hernia 10/28/2021     Added automatically from request for surgery [id number]    Vocal cord anomaly 2007    had problem with enlarge vocal cord after low back surgery, changed his voice since then       Past Surgical History:   Procedure Laterality Date    ABDOMINAL SURGERY      BACK SURGERY      BELPHAROPTOSIS REPAIR Bilateral     BRAIN SURGERY      COLON SURGERY      CRANIOTOMY FOR SUBDURAL HEMATOMA      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    DIAGNOSTIC LAPAROSCOPY N/A 5/4/2022    Procedure: DIAGNOSTIC LAPAROSCOPY CONVERTED TO EXPLORATORY LAPAROTOMY WITH SMALL BOWEL RESECTION;  Surgeon: Maximo Bennett MD;  Location: [facility] OR;  Service: General;  Laterality: N/A;  CONVERTED TO EXPLORATORY LAPAROTOMY AT 1912    EXPLORATORY LAPAROTOMY N/A 04/16/2022    Procedure: LAPAROTOMY EXPLORATORY WITH SMALL BOWEL OBSTRUCTION;  Surgeon: Maximo Bennett MD;  Location: [facility] OR;  Service: General;  Laterality: N/A;    EYE SURGERY      on upper etelids    FOREIGN BODY REMOVAL Right     removal of metal fragments from RUQ after electrocution and fall.      HERNIA REPAIR      INGUINAL HERNIA REPAIR Right 11/01/2021    Procedure: INGUINAL HERNIA REPAIR WITH MESH AND EXCISION OF SPERMATIC CORD LIPOMA;  Surgeon: Rosey Glover MD;  Location: [facility] OR;  Service: General;  Laterality: Right;    LUMBAR SPINE SURGERY      SKIN GRAFT Right     RUE skin grafting after electrocution    THORACIC SPINE SURGERY           Current Outpatient Medications:     Alcohol Swabs (ALCOHOL WIPES) 70 % pads, 1 pad 3 (Three) Times a Day. (Patient taking differently: Use 1 pad 3 (Three) Times a Day. Cleans around stacy catheter with them.), Disp: 100 each, Rfl: 11    amLODIPine (Norvasc) 2.5 MG tablet, Take 1 tablet by mouth Daily., Disp: 90 tablet, Rfl: 1    baclofen (LIORESAL) 10 MG tablet, Take 1 tablet by mouth At Night As Needed for Muscle Spasms., Disp: 30 tablet, Rfl: 3    Diclofenac Sodium (VOLTAREN) 1 % gel gel, Apply 4 g topically to the  "appropriate area as directed 4 (Four) Times a Day As Needed (joint pain)., Disp: 100 g, Rfl: 5    ferrous sulfate 325 (65 FE) MG tablet, Take 1 tablet by mouth Daily With Breakfast., Disp: , Rfl:     finasteride (PROSCAR) 5 MG tablet, TAKE ONE TABLET BY MOUTH EVERY DAY, Disp: 90 tablet, Rfl: 0    levothyroxine (SYNTHROID, LEVOTHROID) 88 MCG tablet, Take 1 tablet by mouth Daily., Disp: 90 tablet, Rfl: 2    melatonin 5 MG tablet tablet, Take 1 tablet by mouth., Disp: , Rfl:     methenamine (HIPREX) 1 g tablet, TAKE ONE TABLET BY MOUTH TWICE DAILY WITH MEALS, Disp: 60 tablet, Rfl: 0    sulfamethoxazole-trimethoprim (Bactrim DS) 800-160 MG per tablet, Take 1 tablet by mouth Daily. Start 5 days before surgery, Disp: 5 tablet, Rfl: 0     Physical Exam  Visit Vitals  /76 (BP Location: Left arm, Patient Position: Sitting, Cuff Size: Adult)   Pulse 92   Temp 97.2 °F (36.2 °C) (Temporal)   Resp 17   Ht 170.2 cm (67\")   Wt 74.7 kg (164 lb 9.6 oz)   SpO2 92%   BMI 25.78 kg/m²       Labs  Brief Urine Lab Results       None            Lab Results   Component Value Date    GLUCOSE 85 11/17/2023    CALCIUM 9.1 11/17/2023     11/17/2023    K 4.8 11/17/2023    CO2 21.8 (L) 11/17/2023     11/17/2023    BUN 22 11/17/2023    CREATININE 1.46 (H) 11/17/2023    EGFRIFAFRI 43 (L) 09/25/2017    EGFRIFNONA 35 (L) 09/14/2021    BCR 15.1 11/17/2023    ANIONGAP 10.2 11/17/2023       Lab Results   Component Value Date    WBC 7.29 11/17/2023    HGB 14.5 11/17/2023    HCT 42.5 11/17/2023    MCV 96.6 11/17/2023     11/17/2023          Lab Results   Component Value Date    PSA 5.0 (H) 07/25/2019    PSA 4.4 (H) 05/23/2018    PSA 5.0 (H) 06/19/2017           Radiographic Studies  No Images in the past 120 days found..      I have reviewed above labs and imaging.     Assessment  78 y.o. male with history of chronic lower urinary tract symptoms, urinary retention, large prostate over 140 cc.  Cystoscopy in the past showed very " large occlusive lateral lobes.  He was found to have a hernia that was repaired by Dr. Glover on 11/1/2021. Dependent on indwelling catheter.  Hx of multiple bowel resections in past year due to obstruction.  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  Unable to perform uroflow due to retention.    No recent interim urinary infections. On Daily macrobid PPx. Hx of stable elevated PSA for many years and had negative biopsy in 2017 with Dr Jones    Plan  1. Schedule for aquablation. Start Bactrim 5d prior  2. FU with Dr. Cook for medical clearance and eval if he needs any further cardiac CRAIG prior to surgery

## 2024-03-19 ENCOUNTER — OFFICE VISIT (OUTPATIENT)
Dept: INTERNAL MEDICINE | Facility: CLINIC | Age: 79
End: 2024-03-19
Payer: MEDICARE

## 2024-03-19 VITALS
WEIGHT: 165 LBS | DIASTOLIC BLOOD PRESSURE: 72 MMHG | BODY MASS INDEX: 25.9 KG/M2 | HEART RATE: 83 BPM | TEMPERATURE: 97.3 F | SYSTOLIC BLOOD PRESSURE: 120 MMHG | HEIGHT: 67 IN | OXYGEN SATURATION: 97 %

## 2024-03-19 DIAGNOSIS — E78.2 MIXED HYPERLIPIDEMIA: ICD-10-CM

## 2024-03-19 DIAGNOSIS — N13.8 BPH WITH URINARY OBSTRUCTION: ICD-10-CM

## 2024-03-19 DIAGNOSIS — Z01.818 PREOP GENERAL PHYSICAL EXAM: Primary | ICD-10-CM

## 2024-03-19 DIAGNOSIS — I10 BENIGN ESSENTIAL HYPERTENSION: ICD-10-CM

## 2024-03-19 DIAGNOSIS — N18.31 STAGE 3A CHRONIC KIDNEY DISEASE: ICD-10-CM

## 2024-03-19 DIAGNOSIS — N40.1 BPH WITH URINARY OBSTRUCTION: ICD-10-CM

## 2024-03-19 PROBLEM — D63.8 ANEMIA OF CHRONIC DISEASE: Status: ACTIVE | Noted: 2022-01-20

## 2024-03-19 PROBLEM — M06.9 RHEUMATOID ARTHRITIS: Status: ACTIVE | Noted: 2022-01-20

## 2024-03-19 NOTE — PROGRESS NOTES
Subjective   Nestor Nye is a 78 y.o. male who presents to the office today for a preoperative consultation at the request of surgeon Dr. Rey who plans on performing aquablation on June 21. This consultation is requested for the specific conditions prompting preoperative evaluation (i.e. because of potential affect on operative risk): needs cardiology clearance. Planned anesthesia: general. The patient has the following known anesthesia issues: past general anesthesia without complications. Patients bleeding risk: no recent abnormal bleeding, no remote history of abnormal bleeding, and use of Ca-channel blockers (see med list). Patient does not have objections to receiving blood products if needed.    Has had 3 surgeries and not needed preop clearance. Will have EKG at surgery center about 2 weeks prior to procedure which he has already scheduled. He does not have a cardiologist and has never seen one.     The following portions of the patient's history were reviewed and updated as appropriate: allergies, current medications, past family history, past medical history, past social history, past surgical history, and problem list.    Vitals:    03/19/24 0929   BP: 120/72   Pulse: 83   Temp: 97.3 °F (36.3 °C)   SpO2: 97%         Review of Systems  A comprehensive review of systems was negative except for: Genitourinary: positive for catheter    Objective   General appearance: alert, appears stated age, and cooperative  Head: Normocephalic, without obvious abnormality, atraumatic  Eyes: conjunctivae/corneas clear. PERRL, EOM's intact. Fundi benign.  Throat: normal findings: lips normal without lesions, buccal mucosa normal, tongue midline and normal, and soft palate, uvula, and tonsils normal and abnormal findings: dentition: missing majority of teeth  Lungs: wheezes bilaterally  Chest wall: no tenderness  Heart: regular rate and rhythm, S1, S2 normal, no murmur, click, rub or gallop  Abdomen: soft,  non-tender; bowel sounds normal; no masses,  no organomegaly  Extremities: extremities normal, atraumatic, no cyanosis or edema  Pulses: 2+ and symmetric  Skin: Skin color, texture, turgor normal. No rashes or lesions  Lymph nodes: Cervical, supraclavicular, and axillary nodes normal.  Neurologic: Grossly normal    Predictors of intubation difficulty:   Morbid obesity? no   Anatomically abnormal facies? no   Prominent incisors?  Missing most teeth   Receding mandible? no   Short, thick neck? no   Neck range of motion: normal   Mallampati score: II (hard and soft palate, upper portion of tonsils anduvula visible)  Dentition: Missing teeth (most)    Cardiographics  Referred to cardiology for pre-op clearance.    Assessment & Plan   78 y.o. male with planned surgery as above.    Known risk factors for perioperative complications: Renal dysfunction    Difficulty with intubation is not anticipated.    Cardiac Risk Estimation: referred to cardiology for pre-op clearance    Current medications which may produce withdrawal symptoms if withheld perioperatively: none    1. Preoperative workup as follows: referred to cardiology for clearance.  2. Change in medication regimen before surgery:  follow direction of surgeon .  3. Prophylaxis for cardiac events with perioperative beta-blockers: should be considered, specific regimen per anesthesia.  4. Invasive hemodynamic monitoring perioperatively: at the discretion of anesthesiologist.  5. Deep vein thrombosis prophylaxis postoperatively:regimen to be chosen by surgical team.  6. Surveillance for postoperative MI with ECG immediately postoperatively and on postoperative days 1 and 2 AND troponin levels 24 hours postoperatively and on day 4 or hospital discharge (whichever comes first): should be considered.    Cleared for surgery from primary care standpoint. Required cardiology clearance prior to procedure.

## 2024-04-04 DIAGNOSIS — R33.9 URINARY RETENTION: ICD-10-CM

## 2024-04-04 RX ORDER — METHENAMINE HIPPURATE 1000 MG/1
TABLET ORAL
Qty: 60 TABLET | Refills: 0 | Status: SHIPPED | OUTPATIENT
Start: 2024-04-04

## 2024-04-10 ENCOUNTER — PROCEDURE VISIT (OUTPATIENT)
Dept: UROLOGY | Facility: CLINIC | Age: 79
End: 2024-04-10
Payer: MEDICARE

## 2024-04-10 VITALS — RESPIRATION RATE: 18 BRPM | HEIGHT: 67 IN | TEMPERATURE: 97.6 F | WEIGHT: 165 LBS | BODY MASS INDEX: 25.9 KG/M2

## 2024-04-10 DIAGNOSIS — N40.1 URINARY RETENTION DUE TO BENIGN PROSTATIC HYPERPLASIA: Primary | ICD-10-CM

## 2024-04-10 DIAGNOSIS — R33.8 URINARY RETENTION DUE TO BENIGN PROSTATIC HYPERPLASIA: Primary | ICD-10-CM

## 2024-04-10 NOTE — PROGRESS NOTES
Patient in office for his monthly catheter change. His old 16fr coude stacy was changed out for new 16fr stacy. I did advise Nestor that we were currently out of the 16fr coude stacy caths. He opted for a 16fr straight stacy instead of going up to an 18fr coude tip, because he states he's experienced pain in the past with a bigger size catheter. Patient tolerated Catheter change well. No complications noted during this visit. He will return in 4 weeks for his next cath change.  PABLITO Loja

## 2024-04-22 ENCOUNTER — OFFICE VISIT (OUTPATIENT)
Dept: CARDIOLOGY | Facility: CLINIC | Age: 79
End: 2024-04-22
Payer: MEDICARE

## 2024-04-22 VITALS
BODY MASS INDEX: 25.74 KG/M2 | OXYGEN SATURATION: 97 % | DIASTOLIC BLOOD PRESSURE: 82 MMHG | RESPIRATION RATE: 18 BRPM | HEART RATE: 89 BPM | HEIGHT: 67 IN | WEIGHT: 164 LBS | SYSTOLIC BLOOD PRESSURE: 108 MMHG

## 2024-04-22 DIAGNOSIS — Z01.810 PREOP CARDIOVASCULAR EXAM: Primary | ICD-10-CM

## 2024-04-22 PROCEDURE — 99203 OFFICE O/P NEW LOW 30 MIN: CPT | Performed by: INTERNAL MEDICINE

## 2024-04-22 PROCEDURE — 93000 ELECTROCARDIOGRAM COMPLETE: CPT | Performed by: INTERNAL MEDICINE

## 2024-04-22 PROCEDURE — 3074F SYST BP LT 130 MM HG: CPT | Performed by: INTERNAL MEDICINE

## 2024-04-22 PROCEDURE — 3079F DIAST BP 80-89 MM HG: CPT | Performed by: INTERNAL MEDICINE

## 2024-04-22 NOTE — PROGRESS NOTES
[facility] Cardiology OP Consult Note    Nestor Nye  [id number]  2024    Referred By: Michelle Gillespie, *    Chief Complaint: Preoperative cardiac risk assessment    History of Present Illness:   Mr. Nestor Nye is a 78 y.o. male who presents to the Cardiology Clinic for preoperative cardiac risk assessment.  The patient has a past medical history significant for hypertension, hyperlipidemia, hypothyroidism, and stage III chronic kidney disease.  He also has a chronic indwelling Gillespie catheter, and is planning to undergo aquablation with urology in .  He denies any significant past cardiac history.  The patient is fairly active at home, and he denies any significant exertional chest pain.  He is able to do yard work around his home without significant exertional dyspnea.  He denies any palpitations.  No history of presyncopal or syncopal events.  No other specific complaints today.    Past Cardiac Testin. Last Coronary Angio: None  2. Prior Stress Testing: None  3. Last Echo: None  4. Prior Holter Monitor: None    Review of Systems:   Review of Systems   Constitutional:  Negative for activity change, appetite change, chills, diaphoresis, fatigue, fever, unexpected weight gain and unexpected weight loss.   Eyes:  Negative for blurred vision and double vision.   Respiratory:  Negative for cough, chest tightness, shortness of breath and wheezing.    Cardiovascular:  Negative for chest pain, palpitations and leg swelling.   Gastrointestinal:  Negative for abdominal pain, anal bleeding, blood in stool and GERD.   Endocrine: Negative for cold intolerance and heat intolerance.   Genitourinary:  Negative for hematuria.   Neurological:  Negative for dizziness, syncope, weakness and light-headedness.   Hematological:  Does not bruise/bleed easily.   Psychiatric/Behavioral:  Negative for depressed mood and stress. The patient is not nervous/anxious.        Past  Medical History:   Past Medical History:   Diagnosis Date    Acute maxillary sinusitis     Arthritis     Disease of thyroid gland     Enlarged prostate     GERD (gastroesophageal reflux disease)     Gout     Impaired functional mobility, balance, gait, and endurance     PONV (postoperative nausea and vomiting)     Renal disorder     Rheumatoid arthritis     Right inguinal hernia 10/28/2021    Added automatically from request for surgery [id number]    Vocal cord anomaly 2007    had problem with enlarge vocal cord after low back surgery, changed his voice since then       Past Surgical History:   Past Surgical History:   Procedure Laterality Date    ABDOMINAL SURGERY      BACK SURGERY      BELPHAROPTOSIS REPAIR Bilateral     BRAIN SURGERY      COLON SURGERY      CRANIOTOMY FOR SUBDURAL HEMATOMA      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    DIAGNOSTIC LAPAROSCOPY N/A 5/4/2022    Procedure: DIAGNOSTIC LAPAROSCOPY CONVERTED TO EXPLORATORY LAPAROTOMY WITH SMALL BOWEL RESECTION;  Surgeon: Maximo Bennett MD;  Location: [facility];  Service: General;  Laterality: N/A;  CONVERTED TO EXPLORATORY LAPAROTOMY AT 1912    EXPLORATORY LAPAROTOMY N/A 04/16/2022    Procedure: LAPAROTOMY EXPLORATORY WITH SMALL BOWEL OBSTRUCTION;  Surgeon: Maximo Bennett MD;  Location: [facility] OR;  Service: General;  Laterality: N/A;    EYE SURGERY      on upper etelids    FOREIGN BODY REMOVAL Right     removal of metal fragments from RUQ after electrocution and fall.      HERNIA REPAIR      INGUINAL HERNIA REPAIR Right 11/01/2021    Procedure: INGUINAL HERNIA REPAIR WITH MESH AND EXCISION OF SPERMATIC CORD LIPOMA;  Surgeon: Rosey Glover MD;  Location: [facility];  Service: General;  Laterality: Right;    LUMBAR SPINE SURGERY      SKIN GRAFT Right     RUE skin grafting after electrocution    THORACIC SPINE SURGERY         Family History:   Family History   Problem Relation Age of Onset    Arthritis Mother     Prostate cancer  Brother     Arthritis Father        Social History:   Social History     Socioeconomic History    Marital status:    Tobacco Use    Smoking status: Every Day     Types: Cigars    Smokeless tobacco: Never    Tobacco comments:     Quit 2013   Vaping Use    Vaping status: Some Days    Substances: Nicotine   Substance and Sexual Activity    Alcohol use: Not Currently    Drug use: No    Sexual activity: Defer       Medications:     Current Outpatient Medications:     Alcohol Swabs (ALCOHOL WIPES) 70 % pads, 1 pad 3 (Three) Times a Day. (Patient taking differently: Use 1 pad 3 (Three) Times a Day. Cleans around stacy catheter with them.), Disp: 100 each, Rfl: 11    amLODIPine (Norvasc) 2.5 MG tablet, Take 1 tablet by mouth Daily., Disp: 90 tablet, Rfl: 1    Diclofenac Sodium (VOLTAREN) 1 % gel gel, Apply 4 g topically to the appropriate area as directed 4 (Four) Times a Day As Needed (joint pain)., Disp: 100 g, Rfl: 5    ferrous sulfate 325 (65 FE) MG tablet, Take 1 tablet by mouth Daily With Breakfast., Disp: , Rfl:     finasteride (PROSCAR) 5 MG tablet, TAKE ONE TABLET BY MOUTH EVERY DAY, Disp: 90 tablet, Rfl: 0    levothyroxine (SYNTHROID, LEVOTHROID) 88 MCG tablet, Take 1 tablet by mouth Daily., Disp: 90 tablet, Rfl: 2    methenamine (HIPREX) 1 g tablet, TAKE ONE TABLET BY MOUTH TWICE DAILY WITH MEALS, Disp: 60 tablet, Rfl: 0    baclofen (LIORESAL) 10 MG tablet, Take 1 tablet by mouth At Night As Needed for Muscle Spasms. (Patient not taking: Reported on 4/22/2024), Disp: 30 tablet, Rfl: 3    melatonin 5 MG tablet tablet, Take 1 tablet by mouth. (Patient not taking: Reported on 4/22/2024), Disp: , Rfl:     sulfamethoxazole-trimethoprim (Bactrim DS) 800-160 MG per tablet, Take 1 tablet by mouth Daily. Start 5 days before surgery (Patient not taking: Reported on 4/22/2024), Disp: 5 tablet, Rfl: 0    Allergies:   No Known Allergies    Physical Exam:  Vital Signs:   Vitals:    04/22/24 0925   BP: 108/82   BP  "Location: Left arm   Patient Position: Lying   Cuff Size: Adult   Pulse: 89   Resp: 18   SpO2: 97%   Weight: 74.4 kg (164 lb)   Height: 170.2 cm (67\")       Physical Exam  Constitutional:       General: He is not in acute distress.     Appearance: Normal appearance. He is not diaphoretic.   HENT:      Head: Normocephalic and atraumatic.   Cardiovascular:      Rate and Rhythm: Normal rate and regular rhythm.      Heart sounds: No murmur heard.  Pulmonary:      Effort: Pulmonary effort is normal. No respiratory distress.      Breath sounds: Normal breath sounds. No stridor. No wheezing, rhonchi or rales.   Abdominal:      General: Bowel sounds are normal. There is no distension.      Palpations: Abdomen is soft.      Tenderness: There is no abdominal tenderness. There is no guarding or rebound.   Musculoskeletal:         General: No swelling. Normal range of motion.      Cervical back: Neck supple. No tenderness.   Skin:     General: Skin is warm and dry.   Neurological:      General: No focal deficit present.      Mental Status: He is alert and oriented to person, place, and time.   Psychiatric:         Mood and Affect: Mood normal.         Behavior: Behavior normal.         Results Review:   I reviewed the patient's new clinical results.  I personally viewed and interpreted the patient's EKG/Telemetry data      ECG 12 Lead    Date/Time: 4/22/2024 9:51 AM  Performed by: Mitch Douglas MD    Authorized by: Mitch Douglas MD  Comparison: not compared with previous ECG   Previous ECG: no previous ECG available  Rhythm: sinus rhythm  Rate: normal  Conduction: incomplete right bundle branch block  QRS axis: normal  Clinical impression comment: Borderline ECG          Assessment / Plan:     1. Preop cardiovascular exam   --Presents today for preoperative cardiac risk assessment in consideration of undergoing urologic surgery  --No significant prior cardiac history  --ECG today shows NSR with incomplete right bundle " branch block, no acute or prior ischemic changes  --No chest pain or exertional anginal symptoms  --No evidence of decompensated CHF, valvulopathy, or arrhythmia  --The patient is currently at low risk for adverse perioperative cardiac events with a low risk revised cardiac risk index of 0.4%.  Given functional status is > 4 METS without anginal symptoms it is reasonable to proceed with the proposed procedure without further cardiac testing or interventions  --Will follow on a as needed basis          Follow Up:   Return if symptoms worsen or fail to improve.      Thank you for allowing me to participate in the care of your patient. Please to not hesitate to contact me with additional questions or concerns.     CLAYTON Douglas MD  Interventional Cardiology   04/22/2024  09:37 EDT

## 2024-04-23 ENCOUNTER — PATIENT ROUNDING (BHMG ONLY) (OUTPATIENT)
Dept: CARDIOLOGY | Facility: CLINIC | Age: 79
End: 2024-04-23
Payer: MEDICARE

## 2024-04-23 NOTE — PROGRESS NOTES
[facility] Cardiology welcome email and rounding message has been sent to patient via [employer].

## 2024-05-08 ENCOUNTER — PROCEDURE VISIT (OUTPATIENT)
Dept: UROLOGY | Facility: CLINIC | Age: 79
End: 2024-05-08
Payer: MEDICARE

## 2024-05-08 DIAGNOSIS — R33.9 URINARY RETENTION: Primary | ICD-10-CM

## 2024-05-08 NOTE — PROGRESS NOTES
Patient came in for a 4 week cath change and I removed the 16 Mont[place]grin cath and cleaned patient off, He request to have some lidocaine Jelly, I placed some in his urethra and then inserted, a 16 coude cath and patient tolerated well and will schedule a 4 week follow up to come in for a cath change.   Phillip RAMIREZ

## 2024-05-09 DIAGNOSIS — R33.9 URINARY RETENTION: ICD-10-CM

## 2024-05-09 RX ORDER — METHENAMINE HIPPURATE 1000 MG/1
TABLET ORAL
Qty: 60 TABLET | Refills: 0 | Status: SHIPPED | OUTPATIENT
Start: 2024-05-09

## 2024-05-29 DIAGNOSIS — R33.9 URINARY RETENTION: ICD-10-CM

## 2024-05-30 RX ORDER — METHENAMINE HIPPURATE 1000 MG/1
TABLET ORAL
Qty: 60 TABLET | Refills: 0 | Status: SHIPPED | OUTPATIENT
Start: 2024-05-30

## 2024-06-04 DIAGNOSIS — N40.1 BENIGN PROSTATIC HYPERPLASIA WITH URINARY OBSTRUCTION: ICD-10-CM

## 2024-06-04 DIAGNOSIS — N13.8 BENIGN PROSTATIC HYPERPLASIA WITH URINARY OBSTRUCTION: ICD-10-CM

## 2024-06-04 RX ORDER — FINASTERIDE 5 MG/1
5 TABLET, FILM COATED ORAL DAILY
Qty: 90 TABLET | Refills: 0 | OUTPATIENT
Start: 2024-06-04

## 2024-06-05 ENCOUNTER — PROCEDURE VISIT (OUTPATIENT)
Dept: UROLOGY | Facility: CLINIC | Age: 79
End: 2024-06-05
Payer: MEDICARE

## 2024-06-05 DIAGNOSIS — R33.9 URINARY RETENTION: Primary | ICD-10-CM

## 2024-06-05 PROCEDURE — 51702 INSERT TEMP BLADDER CATH: CPT | Performed by: UROLOGY

## 2024-06-05 NOTE — PROGRESS NOTES
Patient here today for nurse visit for cath change. I deflated cath balloon and removed 10 ml sterile water. Cath was removed.  Patient was prepped with betadine and I inserted 11 ml lidocaine jelly. I placed 16 fr coude catheter and connected to leg bag. I inflated cath balloon with 10 ml sterile water. Patient was given extra leg bag for home.     LISA Wilde

## 2024-06-07 ENCOUNTER — PRE-ADMISSION TESTING (OUTPATIENT)
Dept: PREADMISSION TESTING | Facility: HOSPITAL | Age: 79
End: 2024-06-07
Payer: MEDICARE

## 2024-06-07 VITALS — BODY MASS INDEX: 26.54 KG/M2 | HEIGHT: 67 IN | WEIGHT: 169.09 LBS

## 2024-06-07 LAB
DEPRECATED RDW RBC AUTO: 46.3 FL (ref 37–54)
ERYTHROCYTE [DISTWIDTH] IN BLOOD BY AUTOMATED COUNT: 12.9 % (ref 12.3–15.4)
HCT VFR BLD AUTO: 46.7 % (ref 37.5–51)
HGB BLD-MCNC: 15.5 G/DL (ref 13–17.7)
MCH RBC QN AUTO: 32.3 PG (ref 26.6–33)
MCHC RBC AUTO-ENTMCNC: 33.2 G/DL (ref 31.5–35.7)
MCV RBC AUTO: 97.3 FL (ref 79–97)
PLATELET # BLD AUTO: 254 10*3/MM3 (ref 140–450)
PMV BLD AUTO: 9.7 FL (ref 6–12)
POTASSIUM SERPL-SCNC: 4.7 MMOL/L (ref 3.5–5.2)
RBC # BLD AUTO: 4.8 10*6/MM3 (ref 4.14–5.8)
WBC NRBC COR # BLD AUTO: 7.61 10*3/MM3 (ref 3.4–10.8)

## 2024-06-07 PROCEDURE — 36415 COLL VENOUS BLD VENIPUNCTURE: CPT

## 2024-06-07 PROCEDURE — 84132 ASSAY OF SERUM POTASSIUM: CPT

## 2024-06-07 PROCEDURE — 85027 COMPLETE CBC AUTOMATED: CPT

## 2024-06-07 NOTE — PAT
Patient did not review general PAT education video as instructed in their preoperative information received from their surgeon.  One-on-one Pre Admission Testing general education provided during PAT visit.  Copies of PAT general education handouts (Incentive Spirometry, Meds to Beds Program, Patient Belongings, Pre-op skin preparation instructions, Blood Glucose testing, Visitor policy, Surgery FAQ, Code H) distributed to patient. Encouraged patient/family to read PAT general education handouts thoroughly and notify PAT staff with any questions or concerns. Patient instructed to bring PAT pass and completed skin prep sheet (if applicable) on the day of procedure. Patient verbalized understanding of all information and priority content.     Instructed pt to bring medication bottles or list with dosages DOS, he doesn't know the names of medications.     CARDIAC CLEARANCE ON CHART FROM 4-.

## 2024-06-11 ENCOUNTER — TELEPHONE (OUTPATIENT)
Dept: UROLOGY | Facility: CLINIC | Age: 79
End: 2024-06-11
Payer: MEDICARE

## 2024-06-11 NOTE — TELEPHONE ENCOUNTER
Left message for a return call from Erma Dickson RN [employer], Surgery Prior Authorization  Edwige regarding Aquablation 06/21/2024.

## 2024-06-18 DIAGNOSIS — N13.8 BENIGN PROSTATIC HYPERPLASIA WITH URINARY OBSTRUCTION: ICD-10-CM

## 2024-06-18 DIAGNOSIS — N40.1 BENIGN PROSTATIC HYPERPLASIA WITH URINARY OBSTRUCTION: ICD-10-CM

## 2024-06-19 RX ORDER — FINASTERIDE 5 MG/1
5 TABLET, FILM COATED ORAL DAILY
Qty: 90 TABLET | Refills: 0 | Status: ON HOLD | OUTPATIENT
Start: 2024-06-19

## 2024-06-21 ENCOUNTER — ANESTHESIA (OUTPATIENT)
Dept: PERIOP | Facility: HOSPITAL | Age: 79
End: 2024-06-21
Payer: MEDICARE

## 2024-06-21 ENCOUNTER — ANESTHESIA EVENT (OUTPATIENT)
Dept: PERIOP | Facility: HOSPITAL | Age: 79
End: 2024-06-21
Payer: MEDICARE

## 2024-06-21 ENCOUNTER — APPOINTMENT (OUTPATIENT)
Dept: INTERVENTIONAL RADIOLOGY/VASCULAR | Facility: HOSPITAL | Age: 79
End: 2024-06-21
Payer: MEDICARE

## 2024-06-21 ENCOUNTER — APPOINTMENT (OUTPATIENT)
Dept: CARDIOLOGY | Facility: HOSPITAL | Age: 79
End: 2024-06-21
Payer: MEDICARE

## 2024-06-21 ENCOUNTER — HOSPITAL ENCOUNTER (INPATIENT)
Facility: HOSPITAL | Age: 79
LOS: 5 days | Discharge: NURSING, REHAB OR LONG-TERM CARE | End: 2024-06-26
Attending: UROLOGY | Admitting: INTERNAL MEDICINE
Payer: MEDICARE

## 2024-06-21 ENCOUNTER — APPOINTMENT (OUTPATIENT)
Dept: GENERAL RADIOLOGY | Facility: HOSPITAL | Age: 79
End: 2024-06-21
Payer: MEDICARE

## 2024-06-21 DIAGNOSIS — N40.1 BPH WITH URINARY OBSTRUCTION: ICD-10-CM

## 2024-06-21 DIAGNOSIS — N13.8 BPH WITH URINARY OBSTRUCTION: ICD-10-CM

## 2024-06-21 DIAGNOSIS — I70.209 FEMORAL ARTERY OCCLUSION: ICD-10-CM

## 2024-06-21 DIAGNOSIS — R33.9 URINARY RETENTION: ICD-10-CM

## 2024-06-21 LAB
ALBUMIN SERPL-MCNC: 3.5 G/DL (ref 3.5–5.2)
ALBUMIN/GLOB SERPL: 1.6 G/DL
ALP SERPL-CCNC: 96 U/L (ref 39–117)
ALT SERPL W P-5'-P-CCNC: 8 U/L (ref 1–41)
ANION GAP SERPL CALCULATED.3IONS-SCNC: 9 MMOL/L (ref 5–15)
APTT PPP: 27.1 SECONDS (ref 22–39)
AST SERPL-CCNC: 14 U/L (ref 1–40)
BASOPHILS # BLD AUTO: 0.08 10*3/MM3 (ref 0–0.2)
BASOPHILS NFR BLD AUTO: 0.8 % (ref 0–1.5)
BH CV LEA RIGHT ANT TIBIAL A DISTAL PSV: 7.8 CM/S
BH CV LEA RIGHT CFA DISTAL PSV: 116 CM/S
BH CV LEA RIGHT DFA PROX PSV: 155 CM/S
BH CV LEA RIGHT PERONEAL  MID PSV: 7.6 CM/S
BH CV LEA RIGHT POPITEAL A  PROX PSV: 16.1 CM/S
BH CV LEA RIGHT PTA DISTAL PSV: 9.6 CM/S
BH CV LEA RIGHT SFA DISTAL PSV: -13.3 CM/S
BH CV LEA RIGHT TIBEOPERONEAL PSV: 14.9 CM/S
BILIRUB SERPL-MCNC: 0.5 MG/DL (ref 0–1.2)
BUN SERPL-MCNC: 15 MG/DL (ref 8–23)
BUN/CREAT SERPL: 7.9 (ref 7–25)
CALCIUM SPEC-SCNC: 8.2 MG/DL (ref 8.6–10.5)
CHLORIDE SERPL-SCNC: 105 MMOL/L (ref 98–107)
CO2 SERPL-SCNC: 25 MMOL/L (ref 22–29)
CREAT SERPL-MCNC: 1.89 MG/DL (ref 0.76–1.27)
DEPRECATED RDW RBC AUTO: 48.8 FL (ref 37–54)
EGFRCR SERPLBLD CKD-EPI 2021: 36.1 ML/MIN/1.73
EOSINOPHIL # BLD AUTO: 0.18 10*3/MM3 (ref 0–0.4)
EOSINOPHIL NFR BLD AUTO: 1.8 % (ref 0.3–6.2)
ERYTHROCYTE [DISTWIDTH] IN BLOOD BY AUTOMATED COUNT: 13.1 % (ref 12.3–15.4)
GLOBULIN UR ELPH-MCNC: 2.2 GM/DL
GLUCOSE SERPL-MCNC: 133 MG/DL (ref 65–99)
HBA1C MFR BLD: 5.3 % (ref 4.8–5.6)
HCT VFR BLD AUTO: 39.3 % (ref 37.5–51)
HGB BLD-MCNC: 12.5 G/DL (ref 13–17.7)
IMM GRANULOCYTES # BLD AUTO: 0.04 10*3/MM3 (ref 0–0.05)
IMM GRANULOCYTES NFR BLD AUTO: 0.4 % (ref 0–0.5)
INR PPP: 1.11 (ref 0.89–1.12)
LYMPHOCYTES # BLD AUTO: 1.44 10*3/MM3 (ref 0.7–3.1)
LYMPHOCYTES NFR BLD AUTO: 14.3 % (ref 19.6–45.3)
MCH RBC QN AUTO: 31.8 PG (ref 26.6–33)
MCHC RBC AUTO-ENTMCNC: 31.8 G/DL (ref 31.5–35.7)
MCV RBC AUTO: 100 FL (ref 79–97)
MONOCYTES # BLD AUTO: 0.9 10*3/MM3 (ref 0.1–0.9)
MONOCYTES NFR BLD AUTO: 9 % (ref 5–12)
NEUTROPHILS NFR BLD AUTO: 7.41 10*3/MM3 (ref 1.7–7)
NEUTROPHILS NFR BLD AUTO: 73.7 % (ref 42.7–76)
NRBC BLD AUTO-RTO: 0 /100 WBC (ref 0–0.2)
PLATELET # BLD AUTO: 227 10*3/MM3 (ref 140–450)
PMV BLD AUTO: 9.9 FL (ref 6–12)
POTASSIUM SERPL-SCNC: 4.7 MMOL/L (ref 3.5–5.2)
PROT SERPL-MCNC: 5.7 G/DL (ref 6–8.5)
PROTHROMBIN TIME: 14.4 SECONDS (ref 12.2–14.5)
RBC # BLD AUTO: 3.93 10*6/MM3 (ref 4.14–5.8)
RIGHT GROIN CFA SYS: 116 CM/SEC
SODIUM SERPL-SCNC: 139 MMOL/L (ref 136–145)
WBC NRBC COR # BLD AUTO: 10.05 10*3/MM3 (ref 3.4–10.8)

## 2024-06-21 PROCEDURE — 0421T PR TRANSURETHRAL WATERJET ABLATION PROSTATE COMPL: CPT | Performed by: UROLOGY

## 2024-06-21 PROCEDURE — 25810000003 LACTATED RINGERS PER 1000 ML: Performed by: ANESTHESIOLOGY

## 2024-06-21 PROCEDURE — 99232 SBSQ HOSP IP/OBS MODERATE 35: CPT

## 2024-06-21 PROCEDURE — 25810000003 LACTATED RINGERS PER 1000 ML: Performed by: NURSE ANESTHETIST, CERTIFIED REGISTERED

## 2024-06-21 PROCEDURE — P9041 ALBUMIN (HUMAN),5%, 50ML: HCPCS

## 2024-06-21 PROCEDURE — 93926 LOWER EXTREMITY STUDY: CPT | Performed by: INTERNAL MEDICINE

## 2024-06-21 PROCEDURE — 25010000002 PROPOFOL 10 MG/ML EMULSION: Performed by: NURSE ANESTHETIST, CERTIFIED REGISTERED

## 2024-06-21 PROCEDURE — 25010000002 ONDANSETRON PER 1 MG: Performed by: NURSE ANESTHETIST, CERTIFIED REGISTERED

## 2024-06-21 PROCEDURE — C1894 INTRO/SHEATH, NON-LASER: HCPCS

## 2024-06-21 PROCEDURE — 99223 1ST HOSP IP/OBS HIGH 75: CPT | Performed by: INTERNAL MEDICINE

## 2024-06-21 PROCEDURE — C1769 GUIDE WIRE: HCPCS

## 2024-06-21 PROCEDURE — 25010000002 CEFAZOLIN PER 500 MG: Performed by: NURSE ANESTHETIST, CERTIFIED REGISTERED

## 2024-06-21 PROCEDURE — 25010000002 PHENYLEPHRINE 10 MG/ML SOLUTION: Performed by: NURSE ANESTHETIST, CERTIFIED REGISTERED

## 2024-06-21 PROCEDURE — C1889 IMPLANT/INSERT DEVICE, NOC: HCPCS

## 2024-06-21 PROCEDURE — 25010000002 FENTANYL CITRATE (PF) 100 MCG/2ML SOLUTION: Performed by: NURSE ANESTHETIST, CERTIFIED REGISTERED

## 2024-06-21 PROCEDURE — 93926 LOWER EXTREMITY STUDY: CPT

## 2024-06-21 PROCEDURE — 85610 PROTHROMBIN TIME: CPT | Performed by: INTERNAL MEDICINE

## 2024-06-21 PROCEDURE — 04LK3DZ OCCLUSION OF RIGHT FEMORAL ARTERY WITH INTRALUMINAL DEVICE, PERCUTANEOUS APPROACH: ICD-10-PCS | Performed by: RADIOLOGY

## 2024-06-21 PROCEDURE — S0260 H&P FOR SURGERY: HCPCS | Performed by: UROLOGY

## 2024-06-21 PROCEDURE — 25010000002 SUGAMMADEX 200 MG/2ML SOLUTION: Performed by: NURSE ANESTHETIST, CERTIFIED REGISTERED

## 2024-06-21 PROCEDURE — 25810000003 SODIUM CHLORIDE 0.9 % SOLUTION

## 2024-06-21 PROCEDURE — 88304 TISSUE EXAM BY PATHOLOGIST: CPT | Performed by: SURGERY

## 2024-06-21 PROCEDURE — 25010000002 DEXAMETHASONE PER 1 MG: Performed by: NURSE ANESTHETIST, CERTIFIED REGISTERED

## 2024-06-21 PROCEDURE — 25010000002 GLYCOPYRROLATE 1 MG/5ML SOLUTION: Performed by: NURSE ANESTHETIST, CERTIFIED REGISTERED

## 2024-06-21 PROCEDURE — S0260 H&P FOR SURGERY: HCPCS

## 2024-06-21 PROCEDURE — 25010000002 ALBUMIN HUMAN 5% PER 50 ML

## 2024-06-21 PROCEDURE — 25010000002 GENTAMICIN PER 80 MG: Performed by: UROLOGY

## 2024-06-21 PROCEDURE — C1757 CATH, THROMBECTOMY/EMBOLECT: HCPCS | Performed by: SURGERY

## 2024-06-21 PROCEDURE — 0V508ZZ DESTRUCTION OF PROSTATE, VIA NATURAL OR ARTIFICIAL OPENING ENDOSCOPIC: ICD-10-PCS | Performed by: UROLOGY

## 2024-06-21 PROCEDURE — 83036 HEMOGLOBIN GLYCOSYLATED A1C: CPT

## 2024-06-21 PROCEDURE — 80053 COMPREHEN METABOLIC PANEL: CPT | Performed by: INTERNAL MEDICINE

## 2024-06-21 PROCEDURE — C1760 CLOSURE DEV, VASC: HCPCS

## 2024-06-21 PROCEDURE — 0 IODIXANOL PER 1 ML

## 2024-06-21 PROCEDURE — 25010000002 LIDOCAINE 1 % SOLUTION: Performed by: SURGERY

## 2024-06-21 PROCEDURE — 25810000003 SODIUM CHLORIDE PER 500 ML: Performed by: SURGERY

## 2024-06-21 PROCEDURE — 85730 THROMBOPLASTIN TIME PARTIAL: CPT | Performed by: INTERNAL MEDICINE

## 2024-06-21 PROCEDURE — 85025 COMPLETE CBC W/AUTO DIFF WBC: CPT | Performed by: INTERNAL MEDICINE

## 2024-06-21 PROCEDURE — C1887 CATHETER, GUIDING: HCPCS

## 2024-06-21 PROCEDURE — C2596 PROBE, ROBOTIC, WATER-JET: HCPCS | Performed by: UROLOGY

## 2024-06-21 PROCEDURE — 25010000002 PHENYLEPHRINE 10 MG/ML SOLUTION 1 ML VIAL: Performed by: NURSE ANESTHETIST, CERTIFIED REGISTERED

## 2024-06-21 PROCEDURE — 25010000002 HEPARIN (PORCINE) PER 1000 UNITS: Performed by: SURGERY

## 2024-06-21 PROCEDURE — 04CK0ZZ EXTIRPATION OF MATTER FROM RIGHT FEMORAL ARTERY, OPEN APPROACH: ICD-10-PCS | Performed by: SURGERY

## 2024-06-21 PROCEDURE — 25010000002 CEFAZOLIN PER 500 MG: Performed by: UROLOGY

## 2024-06-21 DEVICE — CLIP LIGAT VASC HORIZON TI LG ORNG 6CT: Type: IMPLANTABLE DEVICE | Site: GROIN | Status: FUNCTIONAL

## 2024-06-21 DEVICE — HEMOST ABS SURGICEL SNOW 1X2IN: Type: IMPLANTABLE DEVICE | Site: GROIN | Status: FUNCTIONAL

## 2024-06-21 RX ORDER — ONDANSETRON 2 MG/ML
INJECTION INTRAMUSCULAR; INTRAVENOUS AS NEEDED
Status: DISCONTINUED | OUTPATIENT
Start: 2024-06-21 | End: 2024-06-21 | Stop reason: SURG

## 2024-06-21 RX ORDER — ONDANSETRON 4 MG/1
4 TABLET, ORALLY DISINTEGRATING ORAL EVERY 6 HOURS PRN
Status: DISCONTINUED | OUTPATIENT
Start: 2024-06-21 | End: 2024-06-26 | Stop reason: HOSPADM

## 2024-06-21 RX ORDER — MIDAZOLAM HYDROCHLORIDE 1 MG/ML
INJECTION INTRAMUSCULAR; INTRAVENOUS
Status: DISCONTINUED
Start: 2024-06-21 | End: 2024-06-21 | Stop reason: WASHOUT

## 2024-06-21 RX ORDER — EPHEDRINE SULFATE 50 MG/ML
INJECTION INTRAVENOUS AS NEEDED
Status: DISCONTINUED | OUTPATIENT
Start: 2024-06-21 | End: 2024-06-21 | Stop reason: SURG

## 2024-06-21 RX ORDER — LIDOCAINE HYDROCHLORIDE 10 MG/ML
INJECTION, SOLUTION EPIDURAL; INFILTRATION; INTRACAUDAL; PERINEURAL AS NEEDED
Status: DISCONTINUED | OUTPATIENT
Start: 2024-06-21 | End: 2024-06-21 | Stop reason: SURG

## 2024-06-21 RX ORDER — BUPIVACAINE HCL/0.9 % NACL/PF 0.125 %
PLASTIC BAG, INJECTION (ML) EPIDURAL AS NEEDED
Status: DISCONTINUED | OUTPATIENT
Start: 2024-06-21 | End: 2024-06-21 | Stop reason: SURG

## 2024-06-21 RX ORDER — FENTANYL CITRATE 50 UG/ML
50 INJECTION, SOLUTION INTRAMUSCULAR; INTRAVENOUS
Status: DISCONTINUED | OUTPATIENT
Start: 2024-06-21 | End: 2024-06-21 | Stop reason: HOSPADM

## 2024-06-21 RX ORDER — CEFAZOLIN SODIUM 1 G/3ML
INJECTION, POWDER, FOR SOLUTION INTRAMUSCULAR; INTRAVENOUS AS NEEDED
Status: DISCONTINUED | OUTPATIENT
Start: 2024-06-21 | End: 2024-06-21 | Stop reason: SURG

## 2024-06-21 RX ORDER — ONDANSETRON 2 MG/ML
4 INJECTION INTRAMUSCULAR; INTRAVENOUS ONCE AS NEEDED
Status: DISCONTINUED | OUTPATIENT
Start: 2024-06-21 | End: 2024-06-21 | Stop reason: HOSPADM

## 2024-06-21 RX ORDER — ACETAMINOPHEN 160 MG/5ML
650 SOLUTION ORAL EVERY 4 HOURS PRN
Status: CANCELLED | OUTPATIENT
Start: 2024-06-21

## 2024-06-21 RX ORDER — LIDOCAINE HYDROCHLORIDE 10 MG/ML
INJECTION, SOLUTION INFILTRATION; PERINEURAL AS NEEDED
Status: DISCONTINUED | OUTPATIENT
Start: 2024-06-21 | End: 2024-06-21 | Stop reason: HOSPADM

## 2024-06-21 RX ORDER — FAMOTIDINE 20 MG/1
20 TABLET, FILM COATED ORAL ONCE
Status: COMPLETED | OUTPATIENT
Start: 2024-06-21 | End: 2024-06-21

## 2024-06-21 RX ORDER — SODIUM CHLORIDE 0.9 % (FLUSH) 0.9 %
10 SYRINGE (ML) INJECTION AS NEEDED
Status: CANCELLED | OUTPATIENT
Start: 2024-06-21

## 2024-06-21 RX ORDER — ROCURONIUM BROMIDE 10 MG/ML
INJECTION, SOLUTION INTRAVENOUS AS NEEDED
Status: DISCONTINUED | OUTPATIENT
Start: 2024-06-21 | End: 2024-06-21 | Stop reason: SURG

## 2024-06-21 RX ORDER — HYDROMORPHONE HYDROCHLORIDE 1 MG/ML
0.5 INJECTION, SOLUTION INTRAMUSCULAR; INTRAVENOUS; SUBCUTANEOUS
Status: DISCONTINUED | OUTPATIENT
Start: 2024-06-21 | End: 2024-06-21 | Stop reason: HOSPADM

## 2024-06-21 RX ORDER — SODIUM CHLORIDE 0.9 % (FLUSH) 0.9 %
10 SYRINGE (ML) INJECTION EVERY 12 HOURS SCHEDULED
Status: CANCELLED | OUTPATIENT
Start: 2024-06-21

## 2024-06-21 RX ORDER — FENTANYL CITRATE 50 UG/ML
INJECTION, SOLUTION INTRAMUSCULAR; INTRAVENOUS AS NEEDED
Status: DISCONTINUED | OUTPATIENT
Start: 2024-06-21 | End: 2024-06-21 | Stop reason: SURG

## 2024-06-21 RX ORDER — PHENYLEPHRINE HCL IN 0.9% NACL 1 MG/10 ML
SYRINGE (ML) INTRAVENOUS AS NEEDED
Status: DISCONTINUED | OUTPATIENT
Start: 2024-06-21 | End: 2024-06-21 | Stop reason: SURG

## 2024-06-21 RX ORDER — BISACODYL 5 MG/1
5 TABLET, DELAYED RELEASE ORAL DAILY PRN
Status: DISCONTINUED | OUTPATIENT
Start: 2024-06-21 | End: 2024-06-26 | Stop reason: HOSPADM

## 2024-06-21 RX ORDER — ACETAMINOPHEN 650 MG/1
650 SUPPOSITORY RECTAL EVERY 4 HOURS PRN
Status: DISCONTINUED | OUTPATIENT
Start: 2024-06-21 | End: 2024-06-26 | Stop reason: HOSPADM

## 2024-06-21 RX ORDER — AMOXICILLIN 250 MG
2 CAPSULE ORAL 2 TIMES DAILY PRN
Status: DISCONTINUED | OUTPATIENT
Start: 2024-06-21 | End: 2024-06-26 | Stop reason: HOSPADM

## 2024-06-21 RX ORDER — SODIUM CHLORIDE 9 MG/ML
40 INJECTION, SOLUTION INTRAVENOUS AS NEEDED
Status: DISCONTINUED | OUTPATIENT
Start: 2024-06-21 | End: 2024-06-21 | Stop reason: HOSPADM

## 2024-06-21 RX ORDER — BISACODYL 10 MG
10 SUPPOSITORY, RECTAL RECTAL DAILY PRN
Status: DISCONTINUED | OUTPATIENT
Start: 2024-06-21 | End: 2024-06-26 | Stop reason: HOSPADM

## 2024-06-21 RX ORDER — SODIUM CHLORIDE 9 MG/ML
INJECTION, SOLUTION INTRAVENOUS AS NEEDED
Status: DISCONTINUED | OUTPATIENT
Start: 2024-06-21 | End: 2024-06-21 | Stop reason: HOSPADM

## 2024-06-21 RX ORDER — DROPERIDOL 2.5 MG/ML
0.62 INJECTION, SOLUTION INTRAMUSCULAR; INTRAVENOUS
Status: DISCONTINUED | OUTPATIENT
Start: 2024-06-21 | End: 2024-06-21 | Stop reason: HOSPADM

## 2024-06-21 RX ORDER — GLYCOPYRROLATE 0.2 MG/ML
INJECTION INTRAMUSCULAR; INTRAVENOUS AS NEEDED
Status: DISCONTINUED | OUTPATIENT
Start: 2024-06-21 | End: 2024-06-21 | Stop reason: SURG

## 2024-06-21 RX ORDER — DROPERIDOL 2.5 MG/ML
0.62 INJECTION, SOLUTION INTRAMUSCULAR; INTRAVENOUS ONCE AS NEEDED
Status: DISCONTINUED | OUTPATIENT
Start: 2024-06-21 | End: 2024-06-21 | Stop reason: HOSPADM

## 2024-06-21 RX ORDER — ALBUMIN, HUMAN INJ 5% 5 %
250 SOLUTION INTRAVENOUS ONCE
Status: COMPLETED | OUTPATIENT
Start: 2024-06-21 | End: 2024-06-21

## 2024-06-21 RX ORDER — SODIUM CHLORIDE, SODIUM LACTATE, POTASSIUM CHLORIDE, CALCIUM CHLORIDE 600; 310; 30; 20 MG/100ML; MG/100ML; MG/100ML; MG/100ML
INJECTION, SOLUTION INTRAVENOUS CONTINUOUS PRN
Status: DISCONTINUED | OUTPATIENT
Start: 2024-06-21 | End: 2024-06-21 | Stop reason: SURG

## 2024-06-21 RX ORDER — BISACODYL 10 MG
10 SUPPOSITORY, RECTAL RECTAL DAILY PRN
Status: CANCELLED | OUTPATIENT
Start: 2024-06-21

## 2024-06-21 RX ORDER — POLYETHYLENE GLYCOL 3350 17 G/17G
17 POWDER, FOR SOLUTION ORAL DAILY PRN
Status: DISCONTINUED | OUTPATIENT
Start: 2024-06-21 | End: 2024-06-26 | Stop reason: HOSPADM

## 2024-06-21 RX ORDER — PANTOPRAZOLE SODIUM 40 MG/10ML
40 INJECTION, POWDER, LYOPHILIZED, FOR SOLUTION INTRAVENOUS
Status: DISCONTINUED | OUTPATIENT
Start: 2024-06-22 | End: 2024-06-22

## 2024-06-21 RX ORDER — ACETAMINOPHEN 325 MG/1
650 TABLET ORAL EVERY 4 HOURS PRN
Status: DISCONTINUED | OUTPATIENT
Start: 2024-06-21 | End: 2024-06-26 | Stop reason: HOSPADM

## 2024-06-21 RX ORDER — LIDOCAINE HYDROCHLORIDE 10 MG/ML
0.5 INJECTION, SOLUTION EPIDURAL; INFILTRATION; INTRACAUDAL; PERINEURAL ONCE AS NEEDED
Status: COMPLETED | OUTPATIENT
Start: 2024-06-21 | End: 2024-06-21

## 2024-06-21 RX ORDER — MIDAZOLAM HYDROCHLORIDE 1 MG/ML
0.5 INJECTION INTRAMUSCULAR; INTRAVENOUS
Status: DISCONTINUED | OUTPATIENT
Start: 2024-06-21 | End: 2024-06-21 | Stop reason: HOSPADM

## 2024-06-21 RX ORDER — POLYETHYLENE GLYCOL 3350 17 G/17G
17 POWDER, FOR SOLUTION ORAL DAILY PRN
Status: CANCELLED | OUTPATIENT
Start: 2024-06-21

## 2024-06-21 RX ORDER — PROPOFOL 10 MG/ML
VIAL (ML) INTRAVENOUS AS NEEDED
Status: DISCONTINUED | OUTPATIENT
Start: 2024-06-21 | End: 2024-06-21 | Stop reason: SURG

## 2024-06-21 RX ORDER — DEXAMETHASONE SODIUM PHOSPHATE 4 MG/ML
INJECTION, SOLUTION INTRA-ARTICULAR; INTRALESIONAL; INTRAMUSCULAR; INTRAVENOUS; SOFT TISSUE AS NEEDED
Status: DISCONTINUED | OUTPATIENT
Start: 2024-06-21 | End: 2024-06-21 | Stop reason: SURG

## 2024-06-21 RX ORDER — SODIUM CHLORIDE 9 MG/ML
40 INJECTION, SOLUTION INTRAVENOUS AS NEEDED
Status: DISCONTINUED | OUTPATIENT
Start: 2024-06-21 | End: 2024-06-26 | Stop reason: HOSPADM

## 2024-06-21 RX ORDER — SODIUM CHLORIDE 0.9 % (FLUSH) 0.9 %
10 SYRINGE (ML) INJECTION EVERY 12 HOURS SCHEDULED
Status: DISCONTINUED | OUTPATIENT
Start: 2024-06-21 | End: 2024-06-26 | Stop reason: HOSPADM

## 2024-06-21 RX ORDER — SODIUM CHLORIDE 0.9 % (FLUSH) 0.9 %
3 SYRINGE (ML) INJECTION EVERY 12 HOURS SCHEDULED
Status: DISCONTINUED | OUTPATIENT
Start: 2024-06-21 | End: 2024-06-21 | Stop reason: HOSPADM

## 2024-06-21 RX ORDER — AMOXICILLIN 250 MG
2 CAPSULE ORAL 2 TIMES DAILY PRN
Status: CANCELLED | OUTPATIENT
Start: 2024-06-21

## 2024-06-21 RX ORDER — IODIXANOL 320 MG/ML
INJECTION, SOLUTION INTRAVASCULAR
Status: COMPLETED
Start: 2024-06-21 | End: 2024-06-21

## 2024-06-21 RX ORDER — NALOXONE HCL 0.4 MG/ML
0.4 VIAL (ML) INJECTION AS NEEDED
Status: DISCONTINUED | OUTPATIENT
Start: 2024-06-21 | End: 2024-06-21 | Stop reason: HOSPADM

## 2024-06-21 RX ORDER — NITROGLYCERIN 0.4 MG/1
0.4 TABLET SUBLINGUAL
Status: DISCONTINUED | OUTPATIENT
Start: 2024-06-21 | End: 2024-06-26 | Stop reason: HOSPADM

## 2024-06-21 RX ORDER — ALBUMIN, HUMAN INJ 5% 5 %
SOLUTION INTRAVENOUS
Status: COMPLETED
Start: 2024-06-21 | End: 2024-06-21

## 2024-06-21 RX ORDER — IPRATROPIUM BROMIDE AND ALBUTEROL SULFATE 2.5; .5 MG/3ML; MG/3ML
3 SOLUTION RESPIRATORY (INHALATION) ONCE AS NEEDED
Status: DISCONTINUED | OUTPATIENT
Start: 2024-06-21 | End: 2024-06-21 | Stop reason: HOSPADM

## 2024-06-21 RX ORDER — FAMOTIDINE 10 MG/ML
20 INJECTION, SOLUTION INTRAVENOUS ONCE
Status: CANCELLED | OUTPATIENT
Start: 2024-06-21 | End: 2024-06-21

## 2024-06-21 RX ORDER — TRANEXAMIC ACID 10 MG/ML
1000 INJECTION, SOLUTION INTRAVENOUS ONCE
Status: COMPLETED | OUTPATIENT
Start: 2024-06-21 | End: 2024-06-21

## 2024-06-21 RX ORDER — SODIUM CHLORIDE 0.9 % (FLUSH) 0.9 %
10 SYRINGE (ML) INJECTION AS NEEDED
Status: DISCONTINUED | OUTPATIENT
Start: 2024-06-21 | End: 2024-06-21 | Stop reason: HOSPADM

## 2024-06-21 RX ORDER — SODIUM CHLORIDE 0.9 % (FLUSH) 0.9 %
10 SYRINGE (ML) INJECTION AS NEEDED
Status: DISCONTINUED | OUTPATIENT
Start: 2024-06-21 | End: 2024-06-26 | Stop reason: HOSPADM

## 2024-06-21 RX ORDER — SODIUM CHLORIDE 0.9 % (FLUSH) 0.9 %
3-10 SYRINGE (ML) INJECTION AS NEEDED
Status: DISCONTINUED | OUTPATIENT
Start: 2024-06-21 | End: 2024-06-21 | Stop reason: HOSPADM

## 2024-06-21 RX ORDER — FENTANYL CITRATE 50 UG/ML
INJECTION, SOLUTION INTRAMUSCULAR; INTRAVENOUS
Status: DISCONTINUED
Start: 2024-06-21 | End: 2024-06-21 | Stop reason: WASHOUT

## 2024-06-21 RX ORDER — MAGNESIUM HYDROXIDE 1200 MG/15ML
LIQUID ORAL AS NEEDED
Status: DISCONTINUED | OUTPATIENT
Start: 2024-06-21 | End: 2024-06-21 | Stop reason: HOSPADM

## 2024-06-21 RX ORDER — ONDANSETRON 2 MG/ML
4 INJECTION INTRAMUSCULAR; INTRAVENOUS EVERY 6 HOURS PRN
Status: CANCELLED | OUTPATIENT
Start: 2024-06-21

## 2024-06-21 RX ORDER — ONDANSETRON 4 MG/1
4 TABLET, ORALLY DISINTEGRATING ORAL EVERY 6 HOURS PRN
Status: CANCELLED | OUTPATIENT
Start: 2024-06-21

## 2024-06-21 RX ORDER — EPHEDRINE SULFATE 50 MG/ML
INJECTION, SOLUTION INTRAVENOUS AS NEEDED
Status: DISCONTINUED | OUTPATIENT
Start: 2024-06-21 | End: 2024-06-21 | Stop reason: SURG

## 2024-06-21 RX ORDER — LEVOTHYROXINE SODIUM 88 UG/1
88 TABLET ORAL
Status: DISCONTINUED | OUTPATIENT
Start: 2024-06-22 | End: 2024-06-26 | Stop reason: HOSPADM

## 2024-06-21 RX ORDER — PHENYLEPHRINE HYDROCHLORIDE 10 MG/ML
INJECTION INTRAVENOUS AS NEEDED
Status: DISCONTINUED | OUTPATIENT
Start: 2024-06-21 | End: 2024-06-21 | Stop reason: SURG

## 2024-06-21 RX ORDER — ALBUMIN, HUMAN INJ 5% 5 %
25 SOLUTION INTRAVENOUS ONCE
Status: COMPLETED | OUTPATIENT
Start: 2024-06-21 | End: 2024-06-21

## 2024-06-21 RX ORDER — METHENAMINE HIPPURATE 1000 MG/1
1 TABLET ORAL 2 TIMES DAILY WITH MEALS
Status: CANCELLED | OUTPATIENT
Start: 2024-06-21

## 2024-06-21 RX ORDER — FERROUS SULFATE 325(65) MG
325 TABLET ORAL
Status: CANCELLED | OUTPATIENT
Start: 2024-06-21

## 2024-06-21 RX ORDER — ACETAMINOPHEN 325 MG/1
650 TABLET ORAL EVERY 4 HOURS PRN
Status: CANCELLED | OUTPATIENT
Start: 2024-06-21

## 2024-06-21 RX ORDER — LIDOCAINE HYDROCHLORIDE 10 MG/ML
INJECTION, SOLUTION EPIDURAL; INFILTRATION; INTRACAUDAL; PERINEURAL
Status: COMPLETED
Start: 2024-06-21 | End: 2024-06-21

## 2024-06-21 RX ORDER — AMLODIPINE BESYLATE 2.5 MG/1
2.5 TABLET ORAL DAILY
Status: CANCELLED | OUTPATIENT
Start: 2024-06-21

## 2024-06-21 RX ORDER — SODIUM CHLORIDE 9 MG/ML
40 INJECTION, SOLUTION INTRAVENOUS AS NEEDED
Status: CANCELLED | OUTPATIENT
Start: 2024-06-21

## 2024-06-21 RX ORDER — LEVOTHYROXINE SODIUM 88 UG/1
88 TABLET ORAL DAILY
Status: CANCELLED | OUTPATIENT
Start: 2024-06-21

## 2024-06-21 RX ORDER — SODIUM CHLORIDE 9 MG/ML
50 INJECTION, SOLUTION INTRAVENOUS CONTINUOUS
Status: DISCONTINUED | OUTPATIENT
Start: 2024-06-21 | End: 2024-06-25

## 2024-06-21 RX ORDER — NOREPINEPHRINE BITARTRATE 0.03 MG/ML
.02-.3 INJECTION, SOLUTION INTRAVENOUS
Status: DISCONTINUED | OUTPATIENT
Start: 2024-06-21 | End: 2024-06-23

## 2024-06-21 RX ORDER — HEPARIN SODIUM 200 [USP'U]/100ML
INJECTION, SOLUTION INTRAVENOUS
Status: DISPENSED
Start: 2024-06-21 | End: 2024-06-21

## 2024-06-21 RX ORDER — SODIUM CHLORIDE, SODIUM LACTATE, POTASSIUM CHLORIDE, CALCIUM CHLORIDE 600; 310; 30; 20 MG/100ML; MG/100ML; MG/100ML; MG/100ML
9 INJECTION, SOLUTION INTRAVENOUS CONTINUOUS
Status: DISCONTINUED | OUTPATIENT
Start: 2024-06-21 | End: 2024-06-21

## 2024-06-21 RX ORDER — ONDANSETRON 2 MG/ML
4 INJECTION INTRAMUSCULAR; INTRAVENOUS EVERY 6 HOURS PRN
Status: DISCONTINUED | OUTPATIENT
Start: 2024-06-21 | End: 2024-06-26 | Stop reason: HOSPADM

## 2024-06-21 RX ORDER — BISACODYL 5 MG/1
5 TABLET, DELAYED RELEASE ORAL DAILY PRN
Status: CANCELLED | OUTPATIENT
Start: 2024-06-21

## 2024-06-21 RX ORDER — FINASTERIDE 5 MG/1
5 TABLET, FILM COATED ORAL DAILY
Status: CANCELLED | OUTPATIENT
Start: 2024-06-21

## 2024-06-21 RX ORDER — ACETAMINOPHEN 160 MG/5ML
650 SOLUTION ORAL EVERY 4 HOURS PRN
Status: DISCONTINUED | OUTPATIENT
Start: 2024-06-21 | End: 2024-06-26 | Stop reason: HOSPADM

## 2024-06-21 RX ADMIN — GLYCOPYRROLATE 0.2 MCG: 0.2 INJECTION INTRAMUSCULAR; INTRAVENOUS at 07:54

## 2024-06-21 RX ADMIN — IODIXANOL 100 ML: 320 INJECTION, SOLUTION INTRAVASCULAR at 13:35

## 2024-06-21 RX ADMIN — ALBUMIN, HUMAN INJ 5% 250 ML: 5 SOLUTION at 16:25

## 2024-06-21 RX ADMIN — SODIUM CHLORIDE, POTASSIUM CHLORIDE, SODIUM LACTATE AND CALCIUM CHLORIDE 9 ML/HR: 600; 310; 30; 20 INJECTION, SOLUTION INTRAVENOUS at 07:16

## 2024-06-21 RX ADMIN — FAMOTIDINE 20 MG: 20 TABLET, FILM COATED ORAL at 07:16

## 2024-06-21 RX ADMIN — PROPOFOL 100 MG: 10 INJECTION, EMULSION INTRAVENOUS at 12:09

## 2024-06-21 RX ADMIN — LIDOCAINE HYDROCHLORIDE 3 ML: 10 INJECTION, SOLUTION EPIDURAL; INFILTRATION; INTRACAUDAL; PERINEURAL at 13:36

## 2024-06-21 RX ADMIN — ROCURONIUM BROMIDE 20 MG: 10 INJECTION INTRAVENOUS at 14:17

## 2024-06-21 RX ADMIN — PHENYLEPHRINE HYDROCHLORIDE 0.2 MCG/KG/MIN: 10 INJECTION INTRAVENOUS at 10:26

## 2024-06-21 RX ADMIN — PHENYLEPHRINE HYDROCHLORIDE 100 MCG: 10 INJECTION INTRAVENOUS at 10:14

## 2024-06-21 RX ADMIN — EPHEDRINE SULFATE 10 MG: 50 INJECTION INTRAVENOUS at 07:46

## 2024-06-21 RX ADMIN — PHENYLEPHRINE HYDROCHLORIDE 100 MCG: 10 INJECTION INTRAVENOUS at 10:16

## 2024-06-21 RX ADMIN — FENTANYL CITRATE 100 MCG: 50 INJECTION, SOLUTION INTRAMUSCULAR; INTRAVENOUS at 14:51

## 2024-06-21 RX ADMIN — SUGAMMADEX 200 MG: 100 INJECTION, SOLUTION INTRAVENOUS at 10:45

## 2024-06-21 RX ADMIN — ROCURONIUM BROMIDE 100 MG: 10 INJECTION INTRAVENOUS at 12:09

## 2024-06-21 RX ADMIN — CEFAZOLIN 1 G: 1 INJECTION, POWDER, FOR SOLUTION INTRAMUSCULAR; INTRAVENOUS at 12:12

## 2024-06-21 RX ADMIN — PROPOFOL 50 MG: 10 INJECTION, EMULSION INTRAVENOUS at 07:55

## 2024-06-21 RX ADMIN — Medication 10 ML: at 20:26

## 2024-06-21 RX ADMIN — SUGAMMADEX 500 MG: 100 INJECTION, SOLUTION INTRAVENOUS at 15:27

## 2024-06-21 RX ADMIN — ALBUMIN, HUMAN INJ 5% 25 G: 5 SOLUTION at 11:32

## 2024-06-21 RX ADMIN — SODIUM CHLORIDE 2000 MG: 900 INJECTION INTRAVENOUS at 07:31

## 2024-06-21 RX ADMIN — SODIUM CHLORIDE, POTASSIUM CHLORIDE, SODIUM LACTATE AND CALCIUM CHLORIDE: 600; 310; 30; 20 INJECTION, SOLUTION INTRAVENOUS at 10:05

## 2024-06-21 RX ADMIN — FENTANYL CITRATE 50 MCG: 50 INJECTION, SOLUTION INTRAMUSCULAR; INTRAVENOUS at 07:43

## 2024-06-21 RX ADMIN — ROCURONIUM BROMIDE 10 MG: 10 INJECTION INTRAVENOUS at 07:50

## 2024-06-21 RX ADMIN — PHENYLEPHRINE HYDROCHLORIDE 100 MCG: 10 INJECTION INTRAVENOUS at 10:26

## 2024-06-21 RX ADMIN — FENTANYL CITRATE 50 MCG: 50 INJECTION, SOLUTION INTRAMUSCULAR; INTRAVENOUS at 07:35

## 2024-06-21 RX ADMIN — Medication 200 MCG: at 15:13

## 2024-06-21 RX ADMIN — TRANEXAMIC ACID 1000 MG: 10 INJECTION, SOLUTION INTRAVENOUS at 09:11

## 2024-06-21 RX ADMIN — ROCURONIUM BROMIDE 10 MG: 10 INJECTION INTRAVENOUS at 09:50

## 2024-06-21 RX ADMIN — LIDOCAINE HYDROCHLORIDE 50 MG: 10 INJECTION, SOLUTION EPIDURAL; INFILTRATION; INTRACAUDAL; PERINEURAL at 12:09

## 2024-06-21 RX ADMIN — SODIUM CHLORIDE, POTASSIUM CHLORIDE, SODIUM LACTATE AND CALCIUM CHLORIDE: 600; 310; 30; 20 INJECTION, SOLUTION INTRAVENOUS at 14:27

## 2024-06-21 RX ADMIN — Medication 100 MCG: at 07:40

## 2024-06-21 RX ADMIN — FENTANYL CITRATE 50 MCG: 50 INJECTION, SOLUTION INTRAMUSCULAR; INTRAVENOUS at 08:52

## 2024-06-21 RX ADMIN — ROCURONIUM BROMIDE 10 MG: 10 INJECTION INTRAVENOUS at 08:54

## 2024-06-21 RX ADMIN — PROPOFOL 150 MG: 10 INJECTION, EMULSION INTRAVENOUS at 07:35

## 2024-06-21 RX ADMIN — NOREPINEPHRINE BITARTRATE 0.02 MCG/KG/MIN: 0.03 INJECTION, SOLUTION INTRAVENOUS at 20:22

## 2024-06-21 RX ADMIN — ONDANSETRON 4 MG: 2 INJECTION INTRAMUSCULAR; INTRAVENOUS at 15:14

## 2024-06-21 RX ADMIN — ALBUMIN (HUMAN) 25 G: 12.5 INJECTION, SOLUTION INTRAVENOUS at 11:32

## 2024-06-21 RX ADMIN — PHENYLEPHRINE HYDROCHLORIDE 0.2 MCG/KG/MIN: 10 INJECTION INTRAVENOUS at 12:13

## 2024-06-21 RX ADMIN — ROCURONIUM BROMIDE 30 MG: 10 SOLUTION INTRAVENOUS at 14:37

## 2024-06-21 RX ADMIN — LIDOCAINE HYDROCHLORIDE 50 MG: 10 INJECTION, SOLUTION EPIDURAL; INFILTRATION; INTRACAUDAL; PERINEURAL at 07:35

## 2024-06-21 RX ADMIN — ALBUMIN (HUMAN) 250 ML: 12.5 INJECTION, SOLUTION INTRAVENOUS at 16:25

## 2024-06-21 RX ADMIN — DEXAMETHASONE SODIUM PHOSPHATE 4 MG: 4 INJECTION INTRA-ARTICULAR; INTRALESIONAL; INTRAMUSCULAR; INTRAVENOUS; SOFT TISSUE at 15:01

## 2024-06-21 RX ADMIN — ONDANSETRON 4 MG: 2 INJECTION INTRAMUSCULAR; INTRAVENOUS at 10:45

## 2024-06-21 RX ADMIN — LIDOCAINE HYDROCHLORIDE 0.5 ML: 10 INJECTION, SOLUTION EPIDURAL; INFILTRATION; INTRACAUDAL; PERINEURAL at 07:15

## 2024-06-21 RX ADMIN — GENTAMICIN SULFATE 360 MG: 40 INJECTION, SOLUTION INTRAMUSCULAR; INTRAVENOUS at 07:40

## 2024-06-21 RX ADMIN — CEFAZOLIN 2 G: 1 INJECTION, POWDER, FOR SOLUTION INTRAMUSCULAR; INTRAVENOUS at 14:43

## 2024-06-21 RX ADMIN — SODIUM CHLORIDE, POTASSIUM CHLORIDE, SODIUM LACTATE AND CALCIUM CHLORIDE: 600; 310; 30; 20 INJECTION, SOLUTION INTRAVENOUS at 14:37

## 2024-06-21 RX ADMIN — EPHEDRINE SULFATE 10 MG: 50 INJECTION INTRAVENOUS at 15:05

## 2024-06-21 RX ADMIN — Medication 100 MCG: at 12:09

## 2024-06-21 RX ADMIN — SODIUM CHLORIDE 50 ML/HR: 900 INJECTION, SOLUTION INTRAVENOUS at 18:40

## 2024-06-21 RX ADMIN — FENTANYL CITRATE 50 MCG: 50 INJECTION, SOLUTION INTRAMUSCULAR; INTRAVENOUS at 08:41

## 2024-06-21 NOTE — NURSING NOTE
Prostate artery embolization completed per Dr Christine. See anesthesia charting for vital signs and medication administrations.

## 2024-06-21 NOTE — ANESTHESIA PREPROCEDURE EVALUATION
Anesthesia Evaluation     Patient summary reviewed and Nursing notes reviewed   history of anesthetic complications (PATIENT STATES HE HAS HAD AN ENLARGED VOCAL CORD AND A VOICE CHANGE SINCE HE WAS INTUBATED  FOR BACK SURGERY YEARS AGO):  PONV               Airway   Mallampati: II  TM distance: >3 FB  Neck ROM: full  No difficulty expected  Dental - normal exam     Comment: ONLY ONE TOOTH, NOT LOOSE    Pulmonary - normal exam   (+) a smoker Current,  Cardiovascular - normal exam    (+) hypertension, hyperlipidemia      Neuro/Psych- negative ROS  GI/Hepatic/Renal/Endo    (+) GERD, renal disease- CRI, thyroid problem hypothyroidism    Musculoskeletal     Abdominal  - normal exam    Bowel sounds: normal.   Substance History - negative use     OB/GYN negative ob/gyn ROS         Other   arthritis (RHEUMATOID),   Chronic steroid use: Umaña.  ROS/Med Hx Other: Now for IR embolisation of bleed  Will use Umaña (vocal cord damage ?)                 Anesthesia Plan    ASA 3     general     intravenous induction     Anesthetic plan, risks, benefits, and alternatives have been provided, discussed and informed consent has been obtained with: patient.    Plan discussed with CRNA.    CODE STATUS:

## 2024-06-21 NOTE — ANESTHESIA PROCEDURE NOTES
Airway  Urgency: elective    Date/Time: 6/21/2024 7:36 AM  Airway not difficult    General Information and Staff    Patient location during procedure: OR  CRNA/CAA: Pam Garcia CRNA    Indications and Patient Condition  Indications for airway management: airway protection    Preoxygenated: yes  Mask difficulty assessment: 1 - vent by mask    Final Airway Details  Final airway type: supraglottic airway      Successful airway: I-gel  Size 4     Number of attempts at approach: 1  Assessment: lips, teeth, and gum same as pre-op    Additional Comments  LMA placed without difficulty, ventilation with assist, equal breath sounds and symmetric chest rise and fall

## 2024-06-21 NOTE — H&P
"Pre-Op H&P  Nestor Nye  [id number]  1945    Chief complaint: \" I am here for surgery on my prostate by Dr. Rey.\"    HPI:    Patient is a 78 y.o.male who presents with BPH with urinary obstruction.  He presents today for scheduled surgery and anticipates aqua ablation of prostate.  Patient reports he has had an indwelling catheter for multiple years.  He denies taking any anticoagulant or antiplatelet medications.  Patient reports he experiences urinary retention and lower urinary tract symptoms.  Patient was diagnosed with a large prostate over 140 cc.  Cystoscopy in the past showed very large occlusive lateral lobes.  He denies any symptomatic changes or recent illnesses since last office visit with Dr. Rey.  Patient reports he was prescribed a course of Bactrim and states he completed his last dose yesterday 6/20/2024.    Review of Systems:  General ROS: negative for chills, fever or skin lesions;  No changes since last office visit.  Neg for recent sick exposure  Cardiovascular ROS: no chest pain or dyspnea on exertion  Respiratory ROS: no cough, shortness of breath, or wheezing    Allergies: Denies allergy to latex or contrast dye.No Known Allergies    Home Meds:    No current facility-administered medications on file prior to encounter.     Current Outpatient Medications on File Prior to Encounter   Medication Sig Dispense Refill    Alcohol Swabs (ALCOHOL WIPES) 70 % pads 1 pad 3 (Three) Times a Day. (Patient taking differently: Use 1 pad 3 (Three) Times a Day. Cleans around stacy catheter with them.) 100 each 11    amLODIPine (Norvasc) 2.5 MG tablet Take 1 tablet by mouth Daily. 90 tablet 1    baclofen (LIORESAL) 10 MG tablet Take 1 tablet by mouth At Night As Needed for Muscle Spasms. (Patient not taking: Reported on 4/22/2024) 30 tablet 3    Diclofenac Sodium (VOLTAREN) 1 % gel gel Apply 4 g topically to the appropriate area as directed 4 (Four) Times a Day As Needed (joint pain). 100 g 5 "    ferrous sulfate 325 (65 FE) MG tablet Take 1 tablet by mouth Daily With Breakfast.      levothyroxine (SYNTHROID, LEVOTHROID) 88 MCG tablet Take 1 tablet by mouth Daily. 90 tablet 2    melatonin 5 MG tablet tablet Take 1 tablet by mouth. (Patient not taking: Reported on 4/22/2024)      sulfamethoxazole-trimethoprim (Bactrim DS) 800-160 MG per tablet Take 1 tablet by mouth Daily. Start 5 days before surgery (Patient not taking: Reported on 4/22/2024) 5 tablet 0       PMH:   Past Medical History:   Diagnosis Date    Acute maxillary sinusitis     Arthritis     Disease of thyroid gland     Enlarged prostate     GERD (gastroesophageal reflux disease)     Gout     Hypertension     Impaired functional mobility, balance, gait, and endurance     PONV (postoperative nausea and vomiting)     Renal disorder     Rheumatoid arthritis     Right inguinal hernia 10/28/2021    Added automatically from request for surgery [id number]    Vocal cord anomaly 2007    had problem with enlarge vocal cord after low back surgery, changed his voice since then     PSH:    Past Surgical History:   Procedure Laterality Date    ABDOMINAL SURGERY      BACK SURGERY      BELPHAROPTOSIS REPAIR Bilateral     BRAIN SURGERY      COLON SURGERY      bowel resection x2 per pt    CRANIOTOMY FOR SUBDURAL HEMATOMA      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    DIAGNOSTIC LAPAROSCOPY N/A 05/04/2022    Procedure: DIAGNOSTIC LAPAROSCOPY CONVERTED TO EXPLORATORY LAPAROTOMY WITH SMALL BOWEL RESECTION;  Surgeon: Maximo Bennett MD;  Location: [facility] OR;  Service: General;  Laterality: N/A;  CONVERTED TO EXPLORATORY LAPAROTOMY AT 1912    EXPLORATORY LAPAROTOMY N/A 04/16/2022    Procedure: LAPAROTOMY EXPLORATORY WITH SMALL BOWEL OBSTRUCTION;  Surgeon: Maximo Bennett MD;  Location: [facility] OR;  Service: General;  Laterality: N/A;    EYE SURGERY      on upper etelids    FOREIGN BODY REMOVAL Right     removal of metal fragments from RUQ after  electrocution and fall.      HERNIA REPAIR      INGUINAL HERNIA REPAIR Right 11/01/2021    Procedure: INGUINAL HERNIA REPAIR WITH MESH AND EXCISION OF SPERMATIC CORD LIPOMA;  Surgeon: Rosey Glover MD;  Location: [facility];  Service: General;  Laterality: Right;    LUMBAR SPINE SURGERY      SKIN GRAFT Right     RUE skin grafting after electrocution    THORACIC SPINE SURGERY         Immunization History:  Influenza: Yes  Pneumococcal: Yes  Tetanus: Yes    Social History:   Tobacco:   Social History     Tobacco Use   Smoking Status Every Day    Types: Cigars   Smokeless Tobacco Never   Tobacco Comments    Smoked 1 ppd of small cigars for 50  years       Alcohol:     Social History     Substance and Sexual Activity   Alcohol Use Not Currently       Vitals:           There were no vitals taken for this visit.    Physical Exam:  General Appearance:    Alert, cooperative, no distress, appears stated age   Head:    Normocephalic, without obvious abnormality, atraumatic   Lungs:     Decreased breath sounds bilaterally upon auscultation.    Heart:   Regular rate and rhythm, S1 and S2 normal, no murmur, rub    or gallop    Abdomen:    Soft, nontender.  +bowel sounds   Breast Exam:    deferred   Genitalia:    deferred   Extremities:   Extremities normal, atraumatic, no cyanosis or edema   Skin: Patient has a well-healed scratch on his left lower extremity.  Additionally he has small ulcers on his bilateral lower extremities.  No signs of erythema, drainage, or infection.   Neurologic:   Grossly intact   Results Review  LABS:  Lab Results   Component Value Date    WBC 7.61 06/07/2024    HGB 15.5 06/07/2024    HCT 46.7 06/07/2024    MCV 97.3 (H) 06/07/2024     06/07/2024    NEUTROABS 3.95 11/17/2023    GLUCOSE 85 11/17/2023    BUN 22 11/17/2023    CREATININE 1.46 (H) 11/17/2023    EGFRIFNONA 35 (L) 09/14/2021    EGFRIFAFRI 43 (L) 09/25/2017     11/17/2023    K 4.7 06/07/2024     11/17/2023    CO2 21.8 (L)  11/17/2023    MG 2.0 10/31/2016    PHOS 3.7 07/18/2018    CALCIUM 9.1 11/17/2023    ALBUMIN 4.2 11/17/2023    AST 17 11/17/2023    ALT 18 11/17/2023    BILITOT 0.7 11/17/2023       RADIOLOGY:  No radiology results for the last 3 days     I reviewed the patient's new clinical results.    Cancer Staging (if applicable)  Cancer Patient: __ yes __no __unknown; If yes, clinical stage T:__ N:__M:__, stage group or __N/A    Impression: Patient presents with BPH with urinary obstruction.    Plan: Dr. Rey will perform ablation of prostate.      Victoriano Murphy PA-C   06/21/24   6:49 AM EDT

## 2024-06-21 NOTE — PRE-SEDATION DOCUMENTATION
[facility]   Vascular Interventional Radiology  History & Physicial        Patient Name:Nestor Nye    : 1945    MRN: [id number]    Primary Care Physician: Roxanne Slater MD    Referring Physician: Prieto Rey MD     Date of admission: 2024    Subjective     Reason for Consult: Post prostatectomy (Aqua-ablation) hematuria.    History of Present Illness     Nestor Nye is a 78 y.o. male referred to IR as noted above.      Active Hospital Problems:  Active Hospital Problems    Diagnosis     **BPH with urinary obstruction     BPH with obstruction/lower urinary tract symptoms     Urinary retention        Personal History     Past Medical History:   Diagnosis Date    Acute maxillary sinusitis     Arthritis     Disease of thyroid gland     Enlarged prostate     GERD (gastroesophageal reflux disease)     Gout     Hypertension     Impaired functional mobility, balance, gait, and endurance     PONV (postoperative nausea and vomiting)     Renal disorder     Rheumatoid arthritis     Right inguinal hernia 10/28/2021    Added automatically from request for surgery [id number]    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, changed his voice since then       Past Surgical History:   Procedure Laterality Date    ABDOMINAL SURGERY      BACK SURGERY      BELPHAROPTOSIS REPAIR Bilateral     BRAIN SURGERY      COLON SURGERY      bowel resection x2 per pt    CRANIOTOMY FOR SUBDURAL HEMATOMA      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    DIAGNOSTIC LAPAROSCOPY N/A 2022    Procedure: DIAGNOSTIC LAPAROSCOPY CONVERTED TO EXPLORATORY LAPAROTOMY WITH SMALL BOWEL RESECTION;  Surgeon: Maximo Bennett MD;  Location: [facility];  Service: General;  Laterality: N/A;  CONVERTED TO EXPLORATORY LAPAROTOMY AT     EXPLORATORY LAPAROTOMY N/A 2022    Procedure: LAPAROTOMY EXPLORATORY WITH SMALL BOWEL OBSTRUCTION;  Surgeon: Donald  "Maximo LOPEZ MD;  Location: [facility] OR;  Service: General;  Laterality: N/A;    EYE SURGERY      on upper etelids    FOREIGN BODY REMOVAL Right     removal of metal fragments from RUQ after electrocution and fall.      HERNIA REPAIR      INGUINAL HERNIA REPAIR Right 11/01/2021    Procedure: INGUINAL HERNIA REPAIR WITH MESH AND EXCISION OF SPERMATIC CORD LIPOMA;  Surgeon: Rosey Glover MD;  Location: [facility] OR;  Service: General;  Laterality: Right;    LUMBAR SPINE SURGERY      SKIN GRAFT Right     RUE skin grafting after electrocution    THORACIC SPINE SURGERY         Family History: His family history includes Arthritis in his father and mother; Prostate cancer in his brother.     Social History: He  reports that he has been smoking cigars. He has never used smokeless tobacco. He reports that he does not currently use alcohol. He reports that he does not use drugs.    Home Medications:  Alcohol Wipes, Diclofenac Sodium, amLODIPine, baclofen, ferrous sulfate, finasteride, levothyroxine, melatonin, methenamine, and sulfamethoxazole-trimethoprim    Current Medications:    droperidol **OR** droperidol    fentanyl    HYDROmorphone    lactated ringers    lactated ringers     Allergies:  No Known Allergies    Review of Systems    IR Procedure pertinent significant findings are mentioned in the PMH and PSH above.    Objective     Visit Vitals  /70   Pulse 95   Temp 97 °F (36.1 °C)   Resp 20   Ht 170.2 cm (67.01\")   Wt 76.7 kg (169 lb 1.5 oz)   SpO2 96%   BMI 26.48 kg/m²        Physical Exam    Intubated/assisted ventilation (ambu bag) when I saw him being wheeled to the angio suite.  Poor R pedal pulses on Doppler.    Result Review      I have personally reviewed the results from the time of this admission to 6/21/2024 11:10 EDT and agree with these findings.  [x]  Laboratory  []  Microbiology  [x]  Radiology  []  EKG/Telemetry   []  Cardiology/Vascular   []  Pathology  []  Old records  []  Other:    Most notable " "findings include: As noted:      6/7/24: hb 15.5; plt 254; K 4.7  11/17/23: Scr 1.46; eGFR 49.2                CrCl cannot be calculated (Patient's most recent lab result is older than the maximum 30 days allowed.). No results found for: \"CREATININE\"    COVID19   Date Value Ref Range Status   05/04/2022 Not Detected Not Detected - Ref. Range Final        No results found for: \"PREGTESTUR\", \"PREGSERUM\", \"HCG\", \"HCGQUANT\"     ASA SCALE ASSESSMENT (applicable ONLY if sedation planned):   ga     MALLAMPATI CLASSIFICATION (applicable ONLY if sedation planned):   ga    Assessment / Plan     Nestor Nye is a 78 y.o. male referred to the IR service with above problem.    Plan:   As above.    I spoke with the patient's brother Wesley over the phone. He is aware and agreed to proceed with this life saving procedure.    Notice: The note was created before the performance of the procedure. It might have been left in the pending status and signed off after the procedure. The time stamp on the note may be misleading.    Patrick Christine MD   Vascular Interventional Radiology  06/21/24   11:10 AM EDT     "

## 2024-06-21 NOTE — ANESTHESIA PROCEDURE NOTES
Airway  Urgency: elective    Date/Time: 6/21/2024 12:10 PM  Airway not difficult    General Information and Staff    Patient location during procedure: OR  CRNA/CAA: Pam Garcia CRNA    Indications and Patient Condition  Indications for airway management: airway protection    Preoxygenated: yes  MILS not maintained throughout  Mask difficulty assessment: 0 - not attempted    Final Airway Details  Final airway type: endotracheal airway      Successful airway: ETT  Cuffed: yes   Successful intubation technique: video laryngoscopy  Endotracheal tube insertion site: oral  Blade: Rodriguez  Blade size: 4  ETT size (mm): 7.0  Cormack-Lehane Classification: grade I - full view of glottis  Placement verified by: chest auscultation and capnometry   Measured from: lips  ETT/EBT  to lips (cm): 21  Number of attempts at approach: 1  Assessment: lips, teeth, and gum same as pre-op and atraumatic intubation    Additional Comments  Negative epigastric sounds, Breath sound equal bilaterally with symmetric chest rise and fa

## 2024-06-21 NOTE — ANESTHESIA POSTPROCEDURE EVALUATION
Patient: Nestor Nye    Procedure Summary       Date: 06/21/24 Room / Location: [facility] INTERVENTIONAL RADIOLOGY    Anesthesia Start: 1205 Anesthesia Stop: 1433    Procedure: IR ARTERY EMBOLIZATION OCCLUSION Diagnosis: (prostate artery embolization)    Scheduled Providers:  Provider: James Ramirez MD    Anesthesia Type: Not recorded ASA Status: Not recorded            Anesthesia Type: No value filed.    Vitals  No vitals data found for the desired time range.          Post Anesthesia Care and Evaluation    Patient location: OR.  Patient participation: complete - patient cannot participate  Post-procedure mental status: sedated.  Pain management: adequate    Airway patency: patent  Anesthetic complications: No anesthetic complications  PONV Status: none  Cardiovascular status: hemodynamically stable and acceptable  Respiratory status: acceptable, ventilator and ETT  Hydration status: acceptable    Comments: Patient transferred to OR 1 for Vascular procedure with Alexandra. Handoff given to Ever Fink CRNA

## 2024-06-21 NOTE — NURSING NOTE
Anesthesia personnel at bedside for procedure. Please see their charting for vital signs and medication administration.    Has compression stockings

## 2024-06-21 NOTE — H&P
[facility] Medicine Services  HISTORY AND PHYSICAL    Patient Name: Nestor Nye  : 1945  MRN: [id number]  Primary Care Physician: Roxanne Slater MD  Date of admission: 2024      Subjective   Subjective     Chief Complaint:  S/p difficult aquablation    HPI:  Nestor Nye is a 78 y.o. male with h/o RA, HTN, CKD, BPH aquablation for BPH today that was a difficult case and took longer than usual so Dr. Rey/urology asked hospitalists to admit for observation.  Plans for IR to perform prostate RV embolization.  Patient seen in PACU who awake and states that feels ok, breathing ok.  SBP was in the 90's while I saw patient, anesthesia/nurses states that his BP was ok in OR.  Patient states that BP does sometimes run low.       Personal History     Past Medical History:   Diagnosis Date    Acute maxillary sinusitis     Arthritis     Disease of thyroid gland     Enlarged prostate     GERD (gastroesophageal reflux disease)     Gout     Hypertension     Impaired functional mobility, balance, gait, and endurance     PONV (postoperative nausea and vomiting)     Renal disorder     Rheumatoid arthritis     Right inguinal hernia 10/28/2021    Added automatically from request for surgery [id number]    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, changed his voice since then           Past Surgical History:   Procedure Laterality Date    ABDOMINAL SURGERY      BACK SURGERY      BELPHAROPTOSIS REPAIR Bilateral     BRAIN SURGERY      COLON SURGERY      bowel resection x2 per pt    CRANIOTOMY FOR SUBDURAL HEMATOMA      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    DIAGNOSTIC LAPAROSCOPY N/A 2022    Procedure: DIAGNOSTIC LAPAROSCOPY CONVERTED TO EXPLORATORY LAPAROTOMY WITH SMALL BOWEL RESECTION;  Surgeon: Maximo Bennett MD;  Location: [facility];  Service: General;  Laterality: N/A;  CONVERTED TO EXPLORATORY  LAPAROTOMY AT 1912    EXPLORATORY LAPAROTOMY N/A 04/16/2022    Procedure: LAPAROTOMY EXPLORATORY WITH SMALL BOWEL OBSTRUCTION;  Surgeon: Maximo Bennett MD;  Location: [facility];  Service: General;  Laterality: N/A;    EYE SURGERY      on upper etelids    FOREIGN BODY REMOVAL Right     removal of metal fragments from RUQ after electrocution and fall.      HERNIA REPAIR      INGUINAL HERNIA REPAIR Right 11/01/2021    Procedure: INGUINAL HERNIA REPAIR WITH MESH AND EXCISION OF SPERMATIC CORD LIPOMA;  Surgeon: Rosey Glover MD;  Location: [facility] OR;  Service: General;  Laterality: Right;    LUMBAR SPINE SURGERY      SKIN GRAFT Right     RUE skin grafting after electrocution    THORACIC SPINE SURGERY         Family History: family history includes Arthritis in his father and mother; Prostate cancer in his brother.     Social History:  reports that he has been smoking cigars. He has never used smokeless tobacco. He reports that he does not currently use alcohol. He reports that he does not use drugs.  Social History     Social History Narrative    Not on file       Medications:  Available home medication information reviewed.  Alcohol Wipes, Diclofenac Sodium, amLODIPine, baclofen, ferrous sulfate, finasteride, levothyroxine, melatonin, methenamine, and sulfamethoxazole-trimethoprim    No Known Allergies    Objective   Objective     Vital Signs:   Temp:  [97 °F (36.1 °C)] 97 °F (36.1 °C)  Heart Rate:  [] 92  Resp:  [12-20] 16  BP: ()/(61-90) 105/75  Flow (L/min):  [2] 2       Physical Exam   Constitutional: awake, anesthesia at bedside placing art line  Eyes: PERRLA, sclerae anicteric, no conjunctival injection  HENT: NCAT, mucous membranes moist  Neck: Supple, no thyromegaly, no lymphadenopathy, trachea midline  Respiratory: Clear to auscultation bilaterally, nonlabored respirations   Cardiovascular: RRR, no murmurs, rubs, or gallops, palpable pedal pulses bilaterally  Gastrointestinal: Positive bowel  sounds, soft, nontender, nondistended, +stacy with bright red blood in bag  Musculoskeletal: No bilateral ankle edema, no clubbing or cyanosis to extremities  Psychiatric: Appropriate affect, cooperative  Neurologic: Oriented x 3, strength symmetric in all extremities, Cranial Nerves grossly intact to confrontation, speech clear  Skin: No rashes      Result Review:  I have personally reviewed the results from the time of this admission to 6/21/2024 13:33 EDT and agree with these findings:  [x]  Laboratory list / accordion  [x]  Microbiology  [x]  Radiology  [x]  EKG/Telemetry   []  Cardiology/Vascular   []  Pathology  []  Old records  []  Other:  Most notable findings include:       LAB RESULTS:      Lab 06/21/24  1202   WBC 10.05   HEMOGLOBIN 12.5*   HEMATOCRIT 39.3   PLATELETS 227   NEUTROS ABS 7.41*   IMMATURE GRANS (ABS) 0.04   LYMPHS ABS 1.44   MONOS ABS 0.90   EOS ABS 0.18   .0*   PROTIME 14.4   INR 1.11   APTT 27.1         Lab 06/21/24  1202   SODIUM 139   POTASSIUM 4.7   CHLORIDE 105   CO2 25.0   ANION GAP 9.0   BUN 15   CREATININE 1.89*   EGFR 36.1*   GLUCOSE 133*   CALCIUM 8.2*         Lab 06/21/24  1202   TOTAL PROTEIN 5.7*   ALBUMIN 3.5   GLOBULIN 2.2   ALT (SGPT) 8   AST (SGOT) 14   BILIRUBIN 0.5   ALK PHOS 96                         Microbiology Results (last 10 days)       ** No results found for the last 240 hours. **            No radiology results from the last 24 hrs        Assessment & Plan   Assessment & Plan       BPH with urinary obstruction    Hypothyroidism    Mixed hyperlipidemia    Benign essential hypertension    Urinary retention    BPH with obstruction/lower urinary tract symptoms    Stage 3a chronic kidney disease    Nestor Nye is a 78 y.o. male with h/o RA, HTN, CKD, BPH aquablation for BPH today that was a difficult case and took longer than usual so Dr. Rey/urology asked hospitalists to admit for observation.  Plans for IR to perform prostate RV  embolization.    BPH  --s/p aquablation for BPH today per Dr. Rey that he states was difficult case that took much longer than usual as patient has a very large prostate.  Plans for IR to perform prostate RV embolization today.  He ordered ancep x 24 hours and recs keep NPO today  --blood in stacy when I saw in PACU and SBP 90's so will follow HB.      ?UTI  --ancef x at least 24 hours per .  Add urine culture    Hypotension with h/o HTN  --follow hb with hematuria  --hold BP meds  --follow cultures    CKD  --baseline creatinine about 1.5        VTE Prophylaxis:  Pharmacologic VTE prophylaxis orders are present.          CODE STATUS:    Code Status and Medical Interventions:   Ordered at: 06/21/24 1326     Level Of Support Discussed With:    Patient     Code Status (Patient has no pulse and is not breathing):    CPR (Attempt to Resuscitate)     Medical Interventions (Patient has pulse or is breathing):    Full Support       Expected Discharge   Expected Discharge Date: 6/22/2024; Expected Discharge Time:  9:00 AM     Victoriano Tao MD  06/21/24

## 2024-06-21 NOTE — ANESTHESIA POSTPROCEDURE EVALUATION
Patient: Nestor Nye    Procedure Summary       Date: 06/21/24 Room / Location:  [facility]  [facility]  [facility]    Anesthesia Start: 0731 Anesthesia Stop: 1103    Procedure: AQUABLATION OF PROSTATE Diagnosis:       Urinary retention      BPH with urinary obstruction      (Urinary retention [R33.9])      (BPH with urinary obstruction [N40.1, N13.8])    Surgeons: Prieto Rey MD Provider: James Ramirez MD    Anesthesia Type: general ASA Status: 3            Anesthesia Type: general    Vitals  Vitals Value Taken Time   /70 06/21/24 1103   Temp 97 °F (36.1 °C) 06/21/24 1103   Pulse 95 06/21/24 1103   Resp 20 06/21/24 1103   SpO2 96 % 06/21/24 1103           Post Anesthesia Care and Evaluation    Patient location during evaluation: PACU  Patient participation: complete - patient participated  Level of consciousness: awake and alert  Pain management: adequate    Airway patency: patent  Anesthetic complications: No anesthetic complications  PONV Status: none  Cardiovascular status: hemodynamically stable and acceptable  Respiratory status: nonlabored ventilation, acceptable and nasal cannula  Hydration status: acceptable

## 2024-06-21 NOTE — ANESTHESIA POSTPROCEDURE EVALUATION
Patient: Nestor Nye    Procedure Summary       Date: 06/21/24 Room / Location:  [facility] / [facility]    Anesthesia Start: 1434 Anesthesia Stop: 1542    Procedure: FEMORAL EMBELECTOMY (Right: Groin) Diagnosis:     Surgeons: Kingsley Morris MD Provider: James Ramirez MD    Anesthesia Type: general ASA Status: Not recorded            Anesthesia Type: general    Vitals  Vitals Value Taken Time   BP     Temp     Pulse 101 06/21/24 1541   Resp     SpO2 95 % 06/21/24 1542   Vitals shown include unfiled device data.        Post Anesthesia Care and Evaluation    Patient location during evaluation: PACU  Patient participation: complete - patient participated  Level of consciousness: awake and alert  Pain management: adequate    Airway patency: patent  Anesthetic complications: No anesthetic complications  PONV Status: none  Cardiovascular status: hemodynamically stable and acceptable  Respiratory status: nonlabored ventilation, acceptable and nasal cannula  Hydration status: acceptable

## 2024-06-21 NOTE — PROCEDURES
The following procedure was performed: Bilateral hypogastric artery embolization with Embocube 500 micron (Gelfoam) via RCFA access/5 Fr. Angioseal.    I could not feel R SFA post Angioseal. US suspicious for R SFA occlusion with patent RFA. Weak R Doppler DPA preprocedure. No Doppler post.    Stat arterial Duplex RLE consistent with above. Other RLE arteries patent.    Vascular surgery consulted and they have kindly seen the patient. Likely angioseal related Acute SFA occlusion.     Appreciate help from Vascular Surgery.    Please see corresponding Radiology report for in detail procedural information. The Radiology report will be dictated shortly, if not done so already. Please see the IR RN note for the information regarding medicines administered if any, juancarlos-procedural vitals and I/O information.

## 2024-06-21 NOTE — NURSING NOTE
Per Dr Christine pt is to have a STAT Right lower extremity U/S for cold foot completed in IR procedure room before leaving. Order has been placed and U/S notified.

## 2024-06-21 NOTE — PROGRESS NOTES
Intensive Care Follow-up     Hospital:  LOS: 0 days   Mr. Nestor Nye, 78 y.o. male is followed for: Glycemic, Electrolyte, Respiratory, and Medical management         Subjective   Interval History:  Chart reviewed. Patient alert and oriented, pleasant and conversant, lying flat in the bed. VSS. Oxygenating well on 2 L nasal cannula. CBI in progress. Right groin dressing C/D/I. No hematoma or tenderness to palpation.Denies pain, shortness of breath, nausea, vomiting, abdominal discomfort. No family at bedside.     The patient's past medical, surgical and social history were reviewed and updated in Epic as appropriate.     Objective     Infusions:  lactated ringers, 9 mL/hr, Last Rate: 9 mL/hr (06/21/24 1205)  norepinephrine, 0.02-0.3 mcg/kg/min      Medications:  ceFAZolin, 2,000 mg, Intravenous, Q8H  heparin (porcine), , ,   [START ON 6/22/2024] levothyroxine, 88 mcg, Oral, Q AM  sodium chloride, 10 mL, Intravenous, Q12H    I reviewed the patient's medications.    Vital Sign Min/Max for last 24 hours  Temp  Min: 97 °F (36.1 °C)  Max: 97.4 °F (36.3 °C)   BP  Min: 82/68  Max: 131/83   Pulse  Min: 64  Max: 101   Resp  Min: 12  Max: 20   SpO2  Min: 79 %  Max: 100 %   Flow (L/min)  Min: 2  Max: 2       Input/Output for last 24 hour shift  No intake/output data recorded.   S RR:  [2-10] 2    Physical Exam:  GENERAL: Patient lying in bed and conversant. No acute distress.   HEENT: Normocephalic and atraumatic. Trachea midline. PER. EOM WNL.   LUNGS: Chest rise of normal depth and symmetric. Lungs clear to auscultation bilaterally. No wheezes, rhonchi, or rales.   HEART: S1,S2 detected. Regular rate and rhythm. No rub, murmur, or gallop.   ABDOMEN: Soft, round, nondistended, and nontender. Bowel sounds present. Indwelling catheter in place with CBI.   EXTREMITIES: No clubbing, edema, or cyanosis. Peripheral pulses present except DP on right foot, unchanged since postop. Foot wrapped in warm blankets. Right foot  remains cooler than left.    NEURO/PSYCH: Alert and oriented. Follows commands. Moves all extremities. No focal deficits.      Results from last 7 days   Lab Units 06/21/24  1202   WBC 10*3/mm3 10.05   HEMOGLOBIN g/dL 12.5*   PLATELETS 10*3/mm3 227     Results from last 7 days   Lab Units 06/21/24  1202   SODIUM mmol/L 139   POTASSIUM mmol/L 4.7   CO2 mmol/L 25.0   BUN mg/dL 15   CREATININE mg/dL 1.89*   GLUCOSE mg/dL 133*     Estimated Creatinine Clearance: 35.5 mL/min (A) (by C-G formula based on SCr of 1.89 mg/dL (H)).      I reviewed the patient's new clinical results.  I reviewed the patient's new imaging results/reports including actual images and agree with reports.     Imaging Results (Last 24 Hours)       Procedure Component Value Units Date/Time    IR Artery Embolization Occlusion [[id number]] Collected: 06/21/24 1457     Updated: 06/21/24 1544    Narrative:      IR ARTERY EMBOLIZATION OCCLUSION     History: prostate artery embolization. Status post surgical prostatectomy with postoperative hematuria and hypotension. Prostate artery embolization was requested. Because of the emergency nature of the procedure, I also considered hypogastric artery   embolization if prostate artery embolization would not be possible.    Embolization. 12 minutes fluoro time, 62 mGy. 100 cc Visipaque 320. 3 mL Lidocaine. GA.     : Patrick Christine M.D.     MODALITY: Sonography and fluoroscopy.                       DOSE REDUCTION: The examination was performed according to departmental dose-optimization program.    FLUOROSCOPY TIME: 12 minutes    RADIATION DOSE: 61.6 mGy air-Kerma.      SEDATION: No sedation was used.    ANESTHESIA:  Lidocaine 1% local infiltration.      MEDICINES: Ancef 1 g IV    CONTRAST MEDIUM: Visipaque 320, 100 cc.    ESTIMATED blood loss: < 5 cc.            TECHNIQUE:  A thorough discussion of the risks, benefits, and alternatives of the procedure, and if applicable, moderate sedation, was  carried out with the patient's brother over the telephone due to the emergency nature of the procedure. They were encouraged to ask   any questions. Any questions were answered. They verbalized understanding. A written informed consent was then signed.      A multi-component timeout was performed prior to starting the procedure using the departmental protocol.     The procedure room personnel used personal protective equipment. The operators used sterile gloves and if indicated, sterile gowns. The surgical site was prepped with chlorhexidine gluconate  and draped in the maximal applicable sterile fashion.    The right common femoral artery was localized using anatomic, fluoroscopic and ultrasonographic landmarks. The artery is patent. Pertinent US images were stored for documentation. Using aseptic precautions, real-time ultrasonographic guidance, after   local anesthesia and dermatotomy, the artery was accessed with an access needle. Eventually a guide wire was placed using standard exchanges. Over the wire a 5 French Short vascular sheath was placed. The sheath was connected to a heparinized saline   drip.    Selective and superselective catheterization of the following arteries was performed:  Contralateral common iliac artery, contralateral internal iliac artery, contralateral hypogastric artery, contralateral anterior division of the hypogastric artery.  Ipsilateral common femoral artery, hypogastric artery and anterior division of the hypogastric artery.    The equipment used for catheterization was: Bentson wire, Glidewire, Cobra C2 5 French catheter, RIM catheter    At each catheterization and superselective catheterization, following test injection, digital subtraction angiography was performed and repeated in different fluoroscopic projections if and as needed.    This was followed by superselective embolization of the following arteries: Right and left hypogastric artery anterior divisions.    The agent  used for embolization was Gelfoam 500 um cubes    Post embolization run(s) was(were) performed from the following location: Bilateral hypogastric arteries.    The endpoint of embolization was stasis.    The access site was closed using Angio-Seal.    The procedure was complicated by acute occlusion of the right superficial femoral artery associated with a cold foot. Stat vascular Doppler was obtained and vascular surgery was consulted. The patient was taken to the OR.     COMPLICATIONS: Post procedurally there was occlusion of the right superficial femoral artery after deployment of the Angio-Seal device, discovered immediately, investigated and vascular surgery consulted. The patient was taken to the OR for intervention.          Based on my conversation with the vascular surgery team, the Angio-Seal device was deployed correctly. The patient likely had access site thrombosis related to either a small caliber femoral artery where even the 5 Serbian sheath was partially occlusive   in the artery or possibly sheath tip related thrombus in a patient with low systemic blood pressure. The patient's arterial circulation in the right lower extremity has been restored surgically. Please refer to the surgical note for details.    FINDINGS:  This study was not a diagnostic exam. It was done primarily to assist with hemostasis and serious post prostatectomy lower urinary hemorrhage associated with hypotension. Tortuosity and elongation of the arteries is noted. The vessels are in clamp down.   The left common iliac artery, external and internal iliac arteries and the divisions of the internal iliac artery are patent.    The right common iliac artery, external and internal iliac arteries and the anterior and posterior divisions are patent though small caliber. The vessels appear to be in a clamp down state.    The postembolization run shows stasis in the right and left hypogastric artery primarily the anterior division which  was the target of embolization.       Impression:        Successful catheterization and angiography of the anterior divisions of the left and right hypogastric arteries with embolization using 500 um sized Gelfoam cubes successfully.     The procedure was complicated by acute occlusion of the superficial femoral artery. Vascular surgery responded promptly to the request for assistance and the patient was taken to the OR for further management.    Thank you for the opportunity to assist you in the care of your patient.      Electronically Signed: Patrick Christine MD    6/21/2024 3:41 PM EDT    Workstation ID: [id number]            Assessment & Plan   Impression      BPH with urinary obstruction    Hypothyroidism    Mixed hyperlipidemia    Benign essential hypertension    Urinary retention    BPH with obstruction/lower urinary tract symptoms    Stage 3a chronic kidney disease       Plan      Nestor Nye is a 78 y.o. male with PMH BPH with urinary obstruction, HTN, dyslipidemia, hypothyroidism, and CKD stage IIIb (baseline sCr 1.4) who presents to [facility] on 6/21/24 for an elective aqua ablation per Dr Rey with Urology.      Patient has a history of chronic lower urinary tract symptoms requiring chronic indwelling catheter, with prior cystoscopy showing very large, occlusive lateral prostate lobes. He was seen in office on 3/14/24, at which time he elected to undergo ablation and completed 5 days of Bactrim for surgical prophylaxis, with last dose on 6/20/24.      Patient was admitted to Hospital Medicine post-ablation as the case took longer to perform due his large prostate. Case was complicated by hematuria, which required bilateral hypogastric artery embolization by Dr. Christine with Interventional Radiology via right CGA access. Angioseal was placed post-procedure; however, shortly after the case poor perfusion was noted in the right lower extremity, confirming no SFA flow on arterial duplex. Vascular surgery  was consulted and the patient was taken for emergent right groin exploration per Dr. Morris. An acute thrombus was retrieved near the aortic bifurcation with return of pulsatile bleeding noted. Blood pressure was augmented with a pressor bolus with restoration of pulses.      He is admitted to the ICU postoperatively for further management.    Post-femoral endarterectomy management per Vascular Surgery: peripheral vascular and site checks per protocol  Post-ablation management per Urology: Keep CBI running until 0400 on 6/22/24, then start weaning with goal to be discontinued by 0700.   Follow up urine culture  Cefazolin for surgical prophylaxis  Hold home antihypertensives; Levophed PRN for MAP >65  Continue home levothyroxine  IVF, start PO diet  Aggressive pulmonary toilet  AM labs    DVT Prophylaxis: Mechanical  GI Prophylaxis: PPI  Dispo: ICU      Critical Care time spent in direct patient care: 36 minutes (excluding procedure time, if applicable) including high complexity decision making to assess, manipulate, and support vital organ system failure in this individual who has impairment of one or more vital organ systems such that there is a high probability of imminent or life threatening deterioration in the patient's condition.     Awa Flores, MSN, APRN, ACNPC-AG  Pulmonary and Critical Care Medicine  Electronically signed by LANNY Lugo, 06/21/24, 6:16 PM EDT.

## 2024-06-21 NOTE — ANESTHESIA PROCEDURE NOTES
Peripheral IV    Patient location during procedure: pre-op  Line placed for Difficult Access.  Performed By   CRNA/CAA: Pam Garcia CRNA  Preanesthetic Checklist  Completed: patient identified, IV checked, site marked, risks and benefits discussed, surgical consent, monitors and equipment checked, pre-op evaluation and timeout performed  Peripheral IV Prep   Patient position: supine   Prep: ChloraPrep  Peripheral IV Procedure   Laterality:left  Location:  Hand  Catheter size: 20 G          Post Assessment   Dressing Type: tape and transparent.    IV Dressing/Site: clean, dry and intact

## 2024-06-21 NOTE — PAYOR COMM NOTE
"Ref# [id number]   6/21/24 Scheduled OP surgery, Post surgery complication, Flipped to Inpatient  See Both surgery notes    Utilization Review  Phone [phone]  Fax [phone]    [place] [address]             Ervin Mackey (78 y.o. Male)       Date of Birth   1945    Social Security Number       Address   [address] [place] [address]    Home Phone   [phone]    MRN   [id number]       [facility]    Marital Status                               Admission Date   6/21/24    Admission Type   Elective    Admitting Provider   Stanley Parker MD    Attending Provider   Stanley Parker MD    Department, Room/Bed   [facility] 2B ICU, N236/1       Discharge Date       Discharge Disposition       Discharge Destination                                 Attending Provider: Stanley Parker MD    Allergies: No Known Allergies    Isolation: Contact   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)   Code Status: CPR    Ht: 170.2 cm (67.01\")   Wt: 76.7 kg (169 lb 1.5 oz)    Admission Cmt: None   Principal Problem: BPH with urinary obstruction [N40.1,N13.8]                   Active Insurance as of 6/21/2024       Primary Coverage       Payor Plan Insurance Group Employer/Plan Group    ANTHEM MEDICARE REPLACEMENT ANTHEM MEDICARE ADVANTAGE [id number]       Payor Plan Address Payor Plan Phone Number Payor Plan Fax Number Effective Dates    [address] [phone]  1/1/2016 - None Entered    [facility] [id number]         Subscriber Name Subscriber Birth Date Member ID       ERVIN MACKEY 1945 [id number]                     Emergency Contacts        (Rel.) Home Phone Work Phone Mobile Phone    Wesley Mackey (Brother) [phone] -- --              [place]: [facility] [id number] Tax ID [id number]  Insurance Information                  ANTHEM MEDICARE REPLACEMENT/ANTHEM MEDICARE ADVANTAGE Phone: " [phone]    Subscriber: Nestor Nye Subscriber#: [id number]    Group#: [id number] Precert#: [id number]             History & Physical        Victoriano Tao MD at 24 1305              [facility] Medicine Services  HISTORY AND PHYSICAL    Patient Name: Nestor Nye  : 1945  MRN: [id number]  Primary Care Physician: Roxanne Slater MD  Date of admission: 2024      Subjective  Subjective     Chief Complaint:  S/p difficult aquablation    HPI:  Nestor Nye is a 78 y.o. male with h/o RA, HTN, CKD, BPH aquablation for BPH today that was a difficult case and took longer than usual so Dr. Rey/urology asked hospitalists to admit for observation.  Plans for IR to perform prostate RV embolization.  Patient seen in PACU who awake and states that feels ok, breathing ok.  SBP was in the 90's while I saw patient, anesthesia/nurses states that his BP was ok in OR.  Patient states that BP does sometimes run low.       Personal History     Past Medical History:   Diagnosis Date    Acute maxillary sinusitis     Arthritis     Disease of thyroid gland     Enlarged prostate     GERD (gastroesophageal reflux disease)     Gout     Hypertension     Impaired functional mobility, balance, gait, and endurance     PONV (postoperative nausea and vomiting)     Renal disorder     Rheumatoid arthritis     Right inguinal hernia 10/28/2021    Added automatically from request for surgery [id number]    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, changed his voice since then           Past Surgical History:   Procedure Laterality Date    ABDOMINAL SURGERY      BACK SURGERY      BELPHAROPTOSIS REPAIR Bilateral     BRAIN SURGERY      COLON SURGERY      bowel resection x2 per pt    CRANIOTOMY FOR SUBDURAL HEMATOMA      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    DIAGNOSTIC LAPAROSCOPY N/A 2022    Procedure: DIAGNOSTIC  LAPAROSCOPY CONVERTED TO EXPLORATORY LAPAROTOMY WITH SMALL BOWEL RESECTION;  Surgeon: Maximo Bennett MD;  Location: [facility] OR;  Service: General;  Laterality: N/A;  CONVERTED TO EXPLORATORY LAPAROTOMY AT 1912    EXPLORATORY LAPAROTOMY N/A 04/16/2022    Procedure: LAPAROTOMY EXPLORATORY WITH SMALL BOWEL OBSTRUCTION;  Surgeon: Maximo Bennett MD;  Location: [facility] OR;  Service: General;  Laterality: N/A;    EYE SURGERY      on upper etelids    FOREIGN BODY REMOVAL Right     removal of metal fragments from RUQ after electrocution and fall.      HERNIA REPAIR      INGUINAL HERNIA REPAIR Right 11/01/2021    Procedure: INGUINAL HERNIA REPAIR WITH MESH AND EXCISION OF SPERMATIC CORD LIPOMA;  Surgeon: Rosey Glover MD;  Location: [facility] OR;  Service: General;  Laterality: Right;    LUMBAR SPINE SURGERY      SKIN GRAFT Right     RUE skin grafting after electrocution    THORACIC SPINE SURGERY         Family History: family history includes Arthritis in his father and mother; Prostate cancer in his brother.     Social History:  reports that he has been smoking cigars. He has never used smokeless tobacco. He reports that he does not currently use alcohol. He reports that he does not use drugs.  Social History     Social History Narrative    Not on file       Medications:  Available home medication information reviewed.  Alcohol Wipes, Diclofenac Sodium, amLODIPine, baclofen, ferrous sulfate, finasteride, levothyroxine, melatonin, methenamine, and sulfamethoxazole-trimethoprim    No Known Allergies    Objective  Objective     Vital Signs:   Temp:  [97 °F (36.1 °C)] 97 °F (36.1 °C)  Heart Rate:  [] 92  Resp:  [12-20] 16  BP: ()/(61-90) 105/75  Flow (L/min):  [2] 2       Physical Exam   Constitutional: awake, anesthesia at bedside placing art line  Eyes: PERRLA, sclerae anicteric, no conjunctival injection  HENT: NCAT, mucous membranes moist  Neck: Supple, no thyromegaly, no lymphadenopathy, trachea  midline  Respiratory: Clear to auscultation bilaterally, nonlabored respirations   Cardiovascular: RRR, no murmurs, rubs, or gallops, palpable pedal pulses bilaterally  Gastrointestinal: Positive bowel sounds, soft, nontender, nondistended, +stacy with bright red blood in bag  Musculoskeletal: No bilateral ankle edema, no clubbing or cyanosis to extremities  Psychiatric: Appropriate affect, cooperative  Neurologic: Oriented x 3, strength symmetric in all extremities, Cranial Nerves grossly intact to confrontation, speech clear  Skin: No rashes      Result Review:  I have personally reviewed the results from the time of this admission to 6/21/2024 13:33 EDT and agree with these findings:  [x]  Laboratory list / accordion  [x]  Microbiology  [x]  Radiology  [x]  EKG/Telemetry   []  Cardiology/Vascular   []  Pathology  []  Old records  []  Other:  Most notable findings include:       LAB RESULTS:      Lab 06/21/24  1202   WBC 10.05   HEMOGLOBIN 12.5*   HEMATOCRIT 39.3   PLATELETS 227   NEUTROS ABS 7.41*   IMMATURE GRANS (ABS) 0.04   LYMPHS ABS 1.44   MONOS ABS 0.90   EOS ABS 0.18   .0*   PROTIME 14.4   INR 1.11   APTT 27.1         Lab 06/21/24  1202   SODIUM 139   POTASSIUM 4.7   CHLORIDE 105   CO2 25.0   ANION GAP 9.0   BUN 15   CREATININE 1.89*   EGFR 36.1*   GLUCOSE 133*   CALCIUM 8.2*         Lab 06/21/24  1202   TOTAL PROTEIN 5.7*   ALBUMIN 3.5   GLOBULIN 2.2   ALT (SGPT) 8   AST (SGOT) 14   BILIRUBIN 0.5   ALK PHOS 96                         Microbiology Results (last 10 days)       ** No results found for the last 240 hours. **            No radiology results from the last 24 hrs        Assessment & Plan  Assessment & Plan       BPH with urinary obstruction    Hypothyroidism    Mixed hyperlipidemia    Benign essential hypertension    Urinary retention    BPH with obstruction/lower urinary tract symptoms    Stage 3a chronic kidney disease    Nestor Charles Popeye is a 78 y.o. male with h/o RA, HTN, CKD,  "BPH aquablation for BPH today that was a difficult case and took longer than usual so Dr. Rey/urology asked hospitalists to admit for observation.  Plans for IR to perform prostate RV embolization.    BPH  --s/p aquablation for BPH today per Dr. Rey that he states was difficult case that took much longer than usual as patient has a very large prostate.  Plans for IR to perform prostate RV embolization today.  He ordered ancep x 24 hours and recs keep NPO today  --blood in stacy when I saw in PACU and SBP 90's so will follow HB.      ?UTI  --ancef x at least 24 hours per .  Add urine culture    Hypotension with h/o HTN  --follow hb with hematuria  --hold BP meds  --follow cultures    CKD  --baseline creatinine about 1.5        VTE Prophylaxis:  Pharmacologic VTE prophylaxis orders are present.          CODE STATUS:    Code Status and Medical Interventions:   Ordered at: 06/21/24 1326     Level Of Support Discussed With:    Patient     Code Status (Patient has no pulse and is not breathing):    CPR (Attempt to Resuscitate)     Medical Interventions (Patient has pulse or is breathing):    Full Support       Expected Discharge   Expected Discharge Date: 6/22/2024; Expected Discharge Time:  9:00 AM     Victoriano Tao MD  06/21/24      Electronically signed by Victoriano Tao MD at 06/21/24 1333       Victoriano Murphy PA-C at 06/21/24 0649       Attestation signed by Prieto Rey MD at 06/21/24 0719    I have reviewed this documentation and agree.                  Pre-Op H&P  Nestor Nye  [id number]  1945    Chief complaint: \" I am here for surgery on my prostate by Dr. Rey.\"    HPI:    Patient is a 78 y.o.male who presents with BPH with urinary obstruction.  He presents today for scheduled surgery and anticipates aqua ablation of prostate.  Patient reports he has had an indwelling catheter for multiple years.  He denies taking any anticoagulant or antiplatelet medications.  Patient " reports he experiences urinary retention and lower urinary tract symptoms.  Patient was diagnosed with a large prostate over 140 cc.  Cystoscopy in the past showed very large occlusive lateral lobes.  He denies any symptomatic changes or recent illnesses since last office visit with Dr. Rey.  Patient reports he was prescribed a course of Bactrim and states he completed his last dose yesterday 6/20/2024.    Review of Systems:  General ROS: negative for chills, fever or skin lesions;  No changes since last office visit.  Neg for recent sick exposure  Cardiovascular ROS: no chest pain or dyspnea on exertion  Respiratory ROS: no cough, shortness of breath, or wheezing    Allergies: Denies allergy to latex or contrast dye.No Known Allergies    Home Meds:    No current facility-administered medications on file prior to encounter.     Current Outpatient Medications on File Prior to Encounter   Medication Sig Dispense Refill    Alcohol Swabs (ALCOHOL WIPES) 70 % pads 1 pad 3 (Three) Times a Day. (Patient taking differently: Use 1 pad 3 (Three) Times a Day. Cleans around stacy catheter with them.) 100 each 11    amLODIPine (Norvasc) 2.5 MG tablet Take 1 tablet by mouth Daily. 90 tablet 1    baclofen (LIORESAL) 10 MG tablet Take 1 tablet by mouth At Night As Needed for Muscle Spasms. (Patient not taking: Reported on 4/22/2024) 30 tablet 3    Diclofenac Sodium (VOLTAREN) 1 % gel gel Apply 4 g topically to the appropriate area as directed 4 (Four) Times a Day As Needed (joint pain). 100 g 5    ferrous sulfate 325 (65 FE) MG tablet Take 1 tablet by mouth Daily With Breakfast.      levothyroxine (SYNTHROID, LEVOTHROID) 88 MCG tablet Take 1 tablet by mouth Daily. 90 tablet 2    melatonin 5 MG tablet tablet Take 1 tablet by mouth. (Patient not taking: Reported on 4/22/2024)      sulfamethoxazole-trimethoprim (Bactrim DS) 800-160 MG per tablet Take 1 tablet by mouth Daily. Start 5 days before surgery (Patient not taking: Reported  on 4/22/2024) 5 tablet 0       PMH:   Past Medical History:   Diagnosis Date    Acute maxillary sinusitis     Arthritis     Disease of thyroid gland     Enlarged prostate     GERD (gastroesophageal reflux disease)     Gout     Hypertension     Impaired functional mobility, balance, gait, and endurance     PONV (postoperative nausea and vomiting)     Renal disorder     Rheumatoid arthritis     Right inguinal hernia 10/28/2021    Added automatically from request for surgery [id number]    Vocal cord anomaly 2007    had problem with enlarge vocal cord after low back surgery, changed his voice since then     PSH:    Past Surgical History:   Procedure Laterality Date    ABDOMINAL SURGERY      BACK SURGERY      BELPHAROPTOSIS REPAIR Bilateral     BRAIN SURGERY      COLON SURGERY      bowel resection x2 per pt    CRANIOTOMY FOR SUBDURAL HEMATOMA      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    DIAGNOSTIC LAPAROSCOPY N/A 05/04/2022    Procedure: DIAGNOSTIC LAPAROSCOPY CONVERTED TO EXPLORATORY LAPAROTOMY WITH SMALL BOWEL RESECTION;  Surgeon: Maximo Bennett MD;  Location: [facility];  Service: General;  Laterality: N/A;  CONVERTED TO EXPLORATORY LAPAROTOMY AT 1912    EXPLORATORY LAPAROTOMY N/A 04/16/2022    Procedure: LAPAROTOMY EXPLORATORY WITH SMALL BOWEL OBSTRUCTION;  Surgeon: Maximo Bennett MD;  Location: [facility];  Service: General;  Laterality: N/A;    EYE SURGERY      on upper etelids    FOREIGN BODY REMOVAL Right     removal of metal fragments from RUQ after electrocution and fall.      HERNIA REPAIR      INGUINAL HERNIA REPAIR Right 11/01/2021    Procedure: INGUINAL HERNIA REPAIR WITH MESH AND EXCISION OF SPERMATIC CORD LIPOMA;  Surgeon: Rosey Glover MD;  Location: [facility];  Service: General;  Laterality: Right;    LUMBAR SPINE SURGERY      SKIN GRAFT Right     RUE skin grafting after electrocution    THORACIC SPINE SURGERY         Immunization History:  Influenza: Yes  Pneumococcal:  Yes  Tetanus: Yes    Social History:   Tobacco:   Social History     Tobacco Use   Smoking Status Every Day    Types: Cigars   Smokeless Tobacco Never   Tobacco Comments    Smoked 1 ppd of small cigars for 50  years       Alcohol:     Social History     Substance and Sexual Activity   Alcohol Use Not Currently       Vitals:           There were no vitals taken for this visit.    Physical Exam:  General Appearance:    Alert, cooperative, no distress, appears stated age   Head:    Normocephalic, without obvious abnormality, atraumatic   Lungs:     Decreased breath sounds bilaterally upon auscultation.    Heart:   Regular rate and rhythm, S1 and S2 normal, no murmur, rub    or gallop    Abdomen:    Soft, nontender.  +bowel sounds   Breast Exam:    deferred   Genitalia:    deferred   Extremities:   Extremities normal, atraumatic, no cyanosis or edema   Skin: Patient has a well-healed scratch on his left lower extremity.  Additionally he has small ulcers on his bilateral lower extremities.  No signs of erythema, drainage, or infection.   Neurologic:   Grossly intact   Results Review  LABS:  Lab Results   Component Value Date    WBC 7.61 06/07/2024    HGB 15.5 06/07/2024    HCT 46.7 06/07/2024    MCV 97.3 (H) 06/07/2024     06/07/2024    NEUTROABS 3.95 11/17/2023    GLUCOSE 85 11/17/2023    BUN 22 11/17/2023    CREATININE 1.46 (H) 11/17/2023    EGFRIFNONA 35 (L) 09/14/2021    EGFRIFAFRI 43 (L) 09/25/2017     11/17/2023    K 4.7 06/07/2024     11/17/2023    CO2 21.8 (L) 11/17/2023    MG 2.0 10/31/2016    PHOS 3.7 07/18/2018    CALCIUM 9.1 11/17/2023    ALBUMIN 4.2 11/17/2023    AST 17 11/17/2023    ALT 18 11/17/2023    BILITOT 0.7 11/17/2023       RADIOLOGY:  No radiology results for the last 3 days     I reviewed the patient's new clinical results.    Cancer Staging (if applicable)  Cancer Patient: __ yes __no __unknown; If yes, clinical stage T:__ N:__M:__, stage group or __N/A    Impression: Patient  presents with BPH with urinary obstruction.    Plan: Dr. Rey will perform ablation of prostate.      Victoriano Murphy PA-C   06/21/24   6:49 AM EDT       Electronically signed by Prieto Rey MD at 06/21/24 0772       Vital Signs (last day)       Date/Time Temp Temp src Pulse Resp BP Patient Position SpO2    06/21/24 1815 -- -- 92 20 112/76 Lying 96    06/21/24 1810 -- -- 93 -- -- -- 95    06/21/24 1805 -- -- 96 -- -- -- 93    06/21/24 1800 -- -- 98 -- -- -- 89    06/21/24 1755 -- -- 87 20 98/69 -- 97    06/21/24 1750 -- -- 93 -- -- -- --    06/21/24 1715 -- -- 79 18 122/78 -- 96    06/21/24 1650 -- -- 77 -- -- -- 94    06/21/24 1645 97.4 (36.3) Temporal 84 16 107/70 -- 95    06/21/24 1640 -- -- 81 -- -- -- 96    06/21/24 1635 -- -- 81 -- 127/87 -- 97    06/21/24 1630 97.4 (36.3) Temporal 92 -- 97/65 -- 96    06/21/24 1625 97 (36.1) Temporal 89 16 107/79 -- 95    06/21/24 1620 97 (36.1) Temporal 84 16 131/83 -- 97    06/21/24 1615 97 (36.1) Temporal 92 16 125/104 -- 98    06/21/24 1610 97 (36.1) Temporal 86 16 105/75 -- 96    06/21/24 1542 -- -- -- -- -- -- 95    06/21/24 1200 97 (36.1) -- 92 16 105/75 -- 100    06/21/24 1155 97 (36.1) -- 93 16 107/83 -- 100    06/21/24 1150 97 (36.1) -- 95 16 102/75 -- 99    06/21/24 1145 97 (36.1) -- 94 16 93/73 -- 98    06/21/24 1140 97 (36.1) -- 93 16 93/68 -- 98    06/21/24 1135 97 (36.1) -- 92 16 94/66 -- 98    06/21/24 1130 97 (36.1) -- 93 16 94/70 -- 99    06/21/24 1125 97 (36.1) -- 93 16 91/76 -- 97    06/21/24 1120 97 (36.1) -- 98 16 82/68 -- 95    06/21/24 1115 97 (36.1) -- 98 16 87/61 -- 95    06/21/24 1110 97 (36.1) -- 101 16 87/61 -- 89    06/21/24 1105 97 (36.1) -- 100 16 103/90 -- 79    06/21/24 1103 97 (36.1) -- 95 20 114/70 -- 96    06/21/24 1100 97 (36.1) Temporal 96 12 114/78 Lying 95    06/21/24 0707 97 (36.1) Temporal 64 18 131/90 Lying 98          Oxygen Therapy (last day)       Date/Time SpO2 Device (Oxygen Therapy) Flow (L/min) Oxygen Concentration  (%) ETCO2 (mmHg)    06/21/24 1815 96 nasal cannula 2 -- --    06/21/24 1810 95 -- -- -- --    06/21/24 1805 93 -- -- -- --    06/21/24 1800 89 nasal cannula 2 -- --    06/21/24 1755 97 -- -- -- --    06/21/24 1715 96 -- -- -- 33 06/21/24 1714 -- nasal cannula with ETCO2 2 -- --    06/21/24 1650 94 -- -- -- 33 06/21/24 1645 95 nasal cannula with ETCO2 2 -- 30 06/21/24 1640 96 -- -- -- --    06/21/24 1635 97 nasal cannula with ETCO2 2 -- 29 06/21/24 1630 96 nasal cannula with ETCO2 2 -- 29 06/21/24 1625 95 nasal cannula with ETCO2 2 -- 30 06/21/24 1620 97 nasal cannula with ETCO2 2 -- 31 06/21/24 1615 98 nasal cannula with ETCO2 2 -- 24 06/21/24 1610 96 nasal cannula with ETCO2 2 -- 33 06/21/24 1542 95 -- -- -- --    06/21/24 1200 100 nasal cannula with ETCO2 2 -- 16 06/21/24 1155 100 -- 2 -- --    06/21/24 1150 99 -- 2 -- 13 06/21/24 1145 98 -- 2 -- 10    06/21/24 1140 98 -- 2 -- 14 06/21/24 1135 98 -- 2 -- --    06/21/24 1130 99 -- 2 -- 19 06/21/24 1125 97 -- 2 -- 17 06/21/24 1120 95 -- 2 -- 9 06/21/24 1115 95 nasal cannula with ETCO2 2 -- 29 06/21/24 1110 89 -- 2 -- 26 06/21/24 1105 79 -- 2 -- 33 06/21/24 1103 96 -- -- -- --    06/21/24 1100 95 nasal cannula with ETCO2 2 -- 30 06/21/24 0707 98 room air -- -- --          Facility-Administered Medications as of 6/21/2024   Medication Dose Route Frequency Provider Last Rate Last Admin    acetaminophen (TYLENOL) tablet 650 mg  650 mg Oral Q4H PRN Victoriano Tao MD        Or    acetaminophen (TYLENOL) 160 MG/5ML oral solution 650 mg  650 mg Oral Q4H PRN Victoriano Tao MD        Or    acetaminophen (TYLENOL) suppository 650 mg  650 mg Rectal Q4H PRN Victoriano Tao MD        [COMPLETED] albumin human 5 % solution 25 g  25 g Intravenous Once James Ramirez MD 1,000 mL/hr at 06/21/24 1132 25 g at 06/21/24 1132    [COMPLETED] albumin human 5 % solution 250 mL  250 mL Intravenous Once Ever Fink, CRNA   250  mL at 06/21/24 1625    sennosides-docusate (PERICOLACE) 8.6-50 MG per tablet 2 tablet  2 tablet Oral BID PRN Victoriano Tao MD        And    polyethylene glycol (MIRALAX) packet 17 g  17 g Oral Daily PRN Victoriano Tao MD        And    bisacodyl (DULCOLAX) EC tablet 5 mg  5 mg Oral Daily PRN Victoriano Tao MD        And    bisacodyl (DULCOLAX) suppository 10 mg  10 mg Rectal Daily PRN Victoriano Tao MD        [COMPLETED] ceFAZolin 2000 mg IVPB in 100 mL NS (MBP)  2,000 mg Intravenous Once Prieto Rey MD   2,000 mg at 06/21/24 0731    ceFAZolin 2000 mg IVPB in 100 mL NS (MBP)  2,000 mg Intravenous Q8H Prieto Rey MD        [COMPLETED] famotidine (PEPCID) tablet 20 mg  20 mg Oral Once James Ramirez MD   20 mg at 06/21/24 0716    [COMPLETED] gentamicin (GARAMYCIN) 360 mg in sodium chloride 0.9 % 100 mL IVPB  360 mg Intravenous Once Prieto Rey MD   360 mg at 06/21/24 0740    heparin (porcine) 2000-0.9 UNIT/L-% infusion  - ADS Override Pull             [COMPLETED] iodixanol (VISIPAQUE) 320 MG/ML injection  - ADS Override Pull        100 mL at 06/21/24 1335    [START ON 6/22/2024] levothyroxine (SYNTHROID, LEVOTHROID) tablet 88 mcg  88 mcg Oral Q AM Victoriano Tao MD        [COMPLETED] lidocaine PF 1% (XYLOCAINE) 1 % injection  - ADS Override Pull        3 mL at 06/21/24 1336    [COMPLETED] lidocaine PF 1% (XYLOCAINE) injection 0.5 mL  0.5 mL Injection Once PRN James Ramirez MD   0.5 mL at 06/21/24 0715    nitroglycerin (NITROSTAT) SL tablet 0.4 mg  0.4 mg Sublingual Q5 Min PRN Cha Flores APRN        norepinephrine (LEVOPHED) 8 mg in 250 mL NS infusion (premix)  0.02-0.3 mcg/kg/min Intravenous Titrated Cha Flores APRN        ondansetron ODT (ZOFRAN-ODT) disintegrating tablet 4 mg  4 mg Oral Q6H PRN Victoriano Tao MD        Or    ondansetron (ZOFRAN) injection 4 mg  4 mg Intravenous Q6H PRN Victoriano Tao MD        [START ON 6/22/2024] pantoprazole (PROTONIX)  injection 40 mg  40 mg Intravenous Q AM Cha Flores APRN        sodium chloride 0.9 % flush 10 mL  10 mL Intravenous Q12H Victoriano aTo MD        sodium chloride 0.9 % flush 10 mL  10 mL Intravenous PRN Victoriano aTo MD        sodium chloride 0.9 % infusion 40 mL  40 mL Intravenous PRN Victoriano Tao MD        sodium chloride 0.9 % infusion  50 mL/hr Intravenous Continuous Cha Flores APRN 50 mL/hr at 06/21/24 1840 50 mL/hr at 06/21/24 1840    [COMPLETED] tranexamic acid 1000 mg in 100 mL 0.7% NaCl infusion (premix)  1,000 mg Intravenous Once Prieto Rey MD   1,000 mg at 06/21/24 0911     Lab Results (all)       Procedure Component Value Units Date/Time    Hemoglobin A1c [[id number]] Collected: 06/21/24 1202    Specimen: Blood Updated: 06/21/24 1746    Protime-INR [[id number]]  (Normal) Collected: 06/21/24 1202    Specimen: Blood Updated: 06/21/24 1229     Protime 14.4 Seconds      INR 1.11    aPTT [[id number]]  (Normal) Collected: 06/21/24 1202    Specimen: Blood Updated: 06/21/24 1229     PTT 27.1 seconds     Narrative:      PTT = The equivalent PTT values for the therapeutic range of heparin levels at 0.3 to 0.5 U/ml are 60 to 70 seconds.    Comprehensive Metabolic Panel [[id number]]  (Abnormal) Collected: 06/21/24 1202    Specimen: Blood Updated: 06/21/24 1227     Glucose 133 mg/dL      BUN 15 mg/dL      Creatinine 1.89 mg/dL      Sodium 139 mmol/L      Potassium 4.7 mmol/L      Comment: Slight hemolysis detected by analyzer. Result may be falsely elevated.        Chloride 105 mmol/L      CO2 25.0 mmol/L      Calcium 8.2 mg/dL      Total Protein 5.7 g/dL      Albumin 3.5 g/dL      ALT (SGPT) 8 U/L      AST (SGOT) 14 U/L      Alkaline Phosphatase 96 U/L      Total Bilirubin 0.5 mg/dL      Globulin 2.2 gm/dL      Comment: Calculated Result        A/G Ratio 1.6 g/dL      BUN/Creatinine Ratio 7.9     Anion Gap 9.0 mmol/L      eGFR 36.1 mL/min/1.73     Narrative:      GFR Normal >60  Chronic  Kidney Disease <60  Kidney Failure <15    The GFR formula is only valid for adults with stable renal function between ages 18 and 70.    CBC & Differential [[id number]]  (Abnormal) Collected: 06/21/24 1202    Specimen: Blood Updated: 06/21/24 1213    Narrative:      The following orders were created for panel order CBC & Differential.  Procedure                               Abnormality         Status                     ---------                               -----------         ------                     CBC Auto Differential[[id number]]        Abnormal            Final result                 Please view results for these tests on the individual orders.    CBC Auto Differential [[id number]]  (Abnormal) Collected: 06/21/24 1202    Specimen: Blood Updated: 06/21/24 1213     WBC 10.05 10*3/mm3      RBC 3.93 10*6/mm3      Hemoglobin 12.5 g/dL      Hematocrit 39.3 %      .0 fL      MCH 31.8 pg      MCHC 31.8 g/dL      RDW 13.1 %      RDW-SD 48.8 fl      MPV 9.9 fL      Platelets 227 10*3/mm3      Neutrophil % 73.7 %      Lymphocyte % 14.3 %      Monocyte % 9.0 %      Eosinophil % 1.8 %      Basophil % 0.8 %      Immature Grans % 0.4 %      Neutrophils, Absolute 7.41 10*3/mm3      Lymphocytes, Absolute 1.44 10*3/mm3      Monocytes, Absolute 0.90 10*3/mm3      Eosinophils, Absolute 0.18 10*3/mm3      Basophils, Absolute 0.08 10*3/mm3      Immature Grans, Absolute 0.04 10*3/mm3      nRBC 0.0 /100 WBC           Imaging Results (All)       Procedure Component Value Units Date/Time    IR Artery Embolization Occlusion [[id number]] Collected: 06/21/24 1457     Updated: 06/21/24 1544    Narrative:      IR ARTERY EMBOLIZATION OCCLUSION     History: prostate artery embolization. Status post surgical prostatectomy with postoperative hematuria and hypotension. Prostate artery embolization was requested. Because of the emergency nature of the procedure, I also considered hypogastric artery   embolization if prostate artery  embolization would not be possible.    Embolization. 12 minutes fluoro time, 62 mGy. 100 cc Visipaque 320. 3 mL Lidocaine. GA.     : Patrick Christine M.D.     MODALITY: Sonography and fluoroscopy.                       DOSE REDUCTION: The examination was performed according to departmental dose-optimization program.    FLUOROSCOPY TIME: 12 minutes    RADIATION DOSE: 61.6 mGy air-Kerma.      SEDATION: No sedation was used.    ANESTHESIA:  Lidocaine 1% local infiltration.      MEDICINES: Ancef 1 g IV    CONTRAST MEDIUM: Visipaque 320, 100 cc.    ESTIMATED blood loss: < 5 cc.            TECHNIQUE:  A thorough discussion of the risks, benefits, and alternatives of the procedure, and if applicable, moderate sedation, was carried out with the patient's brother over the telephone due to the emergency nature of the procedure. They were encouraged to ask   any questions. Any questions were answered. They verbalized understanding. A written informed consent was then signed.      A multi-component timeout was performed prior to starting the procedure using the departmental protocol.     The procedure room personnel used personal protective equipment. The operators used sterile gloves and if indicated, sterile gowns. The surgical site was prepped with chlorhexidine gluconate  and draped in the maximal applicable sterile fashion.    The right common femoral artery was localized using anatomic, fluoroscopic and ultrasonographic landmarks. The artery is patent. Pertinent US images were stored for documentation. Using aseptic precautions, real-time ultrasonographic guidance, after   local anesthesia and dermatotomy, the artery was accessed with an access needle. Eventually a guide wire was placed using standard exchanges. Over the wire a 5 [place] Short vascular sheath was placed. The sheath was connected to a heparinized saline   drip.    Selective and superselective catheterization of the following arteries was  performed:  Contralateral common iliac artery, contralateral internal iliac artery, contralateral hypogastric artery, contralateral anterior division of the hypogastric artery.  Ipsilateral common femoral artery, hypogastric artery and anterior division of the hypogastric artery.    The equipment used for catheterization was: Bentson wire, Glidewire, Cobra C2 5 French catheter, RIM catheter    At each catheterization and superselective catheterization, following test injection, digital subtraction angiography was performed and repeated in different fluoroscopic projections if and as needed.    This was followed by superselective embolization of the following arteries: Right and left hypogastric artery anterior divisions.    The agent used for embolization was Gelfoam 500 um cubes    Post embolization run(s) was(were) performed from the following location: Bilateral hypogastric arteries.    The endpoint of embolization was stasis.    The access site was closed using Angio-Seal.    The procedure was complicated by acute occlusion of the right superficial femoral artery associated with a cold foot. Stat vascular Doppler was obtained and vascular surgery was consulted. The patient was taken to the OR.     COMPLICATIONS: Post procedurally there was occlusion of the right superficial femoral artery after deployment of the Angio-Seal device, discovered immediately, investigated and vascular surgery consulted. The patient was taken to the OR for intervention.          Based on my conversation with the vascular surgery team, the Angio-Seal device was deployed correctly. The patient likely had access site thrombosis related to either a small caliber femoral artery where even the 5 French sheath was partially occlusive   in the artery or possibly sheath tip related thrombus in a patient with low systemic blood pressure. The patient's arterial circulation in the right lower extremity has been restored surgically. Please refer  to the surgical note for details.    FINDINGS:  This study was not a diagnostic exam. It was done primarily to assist with hemostasis and serious post prostatectomy lower urinary hemorrhage associated with hypotension. Tortuosity and elongation of the arteries is noted. The vessels are in clamp down.   The left common iliac artery, external and internal iliac arteries and the divisions of the internal iliac artery are patent.    The right common iliac artery, external and internal iliac arteries and the anterior and posterior divisions are patent though small caliber. The vessels appear to be in a clamp down state.    The postembolization run shows stasis in the right and left hypogastric artery primarily the anterior division which was the target of embolization.       Impression:        Successful catheterization and angiography of the anterior divisions of the left and right hypogastric arteries with embolization using 500 um sized Gelfoam cubes successfully.     The procedure was complicated by acute occlusion of the superficial femoral artery. Vascular surgery responded promptly to the request for assistance and the patient was taken to the OR for further management.    Thank you for the opportunity to assist you in the care of your patient.      Electronically Signed: Patrick Christine MD    6/21/2024 3:41 PM EDT    Workstation ID: [id number]          ECG/EMG Results (all)       None          Ventilator/Non-Invasive Ventilation Settings (From admission, onward)      None             Operative/Procedure Notes (all)        Prieto Rey MD at 06/21/24 0715  Version 1 of 1         Pre-procedure diagnosis:   BPH with urinary obstruction    Post-procedure diagnosis:   SAME    Procedure performed:   Aquablation CPT- 0421T    Surgeon  Prieto Rey MD    Anesthesia  General    Specimens  Prostate tissue    EBL  100 cc    FINDINGS:  1. Prostate was sized at > 170 cc       PROCEDURE IN DETAIL:  The patient was  brought to the operating room. Site and scope of surgery were confirmed with the patient. The patient’s identity was confirmed.  General anesthesia was achieved without complications. Patient was then placed in lithotomy position. Genitals were prepped and draped in standard sterile fashion. Appropriate time-out was performed. Intravenous antibiotic was given. The TRUS Stepper was mounted to the Articulating Arm and secured to OR bed. The ultrasound probe was attached to the stepper. The ultrasound probe was aligned, and confirmation made that the prostate is centered and aligned using both transverse and sagittal views. The bladder neck, verumontanum and the central/transition zones were identified. [optional] A clinical assessment of the prostate was performed measuring dimensions (height, width, length) to estimate prostate size and note prostate anatomy. The 24F AQUABEAM Handpiece was inserted into the prostatic urethra and a complete cystoscopic evaluation was performed by inspecting the prostate, bladder, and identifying the location of the verumontanum/external sphincter. The AQUABEAM Handpiece was secured to the Handpiece Articulating Arm. Confirmed alignment of AQUABEAM Handpiece and TRUS Probe to be parallel and colinear. Confirmation that AQUABEAM nozzle is centered and anterior of the bladder neck or the median lobe. The cystoscope was then retracted to visualize the verumontanum and external sphincter and the cystoscope tip was positioned just proximal to the external sphincter. Reconfirmed alignment of the TRUS probe with the AQUABEAM Handpiece and compression applied with TRUS probe. Horizontal alignment of the Handpiece waterjet nozzle was performed. The Aquablation treatment zones were planned utilizing real-time TRUS to visualize the contour of the prostate and the depth and radial angles of resection were defined in the transverse view. In the sagittal view, the AQUABEAM nozzle is identified and  position registered with software. The treatment contours were then adjusted to conform to the intended resection margins. The median lobe, bladder neck and verumontanum were marked and confirmed in the treatment contour. The Aquablation Treatment was then started following the resection contour confirmed under ultrasound guidance.     TOTAL AQUABLATION RESECTION TIME: 12:42    Once Aquablation resection was complete, cystoscopy and tissue/clot evacuation was performed using a 26 Fr cystoscope/resectoscope until light pink.  We then resected the fluffy tissue that had been destroyed and performed cautery with the bipolar loop and half-moon ball at settings of 3 and 3.  A 24 [place] Gillespie catheter was inserted under ultrasound guidance. Balloon was inflated to   30 cc. Balloon positioning at bladder neck was confirmed and CBI started.Ultrasound probe was then removed and patient was brought out of lithotomy and Gillespie was put on traction for 6 hours. Patient was then brought to recovery in stable condition.      Electronically signed by Prieto Rey MD at 06/21/24 7599       Patrick Christine MD at 06/21/24 1758  Version 1 of 1         The following procedure was performed: Bilateral hypogastric artery embolization with Embocube 500 micron (Gelfoam) via RCFA access/5 Fr. Angioseal.    I could not feel R SFA post Angioseal. US suspicious for R SFA occlusion with patent RFA. Weak R Doppler DPA preprocedure. No Doppler post.    Stat arterial Duplex RLE consistent with above. Other RLE arteries patent.    Vascular surgery consulted and they have kindly seen the patient. Likely angioseal related Acute SFA occlusion.     Appreciate help from Vascular Surgery.    Please see corresponding Radiology report for in detail procedural information. The Radiology report will be dictated shortly, if not done so already. Please see the IR RN note for the information regarding medicines administered if any, juancarlos-procedural  vitals and I/O information.       Electronically signed by Patrick Christine MD at 06/21/24 1419          Physician Progress Notes (all)        Cha Flores APRN at 06/21/24 1815            Intensive Care Follow-up     Hospital:  LOS: 0 days   Mr. Nestor Nye, 78 y.o. male is followed for: Glycemic, Electrolyte, Respiratory, and Medical management      Subjective   Subjective   Interval History:  Chart reviewed. Patient alert and oriented, pleasant and conversant, lying flat in the bed. VSS. Oxygenating well on 2 L nasal cannula. CBI in progress. Right groin dressing C/D/I. No hematoma or tenderness to palpation.Denies pain, shortness of breath, nausea, vomiting, abdominal discomfort. No family at bedside.     The patient's past medical, surgical and social history were reviewed and updated in Epic as appropriate.  Objective   Objective     Infusions:  lactated ringers, 9 mL/hr, Last Rate: 9 mL/hr (06/21/24 1205)  norepinephrine, 0.02-0.3 mcg/kg/min      Medications:  ceFAZolin, 2,000 mg, Intravenous, Q8H  heparin (porcine), , ,   [START ON 6/22/2024] levothyroxine, 88 mcg, Oral, Q AM  sodium chloride, 10 mL, Intravenous, Q12H    I reviewed the patient's medications.    Vital Sign Min/Max for last 24 hours  Temp  Min: 97 °F (36.1 °C)  Max: 97.4 °F (36.3 °C)   BP  Min: 82/68  Max: 131/83   Pulse  Min: 64  Max: 101   Resp  Min: 12  Max: 20   SpO2  Min: 79 %  Max: 100 %   Flow (L/min)  Min: 2  Max: 2       Input/Output for last 24 hour shift  No intake/output data recorded.   S RR:  [2-10] 2    Physical Exam:  GENERAL: Patient lying in bed and conversant. No acute distress.   HEENT: Normocephalic and atraumatic. Trachea midline. PER. EOM WNL.   LUNGS: Chest rise of normal depth and symmetric. Lungs clear to auscultation bilaterally. No wheezes, rhonchi, or rales.   HEART: S1,S2 detected. Regular rate and rhythm. No rub, murmur, or gallop.   ABDOMEN: Soft, round, nondistended, and nontender. Bowel  sounds present. Indwelling catheter in place with CBI.   EXTREMITIES: No clubbing, edema, or cyanosis. Peripheral pulses present except DP on right foot, unchanged since postop. Foot wrapped in warm blankets. Right foot remains cooler than left.    NEURO/PSYCH: Alert and oriented. Follows commands. Moves all extremities. No focal deficits.      Results from last 7 days   Lab Units 06/21/24  1202   WBC 10*3/mm3 10.05   HEMOGLOBIN g/dL 12.5*   PLATELETS 10*3/mm3 227     Results from last 7 days   Lab Units 06/21/24  1202   SODIUM mmol/L 139   POTASSIUM mmol/L 4.7   CO2 mmol/L 25.0   BUN mg/dL 15   CREATININE mg/dL 1.89*   GLUCOSE mg/dL 133*     Estimated Creatinine Clearance: 35.5 mL/min (A) (by C-G formula based on SCr of 1.89 mg/dL (H)).      I reviewed the patient's new clinical results.  I reviewed the patient's new imaging results/reports including actual images and agree with reports.     Imaging Results (Last 24 Hours)       Procedure Component Value Units Date/Time    IR Artery Embolization Occlusion [[id number]] Collected: 06/21/24 1457     Updated: 06/21/24 1544    Narrative:      IR ARTERY EMBOLIZATION OCCLUSION     History: prostate artery embolization. Status post surgical prostatectomy with postoperative hematuria and hypotension. Prostate artery embolization was requested. Because of the emergency nature of the procedure, I also considered hypogastric artery   embolization if prostate artery embolization would not be possible.    Embolization. 12 minutes fluoro time, 62 mGy. 100 cc Visipaque 320. 3 mL Lidocaine. GA.     : Patrick Christine M.D.     MODALITY: Sonography and fluoroscopy.                       DOSE REDUCTION: The examination was performed according to departmental dose-optimization program.    FLUOROSCOPY TIME: 12 minutes    RADIATION DOSE: 61.6 mGy air-Kerma.      SEDATION: No sedation was used.    ANESTHESIA:  Lidocaine 1% local infiltration.      MEDICINES: Ancef 1 g  IV    CONTRAST MEDIUM: Visipaque 320, 100 cc.    ESTIMATED blood loss: < 5 cc.            TECHNIQUE:  A thorough discussion of the risks, benefits, and alternatives of the procedure, and if applicable, moderate sedation, was carried out with the patient's brother over the telephone due to the emergency nature of the procedure. They were encouraged to ask   any questions. Any questions were answered. They verbalized understanding. A written informed consent was then signed.      A multi-component timeout was performed prior to starting the procedure using the departmental protocol.     The procedure room personnel used personal protective equipment. The operators used sterile gloves and if indicated, sterile gowns. The surgical site was prepped with chlorhexidine gluconate  and draped in the maximal applicable sterile fashion.    The right common femoral artery was localized using anatomic, fluoroscopic and ultrasonographic landmarks. The artery is patent. Pertinent US images were stored for documentation. Using aseptic precautions, real-time ultrasonographic guidance, after   local anesthesia and dermatotomy, the artery was accessed with an access needle. Eventually a guide wire was placed using standard exchanges. Over the wire a 5 [place] Short vascular sheath was placed. The sheath was connected to a heparinized saline   drip.    Selective and superselective catheterization of the following arteries was performed:  Contralateral common iliac artery, contralateral internal iliac artery, contralateral hypogastric artery, contralateral anterior division of the hypogastric artery.  Ipsilateral common femoral artery, hypogastric artery and anterior division of the hypogastric artery.    The equipment used for catheterization was: Bentson wire, Glidewire, Cobra C2 5 [place] catheter, RIM catheter    At each catheterization and superselective catheterization, following test injection, digital subtraction angiography was  performed and repeated in different fluoroscopic projections if and as needed.    This was followed by superselective embolization of the following arteries: Right and left hypogastric artery anterior divisions.    The agent used for embolization was Gelfoam 500 um cubes    Post embolization run(s) was(were) performed from the following location: Bilateral hypogastric arteries.    The endpoint of embolization was stasis.    The access site was closed using Angio-Seal.    The procedure was complicated by acute occlusion of the right superficial femoral artery associated with a cold foot. Stat vascular Doppler was obtained and vascular surgery was consulted. The patient was taken to the OR.     COMPLICATIONS: Post procedurally there was occlusion of the right superficial femoral artery after deployment of the Angio-Seal device, discovered immediately, investigated and vascular surgery consulted. The patient was taken to the OR for intervention.          Based on my conversation with the vascular surgery team, the Angio-Seal device was deployed correctly. The patient likely had access site thrombosis related to either a small caliber femoral artery where even the 5 French sheath was partially occlusive   in the artery or possibly sheath tip related thrombus in a patient with low systemic blood pressure. The patient's arterial circulation in the right lower extremity has been restored surgically. Please refer to the surgical note for details.    FINDINGS:  This study was not a diagnostic exam. It was done primarily to assist with hemostasis and serious post prostatectomy lower urinary hemorrhage associated with hypotension. Tortuosity and elongation of the arteries is noted. The vessels are in clamp down.   The left common iliac artery, external and internal iliac arteries and the divisions of the internal iliac artery are patent.    The right common iliac artery, external and internal iliac arteries and the anterior  and posterior divisions are patent though small caliber. The vessels appear to be in a clamp down state.    The postembolization run shows stasis in the right and left hypogastric artery primarily the anterior division which was the target of embolization.       Impression:        Successful catheterization and angiography of the anterior divisions of the left and right hypogastric arteries with embolization using 500 um sized Gelfoam cubes successfully.     The procedure was complicated by acute occlusion of the superficial femoral artery. Vascular surgery responded promptly to the request for assistance and the patient was taken to the OR for further management.    Thank you for the opportunity to assist you in the care of your patient.      Electronically Signed: Patrick Christine MD    6/21/2024 3:41 PM EDT    Workstation ID: [id number]            Assessment & Plan   Impression      BPH with urinary obstruction    Hypothyroidism    Mixed hyperlipidemia    Benign essential hypertension    Urinary retention    BPH with obstruction/lower urinary tract symptoms    Stage 3a chronic kidney disease       Plan      Nestor Nye is a 78 y.o. male with PMH BPH with urinary obstruction, HTN, dyslipidemia, hypothyroidism, and CKD stage IIIb (baseline sCr 1.4) who presents to [facility] on 6/21/24 for an elective aqua ablation per Dr Rey with Urology.      Patient has a history of chronic lower urinary tract symptoms requiring chronic indwelling catheter, with prior cystoscopy showing very large, occlusive lateral prostate lobes. He was seen in office on 3/14/24, at which time he elected to undergo ablation and completed 5 days of Bactrim for surgical prophylaxis, with last dose on 6/20/24.      Patient was admitted to Hospital Medicine post-ablation as the case took longer to perform due his large prostate. Case was complicated by hematuria, which required bilateral hypogastric artery embolization by Dr. Christine with  Interventional Radiology via right CGA access. Angioseal was placed post-procedure; however, shortly after the case poor perfusion was noted in the right lower extremity, confirming no SFA flow on arterial duplex. Vascular surgery was consulted and the patient was taken for emergent right groin exploration per Dr. Morris. An acute thrombus was retrieved near the aortic bifurcation with return of pulsatile bleeding noted. Blood pressure was augmented with a pressor bolus with restoration of pulses.      He is admitted to the ICU postoperatively for further management.    Post-femoral endarterectomy management per Vascular Surgery: peripheral vascular and site checks per protocol  Post-ablation management per Urology: Keep CBI running until 0400 on 6/22/24, then start weaning with goal to be discontinued by 0700.   Follow up urine culture  Cefazolin for surgical prophylaxis  Hold home antihypertensives; Levophed PRN for MAP >65  Continue home levothyroxine  IVF, start PO diet  Aggressive pulmonary toilet  AM labs    DVT Prophylaxis: Mechanical  GI Prophylaxis: PPI  Dispo: ICU      Critical Care time spent in direct patient care: 36 minutes (excluding procedure time, if applicable) including high complexity decision making to assess, manipulate, and support vital organ system failure in this individual who has impairment of one or more vital organ systems such that there is a high probability of imminent or life threatening deterioration in the patient's condition.     Awa Flores, MSN, APRN, ACNPC-AG  Pulmonary and Critical Care Medicine  Electronically signed by LANNY Lugo, 06/21/24, 6:16 PM EDT.          Electronically signed by Cha Flores APRN at 06/21/24 1834          Consult Notes (all)        Kingsley Morris MD at 06/21/24 1540          FEMORAL ENDARTERECTOMY  Procedure Report    Patient Name:  Nestor Nye  YOB: 1945    Date of Surgery:  6/21/2024      Indications:  78 year old  gentleman referred by Dr. Christine, a patient of Dr. Roxanne Slater, who underwent aquablation with Dr. Prieto Rey today for BPH which was complicated by hematuria which required BILATERAL hypogastric artery embolization  The embolization was performed via RIGHT CFA access with a heparin drip through the sheath side port.  After placement of the Angioseal, there is poor perfusion of the RIGHT lower extremity which was confirmed by arterial duplex.  The PFA is patent, but the SFA demonstrates no flow.  I spoke with the patient's brother over the phone and obtained informed consent for RIGHT groin exploration and all indicated procedures.    Pre-op Diagnosis:   RIGHT CFA thrombosis       Post-Op Diagnosis Codes:  RIGHT CFA thrombosis - favor thrombus which developed at the end of the sheath    Procedure/CPT® Codes:  RIGHT iliofemoral embolectomy    Staff:  Surgeon(s):  Kingsley Morris MD    Circulator: Yaneth Noland RN; Paolo Zaldivar RN  Scrub Person: Brittney Mcnally; Clarke Foy                 Anesthesia: Choice    Estimated Blood Loss: 100ml    Implants:    Implant Name Type Inv. Item Serial No.  Lot No. LRB No. Used Action   CLIP LIGAT VASC HORIZON TI LG ORNG 6CT - [id number] Implant CLIP LIGAT VASC HORIZON TI LG ORNG 6CT  [employer]  Right 1 Implanted   HEMOST ABS SURGICEL SNOW 1X2IN - [id number] Implant HEMOST ABS SURGICEL SNOW 1X2IN  [employer]  DIV OF J AND J [id number] Right 1 Implanted       Specimen:          Specimens       ID Source Type Tests Collected By Collected At Frozen?    A Femoral Artery Tissue TISSUE PATHOLOGY EXAM   Kingsley Morris MD 6/21/24 6276     Description: femoral thrombus    This specimen was not marked as sent.                Findings: The RIGHT CFA is not pulsatile.  No immediate thrombus was noted at the Angioseal site.  Transverse arteriotomy demonstrates back bleeding from the SFA and 4Fr embolectomy catheter  retrieved a thrombus from within the RIGHT EIA region with return of pulsatile inflow.    Complications: None    Description of Procedure: The patient was transferred intubated from the IR suite and placed supine on the OR table.  The RIGHT groin and circumferential thigh/leg were then prepped and draped in usual sterile fashion.  The patient was identified as Nestor Nye per time-out protocol.    An oblique incision was made in the RIGHT groin and dissection was carried down to the level of the CFA.  There is minor hematoma from around the Angioseal area and the CFA is noted to be non-pulsatile.  Most notably, the CFA is small in size approximately 4-5mm.  The CFA was encircled proximally and distally and a transverse incision was made in the mid CFA.  There is no bleeding noted or immediate thrombus identified.  The 5Fr Miguel was chosen, but appears too big for the pt's anatomy.  The 4Fr Miguel was then advanced 20cm retrograde towards the aortic bifurcation and an acute thrombus was retrieved with return of pulsatile bleeding noted.  The 4Fr Fogary was then advanced 40cm antegrade towards the feet with no thrombus retrieved and moderate back bleeding noted.  The transverse arteriotomy was approximated using 6-0 Prolene in interrupted fashion with good hemostasis.  After restoration of flow, the pulse is not well appreciated with a systolic pressure of 90mmHg.  After a pressor bolus, the pulse is now easily appreciable at a systolic pressure 130mmHg and a PT signal is identified by doppler interrogation.  Surgicel snow was applied to the repair site and the wound was then closed in layers using 2-0 and 3-0 Vicryl with 4-0 Monocryl to approximate the skin edges.  Skin glue and Aquacel were applied to the incision.  At the end of the case, the sponge and needle counts were reported as correct.      Kingsley Morris MD     Date: 6/21/2024  Time: 15:40 EDT      Electronically signed by Kingsley Morris MD at  06/21/24 0550

## 2024-06-21 NOTE — OP NOTE
Pre-procedure diagnosis:   BPH with urinary obstruction    Post-procedure diagnosis:   SAME    Procedure performed:   Aquablation CPT- 0421T    Surgeon  Prieto Rey MD    Anesthesia  General    Specimens  Prostate tissue    EBL  100 cc    FINDINGS:  1. Prostate was sized at > 170 cc       PROCEDURE IN DETAIL:  The patient was brought to the operating room. Site and scope of surgery were confirmed with the patient. The patient’s identity was confirmed.  General anesthesia was achieved without complications. Patient was then placed in lithotomy position. Genitals were prepped and draped in standard sterile fashion. Appropriate time-out was performed. Intravenous antibiotic was given. The TRUS Stepper was mounted to the Articulating Arm and secured to OR bed. The ultrasound probe was attached to the stepper. The ultrasound probe was aligned, and confirmation made that the prostate is centered and aligned using both transverse and sagittal views. The bladder neck, verumontanum and the central/transition zones were identified. [optional] A clinical assessment of the prostate was performed measuring dimensions (height, width, length) to estimate prostate size and note prostate anatomy. The 24F AQUABEAM Handpiece was inserted into the prostatic urethra and a complete cystoscopic evaluation was performed by inspecting the prostate, bladder, and identifying the location of the verumontanum/external sphincter. The AQUABEAM Handpiece was secured to the Handpiece Articulating Arm. Confirmed alignment of AQUABEAM Handpiece and TRUS Probe to be parallel and colinear. Confirmation that AQUABEAM nozzle is centered and anterior of the bladder neck or the median lobe. The cystoscope was then retracted to visualize the verumontanum and external sphincter and the cystoscope tip was positioned just proximal to the external sphincter. Reconfirmed alignment of the TRUS probe with the AQUABEAM Handpiece and compression applied with  TRUS probe. Horizontal alignment of the Handpiece waterjet nozzle was performed. The Aquablation treatment zones were planned utilizing real-time TRUS to visualize the contour of the prostate and the depth and radial angles of resection were defined in the transverse view. In the sagittal view, the AQUABEAM nozzle is identified and position registered with software. The treatment contours were then adjusted to conform to the intended resection margins. The median lobe, bladder neck and verumontanum were marked and confirmed in the treatment contour. The Aquablation Treatment was then started following the resection contour confirmed under ultrasound guidance.     TOTAL AQUABLATION RESECTION TIME: 12:42    Once Aquablation resection was complete, cystoscopy and tissue/clot evacuation was performed using a 26 Fr cystoscope/resectoscope until light pink.  We then resected the fluffy tissue that had been destroyed and performed cautery with the bipolar loop and half-moon ball at settings of 3 and 3.  A 24 Swedish Gillespie catheter was inserted under ultrasound guidance. Balloon was inflated to   30 cc. Balloon positioning at bladder neck was confirmed and CBI started.Ultrasound probe was then removed and patient was brought out of lithotomy and Gillespie was put on traction for 6 hours. Patient was then brought to recovery in stable condition.

## 2024-06-21 NOTE — OP NOTE
FEMORAL ENDARTERECTOMY  Procedure Report    Patient Name:  Nestor Nye  YOB: 1945    Date of Surgery:  6/21/2024     Indications:  78 year old  gentleman referred by Dr. Christine, a patient of Dr. Roxanne Slater, who underwent aquablation with Dr. Prieto Rey today for BPH which was complicated by hematuria which required BILATERAL hypogastric artery embolization  The embolization was performed via RIGHT CFA access with a heparin drip through the sheath side port.  After placement of the Angioseal, there is poor perfusion of the RIGHT lower extremity which was confirmed by arterial duplex.  The PFA is patent, but the SFA demonstrates no flow.  I spoke with the patient's brother over the phone and obtained informed consent for RIGHT groin exploration and all indicated procedures.    Pre-op Diagnosis:   RIGHT CFA thrombosis       Post-Op Diagnosis Codes:  RIGHT CFA thrombosis - favor thrombus which developed at the end of the sheath    Procedure/CPT® Codes:  RIGHT iliofemoral embolectomy    Staff:  Surgeon(s):  Kingsley Morris MD    Circulator: Yaneth Noland RN; Paolo Zaldivar RN  Scrub Person: Brittney Mcnally; Clarke Foy                 Anesthesia: Choice    Estimated Blood Loss: 100ml    Implants:    Implant Name Type Inv. Item Serial No.  Lot No. LRB No. Used Action   CLIP LIGAT VASC HORIZON TI LG ORNG 6CT - [id number] Implant CLIP LIGAT VASC HORIZON TI LG ORNG 6CT  [employer]  Right 1 Implanted   HEMOST ABS SURGICEL SNOW 1X2IN - [id number] Implant HEMOST ABS SURGICEL SNOW 1X2IN  [employer] [id number] Right 1 Implanted       Specimen:          Specimens       ID Source Type Tests Collected By Collected At Frozen?    A Femoral Artery Tissue TISSUE PATHOLOGY EXAM   Kingsley Morris MD 6/21/24 1282     Description: femoral thrombus    This specimen was not marked as sent.                Findings: The RIGHT CFA is not pulsatile.  No immediate  thrombus was noted at the Angioseal site.  Transverse arteriotomy demonstrates back bleeding from the SFA and 4Fr embolectomy catheter retrieved a thrombus from within the RIGHT EIA region with return of pulsatile inflow.    Complications: None    Description of Procedure: The patient was transferred intubated from the IR suite and placed supine on the OR table.  The RIGHT groin and circumferential thigh/leg were then prepped and draped in usual sterile fashion.  The patient was identified as Nestor Nye per time-out protocol.    An oblique incision was made in the RIGHT groin and dissection was carried down to the level of the CFA.  There is minor hematoma from around the Angioseal area and the CFA is noted to be non-pulsatile.  Most notably, the CFA is small in size approximately 4-5mm.  The CFA was encircled proximally and distally and a transverse incision was made in the mid CFA.  There is no bleeding noted or immediate thrombus identified.  The 5Fr Miguel was chosen, but appears too big for the pt's anatomy.  The 4Fr Miguel was then advanced 20cm retrograde towards the aortic bifurcation and an acute thrombus was retrieved with return of pulsatile bleeding noted.  The 4Fr Fogary was then advanced 40cm antegrade towards the feet with no thrombus retrieved and moderate back bleeding noted.  The transverse arteriotomy was approximated using 6-0 Prolene in interrupted fashion with good hemostasis.  After restoration of flow, the pulse is not well appreciated with a systolic pressure of 90mmHg.  After a pressor bolus, the pulse is now easily appreciable at a systolic pressure 130mmHg and a PT signal is identified by doppler interrogation.  Surgicel snow was applied to the repair site and the wound was then closed in layers using 2-0 and 3-0 Vicryl with 4-0 Monocryl to approximate the skin edges.  Skin glue and Aquacel were applied to the incision.  At the end of the case, the sponge and needle counts were  reported as correct.      Kingsley Morris MD     Date: 6/21/2024  Time: 15:40 EDT

## 2024-06-22 LAB
ANION GAP SERPL CALCULATED.3IONS-SCNC: 11 MMOL/L (ref 5–15)
BUN SERPL-MCNC: 16 MG/DL (ref 8–23)
BUN/CREAT SERPL: 10.1 (ref 7–25)
CALCIUM SPEC-SCNC: 7.8 MG/DL (ref 8.6–10.5)
CHLORIDE SERPL-SCNC: 109 MMOL/L (ref 98–107)
CO2 SERPL-SCNC: 21 MMOL/L (ref 22–29)
CREAT SERPL-MCNC: 1.59 MG/DL (ref 0.76–1.27)
DEPRECATED RDW RBC AUTO: 45.1 FL (ref 37–54)
EGFRCR SERPLBLD CKD-EPI 2021: 44.4 ML/MIN/1.73
ERYTHROCYTE [DISTWIDTH] IN BLOOD BY AUTOMATED COUNT: 13.1 % (ref 12.3–15.4)
GLUCOSE SERPL-MCNC: 143 MG/DL (ref 65–99)
HCT VFR BLD AUTO: 24.8 % (ref 37.5–51)
HCT VFR BLD AUTO: 25.6 % (ref 37.5–51)
HCT VFR BLD AUTO: 26.5 % (ref 37.5–51)
HCT VFR BLD AUTO: 27 % (ref 37.5–51)
HGB BLD-MCNC: 8.3 G/DL (ref 13–17.7)
HGB BLD-MCNC: 8.7 G/DL (ref 13–17.7)
HGB BLD-MCNC: 9 G/DL (ref 13–17.7)
HGB BLD-MCNC: 9.3 G/DL (ref 13–17.7)
MAGNESIUM SERPL-MCNC: 1.7 MG/DL (ref 1.6–2.4)
MCH RBC QN AUTO: 31.7 PG (ref 26.6–33)
MCHC RBC AUTO-ENTMCNC: 34 G/DL (ref 31.5–35.7)
MCV RBC AUTO: 93.3 FL (ref 79–97)
PHOSPHATE SERPL-MCNC: 3.3 MG/DL (ref 2.5–4.5)
PLATELET # BLD AUTO: 222 10*3/MM3 (ref 140–450)
PMV BLD AUTO: 10 FL (ref 6–12)
POTASSIUM SERPL-SCNC: 4.6 MMOL/L (ref 3.5–5.2)
RBC # BLD AUTO: 2.84 10*6/MM3 (ref 4.14–5.8)
SODIUM SERPL-SCNC: 141 MMOL/L (ref 136–145)
WBC NRBC COR # BLD AUTO: 14.21 10*3/MM3 (ref 3.4–10.8)

## 2024-06-22 PROCEDURE — 99232 SBSQ HOSP IP/OBS MODERATE 35: CPT

## 2024-06-22 PROCEDURE — 85018 HEMOGLOBIN: CPT

## 2024-06-22 PROCEDURE — 85018 HEMOGLOBIN: CPT | Performed by: INTERNAL MEDICINE

## 2024-06-22 PROCEDURE — 85014 HEMATOCRIT: CPT

## 2024-06-22 PROCEDURE — 80048 BASIC METABOLIC PNL TOTAL CA: CPT | Performed by: INTERNAL MEDICINE

## 2024-06-22 PROCEDURE — 83735 ASSAY OF MAGNESIUM: CPT

## 2024-06-22 PROCEDURE — 99233 SBSQ HOSP IP/OBS HIGH 50: CPT | Performed by: UROLOGY

## 2024-06-22 PROCEDURE — 84100 ASSAY OF PHOSPHORUS: CPT

## 2024-06-22 PROCEDURE — 25810000003 SODIUM CHLORIDE 0.9 % SOLUTION

## 2024-06-22 PROCEDURE — 85014 HEMATOCRIT: CPT | Performed by: INTERNAL MEDICINE

## 2024-06-22 PROCEDURE — 25010000002 CEFAZOLIN PER 500 MG: Performed by: UROLOGY

## 2024-06-22 PROCEDURE — 85027 COMPLETE CBC AUTOMATED: CPT | Performed by: INTERNAL MEDICINE

## 2024-06-22 PROCEDURE — 25010000002 ONDANSETRON PER 1 MG: Performed by: INTERNAL MEDICINE

## 2024-06-22 RX ORDER — PANTOPRAZOLE SODIUM 40 MG/1
40 TABLET, DELAYED RELEASE ORAL
Status: DISCONTINUED | OUTPATIENT
Start: 2024-06-23 | End: 2024-06-26 | Stop reason: HOSPADM

## 2024-06-22 RX ORDER — IPRATROPIUM BROMIDE AND ALBUTEROL SULFATE 2.5; .5 MG/3ML; MG/3ML
3 SOLUTION RESPIRATORY (INHALATION) EVERY 4 HOURS PRN
Status: DISCONTINUED | OUTPATIENT
Start: 2024-06-22 | End: 2024-06-26 | Stop reason: HOSPADM

## 2024-06-22 RX ADMIN — SODIUM CHLORIDE 2000 MG: 900 INJECTION INTRAVENOUS at 00:18

## 2024-06-22 RX ADMIN — Medication 10 ML: at 20:44

## 2024-06-22 RX ADMIN — PANTOPRAZOLE SODIUM 40 MG: 40 INJECTION, POWDER, FOR SOLUTION INTRAVENOUS at 06:12

## 2024-06-22 RX ADMIN — SODIUM CHLORIDE 2000 MG: 900 INJECTION INTRAVENOUS at 08:37

## 2024-06-22 RX ADMIN — SODIUM CHLORIDE 50 ML/HR: 900 INJECTION, SOLUTION INTRAVENOUS at 18:09

## 2024-06-22 RX ADMIN — LEVOTHYROXINE SODIUM 88 MCG: 88 TABLET ORAL at 06:12

## 2024-06-22 RX ADMIN — ONDANSETRON 4 MG: 2 INJECTION INTRAMUSCULAR; INTRAVENOUS at 00:21

## 2024-06-22 NOTE — PROGRESS NOTES
"Urology Inpatient Progress Note    Subjective  Patient doing very well.  He is in excellent spirits and happy that he had procedure done.  He is off of pressors, no major events overnight.    Physical Exam  Visit Vitals  /78 (BP Location: Left arm, Patient Position: Lying)   Pulse 103   Temp 98.1 °F (36.7 °C) (Oral)   Resp 18   Ht 170.2 cm (67.01\")   Wt 76 kg (167 lb 8.8 oz)   SpO2 92%   BMI 26.24 kg/m²     Constitutional: NAD, WDWN.  Cardiovascular: Regular rate.  Pulmonary/Chest: Respirations are even and non-labored bilaterally.  Abdominal: Soft. No distension, tenderness, masses or guarding. No CVA tenderness.  Extremities: SAUL x 4, Warm. No clubbing.  No cyanosis.    Skin: Pink, warm and dry.  No rashes noted.    Genitourinary  Urine clear light watermelon.  No clots with irrigation.    I/O last 3 completed shifts:  In: 18439.8 [P.O.:60; I.V.:3397.8; Other:67700; IV Piggyback:275]  Out: 58335 [Urine:18579]      Current Facility-Administered Medications:     acetaminophen (TYLENOL) tablet 650 mg, 650 mg, Oral, Q4H PRN **OR** acetaminophen (TYLENOL) 160 MG/5ML oral solution 650 mg, 650 mg, Oral, Q4H PRN **OR** acetaminophen (TYLENOL) suppository 650 mg, 650 mg, Rectal, Q4H PRN, Victoriano Tao MD    sennosides-docusate (PERICOLACE) 8.6-50 MG per tablet 2 tablet, 2 tablet, Oral, BID PRN **AND** polyethylene glycol (MIRALAX) packet 17 g, 17 g, Oral, Daily PRN **AND** bisacodyl (DULCOLAX) EC tablet 5 mg, 5 mg, Oral, Daily PRN **AND** bisacodyl (DULCOLAX) suppository 10 mg, 10 mg, Rectal, Daily PRN, Victoriano Tao MD    ipratropium-albuterol (DUO-NEB) nebulizer solution 3 mL, 3 mL, Nebulization, Q4H PRN, Cha Flores APRN    levothyroxine (SYNTHROID, LEVOTHROID) tablet 88 mcg, 88 mcg, Oral, Q AM, Victoriano Tao MD, 88 mcg at 06/22/24 0612    nitroglycerin (NITROSTAT) SL tablet 0.4 mg, 0.4 mg, Sublingual, Q5 Min PRN, Cha Flores APRN    norepinephrine (LEVOPHED) 8 mg in 250 mL NS infusion " "(premix), 0.02-0.3 mcg/kg/min, Intravenous, Titrated, Cha Flores APRN, Stopped at 06/22/24 0929    ondansetron ODT (ZOFRAN-ODT) disintegrating tablet 4 mg, 4 mg, Oral, Q6H PRN **OR** ondansetron (ZOFRAN) injection 4 mg, 4 mg, Intravenous, Q6H PRN, Victoriano Tao MD, 4 mg at 06/22/24 0021    pantoprazole (PROTONIX) injection 40 mg, 40 mg, Intravenous, Q AM, Cha Flores APRN, 40 mg at 06/22/24 0612    sodium chloride 0.9 % flush 10 mL, 10 mL, Intravenous, Q12H, Victoriano Tao MD, 10 mL at 06/21/24 2026    sodium chloride 0.9 % flush 10 mL, 10 mL, Intravenous, PRN, Victoriano Tao MD    sodium chloride 0.9 % infusion 40 mL, 40 mL, Intravenous, PRN, Victoriano Tao MD    sodium chloride 0.9 % infusion, 50 mL/hr, Intravenous, Continuous, Cha Flores APRN, Last Rate: 50 mL/hr at 06/22/24 0647, 50 mL/hr at 06/22/24 0647    Labs  Lab Results   Component Value Date    GLUCOSE 143 (H) 06/22/2024    CALCIUM 7.8 (L) 06/22/2024     06/22/2024    K 4.6 06/22/2024    CO2 21.0 (L) 06/22/2024     (H) 06/22/2024    BUN 16 06/22/2024    CREATININE 1.59 (H) 06/22/2024    EGFRIFAFRI 43 (L) 09/25/2017    EGFRIFNONA 35 (L) 09/14/2021    BCR 10.1 06/22/2024    ANIONGAP 11.0 06/22/2024       Lab Results   Component Value Date    WBC 14.21 (H) 06/22/2024    HGB 8.7 (L) 06/22/2024    HCT 25.6 (L) 06/22/2024    MCV 93.3 06/22/2024     06/22/2024       No results found for: \"URINECX\"    Brief Urine Lab Results       None              Radiographic Studies  IR Artery Embolization Occlusion    Result Date: 6/21/2024  Successful catheterization and angiography of the anterior divisions of the left and right hypogastric arteries with embolization using 500 um sized Gelfoam cubes successfully. The procedure was complicated by acute occlusion of the superficial femoral artery. Vascular surgery responded promptly to the request for assistance and the patient was taken to the OR for further management. Thank " you for the opportunity to assist you in the care of your patient. Electronically Signed: Patrick Christine MD  6/21/2024 3:41 PM EDT  Workstation ID: [id number]       Assessment  78 y.o. male with a PMHx of  has a past medical history of Acute maxillary sinusitis, Arthritis, Disease of thyroid gland, Enlarged prostate, GERD (gastroesophageal reflux disease), Gout, Hypertension, Impaired functional mobility, balance, gait, and endurance, PONV (postoperative nausea and vomiting), Renal disorder, Rheumatoid arthritis, Right inguinal hernia (10/28/2021), and Vocal cord anomaly (2007)., who is postop day 1 after aquablation for very large prostate, greater than 170 g.  Bleeding during case necessitated prostate artery embolization, complicated by SFA occlusion, but doing well after angioplasty.    Patient has recovered well, he has good pulses and great movement in both extremities, urine is light clear watermelon, off CBI.    Plan  1.  CBI, patient can stay off of it.  I had the nurse plug the inflow port.  Please call me with any concerns about urine flow.    2.  Recommend daily oral antibiotic while he has catheter in (approximately 1 week), renally dosed at the discretion of hospital medicine.  He should finish his Ancef today.    3.  Patient will keep catheter in until his follow-up appointment.  I would like to see him back in 1 week for a fill and void trial in my office.    4.  Patient is okay for ASA 81 from my standpoint.  I do not want any other blood thinners due to recent prostate bleeding, and I would like early ambulation if that is acceptable from vascular surgery.  Please call me to discuss if it is felt the patient will need long-term blood thinners, and we could discuss when it would be safe to initiate those.    Prieto Rey MD  Urologic Surgery  [facility]  [id number]   [place] [address]    [phone] office  [phone] fax  [email or web address]

## 2024-06-22 NOTE — ANESTHESIA PREPROCEDURE EVALUATION
Anesthesia Evaluation     Patient summary reviewed and Nursing notes reviewed   history of anesthetic complications:  PONV               Airway   Comment: INTUBATED  Dental      Pulmonary - negative pulmonary ROS   Cardiovascular     (+) hypertension, hyperlipidemia      Neuro/Psych- negative ROS  GI/Hepatic/Renal/Endo    (+) GERD, renal disease- CRI, thyroid problem hypothyroidism    Musculoskeletal     Abdominal    Substance History - negative use     OB/GYN negative ob/gyn ROS         Other   arthritis (rheumatoid),                 Anesthesia Plan    ASA 3 - emergent     general     intravenous induction       Plan discussed with CRNA.    CODE STATUS:    Level Of Support Discussed With: Patient  Code Status (Patient has no pulse and is not breathing): CPR (Attempt to Resuscitate)  Medical Interventions (Patient has pulse or is breathing): Full Support

## 2024-06-22 NOTE — ANESTHESIA PREPROCEDURE EVALUATION
Anesthesia Evaluation     Patient summary reviewed and Nursing notes reviewed   history of anesthetic complications:  PONV               Airway   Mallampati: II  TM distance: >3 FB  Neck ROM: full  No difficulty expected  Dental - normal exam     Pulmonary - negative pulmonary ROS and normal exam   Cardiovascular - normal exam    (+) hypertension, hyperlipidemia      Neuro/Psych- negative ROS  GI/Hepatic/Renal/Endo    (+) GERD, renal disease- CRI, thyroid problem hypothyroidism    Musculoskeletal     Abdominal  - normal exam    Bowel sounds: normal.   Substance History - negative use     OB/GYN negative ob/gyn ROS         Other   arthritis (RHEUMATOID),                 Anesthesia Plan    ASA 3     general     intravenous induction     Anesthetic plan, risks, benefits, and alternatives have been provided, discussed and informed consent has been obtained with: patient.    Plan discussed with CRNA.    CODE STATUS:    Level Of Support Discussed With: Patient  Code Status (Patient has no pulse and is not breathing): CPR (Attempt to Resuscitate)  Medical Interventions (Patient has pulse or is breathing): Full Support

## 2024-06-22 NOTE — PROGRESS NOTES
Intensive Care Follow-up     Hospital:  LOS: 1 day   Mr. Nestor Nye, 78 y.o. male is followed for: Glycemic, Electrolyte, Respiratory, and Medical management         Subjective   Interval History:  Chart reviewed. Patient alert and oriented, pleasant and conversant, lying in bed. No acute distress. VSS. Oxygenating well on 2 L nasal cannula. CBI completed. Right groin dressing C/D/I. No hematoma. Endorses right groin tenderness, but denies shortness of breath, nausea, vomiting, abdominal discomfort. No family at bedside.     The patient's past medical, surgical and social history were reviewed and updated in Epic as appropriate.     Objective     Infusions:  norepinephrine, 0.02-0.3 mcg/kg/min, Last Rate: Stopped (06/22/24 0929)  sodium chloride, 50 mL/hr, Last Rate: 50 mL/hr (06/22/24 0647)      Medications:  levothyroxine, 88 mcg, Oral, Q AM  pantoprazole, 40 mg, Intravenous, Q AM  sodium chloride, 10 mL, Intravenous, Q12H    I reviewed the patient's medications.    Vital Sign Min/Max for last 24 hours  Temp  Min: 97 °F (36.1 °C)  Max: 98.3 °F (36.8 °C)   BP  Min: 80/51  Max: 131/83   Pulse  Min: 77  Max: 105   Resp  Min: 12  Max: 20   SpO2  Min: 79 %  Max: 100 %   Flow (L/min)  Min: 2  Max: 2       Input/Output for last 24 hour shift  06/21 0701 - 06/22 0700  In: 00469.8 [P.O.:60; I.V.:3397.8]  Out: 30958 [Urine:65794]   S RR:  [2-10] 2    Physical Exam:  GENERAL: Patient lying in bed and conversant. No acute distress.   HEENT: Normocephalic and atraumatic. Trachea midline. PER. EOM WNL.   LUNGS: Chest rise of normal depth and symmetric. Lungs clear to auscultation bilaterally. No wheezes, rhonchi, or rales.   HEART: S1,S2 detected. Regular rate and rhythm. No rub, murmur, or gallop.   ABDOMEN: Soft, round, nondistended, and nontender. Bowel sounds present. Indwelling catheter in place with sanguinous urine.   EXTREMITIES: No clubbing, edema, or cyanosis. Peripheral pulses present.   NEURO/PSYCH: Alert  and oriented. Follows commands. Moves all extremities. No focal deficits.      Results from last 7 days   Lab Units 06/22/24  0808 06/22/24  0419 06/22/24  0039 06/21/24  1202   WBC 10*3/mm3  --  14.21*  --  10.05   HEMOGLOBIN g/dL 8.7* 9.0* 9.3* 12.5*   PLATELETS 10*3/mm3  --  222  --  227     Results from last 7 days   Lab Units 06/22/24  0419 06/21/24  1202   SODIUM mmol/L 141 139   POTASSIUM mmol/L 4.6 4.7   CO2 mmol/L 21.0* 25.0   BUN mg/dL 16 15   CREATININE mg/dL 1.59* 1.89*   MAGNESIUM mg/dL 1.7  --    PHOSPHORUS mg/dL 3.3  --    GLUCOSE mg/dL 143* 133*     Estimated Creatinine Clearance: 41.8 mL/min (A) (by C-G formula based on SCr of 1.59 mg/dL (H)).      I reviewed the patient's new clinical results.  I reviewed the patient's new imaging results/reports including actual images and agree with reports.     Imaging Results (Last 24 Hours)       Procedure Component Value Units Date/Time    IR Artery Embolization Occlusion [[id number]] Collected: 06/21/24 1457     Updated: 06/21/24 1544    Narrative:      IR ARTERY EMBOLIZATION OCCLUSION     History: prostate artery embolization. Status post surgical prostatectomy with postoperative hematuria and hypotension. Prostate artery embolization was requested. Because of the emergency nature of the procedure, I also considered hypogastric artery   embolization if prostate artery embolization would not be possible.    Embolization. 12 minutes fluoro time, 62 mGy. 100 cc Visipaque 320. 3 mL Lidocaine. GA.     : Patrick Christine M.D.     MODALITY: Sonography and fluoroscopy.                       DOSE REDUCTION: The examination was performed according to departmental dose-optimization program.    FLUOROSCOPY TIME: 12 minutes    RADIATION DOSE: 61.6 mGy air-Kerma.      SEDATION: No sedation was used.    ANESTHESIA:  Lidocaine 1% local infiltration.      MEDICINES: Ancef 1 g IV    CONTRAST MEDIUM: Visipaque 320, 100 cc.    ESTIMATED blood loss: < 5 cc.             TECHNIQUE:  A thorough discussion of the risks, benefits, and alternatives of the procedure, and if applicable, moderate sedation, was carried out with the patient's brother over the telephone due to the emergency nature of the procedure. They were encouraged to ask   any questions. Any questions were answered. They verbalized understanding. A written informed consent was then signed.      A multi-component timeout was performed prior to starting the procedure using the departmental protocol.     The procedure room personnel used personal protective equipment. The operators used sterile gloves and if indicated, sterile gowns. The surgical site was prepped with chlorhexidine gluconate  and draped in the maximal applicable sterile fashion.    The right common femoral artery was localized using anatomic, fluoroscopic and ultrasonographic landmarks. The artery is patent. Pertinent US images were stored for documentation. Using aseptic precautions, real-time ultrasonographic guidance, after   local anesthesia and dermatotomy, the artery was accessed with an access needle. Eventually a guide wire was placed using standard exchanges. Over the wire a 5 French Short vascular sheath was placed. The sheath was connected to a heparinized saline   drip.    Selective and superselective catheterization of the following arteries was performed:  Contralateral common iliac artery, contralateral internal iliac artery, contralateral hypogastric artery, contralateral anterior division of the hypogastric artery.  Ipsilateral common femoral artery, hypogastric artery and anterior division of the hypogastric artery.    The equipment used for catheterization was: Bentson wire, Glidewire, Cobra C2 5 French catheter, RIM catheter    At each catheterization and superselective catheterization, following test injection, digital subtraction angiography was performed and repeated in different fluoroscopic projections if and as needed.    This  was followed by superselective embolization of the following arteries: Right and left hypogastric artery anterior divisions.    The agent used for embolization was Gelfoam 500 um cubes    Post embolization run(s) was(were) performed from the following location: Bilateral hypogastric arteries.    The endpoint of embolization was stasis.    The access site was closed using Angio-Seal.    The procedure was complicated by acute occlusion of the right superficial femoral artery associated with a cold foot. Stat vascular Doppler was obtained and vascular surgery was consulted. The patient was taken to the OR.     COMPLICATIONS: Post procedurally there was occlusion of the right superficial femoral artery after deployment of the Angio-Seal device, discovered immediately, investigated and vascular surgery consulted. The patient was taken to the OR for intervention.          Based on my conversation with the vascular surgery team, the Angio-Seal device was deployed correctly. The patient likely had access site thrombosis related to either a small caliber femoral artery where even the 5 [place] sheath was partially occlusive   in the artery or possibly sheath tip related thrombus in a patient with low systemic blood pressure. The patient's arterial circulation in the right lower extremity has been restored surgically. Please refer to the surgical note for details.    FINDINGS:  This study was not a diagnostic exam. It was done primarily to assist with hemostasis and serious post prostatectomy lower urinary hemorrhage associated with hypotension. Tortuosity and elongation of the arteries is noted. The vessels are in clamp down.   The left common iliac artery, external and internal iliac arteries and the divisions of the internal iliac artery are patent.    The right common iliac artery, external and internal iliac arteries and the anterior and posterior divisions are patent though small caliber. The vessels appear to be in a  clamp down state.    The postembolization run shows stasis in the right and left hypogastric artery primarily the anterior division which was the target of embolization.       Impression:        Successful catheterization and angiography of the anterior divisions of the left and right hypogastric arteries with embolization using 500 um sized Gelfoam cubes successfully.     The procedure was complicated by acute occlusion of the superficial femoral artery. Vascular surgery responded promptly to the request for assistance and the patient was taken to the OR for further management.    Thank you for the opportunity to assist you in the care of your patient.      Electronically Signed: Patrick Christine MD    6/21/2024 3:41 PM EDT    Workstation ID: [id number]            Assessment & Plan   Impression      BPH with urinary obstruction    Hypothyroidism    Mixed hyperlipidemia    Benign essential hypertension    Urinary retention    BPH with obstruction/lower urinary tract symptoms    Stage 3a chronic kidney disease       Plan      Nestor Nye is a 78 y.o. male with PMH BPH with urinary obstruction, HTN, dyslipidemia, hypothyroidism, and CKD stage IIIb (baseline sCr 1.4) who presents to [facility] on 6/21/24 for an elective aqua ablation per Dr Rey with Urology.      Patient has a history of chronic lower urinary tract symptoms requiring chronic indwelling catheter, with prior cystoscopy showing very large, occlusive lateral prostate lobes. He was seen in office on 3/14/24, at which time he elected to undergo ablation and completed 5 days of Bactrim for surgical prophylaxis, with last dose on 6/20/24.      Patient was admitted to Hospital Medicine post-ablation as the case took longer to perform due his large prostate. Case was complicated by hematuria, which required bilateral hypogastric artery embolization by Dr. Christine with Interventional Radiology via right CGA access. Angioseal was placed post-procedure; however,  shortly after the case poor perfusion was noted in the right lower extremity, confirming no SFA flow on arterial duplex. Vascular surgery was consulted and the patient was taken for emergent right groin exploration per Dr. Morris. An acute thrombus was retrieved near the aortic bifurcation with return of pulsatile bleeding noted. Blood pressure was augmented with a pressor bolus with restoration of pulses.      He is admitted to the ICU postoperatively for further management.    Post-femoral endarterectomy management per Vascular Surgery: peripheral vascular and site checks per protocol  Post-ablation management per Urology: CBI turned off this morning at 0700. Urine remains dark red; however, Urology OK to stop CBI and start ASA  Follow up urine culture  Cefazolin for surgical prophylaxis  Hold home antihypertensives; blood pressure has remained stable without Levophed  Discontinue arterial line  Continue home levothyroxine  Tolerating PO diet  OOB to chair; ambulate as tolerated  Aggressive pulmonary toilet; add PRN nebs  AM labs    DVT Prophylaxis: Mechanical  GI Prophylaxis: PPI  Dispo: Transfer to telemetry      Critical Care time spent in direct patient care: 36 minutes (excluding procedure time, if applicable) including high complexity decision making to assess, manipulate, and support vital organ system failure in this individual who has impairment of one or more vital organ systems such that there is a high probability of imminent or life threatening deterioration in the patient's condition.     Awa Flores, MSN, APRN, ACNPC-AG  Pulmonary and Critical Care Medicine  Electronically signed by LANNY Lugo, 06/22/24, 10:12 AM EDT.

## 2024-06-22 NOTE — PROGRESS NOTES
Patient is awake and alert comfortable resting in bed.  Pain well-controlled no complaints.    Vitals Per EMR    Patient is in no acute distress nontoxic appearing he appears comfortable  Respirations are nonlabored on room air with equal chest rise bilaterally  Sinus tachycardia  Abdomen is soft  Right groin incision is clean dry and intact with Aquacel in place  Bilateral feet are warm, multiphasic PT signals bilaterally  Patient can move all extremities well    Acute limb ischemia of the right lower extremity  6/21/2024 -right iliofemoral embolectomy (Dr. Morris)    -Resume aspirin 81 mg daily  -Okay to mobilize from my perspective with physical therapy  -Follow-up in 2 weeks with Dr. Morris for wound check

## 2024-06-23 LAB
ALBUMIN SERPL-MCNC: 3.4 G/DL (ref 3.5–5.2)
ALBUMIN/GLOB SERPL: 1.7 G/DL
ALP SERPL-CCNC: 83 U/L (ref 39–117)
ALT SERPL W P-5'-P-CCNC: <5 U/L (ref 1–41)
ANION GAP SERPL CALCULATED.3IONS-SCNC: 8 MMOL/L (ref 5–15)
AST SERPL-CCNC: 32 U/L (ref 1–40)
BACTERIA UR QL AUTO: ABNORMAL /HPF
BASOPHILS # BLD AUTO: 0.04 10*3/MM3 (ref 0–0.2)
BASOPHILS NFR BLD AUTO: 0.2 % (ref 0–1.5)
BILIRUB SERPL-MCNC: 0.8 MG/DL (ref 0–1.2)
BILIRUB UR QL STRIP: NEGATIVE
BUN SERPL-MCNC: 15 MG/DL (ref 8–23)
BUN/CREAT SERPL: 9 (ref 7–25)
CALCIUM SPEC-SCNC: 8.3 MG/DL (ref 8.6–10.5)
CHLORIDE SERPL-SCNC: 106 MMOL/L (ref 98–107)
CLARITY UR: ABNORMAL
CO2 SERPL-SCNC: 25 MMOL/L (ref 22–29)
COLOR UR: ABNORMAL
CREAT SERPL-MCNC: 1.67 MG/DL (ref 0.76–1.27)
DEPRECATED RDW RBC AUTO: 47.2 FL (ref 37–54)
EGFRCR SERPLBLD CKD-EPI 2021: 41.9 ML/MIN/1.73
EOSINOPHIL # BLD AUTO: 0.02 10*3/MM3 (ref 0–0.4)
EOSINOPHIL NFR BLD AUTO: 0.1 % (ref 0.3–6.2)
ERYTHROCYTE [DISTWIDTH] IN BLOOD BY AUTOMATED COUNT: 13.4 % (ref 12.3–15.4)
GLOBULIN UR ELPH-MCNC: 2 GM/DL
GLUCOSE SERPL-MCNC: 100 MG/DL (ref 65–99)
GLUCOSE UR STRIP-MCNC: NEGATIVE MG/DL
HCT VFR BLD AUTO: 25.5 % (ref 37.5–51)
HCT VFR BLD AUTO: 26.1 % (ref 37.5–51)
HGB BLD-MCNC: 8.7 G/DL (ref 13–17.7)
HGB BLD-MCNC: 8.8 G/DL (ref 13–17.7)
HGB UR QL STRIP.AUTO: ABNORMAL
HYALINE CASTS UR QL AUTO: ABNORMAL /LPF
IMM GRANULOCYTES # BLD AUTO: 0.12 10*3/MM3 (ref 0–0.05)
IMM GRANULOCYTES NFR BLD AUTO: 0.7 % (ref 0–0.5)
KETONES UR QL STRIP: NEGATIVE
LEUKOCYTE ESTERASE UR QL STRIP.AUTO: ABNORMAL
LYMPHOCYTES # BLD AUTO: 1.5 10*3/MM3 (ref 0.7–3.1)
LYMPHOCYTES NFR BLD AUTO: 9.2 % (ref 19.6–45.3)
MAGNESIUM SERPL-MCNC: 1.9 MG/DL (ref 1.6–2.4)
MCH RBC QN AUTO: 32.8 PG (ref 26.6–33)
MCHC RBC AUTO-ENTMCNC: 34.1 G/DL (ref 31.5–35.7)
MCV RBC AUTO: 96.2 FL (ref 79–97)
MONOCYTES # BLD AUTO: 1.73 10*3/MM3 (ref 0.1–0.9)
MONOCYTES NFR BLD AUTO: 10.6 % (ref 5–12)
NEUTROPHILS NFR BLD AUTO: 12.87 10*3/MM3 (ref 1.7–7)
NEUTROPHILS NFR BLD AUTO: 79.2 % (ref 42.7–76)
NITRITE UR QL STRIP: NEGATIVE
NRBC BLD AUTO-RTO: 0 /100 WBC (ref 0–0.2)
PH UR STRIP.AUTO: 6 [PH] (ref 5–8)
PHOSPHATE SERPL-MCNC: 2.7 MG/DL (ref 2.5–4.5)
PLATELET # BLD AUTO: 203 10*3/MM3 (ref 140–450)
PMV BLD AUTO: 10.6 FL (ref 6–12)
POTASSIUM SERPL-SCNC: 4.4 MMOL/L (ref 3.5–5.2)
PROT SERPL-MCNC: 5.4 G/DL (ref 6–8.5)
PROT UR QL STRIP: ABNORMAL
RBC # BLD AUTO: 2.65 10*6/MM3 (ref 4.14–5.8)
RBC # UR STRIP: ABNORMAL /HPF
REF LAB TEST METHOD: ABNORMAL
SODIUM SERPL-SCNC: 139 MMOL/L (ref 136–145)
SP GR UR STRIP: 1.01 (ref 1–1.03)
SQUAMOUS #/AREA URNS HPF: ABNORMAL /HPF
UROBILINOGEN UR QL STRIP: ABNORMAL
WBC # UR STRIP: ABNORMAL /HPF
WBC NRBC COR # BLD AUTO: 16.28 10*3/MM3 (ref 3.4–10.8)

## 2024-06-23 PROCEDURE — 99232 SBSQ HOSP IP/OBS MODERATE 35: CPT

## 2024-06-23 PROCEDURE — 94799 UNLISTED PULMONARY SVC/PX: CPT

## 2024-06-23 PROCEDURE — 85018 HEMOGLOBIN: CPT

## 2024-06-23 PROCEDURE — 25810000003 SODIUM CHLORIDE 0.9 % SOLUTION

## 2024-06-23 PROCEDURE — 80053 COMPREHEN METABOLIC PANEL: CPT

## 2024-06-23 PROCEDURE — 94640 AIRWAY INHALATION TREATMENT: CPT

## 2024-06-23 PROCEDURE — 83735 ASSAY OF MAGNESIUM: CPT

## 2024-06-23 PROCEDURE — 84100 ASSAY OF PHOSPHORUS: CPT

## 2024-06-23 PROCEDURE — 85014 HEMATOCRIT: CPT

## 2024-06-23 PROCEDURE — 81001 URINALYSIS AUTO W/SCOPE: CPT

## 2024-06-23 PROCEDURE — 92610 EVALUATE SWALLOWING FUNCTION: CPT

## 2024-06-23 PROCEDURE — 85025 COMPLETE CBC W/AUTO DIFF WBC: CPT

## 2024-06-23 RX ORDER — ASPIRIN 81 MG/1
81 TABLET, CHEWABLE ORAL DAILY
Status: DISCONTINUED | OUTPATIENT
Start: 2024-06-23 | End: 2024-06-26 | Stop reason: HOSPADM

## 2024-06-23 RX ORDER — AMOXICILLIN AND CLAVULANATE POTASSIUM 500; 125 MG/1; MG/1
1 TABLET, FILM COATED ORAL 2 TIMES DAILY
Status: DISCONTINUED | OUTPATIENT
Start: 2024-06-23 | End: 2024-06-26 | Stop reason: HOSPADM

## 2024-06-23 RX ADMIN — Medication 10 ML: at 08:23

## 2024-06-23 RX ADMIN — IPRATROPIUM BROMIDE AND ALBUTEROL SULFATE 3 ML: 2.5; .5 SOLUTION RESPIRATORY (INHALATION) at 05:12

## 2024-06-23 RX ADMIN — LEVOTHYROXINE SODIUM 88 MCG: 88 TABLET ORAL at 05:51

## 2024-06-23 RX ADMIN — ASPIRIN 81 MG: 81 TABLET, CHEWABLE ORAL at 08:22

## 2024-06-23 RX ADMIN — Medication 10 ML: at 20:42

## 2024-06-23 RX ADMIN — AMOXICILLIN AND CLAVULANATE POTASSIUM 500 MG: 500; 125 TABLET, FILM COATED ORAL at 08:22

## 2024-06-23 RX ADMIN — AMOXICILLIN AND CLAVULANATE POTASSIUM 500 MG: 500; 125 TABLET, FILM COATED ORAL at 20:42

## 2024-06-23 RX ADMIN — PANTOPRAZOLE SODIUM 40 MG: 40 TABLET, DELAYED RELEASE ORAL at 05:51

## 2024-06-23 RX ADMIN — SODIUM CHLORIDE 50 ML/HR: 900 INJECTION, SOLUTION INTRAVENOUS at 14:31

## 2024-06-23 NOTE — PROGRESS NOTES
Intensive Care Follow-up     Hospital:  LOS: 2 days   Mr. Nestor Nye, 78 y.o. male is followed for: Glycemic, Electrolyte, Respiratory, and Medical management         Subjective   Interval History:  Chart reviewed. Patient alert and oriented, very pleasant and conversant, sitting up in chair. No acute distress. VSS. Oxygenating well on room air. Right groin dressing C/D/I. No hematoma. Denies pain, shortness of breath, nausea, vomiting, abdominal discomfort. No complaints at this time. Says he walked with PT yesterday and would like to walk as much as possible today.      The patient's past medical, surgical and social history were reviewed and updated in Epic as appropriate.     Objective     Infusions:  norepinephrine, 0.02-0.3 mcg/kg/min, Last Rate: Stopped (06/22/24 0929)  sodium chloride, 50 mL/hr, Last Rate: 50 mL/hr (06/22/24 1809)      Medications:  amoxicillin-clavulanate, 1 tablet, Oral, BID  aspirin, 81 mg, Oral, Daily  levothyroxine, 88 mcg, Oral, Q AM  pantoprazole, 40 mg, Oral, Q AM  sodium chloride, 10 mL, Intravenous, Q12H    I reviewed the patient's medications.    Vital Sign Min/Max for last 24 hours  Temp  Min: 98 °F (36.7 °C)  Max: 99.5 °F (37.5 °C)   BP  Min: 100/80  Max: 134/82   Pulse  Min: 92  Max: 118   Resp  Min: 16  Max: 24   SpO2  Min: 86 %  Max: 98 %   Flow (L/min)  Min: 1.5  Max: 1.5       Input/Output for last 24 hour shift  06/22 0701 - 06/23 0700  In: 2794.2 [P.O.:1568; I.V.:1226.2]  Out: 3425 [Urine:3425]      Physical Exam:  GENERAL: Patient sitting up in chair. No acute distress.   HEENT: Normocephalic and atraumatic. Trachea midline. PER. EOM WNL.   LUNGS: Chest rise of normal depth and symmetric. Lungs clear to auscultation bilaterally. No wheezes, rhonchi, or rales.   HEART: S1,S2 detected. Regular rate and rhythm. No rub, murmur, or gallop.   ABDOMEN: Soft, round, nondistended, and nontender. Bowel sounds present. Indwelling catheter in place with pin-tinged urine.     EXTREMITIES: No clubbing, edema, or cyanosis. Peripheral pulses present.   NEURO/PSYCH: Alert and oriented. Follows commands. Moves all extremities. No focal deficits.      Results from last 7 days   Lab Units 06/23/24  0836 06/23/24 0449 06/22/24 2051 06/22/24  0808 06/22/24  0419 06/22/24  0039 06/21/24  1202   WBC 10*3/mm3  --  16.28*  --   --  14.21*  --  10.05   HEMOGLOBIN g/dL 8.8* 8.7* 8.3*   < > 9.0*   < > 12.5*   PLATELETS 10*3/mm3  --  203  --   --  222  --  227    < > = values in this interval not displayed.     Results from last 7 days   Lab Units 06/23/24 0449 06/22/24 0419 06/21/24  1202   SODIUM mmol/L 139 141 139   POTASSIUM mmol/L 4.4 4.6 4.7   CO2 mmol/L 25.0 21.0* 25.0   BUN mg/dL 15 16 15   CREATININE mg/dL 1.67* 1.59* 1.89*   MAGNESIUM mg/dL 1.9 1.7  --    PHOSPHORUS mg/dL 2.7 3.3  --    GLUCOSE mg/dL 100* 143* 133*     Estimated Creatinine Clearance: 39.7 mL/min (A) (by C-G formula based on SCr of 1.67 mg/dL (H)).      I reviewed the patient's new clinical results.  I reviewed the patient's new imaging results/reports including actual images and agree with reports.     Assessment & Plan   Impression      BPH with urinary obstruction    Hypothyroidism    Mixed hyperlipidemia    Benign essential hypertension    Urinary retention    BPH with obstruction/lower urinary tract symptoms    Stage 3a chronic kidney disease       Plan      Nestor Nye is a 78 y.o. male with PMH BPH with urinary obstruction, HTN, dyslipidemia, hypothyroidism, and CKD stage IIIb (baseline sCr 1.4) who presents to [facility] on 6/21/24 for an elective aqua ablation per Dr Rey with Urology.      Patient has a history of chronic lower urinary tract symptoms requiring chronic indwelling catheter, with prior cystoscopy showing very large, occlusive lateral prostate lobes. He was seen in office on 3/14/24, at which time he elected to undergo ablation and completed 5 days of Bactrim for surgical prophylaxis, with last  dose on 6/20/24.      Patient was admitted to Hospital Medicine post-ablation as the case took longer to perform due his large prostate. Case was complicated by hematuria, which required bilateral hypogastric artery embolization by Dr. Christine with Interventional Radiology via right CGA access. Angioseal was placed post-procedure; however, shortly after the case poor perfusion was noted in the right lower extremity, confirming no SFA flow on arterial duplex. Vascular surgery was consulted and the patient was taken for emergent right groin exploration per Dr. Morris. An acute thrombus was retrieved near the aortic bifurcation with return of pulsatile bleeding noted. Blood pressure was augmented with a pressor bolus with restoration of pulses.      He was admitted to the ICU postoperatively for further management.    Post-femoral endarterectomy management per Vascular Surgery: peripheral vascular and site checks per protocol  Post-ablation management per Urology: CBI turned off 6/22/24. Urine has cleared significantly but still pink tinged.  Follow up urine culture  Augmentin started for urological procedure prophylaxis  Continue ASA  Resume home antihypertensives as necessary  Discontinue arterial line  Continue home levothyroxine  Tolerating PO diet  Ambulate as tolerated  Aggressive pulmonary toilet; add PRN nebs  AM labs    DVT Prophylaxis: Mechanical  GI Prophylaxis: PPI  Dispo: Transfer to telemetry    Critical Care time spent in direct patient care: 36 minutes (excluding procedure time, if applicable) including high complexity decision making to assess, manipulate, and support vital organ system failure in this individual who has impairment of one or more vital organ systems such that there is a high probability of imminent or life threatening deterioration in the patient's condition.     Awa Flores, MSN, APRN, ACNPC-AG  Pulmonary and Critical Care Medicine  Electronically signed by LANNY Lugo,  06/23/24, 11:54 AM EDT.

## 2024-06-23 NOTE — THERAPY EVALUATION
Acute Care - Speech Language Pathology   Swallow Initial Evaluation [facility]  Clinical Swallow Evaluation     Patient Name: Nestor Nye  : 1945  MRN: [id number]  Today's Date: 2024               Admit Date: 2024    Visit Dx:     ICD-10-CM ICD-9-CM   1. Urinary retention  R33.9 788.20   2. BPH with urinary obstruction  N40.1 600.01    N13.8 599.69   3. Femoral artery occlusion  I70.209 444.22     Patient Active Problem List   Diagnosis    Hypothyroidism    Mixed hyperlipidemia    Elevated PSA    Benign essential hypertension    Urinary retention    Small bowel obstruction    Acute UTI    BPH with obstruction/lower urinary tract symptoms    BPH with urinary obstruction    Anemia of chronic disease    Rheumatoid arthritis    Secondary hyperparathyroidism    Stage 3a chronic kidney disease     Past Medical History:   Diagnosis Date    Acute maxillary sinusitis     Arthritis     Disease of thyroid gland     Enlarged prostate     GERD (gastroesophageal reflux disease)     Gout     Hypertension     Impaired functional mobility, balance, gait, and endurance     PONV (postoperative nausea and vomiting)     Renal disorder     Rheumatoid arthritis     Right inguinal hernia 10/28/2021    Added automatically from request for surgery [id number]    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, changed his voice since then     Past Surgical History:   Procedure Laterality Date    ABDOMINAL SURGERY      BACK SURGERY      BELPHAROPTOSIS REPAIR Bilateral     BRAIN SURGERY      COLON SURGERY      bowel resection x2 per pt    CRANIOTOMY FOR SUBDURAL HEMATOMA      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    DIAGNOSTIC LAPAROSCOPY N/A 2022    Procedure: DIAGNOSTIC LAPAROSCOPY CONVERTED TO EXPLORATORY LAPAROTOMY WITH SMALL BOWEL RESECTION;  Surgeon: Maximo Bennett MD;  Location: [facility];  Service: General;  Laterality: N/A;  CONVERTED TO  EXPLORATORY LAPAROTOMY AT 1912    EXPLORATORY LAPAROTOMY N/A 04/16/2022    Procedure: LAPAROTOMY EXPLORATORY WITH SMALL BOWEL OBSTRUCTION;  Surgeon: Maximo Bennett MD;  Location: [facility] OR;  Service: General;  Laterality: N/A;    EYE SURGERY      on upper etelids    FOREIGN BODY REMOVAL Right     removal of metal fragments from RUQ after electrocution and fall.      HERNIA REPAIR      INGUINAL HERNIA REPAIR Right 11/01/2021    Procedure: INGUINAL HERNIA REPAIR WITH MESH AND EXCISION OF SPERMATIC CORD LIPOMA;  Surgeon: Rosey Glover MD;  Location: [facility] OR;  Service: General;  Laterality: Right;    LUMBAR SPINE SURGERY      SKIN GRAFT Right     RUE skin grafting after electrocution    THORACIC SPINE SURGERY         SLP Recommendation and Plan  SLP Swallowing Diagnosis: swallow WFL/no suspected pharyngeal impairment (06/23/24 0840)  SLP Diet Recommendation: soft to chew textures, thin liquids (06/23/24 0840)  Recommended Precautions and Strategies: upright posture during/after eating (06/23/24 0840)  SLP Rec. for Method of Medication Administration: meds whole, with thin liquids, with puree, as tolerated (06/23/24 0840)           Swallow Criteria for Skilled Therapeutic Interventions Met: no problems identified which require skilled intervention (06/23/24 0840)        Therapy Frequency (Swallow): evaluation only (06/23/24 0840)     Oral Care Recommendations: Oral Care BID/PRN, Toothbrush (06/23/24 0840)                                        Plan of Care Reviewed With: patient  Progress: no change (eval)      SWALLOW EVALUATION (Last 72 Hours)       SLP Adult Swallow Evaluation       Row Name 06/23/24 0840                   Rehab Evaluation    Document Type evaluation  -EN        Subjective Information no complaints  -EN        Patient Observations alert;cooperative;agree to therapy  -EN        Patient/Family/Caregiver Comments/Observations no family present  -EN        Patient Effort good  -EN        Symptoms  Noted During/After Treatment none  -EN           General Information    Patient Profile Reviewed yes  -EN        Pertinent History Of Current Problem Pt adm w/ BPH w/ urinary obstruction and is now s/p sx to repair. Other hx of VF dysfunction related to prior surgery, RA, HTN, CKD. Consulted d/t pt report of VF dysfunction and occ swallowing diff.  -EN        Current Method of Nutrition soft to chew textures;thin liquids  -EN        Precautions/Limitations, Vision WFL;for purposes of eval  -EN        Precautions/Limitations, Hearing WFL;for purposes of eval  -EN        Prior Level of Function-Communication WFL  -EN        Prior Level of Function-Swallowing safe, efficient swallowing in all situations  -EN        Plans/Goals Discussed with patient  -EN        Barriers to Rehab none identified  -EN        Patient's Goals for Discharge patient did not state  -EN           Pain    Additional Documentation Pain Scale: FACES Pre/Post-Treatment (Group)  -EN           Pain Scale: FACES Pre/Post-Treatment    Pain: FACES Scale, Pretreatment 0-->no hurt  -EN        Posttreatment Pain Rating 0-->no hurt  -EN           Oral Motor Structure and Function    Dentition Assessment missing teeth;edentulous  -EN        Secretion Management WNL/WFL  -EN        Mucosal Quality moist, healthy  -EN        Volitional Swallow WFL  -EN           Oral Musculature and Cranial Nerve Assessment    Oral Motor General Assessment WFL  -EN           General Eating/Swallowing Observations    Respiratory Support Currently in Use nasal cannula  -EN        O2 Liters 2L  -EN        Eating/Swallowing Skills fed by SLP;self-fed  -EN        Positioning During Eating upright 90 degree;upright in chair  -EN        Utensils Used spoon;cup;straw  -EN        Consistencies Trialed regular textures;pureed;thin liquids  -EN           Clinical Swallow Eval    Oral Prep Phase WFL  -EN        Oral Transit WFL  -EN        Oral Residue WFL  -EN        Pharyngeal Phase no  overt signs/symptoms of pharyngeal impairment  -EN        Esophageal Phase unremarkable  -EN        Clinical Swallow Evaluation Summary No overt s/s of aspiration -- pt states not issues related to pharyngeal phase of the swallow. Does have to eat softer foods at home d/t dentition status. Pt voice breathy however reports no issues related to VF dysfunction in a long time. Will sign off at this time as no further services indicated however please re-consult w/ any issues.  -EN           SLP Evaluation Clinical Impression    SLP Swallowing Diagnosis swallow WFL/no suspected pharyngeal impairment  -EN        Functional Impact no impact on function  -EN        Swallow Criteria for Skilled Therapeutic Interventions Met no problems identified which require skilled intervention  -EN           Recommendations    Therapy Frequency (Swallow) evaluation only  -EN        SLP Diet Recommendation soft to chew textures;thin liquids  -EN        Recommended Precautions and Strategies upright posture during/after eating  -EN        Oral Care Recommendations Oral Care BID/PRN;Toothbrush  -EN        SLP Rec. for Method of Medication Administration meds whole;with thin liquids;with puree;as tolerated  -EN                  User Key  (r) = Recorded By, (t) = Taken By, (c) = Cosigned By      Initials Name Effective Dates    EN Deborah Avery, MS CCC-SLP 06/22/22 -                     EDUCATION  The patient has been educated in the following areas:   Dysphagia (Swallowing Impairment).                Time Calculation:    Time Calculation- SLP       Row Name 06/23/24 0993             Time Calculation- SLP    SLP Start Time 0840  -EN      SLP Received On 06/23/24  -EN         Untimed Charges    SLP Eval/Re-eval  ST Eval Oral Pharyng Swallow - 47422  -EN      [id number] Eval Oral Pharyng Swallow Minutes 30  -EN         Total Minutes    Untimed Charges Total Minutes 30  -EN       Total Minutes 30  -EN                User Key  (r) = Recorded By,  (t) = Taken By, (c) = Cosigned By      Initials Name Provider Type    EN Deborah Avery, MS CCC-SLP Speech and Language Pathologist                    Therapy Charges for Today       Code Description Service Date Service Provider Modifiers Qty    [id number] HC ST EVAL ORAL PHARYNG SWALLOW 2 6/23/2024 Deborah Avery, MS CCC-SLP GN 1                 Deborah Avery MS CCC-SLP  6/23/2024

## 2024-06-23 NOTE — PLAN OF CARE
Goal Outcome Evaluation:  Plan of Care Reviewed With: patient        Progress: no change (eval)                    SLP Swallowing Diagnosis: swallow WFL/no suspected pharyngeal impairment (06/23/24 3419)

## 2024-06-24 LAB
ALBUMIN SERPL-MCNC: 3.2 G/DL (ref 3.5–5.2)
ALBUMIN/GLOB SERPL: 1.3 G/DL
ALP SERPL-CCNC: 80 U/L (ref 39–117)
ALT SERPL W P-5'-P-CCNC: <5 U/L (ref 1–41)
ANION GAP SERPL CALCULATED.3IONS-SCNC: 6 MMOL/L (ref 5–15)
AST SERPL-CCNC: 29 U/L (ref 1–40)
BASOPHILS # BLD AUTO: 0.04 10*3/MM3 (ref 0–0.2)
BASOPHILS NFR BLD AUTO: 0.3 % (ref 0–1.5)
BILIRUB SERPL-MCNC: 0.7 MG/DL (ref 0–1.2)
BUN SERPL-MCNC: 15 MG/DL (ref 8–23)
BUN/CREAT SERPL: 9.3 (ref 7–25)
CALCIUM SPEC-SCNC: 8.6 MG/DL (ref 8.6–10.5)
CHLORIDE SERPL-SCNC: 106 MMOL/L (ref 98–107)
CO2 SERPL-SCNC: 26 MMOL/L (ref 22–29)
CREAT SERPL-MCNC: 1.61 MG/DL (ref 0.76–1.27)
DEPRECATED RDW RBC AUTO: 46.3 FL (ref 37–54)
EGFRCR SERPLBLD CKD-EPI 2021: 43.8 ML/MIN/1.73
EOSINOPHIL # BLD AUTO: 0.08 10*3/MM3 (ref 0–0.4)
EOSINOPHIL NFR BLD AUTO: 0.6 % (ref 0.3–6.2)
ERYTHROCYTE [DISTWIDTH] IN BLOOD BY AUTOMATED COUNT: 13.2 % (ref 12.3–15.4)
GLOBULIN UR ELPH-MCNC: 2.5 GM/DL
GLUCOSE SERPL-MCNC: 99 MG/DL (ref 65–99)
HCT VFR BLD AUTO: 24 % (ref 37.5–51)
HGB BLD-MCNC: 7.9 G/DL (ref 13–17.7)
IMM GRANULOCYTES # BLD AUTO: 0.1 10*3/MM3 (ref 0–0.05)
IMM GRANULOCYTES NFR BLD AUTO: 0.8 % (ref 0–0.5)
LYMPHOCYTES # BLD AUTO: 1.63 10*3/MM3 (ref 0.7–3.1)
LYMPHOCYTES NFR BLD AUTO: 12.4 % (ref 19.6–45.3)
MAGNESIUM SERPL-MCNC: 2 MG/DL (ref 1.6–2.4)
MCH RBC QN AUTO: 31.3 PG (ref 26.6–33)
MCHC RBC AUTO-ENTMCNC: 32.9 G/DL (ref 31.5–35.7)
MCV RBC AUTO: 95.2 FL (ref 79–97)
MONOCYTES # BLD AUTO: 1.28 10*3/MM3 (ref 0.1–0.9)
MONOCYTES NFR BLD AUTO: 9.7 % (ref 5–12)
NEUTROPHILS NFR BLD AUTO: 10.05 10*3/MM3 (ref 1.7–7)
NEUTROPHILS NFR BLD AUTO: 76.2 % (ref 42.7–76)
NRBC BLD AUTO-RTO: 0 /100 WBC (ref 0–0.2)
PHOSPHATE SERPL-MCNC: 3.2 MG/DL (ref 2.5–4.5)
PLATELET # BLD AUTO: 212 10*3/MM3 (ref 140–450)
PMV BLD AUTO: 9.8 FL (ref 6–12)
POTASSIUM SERPL-SCNC: 4.6 MMOL/L (ref 3.5–5.2)
PROT SERPL-MCNC: 5.7 G/DL (ref 6–8.5)
RBC # BLD AUTO: 2.52 10*6/MM3 (ref 4.14–5.8)
SODIUM SERPL-SCNC: 138 MMOL/L (ref 136–145)
WBC NRBC COR # BLD AUTO: 13.18 10*3/MM3 (ref 3.4–10.8)

## 2024-06-24 PROCEDURE — 99232 SBSQ HOSP IP/OBS MODERATE 35: CPT | Performed by: INTERNAL MEDICINE

## 2024-06-24 PROCEDURE — 25810000003 SODIUM CHLORIDE 0.9 % SOLUTION

## 2024-06-24 PROCEDURE — 97162 PT EVAL MOD COMPLEX 30 MIN: CPT

## 2024-06-24 PROCEDURE — 97535 SELF CARE MNGMENT TRAINING: CPT

## 2024-06-24 PROCEDURE — 97530 THERAPEUTIC ACTIVITIES: CPT

## 2024-06-24 PROCEDURE — 85025 COMPLETE CBC W/AUTO DIFF WBC: CPT

## 2024-06-24 PROCEDURE — 84100 ASSAY OF PHOSPHORUS: CPT

## 2024-06-24 PROCEDURE — 97166 OT EVAL MOD COMPLEX 45 MIN: CPT

## 2024-06-24 PROCEDURE — 83735 ASSAY OF MAGNESIUM: CPT

## 2024-06-24 PROCEDURE — 80053 COMPREHEN METABOLIC PANEL: CPT

## 2024-06-24 RX ADMIN — Medication 10 ML: at 08:05

## 2024-06-24 RX ADMIN — LEVOTHYROXINE SODIUM 88 MCG: 88 TABLET ORAL at 05:49

## 2024-06-24 RX ADMIN — SODIUM CHLORIDE 50 ML/HR: 900 INJECTION, SOLUTION INTRAVENOUS at 10:57

## 2024-06-24 RX ADMIN — ASPIRIN 81 MG: 81 TABLET, CHEWABLE ORAL at 08:05

## 2024-06-24 RX ADMIN — AMOXICILLIN AND CLAVULANATE POTASSIUM 500 MG: 500; 125 TABLET, FILM COATED ORAL at 20:44

## 2024-06-24 RX ADMIN — PANTOPRAZOLE SODIUM 40 MG: 40 TABLET, DELAYED RELEASE ORAL at 05:49

## 2024-06-24 RX ADMIN — Medication 10 ML: at 20:44

## 2024-06-24 RX ADMIN — AMOXICILLIN AND CLAVULANATE POTASSIUM 500 MG: 500; 125 TABLET, FILM COATED ORAL at 08:05

## 2024-06-24 NOTE — PLAN OF CARE
Goal Outcome Evaluation:  Plan of Care Reviewed With: patient        Progress: improving  Outcome Evaluation: OT eval complete. Pt presents w/ deficits in functional endurance, dynamic balance, and LE ROM warranting cont skilled IPOT POC to promote reteurn to PLOF. Pt with c/o two recent falls and significant functional performance deficits from baseline. Recommend pt DC to IP rehab.      Anticipated Discharge Disposition (OT): inpatient rehabilitation facility

## 2024-06-24 NOTE — THERAPY EVALUATION
Patient Name: Nestor Nye  : 1945    MRN: [id number]                              Today's Date: 2024       Admit Date: 2024    Visit Dx:     ICD-10-CM ICD-9-CM   1. Urinary retention  R33.9 788.20   2. BPH with urinary obstruction  N40.1 600.01    N13.8 599.69   3. Femoral artery occlusion  I70.209 444.22     Patient Active Problem List   Diagnosis    Hypothyroidism    Mixed hyperlipidemia    Elevated PSA    Benign essential hypertension    Urinary retention    Small bowel obstruction    Acute UTI    BPH with obstruction/lower urinary tract symptoms    BPH with urinary obstruction    Anemia of chronic disease    Rheumatoid arthritis    Secondary hyperparathyroidism    Stage 3a chronic kidney disease     Past Medical History:   Diagnosis Date    Acute maxillary sinusitis     Arthritis     Disease of thyroid gland     Enlarged prostate     GERD (gastroesophageal reflux disease)     Gout     Hypertension     Impaired functional mobility, balance, gait, and endurance     PONV (postoperative nausea and vomiting)     Renal disorder     Rheumatoid arthritis     Right inguinal hernia 10/28/2021    Added automatically from request for surgery [id number]    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, changed his voice since then     Past Surgical History:   Procedure Laterality Date    ABDOMINAL SURGERY      BACK SURGERY      BELPHAROPTOSIS REPAIR Bilateral     BRAIN SURGERY      COLON SURGERY      bowel resection x2 per pt    CRANIOTOMY FOR SUBDURAL HEMATOMA      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    DIAGNOSTIC LAPAROSCOPY N/A 2022    Procedure: DIAGNOSTIC LAPAROSCOPY CONVERTED TO EXPLORATORY LAPAROTOMY WITH SMALL BOWEL RESECTION;  Surgeon: Maximo Bennett MD;  Location: [facility];  Service: General;  Laterality: N/A;  CONVERTED TO EXPLORATORY LAPAROTOMY AT     EXPLORATORY LAPAROTOMY N/A 2022    Procedure: LAPAROTOMY  EXPLORATORY WITH SMALL BOWEL OBSTRUCTION;  Surgeon: Maximo Bennett MD;  Location:  [facility];  Service: General;  Laterality: N/A;    EYE SURGERY      on upper etelids    FEMORAL ENDARTERECTOMY Right 6/21/2024    Procedure: FEMORAL EMBELECTOMY RIGHT;  Surgeon: Kingsley Morris MD;  Location:  [facility];  Service: Vascular;  Laterality: Right;    FOREIGN BODY REMOVAL Right     removal of metal fragments from RUQ after electrocution and fall.      HERNIA REPAIR      INGUINAL HERNIA REPAIR Right 11/01/2021    Procedure: INGUINAL HERNIA REPAIR WITH MESH AND EXCISION OF SPERMATIC CORD LIPOMA;  Surgeon: Rosey Glover MD;  Location:  [facility];  Service: General;  Laterality: Right;    LUMBAR SPINE SURGERY      PROSTATE AQUABLATION N/A 6/21/2024    Procedure: AQUABLATION OF PROSTATE;  Surgeon: Prieto Rey MD;  Location:  [facility];  Service: Robotics - Urology;  Laterality: N/A;    SKIN GRAFT Right     RUE skin grafting after electrocution    THORACIC SPINE SURGERY        General Information       Row Name 06/24/24 1049          Physical Therapy Time and Intention    Document Type evaluation  -KE     Mode of Treatment physical therapy  -       Row Name 06/24/24 1049          General Information    Patient Profile Reviewed yes  -KE     Prior Level of Function independent:;all household mobility;ADL's;mod assist:;community mobility;shopping  Ind w/ SPC vs FWW for mobility. limited community ambulator. Recent history of falls. Brother assists w/ grocery shopping and all house management.  -KE     Existing Precautions/Restrictions fall  stacy, contact - cleared for ambulation in bobby w/ brief and staff in appropriate PPE, R groin incision  -EVELYN     Barriers to Rehab medically complex;previous functional deficit  -EVELYN       Row Name 06/24/24 1042          Living Environment    People in Home alone  -       Row Name 06/24/24 1049          Home Main Entrance    Number of Stairs, Main Entrance one  -EVELYN      Stair Railings, Main Entrance none  -KE       Row Name 06/24/24 1049          Stairs Within Home, Primary    Number of Stairs, Within Home, Primary none  -KE       Row Name 06/24/24 1049          Cognition    Orientation Status (Cognition) oriented x 3  -KE       Row Name 06/24/24 1049          Safety Issues, Functional Mobility    Safety Issues Affecting Function (Mobility) awareness of need for assistance;insight into deficits/self-awareness;safety precaution awareness;safety precautions follow-through/compliance  -KE     Impairments Affecting Function (Mobility) balance;endurance/activity tolerance;strength  -KE               User Key  (r) = Recorded By, (t) = Taken By, (c) = Cosigned By      Initials Name Provider Type    Yuridia Parry, PT Physical Therapist                   Mobility       Row Name 06/24/24 1054          Transfers    Comment, (Transfers) UIC  -KE       Row Name 06/24/24 1054          Sit-Stand Transfer    Sit-Stand Midway (Transfers) contact guard  -KE     Assistive Device (Sit-Stand Transfers) walker, front-wheeled  -KE     Comment, (Sit-Stand Transfer) VCs for improving hand and foot placement  -KE       Row Name 06/24/24 1054          Gait/Stairs (Locomotion)    Midway Level (Gait) 1 person assist;1 person to manage equipment;minimum assist (75% patient effort)  -KE     Assistive Device (Gait) walker, front-wheeled  -KE     Patient was able to Ambulate yes  -KE     Distance in Feet (Gait) 60  -KE     Deviations/Abnormal Patterns (Gait) bilateral deviations;gait speed decreased;stride length decreased  -KE     Bilateral Gait Deviations forward flexed posture;heel strike decreased  -KE     Comment, (Gait/Stairs) Pt amb 60' w/ FWW, Eloise prog to CGA + 1 to manage equipment. Pt demo partial step through gait pattern with slow gait speed, fwd flexed posture, and decr heel strike. VCs for upright posturing and improving foot clearance. Further mobility limited by fatiuge.  -KE                User Key  (r) = Recorded By, (t) = Taken By, (c) = Cosigned By      Initials Name Provider Type    Yuridia Parry PT Physical Therapist                   Obj/Interventions       Row Name 06/24/24 1056          Range of Motion Comprehensive    General Range of Motion bilateral lower extremity ROM WFL  -KE     Comment, General Range of Motion R hip flexion mildly limited d/t incisional discomfort; L LE WFL  -KE       Row Name 06/24/24 1056          Strength Comprehensive (MMT)    General Manual Muscle Testing (MMT) Assessment lower extremity strength deficits identified  -KE     Comment, General Manual Muscle Testing (MMT) Assessment L LE grossly 4+/5; R LE grossly 4-/5  -KE       Row Name 06/24/24 1056          Balance    Balance Assessment sitting static balance;sitting dynamic balance;sit to stand dynamic balance;standing static balance;standing dynamic balance  -KE     Static Sitting Balance standby assist  -KE     Dynamic Sitting Balance standby assist  -KE     Position, Sitting Balance sitting in chair  -KE     Sit to Stand Dynamic Balance contact guard  -KE     Static Standing Balance contact guard  -KE     Dynamic Standing Balance contact guard;minimal assist  -KE     Position/Device Used, Standing Balance supported;walker, front-wheeled  -KE     Balance Interventions sitting;dynamic;static;supported;sit to stand;standing  -KE     Comment, Balance no overt LOB noted  -KE       Row Name 06/24/24 1056          Sensory Assessment (Somatosensory)    Sensory Assessment (Somatosensory) LE sensation intact  -KE               User Key  (r) = Recorded By, (t) = Taken By, (c) = Cosigned By      Initials Name Provider Type    Yuridia Parry PT Physical Therapist                   Goals/Plan       Row Name 06/24/24 1107          Bed Mobility Goal 1 (PT)    Activity/Assistive Device (Bed Mobility Goal 1, PT) sit to supine/supine to sit  -KE     [place] Level/Cues Needed (Bed Mobility Goal 1,  PT) contact guard required  -KE     Time Frame (Bed Mobility Goal 1, PT) short term goal (STG);5 days  -KE     Strategies/Barriers (Bed Mobility Goal 1, PT) HOB flat  -KE     Progress/Outcomes (Bed Mobility Goal 1, PT) new goal  -KE       Row Name 06/24/24 1107          Transfer Goal 1 (PT)    Activity/Assistive Device (Transfer Goal 1, PT) sit-to-stand/stand-to-sit;bed-to-chair/chair-to-bed  -KE     [place] Level/Cues Needed (Transfer Goal 1, PT) standby assist  -KE     Time Frame (Transfer Goal 1, PT) long term goal (LTG);10 days  -KE     Progress/Outcome (Transfer Goal 1, PT) new goal  -KE       Row Name 06/24/24 1107          Gait Training Goal 1 (PT)    Activity/Assistive Device (Gait Training Goal 1, PT) gait (walking locomotion);assistive device use;improve balance and speed;decrease fall risk  -KE     [place] Level (Gait Training Goal 1, PT) standby assist  -KE     Distance (Gait Training Goal 1, PT) 250'  -KE     Time Frame (Gait Training Goal 1, PT) long term goal (LTG);10 days  -KE     Progress/Outcome (Gait Training Goal 1, PT) new goal  -KE       Row Name 06/24/24 1107          Stairs Goal 1 (PT)    Activity/Assistive Device (Stairs Goal 1, PT) stairs, all skills  -KE     [place] Level/Cues Needed (Stairs Goal 1, PT) standby assist  -KE     Number of Stairs (Stairs Goal 1, PT) 1  -KE     Time Frame (Stairs Goal 1, PT) long term goal (LTG);10 days  -KE     Progress/Outcome (Stairs Goal 1, PT) new goal  -KE       Row Name 06/24/24 1107          Therapy Assessment/Plan (PT)    Planned Therapy Interventions (PT) balance training;bed mobility training;gait training;home exercise program;postural re-education;patient/family education;neuromuscular re-education;ROM (range of motion);stair training;strengthening;stretching;transfer training  -KE               User Key  (r) = Recorded By, (t) = Taken By, (c) = Cosigned By      Initials Name Provider Type    Yuridia Parry, PT Physical  Therapist                   Clinical Impression       Row Name 06/24/24 1100          Pain    Pretreatment Pain Rating 0/10 - no pain  -KE     Posttreatment Pain Rating 0/10 - no pain  -KE     Pain Intervention(s) Ambulation/increased activity;Repositioned  -       Row Name 06/24/24 1100          Plan of Care Review    Plan of Care Reviewed With patient  -KE     Outcome Evaluation PT eval  complete. Pt presents with LE weakness (R>L), balance deficits, and decreased functional endurance leading to impairments in functional mobility below baseline. Pt amb 60' w/ FWW req Eloise prog to CGA + 1 to manage equipment. Pt will benefit from skilled IP PT to address impairments and return to PLOF. PT rec IRF at d/c.  -       Row Name 06/24/24 1100          Therapy Assessment/Plan (PT)    Patient/Family Therapy Goals Statement (PT) to get stronger  -KE     Rehab Potential (PT) good, to achieve stated therapy goals  -KE     Criteria for Skilled Interventions Met (PT) yes;meets criteria;skilled treatment is necessary  -KE     Therapy Frequency (PT) daily  -KE     Predicted Duration of Therapy Intervention (PT) 10 days  -KE       Row Name 06/24/24 1100          Vital Signs    Pre Systolic BP Rehab 111  -KE     Pre Treatment Diastolic BP 72  -KE     Pretreatment Heart Rate (beats/min) 101  -KE     Posttreatment Heart Rate (beats/min) 110  -KE     Pre SpO2 (%) 94  -KE     O2 Delivery Pre Treatment room air  -KE     O2 Delivery Intra Treatment room air  -KE     Post SpO2 (%) 96  -KE     O2 Delivery Post Treatment room air  -KE     Pre Patient Position Sitting  -KE     Intra Patient Position Standing  -KE     Post Patient Position Sitting  -KE       Row Name 06/24/24 1100          Positioning and Restraints    Pre-Treatment Position sitting in chair/recliner  -KE     Post Treatment Position chair  -KE     In Chair notified nsg;reclined;call light within reach;encouraged to call for assist;with OT;waffle cushion;exit alarm on;legs  elevated  -KE               User Key  (r) = Recorded By, (t) = Taken By, (c) = Cosigned By      Initials Name Provider Type    Yuridia Parry, JOHNNY Physical Therapist                   Outcome Measures       Row Name 06/24/24 1108 06/24/24 0800       How much help from another person do you currently need...    Turning from your back to your side while in flat bed without using bedrails? 3  -KE 3  -AL    Moving from lying on back to sitting on the side of a flat bed without bedrails? 3  -KE 3  -AL    Moving to and from a bed to a chair (including a wheelchair)? 3  -KE 3  -AL    Standing up from a chair using your arms (e.g., wheelchair, bedside chair)? 3  -KE 3  -AL    Climbing 3-5 steps with a railing? 2  -KE 2  -AL    To walk in hospital room? 3  -KE 3  -AL    AM-PAC 6 Clicks Score (PT) 17  -KE 17  -AL    Highest Level of Mobility Goal 5 --> Static standing  -KE 5 --> Static standing  -AL      Row Name 06/24/24 1108          Functional Assessment    Outcome Measure Options AM-PAC 6 Clicks Basic Mobility (PT)  -KE               User Key  (r) = Recorded By, (t) = Taken By, (c) = Cosigned By      Initials Name Provider Type    Sakshi Chavez RN Registered Nurse    Yuridia Parry, PT Physical Therapist                                 Physical Therapy Education       Title: PT OT SLP Therapies (In Progress)       Topic: Physical Therapy (In Progress)       Point: Mobility training (In Progress)       Learning Progress Summary             Patient Acceptance, E, NR by EVELYN at 6/24/2024 1013                         Point: Home exercise program (In Progress)       Learning Progress Summary             Patient Acceptance, E, NR by EVELYN at 6/24/2024 1013                         Point: Body mechanics (In Progress)       Learning Progress Summary             Patient Acceptance, E, NR by EVELYN at 6/24/2024 1013                         Point: Precautions (In Progress)       Learning Progress Summary             Patient  Acceptance, E, NR by  at 6/24/2024 1013                                         User Key       Initials Effective Dates Name Provider Type Discipline     11/16/23 -  Yurdiia High, PT Physical Therapist PT                  PT Recommendation and Plan  Planned Therapy Interventions (PT): balance training, bed mobility training, gait training, home exercise program, postural re-education, patient/family education, neuromuscular re-education, ROM (range of motion), stair training, strengthening, stretching, transfer training  Plan of Care Reviewed With: patient  Outcome Evaluation: PT eval  complete. Pt presents with LE weakness (R>L), balance deficits, and decreased functional endurance leading to impairments in functional mobility below baseline. Pt amb 60' w/ FWW req Eloise prog to CGA + 1 to manage equipment. Pt will benefit from skilled IP PT to address impairments and return to PLOF. PT rec IRF at d/c.     Time Calculation:   PT Evaluation Complexity  History, PT Evaluation Complexity: 3 or more personal factors and/or comorbidities  Examination of Body Systems (PT Eval Complexity): total of 3 or more elements  Clinical Presentation (PT Evaluation Complexity): evolving  Clinical Decision Making (PT Evaluation Complexity): moderate complexity  Overall Complexity (PT Evaluation Complexity): moderate complexity     PT Charges       Row Name 06/24/24 1109             Time Calculation    Start Time 1013  -KE      PT Received On 06/24/24  -KE      PT Goal Re-Cert Due Date 07/04/24  -KE         Timed Charges    11963 - PT Therapeutic Activity Minutes 8  -KE         Untimed Charges    PT Eval/Re-eval Minutes 48  -KE         Total Minutes    Timed Charges Total Minutes 8  -KE      Untimed Charges Total Minutes 48  -KE       Total Minutes 56  -KE                User Key  (r) = Recorded By, (t) = Taken By, (c) = Cosigned By      Initials Name Provider Type    Yuridia Parry, PT Physical Therapist                   Therapy Charges for Today       Code Description Service Date Service Provider Modifiers Qty    44689552050  PT EVAL MOD COMPLEXITY 4 6/24/2024 Yuridia High, PT GP 1    [id number]  PT THERAPEUTIC ACT EA 15 MIN 6/24/2024 Yuridia High, PT GP 1            PT G-Codes  Outcome Measure Options: AM-PAC 6 Clicks Basic Mobility (PT)  AM-PAC 6 Clicks Score (PT): 17  PT Discharge Summary  Anticipated Discharge Disposition (PT): inpatient rehabilitation facility    Yuridia High PT  6/24/2024

## 2024-06-24 NOTE — DISCHARGE PLACEMENT REQUEST
"Ervin Mackey (78 y.o. Male)   To Admissions  From [facility] Edith [phone]      Date of Birth   1945    Social Security Number       Address   [address] [place] [address]    Home Phone   [phone]    MRN   [id number]       [facility]    Marital Status                               Admission Date   24    Admission Type   Elective    Admitting Provider   Stanley Parker MD    Attending Provider   Trinh Girard DO    Department, Room/Bed   [facility] 2B ICU, N236/1       Discharge Date       Discharge Disposition       Discharge Destination                                 Attending Provider: Trinh Girard DO    Allergies: No Known Allergies    Isolation: Contact   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)   Code Status: CPR    Ht: 170.2 cm (67.01\")   Wt: 75.8 kg (167 lb 1.7 oz)    Admission Cmt: None   Principal Problem: BPH with urinary obstruction [N40.1,N13.8]                   Active Insurance as of 2024       Primary Coverage       Payor Plan Insurance Group Employer/Plan Group    ANTHEM MEDICARE REPLACEMENT ANTHEM MEDICARE ADVANTAGE [id number]       Payor Plan Address Payor Plan Phone Number Payor Plan Fax Number Effective Dates    [address] [phone]  2016 - None Entered    [facility] [id number]         Subscriber Name Subscriber Birth Date Member ID       ERVIN MACKEY 1945 [id number]                     Emergency Contacts        (Rel.) Home Phone Work Phone Mobile Phone    Wesley Mackey (Brother) [phone] -- --                 History & Physical        Victoriano Tao MD at 24 1305              [facility]  HISTORY AND PHYSICAL    Patient Name: Ervin Mackey  : 1945  MRN: [id number]  Primary Care Physician: Roxanne Slater MD  Date of admission: 2024      Subjective  Subjective     Chief Complaint:  S/p difficult " aquablation    HPI:  Nestor Nye is a 78 y.o. male with h/o RA, HTN, CKD, BPH aquablation for BPH today that was a difficult case and took longer than usual so Dr. Rey/urology asked hospitalists to admit for observation.  Plans for IR to perform prostate RV embolization.  Patient seen in PACU who awake and states that feels ok, breathing ok.  SBP was in the 90's while I saw patient, anesthesia/nurses states that his BP was ok in OR.  Patient states that BP does sometimes run low.       Personal History     Past Medical History:   Diagnosis Date    Acute maxillary sinusitis     Arthritis     Disease of thyroid gland     Enlarged prostate     GERD (gastroesophageal reflux disease)     Gout     Hypertension     Impaired functional mobility, balance, gait, and endurance     PONV (postoperative nausea and vomiting)     Renal disorder     Rheumatoid arthritis     Right inguinal hernia 10/28/2021    Added automatically from request for surgery [id number]    Vocal cord anomaly 2007    had problem with enlarge vocal cord after low back surgery, changed his voice since then           Past Surgical History:   Procedure Laterality Date    ABDOMINAL SURGERY      BACK SURGERY      BELPHAROPTOSIS REPAIR Bilateral     BRAIN SURGERY      COLON SURGERY      bowel resection x2 per pt    CRANIOTOMY FOR SUBDURAL HEMATOMA      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    DIAGNOSTIC LAPAROSCOPY N/A 05/04/2022    Procedure: DIAGNOSTIC LAPAROSCOPY CONVERTED TO EXPLORATORY LAPAROTOMY WITH SMALL BOWEL RESECTION;  Surgeon: Maximo Bennett MD;  Location: [facility] OR;  Service: General;  Laterality: N/A;  CONVERTED TO EXPLORATORY LAPAROTOMY AT 1912    EXPLORATORY LAPAROTOMY N/A 04/16/2022    Procedure: LAPAROTOMY EXPLORATORY WITH SMALL BOWEL OBSTRUCTION;  Surgeon: Maximo Bennett MD;  Location: [facility] OR;  Service: General;  Laterality: N/A;    EYE SURGERY      on upper etelids    FOREIGN BODY REMOVAL Right     removal  of metal fragments from RUQ after electrocution and fall.      HERNIA REPAIR      INGUINAL HERNIA REPAIR Right 11/01/2021    Procedure: INGUINAL HERNIA REPAIR WITH MESH AND EXCISION OF SPERMATIC CORD LIPOMA;  Surgeon: Rosey Glover MD;  Location: [facility];  Service: General;  Laterality: Right;    LUMBAR SPINE SURGERY      SKIN GRAFT Right     RUE skin grafting after electrocution    THORACIC SPINE SURGERY         Family History: family history includes Arthritis in his father and mother; Prostate cancer in his brother.     Social History:  reports that he has been smoking cigars. He has never used smokeless tobacco. He reports that he does not currently use alcohol. He reports that he does not use drugs.  Social History     Social History Narrative    Not on file       Medications:  Available home medication information reviewed.  Alcohol Wipes, Diclofenac Sodium, amLODIPine, baclofen, ferrous sulfate, finasteride, levothyroxine, melatonin, methenamine, and sulfamethoxazole-trimethoprim    No Known Allergies    Objective  Objective     Vital Signs:   Temp:  [97 °F (36.1 °C)] 97 °F (36.1 °C)  Heart Rate:  [] 92  Resp:  [12-20] 16  BP: ()/(61-90) 105/75  Flow (L/min):  [2] 2       Physical Exam   Constitutional: awake, anesthesia at bedside placing art line  Eyes: PERRLA, sclerae anicteric, no conjunctival injection  HENT: NCAT, mucous membranes moist  Neck: Supple, no thyromegaly, no lymphadenopathy, trachea midline  Respiratory: Clear to auscultation bilaterally, nonlabored respirations   Cardiovascular: RRR, no murmurs, rubs, or gallops, palpable pedal pulses bilaterally  Gastrointestinal: Positive bowel sounds, soft, nontender, nondistended, +stacy with bright red blood in bag  Musculoskeletal: No bilateral ankle edema, no clubbing or cyanosis to extremities  Psychiatric: Appropriate affect, cooperative  Neurologic: Oriented x 3, strength symmetric in all extremities, Cranial Nerves grossly  intact to confrontation, speech clear  Skin: No rashes      Result Review:  I have personally reviewed the results from the time of this admission to 6/21/2024 13:33 EDT and agree with these findings:  [x]  Laboratory list / accordion  [x]  Microbiology  [x]  Radiology  [x]  EKG/Telemetry   []  Cardiology/Vascular   []  Pathology  []  Old records  []  Other:  Most notable findings include:       LAB RESULTS:      Lab 06/21/24  1202   WBC 10.05   HEMOGLOBIN 12.5*   HEMATOCRIT 39.3   PLATELETS 227   NEUTROS ABS 7.41*   IMMATURE GRANS (ABS) 0.04   LYMPHS ABS 1.44   MONOS ABS 0.90   EOS ABS 0.18   .0*   PROTIME 14.4   INR 1.11   APTT 27.1         Lab 06/21/24  1202   SODIUM 139   POTASSIUM 4.7   CHLORIDE 105   CO2 25.0   ANION GAP 9.0   BUN 15   CREATININE 1.89*   EGFR 36.1*   GLUCOSE 133*   CALCIUM 8.2*         Lab 06/21/24  1202   TOTAL PROTEIN 5.7*   ALBUMIN 3.5   GLOBULIN 2.2   ALT (SGPT) 8   AST (SGOT) 14   BILIRUBIN 0.5   ALK PHOS 96                         Microbiology Results (last 10 days)       ** No results found for the last 240 hours. **            No radiology results from the last 24 hrs        Assessment & Plan  Assessment & Plan       BPH with urinary obstruction    Hypothyroidism    Mixed hyperlipidemia    Benign essential hypertension    Urinary retention    BPH with obstruction/lower urinary tract symptoms    Stage 3a chronic kidney disease    Nestor Nye is a 78 y.o. male with h/o RA, HTN, CKD, BPH aquablation for BPH today that was a difficult case and took longer than usual so Dr. Rey/urology asked hospitalists to admit for observation.  Plans for IR to perform prostate RV embolization.    BPH  --s/p aquablation for BPH today per Dr. Rey that he states was difficult case that took much longer than usual as patient has a very large prostate.  Plans for IR to perform prostate RV embolization today.  He ordered ancep x 24 hours and recs keep NPO today  --blood in stacy when I saw  "in PACU and SBP 90's so will follow HB.      ?UTI  --ancef x at least 24 hours per .  Add urine culture    Hypotension with h/o HTN  --follow hb with hematuria  --hold BP meds  --follow cultures    CKD  --baseline creatinine about 1.5        VTE Prophylaxis:  Pharmacologic VTE prophylaxis orders are present.          CODE STATUS:    Code Status and Medical Interventions:   Ordered at: 06/21/24 1326     Level Of Support Discussed With:    Patient     Code Status (Patient has no pulse and is not breathing):    CPR (Attempt to Resuscitate)     Medical Interventions (Patient has pulse or is breathing):    Full Support       Expected Discharge   Expected Discharge Date: 6/22/2024; Expected Discharge Time:  9:00 AM     Victoriano Tao MD  06/21/24      Electronically signed by Victoriano Tao MD at 06/21/24 1333       Victoriano Murphy PA-C at 06/21/24 0615       Attestation signed by Prieto Rey MD at 06/21/24 0739    I have reviewed this documentation and agree.                  Pre-Op H&P  Nestor Nye  [id number]  1945    Chief complaint: \" I am here for surgery on my prostate by Dr. Rey.\"    HPI:    Patient is a 78 y.o.male who presents with BPH with urinary obstruction.  He presents today for scheduled surgery and anticipates aqua ablation of prostate.  Patient reports he has had an indwelling catheter for multiple years.  He denies taking any anticoagulant or antiplatelet medications.  Patient reports he experiences urinary retention and lower urinary tract symptoms.  Patient was diagnosed with a large prostate over 140 cc.  Cystoscopy in the past showed very large occlusive lateral lobes.  He denies any symptomatic changes or recent illnesses since last office visit with Dr. Rey.  Patient reports he was prescribed a course of Bactrim and states he completed his last dose yesterday 6/20/2024.    Review of Systems:  General ROS: negative for chills, fever or skin lesions;  No changes " since last office visit.  Neg for recent sick exposure  Cardiovascular ROS: no chest pain or dyspnea on exertion  Respiratory ROS: no cough, shortness of breath, or wheezing    Allergies: Denies allergy to latex or contrast dye.No Known Allergies    Home Meds:    No current facility-administered medications on file prior to encounter.     Current Outpatient Medications on File Prior to Encounter   Medication Sig Dispense Refill    Alcohol Swabs (ALCOHOL WIPES) 70 % pads 1 pad 3 (Three) Times a Day. (Patient taking differently: Use 1 pad 3 (Three) Times a Day. Cleans around stacy catheter with them.) 100 each 11    amLODIPine (Norvasc) 2.5 MG tablet Take 1 tablet by mouth Daily. 90 tablet 1    baclofen (LIORESAL) 10 MG tablet Take 1 tablet by mouth At Night As Needed for Muscle Spasms. (Patient not taking: Reported on 4/22/2024) 30 tablet 3    Diclofenac Sodium (VOLTAREN) 1 % gel gel Apply 4 g topically to the appropriate area as directed 4 (Four) Times a Day As Needed (joint pain). 100 g 5    ferrous sulfate 325 (65 FE) MG tablet Take 1 tablet by mouth Daily With Breakfast.      levothyroxine (SYNTHROID, LEVOTHROID) 88 MCG tablet Take 1 tablet by mouth Daily. 90 tablet 2    melatonin 5 MG tablet tablet Take 1 tablet by mouth. (Patient not taking: Reported on 4/22/2024)      sulfamethoxazole-trimethoprim (Bactrim DS) 800-160 MG per tablet Take 1 tablet by mouth Daily. Start 5 days before surgery (Patient not taking: Reported on 4/22/2024) 5 tablet 0       PMH:   Past Medical History:   Diagnosis Date    Acute maxillary sinusitis     Arthritis     Disease of thyroid gland     Enlarged prostate     GERD (gastroesophageal reflux disease)     Gout     Hypertension     Impaired functional mobility, balance, gait, and endurance     PONV (postoperative nausea and vomiting)     Renal disorder     Rheumatoid arthritis     Right inguinal hernia 10/28/2021    Added automatically from request for surgery [id number]    Vocal cord  anomaly 2007    had problem with enlarge vocal cord after low back surgery, changed his voice since then     PSH:    Past Surgical History:   Procedure Laterality Date    ABDOMINAL SURGERY      BACK SURGERY      BELPHAROPTOSIS REPAIR Bilateral     BRAIN SURGERY      COLON SURGERY      bowel resection x2 per pt    CRANIOTOMY FOR SUBDURAL HEMATOMA      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    DIAGNOSTIC LAPAROSCOPY N/A 05/04/2022    Procedure: DIAGNOSTIC LAPAROSCOPY CONVERTED TO EXPLORATORY LAPAROTOMY WITH SMALL BOWEL RESECTION;  Surgeon: Maximo Bennett MD;  Location: [facility] OR;  Service: General;  Laterality: N/A;  CONVERTED TO EXPLORATORY LAPAROTOMY AT 1912    EXPLORATORY LAPAROTOMY N/A 04/16/2022    Procedure: LAPAROTOMY EXPLORATORY WITH SMALL BOWEL OBSTRUCTION;  Surgeon: Maximo Bennett MD;  Location: [facility] OR;  Service: General;  Laterality: N/A;    EYE SURGERY      on upper etelids    FOREIGN BODY REMOVAL Right     removal of metal fragments from RUQ after electrocution and fall.      HERNIA REPAIR      INGUINAL HERNIA REPAIR Right 11/01/2021    Procedure: INGUINAL HERNIA REPAIR WITH MESH AND EXCISION OF SPERMATIC CORD LIPOMA;  Surgeon: Rosey Glover MD;  Location: [facility] OR;  Service: General;  Laterality: Right;    LUMBAR SPINE SURGERY      SKIN GRAFT Right     RUE skin grafting after electrocution    THORACIC SPINE SURGERY         Immunization History:  Influenza: Yes  Pneumococcal: Yes  Tetanus: Yes    Social History:   Tobacco:   Social History     Tobacco Use   Smoking Status Every Day    Types: Cigars   Smokeless Tobacco Never   Tobacco Comments    Smoked 1 ppd of small cigars for 50  years       Alcohol:     Social History     Substance and Sexual Activity   Alcohol Use Not Currently       Vitals:           There were no vitals taken for this visit.    Physical Exam:  General Appearance:    Alert, cooperative, no distress, appears stated age   Head:    Normocephalic, without  obvious abnormality, atraumatic   Lungs:     Decreased breath sounds bilaterally upon auscultation.    Heart:   Regular rate and rhythm, S1 and S2 normal, no murmur, rub    or gallop    Abdomen:    Soft, nontender.  +bowel sounds   Breast Exam:    deferred   Genitalia:    deferred   Extremities:   Extremities normal, atraumatic, no cyanosis or edema   Skin: Patient has a well-healed scratch on his left lower extremity.  Additionally he has small ulcers on his bilateral lower extremities.  No signs of erythema, drainage, or infection.   Neurologic:   Grossly intact   Results Review  LABS:  Lab Results   Component Value Date    WBC 7.61 06/07/2024    HGB 15.5 06/07/2024    HCT 46.7 06/07/2024    MCV 97.3 (H) 06/07/2024     06/07/2024    NEUTROABS 3.95 11/17/2023    GLUCOSE 85 11/17/2023    BUN 22 11/17/2023    CREATININE 1.46 (H) 11/17/2023    EGFRIFNONA 35 (L) 09/14/2021    EGFRIFAFRI 43 (L) 09/25/2017     11/17/2023    K 4.7 06/07/2024     11/17/2023    CO2 21.8 (L) 11/17/2023    MG 2.0 10/31/2016    PHOS 3.7 07/18/2018    CALCIUM 9.1 11/17/2023    ALBUMIN 4.2 11/17/2023    AST 17 11/17/2023    ALT 18 11/17/2023    BILITOT 0.7 11/17/2023       RADIOLOGY:  No radiology results for the last 3 days     I reviewed the patient's new clinical results.    Cancer Staging (if applicable)  Cancer Patient: __ yes __no __unknown; If yes, clinical stage T:__ N:__M:__, stage group or __N/A    Impression: Patient presents with BPH with urinary obstruction.    Plan: Dr. Rey will perform ablation of prostate.      Victoriano Murphy PA-C   06/21/24   6:49 AM EDT       Electronically signed by Prieto Rey MD at 06/21/24 6473          Physician Progress Notes (most recent note)        Shaji, Trinh STEPHENSDO at 06/24/24 1228            INTENSIVIST   PROGRESS NOTE     Hospital:  LOS: 3 days     Mr. Nestor Nye, 78 y.o. male is followed for a Chief Complaint of: Hematuria      Subjective   S      Interval History:  No acute events overnight.        The patient's relevant past medical, surgical and social history were reviewed and updated in Epic as appropriate.      ROS:   Constitutional: Negative for fever.   Respiratory: Negative for dyspnea.   Cardiovascular: Negative for chest pain.   Gastrointestinal: Negative for  nausea, vomiting and diarrhea.     Objective   O     Vitals:  Temp  Min: 97.5 °F (36.4 °C)  Max: 98.5 °F (36.9 °C)  BP  Min: 100/51  Max: 127/79  Pulse  Min: 83  Max: 117  Resp  Min: 14  Max: 19  SpO2  Min: 90 %  Max: 99 % Flow (L/min)  Min: 2  Max: 2    Intake/Ouptut 24 hrs (7:00AM - 6:59 AM)  Intake & Output (last 3 days)         06/21 0701  06/22 0700 06/22 0701 06/23 0700 06/23 0701  06/24 0700 06/24 0701  06/25 0700    P.O. 60 1568 580     I.V. (mL/kg) 3397.8 (44.7) 1226.2 (16.2) 1171 (15.4) 84 (1.1)    Other 23644       IV Piggyback 275       Total Intake(mL/kg) 63425.8 (316) 2794.2 (36.9) 1751 (23.1) 84 (1.1)    Urine (mL/kg/hr) 69559 (11.5) 3425 (1.9) 3900 (2.1) 200 (0.5)    Stool  0 0     Total Output 31698 3425 3900 200    Net +3062.8 -630.8 -2149 -116            Stool Unmeasured Occurrence  1 x 1 x             Medications (drips):  sodium chloride, Last Rate: 50 mL/hr (06/24/24 1057)        Physical Examination  Telemetry:  Normal sinus rhythm.    Constitutional:  No acute distress.  Walking with PT.    Eyes: No scleral icterus.   PERRL, EOM intact.    Neck:  Supple, FROM   Cardiovascular: Normal rate, regular and rhythm. Normal heart sounds.  No murmurs, gallop or rub.   Respiratory: No respiratory distress. Normal respiratory effort.  Diminished.    Abdominal:  Soft. No masses. Nontender. No distension. No HSM.   Extremities: No digital cyanosis. No clubbing.  No peripheral edema.   Skin: No rashes, lesions or ulcers   Neurological:   Alert and interactive.              Results from last 7 days   Lab Units 06/24/24  0559 06/23/24  0836 06/23/24  0449 06/22/24  0808  06/22/24  0419   WBC 10*3/mm3 13.18*  --  16.28*  --  14.21*   HEMOGLOBIN g/dL 7.9* 8.8* 8.7*   < > 9.0*   MCV fL 95.2  --  96.2  --  93.3   PLATELETS 10*3/mm3 212  --  203  --  222    < > = values in this interval not displayed.     Results from last 7 days   Lab Units 06/24/24  0559 06/23/24 0449 06/22/24  0419   SODIUM mmol/L 138 139 141   POTASSIUM mmol/L 4.6 4.4 4.6   CO2 mmol/L 26.0 25.0 21.0*   CREATININE mg/dL 1.61* 1.67* 1.59*   GLUCOSE mg/dL 99 100* 143*   MAGNESIUM mg/dL 2.0 1.9 1.7   PHOSPHORUS mg/dL 3.2 2.7 3.3     Estimated Creatinine Clearance: 41.2 mL/min (A) (by C-G formula based on SCr of 1.61 mg/dL (H)).  Results from last 7 days   Lab Units 06/24/24  0559 06/23/24 0449 06/21/24  1202   ALK PHOS U/L 80 83 96   BILIRUBIN mg/dL 0.7 0.8 0.5   ALT (SGPT) U/L <5 <5 8   AST (SGOT) U/L 29 32 14             Images:  Imaging Results (Last 24 Hours)       ** No results found for the last 24 hours. **               Results: Reviewed.  I reviewed the patient's new laboratory and imaging results.  I independently reviewed the patient's new images.    Medications: Reviewed.    Assessment & Plan   A / P     Mr. Nye is a 77yo M with a history of BPH with urinary obstruction, HTN, HLD, hypothyroidism and CKDIII who presented to [facility] for an elective aqua ablation per Dr. Rey with Urology. He was initially admitted to Hospital Medicine as the case took longer to perform due to his large prostate.     The case was complicated by hematuria and required bilateral hypogastric artery embolization by Dr. Christine with IR via right CGA access. An angioseal was placed post-procedure. Shortly after the case, poor perfusion was noted in the right lower extremity. An arterial duplex was performed which showed no SFA flow. Vascular Surgery was consulted and he was taken for emergent right groin exploration per Dr. Morris. An acute thrombus was retrieved near the aortic bifurcation with return of pulsatile  bleeding. He was then transferred to the ICU.     CBI was stopped on 24.     Nutrition:   Diet: Regular/House, Renal; Low Phosphorus; Texture: Mechanical Ground (NDD 2); Fluid Consistency: Thin (IDDSI 0)  Advance Directives:   Code Status and Medical Interventions:   Ordered at: 24 1326     Level Of Support Discussed With:    Patient     Code Status (Patient has no pulse and is not breathing):    CPR (Attempt to Resuscitate)     Medical Interventions (Patient has pulse or is breathing):    Full Support       Active Hospital Problems    Diagnosis     **BPH with urinary obstruction     BPH with obstruction/lower urinary tract symptoms     Urinary retention     Stage 3a chronic kidney disease     Hypothyroidism     Mixed hyperlipidemia     Benign essential hypertension        Assessment / Plan:    Vascular Surgery following for post-femoral endarterectomy management.   Post-ablation management per Urology.   Augmentin for urologic procedure prophylaxis.   Continue ASA.   PO diet.   PT following.   AM labs  Awaiting telemetry bed.     High level of risk due to:  severe exacerbation of chronic illness and illness with threat to life or bodily function.    Plan of care and goals reviewed during interdisciplinary rounds.  I discussed the patient's findings and my recommendations with patient and nursing staff      Trinh Girard DO    Intensive Care Medicine and Pulmonary Medicine       Electronically signed by Trinh Girard DO at 24 1234          Physical Therapy Notes (most recent note)        Yuridia High, PT at 24 1013  Version 1 of 1         Patient Name: Nestor Nye  : 1945    MRN: [id number]                              Today's Date: 2024       Admit Date: 2024    Visit Dx:     ICD-10-CM ICD-9-CM   1. Urinary retention  R33.9 788.20   2. BPH with urinary obstruction  N40.1 600.01    N13.8 599.69   3. Femoral artery occlusion  I70.209 444.22     Patient  Active Problem List   Diagnosis    Hypothyroidism    Mixed hyperlipidemia    Elevated PSA    Benign essential hypertension    Urinary retention    Small bowel obstruction    Acute UTI    BPH with obstruction/lower urinary tract symptoms    BPH with urinary obstruction    Anemia of chronic disease    Rheumatoid arthritis    Secondary hyperparathyroidism    Stage 3a chronic kidney disease     Past Medical History:   Diagnosis Date    Acute maxillary sinusitis     Arthritis     Disease of thyroid gland     Enlarged prostate     GERD (gastroesophageal reflux disease)     Gout     Hypertension     Impaired functional mobility, balance, gait, and endurance     PONV (postoperative nausea and vomiting)     Renal disorder     Rheumatoid arthritis     Right inguinal hernia 10/28/2021    Added automatically from request for surgery [id number]    Vocal cord anomaly 2007    had problem with enlarge vocal cord after low back surgery, changed his voice since then     Past Surgical History:   Procedure Laterality Date    ABDOMINAL SURGERY      BACK SURGERY      BELPHAROPTOSIS REPAIR Bilateral     BRAIN SURGERY      COLON SURGERY      bowel resection x2 per pt    CRANIOTOMY FOR SUBDURAL HEMATOMA      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    DIAGNOSTIC LAPAROSCOPY N/A 05/04/2022    Procedure: DIAGNOSTIC LAPAROSCOPY CONVERTED TO EXPLORATORY LAPAROTOMY WITH SMALL BOWEL RESECTION;  Surgeon: Maximo Bennett MD;  Location:  [facility] OR;  Service: General;  Laterality: N/A;  CONVERTED TO EXPLORATORY LAPAROTOMY AT 1912    EXPLORATORY LAPAROTOMY N/A 04/16/2022    Procedure: LAPAROTOMY EXPLORATORY WITH SMALL BOWEL OBSTRUCTION;  Surgeon: Maximo Bennett MD;  Location:  [facility] OR;  Service: General;  Laterality: N/A;    EYE SURGERY      on upper etelids    FEMORAL ENDARTERECTOMY Right 6/21/2024    Procedure: FEMORAL EMBELECTOMY RIGHT;  Surgeon: Kingsley Morris MD;  Location: [facility] OR;  Service: Vascular;   Laterality: Right;    FOREIGN BODY REMOVAL Right     removal of metal fragments from RUQ after electrocution and fall.      HERNIA REPAIR      INGUINAL HERNIA REPAIR Right 11/01/2021    Procedure: INGUINAL HERNIA REPAIR WITH MESH AND EXCISION OF SPERMATIC CORD LIPOMA;  Surgeon: Rosey Glover MD;  Location: [facility] OR;  Service: General;  Laterality: Right;    LUMBAR SPINE SURGERY      PROSTATE AQUABLATION N/A 6/21/2024    Procedure: AQUABLATION OF PROSTATE;  Surgeon: Prieto Rey MD;  Location: [facility] OR;  Service: Robotics - Urology;  Laterality: N/A;    SKIN GRAFT Right     RUE skin grafting after electrocution    THORACIC SPINE SURGERY        General Information       Row Name 06/24/24 1049          Physical Therapy Time and Intention    Document Type evaluation  -KE     Mode of Treatment physical therapy  -       Row Name 06/24/24 1049          General Information    Patient Profile Reviewed yes  -KE     Prior Level of Function independent:;all household mobility;ADL's;mod assist:;community mobility;shopping  Ind w/ SPC vs FWW for mobility. limited community ambulator. Recent history of falls. Brother assists w/ grocery shopping and all house management.  -KE     Existing Precautions/Restrictions fall  stacy, contact - cleared for ambulation in bobby w/ brief and staff in appropriate PPE, R groin incision  -KE     Barriers to Rehab medically complex;previous functional deficit  -KE       Row Name 06/24/24 1049          Living Environment    People in Home alone  -KE       Row Name 06/24/24 1049          Home Main Entrance    Number of Stairs, Main Entrance one  -KE     Stair Railings, Main Entrance none  -KE       Row Name 06/24/24 1049          Stairs Within Home, Primary    Number of Stairs, Within Home, Primary none  -KE       Row Name 06/24/24 1049          Cognition    Orientation Status (Cognition) oriented x 3  -KE       Row Name 06/24/24 1049          Safety Issues, Functional Mobility     Safety Issues Affecting Function (Mobility) awareness of need for assistance;insight into deficits/self-awareness;safety precaution awareness;safety precautions follow-through/compliance  -KE     Impairments Affecting Function (Mobility) balance;endurance/activity tolerance;strength  -KE               User Key  (r) = Recorded By, (t) = Taken By, (c) = Cosigned By      Initials Name Provider Type    Yuridia Parry PT Physical Therapist                   Mobility       Row Name 06/24/24 1054          Transfers    Comment, (Transfers) UIC  -KE       Row Name 06/24/24 1054          Sit-Stand Transfer    Sit-Stand [place] (Transfers) contact guard  -KE     Assistive Device (Sit-Stand Transfers) walker, front-wheeled  -KE     Comment, (Sit-Stand Transfer) VCs for improving hand and foot placement  -KE       Row Name 06/24/24 1054          Gait/Stairs (Locomotion)    [place] Level (Gait) 1 person assist;1 person to manage equipment;minimum assist (75% patient effort)  -KE     Assistive Device (Gait) walker, front-wheeled  -KE     Patient was able to Ambulate yes  -KE     Distance in Feet (Gait) 60  -KE     Deviations/Abnormal Patterns (Gait) bilateral deviations;gait speed decreased;stride length decreased  -KE     Bilateral Gait Deviations forward flexed posture;heel strike decreased  -KE     Comment, (Gait/Stairs) Pt amb 60' w/ FWW, Eloise prog to CGA + 1 to manage equipment. Pt demo partial step through gait pattern with slow gait speed, fwd flexed posture, and decr heel strike. VCs for upright posturing and improving foot clearance. Further mobility limited by fatiuge.  -KE               User Key  (r) = Recorded By, (t) = Taken By, (c) = Cosigned By      Initials Name Provider Type    Yuridia Parry PT Physical Therapist                   Obj/Interventions       Row Name 06/24/24 1056          Range of Motion Comprehensive    General Range of Motion bilateral lower extremity ROM WFL  -KE      Comment, General Range of Motion R hip flexion mildly limited d/t incisional discomfort; L LE WFL  -KE       Row Name 06/24/24 1056          Strength Comprehensive (MMT)    General Manual Muscle Testing (MMT) Assessment lower extremity strength deficits identified  -KE     Comment, General Manual Muscle Testing (MMT) Assessment L LE grossly 4+/5; R LE grossly 4-/5  -KE       Row Name 06/24/24 1056          Balance    Balance Assessment sitting static balance;sitting dynamic balance;sit to stand dynamic balance;standing static balance;standing dynamic balance  -KE     Static Sitting Balance standby assist  -KE     Dynamic Sitting Balance standby assist  -KE     Position, Sitting Balance sitting in chair  -KE     Sit to Stand Dynamic Balance contact guard  -KE     Static Standing Balance contact guard  -KE     Dynamic Standing Balance contact guard;minimal assist  -KE     Position/Device Used, Standing Balance supported;walker, front-wheeled  -KE     Balance Interventions sitting;dynamic;static;supported;sit to stand;standing  -KE     Comment, Balance no overt LOB noted  -       Row Name 06/24/24 1056          Sensory Assessment (Somatosensory)    Sensory Assessment (Somatosensory) LE sensation intact  -               User Key  (r) = Recorded By, (t) = Taken By, (c) = Cosigned By      Initials Name Provider Type    Yuridia Parry, PT Physical Therapist                   Goals/Plan       Row Name 06/24/24 1107          Bed Mobility Goal 1 (PT)    Activity/Assistive Device (Bed Mobility Goal 1, PT) sit to supine/supine to sit  -KE     [place] Level/Cues Needed (Bed Mobility Goal 1, PT) contact guard required  -KE     Time Frame (Bed Mobility Goal 1, PT) short term goal (STG);5 days  -KE     Strategies/Barriers (Bed Mobility Goal 1, PT) HOB flat  -KE     Progress/Outcomes (Bed Mobility Goal 1, PT) new goal  -       Row Name 06/24/24 1107          Transfer Goal 1 (PT)    Activity/Assistive Device (Transfer  Goal 1, PT) sit-to-stand/stand-to-sit;bed-to-chair/chair-to-bed  -KE     [place] Level/Cues Needed (Transfer Goal 1, PT) standby assist  -KE     Time Frame (Transfer Goal 1, PT) long term goal (LTG);10 days  -KE     Progress/Outcome (Transfer Goal 1, PT) new goal  -KE       Row Name 06/24/24 1107          Gait Training Goal 1 (PT)    Activity/Assistive Device (Gait Training Goal 1, PT) gait (walking locomotion);assistive device use;improve balance and speed;decrease fall risk  -KE     [place] Level (Gait Training Goal 1, PT) standby assist  -KE     Distance (Gait Training Goal 1, PT) 250'  -KE     Time Frame (Gait Training Goal 1, PT) long term goal (LTG);10 days  -KE     Progress/Outcome (Gait Training Goal 1, PT) new goal  -KE       Row Name 06/24/24 1107          Stairs Goal 1 (PT)    Activity/Assistive Device (Stairs Goal 1, PT) stairs, all skills  -KE     [place] Level/Cues Needed (Stairs Goal 1, PT) standby assist  -KE     Number of Stairs (Stairs Goal 1, PT) 1  -KE     Time Frame (Stairs Goal 1, PT) long term goal (LTG);10 days  -KE     Progress/Outcome (Stairs Goal 1, PT) new goal  -KE       Row Name 06/24/24 1107          Therapy Assessment/Plan (PT)    Planned Therapy Interventions (PT) balance training;bed mobility training;gait training;home exercise program;postural re-education;patient/family education;neuromuscular re-education;ROM (range of motion);stair training;strengthening;stretching;transfer training  -KE               User Key  (r) = Recorded By, (t) = Taken By, (c) = Cosigned By      Initials Name Provider Type    Yuridia Parry, PT Physical Therapist                   Clinical Impression       Row Name 06/24/24 1100          Pain    Pretreatment Pain Rating 0/10 - no pain  -KE     Posttreatment Pain Rating 0/10 - no pain  -KE     Pain Intervention(s) Ambulation/increased activity;Repositioned  -KE       Row Name 06/24/24 1100          Plan of Care Review    Plan of Care  Reviewed With patient  -KE     Outcome Evaluation PT eval  complete. Pt presents with LE weakness (R>L), balance deficits, and decreased functional endurance leading to impairments in functional mobility below baseline. Pt amb 60' w/ FWW req Eloise prog to CGA + 1 to manage equipment. Pt will benefit from skilled IP PT to address impairments and return to PLOF. PT rec IRF at d/c.  -KE       Row Name 06/24/24 1100          Therapy Assessment/Plan (PT)    Patient/Family Therapy Goals Statement (PT) to get stronger  -KE     Rehab Potential (PT) good, to achieve stated therapy goals  -KE     Criteria for Skilled Interventions Met (PT) yes;meets criteria;skilled treatment is necessary  -KE     Therapy Frequency (PT) daily  -KE     Predicted Duration of Therapy Intervention (PT) 10 days  -KE       Row Name 06/24/24 1100          Vital Signs    Pre Systolic BP Rehab 111  -KE     Pre Treatment Diastolic BP 72  -KE     Pretreatment Heart Rate (beats/min) 101  -KE     Posttreatment Heart Rate (beats/min) 110  -KE     Pre SpO2 (%) 94  -KE     O2 Delivery Pre Treatment room air  -KE     O2 Delivery Intra Treatment room air  -KE     Post SpO2 (%) 96  -KE     O2 Delivery Post Treatment room air  -KE     Pre Patient Position Sitting  -KE     Intra Patient Position Standing  -KE     Post Patient Position Sitting  -KE       Row Name 06/24/24 1100          Positioning and Restraints    Pre-Treatment Position sitting in chair/recliner  -KE     Post Treatment Position chair  -KE     In Chair notified nsg;reclined;call light within reach;encouraged to call for assist;with OT;waffle cushion;exit alarm on;legs elevated  -KE               User Key  (r) = Recorded By, (t) = Taken By, (c) = Cosigned By      Initials Name Provider Type    Yuridia Parry, PT Physical Therapist                   Outcome Measures       Row Name 06/24/24 1108 06/24/24 0800       How much help from another person do you currently need...    Turning from your  back to your side while in flat bed without using bedrails? 3  -KE 3  -AL    Moving from lying on back to sitting on the side of a flat bed without bedrails? 3  -KE 3  -AL    Moving to and from a bed to a chair (including a wheelchair)? 3  -KE 3  -AL    Standing up from a chair using your arms (e.g., wheelchair, bedside chair)? 3  -KE 3  -AL    Climbing 3-5 steps with a railing? 2  -KE 2  -AL    To walk in hospital room? 3  -KE 3  -AL    AM-PAC 6 Clicks Score (PT) 17  -KE 17  -AL    Highest Level of Mobility Goal 5 --> Static standing  -KE 5 --> Static standing  -AL      Row Name 06/24/24 1108          Functional Assessment    Outcome Measure Options AM-PAC 6 Clicks Basic Mobility (PT)  -               User Key  (r) = Recorded By, (t) = Taken By, (c) = Cosigned By      Initials Name Provider Type    AL Sakshi Goode, RN Registered Nurse    Yuridia Parry, PT Physical Therapist                                 Physical Therapy Education       Title: PT OT SLP Therapies (In Progress)       Topic: Physical Therapy (In Progress)       Point: Mobility training (In Progress)       Learning Progress Summary             Patient Acceptance, E, NR by EVELYN at 6/24/2024 1013                         Point: Home exercise program (In Progress)       Learning Progress Summary             Patient Acceptance, E, NR by EVELYN at 6/24/2024 1013                         Point: Body mechanics (In Progress)       Learning Progress Summary             Patient Acceptance, E, NR by EVELYN at 6/24/2024 1013                         Point: Precautions (In Progress)       Learning Progress Summary             Patient Acceptance, E, NR by EVELYN at 6/24/2024 1013                                         User Key       Initials Effective Dates Name Provider Type Discipline    EVELYN 11/16/23 -  Yuridia High, PT Physical Therapist PT                  PT Recommendation and Plan  Planned Therapy Interventions (PT): balance training, bed mobility training,  gait training, home exercise program, postural re-education, patient/family education, neuromuscular re-education, ROM (range of motion), stair training, strengthening, stretching, transfer training  Plan of Care Reviewed With: patient  Outcome Evaluation: PT eval  complete. Pt presents with LE weakness (R>L), balance deficits, and decreased functional endurance leading to impairments in functional mobility below baseline. Pt amb 60' w/ FWW req Eloise prog to CGA + 1 to manage equipment. Pt will benefit from skilled IP PT to address impairments and return to PLOF. PT rec IRF at d/c.     Time Calculation:   PT Evaluation Complexity  History, PT Evaluation Complexity: 3 or more personal factors and/or comorbidities  Examination of Body Systems (PT Eval Complexity): total of 3 or more elements  Clinical Presentation (PT Evaluation Complexity): evolving  Clinical Decision Making (PT Evaluation Complexity): moderate complexity  Overall Complexity (PT Evaluation Complexity): moderate complexity     PT Charges       Row Name 06/24/24 1109             Time Calculation    Start Time 1013  -KE      PT Received On 06/24/24  -KE      PT Goal Re-Cert Due Date 07/04/24  -KE         Timed Charges    24405 - PT Therapeutic Activity Minutes 8  -KE         Untimed Charges    PT Eval/Re-eval Minutes 48  -KE         Total Minutes    Timed Charges Total Minutes 8  -KE      Untimed Charges Total Minutes 48  -KE       Total Minutes 56  -KE                User Key  (r) = Recorded By, (t) = Taken By, (c) = Cosigned By      Initials Name Provider Type    Yuridia Parry PT Physical Therapist                  Therapy Charges for Today       Code Description Service Date Service Provider Modifiers Qty    62923977430 HC PT EVAL MOD COMPLEXITY 4 6/24/2024 Yuridia High, PT GP 1    [id number]  PT THERAPEUTIC ACT EA 15 MIN 6/24/2024 Yuridia High, PT GP 1            PT G-Codes  Outcome Measure Options: AM-PAC 6 Clicks Basic Mobility  (PT)  AM-PAC 6 Clicks Score (PT): 17  PT Discharge Summary  Anticipated Discharge Disposition (PT): inpatient rehabilitation facility    Yuridia High, PT  2024      Electronically signed by Yuridia High, PT at 24 1110          Occupational Therapy Notes (most recent note)        Cande Campos, OT at 24 1010          Patient Name: Nestor Nye  : 1945    MRN: [id number]                              Today's Date: 2024       Admit Date: 2024    Visit Dx:     ICD-10-CM ICD-9-CM   1. Urinary retention  R33.9 788.20   2. BPH with urinary obstruction  N40.1 600.01    N13.8 599.69   3. Femoral artery occlusion  I70.209 444.22     Patient Active Problem List   Diagnosis    Hypothyroidism    Mixed hyperlipidemia    Elevated PSA    Benign essential hypertension    Urinary retention    Small bowel obstruction    Acute UTI    BPH with obstruction/lower urinary tract symptoms    BPH with urinary obstruction    Anemia of chronic disease    Rheumatoid arthritis    Secondary hyperparathyroidism    Stage 3a chronic kidney disease     Past Medical History:   Diagnosis Date    Acute maxillary sinusitis     Arthritis     Disease of thyroid gland     Enlarged prostate     GERD (gastroesophageal reflux disease)     Gout     Hypertension     Impaired functional mobility, balance, gait, and endurance     PONV (postoperative nausea and vomiting)     Renal disorder     Rheumatoid arthritis     Right inguinal hernia 10/28/2021    Added automatically from request for surgery [id number]    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, changed his voice since then     Past Surgical History:   Procedure Laterality Date    ABDOMINAL SURGERY      BACK SURGERY      BELPHAROPTOSIS REPAIR Bilateral     BRAIN SURGERY      COLON SURGERY      bowel resection x2 per pt    CRANIOTOMY FOR SUBDURAL HEMATOMA      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     DIAGNOSTIC LAPAROSCOPY N/A 05/04/2022    Procedure: DIAGNOSTIC LAPAROSCOPY CONVERTED TO EXPLORATORY LAPAROTOMY WITH SMALL BOWEL RESECTION;  Surgeon: Maximo Bennett MD;  Location:  [facility];  Service: General;  Laterality: N/A;  CONVERTED TO EXPLORATORY LAPAROTOMY AT 1912    EXPLORATORY LAPAROTOMY N/A 04/16/2022    Procedure: LAPAROTOMY EXPLORATORY WITH SMALL BOWEL OBSTRUCTION;  Surgeon: Maximo Bennett MD;  Location:  [facility];  Service: General;  Laterality: N/A;    EYE SURGERY      on upper etelids    FEMORAL ENDARTERECTOMY Right 6/21/2024    Procedure: FEMORAL EMBELECTOMY RIGHT;  Surgeon: Kingsley Morris MD;  Location:  [facility];  Service: Vascular;  Laterality: Right;    FOREIGN BODY REMOVAL Right     removal of metal fragments from RUQ after electrocution and fall.      HERNIA REPAIR      INGUINAL HERNIA REPAIR Right 11/01/2021    Procedure: INGUINAL HERNIA REPAIR WITH MESH AND EXCISION OF SPERMATIC CORD LIPOMA;  Surgeon: Rosey Glover MD;  Location:  [facility];  Service: General;  Laterality: Right;    LUMBAR SPINE SURGERY      PROSTATE AQUABLATION N/A 6/21/2024    Procedure: AQUABLATION OF PROSTATE;  Surgeon: Prieto Rey MD;  Location:  [facility];  Service: [employer] - Urology;  Laterality: N/A;    SKIN GRAFT Right     RUE skin grafting after electrocution    THORACIC SPINE SURGERY        General Information       Row Name 06/24/24 1429          OT Time and Intention    Document Type evaluation  -CS     Mode of Treatment occupational therapy  -CS       Row Name 06/24/24 1429          General Information    Patient Profile Reviewed yes  -CS     Prior Level of Function independent:;all household mobility;ADL's  own RW and STC, 2 recent falls  -CS     Existing Precautions/Restrictions fall  stacy, contact - cleared for ambulation in bobby w/ brief and staff in appropriate PPE, R groin incision  -CS     Barriers to Rehab medically complex;previous functional deficit  -CS       Row Name  06/24/24 1429          Cognition    Orientation Status (Cognition) oriented x 3  -       Row Name 06/24/24 1429          Safety Issues, Functional Mobility    Impairments Affecting Function (Mobility) balance;endurance/activity tolerance;strength  -               User Key  (r) = Recorded By, (t) = Taken By, (c) = Cosigned By      Initials Name Provider Type     Cande Campos, WANDA Occupational Therapist                     Mobility/ADL's       Row Name 06/24/24 1430          Transfers    Transfers sit-stand transfer;stand-sit transfer  -       Row Name 06/24/24 1430          Sit-Stand Transfer    Sit-Stand [place] (Transfers) verbal cues  -     Assistive Device (Sit-Stand Transfers) walker, front-wheeled  -CS       Row Name 06/24/24 1430          Stand-Sit Transfer    Stand-Sit [place] (Transfers) contact guard;verbal cues  -     Assistive Device (Stand-Sit Transfers) walker, front-wheeled  -       Row Name 06/24/24 1430          Functional Mobility    Functional Mobility- Ind. Level minimum assist (75% patient effort);verbal cues required  -     Functional Mobility- Device walker, front-wheeled  -     Functional Mobility-Distance (Feet) --  household distance  -     Functional Mobility- Safety Issues weight-shifting ability decreased  -     Functional Mobility- Comment cueing for noted toe drag w/ fatigue,  -       Row Name 06/24/24 1430          Activities of Daily Living    BADL Assessment/Intervention lower body dressing;toileting;grooming  -       Row Name 06/24/24 1430          Lower Body Dressing Assessment/Training    [place] Level (Lower Body Dressing) don;doff;socks;moderate assist (50% patient effort)  -     Assistive Devices (Lower Body Dressing) reacher;sock-aid  -     Position (Lower Body Dressing) supported sitting  -     Comment, (Lower Body Dressing) owns  shoe horn, educated on AE use and compensatory strategies  -       Row Name 06/24/24 1430           Toileting Assessment/Training    [place] Level (Toileting) adjust/manage clothing;perform perineal hygiene;maximum assist (25% patient effort)  -     Position (Toileting) supported sitting;supported standing  -       Row Name 06/24/24 1430          Grooming Assessment/Training    [place] Level (Grooming) wash face, hands;set up  -     Position (Grooming) supported sitting  -               User Key  (r) = Recorded By, (t) = Taken By, (c) = Cosigned By      Initials Name Provider Type     Cande Campos OT Occupational Therapist                   Obj/Interventions       [facility] Name 06/24/24 1431          Sensory Assessment (Somatosensory)    Sensory Assessment (Somatosensory) UE sensation intact  -[facility] Name 06/24/24 1431          Range of Motion Comprehensive    General Range of Motion bilateral upper extremity ROM WFL  -CS       Row Name 06/24/24 1431          Strength Comprehensive (MMT)    Comment, General Manual Muscle Testing (MMT) Assessment BUE grossly 3+/5  -[facility] Name 06/24/24 1431          Balance    Balance Assessment sitting dynamic balance;sitting static balance;standing static balance;standing dynamic balance  -     Static Sitting Balance standby assist  -     Dynamic Sitting Balance standby assist  -CS     Position, Sitting Balance sitting in chair  -     Static Standing Balance contact guard  -     Dynamic Standing Balance minimal assist  -CS     Position/Device Used, Standing Balance supported;walker, rolling  -     Balance Interventions sitting;standing;static;dynamic;dynamic reaching;occupation based/functional task;weight shifting activity  -               User Key  (r) = Recorded By, (t) = Taken By, (c) = Cosigned By      Initials Name Provider Type    Cande Murdock OT Occupational Therapist                   Goals/Plan       [facility] Name 06/24/24 1434          Transfer Goal 1 (OT)    Activity/Assistive Device (Transfer Goal 1, OT)  sit-to-stand/stand-to-sit;toilet  -CS     [place] Level/Cues Needed (Transfer Goal 1, OT) standby assist  -CS     Time Frame (Transfer Goal 1, OT) long term goal (LTG);10 days  -CS     Progress/Outcome (Transfer Goal 1, OT) goal ongoing  -CS       Row Name 06/24/24 1434          Dressing Goal 1 (OT)    Activity/Device (Dressing Goal 1, OT) lower body dressing  -CS     [place]/Cues Needed (Dressing Goal 1, OT) minimum assist (75% or more patient effort)  -CS     Time Frame (Dressing Goal 1, OT) long term goal (LTG);10 days  -CS     Strategies/Barriers (Dressing Goal 1, OT) don/doff socks w/ AAD  -CS     Progress/Outcome (Dressing Goal 1, OT) goal ongoing  -CS       Row Name 06/24/24 1434          Toileting Goal 1 (OT)    Activity/Device (Toileting Goal 1, OT) adjust/manage clothing;perform perineal hygiene  -CS     [place] Level/Cues Needed (Toileting Goal 1, OT) minimum assist (75% or more patient effort)  -CS     Time Frame (Toileting Goal 1, OT) short term goal (STG);5 days  -CS     Progress/Outcome (Toileting Goal 1, OT) goal ongoing  -CS       Row Name 06/24/24 1434          Therapy Assessment/Plan (OT)    Planned Therapy Interventions (OT) activity tolerance training;adaptive equipment training;BADL retraining;functional balance retraining;occupation/activity based interventions;ROM/therapeutic exercise;strengthening exercise;transfer/mobility retraining  -CS               User Key  (r) = Recorded By, (t) = Taken By, (c) = Cosigned By      Initials Name Provider Type    CS Cande Campos OT Occupational Therapist                   Clinical Impression       Row Name 06/24/24 1432          Pain Assessment    Pretreatment Pain Rating 0/10 - no pain  -CS     Posttreatment Pain Rating 0/10 - no pain  -CS       Row Name 06/24/24 1432          Plan of Care Review    Plan of Care Reviewed With patient  -CS     Progress improving  -CS     Outcome Evaluation OT eval complete. Pt presents w/ deficits in  functional endurance, dynamic balance, and LE ROM warranting cont skilled IPOT POC to promote reteurn to PLOF. Pt with c/o two recent falls and significant functional performance deficits from baseline. Recommend pt DC to IP rehab.  -       Row Name 06/24/24 1432          Therapy Assessment/Plan (OT)    Patient/Family Therapy Goal Statement (OT) Return to PLOF  -CS     Rehab Potential (OT) good, to achieve stated therapy goals  -CS     Criteria for Skilled Therapeutic Interventions Met (OT) yes;skilled treatment is necessary  -CS     Therapy Frequency (OT) daily  -CS     Predicted Duration of Therapy Intervention (OT) 10 days  -CS       Row Name 06/24/24 1432          Therapy Plan Review/Discharge Plan (OT)    Anticipated Discharge Disposition (OT) inpatient rehabilitation facility  -       Row Name 06/24/24 1432          Vital Signs    Pre Systolic BP Rehab 111  -CS     Pre Treatment Diastolic BP 72  -CS     Pretreatment Heart Rate (beats/min) 100  -CS     Posttreatment Heart Rate (beats/min) 105  -CS     Pre SpO2 (%) 94  -CS     O2 Delivery Pre Treatment room air  -CS     Post SpO2 (%) 96  -CS     O2 Delivery Post Treatment room air  -CS     Pre Patient Position Sitting  -CS     Intra Patient Position Standing  -CS     Post Patient Position Sitting  -CS       Row Name 06/24/24 1432          Positioning and Restraints    Pre-Treatment Position sitting in chair/recliner  -CS     Post Treatment Position chair  -CS     In Chair notified nsg;reclined;call light within reach;encouraged to call for assist;exit alarm on;RUE elevated;LUE elevated;waffle cushion;legs elevated  -CS               User Key  (r) = Recorded By, (t) = Taken By, (c) = Cosigned By      Initials Name Provider Type    Cande Murdock, OT Occupational Therapist                   Outcome Measures       Row Name 06/24/24 1434          How much help from another is currently needed...    Putting on and taking off regular lower body clothing? 2  -CS      Bathing (including washing, rinsing, and drying) 2  -CS     Toileting (which includes using toilet bed pan or urinal) 2  -CS     Putting on and taking off regular upper body clothing 3  -CS     Taking care of personal grooming (such as brushing teeth) 3  -CS     Eating meals 4  -CS     AM-PAC 6 Clicks Score (OT) 16  -CS       Row Name 06/24/24 1108 06/24/24 0800       How much help from another person do you currently need...    Turning from your back to your side while in flat bed without using bedrails? 3  -KE 3  -AL    Moving from lying on back to sitting on the side of a flat bed without bedrails? 3  -KE 3  -AL    Moving to and from a bed to a chair (including a wheelchair)? 3  -KE 3  -AL    Standing up from a chair using your arms (e.g., wheelchair, bedside chair)? 3  -KE 3  -AL    Climbing 3-5 steps with a railing? 2  -KE 2  -AL    To walk in hospital room? 3  -KE 3  -AL    AM-PAC 6 Clicks Score (PT) 17  -KE 17  -AL    Highest Level of Mobility Goal 5 --> Static standing  -KE 5 --> Static standing  -AL      Row Name 06/24/24 1434 06/24/24 1108       Functional Assessment    Outcome Measure Options AM-PAC 6 Clicks Daily Activity (OT)  -CS AM-PAC 6 Clicks Basic Mobility (PT)  -KE              User Key  (r) = Recorded By, (t) = Taken By, (c) = Cosigned By      Initials Name Provider Type    Cande Murdock OT Occupational Therapist    Sakshi Chavez, RN Registered Nurse    Yuridia Parry, PT Physical Therapist                    Occupational Therapy Education       Title: PT OT SLP Therapies (In Progress)       Topic: Occupational Therapy (In Progress)       Point: ADL training (In Progress)       Description:   Instruct learner(s) on proper safety adaptation and remediation techniques during self care or transfers.   Instruct in proper use of assistive devices.                  Learning Progress Summary             Patient Acceptance, E, NR by CS at 6/24/2024 1435                         Point:  Home exercise program (Not Started)       Description:   Instruct learner(s) on appropriate technique for monitoring, assisting and/or progressing therapeutic exercises/activities.                  Learner Progress:  Not documented in this visit.              Point: Precautions (In Progress)       Description:   Instruct learner(s) on prescribed precautions during self-care and functional transfers.                  Learning Progress Summary             Patient Acceptance, E, NR by  at 6/24/2024 1435                         Point: Body mechanics (In Progress)       Description:   Instruct learner(s) on proper positioning and spine alignment during self-care, functional mobility activities and/or exercises.                  Learning Progress Summary             Patient Acceptance, E, NR by CS at 6/24/2024 1435                                         User Key       Initials Effective Dates Name Provider Type Discipline     09/02/21 -  Cande Campos, OT Occupational Therapist OT                  OT Recommendation and Plan  Planned Therapy Interventions (OT): activity tolerance training, adaptive equipment training, BADL retraining, functional balance retraining, occupation/activity based interventions, ROM/therapeutic exercise, strengthening exercise, transfer/mobility retraining  Therapy Frequency (OT): daily  Plan of Care Review  Plan of Care Reviewed With: patient  Progress: improving  Outcome Evaluation: OT eval complete. Pt presents w/ deficits in functional endurance, dynamic balance, and LE ROM warranting cont skilled IPOT POC to promote reteurn to PLOF. Pt with c/o two recent falls and significant functional performance deficits from baseline. Recommend pt DC to IP rehab.     Time Calculation:   Evaluation Complexity (OT)  Review Occupational Profile/Medical/Therapy History Complexity: expanded/moderate complexity  Assessment, Occupational Performance/Identification of Deficit Complexity: 3-5 performance  deficits  Clinical Decision Making Complexity (OT): detailed assessment/moderate complexity  Overall Complexity of Evaluation (OT): moderate complexity     Time Calculation- OT       Row Name 06/24/24 1436             Time Calculation- OT    OT Start Time 1010  -CS      OT Received On 06/24/24  -CS      OT Goal Re-Cert Due Date 07/04/24  -CS         Timed Charges    64590 - OT Self Care/Mgmt Minutes 8  -CS         Untimed Charges    OT Eval/Re-eval Minutes 46  -CS         Total Minutes    Timed Charges Total Minutes 8  -CS      Untimed Charges Total Minutes 46  -CS       Total Minutes 54  -CS                User Key  (r) = Recorded By, (t) = Taken By, (c) = Cosigned By      Initials Name Provider Type    CS Cande Campos OT Occupational Therapist                  Therapy Charges for Today       Code Description Service Date Service Provider Modifiers Qty    92812616165 HC OT SELF CARE/MGMT/TRAIN EA 15 MIN 6/24/2024 Cande Campos, OT GO 1    75986266339 HC OT EVAL MOD COMPLEXITY 4 6/24/2024 Cande Campos OT GO 1                 Cande Campos OT  6/24/2024    Electronically signed by Cande Campos OT at 06/24/24 1436

## 2024-06-24 NOTE — THERAPY EVALUATION
Patient Name: Nestor Nye  : 1945    MRN: [id number]                              Today's Date: 2024       Admit Date: 2024    Visit Dx:     ICD-10-CM ICD-9-CM   1. Urinary retention  R33.9 788.20   2. BPH with urinary obstruction  N40.1 600.01    N13.8 599.69   3. Femoral artery occlusion  I70.209 444.22     Patient Active Problem List   Diagnosis    Hypothyroidism    Mixed hyperlipidemia    Elevated PSA    Benign essential hypertension    Urinary retention    Small bowel obstruction    Acute UTI    BPH with obstruction/lower urinary tract symptoms    BPH with urinary obstruction    Anemia of chronic disease    Rheumatoid arthritis    Secondary hyperparathyroidism    Stage 3a chronic kidney disease     Past Medical History:   Diagnosis Date    Acute maxillary sinusitis     Arthritis     Disease of thyroid gland     Enlarged prostate     GERD (gastroesophageal reflux disease)     Gout     Hypertension     Impaired functional mobility, balance, gait, and endurance     PONV (postoperative nausea and vomiting)     Renal disorder     Rheumatoid arthritis     Right inguinal hernia 10/28/2021    Added automatically from request for surgery [id number]    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, changed his voice since then     Past Surgical History:   Procedure Laterality Date    ABDOMINAL SURGERY      BACK SURGERY      BELPHAROPTOSIS REPAIR Bilateral     BRAIN SURGERY      COLON SURGERY      bowel resection x2 per pt    CRANIOTOMY FOR SUBDURAL HEMATOMA      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    DIAGNOSTIC LAPAROSCOPY N/A 2022    Procedure: DIAGNOSTIC LAPAROSCOPY CONVERTED TO EXPLORATORY LAPAROTOMY WITH SMALL BOWEL RESECTION;  Surgeon: Maximo Bennett MD;  Location: [facility];  Service: General;  Laterality: N/A;  CONVERTED TO EXPLORATORY LAPAROTOMY AT     EXPLORATORY LAPAROTOMY N/A 2022    Procedure: LAPAROTOMY  EXPLORATORY WITH SMALL BOWEL OBSTRUCTION;  Surgeon: Maximo Bennett MD;  Location:  [facility];  Service: General;  Laterality: N/A;    EYE SURGERY      on upper etelids    FEMORAL ENDARTERECTOMY Right 6/21/2024    Procedure: FEMORAL EMBELECTOMY RIGHT;  Surgeon: Kingsley Morris MD;  Location:  [facility];  Service: Vascular;  Laterality: Right;    FOREIGN BODY REMOVAL Right     removal of metal fragments from RUQ after electrocution and fall.      HERNIA REPAIR      INGUINAL HERNIA REPAIR Right 11/01/2021    Procedure: INGUINAL HERNIA REPAIR WITH MESH AND EXCISION OF SPERMATIC CORD LIPOMA;  Surgeon: Rosey Glover MD;  Location:  [facility];  Service: General;  Laterality: Right;    LUMBAR SPINE SURGERY      PROSTATE AQUABLATION N/A 6/21/2024    Procedure: AQUABLATION OF PROSTATE;  Surgeon: Prieto Rey MD;  Location:  [facility];  Service: [employer] - Urology;  Laterality: N/A;    SKIN GRAFT Right     RUE skin grafting after electrocution    THORACIC SPINE SURGERY        General Information       Row Name 06/24/24 1429          OT Time and Intention    Document Type evaluation  -CS     Mode of Treatment occupational therapy  -CS       Row Name 06/24/24 1429          General Information    Patient Profile Reviewed yes  -CS     Prior Level of Function independent:;all household mobility;ADL's  own RW and STC, 2 recent falls  -CS     Existing Precautions/Restrictions fall  stacy, contact - cleared for ambulation in bobby w/ brief and staff in appropriate PPE, R groin incision  -CS     Barriers to Rehab medically complex;previous functional deficit  -CS       Row Name 06/24/24 1429          Cognition    Orientation Status (Cognition) oriented x 3  -CS       Row Name 06/24/24 1429          Safety Issues, Functional Mobility    Impairments Affecting Function (Mobility) balance;endurance/activity tolerance;strength  -CS               User Key  (r) = Recorded By, (t) = Taken By, (c) = Cosigned By       Initials Name Provider Type     Cande Campos OT Occupational Therapist                     Mobility/ADL's       Row Name 06/24/24 1430          Transfers    Transfers sit-stand transfer;stand-sit transfer  -       Row Name 06/24/24 1430          Sit-Stand Transfer    Sit-Stand [place] (Transfers) verbal cues  -     Assistive Device (Sit-Stand Transfers) walker, front-wheeled  -       Row Name 06/24/24 1430          Stand-Sit Transfer    Stand-Sit [place] (Transfers) contact guard;verbal cues  -     Assistive Device (Stand-Sit Transfers) walker, front-wheeled  -       Row Name 06/24/24 1430          Functional Mobility    Functional Mobility- Ind. Level minimum assist (75% patient effort);verbal cues required  -     Functional Mobility- Device walker, front-wheeled  -     Functional Mobility-Distance (Feet) --  household distance  -     Functional Mobility- Safety Issues weight-shifting ability decreased  -     Functional Mobility- Comment cueing for noted toe drag w/ fatigue,  -       Row Name 06/24/24 1430          Activities of Daily Living    BADL Assessment/Intervention lower body dressing;toileting;grooming  -       Row Name 06/24/24 1430          Lower Body Dressing Assessment/Training    [place] Level (Lower Body Dressing) don;doff;socks;moderate assist (50% patient effort)  -     Assistive Devices (Lower Body Dressing) reacher;sock-aid  -     Position (Lower Body Dressing) supported sitting  -     Comment, (Lower Body Dressing) owns LH shoe horn, educated on AE use and compensatory strategies  -       Row Name 06/24/24 1430          Toileting Assessment/Training    [place] Level (Toileting) adjust/manage clothing;perform perineal hygiene;maximum assist (25% patient effort)  -     Position (Toileting) supported sitting;supported standing  -       Row Name 06/24/24 1430          Grooming Assessment/Training    [place] Level (Grooming) wash face,  hands;set up  -     Position (Grooming) supported sitting  -               User Key  (r) = Recorded By, (t) = Taken By, (c) = Cosigned By      Initials Name Provider Type     Cande Campos OT Occupational Therapist                   Obj/Interventions       Row Name 06/24/24 1431          Sensory Assessment (Somatosensory)    Sensory Assessment (Somatosensory) UE sensation intact  -CS       Row Name 06/24/24 1431          Range of Motion Comprehensive    General Range of Motion bilateral upper extremity ROM WFL  -CS       Row Name 06/24/24 1431          Strength Comprehensive (MMT)    Comment, General Manual Muscle Testing (MMT) Assessment BUE grossly 3+/5  -CS       Row Name 06/24/24 1431          Balance    Balance Assessment sitting dynamic balance;sitting static balance;standing static balance;standing dynamic balance  -     Static Sitting Balance standby assist  -     Dynamic Sitting Balance standby assist  -CS     Position, Sitting Balance sitting in chair  -     Static Standing Balance contact guard  -     Dynamic Standing Balance minimal assist  -CS     Position/Device Used, Standing Balance supported;walker, rolling  -     Balance Interventions sitting;standing;static;dynamic;dynamic reaching;occupation based/functional task;weight shifting activity  -               User Key  (r) = Recorded By, (t) = Taken By, (c) = Cosigned By      Initials Name Provider Type    CS Cande Campos OT Occupational Therapist                   Goals/Plan       Row Name 06/24/24 1434          Transfer Goal 1 (OT)    Activity/Assistive Device (Transfer Goal 1, OT) sit-to-stand/stand-to-sit;toilet  -     [place] Level/Cues Needed (Transfer Goal 1, OT) standby assist  -     Time Frame (Transfer Goal 1, OT) long term goal (LTG);10 days  -     Progress/Outcome (Transfer Goal 1, OT) goal ongoing  -CS       Row Name 06/24/24 1434          Dressing Goal 1 (OT)    Activity/Device (Dressing Goal 1, OT)  lower body dressing  -CS     [place]/Cues Needed (Dressing Goal 1, OT) minimum assist (75% or more patient effort)  -CS     Time Frame (Dressing Goal 1, OT) long term goal (LTG);10 days  -CS     Strategies/Barriers (Dressing Goal 1, OT) don/doff socks w/ AAD  -CS     Progress/Outcome (Dressing Goal 1, OT) goal ongoing  -CS       Row Name 06/24/24 1434          Toileting Goal 1 (OT)    Activity/Device (Toileting Goal 1, OT) adjust/manage clothing;perform perineal hygiene  -CS     [place] Level/Cues Needed (Toileting Goal 1, OT) minimum assist (75% or more patient effort)  -CS     Time Frame (Toileting Goal 1, OT) short term goal (STG);5 days  -CS     Progress/Outcome (Toileting Goal 1, OT) goal ongoing  -CS       Row Name 06/24/24 1434          Therapy Assessment/Plan (OT)    Planned Therapy Interventions (OT) activity tolerance training;adaptive equipment training;BADL retraining;functional balance retraining;occupation/activity based interventions;ROM/therapeutic exercise;strengthening exercise;transfer/mobility retraining  -CS               User Key  (r) = Recorded By, (t) = Taken By, (c) = Cosigned By      Initials Name Provider Type    CS Cande Campos, WANDA Occupational Therapist                   Clinical Impression       Row Name 06/24/24 1432          Pain Assessment    Pretreatment Pain Rating 0/10 - no pain  -CS     Posttreatment Pain Rating 0/10 - no pain  -CS       Row Name 06/24/24 1432          Plan of Care Review    Plan of Care Reviewed With patient  -CS     Progress improving  -CS     Outcome Evaluation OT eval complete. Pt presents w/ deficits in functional endurance, dynamic balance, and LE ROM warranting cont skilled IPOT POC to promote reteurn to PLOF. Pt with c/o two recent falls and significant functional performance deficits from baseline. Recommend pt DC to IP rehab.  -CS       Row Name 06/24/24 1432          Therapy Assessment/Plan (OT)    Patient/Family Therapy Goal Statement (OT)  Return to PLOF  -CS     Rehab Potential (OT) good, to achieve stated therapy goals  -CS     Criteria for Skilled Therapeutic Interventions Met (OT) yes;skilled treatment is necessary  -CS     Therapy Frequency (OT) daily  -CS     Predicted Duration of Therapy Intervention (OT) 10 days  -CS       Row Name 06/24/24 1432          Therapy Plan Review/Discharge Plan (OT)    Anticipated Discharge Disposition (OT) inpatient rehabilitation facility  -CS       Row Name 06/24/24 1432          Vital Signs    Pre Systolic BP Rehab 111  -CS     Pre Treatment Diastolic BP 72  -CS     Pretreatment Heart Rate (beats/min) 100  -CS     Posttreatment Heart Rate (beats/min) 105  -CS     Pre SpO2 (%) 94  -CS     O2 Delivery Pre Treatment room air  -CS     Post SpO2 (%) 96  -CS     O2 Delivery Post Treatment room air  -CS     Pre Patient Position Sitting  -CS     Intra Patient Position Standing  -CS     Post Patient Position Sitting  -CS       Row Name 06/24/24 1432          Positioning and Restraints    Pre-Treatment Position sitting in chair/recliner  -CS     Post Treatment Position chair  -CS     In Chair notified nsg;reclined;call light within reach;encouraged to call for assist;exit alarm on;RUE elevated;LUE elevated;waffle cushion;legs elevated  -CS               User Key  (r) = Recorded By, (t) = Taken By, (c) = Cosigned By      Initials Name Provider Type    CS Cande Campos, OT Occupational Therapist                   Outcome Measures       Row Name 06/24/24 1434          How much help from another is currently needed...    Putting on and taking off regular lower body clothing? 2  -CS     Bathing (including washing, rinsing, and drying) 2  -CS     Toileting (which includes using toilet bed pan or urinal) 2  -CS     Putting on and taking off regular upper body clothing 3  -CS     Taking care of personal grooming (such as brushing teeth) 3  -CS     Eating meals 4  -CS     AM-PAC 6 Clicks Score (OT) 16  -CS       Row Name 06/24/24  1108 06/24/24 0800       How much help from another person do you currently need...    Turning from your back to your side while in flat bed without using bedrails? 3  -KE 3  -AL    Moving from lying on back to sitting on the side of a flat bed without bedrails? 3  -KE 3  -AL    Moving to and from a bed to a chair (including a wheelchair)? 3  -KE 3  -AL    Standing up from a chair using your arms (e.g., wheelchair, bedside chair)? 3  -KE 3  -AL    Climbing 3-5 steps with a railing? 2  -KE 2  -AL    To walk in hospital room? 3  -KE 3  -AL    AM-PAC 6 Clicks Score (PT) 17  -KE 17  -AL    Highest Level of Mobility Goal 5 --> Static standing  -KE 5 --> Static standing  -AL      Row Name 06/24/24 1434 06/24/24 1108       Functional Assessment    Outcome Measure Options AM-PAC 6 Clicks Daily Activity (OT)  -CS AM-PAC 6 Clicks Basic Mobility (PT)  -KE              User Key  (r) = Recorded By, (t) = Taken By, (c) = Cosigned By      Initials Name Provider Type    Cande Murdock OT Occupational Therapist    Sakshi Chavez, RN Registered Nurse    Yuridia Parry, PT Physical Therapist                    Occupational Therapy Education       Title: PT OT SLP Therapies (In Progress)       Topic: Occupational Therapy (In Progress)       Point: ADL training (In Progress)       Description:   Instruct learner(s) on proper safety adaptation and remediation techniques during self care or transfers.   Instruct in proper use of assistive devices.                  Learning Progress Summary             Patient Acceptance, E, NR by  at 6/24/2024 1435                         Point: Home exercise program (Not Started)       Description:   Instruct learner(s) on appropriate technique for monitoring, assisting and/or progressing therapeutic exercises/activities.                  Learner Progress:  Not documented in this visit.              Point: Precautions (In Progress)       Description:   Instruct learner(s) on prescribed  precautions during self-care and functional transfers.                  Learning Progress Summary             Patient Acceptance, E, NR by  at 6/24/2024 1435                         Point: Body mechanics (In Progress)       Description:   Instruct learner(s) on proper positioning and spine alignment during self-care, functional mobility activities and/or exercises.                  Learning Progress Summary             Patient Acceptance, E, NR by  at 6/24/2024 1435                                         User Key       Initials Effective Dates Name Provider Type Discipline     09/02/21 -  Cande Campos, OT Occupational Therapist OT                  OT Recommendation and Plan  Planned Therapy Interventions (OT): activity tolerance training, adaptive equipment training, BADL retraining, functional balance retraining, occupation/activity based interventions, ROM/therapeutic exercise, strengthening exercise, transfer/mobility retraining  Therapy Frequency (OT): daily  Plan of Care Review  Plan of Care Reviewed With: patient  Progress: improving  Outcome Evaluation: OT eval complete. Pt presents w/ deficits in functional endurance, dynamic balance, and LE ROM warranting cont skilled IPOT POC to promote reteurn to PLOF. Pt with c/o two recent falls and significant functional performance deficits from baseline. Recommend pt DC to IP rehab.     Time Calculation:   Evaluation Complexity (OT)  Review Occupational Profile/Medical/Therapy History Complexity: expanded/moderate complexity  Assessment, Occupational Performance/Identification of Deficit Complexity: 3-5 performance deficits  Clinical Decision Making Complexity (OT): detailed assessment/moderate complexity  Overall Complexity of Evaluation (OT): moderate complexity     Time Calculation- OT       Row Name 06/24/24 1436             Time Calculation- OT    OT Start Time 1010  -CS      OT Received On 06/24/24  -      OT Goal Re-Cert Due Date 07/04/24  -          Timed Charges    17641 - OT Self Care/Mgmt Minutes 8  -CS         Untimed Charges    OT Eval/Re-eval Minutes 46  -CS         Total Minutes    Timed Charges Total Minutes 8  -CS      Untimed Charges Total Minutes 46  -CS       Total Minutes 54  -CS                User Key  (r) = Recorded By, (t) = Taken By, (c) = Cosigned By      Initials Name Provider Type    CS Cande Campos OT Occupational Therapist                  Therapy Charges for Today       Code Description Service Date Service Provider Modifiers Qty    74636856098 HC OT SELF CARE/MGMT/TRAIN EA 15 MIN 6/24/2024 Cande Campos, OT GO 1    [id number]  OT EVAL MOD COMPLEXITY 4 6/24/2024 Cande Campos, OT GO 1                 Cande Campos OT  6/24/2024

## 2024-06-24 NOTE — PROGRESS NOTES
INTENSIVIST   PROGRESS NOTE     Hospital:  LOS: 3 days     Mr. Nestor Nye, 78 y.o. male is followed for a Chief Complaint of: Hematuria      Subjective   S     Interval History:  No acute events overnight.        The patient's relevant past medical, surgical and social history were reviewed and updated in Epic as appropriate.      ROS:   Constitutional: Negative for fever.   Respiratory: Negative for dyspnea.   Cardiovascular: Negative for chest pain.   Gastrointestinal: Negative for  nausea, vomiting and diarrhea.     Objective   O     Vitals:  Temp  Min: 97.5 °F (36.4 °C)  Max: 98.5 °F (36.9 °C)  BP  Min: 100/51  Max: 127/79  Pulse  Min: 83  Max: 117  Resp  Min: 14  Max: 19  SpO2  Min: 90 %  Max: 99 % Flow (L/min)  Min: 2  Max: 2    Intake/Ouptut 24 hrs (7:00AM - 6:59 AM)  Intake & Output (last 3 days)         06/21 0701  06/22 0700 06/22 0701  06/23 0700 06/23 0701  06/24 0700 06/24 0701  06/25 0700    P.O. 60 1568 580     I.V. (mL/kg) 3397.8 (44.7) 1226.2 (16.2) 1171 (15.4) 84 (1.1)    Other 24077       IV Piggyback 275       Total Intake(mL/kg) 53933.8 (316) 2794.2 (36.9) 1751 (23.1) 84 (1.1)    Urine (mL/kg/hr) 21503 (11.5) 3425 (1.9) 3900 (2.1) 200 (0.5)    Stool  0 0     Total Output 17778 3425 3900 200    Net +3062.8 -630.8 -2149 -116            Stool Unmeasured Occurrence  1 x 1 x             Medications (drips):  sodium chloride, Last Rate: 50 mL/hr (06/24/24 1057)        Physical Examination  Telemetry:  Normal sinus rhythm.    Constitutional:  No acute distress.  Walking with PT.    Eyes: No scleral icterus.   PERRL, EOM intact.    Neck:  Supple, FROM   Cardiovascular: Normal rate, regular and rhythm. Normal heart sounds.  No murmurs, gallop or rub.   Respiratory: No respiratory distress. Normal respiratory effort.  Diminished.    Abdominal:  Soft. No masses. Nontender. No distension. No HSM.   Extremities: No digital cyanosis. No clubbing.  No peripheral edema.   Skin: No rashes, lesions or  ulcers   Neurological:   Alert and interactive.              Results from last 7 days   Lab Units 06/24/24  0559 06/23/24  0836 06/23/24  0449 06/22/24  0808 06/22/24  0419   WBC 10*3/mm3 13.18*  --  16.28*  --  14.21*   HEMOGLOBIN g/dL 7.9* 8.8* 8.7*   < > 9.0*   MCV fL 95.2  --  96.2  --  93.3   PLATELETS 10*3/mm3 212  --  203  --  222    < > = values in this interval not displayed.     Results from last 7 days   Lab Units 06/24/24  0559 06/23/24 0449 06/22/24  0419   SODIUM mmol/L 138 139 141   POTASSIUM mmol/L 4.6 4.4 4.6   CO2 mmol/L 26.0 25.0 21.0*   CREATININE mg/dL 1.61* 1.67* 1.59*   GLUCOSE mg/dL 99 100* 143*   MAGNESIUM mg/dL 2.0 1.9 1.7   PHOSPHORUS mg/dL 3.2 2.7 3.3     Estimated Creatinine Clearance: 41.2 mL/min (A) (by C-G formula based on SCr of 1.61 mg/dL (H)).  Results from last 7 days   Lab Units 06/24/24  0559 06/23/24 0449 06/21/24  1202   ALK PHOS U/L 80 83 96   BILIRUBIN mg/dL 0.7 0.8 0.5   ALT (SGPT) U/L <5 <5 8   AST (SGOT) U/L 29 32 14             Images:  Imaging Results (Last 24 Hours)       ** No results found for the last 24 hours. **               Results: Reviewed.  I reviewed the patient's new laboratory and imaging results.  I independently reviewed the patient's new images.    Medications: Reviewed.    Assessment & Plan   A / P     Mr. Nye is a 77yo M with a history of BPH with urinary obstruction, HTN, HLD, hypothyroidism and CKDIII who presented to [facility] on [facility] for an elective aqua ablation per Dr. Rey with Urology. He was initially admitted to Hospital Medicine as the case took longer to perform due to his large prostate.     The case was complicated by hematuria and required bilateral hypogastric artery embolization by Dr. Christine with IR via right CGA access. An angioseal was placed post-procedure. Shortly after the case, poor perfusion was noted in the right lower extremity. An arterial duplex was performed which showed no SFA flow. Vascular Surgery was consulted  and he was taken for emergent right groin exploration per Dr. Morris. An acute thrombus was retrieved near the aortic bifurcation with return of pulsatile bleeding. He was then transferred to the ICU.     CBI was stopped on 6/22/24.     Nutrition:   Diet: Regular/House, Renal; Low Phosphorus; Texture: Mechanical Ground (NDD 2); Fluid Consistency: Thin (IDDSI 0)  Advance Directives:   Code Status and Medical Interventions:   Ordered at: 06/21/24 1326     Level Of Support Discussed With:    Patient     Code Status (Patient has no pulse and is not breathing):    CPR (Attempt to Resuscitate)     Medical Interventions (Patient has pulse or is breathing):    Full Support       Active Hospital Problems    Diagnosis     **BPH with urinary obstruction     BPH with obstruction/lower urinary tract symptoms     Urinary retention     Stage 3a chronic kidney disease     Hypothyroidism     Mixed hyperlipidemia     Benign essential hypertension        Assessment / Plan:    Vascular Surgery following for post-femoral endarterectomy management.   Post-ablation management per Urology.   Augmentin for urologic procedure prophylaxis.   Continue ASA.   PO diet.   PT following.   AM labs  Awaiting telemetry bed.     High level of risk due to:  severe exacerbation of chronic illness and illness with threat to life or bodily function.    Plan of care and goals reviewed during interdisciplinary rounds.  I discussed the patient's findings and my recommendations with patient and nursing staff      Trinh Girard DO    Intensive Care Medicine and Pulmonary Medicine

## 2024-06-24 NOTE — CASE MANAGEMENT/SOCIAL WORK
Discharge Planning Assessment  [facility]     Patient Name: Nestor Nye  MRN: [id number]  Today's Date: 6/24/2024    Admit Date: 6/21/2024    Plan: IDP   Discharge Needs Assessment       Row Name 06/24/24 1512       Living Environment    People in Home alone    Current Living Arrangements home    Potentially Unsafe Housing Conditions none    In the past 12 months has the electric, gas, oil, or water company threatened to shut off services in your home? No    Primary Care Provided by self    Provides Primary Care For no one    Family Caregiver if Needed sibling(s)    Family Caregiver Names Wesley Nye (Brother)  [phone] (Home Phone)    Quality of Family Relationships supportive    Able to Return to Prior Arrangements yes       Resource/Environmental Concerns    Resource/Environmental Concerns none    Transportation Concerns none       Transportation Needs    In the past 12 months, has lack of transportation kept you from medical appointments or from getting medications? no    In the past 12 months, has lack of transportation kept you from meetings, work, or from getting things needed for daily living? No       Food Insecurity    Within the past 12 months, you worried that your food would run out before you got the money to buy more. Never true    Within the past 12 months, the food you bought just didn't last and you didn't have money to get more. Never true       Transition Planning    Patient/Family Anticipates Transition to inpatient rehabilitation facility    Patient/Family Anticipated Services at Transition rehabilitation services    Transportation Anticipated family or friend will provide       Discharge Needs Assessment    Readmission Within the Last 30 Days no previous admission in last 30 days    Equipment Currently Used at Home cane, straight;walker, rolling    Concerns to be Addressed discharge planning    Anticipated Changes Related to Illness inability to care for self    Equipment  Needed After Discharge none    Discharge Facility/Level of Care Needs nursing facility, skilled    Provided Post Acute Provider List? Yes    Post Acute Provider List Inpatient Rehab    Provided Post Acute Provider Quality & Resource List? Yes    Post Acute Provider Quality and Resource List Inpatient Rehab    Delivered To Patient    Method of Delivery In person    Current Discharge Risk chronically ill;lives alone                   Discharge Plan       Row Name 06/24/24 1514       Plan    Plan IDP    Patient/Family in Agreement with Plan yes    Plan Comments I met with the patient at the bedside to discuss discharge planning. The patient stated that he lives in [facility] [place] [facility] in a house alone. He stated that he is normally independent at home but does not drive. He stated that his eyesight is not very good anymore. He uses a cane or walker at home to ambulate. He is not current with home or outpatient services. He confirmed his insurance is [employer] and his PCP is Roxanne Slater MD. We discussed therapy's recommendation for rehab at discharge. Patient Choice given. He would like a referral to [facility] in [place] first and then [facility] if [facility] does not have a bed. Will make referrals in that order. He is very agreeable to rehab. CM to follow and assist as needed.    Final Discharge Disposition Code 03 - skilled nursing facility (SNF)                  Continued Care and Services - Admitted Since 6/21/2024       Destination       Service Provider Request Status Selected Services Address Phone Fax Patient Preferred    [facility] Pending - Request Sent N/A [address] [id number] [phone]     [facility] [id number] [facility] [id number] [phone] --                  Expected Discharge Date and Time       Expected Discharge Date Expected Discharge Time    Jun 22, 2024             Demographic Summary    No documentation.                  Functional Status       Row Name 06/24/24 1512       Functional Status    Usual Activity Tolerance fair    Current Activity Tolerance other (see comments)  see therapy and RN notes       Physical Activity    On average, how many days per week do you engage in moderate to strenuous exercise (like a brisk walk)? 0 days    On average, how many minutes do you engage in exercise at this level? 0 min    Number of minutes of exercise per week 0       Assessment of Health Literacy    How often do you have someone help you read hospital materials? Occasionally    How often do you have problems learning about your medical condition because of difficulty understanding written information? Never    How often do you have a problem understanding what is told to you about your medical condition? Never    How confident are you filling out medical forms by yourself? Quite a bit    Health Literacy Good       Functional Status, IADL    Medications independent    Meal Preparation independent    Housekeeping independent    Laundry independent    Shopping assistive person       Mental Status    General Appearance WDL WDL       Mental Status Summary    Recent Changes in Mental Status/Cognitive Functioning no changes       Employment/    Employment Status retired                   Psychosocial    No documentation.                  Abuse/Neglect    No documentation.                  Legal    No documentation.                  Substance Abuse    No documentation.                  Patient Forms    No documentation.                     Edith Kaminski, LINDA

## 2024-06-24 NOTE — PAYOR COMM NOTE
"Ref# [id number]   Still pending  Clinical update    Utilization Review  Phone [phone]  Fax [phone]    [place] [address]           Ervin Mackey (78 y.o. Male)       Date of Birth   1945    Social Security Number       Address   [address] [place] [address]    Home Phone   [phone]    MRN   [id number]       Mormon   Oriental orthodox    Marital Status                               Admission Date   6/21/24    Admission Type   Elective    Admitting Provider   Stanley Parker MD    Attending Provider   Trinh Girard DO    Department, Room/Bed   [facility] 2B ICU, N236/1       Discharge Date       Discharge Disposition       Discharge Destination                                 Attending Provider: Trinh Girard DO    Allergies: No Known Allergies    Isolation: Contact   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)   Code Status: CPR    Ht: 170.2 cm (67.01\")   Wt: 75.8 kg (167 lb 1.7 oz)    Admission Cmt: None   Principal Problem: BPH with urinary obstruction [N40.1,N13.8]                   Active Insurance as of 6/21/2024       Primary Coverage       Payor Plan Insurance Group Employer/Plan Group    ANTHEM MEDICARE REPLACEMENT ANTHEM MEDICARE ADVANTAGE [id number]       Payor Plan Address Payor Plan Phone Number Payor Plan Fax Number Effective Dates    [address] [phone]  1/1/2016 - None Entered    [facility] [id number]         Subscriber Name Subscriber Birth Date Member ID       ERVIN MACKEY 1945 [id number]                     Emergency Contacts        (Rel.) Home Phone Work Phone Mobile Phone    Wesley Mackey (Brother) [phone] -- --              Vital Signs (last day)       Date/Time Temp Temp src Pulse Resp BP Patient Position SpO2    06/24/24 1400 -- -- 112 -- 115/72 -- 95    06/24/24 1300 -- -- 113 -- 124/72 -- 95    06/24/24 1200 97.7 (36.5) Axillary 112 18 121/82 " Lying 97    06/24/24 1100 -- -- 105 -- 117/73 -- 97    06/24/24 1000 -- -- 105 -- 111/72 -- 95    06/24/24 0900 -- -- 106 -- 111/78 -- 90    06/24/24 0800 97.5 (36.4) Axillary 107 19 112/79 Sitting 94    06/24/24 0700 -- -- 96 -- 116/78 -- 99    06/24/24 0600 -- -- 95 -- 127/79 -- 93    06/24/24 0500 97.9 (36.6) Oral 83 -- 100/51 Lying 95    06/24/24 0400 -- -- 92 -- 120/77 -- 98    06/24/24 0300 -- -- 90 -- 115/72 -- 96    06/24/24 0200 -- -- 95 -- 126/72 -- 93    06/24/24 0100 -- -- 96 -- 116/73 -- 96    06/24/24 0000 98.5 (36.9) Oral 101 16 120/73 Lying 94    06/23/24 2300 -- -- 93 -- 124/64 -- 92    06/23/24 2200 -- -- 90 -- 107/65 -- 93    06/23/24 2100 -- -- 98 -- 113/71 -- 94    06/23/24 2000 98.1 (36.7) Oral 101 16 104/72 Lying 93    06/23/24 1900 -- -- 112 -- 107/68 -- 94    06/23/24 1800 -- -- 113 -- 118/75 -- 94    06/23/24 1700 98.2 (36.8) Oral 101 14 122/70 Sitting 95    06/23/24 1600 -- -- 108 -- 104/73 -- 96    06/23/24 1500 -- -- 117 -- 119/74 -- 95    06/23/24 1400 -- -- 105 16 104/68 Sitting 94    06/23/24 1300 -- -- 113 -- 115/65 -- 94    06/23/24 1200 97.8 (36.6) Oral 116 16 108/68 -- 93    06/23/24 1100 -- -- 111 -- 111/72 -- 96    06/23/24 1000 -- -- 112 -- 129/74 -- 98    06/23/24 0900 -- -- 105 -- 111/63 -- 92    06/23/24 0800 98 (36.7) Oral 113 16 134/82 Sitting 96    06/23/24 0700 -- -- 109 -- 132/80 -- 94    06/23/24 0600 -- -- 115 18 107/75 Sitting 95    06/23/24 0513 -- -- 118 18 -- -- 92    06/23/24 0500 -- -- 113 -- 134/89 -- 93    06/23/24 0430 -- -- 98 -- 112/65 -- 95    06/23/24 0400 98.6 (37) Oral 98 20 105/64 Lying 94    06/23/24 0330 -- -- 97 -- 118/69 -- 97    06/23/24 0300 -- -- 96 -- 108/53 -- 94    06/23/24 0230 -- -- 101 -- 103/70 -- 95    06/23/24 0200 -- -- 96 18 114/68 Lying 95    06/23/24 0130 -- -- 96 -- 112/58 -- 96    06/23/24 0100 -- -- 99 -- 107/79 -- 96    06/23/24 0030 -- -- 100 -- 118/59 -- 95    06/23/24 0000 98.1 (36.7) Oral 92 16 113/59 Lying 95           Current Facility-Administered Medications   Medication Dose Route Frequency Provider Last Rate Last Admin    acetaminophen (TYLENOL) tablet 650 mg  650 mg Oral Q4H PRN Victoriano Tao MD        Or    acetaminophen (TYLENOL) 160 MG/5ML oral solution 650 mg  650 mg Oral Q4H PRN Victoriano Tao MD        Or    acetaminophen (TYLENOL) suppository 650 mg  650 mg Rectal Q4H PRN Victoriano Tao MD        amoxicillin-clavulanate (AUGMENTIN) 500-125 MG per tablet 500 mg  1 tablet Oral BID Lei Mcpherson APRN   500 mg at 06/24/24 0805    aspirin chewable tablet 81 mg  81 mg Oral Daily Lei Mcpherson APRN   81 mg at 06/24/24 0805    sennosides-docusate (PERICOLACE) 8.6-50 MG per tablet 2 tablet  2 tablet Oral BID PRN Victoriano Tao MD        And    polyethylene glycol (MIRALAX) packet 17 g  17 g Oral Daily PRN Victoriano Tao MD        And    bisacodyl (DULCOLAX) EC tablet 5 mg  5 mg Oral Daily PRN Victoriano Tao MD        And    bisacodyl (DULCOLAX) suppository 10 mg  10 mg Rectal Daily PRN Victoriano Tao MD        ipratropium-albuterol (DUO-NEB) nebulizer solution 3 mL  3 mL Nebulization Q4H PRN Cha Flores APRN   3 mL at 06/23/24 0512    levothyroxine (SYNTHROID, LEVOTHROID) tablet 88 mcg  88 mcg Oral Q AM Victoriano Tao MD   88 mcg at 06/24/24 0549    nitroglycerin (NITROSTAT) SL tablet 0.4 mg  0.4 mg Sublingual Q5 Min PRN Cha Flores APRKAREL        ondansetron ODT (ZOFRAN-ODT) disintegrating tablet 4 mg  4 mg Oral Q6H PRN Victoriano Tao MD        Or    ondansetron (ZOFRAN) injection 4 mg  4 mg Intravenous Q6H PRN Victoriano Tao MD   4 mg at 06/22/24 0021    pantoprazole (PROTONIX) EC tablet 40 mg  40 mg Oral Q AM Zen Patel MD   40 mg at 06/24/24 0549    sodium chloride 0.9 % flush 10 mL  10 mL Intravenous Q12H Victoriano Tao MD   10 mL at 06/24/24 0805    sodium chloride 0.9 % flush 10 mL  10 mL Intravenous PRN Victoriano Tao MD        sodium chloride 0.9 % infusion 40  mL  40 mL Intravenous Victoriano Patiño MD        sodium chloride 0.9 % infusion  50 mL/hr Intravenous Continuous Cha Flores APRN 50 mL/hr at 06/24/24 1057 50 mL/hr at 06/24/24 1057     Lab Results (last 48 hours)       Procedure Component Value Units Date/Time    Tissue Pathology Exam [[id number]] Collected: 06/21/24 1515    Specimen: Tissue from Femoral Artery Updated: 06/24/24 0746    Comprehensive Metabolic Panel [[id number]]  (Abnormal) Collected: 06/24/24 0559    Specimen: Blood from Arm, Right Updated: 06/24/24 0651     Glucose 99 mg/dL      BUN 15 mg/dL      Creatinine 1.61 mg/dL      Sodium 138 mmol/L      Potassium 4.6 mmol/L      Comment: Slight hemolysis detected by analyzer. Result may be falsely elevated.        Chloride 106 mmol/L      CO2 26.0 mmol/L      Calcium 8.6 mg/dL      Total Protein 5.7 g/dL      Albumin 3.2 g/dL      ALT (SGPT) <5 U/L      AST (SGOT) 29 U/L      Alkaline Phosphatase 80 U/L      Total Bilirubin 0.7 mg/dL      Globulin 2.5 gm/dL      Comment: Calculated Result        A/G Ratio 1.3 g/dL      BUN/Creatinine Ratio 9.3     Anion Gap 6.0 mmol/L      eGFR 43.8 mL/min/1.73     Narrative:      GFR Normal >60  Chronic Kidney Disease <60  Kidney Failure <15    The GFR formula is only valid for adults with stable renal function between ages 18 and 70.    Magnesium [[id number]]  (Normal) Collected: 06/24/24 0559    Specimen: Blood from Arm, Right Updated: 06/24/24 0641     Magnesium 2.0 mg/dL     Phosphorus [[id number]]  (Normal) Collected: 06/24/24 0559    Specimen: Blood from Arm, Right Updated: 06/24/24 0633     Phosphorus 3.2 mg/dL     CBC & Differential [[id number]]  (Abnormal) Collected: 06/24/24 0559    Specimen: Blood from Arm, Right Updated: 06/24/24 0613    Narrative:      The following orders were created for panel order CBC & Differential.  Procedure                               Abnormality         Status                     ---------                                -----------         ------                     CBC Auto Differential[[id number]]        Abnormal            Final result                 Please view results for these tests on the individual orders.    CBC Auto Differential [[id number]]  (Abnormal) Collected: 06/24/24 0559    Specimen: Blood from Arm, Right Updated: 06/24/24 0613     WBC 13.18 10*3/mm3      RBC 2.52 10*6/mm3      Hemoglobin 7.9 g/dL      Hematocrit 24.0 %      MCV 95.2 fL      MCH 31.3 pg      MCHC 32.9 g/dL      RDW 13.2 %      RDW-SD 46.3 fl      MPV 9.8 fL      Platelets 212 10*3/mm3      Neutrophil % 76.2 %      Lymphocyte % 12.4 %      Monocyte % 9.7 %      Eosinophil % 0.6 %      Basophil % 0.3 %      Immature Grans % 0.8 %      Neutrophils, Absolute 10.05 10*3/mm3      Lymphocytes, Absolute 1.63 10*3/mm3      Monocytes, Absolute 1.28 10*3/mm3      Eosinophils, Absolute 0.08 10*3/mm3      Basophils, Absolute 0.04 10*3/mm3      Immature Grans, Absolute 0.10 10*3/mm3      nRBC 0.0 /100 WBC     Urinalysis, Microscopic Only - Indwelling Urethral Catheter [[id number]]  (Abnormal) Collected: 06/23/24 1433    Specimen: Urine from Indwelling Urethral Catheter Updated: 06/23/24 1529     RBC, UA Too Numerous to Count /HPF      WBC, UA 3-5 /HPF      Comment: Urine culture not indicated.        Bacteria, UA Trace /HPF      Squamous Epithelial Cells, UA 0-2 /HPF      Hyaline Casts, UA None Seen /LPF      Methodology Manual Light Microscopy    Urinalysis With Culture If Indicated - Indwelling Urethral Catheter [[id number]]  (Abnormal) Collected: 06/23/24 1433    Specimen: Urine from Indwelling Urethral Catheter Updated: 06/23/24 1522     Color, UA Red     Appearance, UA Turbid     pH, UA 6.0     Specific Gravity, UA 1.009     Glucose, UA Negative     Ketones, UA Negative     Bilirubin, UA Negative     Blood, UA Large (3+)     Protein, UA >=300 mg/dL (3+)     Leuk Esterase, UA Moderate (2+)     Nitrite, UA Negative     Urobilinogen, UA 0.2 E.U./dL    Narrative:       In absence of clinical symptoms, the presence of pyuria, bacteria, and/or nitrites on the urinalysis result does not correlate with infection.    Hemoglobin & Hematocrit, Blood [[id number]]  (Abnormal) Collected: 06/23/24 0836    Specimen: Blood Updated: 06/23/24 0930     Hemoglobin 8.8 g/dL      Hematocrit 26.1 %     Comprehensive Metabolic Panel [[id number]]  (Abnormal) Collected: 06/23/24 0449    Specimen: Blood from Arm, Left Updated: 06/23/24 0643     Glucose 100 mg/dL      BUN 15 mg/dL      Creatinine 1.67 mg/dL      Sodium 139 mmol/L      Potassium 4.4 mmol/L      Chloride 106 mmol/L      CO2 25.0 mmol/L      Calcium 8.3 mg/dL      Total Protein 5.4 g/dL      Albumin 3.4 g/dL      ALT (SGPT) <5 U/L      AST (SGOT) 32 U/L      Alkaline Phosphatase 83 U/L      Total Bilirubin 0.8 mg/dL      Globulin 2.0 gm/dL      Comment: Calculated Result        A/G Ratio 1.7 g/dL      BUN/Creatinine Ratio 9.0     Anion Gap 8.0 mmol/L      eGFR 41.9 mL/min/1.73     Narrative:      GFR Normal >60  Chronic Kidney Disease <60  Kidney Failure <15    The GFR formula is only valid for adults with stable renal function between ages 18 and 70.    Magnesium [[id number]]  (Normal) Collected: 06/23/24 0449    Specimen: Blood from Arm, Left Updated: 06/23/24 0628     Magnesium 1.9 mg/dL     Phosphorus [[id number]]  (Normal) Collected: 06/23/24 0449    Specimen: Blood from Arm, Left Updated: 06/23/24 0628     Phosphorus 2.7 mg/dL     CBC & Differential [[id number]]  (Abnormal) Collected: 06/23/24 0449    Specimen: Blood from Arm, Left Updated: 06/23/24 0606    Narrative:      The following orders were created for panel order CBC & Differential.  Procedure                               Abnormality         Status                     ---------                               -----------         ------                     CBC Auto Differential[[id number]]        Abnormal            Final result                 Please view results for these tests  on the individual orders.    CBC Auto Differential [[id number]]  (Abnormal) Collected: 06/23/24 0449    Specimen: Blood from Arm, Left Updated: 06/23/24 0606     WBC 16.28 10*3/mm3      RBC 2.65 10*6/mm3      Hemoglobin 8.7 g/dL      Hematocrit 25.5 %      MCV 96.2 fL      MCH 32.8 pg      MCHC 34.1 g/dL      RDW 13.4 %      RDW-SD 47.2 fl      MPV 10.6 fL      Platelets 203 10*3/mm3      Neutrophil % 79.2 %      Lymphocyte % 9.2 %      Monocyte % 10.6 %      Eosinophil % 0.1 %      Basophil % 0.2 %      Immature Grans % 0.7 %      Neutrophils, Absolute 12.87 10*3/mm3      Lymphocytes, Absolute 1.50 10*3/mm3      Monocytes, Absolute 1.73 10*3/mm3      Eosinophils, Absolute 0.02 10*3/mm3      Basophils, Absolute 0.04 10*3/mm3      Immature Grans, Absolute 0.12 10*3/mm3      nRBC 0.0 /100 WBC     Hemoglobin & Hematocrit, Blood [[id number]]  (Abnormal) Collected: 06/22/24 2051    Specimen: Blood from Arm, Left Updated: 06/22/24 2112     Hemoglobin 8.3 g/dL      Hematocrit 24.8 %           Imaging Results (Last 48 Hours)       ** No results found for the last 48 hours. **          ECG/EMG Results (last 48 hours)       ** No results found for the last 48 hours. **             Operative/Procedure Notes (last 72 hours)        Kingsley Morris MD at 06/21/24 1540          FEMORAL ENDARTERECTOMY  Procedure Report    Patient Name:  Nestor Nye  YOB: 1945    Date of Surgery:  6/21/2024     Indications:  78 year old  gentleman referred by Dr. Christine, a patient of Dr. Roxanne Slater, who underwent aquablation with Dr. Prieto Rey today for BPH which was complicated by hematuria which required BILATERAL hypogastric artery embolization  The embolization was performed via RIGHT CFA access with a heparin drip through the sheath side port.  After placement of the Angioseal, there is poor perfusion of the RIGHT lower extremity which was confirmed by arterial duplex.  The PFA is patent, but the SFA  demonstrates no flow.  I spoke with the patient's brother over the phone and obtained informed consent for RIGHT groin exploration and all indicated procedures.    Pre-op Diagnosis:   RIGHT CFA thrombosis       Post-Op Diagnosis Codes:  RIGHT CFA thrombosis - favor thrombus which developed at the end of the sheath    Procedure/CPT® Codes:  RIGHT iliofemoral embolectomy    Staff:  Surgeon(s):  Kingsley Morris MD    Circulator: Yaneth Noland RN; Paolo Zaldivar RN  Scrub Person: Brittney Mcnally; Clarke Foy                 Anesthesia: Choice    Estimated Blood Loss: 100ml    Implants:    Implant Name Type Inv. Item Serial No.  Lot No. LRB No. Used Action   CLIP LIGAT VASC HORIZON TI LG ORNG 6CT - [id number] Implant CLIP LIGAT VASC HORIZON TI LG ORNG 6CT  [employer]  Right 1 Implanted   HEMOST ABS SURGICEL SNOW 1X2IN - [id number] Implant HEMOST ABS SURGICEL SNOW 1X2IN  [employer]  DIV OF J AND J [id number] Right 1 Implanted       Specimen:          Specimens       ID Source Type Tests Collected By Collected At Frozen?    A Femoral Artery Tissue TISSUE PATHOLOGY EXAM   Kingsley Morris MD 6/21/24 1515     Description: femoral thrombus    This specimen was not marked as sent.                Findings: The RIGHT CFA is not pulsatile.  No immediate thrombus was noted at the Angioseal site.  Transverse arteriotomy demonstrates back bleeding from the SFA and 4Fr embolectomy catheter retrieved a thrombus from within the RIGHT EIA region with return of pulsatile inflow.    Complications: None    Description of Procedure: The patient was transferred intubated from the IR suite and placed supine on the OR table.  The RIGHT groin and circumferential thigh/leg were then prepped and draped in usual sterile fashion.  The patient was identified as Nestor Nye per time-out protocol.    An oblique incision was made in the RIGHT groin and dissection was carried down to the level of the CFA.  There is minor  hematoma from around the Angioseal area and the CFA is noted to be non-pulsatile.  Most notably, the CFA is small in size approximately 4-5mm.  The CFA was encircled proximally and distally and a transverse incision was made in the mid CFA.  There is no bleeding noted or immediate thrombus identified.  The 5Fr Miguel was chosen, but appears too big for the pt's anatomy.  The 4Fr Miguel was then advanced 20cm retrograde towards the aortic bifurcation and an acute thrombus was retrieved with return of pulsatile bleeding noted.  The 4Fr Fogary was then advanced 40cm antegrade towards the feet with no thrombus retrieved and moderate back bleeding noted.  The transverse arteriotomy was approximated using 6-0 Prolene in interrupted fashion with good hemostasis.  After restoration of flow, the pulse is not well appreciated with a systolic pressure of 90mmHg.  After a pressor bolus, the pulse is now easily appreciable at a systolic pressure 130mmHg and a PT signal is identified by doppler interrogation.  Surgicel snow was applied to the repair site and the wound was then closed in layers using 2-0 and 3-0 Vicryl with 4-0 Monocryl to approximate the skin edges.  Skin glue and Aquacel were applied to the incision.  At the end of the case, the sponge and needle counts were reported as correct.      Kingsley Morris MD     Date: 6/21/2024  Time: 15:40 EDT      Electronically signed by Kingsley Morris MD at 06/21/24 1633          Physician Progress Notes (last 72 hours)        Case, Trinh STEPHENS DO at 06/24/24 1228            INTENSIVIST   PROGRESS NOTE     Hospital:  LOS: 3 days     Mr. Nestor Nye, 78 y.o. male is followed for a Chief Complaint of: Hematuria      Subjective   S     Interval History:  No acute events overnight.        The patient's relevant past medical, surgical and social history were reviewed and updated in Epic as appropriate.      ROS:   Constitutional: Negative for fever.   Respiratory:  Negative for dyspnea.   Cardiovascular: Negative for chest pain.   Gastrointestinal: Negative for  nausea, vomiting and diarrhea.     Objective   O     Vitals:  Temp  Min: 97.5 °F (36.4 °C)  Max: 98.5 °F (36.9 °C)  BP  Min: 100/51  Max: 127/79  Pulse  Min: 83  Max: 117  Resp  Min: 14  Max: 19  SpO2  Min: 90 %  Max: 99 % Flow (L/min)  Min: 2  Max: 2    Intake/Ouptut 24 hrs (7:00AM - 6:59 AM)  Intake & Output (last 3 days)         06/21 0701  06/22 0700 06/22 0701 06/23 0700 06/23 0701  06/24 0700 06/24 0701 06/25 0700    P.O. 60 1568 580     I.V. (mL/kg) 3397.8 (44.7) 1226.2 (16.2) 1171 (15.4) 84 (1.1)    Other 67110       IV Piggyback 275       Total Intake(mL/kg) 37794.8 (316) 2794.2 (36.9) 1751 (23.1) 84 (1.1)    Urine (mL/kg/hr) 34182 (11.5) 3425 (1.9) 3900 (2.1) 200 (0.5)    Stool  0 0     Total Output 28243 3425 3900 200    Net +3062.8 -630.8 -2149 -116            Stool Unmeasured Occurrence  1 x 1 x             Medications (drips):  sodium chloride, Last Rate: 50 mL/hr (06/24/24 1057)        Physical Examination  Telemetry:  Normal sinus rhythm.    Constitutional:  No acute distress.  Walking with PT.    Eyes: No scleral icterus.   PERRL, EOM intact.    Neck:  Supple, FROM   Cardiovascular: Normal rate, regular and rhythm. Normal heart sounds.  No murmurs, gallop or rub.   Respiratory: No respiratory distress. Normal respiratory effort.  Diminished.    Abdominal:  Soft. No masses. Nontender. No distension. No HSM.   Extremities: No digital cyanosis. No clubbing.  No peripheral edema.   Skin: No rashes, lesions or ulcers   Neurological:   Alert and interactive.              Results from last 7 days   Lab Units 06/24/24  0559 06/23/24  0836 06/23/24  0449 06/22/24  0808 06/22/24  0419   WBC 10*3/mm3 13.18*  --  16.28*  --  14.21*   HEMOGLOBIN g/dL 7.9* 8.8* 8.7*   < > 9.0*   MCV fL 95.2  --  96.2  --  93.3   PLATELETS 10*3/mm3 212  --  203  --  222    < > = values in this interval not displayed.     Results  from last 7 days   Lab Units 06/24/24  0559 06/23/24  0449 06/22/24  0419   SODIUM mmol/L 138 139 141   POTASSIUM mmol/L 4.6 4.4 4.6   CO2 mmol/L 26.0 25.0 21.0*   CREATININE mg/dL 1.61* 1.67* 1.59*   GLUCOSE mg/dL 99 100* 143*   MAGNESIUM mg/dL 2.0 1.9 1.7   PHOSPHORUS mg/dL 3.2 2.7 3.3     Estimated Creatinine Clearance: 41.2 mL/min (A) (by C-G formula based on SCr of 1.61 mg/dL (H)).  Results from last 7 days   Lab Units 06/24/24  0559 06/23/24  0449 06/21/24  1202   ALK PHOS U/L 80 83 96   BILIRUBIN mg/dL 0.7 0.8 0.5   ALT (SGPT) U/L <5 <5 8   AST (SGOT) U/L 29 32 14             Images:  Imaging Results (Last 24 Hours)       ** No results found for the last 24 hours. **               Results: Reviewed.  I reviewed the patient's new laboratory and imaging results.  I independently reviewed the patient's new images.    Medications: Reviewed.    Assessment & Plan   A / P     Mr. Nye is a 79yo M with a history of BPH with urinary obstruction, HTN, HLD, hypothyroidism and CKDIII who presented to [facility] for an elective aqua ablation per Dr. Rey with Urology. He was initially admitted to Hospital Medicine as the case took longer to perform due to his large prostate.     The case was complicated by hematuria and required bilateral hypogastric artery embolization by Dr. Christine with IR via right CGA access. An angioseal was placed post-procedure. Shortly after the case, poor perfusion was noted in the right lower extremity. An arterial duplex was performed which showed no SFA flow. Vascular Surgery was consulted and he was taken for emergent right groin exploration per Dr. Morris. An acute thrombus was retrieved near the aortic bifurcation with return of pulsatile bleeding. He was then transferred to the ICU.     CBI was stopped on 6/22/24.     Nutrition:   Diet: Regular/House, Renal; Low Phosphorus; Texture: Mechanical Ground (NDD 2); Fluid Consistency: Thin (IDDSI 0)  Advance Directives:   Code Status and  Medical Interventions:   Ordered at: 06/21/24 1326     Level Of Support Discussed With:    Patient     Code Status (Patient has no pulse and is not breathing):    CPR (Attempt to Resuscitate)     Medical Interventions (Patient has pulse or is breathing):    Full Support       Active Hospital Problems    Diagnosis     **BPH with urinary obstruction     BPH with obstruction/lower urinary tract symptoms     Urinary retention     Stage 3a chronic kidney disease     Hypothyroidism     Mixed hyperlipidemia     Benign essential hypertension        Assessment / Plan:    Vascular Surgery following for post-femoral endarterectomy management.   Post-ablation management per Urology.   Augmentin for urologic procedure prophylaxis.   Continue ASA.   PO diet.   PT following.   AM labs  Awaiting telemetry bed.     High level of risk due to:  severe exacerbation of chronic illness and illness with threat to life or bodily function.    Plan of care and goals reviewed during interdisciplinary rounds.  I discussed the patient's findings and my recommendations with patient and nursing staff      Trinh Girard DO    Intensive Care Medicine and Pulmonary Medicine       Electronically signed by Trinh Girard DO at 06/24/24 1234       Cha Flores APRN at 06/23/24 1145            Intensive Care Follow-up     Hospital:  LOS: 2 days   Mr. Nestor Nye, 78 y.o. male is followed for: Glycemic, Electrolyte, Respiratory, and Medical management      Subjective   Subjective   Interval History:  Chart reviewed. Patient alert and oriented, very pleasant and conversant, sitting up in chair. No acute distress. VSS. Oxygenating well on room air. Right groin dressing C/D/I. No hematoma. Denies pain, shortness of breath, nausea, vomiting, abdominal discomfort. No complaints at this time. Says he walked with PT yesterday and would like to walk as much as possible today.      The patient's past medical, surgical and social history were  reviewed and updated in Epic as appropriate.  Objective   Objective     Infusions:  norepinephrine, 0.02-0.3 mcg/kg/min, Last Rate: Stopped (06/22/24 0929)  sodium chloride, 50 mL/hr, Last Rate: 50 mL/hr (06/22/24 1809)      Medications:  amoxicillin-clavulanate, 1 tablet, Oral, BID  aspirin, 81 mg, Oral, Daily  levothyroxine, 88 mcg, Oral, Q AM  pantoprazole, 40 mg, Oral, Q AM  sodium chloride, 10 mL, Intravenous, Q12H    I reviewed the patient's medications.    Vital Sign Min/Max for last 24 hours  Temp  Min: 98 °F (36.7 °C)  Max: 99.5 °F (37.5 °C)   BP  Min: 100/80  Max: 134/82   Pulse  Min: 92  Max: 118   Resp  Min: 16  Max: 24   SpO2  Min: 86 %  Max: 98 %   Flow (L/min)  Min: 1.5  Max: 1.5       Input/Output for last 24 hour shift  06/22 0701 - 06/23 0700  In: 2794.2 [P.O.:1568; I.V.:1226.2]  Out: 3425 [Urine:3425]      Physical Exam:  GENERAL: Patient sitting up in chair. No acute distress.   HEENT: Normocephalic and atraumatic. Trachea midline. PER. EOM WNL.   LUNGS: Chest rise of normal depth and symmetric. Lungs clear to auscultation bilaterally. No wheezes, rhonchi, or rales.   HEART: S1,S2 detected. Regular rate and rhythm. No rub, murmur, or gallop.   ABDOMEN: Soft, round, nondistended, and nontender. Bowel sounds present. Indwelling catheter in place with pin-tinged urine.    EXTREMITIES: No clubbing, edema, or cyanosis. Peripheral pulses present.   NEURO/PSYCH: Alert and oriented. Follows commands. Moves all extremities. No focal deficits.      Results from last 7 days   Lab Units 06/23/24  0836 06/23/24  0449 06/22/24  2051 06/22/24  0808 06/22/24  0419 06/22/24  0039 06/21/24  1202   WBC 10*3/mm3  --  16.28*  --   --  14.21*  --  10.05   HEMOGLOBIN g/dL 8.8* 8.7* 8.3*   < > 9.0*   < > 12.5*   PLATELETS 10*3/mm3  --  203  --   --  222  --  227    < > = values in this interval not displayed.     Results from last 7 days   Lab Units 06/23/24  0449 06/22/24  0419 06/21/24  1202   SODIUM mmol/L 139 141  139   POTASSIUM mmol/L 4.4 4.6 4.7   CO2 mmol/L 25.0 21.0* 25.0   BUN mg/dL 15 16 15   CREATININE mg/dL 1.67* 1.59* 1.89*   MAGNESIUM mg/dL 1.9 1.7  --    PHOSPHORUS mg/dL 2.7 3.3  --    GLUCOSE mg/dL 100* 143* 133*     Estimated Creatinine Clearance: 39.7 mL/min (A) (by C-G formula based on SCr of 1.67 mg/dL (H)).      I reviewed the patient's new clinical results.  I reviewed the patient's new imaging results/reports including actual images and agree with reports.     Assessment & Plan   Impression      BPH with urinary obstruction    Hypothyroidism    Mixed hyperlipidemia    Benign essential hypertension    Urinary retention    BPH with obstruction/lower urinary tract symptoms    Stage 3a chronic kidney disease       Plan      Nestor Nye is a 78 y.o. male with PMH BPH with urinary obstruction, HTN, dyslipidemia, hypothyroidism, and CKD stage IIIb (baseline sCr 1.4) who presents to [facility] on 6/21/24 for an elective aqua ablation per Dr Rey with Urology.      Patient has a history of chronic lower urinary tract symptoms requiring chronic indwelling catheter, with prior cystoscopy showing very large, occlusive lateral prostate lobes. He was seen in office on 3/14/24, at which time he elected to undergo ablation and completed 5 days of Bactrim for surgical prophylaxis, with last dose on 6/20/24.      Patient was admitted to Hospital Medicine post-ablation as the case took longer to perform due his large prostate. Case was complicated by hematuria, which required bilateral hypogastric artery embolization by Dr. Christine with Interventional Radiology via right CGA access. Angioseal was placed post-procedure; however, shortly after the case poor perfusion was noted in the right lower extremity, confirming no SFA flow on arterial duplex. Vascular surgery was consulted and the patient was taken for emergent right groin exploration per Dr. Morris. An acute thrombus was retrieved near the aortic bifurcation with  return of pulsatile bleeding noted. Blood pressure was augmented with a pressor bolus with restoration of pulses.      He was admitted to the ICU postoperatively for further management.    Post-femoral endarterectomy management per Vascular Surgery: peripheral vascular and site checks per protocol  Post-ablation management per Urology: CBI turned off 6/22/24. Urine has cleared significantly but still pink tinged.  Follow up urine culture  Augmentin started for urological procedure prophylaxis  Continue ASA  Resume home antihypertensives as necessary  Discontinue arterial line  Continue home levothyroxine  Tolerating PO diet  Ambulate as tolerated  Aggressive pulmonary toilet; add PRN nebs  AM labs    DVT Prophylaxis: Mechanical  GI Prophylaxis: PPI  Dispo: Transfer to telemetry    Critical Care time spent in direct patient care: 36 minutes (excluding procedure time, if applicable) including high complexity decision making to assess, manipulate, and support vital organ system failure in this individual who has impairment of one or more vital organ systems such that there is a high probability of imminent or life threatening deterioration in the patient's condition.     Awa Flores, MSN, APRN, ACNPC-AG  Pulmonary and Critical Care Medicine  Electronically signed by LANNY Lugo, 06/23/24, 11:54 AM EDT.          Electronically signed by Cha Flores APRN at 06/23/24 1155       Adrien Bean DO at 06/22/24 1236          Patient is awake and alert comfortable resting in bed.  Pain well-controlled no complaints.    Vitals Per EMR    Patient is in no acute distress nontoxic appearing he appears comfortable  Respirations are nonlabored on room air with equal chest rise bilaterally  Sinus tachycardia  Abdomen is soft  Right groin incision is clean dry and intact with Aquacel in place  Bilateral feet are warm, multiphasic PT signals bilaterally  Patient can move all extremities well    Acute limb ischemia of  "the right lower extremity  6/21/2024 -right iliofemoral embolectomy (Dr. Morris)    -Resume aspirin 81 mg daily  -Okay to mobilize from my perspective with physical therapy  -Follow-up in 2 weeks with Dr. Morris for wound check    Electronically signed by Adrien Bean DO at 06/22/24 1238       Prieto Rey MD at 06/22/24 1024          Urology Inpatient Progress Note    Subjective  Patient doing very well.  He is in excellent spirits and happy that he had procedure done.  He is off of pressors, no major events overnight.    Physical Exam  Visit Vitals  /78 (BP Location: Left arm, Patient Position: Lying)   Pulse 103   Temp 98.1 °F (36.7 °C) (Oral)   Resp 18   Ht 170.2 cm (67.01\")   Wt 76 kg (167 lb 8.8 oz)   SpO2 92%   BMI 26.24 kg/m²     Constitutional: NAD, WDWN.  Cardiovascular: Regular rate.  Pulmonary/Chest: Respirations are even and non-labored bilaterally.  Abdominal: Soft. No distension, tenderness, masses or guarding. No CVA tenderness.  Extremities: SAUL x 4, Warm. No clubbing.  No cyanosis.    Skin: Pink, warm and dry.  No rashes noted.    Genitourinary  Urine clear light watermelon.  No clots with irrigation.    I/O last 3 completed shifts:  In: 06698.8 [P.O.:60; I.V.:3397.8; Other:29833; IV Piggyback:275]  Out: 61903 [Urine:87684]      Current Facility-Administered Medications:     acetaminophen (TYLENOL) tablet 650 mg, 650 mg, Oral, Q4H PRN **OR** acetaminophen (TYLENOL) 160 MG/5ML oral solution 650 mg, 650 mg, Oral, Q4H PRN **OR** acetaminophen (TYLENOL) suppository 650 mg, 650 mg, Rectal, Q4H PRN, Victoriano Tao MD    sennosides-docusate (PERICOLACE) 8.6-50 MG per tablet 2 tablet, 2 tablet, Oral, BID PRN **AND** polyethylene glycol (MIRALAX) packet 17 g, 17 g, Oral, Daily PRN **AND** bisacodyl (DULCOLAX) EC tablet 5 mg, 5 mg, Oral, Daily PRN **AND** bisacodyl (DULCOLAX) suppository 10 mg, 10 mg, Rectal, Daily PRN, Victoriano Tao MD    ipratropium-albuterol (DUO-NEB) nebulizer " "solution 3 mL, 3 mL, Nebulization, Q4H PRN, Cha Flores APRN    levothyroxine (SYNTHROID, LEVOTHROID) tablet 88 mcg, 88 mcg, Oral, Q AM, Victoriano Tao MD, 88 mcg at 06/22/24 0612    nitroglycerin (NITROSTAT) SL tablet 0.4 mg, 0.4 mg, Sublingual, Q5 Min PRN, Cha Flores APRN    norepinephrine (LEVOPHED) 8 mg in 250 mL NS infusion (premix), 0.02-0.3 mcg/kg/min, Intravenous, Titrated, Cha Flores APRN, Stopped at 06/22/24 0929    ondansetron ODT (ZOFRAN-ODT) disintegrating tablet 4 mg, 4 mg, Oral, Q6H PRN **OR** ondansetron (ZOFRAN) injection 4 mg, 4 mg, Intravenous, Q6H PRN, Victoriano Tao MD, 4 mg at 06/22/24 0021    pantoprazole (PROTONIX) injection 40 mg, 40 mg, Intravenous, Q AM, Cha Flores APRN, 40 mg at 06/22/24 0612    sodium chloride 0.9 % flush 10 mL, 10 mL, Intravenous, Q12H, Victoriano Tao MD, 10 mL at 06/21/24 2026    sodium chloride 0.9 % flush 10 mL, 10 mL, Intravenous, PRN, Victoriano Tao MD    sodium chloride 0.9 % infusion 40 mL, 40 mL, Intravenous, PRN, Victoriano Tao MD    sodium chloride 0.9 % infusion, 50 mL/hr, Intravenous, Continuous, Cha Flores APRN, Last Rate: 50 mL/hr at 06/22/24 0647, 50 mL/hr at 06/22/24 0647    Labs  Lab Results   Component Value Date    GLUCOSE 143 (H) 06/22/2024    CALCIUM 7.8 (L) 06/22/2024     06/22/2024    K 4.6 06/22/2024    CO2 21.0 (L) 06/22/2024     (H) 06/22/2024    BUN 16 06/22/2024    CREATININE 1.59 (H) 06/22/2024    EGFRIFAFRI 43 (L) 09/25/2017    EGFRIFNONA 35 (L) 09/14/2021    BCR 10.1 06/22/2024    ANIONGAP 11.0 06/22/2024       Lab Results   Component Value Date    WBC 14.21 (H) 06/22/2024    HGB 8.7 (L) 06/22/2024    HCT 25.6 (L) 06/22/2024    MCV 93.3 06/22/2024     06/22/2024       No results found for: \"URINECX\"    Brief Urine Lab Results       None              Radiographic Studies  IR Artery Embolization Occlusion    Result Date: 6/21/2024  Successful catheterization and angiography of " the anterior divisions of the left and right hypogastric arteries with embolization using 500 um sized Gelfoam cubes successfully. The procedure was complicated by acute occlusion of the superficial femoral artery. Vascular surgery responded promptly to the request for assistance and the patient was taken to the OR for further management. Thank you for the opportunity to assist you in the care of your patient. Electronically Signed: Patrick Christine MD  6/21/2024 3:41 PM EDT  Workstation ID: [id number]       Assessment  78 y.o. male with a PMHx of  has a past medical history of Acute maxillary sinusitis, Arthritis, Disease of thyroid gland, Enlarged prostate, GERD (gastroesophageal reflux disease), Gout, Hypertension, Impaired functional mobility, balance, gait, and endurance, PONV (postoperative nausea and vomiting), Renal disorder, Rheumatoid arthritis, Right inguinal hernia (10/28/2021), and Vocal cord anomaly (2007)., who is postop day 1 after aquablation for very large prostate, greater than 170 g.  Bleeding during case necessitated prostate artery embolization, complicated by SFA occlusion, but doing well after angioplasty.    Patient has recovered well, he has good pulses and great movement in both extremities, urine is light clear watermelon, off CBI.    Plan  1.  CBI, patient can stay off of it.  I had the nurse plug the inflow port.  Please call me with any concerns about urine flow.    2.  Recommend daily oral antibiotic while he has catheter in (approximately 1 week), renally dosed at the discretion of hospital medicine.  He should finish his Ancef today.    3.  Patient will keep catheter in until his follow-up appointment.  I would like to see him back in 1 week for a fill and void trial in my office.    4.  Patient is okay for ASA 81 from my standpoint.  I do not want any other blood thinners due to recent prostate bleeding, and I would like early ambulation if that is acceptable from vascular surgery.   Please call me to discuss if it is felt the patient will need long-term blood thinners, and we could discuss when it would be safe to initiate those.    Prieto Rey MD  Urologic Surgery  [facility]  [id number]   [place], [place] [address] [phone] office  [phone] fax  [email or web address]         Electronically signed by Prieto Rey MD at 06/22/24 1037       Cha Flores APRN at 06/22/24 1010            Intensive Care Follow-up     Hospital:  LOS: 1 day   Mr. Nestor Nye, 78 y.o. male is followed for: Glycemic, Electrolyte, Respiratory, and Medical management      Subjective   Subjective   Interval History:  Chart reviewed. Patient alert and oriented, pleasant and conversant, lying in bed. No acute distress. VSS. Oxygenating well on 2 L nasal cannula. CBI completed. Right groin dressing C/D/I. No hematoma. Endorses right groin tenderness, but denies shortness of breath, nausea, vomiting, abdominal discomfort. No family at bedside.     The patient's past medical, surgical and social history were reviewed and updated in Epic as appropriate.  Objective   Objective     Infusions:  norepinephrine, 0.02-0.3 mcg/kg/min, Last Rate: Stopped (06/22/24 0929)  sodium chloride, 50 mL/hr, Last Rate: 50 mL/hr (06/22/24 0647)      Medications:  levothyroxine, 88 mcg, Oral, Q AM  pantoprazole, 40 mg, Intravenous, Q AM  sodium chloride, 10 mL, Intravenous, Q12H    I reviewed the patient's medications.    Vital Sign Min/Max for last 24 hours  Temp  Min: 97 °F (36.1 °C)  Max: 98.3 °F (36.8 °C)   BP  Min: 80/51  Max: 131/83   Pulse  Min: 77  Max: 105   Resp  Min: 12  Max: 20   SpO2  Min: 79 %  Max: 100 %   Flow (L/min)  Min: 2  Max: 2       Input/Output for last 24 hour shift  06/21 0701 - 06/22 0700  In: 14600.8 [P.O.:60; I.V.:3397.8]  Out: 10837 [Urine:37680]   S RR:  [2-10] 2    Physical Exam:  GENERAL: Patient lying in bed and conversant. No acute distress.    HEENT: Normocephalic and atraumatic. Trachea midline. PER. EOM WNL.   LUNGS: Chest rise of normal depth and symmetric. Lungs clear to auscultation bilaterally. No wheezes, rhonchi, or rales.   HEART: S1,S2 detected. Regular rate and rhythm. No rub, murmur, or gallop.   ABDOMEN: Soft, round, nondistended, and nontender. Bowel sounds present. Indwelling catheter in place with sanguinous urine.   EXTREMITIES: No clubbing, edema, or cyanosis. Peripheral pulses present.   NEURO/PSYCH: Alert and oriented. Follows commands. Moves all extremities. No focal deficits.      Results from last 7 days   Lab Units 06/22/24  0808 06/22/24  0419 06/22/24  0039 06/21/24  1202   WBC 10*3/mm3  --  14.21*  --  10.05   HEMOGLOBIN g/dL 8.7* 9.0* 9.3* 12.5*   PLATELETS 10*3/mm3  --  222  --  227     Results from last 7 days   Lab Units 06/22/24  0419 06/21/24  1202   SODIUM mmol/L 141 139   POTASSIUM mmol/L 4.6 4.7   CO2 mmol/L 21.0* 25.0   BUN mg/dL 16 15   CREATININE mg/dL 1.59* 1.89*   MAGNESIUM mg/dL 1.7  --    PHOSPHORUS mg/dL 3.3  --    GLUCOSE mg/dL 143* 133*     Estimated Creatinine Clearance: 41.8 mL/min (A) (by C-G formula based on SCr of 1.59 mg/dL (H)).      I reviewed the patient's new clinical results.  I reviewed the patient's new imaging results/reports including actual images and agree with reports.     Imaging Results (Last 24 Hours)       Procedure Component Value Units Date/Time    IR Artery Embolization Occlusion [[id number]] Collected: 06/21/24 1457     Updated: 06/21/24 1544    Narrative:      IR ARTERY EMBOLIZATION OCCLUSION     History: prostate artery embolization. Status post surgical prostatectomy with postoperative hematuria and hypotension. Prostate artery embolization was requested. Because of the emergency nature of the procedure, I also considered hypogastric artery   embolization if prostate artery embolization would not be possible.    Embolization. 12 minutes fluoro time, 62 mGy. 100 cc Visipaque 320. 3  mL Lidocaine. GA.     : Patrick Christine M.D.     MODALITY: Sonography and fluoroscopy.                       DOSE REDUCTION: The examination was performed according to departmental dose-optimization program.    FLUOROSCOPY TIME: 12 minutes    RADIATION DOSE: 61.6 mGy air-Kerma.      SEDATION: No sedation was used.    ANESTHESIA:  Lidocaine 1% local infiltration.      MEDICINES: Ancef 1 g IV    CONTRAST MEDIUM: Visipaque 320, 100 cc.    ESTIMATED blood loss: < 5 cc.            TECHNIQUE:  A thorough discussion of the risks, benefits, and alternatives of the procedure, and if applicable, moderate sedation, was carried out with the patient's brother over the telephone due to the emergency nature of the procedure. They were encouraged to ask   any questions. Any questions were answered. They verbalized understanding. A written informed consent was then signed.      A multi-component timeout was performed prior to starting the procedure using the departmental protocol.     The procedure room personnel used personal protective equipment. The operators used sterile gloves and if indicated, sterile gowns. The surgical site was prepped with chlorhexidine gluconate  and draped in the maximal applicable sterile fashion.    The right common femoral artery was localized using anatomic, fluoroscopic and ultrasonographic landmarks. The artery is patent. Pertinent US images were stored for documentation. Using aseptic precautions, real-time ultrasonographic guidance, after   local anesthesia and dermatotomy, the artery was accessed with an access needle. Eventually a guide wire was placed using standard exchanges. Over the wire a 5 Armenian Short vascular sheath was placed. The sheath was connected to a heparinized saline   drip.    Selective and superselective catheterization of the following arteries was performed:  Contralateral common iliac artery, contralateral internal iliac artery, contralateral hypogastric  artery, contralateral anterior division of the hypogastric artery.  Ipsilateral common femoral artery, hypogastric artery and anterior division of the hypogastric artery.    The equipment used for catheterization was: Bentson wire, Glidewire, Cobra C2 5 French catheter, RIM catheter    At each catheterization and superselective catheterization, following test injection, digital subtraction angiography was performed and repeated in different fluoroscopic projections if and as needed.    This was followed by superselective embolization of the following arteries: Right and left hypogastric artery anterior divisions.    The agent used for embolization was Gelfoam 500 um cubes    Post embolization run(s) was(were) performed from the following location: Bilateral hypogastric arteries.    The endpoint of embolization was stasis.    The access site was closed using Angio-Seal.    The procedure was complicated by acute occlusion of the right superficial femoral artery associated with a cold foot. Stat vascular Doppler was obtained and vascular surgery was consulted. The patient was taken to the OR.     COMPLICATIONS: Post procedurally there was occlusion of the right superficial femoral artery after deployment of the Angio-Seal device, discovered immediately, investigated and vascular surgery consulted. The patient was taken to the OR for intervention.          Based on my conversation with the vascular surgery team, the Angio-Seal device was deployed correctly. The patient likely had access site thrombosis related to either a small caliber femoral artery where even the 5 French sheath was partially occlusive   in the artery or possibly sheath tip related thrombus in a patient with low systemic blood pressure. The patient's arterial circulation in the right lower extremity has been restored surgically. Please refer to the surgical note for details.    FINDINGS:  This study was not a diagnostic exam. It was done primarily to  assist with hemostasis and serious post prostatectomy lower urinary hemorrhage associated with hypotension. Tortuosity and elongation of the arteries is noted. The vessels are in clamp down.   The left common iliac artery, external and internal iliac arteries and the divisions of the internal iliac artery are patent.    The right common iliac artery, external and internal iliac arteries and the anterior and posterior divisions are patent though small caliber. The vessels appear to be in a clamp down state.    The postembolization run shows stasis in the right and left hypogastric artery primarily the anterior division which was the target of embolization.       Impression:        Successful catheterization and angiography of the anterior divisions of the left and right hypogastric arteries with embolization using 500 um sized Gelfoam cubes successfully.     The procedure was complicated by acute occlusion of the superficial femoral artery. Vascular surgery responded promptly to the request for assistance and the patient was taken to the OR for further management.    Thank you for the opportunity to assist you in the care of your patient.      Electronically Signed: Patrick Christine MD    6/21/2024 3:41 PM EDT    Workstation ID: [id number]            Assessment & Plan   Impression      BPH with urinary obstruction    Hypothyroidism    Mixed hyperlipidemia    Benign essential hypertension    Urinary retention    BPH with obstruction/lower urinary tract symptoms    Stage 3a chronic kidney disease       Plan      Nestor Nye is a 78 y.o. male with PMH BPH with urinary obstruction, HTN, dyslipidemia, hypothyroidism, and CKD stage IIIb (baseline sCr 1.4) who presents to [facility] on 6/21/24 for an elective aqua ablation per Dr Rey with Urology.      Patient has a history of chronic lower urinary tract symptoms requiring chronic indwelling catheter, with prior cystoscopy showing very large, occlusive lateral prostate  lobes. He was seen in office on 3/14/24, at which time he elected to undergo ablation and completed 5 days of Bactrim for surgical prophylaxis, with last dose on 6/20/24.      Patient was admitted to Hospital Medicine post-ablation as the case took longer to perform due his large prostate. Case was complicated by hematuria, which required bilateral hypogastric artery embolization by Dr. Christine with Interventional Radiology via right CGA access. Angioseal was placed post-procedure; however, shortly after the case poor perfusion was noted in the right lower extremity, confirming no SFA flow on arterial duplex. Vascular surgery was consulted and the patient was taken for emergent right groin exploration per Dr. Morris. An acute thrombus was retrieved near the aortic bifurcation with return of pulsatile bleeding noted. Blood pressure was augmented with a pressor bolus with restoration of pulses.      He is admitted to the ICU postoperatively for further management.    Post-femoral endarterectomy management per Vascular Surgery: peripheral vascular and site checks per protocol  Post-ablation management per Urology: CBI turned off this morning at 0700. Urine remains dark red; however, Urology OK to stop CBI and start ASA  Follow up urine culture  Cefazolin for surgical prophylaxis  Hold home antihypertensives; blood pressure has remained stable without Levophed  Discontinue arterial line  Continue home levothyroxine  Tolerating PO diet  OOB to chair; ambulate as tolerated  Aggressive pulmonary toilet; add PRN nebs  AM labs    DVT Prophylaxis: Mechanical  GI Prophylaxis: PPI  Dispo: Transfer to telemetry      Critical Care time spent in direct patient care: 36 minutes (excluding procedure time, if applicable) including high complexity decision making to assess, manipulate, and support vital organ system failure in this individual who has impairment of one or more vital organ systems such that there is a high probability of  imminent or life threatening deterioration in the patient's condition.     Awa Flores, MSN, APRN, ACN-  Pulmonary and Critical Care Medicine  Electronically signed by LANNY Lugo, 06/22/24, 10:12 AM EDT.          Electronically signed by Cha Flores APRN at 06/22/24 1030       Cha Flores APRN at 06/21/24 1815            Intensive Care Follow-up     Hospital:  LOS: 0 days   Mr. Nestor Nye, 78 y.o. male is followed for: Glycemic, Electrolyte, Respiratory, and Medical management      Subjective   Subjective   Interval History:  Chart reviewed. Patient alert and oriented, pleasant and conversant, lying flat in the bed. VSS. Oxygenating well on 2 L nasal cannula. CBI in progress. Right groin dressing C/D/I. No hematoma or tenderness to palpation.Denies pain, shortness of breath, nausea, vomiting, abdominal discomfort. No family at bedside.     The patient's past medical, surgical and social history were reviewed and updated in Epic as appropriate.  Objective   Objective     Infusions:  lactated ringers, 9 mL/hr, Last Rate: 9 mL/hr (06/21/24 1205)  norepinephrine, 0.02-0.3 mcg/kg/min      Medications:  ceFAZolin, 2,000 mg, Intravenous, Q8H  heparin (porcine), , ,   [START ON 6/22/2024] levothyroxine, 88 mcg, Oral, Q AM  sodium chloride, 10 mL, Intravenous, Q12H    I reviewed the patient's medications.    Vital Sign Min/Max for last 24 hours  Temp  Min: 97 °F (36.1 °C)  Max: 97.4 °F (36.3 °C)   BP  Min: 82/68  Max: 131/83   Pulse  Min: 64  Max: 101   Resp  Min: 12  Max: 20   SpO2  Min: 79 %  Max: 100 %   Flow (L/min)  Min: 2  Max: 2       Input/Output for last 24 hour shift  No intake/output data recorded.   S RR:  [2-10] 2    Physical Exam:  GENERAL: Patient lying in bed and conversant. No acute distress.   HEENT: Normocephalic and atraumatic. Trachea midline. PER. EOM WNL.   LUNGS: Chest rise of normal depth and symmetric. Lungs clear to auscultation bilaterally. No wheezes,  rhonchi, or rales.   HEART: S1,S2 detected. Regular rate and rhythm. No rub, murmur, or gallop.   ABDOMEN: Soft, round, nondistended, and nontender. Bowel sounds present. Indwelling catheter in place with CBI.   EXTREMITIES: No clubbing, edema, or cyanosis. Peripheral pulses present except DP on right foot, unchanged since postop. Foot wrapped in warm blankets. Right foot remains cooler than left.    NEURO/PSYCH: Alert and oriented. Follows commands. Moves all extremities. No focal deficits.      Results from last 7 days   Lab Units 06/21/24  1202   WBC 10*3/mm3 10.05   HEMOGLOBIN g/dL 12.5*   PLATELETS 10*3/mm3 227     Results from last 7 days   Lab Units 06/21/24  1202   SODIUM mmol/L 139   POTASSIUM mmol/L 4.7   CO2 mmol/L 25.0   BUN mg/dL 15   CREATININE mg/dL 1.89*   GLUCOSE mg/dL 133*     Estimated Creatinine Clearance: 35.5 mL/min (A) (by C-G formula based on SCr of 1.89 mg/dL (H)).      I reviewed the patient's new clinical results.  I reviewed the patient's new imaging results/reports including actual images and agree with reports.     Imaging Results (Last 24 Hours)       Procedure Component Value Units Date/Time    IR Artery Embolization Occlusion [[id number]] Collected: 06/21/24 1457     Updated: 06/21/24 1544    Narrative:      IR ARTERY EMBOLIZATION OCCLUSION     History: prostate artery embolization. Status post surgical prostatectomy with postoperative hematuria and hypotension. Prostate artery embolization was requested. Because of the emergency nature of the procedure, I also considered hypogastric artery   embolization if prostate artery embolization would not be possible.    Embolization. 12 minutes fluoro time, 62 mGy. 100 cc Visipaque 320. 3 mL Lidocaine. GA.     : Patrick Christine M.D.     MODALITY: Sonography and fluoroscopy.                       DOSE REDUCTION: The examination was performed according to departmental dose-optimization program.    FLUOROSCOPY TIME: 12  minutes    RADIATION DOSE: 61.6 mGy air-Kerma.      SEDATION: No sedation was used.    ANESTHESIA:  Lidocaine 1% local infiltration.      MEDICINES: Ancef 1 g IV    CONTRAST MEDIUM: Visipaque 320, 100 cc.    ESTIMATED blood loss: < 5 cc.            TECHNIQUE:  A thorough discussion of the risks, benefits, and alternatives of the procedure, and if applicable, moderate sedation, was carried out with the patient's brother over the telephone due to the emergency nature of the procedure. They were encouraged to ask   any questions. Any questions were answered. They verbalized understanding. A written informed consent was then signed.      A multi-component timeout was performed prior to starting the procedure using the departmental protocol.     The procedure room personnel used personal protective equipment. The operators used sterile gloves and if indicated, sterile gowns. The surgical site was prepped with chlorhexidine gluconate  and draped in the maximal applicable sterile fashion.    The right common femoral artery was localized using anatomic, fluoroscopic and ultrasonographic landmarks. The artery is patent. Pertinent US images were stored for documentation. Using aseptic precautions, real-time ultrasonographic guidance, after   local anesthesia and dermatotomy, the artery was accessed with an access needle. Eventually a guide wire was placed using standard exchanges. Over the wire a 5 Bruneian Short vascular sheath was placed. The sheath was connected to a heparinized saline   drip.    Selective and superselective catheterization of the following arteries was performed:  Contralateral common iliac artery, contralateral internal iliac artery, contralateral hypogastric artery, contralateral anterior division of the hypogastric artery.  Ipsilateral common femoral artery, hypogastric artery and anterior division of the hypogastric artery.    The equipment used for catheterization was: Bentson wire, Glidewire, Cobra C2  5 French catheter, RIM catheter    At each catheterization and superselective catheterization, following test injection, digital subtraction angiography was performed and repeated in different fluoroscopic projections if and as needed.    This was followed by superselective embolization of the following arteries: Right and left hypogastric artery anterior divisions.    The agent used for embolization was Gelfoam 500 um cubes    Post embolization run(s) was(were) performed from the following location: Bilateral hypogastric arteries.    The endpoint of embolization was stasis.    The access site was closed using Angio-Seal.    The procedure was complicated by acute occlusion of the right superficial femoral artery associated with a cold foot. Stat vascular Doppler was obtained and vascular surgery was consulted. The patient was taken to the OR.     COMPLICATIONS: Post procedurally there was occlusion of the right superficial femoral artery after deployment of the Angio-Seal device, discovered immediately, investigated and vascular surgery consulted. The patient was taken to the OR for intervention.          Based on my conversation with the vascular surgery team, the Angio-Seal device was deployed correctly. The patient likely had access site thrombosis related to either a small caliber femoral artery where even the 5 French sheath was partially occlusive   in the artery or possibly sheath tip related thrombus in a patient with low systemic blood pressure. The patient's arterial circulation in the right lower extremity has been restored surgically. Please refer to the surgical note for details.    FINDINGS:  This study was not a diagnostic exam. It was done primarily to assist with hemostasis and serious post prostatectomy lower urinary hemorrhage associated with hypotension. Tortuosity and elongation of the arteries is noted. The vessels are in clamp down.   The left common iliac artery, external and internal iliac  arteries and the divisions of the internal iliac artery are patent.    The right common iliac artery, external and internal iliac arteries and the anterior and posterior divisions are patent though small caliber. The vessels appear to be in a clamp down state.    The postembolization run shows stasis in the right and left hypogastric artery primarily the anterior division which was the target of embolization.       Impression:        Successful catheterization and angiography of the anterior divisions of the left and right hypogastric arteries with embolization using 500 um sized Gelfoam cubes successfully.     The procedure was complicated by acute occlusion of the superficial femoral artery. Vascular surgery responded promptly to the request for assistance and the patient was taken to the OR for further management.    Thank you for the opportunity to assist you in the care of your patient.      Electronically Signed: Patrick Christine MD    6/21/2024 3:41 PM EDT    Workstation ID: [id number]            Assessment & Plan   Impression      BPH with urinary obstruction    Hypothyroidism    Mixed hyperlipidemia    Benign essential hypertension    Urinary retention    BPH with obstruction/lower urinary tract symptoms    Stage 3a chronic kidney disease       Plan      Nestor Nye is a 78 y.o. male with PMH BPH with urinary obstruction, HTN, dyslipidemia, hypothyroidism, and CKD stage IIIb (baseline sCr 1.4) who presents to [facility] on 6/21/24 for an elective aqua ablation per Dr Rey with Urology.      Patient has a history of chronic lower urinary tract symptoms requiring chronic indwelling catheter, with prior cystoscopy showing very large, occlusive lateral prostate lobes. He was seen in office on 3/14/24, at which time he elected to undergo ablation and completed 5 days of Bactrim for surgical prophylaxis, with last dose on 6/20/24.      Patient was admitted to Hospital Medicine post-ablation as the case took longer  to perform due his large prostate. Case was complicated by hematuria, which required bilateral hypogastric artery embolization by Dr. Christine with Interventional Radiology via right CGA access. Angioseal was placed post-procedure; however, shortly after the case poor perfusion was noted in the right lower extremity, confirming no SFA flow on arterial duplex. Vascular surgery was consulted and the patient was taken for emergent right groin exploration per Dr. Morris. An acute thrombus was retrieved near the aortic bifurcation with return of pulsatile bleeding noted. Blood pressure was augmented with a pressor bolus with restoration of pulses.      He is admitted to the ICU postoperatively for further management.    Post-femoral endarterectomy management per Vascular Surgery: peripheral vascular and site checks per protocol  Post-ablation management per Urology: Keep CBI running until 0400 on 6/22/24, then start weaning with goal to be discontinued by 0700.   Follow up urine culture  Cefazolin for surgical prophylaxis  Hold home antihypertensives; Levophed PRN for MAP >65  Continue home levothyroxine  IVF, start PO diet  Aggressive pulmonary toilet  AM labs    DVT Prophylaxis: Mechanical  GI Prophylaxis: PPI  Dispo: ICU      Critical Care time spent in direct patient care: 36 minutes (excluding procedure time, if applicable) including high complexity decision making to assess, manipulate, and support vital organ system failure in this individual who has impairment of one or more vital organ systems such that there is a high probability of imminent or life threatening deterioration in the patient's condition.     Awa Flores, MSN, APRN, ACNPC-AG  Pulmonary and Critical Care Medicine  Electronically signed by LANNY Lugo, 06/21/24, 6:16 PM EDT.          Electronically signed by Cha Flores APRN at 06/21/24 9270       Consult Notes (last 72 hours)  Notes from 06/21/24 1447 through 06/24/24 1447   No notes  of this type exist for this encounter.

## 2024-06-24 NOTE — PLAN OF CARE
Goal Outcome Evaluation:  Plan of Care Reviewed With: patient           Outcome Evaluation: PT eval  complete. Pt presents with LE weakness (R>L), balance deficits, and decreased functional endurance leading to impairments in functional mobility below baseline. Pt amb 60' w/ FWW req Eloise prog to CGA + 1 to manage equipment. Pt will benefit from skilled IP PT to address impairments and return to PLOF. PT rec IRF at d/c.      Anticipated Discharge Disposition (PT): inpatient rehabilitation facility

## 2024-06-25 LAB
ANION GAP SERPL CALCULATED.3IONS-SCNC: 8 MMOL/L (ref 5–15)
BUN SERPL-MCNC: 18 MG/DL (ref 8–23)
BUN/CREAT SERPL: 11.3 (ref 7–25)
CALCIUM SPEC-SCNC: 7.7 MG/DL (ref 8.6–10.5)
CHLORIDE SERPL-SCNC: 107 MMOL/L (ref 98–107)
CO2 SERPL-SCNC: 25 MMOL/L (ref 22–29)
CREAT SERPL-MCNC: 1.6 MG/DL (ref 0.76–1.27)
CYTO UR: NORMAL
DEPRECATED RDW RBC AUTO: 45 FL (ref 37–54)
EGFRCR SERPLBLD CKD-EPI 2021: 44.1 ML/MIN/1.73
ERYTHROCYTE [DISTWIDTH] IN BLOOD BY AUTOMATED COUNT: 12.9 % (ref 12.3–15.4)
GLUCOSE SERPL-MCNC: 108 MG/DL (ref 65–99)
HCT VFR BLD AUTO: 24 % (ref 37.5–51)
HGB BLD-MCNC: 8.2 G/DL (ref 13–17.7)
LAB AP CASE REPORT: NORMAL
LAB AP CLINICAL INFORMATION: NORMAL
MAGNESIUM SERPL-MCNC: 2 MG/DL (ref 1.6–2.4)
MCH RBC QN AUTO: 32.8 PG (ref 26.6–33)
MCHC RBC AUTO-ENTMCNC: 34.2 G/DL (ref 31.5–35.7)
MCV RBC AUTO: 96 FL (ref 79–97)
PATH REPORT.FINAL DX SPEC: NORMAL
PATH REPORT.GROSS SPEC: NORMAL
PHOSPHATE SERPL-MCNC: 3.1 MG/DL (ref 2.5–4.5)
PLATELET # BLD AUTO: 246 10*3/MM3 (ref 140–450)
PMV BLD AUTO: 9.9 FL (ref 6–12)
POTASSIUM SERPL-SCNC: 4.5 MMOL/L (ref 3.5–5.2)
RBC # BLD AUTO: 2.5 10*6/MM3 (ref 4.14–5.8)
SODIUM SERPL-SCNC: 140 MMOL/L (ref 136–145)
WBC NRBC COR # BLD AUTO: 10.02 10*3/MM3 (ref 3.4–10.8)

## 2024-06-25 PROCEDURE — 80048 BASIC METABOLIC PNL TOTAL CA: CPT | Performed by: INTERNAL MEDICINE

## 2024-06-25 PROCEDURE — 83735 ASSAY OF MAGNESIUM: CPT | Performed by: INTERNAL MEDICINE

## 2024-06-25 PROCEDURE — 99232 SBSQ HOSP IP/OBS MODERATE 35: CPT | Performed by: INTERNAL MEDICINE

## 2024-06-25 PROCEDURE — 84100 ASSAY OF PHOSPHORUS: CPT | Performed by: INTERNAL MEDICINE

## 2024-06-25 PROCEDURE — 85027 COMPLETE CBC AUTOMATED: CPT | Performed by: INTERNAL MEDICINE

## 2024-06-25 PROCEDURE — 25810000003 SODIUM CHLORIDE 0.9 % SOLUTION

## 2024-06-25 RX ADMIN — ASPIRIN 81 MG: 81 TABLET, CHEWABLE ORAL at 08:45

## 2024-06-25 RX ADMIN — PANTOPRAZOLE SODIUM 40 MG: 40 TABLET, DELAYED RELEASE ORAL at 06:13

## 2024-06-25 RX ADMIN — SODIUM CHLORIDE 50 ML/HR: 900 INJECTION, SOLUTION INTRAVENOUS at 08:48

## 2024-06-25 RX ADMIN — LEVOTHYROXINE SODIUM 88 MCG: 88 TABLET ORAL at 06:13

## 2024-06-25 RX ADMIN — Medication 10 ML: at 08:45

## 2024-06-25 RX ADMIN — Medication 10 ML: at 21:46

## 2024-06-25 RX ADMIN — AMOXICILLIN AND CLAVULANATE POTASSIUM 500 MG: 500; 125 TABLET, FILM COATED ORAL at 21:10

## 2024-06-25 RX ADMIN — AMOXICILLIN AND CLAVULANATE POTASSIUM 500 MG: 500; 125 TABLET, FILM COATED ORAL at 08:45

## 2024-06-25 NOTE — CASE MANAGEMENT/SOCIAL WORK
Continued Stay Note  [facility]     Patient Name: Nestor Nye  MRN: [id number]  Today's Date: 6/25/2024    Admit Date: 6/21/2024    Plan: [facility] SN[facility]   Discharge Plan       Row Name 06/25/24 1008       Plan    Final Discharge Disposition Code 03 - skilled nursing facility (SNF)      Row Name 06/25/24 1006       Plan    Plan [facility]    Plan Comments Discussed patient in MDR; patient is medically ready to discharge.  Spoke with Yana at [facility]; they can offer a bed and will start insurance pre-cert today.  Patient will need WC transport set up at discharge.  Patient updated and in agreement with discharge plan. CM will continue to follow.                   Discharge Codes    No documentation.                 Expected Discharge Date and Time       Expected Discharge Date Expected Discharge Time    Jun 26, 2024               Ifeoma Roche RN

## 2024-06-25 NOTE — PROGRESS NOTES
INTENSIVIST   PROGRESS NOTE     Hospital:  LOS: 4 days     Mr. Nestor Nye, 78 y.o. male is followed for a Chief Complaint of: Hematuria      Subjective   S     Interval History:  No acute events overnight. Awaiting a telemetry bed.        The patient's relevant past medical, surgical and social history were reviewed and updated in Epic as appropriate.      ROS:   Constitutional: Negative for fever.   Respiratory: Negative for dyspnea.   Cardiovascular: Negative for chest pain.   Gastrointestinal: Negative for  nausea, vomiting and diarrhea.     Objective   O     Vitals:  Temp  Min: 98.3 °F (36.8 °C)  Max: 98.5 °F (36.9 °C)  BP  Min: 102/83  Max: 139/78  Pulse  Min: 80  Max: 112  Resp  Min: 16  Max: 18  SpO2  Min: 84 %  Max: 97 % Flow (L/min)  Min: 2  Max: 2    Intake/Ouptut 24 hrs (7:00AM - 6:59 AM)  Intake & Output (last 3 days)         06/21 0701  06/22 0700 06/22 0701  06/23 0700 06/23 0701  06/24 0700 06/24 0701  06/25 0700    P.O. 60 1568 580     I.V. (mL/kg) 3397.8 (44.7) 1226.2 (16.2) 1171 (15.4) 84 (1.1)    Other 77483       IV Piggyback 275       Total Intake(mL/kg) 82395.8 (316) 2794.2 (36.9) 1751 (23.1) 84 (1.1)    Urine (mL/kg/hr) 72331 (11.5) 3425 (1.9) 3900 (2.1) 200 (0.5)    Stool  0 0     Total Output 29900 3425 3900 200    Net +3062.8 -630.8 -2149 -116            Stool Unmeasured Occurrence  1 x 1 x             Medications (drips):         Physical Examination  Telemetry:  Normal sinus rhythm.    Constitutional:  No acute distress.  Sitting up in a chair.    Eyes: No scleral icterus.   PERRL, EOM intact.    Neck:  Supple, FROM   Cardiovascular: Normal rate, regular and rhythm. Normal heart sounds.  No murmurs, gallop or rub.   Respiratory: No respiratory distress. Normal respiratory effort.  Diminished.    Abdominal:  Soft. No masses. Nontender. No distension. No HSM.   Extremities: No digital cyanosis. No clubbing.  No peripheral edema.   Skin: No rashes, lesions or ulcers    Neurological:   Alert and interactive.              Results from last 7 days   Lab Units 06/25/24  0421 06/24/24  0559 06/23/24  0836 06/23/24  0449   WBC 10*3/mm3 10.02 13.18*  --  16.28*   HEMOGLOBIN g/dL 8.2* 7.9* 8.8* 8.7*   MCV fL 96.0 95.2  --  96.2   PLATELETS 10*3/mm3 246 212  --  203     Results from last 7 days   Lab Units 06/25/24  0421 06/24/24  0559 06/23/24  0449   SODIUM mmol/L 140 138 139   POTASSIUM mmol/L 4.5 4.6 4.4   CO2 mmol/L 25.0 26.0 25.0   CREATININE mg/dL 1.60* 1.61* 1.67*   GLUCOSE mg/dL 108* 99 100*   MAGNESIUM mg/dL 2.0 2.0 1.9   PHOSPHORUS mg/dL 3.1 3.2 2.7     Estimated Creatinine Clearance: 41.5 mL/min (A) (by C-G formula based on SCr of 1.6 mg/dL (H)).  Results from last 7 days   Lab Units 06/24/24  0559 06/23/24  0449 06/21/24  1202   ALK PHOS U/L 80 83 96   BILIRUBIN mg/dL 0.7 0.8 0.5   ALT (SGPT) U/L <5 <5 8   AST (SGOT) U/L 29 32 14             Images:  Imaging Results (Last 24 Hours)       ** No results found for the last 24 hours. **               Results: Reviewed.  I reviewed the patient's new laboratory and imaging results.  I independently reviewed the patient's new images.    Medications: Reviewed.    Assessment & Plan   A / P     Mr. Nye is a 77yo M with a history of BPH with urinary obstruction, HTN, HLD, hypothyroidism and CKDIII who presented to [facility] on 6/21/24 for an elective aqua ablation per Dr. Rey with Urology. He was initially admitted to Hospital Medicine as the case took longer to perform due to his large prostate.     The case was complicated by hematuria and required bilateral hypogastric artery embolization by Dr. Christine with IR via right CGA access. An angioseal was placed post-procedure. Shortly after the case, poor perfusion was noted in the right lower extremity. An arterial duplex was performed which showed no SFA flow. Vascular Surgery was consulted and he was taken for emergent right groin exploration per Dr. Morris. An acute thrombus was  retrieved near the aortic bifurcation with return of pulsatile bleeding. He was then transferred to the ICU.     CBI was stopped on 6/22/24.     Nutrition:   Diet: Regular/House, Renal; Low Phosphorus; Texture: Mechanical Ground (NDD 2); Fluid Consistency: Thin (IDDSI 0)  Advance Directives:   Code Status and Medical Interventions:   Ordered at: 06/21/24 1326     Level Of Support Discussed With:    Patient     Code Status (Patient has no pulse and is not breathing):    CPR (Attempt to Resuscitate)     Medical Interventions (Patient has pulse or is breathing):    Full Support       Active Hospital Problems    Diagnosis     **BPH with urinary obstruction     BPH with obstruction/lower urinary tract symptoms     Urinary retention     Stage 3a chronic kidney disease     Hypothyroidism     Mixed hyperlipidemia     Benign essential hypertension        Assessment / Plan:     Post-ablation management per Urology. Will need discharge plans regarding stacy catheter and follow up.   Augmentin for urologic procedure prophylaxis.   Continue ASA.   PO diet. D/c IV fluids.   PT following. Needs inpatient rehab. Case management aware.   AM labs  Awaiting telemetry bed.     High level of risk due to:  severe exacerbation of chronic illness and illness with threat to life or bodily function.    Plan of care and goals reviewed during interdisciplinary rounds.  I discussed the patient's findings and my recommendations with patient and nursing staff      Trinh Girard, DO    Intensive Care Medicine and Pulmonary Medicine

## 2024-06-26 VITALS
HEIGHT: 67 IN | DIASTOLIC BLOOD PRESSURE: 78 MMHG | HEART RATE: 93 BPM | OXYGEN SATURATION: 95 % | WEIGHT: 169.75 LBS | TEMPERATURE: 98.5 F | SYSTOLIC BLOOD PRESSURE: 124 MMHG | BODY MASS INDEX: 26.64 KG/M2 | RESPIRATION RATE: 17 BRPM

## 2024-06-26 LAB
ANION GAP SERPL CALCULATED.3IONS-SCNC: 11 MMOL/L (ref 5–15)
BUN SERPL-MCNC: 20 MG/DL (ref 8–23)
BUN/CREAT SERPL: 13 (ref 7–25)
CALCIUM SPEC-SCNC: 8.4 MG/DL (ref 8.6–10.5)
CHLORIDE SERPL-SCNC: 102 MMOL/L (ref 98–107)
CO2 SERPL-SCNC: 23 MMOL/L (ref 22–29)
CREAT SERPL-MCNC: 1.54 MG/DL (ref 0.76–1.27)
DEPRECATED RDW RBC AUTO: 45.8 FL (ref 37–54)
EGFRCR SERPLBLD CKD-EPI 2021: 46.2 ML/MIN/1.73
ERYTHROCYTE [DISTWIDTH] IN BLOOD BY AUTOMATED COUNT: 13 % (ref 12.3–15.4)
GLUCOSE SERPL-MCNC: 95 MG/DL (ref 65–99)
HCT VFR BLD AUTO: 24.7 % (ref 37.5–51)
HGB BLD-MCNC: 8.1 G/DL (ref 13–17.7)
MAGNESIUM SERPL-MCNC: 2 MG/DL (ref 1.6–2.4)
MCH RBC QN AUTO: 31.8 PG (ref 26.6–33)
MCHC RBC AUTO-ENTMCNC: 32.8 G/DL (ref 31.5–35.7)
MCV RBC AUTO: 96.9 FL (ref 79–97)
PHOSPHATE SERPL-MCNC: 3.3 MG/DL (ref 2.5–4.5)
PLATELET # BLD AUTO: 291 10*3/MM3 (ref 140–450)
PMV BLD AUTO: 9.4 FL (ref 6–12)
POTASSIUM SERPL-SCNC: 4.3 MMOL/L (ref 3.5–5.2)
RBC # BLD AUTO: 2.55 10*6/MM3 (ref 4.14–5.8)
SODIUM SERPL-SCNC: 136 MMOL/L (ref 136–145)
WBC NRBC COR # BLD AUTO: 8.32 10*3/MM3 (ref 3.4–10.8)

## 2024-06-26 PROCEDURE — 84100 ASSAY OF PHOSPHORUS: CPT | Performed by: INTERNAL MEDICINE

## 2024-06-26 PROCEDURE — 80048 BASIC METABOLIC PNL TOTAL CA: CPT | Performed by: INTERNAL MEDICINE

## 2024-06-26 PROCEDURE — 99239 HOSP IP/OBS DSCHRG MGMT >30: CPT

## 2024-06-26 PROCEDURE — 83735 ASSAY OF MAGNESIUM: CPT | Performed by: INTERNAL MEDICINE

## 2024-06-26 PROCEDURE — 85027 COMPLETE CBC AUTOMATED: CPT | Performed by: INTERNAL MEDICINE

## 2024-06-26 RX ORDER — IPRATROPIUM BROMIDE AND ALBUTEROL SULFATE 2.5; .5 MG/3ML; MG/3ML
3 SOLUTION RESPIRATORY (INHALATION) EVERY 4 HOURS PRN
Qty: 360 ML | Refills: 1 | Status: SHIPPED | OUTPATIENT
Start: 2024-06-26

## 2024-06-26 RX ORDER — AMOXICILLIN AND CLAVULANATE POTASSIUM 500; 125 MG/1; MG/1
1 TABLET, FILM COATED ORAL 2 TIMES DAILY
Qty: 7 TABLET | Refills: 0 | Status: SHIPPED | OUTPATIENT
Start: 2024-06-26 | End: 2024-06-30

## 2024-06-26 RX ORDER — ASPIRIN 81 MG/1
81 TABLET, CHEWABLE ORAL DAILY
Qty: 30 TABLET | Refills: 1 | Status: SHIPPED | OUTPATIENT
Start: 2024-06-27

## 2024-06-26 RX ADMIN — ASPIRIN 81 MG: 81 TABLET, CHEWABLE ORAL at 08:33

## 2024-06-26 RX ADMIN — LEVOTHYROXINE SODIUM 88 MCG: 88 TABLET ORAL at 05:10

## 2024-06-26 RX ADMIN — SENNOSIDES AND DOCUSATE SODIUM 2 TABLET: 50; 8.6 TABLET ORAL at 04:20

## 2024-06-26 RX ADMIN — PANTOPRAZOLE SODIUM 40 MG: 40 TABLET, DELAYED RELEASE ORAL at 05:10

## 2024-06-26 RX ADMIN — AMOXICILLIN AND CLAVULANATE POTASSIUM 500 MG: 500; 125 TABLET, FILM COATED ORAL at 08:33

## 2024-06-26 NOTE — PAYOR COMM NOTE
"Ref# [id number]   6/26/24 Discharge Date    Utilization Review  Phone [phone]  Fax [phone]    [place] [address]         Ervin Mackey (78 y.o. Male)       Date of Birth   1945    Social Security Number       Address   [address] [place] [address]    Home Phone   [phone]    MRN   [id number]       [employer]ism   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)   Code Status: CPR    Ht: 170.2 cm (67.01\")   Wt: 77 kg (169 lb 12.1 oz)    Admission Cmt: None   Principal Problem: BPH with urinary obstruction [N40.1,N13.8]                   Active Insurance as of 6/21/2024       Primary Coverage       Payor Plan Insurance Group Employer/Plan Group    ANTHEM MEDICARE REPLACEMENT ANTHEM MEDICARE ADVANTAGE [id number]       Payor Plan Address Payor Plan Phone Number Payor Plan Fax Number Effective Dates    [address] [phone]  1/1/2016 - None Entered    [facility] [address]         Subscriber Name Subscriber Birth Date Member ID       ERVIN MACKEY 1945 [id number]                     Emergency Contacts        (Rel.) Home Phone Work Phone Mobile Phone    Wesley Mackey (Brother) [phone] -- --              Current Facility-Administered Medications   Medication Dose Route Frequency Provider Last Rate Last Admin    acetaminophen (TYLENOL) tablet 650 mg  650 mg Oral Q4H PRN Victoriano Tao MD        Or    " acetaminophen (TYLENOL) 160 MG/5ML oral solution 650 mg  650 mg Oral Q4H PRN Victoriano Tao MD        Or    acetaminophen (TYLENOL) suppository 650 mg  650 mg Rectal Q4H PRN Victoriano Tao MD        amoxicillin-clavulanate (AUGMENTIN) 500-125 MG per tablet 500 mg  1 tablet Oral BID Lei Mcpherson APRN   500 mg at 06/26/24 0833    aspirin chewable tablet 81 mg  81 mg Oral Daily Lei Mcpherson APRN   81 mg at 06/26/24 0833    sennosides-docusate (PERICOLACE) 8.6-50 MG per tablet 2 tablet  2 tablet Oral BID PRN Victoriano Tao MD   2 tablet at 06/26/24 0420    And    polyethylene glycol (MIRALAX) packet 17 g  17 g Oral Daily PRN Victoriano Tao MD        And    bisacodyl (DULCOLAX) EC tablet 5 mg  5 mg Oral Daily PRN Victoriano Tao MD        And    bisacodyl (DULCOLAX) suppository 10 mg  10 mg Rectal Daily PRN Victoriano Tao MD        ipratropium-albuterol (DUO-NEB) nebulizer solution 3 mL  3 mL Nebulization Q4H PRN Cha Flores APRN   3 mL at 06/23/24 0512    levothyroxine (SYNTHROID, LEVOTHROID) tablet 88 mcg  88 mcg Oral Q AM Victoriano Tao MD   88 mcg at 06/26/24 0510    nitroglycerin (NITROSTAT) SL tablet 0.4 mg  0.4 mg Sublingual Q5 Min PRN Cha Flores APRN        ondansetron ODT (ZOFRAN-ODT) disintegrating tablet 4 mg  4 mg Oral Q6H PRN Victoriano Tao MD        Or    ondansetron (ZOFRAN) injection 4 mg  4 mg Intravenous Q6H PRN Victoriano Tao MD   4 mg at 06/22/24 0021    pantoprazole (PROTONIX) EC tablet 40 mg  40 mg Oral Q AM Zen Patel MD   40 mg at 06/26/24 0510    sodium chloride 0.9 % flush 10 mL  10 mL Intravenous Q12H Victoriano Tao MD   10 mL at 06/25/24 2146    sodium chloride 0.9 % flush 10 mL  10 mL Intravenous PRN Victoriano Tao MD        sodium chloride 0.9 % infusion 40 mL  40 mL Intravenous PRN Victoriano Tao MD         Lab Results (last 48 hours)       Procedure Component Value Units Date/Time    Basic Metabolic Panel [[id number]]  (Abnormal)  Collected: 06/26/24 0213    Specimen: Blood Updated: 06/26/24 0244     Glucose 95 mg/dL      BUN 20 mg/dL      Creatinine 1.54 mg/dL      Sodium 136 mmol/L      Potassium 4.3 mmol/L      Chloride 102 mmol/L      CO2 23.0 mmol/L      Calcium 8.4 mg/dL      BUN/Creatinine Ratio 13.0     Anion Gap 11.0 mmol/L      eGFR 46.2 mL/min/1.73     Narrative:      GFR Normal >60  Chronic Kidney Disease <60  Kidney Failure <15    The GFR formula is only valid for adults with stable renal function between ages 18 and 70.    Magnesium [[id number]]  (Normal) Collected: 06/26/24 0213    Specimen: Blood Updated: 06/26/24 0244     Magnesium 2.0 mg/dL     Phosphorus [[id number]]  (Normal) Collected: 06/26/24 0213    Specimen: Blood Updated: 06/26/24 0244     Phosphorus 3.3 mg/dL     CBC (No Diff) [[id number]]  (Abnormal) Collected: 06/26/24 0213    Specimen: Blood Updated: 06/26/24 0224     WBC 8.32 10*3/mm3      RBC 2.55 10*6/mm3      Hemoglobin 8.1 g/dL      Hematocrit 24.7 %      MCV 96.9 fL      MCH 31.8 pg      MCHC 32.8 g/dL      RDW 13.0 %      RDW-SD 45.8 fl      MPV 9.4 fL      Platelets 291 10*3/mm3     Tissue Pathology Exam [[id number]] Collected: 06/21/24 1515    Specimen: Tissue from Femoral Artery Updated: 06/25/24 1035     Case Report --     Surgical Pathology Report                         Case: [id number]                                  Authorizing Provider:  Kingsley Morris MD    Collected:           06/21/2024 03:15 PM          Ordering Location:     [facility]   Received:            06/24/2024 07:46 AM                                 OR                                                                           Pathologist:           Ysabel Kirkland DO                                                        Specimen:    Femoral Artery, femoral thrombus                                                            Clinical Information --     Femoral artery occlusion         Final Diagnosis --     FEMORAL  "THROMBUS, THROMBECTOMY:  Blood clot       Gross Description --     1. Femoral Artery.  Received in formalin labeled \"femoral thrombus\", is a 1.0 x 0.3 x 0.2 cm ragged portion of tan-brown, friable blood clot.  The specimen is entirely submitted as received in cassette 1A. MLA         Microscopic Description --     The slides are reviewed and demonstrate histopathologic features supporting the above rendered diagnosis.        Basic Metabolic Panel [[id number]]  (Abnormal) Collected: 06/25/24 0421    Specimen: Blood from Arm, Right Updated: 06/25/24 0453     Glucose 108 mg/dL      BUN 18 mg/dL      Creatinine 1.60 mg/dL      Sodium 140 mmol/L      Potassium 4.5 mmol/L      Comment: Slight hemolysis detected by analyzer. Result may be falsely elevated.        Chloride 107 mmol/L      CO2 25.0 mmol/L      Calcium 7.7 mg/dL      BUN/Creatinine Ratio 11.3     Anion Gap 8.0 mmol/L      eGFR 44.1 mL/min/1.73     Narrative:      GFR Normal >60  Chronic Kidney Disease <60  Kidney Failure <15    The GFR formula is only valid for adults with stable renal function between ages 18 and 70.    Magnesium [[id number]]  (Normal) Collected: 06/25/24 0421    Specimen: Blood from Arm, Right Updated: 06/25/24 0453     Magnesium 2.0 mg/dL     Phosphorus [[id number]]  (Normal) Collected: 06/25/24 0421    Specimen: Blood from Arm, Right Updated: 06/25/24 0453     Phosphorus 3.1 mg/dL     CBC (No Diff) [[id number]]  (Abnormal) Collected: 06/25/24 0421    Specimen: Blood from Arm, Right Updated: 06/25/24 0445     WBC 10.02 10*3/mm3      RBC 2.50 10*6/mm3      Hemoglobin 8.2 g/dL      Hematocrit 24.0 %      MCV 96.0 fL      MCH 32.8 pg      MCHC 34.2 g/dL      RDW 12.9 %      RDW-SD 45.0 fl      MPV 9.9 fL      Platelets 246 10*3/mm3           Imaging Results (Last 48 Hours)       ** No results found for the last 48 hours. **             Physician Progress Notes (last 48 hours)        Case, Trinh ANGELO, DO at 06/25/24 1350            INTENSIVIST "   PROGRESS NOTE     Hospital:  LOS: 4 days     Mr. Nestor Nye, 78 y.o. male is followed for a Chief Complaint of: Hematuria      Subjective   S     Interval History:  No acute events overnight. Awaiting a telemetry bed.        The patient's relevant past medical, surgical and social history were reviewed and updated in Epic as appropriate.      ROS:   Constitutional: Negative for fever.   Respiratory: Negative for dyspnea.   Cardiovascular: Negative for chest pain.   Gastrointestinal: Negative for  nausea, vomiting and diarrhea.     Objective   O     Vitals:  Temp  Min: 98.3 °F (36.8 °C)  Max: 98.5 °F (36.9 °C)  BP  Min: 102/83  Max: 139/78  Pulse  Min: 80  Max: 112  Resp  Min: 16  Max: 18  SpO2  Min: 84 %  Max: 97 % Flow (L/min)  Min: 2  Max: 2    Intake/Ouptut 24 hrs (7:00AM - 6:59 AM)  Intake & Output (last 3 days)         06/21 0701  06/22 0700 06/22 0701  06/23 0700 06/23 0701  06/24 0700 06/24 0701  06/25 0700    P.O. 60 1568 580     I.V. (mL/kg) 3397.8 (44.7) 1226.2 (16.2) 1171 (15.4) 84 (1.1)    Other 69826       IV Piggyback 275       Total Intake(mL/kg) 36403.8 (316) 2794.2 (36.9) 1751 (23.1) 84 (1.1)    Urine (mL/kg/hr) 37999 (11.5) 3425 (1.9) 3900 (2.1) 200 (0.5)    Stool  0 0     Total Output 63125 3425 3900 200    Net +3062.8 -630.8 -2149 -116            Stool Unmeasured Occurrence  1 x 1 x             Medications (drips):         Physical Examination  Telemetry:  Normal sinus rhythm.    Constitutional:  No acute distress.  Sitting up in a chair.    Eyes: No scleral icterus.   PERRL, EOM intact.    Neck:  Supple, FROM   Cardiovascular: Normal rate, regular and rhythm. Normal heart sounds.  No murmurs, gallop or rub.   Respiratory: No respiratory distress. Normal respiratory effort.  Diminished.    Abdominal:  Soft. No masses. Nontender. No distension. No HSM.   Extremities: No digital cyanosis. No clubbing.  No peripheral edema.   Skin: No rashes, lesions or ulcers   Neurological:   Alert  and interactive.              Results from last 7 days   Lab Units 06/25/24  0421 06/24/24  0559 06/23/24  0836 06/23/24  0449   WBC 10*3/mm3 10.02 13.18*  --  16.28*   HEMOGLOBIN g/dL 8.2* 7.9* 8.8* 8.7*   MCV fL 96.0 95.2  --  96.2   PLATELETS 10*3/mm3 246 212  --  203     Results from last 7 days   Lab Units 06/25/24  0421 06/24/24  0559 06/23/24  0449   SODIUM mmol/L 140 138 139   POTASSIUM mmol/L 4.5 4.6 4.4   CO2 mmol/L 25.0 26.0 25.0   CREATININE mg/dL 1.60* 1.61* 1.67*   GLUCOSE mg/dL 108* 99 100*   MAGNESIUM mg/dL 2.0 2.0 1.9   PHOSPHORUS mg/dL 3.1 3.2 2.7     Estimated Creatinine Clearance: 41.5 mL/min (A) (by C-G formula based on SCr of 1.6 mg/dL (H)).  Results from last 7 days   Lab Units 06/24/24  0559 06/23/24  0449 06/21/24  1202   ALK PHOS U/L 80 83 96   BILIRUBIN mg/dL 0.7 0.8 0.5   ALT (SGPT) U/L <5 <5 8   AST (SGOT) U/L 29 32 14             Images:  Imaging Results (Last 24 Hours)       ** No results found for the last 24 hours. **               Results: Reviewed.  I reviewed the patient's new laboratory and imaging results.  I independently reviewed the patient's new images.    Medications: Reviewed.    Assessment & Plan   A / P     Mr. Nye is a 79yo M with a history of BPH with urinary obstruction, HTN, HLD, hypothyroidism and CKDIII who presented to [facility] on 6/21/24 for an elective aqua ablation per Dr. Rey with Urology. He was initially admitted to Hospital Medicine as the case took longer to perform due to his large prostate.     The case was complicated by hematuria and required bilateral hypogastric artery embolization by Dr. Christine with IR via right CGA access. An angioseal was placed post-procedure. Shortly after the case, poor perfusion was noted in the right lower extremity. An arterial duplex was performed which showed no SFA flow. Vascular Surgery was consulted and he was taken for emergent right groin exploration per Dr. Morris. An acute thrombus was retrieved near the aortic  bifurcation with return of pulsatile bleeding. He was then transferred to the ICU.     CBI was stopped on 6/22/24.     Nutrition:   Diet: Regular/House, Renal; Low Phosphorus; Texture: Mechanical Ground (NDD 2); Fluid Consistency: Thin (IDDSI 0)  Advance Directives:   Code Status and Medical Interventions:   Ordered at: 06/21/24 1326     Level Of Support Discussed With:    Patient     Code Status (Patient has no pulse and is not breathing):    CPR (Attempt to Resuscitate)     Medical Interventions (Patient has pulse or is breathing):    Full Support       Active Hospital Problems    Diagnosis     **BPH with urinary obstruction     BPH with obstruction/lower urinary tract symptoms     Urinary retention     Stage 3a chronic kidney disease     Hypothyroidism     Mixed hyperlipidemia     Benign essential hypertension        Assessment / Plan:     Post-ablation management per Urology. Will need discharge plans regarding stacy catheter and follow up.   Augmentin for urologic procedure prophylaxis.   Continue ASA.   PO diet. D/c IV fluids.   PT following. Needs inpatient rehab. Case management aware.   AM labs  Awaiting telemetry bed.     High level of risk due to:  severe exacerbation of chronic illness and illness with threat to life or bodily function.    Plan of care and goals reviewed during interdisciplinary rounds.  I discussed the patient's findings and my recommendations with patient and nursing staff      Trinh Girard DO    Intensive Care Medicine and Pulmonary Medicine       Electronically signed by Trinh Girard DO at 06/25/24 1351       Consult Notes (last 48 hours)  Notes from 06/24/24 1412 through 06/26/24 1412   No notes of this type exist for this encounter.          Discharge Summary        Cha Flores APRN at 06/26/24 1137       Attestation signed by Trinh Girard DO at 06/26/24 1405    I have reviewed this documentation and agree.                  Discharge Summary    Patient name: Nestor  Charles Nye  CSN: [id number]  MRN: [id number]  : 1945  Today's date: 2024     Date of Admission: 2024  Date of Discharge:  2024    Admitting Physician:  Dr. Trinh Girard DO; Pulmonology  Primary Care Provider: Roxanne Slater MD  Consultations:  Dr. Adrien Bean MD and Dr. Kingsley Morris MD; Vascular Surgery     Dr. Prieto Rey MD; Urology     Dr. Patrick Christine MD; Interventional Radiology    Admission Diagnosis: BPH with urinary obstruction     Discharge Diagnoses:   Active Hospital Problems    Diagnosis     **BPH with urinary obstruction     BPH with obstruction/lower urinary tract symptoms     Urinary retention     Stage 3a chronic kidney disease     Hypothyroidism     Mixed hyperlipidemia     Benign essential hypertension      Allergies:  Patient has no known allergies.    Code Status and Medical Interventions:   Ordered at: 24 1326     Level Of Support Discussed With:    Patient     Code Status (Patient has no pulse and is not breathing):    CPR (Attempt to Resuscitate)     Medical Interventions (Patient has pulse or is breathing):    Full Support       Procedures/Testing:  Procedure(s):  Aqua ablation for BPH  FEMORAL EMBELECTOMY RIGHT  Bilateral hypogastric artery embolization     History of Present Illness:  Nestor Nye is a 78 y.o. male with PMH BPH with urinary obstruction, HTN, dyslipidemia, hypothyroidism, and CKD stage IIIb (baseline sCr 1.4) who presents to [facility] on 24 for an elective aqua ablation per Dr Rey with Urology.      Patient has a history of chronic lower urinary tract symptoms requiring chronic indwelling catheter, with prior cystoscopy showing very large, occlusive lateral prostate lobes. He was seen in office on 3/14/24, at which time he elected to undergo ablation and completed 5 days of Bactrim for surgical prophylaxis, with last dose on 24.     Hospital Course:  Patient was admitted to Hospital Medicine post-ablation as the case took  "longer to perform due his large prostate. The case was complicated by hematuria and required bilateral hypogastric artery embolization by Dr. Christine with IR via right CGA access. An angioseal was placed post-procedure. Shortly after the case, poor perfusion was noted in the right lower extremity. An arterial duplex was performed which showed no SFA flow. Vascular Surgery was consulted and he was taken for emergent right groin exploration per Dr. Morris. An acute thrombus was retrieved near the aortic bifurcation with return of pulsatile bleeding. He was then transferred to the ICU.      CBI was stopped on 6/22/24 and he remained stable. He will complete 1 week of Augmentin for urologic prophylaxis.     Patient worked with PT/OT with recommendations to [facility]. CM arranged placement at [place] and patient will be discharged today, 6/26/24, via wheelchair transport. Discharge medication, follow up appointments, and instructions below.    Vitals:  BP 99/68   Pulse 99   Temp 98.2 °F (36.8 °C) (Oral)   Resp 18   Ht 170.2 cm (67.01\")   Wt 77 kg (169 lb 12.1 oz)   SpO2 93%   BMI 26.58 kg/m²     Physical Exam:  Constitutional:  Appears well-developed and well-nourished. No distress.   HEENT:  Normocephalic and atraumatic. PERRL  Neck:  Neck supple. No JVD present.   CV: Normal rate, regular rhythm,  intact distal pulses.  No gallop, murmur or rub.  Pulmonary/Chest: Effort normal and breath sounds normal. No respiratory distress. No wheezes, rhonchi or rales.   Abdominal: Soft. +BS. No distension and no mass. There is no tenderness. Gillespie catheter to remain in place on discharge  Musculoskeletal: Normal muscle tone and strength  Neurological: Alert and oriented to person, place, and time.  No focal deficits  Skin: Skin is warm and dry. No rash noted.   Extremities:  No clubbing, edema or cyanosis  Psychiatric: Normal mood and affect. Behavior is normal.     Labs:  Results from last 7 days   Lab Units 06/26/24  0213   WBC " 10*3/mm3 8.32   HEMOGLOBIN g/dL 8.1*   HEMATOCRIT % 24.7*   PLATELETS 10*3/mm3 291     Results from last 7 days   Lab Units 06/26/24  0213 06/25/24  0421 06/24/24  0559   SODIUM mmol/L 136   < > 138   POTASSIUM mmol/L 4.3   < > 4.6   CHLORIDE mmol/L 102   < > 106   CO2 mmol/L 23.0   < > 26.0   BUN mg/dL 20   < > 15   CREATININE mg/dL 1.54*   < > 1.61*   CALCIUM mg/dL 8.4*   < > 8.6   BILIRUBIN mg/dL  --   --  0.7   ALK PHOS U/L  --   --  80   ALT (SGPT) U/L  --   --  <5   AST (SGOT) U/L  --   --  29   GLUCOSE mg/dL 95   < > 99    < > = values in this interval not displayed.         Magnesium   Date Value Ref Range Status   06/26/2024 2.0 1.6 - 2.4 mg/dL Final   06/25/2024 2.0 1.6 - 2.4 mg/dL Final   06/24/2024 2.0 1.6 - 2.4 mg/dL Final     Phosphorus   Date Value Ref Range Status   06/26/2024 3.3 2.5 - 4.5 mg/dL Final   06/25/2024 3.1 2.5 - 4.5 mg/dL Final   06/24/2024 3.2 2.5 - 4.5 mg/dL Final                    Discharge Medications        New Medications        Instructions Start Date   amoxicillin-clavulanate 500-125 MG per tablet  Commonly known as: AUGMENTIN   500 mg, Oral, 2 Times Daily      aspirin 81 MG chewable tablet   81 mg, Oral, Daily   Start Date: June 27, 2024     ipratropium-albuterol 0.5-2.5 mg/3 ml nebulizer  Commonly known as: DUO-NEB   3 mL, Nebulization, Every 4 Hours PRN             Changes to Medications        Instructions Start Date   Alcohol Wipes 70 % pads  What changed: additional instructions   1 pad , Does not apply, 3 Times Daily             Continue These Medications        Instructions Start Date   amLODIPine 2.5 MG tablet  Commonly known as: Norvasc   2.5 mg, Oral, Daily      Diclofenac Sodium 1 % gel gel  Commonly known as: VOLTAREN   4 g, Topical, 4 Times Daily PRN      ferrous sulfate 325 (65 FE) MG tablet   325 mg, Oral, Daily With Breakfast      finasteride 5 MG tablet  Commonly known as: PROSCAR   5 mg, Oral, Daily      levothyroxine 88 MCG tablet  Commonly known as:  SYNTHROID, LEVOTHROID   88 mcg, Oral, Daily      melatonin 5 MG tablet tablet   5 mg, Oral             Stop These Medications      baclofen 10 MG tablet  Commonly known as: LIORESAL     methenamine 1 g tablet  Commonly known as: HIPREX     sulfamethoxazole-trimethoprim 800-160 MG per tablet  Commonly known as: Bactrim DS            Follow-up Appointments  Future Appointments   Date Time Provider Department Center   7/17/2024  2:45 PM Roxanne Slater MD [facility]       Discharge Instructions:  [facility] at 1400  Transportation via Wheelchair transport  Medications sent to [facility] in [place]  Discharge with indwelling catheter  Augmentin x 1 week  Follow up with Dr. Rey (Urology) in 1 week  Follow up with Dr. Morris (Vascular Surgery) in 2 weeks  PCP in 1 week  Return to the hospital or call 911 with recurrence of symptoms     Awa Folres, MSN, APRN, ACNPC-AG  Pulmonary and Critical Care Medicine  Electronically signed by LANNY Lugo, 06/26/24, 11:38 AM EDT.     Time: I spent 36 minutes on this discharge activity which included: face-to-face encounter with the patient, reviewing the data in the system, coordination of the care with the nursing staff as well as consultants, documentation, and entering orders.      CC: Roxanne Slater MD          Electronically signed by Trinh Girard DO at 06/26/24 1405       Discharge Order (From admission, onward)       Start     Ordered    06/26/24 1226  Discharge patient  Once        Expected Discharge Date: 06/26/24   Expected Discharge Time: Afternoon   Discharge Disposition: Rehab Facility or Unit (DC - External)   Physician of Record for Attribution - Please select from Treatment Team: TRINH GIRARD [[id number]]   Review needed by CMO to determine Physician of Record: No      Question Answer Comment   Physician of Record for Attribution - Please select from Treatment Team TRINH GIRARD    Review needed by CMO to determine  Physician of Record No        06/26/24 1223

## 2024-06-26 NOTE — CASE MANAGEMENT/SOCIAL WORK
Continued Stay Note  [facility]     Patient Name: Nestor Nye  MRN: [id number]  Today's Date: 6/26/2024    Admit Date: 6/21/2024    Plan: [facility]   Discharge Plan       Row Name 06/26/24 0950       Plan    Plan [facility]    Patient/Family in Agreement with Plan yes    Plan Comments Spoke with Yana at [facility] and they are still waiting on insurance approval. Plan is [facility]. PAtient will need wheelchair transport. CM will continue to follow.    Final Discharge Disposition Code 03 - skilled nursing facility (SNF)                   Discharge Codes    No documentation.                 Expected Discharge Date and Time       Expected Discharge Date Expected Discharge Time    Jun 26, 2024               Justin Salazar RN     29-Dec-2023 10:40

## 2024-06-26 NOTE — CASE MANAGEMENT/SOCIAL WORK
Case Management Discharge Note      Final Note: Plan is [facility]. [facility] is scheduled for today at 14:00. Nurse to call report to [phone] and fax discharge summary to [phone]. Pharmacy has been changed [facility] in [place], [place]. Brother Wesley is aware of plan.    Provided Post Acute Provider List?: Yes  Post Acute Provider List: Inpatient Rehab  Provided Post Acute Provider Quality & Resource List?: Yes  Post Acute Provider Quality and Resource List: Inpatient Rehab  Delivered To: Patient  Method of Delivery: In person    Selected Continued Care - Admitted Since 6/21/2024       Destination Coordination complete.      Service Provider Selected Services Address Phone Fax Patient Preferred    [facility] [address] [address] [phone]               Durable Medical Equipment    No services have been selected for the patient.                Dialysis/Infusion    No services have been selected for the patient.                Home Medical Care    No services have been selected for the patient.                Therapy    No services have been selected for the patient.                Community Resources    No services have been selected for the patient.                Community & DME    No services have been selected for the patient.                         Final Discharge Disposition Code: 03 - skilled nursing facility (SNF)

## 2024-06-26 NOTE — DISCHARGE PLACEMENT REQUEST
"Ervin Mackey (78 y.o. Male)       Date of Birth   1945    Social Security Number       Address   [address] [place] [address]    Home Phone   [phone]    MRN   [id number]       [facility]    Marital Status                               Admission Date   24    Admission Type   Elective    Admitting Provider   Stanley Parker MD    Attending Provider   Trinh Girard DO    Department, Room/Bed   [address] ICU, N236/1       Discharge Date       Discharge Disposition   Rehab Facility or Unit (DC - External)    Discharge Destination                                 Attending Provider: Trinh Girard DO    Allergies: No Known Allergies    Isolation: Contact   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 [id number]         Subscriber Name Subscriber Birth Date Member ID       ERVIN MACKEY 1945 [id number]                     Emergency Contacts        (Rel.) Home Phone Work Phone Mobile Phone    Wesley Mackey (Brother) [phone] -- --                 Discharge Summary        Cha Flores APRN at 24 1137          Discharge Summary    Patient name: Ervin Mackey  CSN: [id number]  MRN: [id number]  : 1945  Today's date: 2024     Date of Admission: 2024  Date of Discharge:  2024    Admitting Physician:  Dr. Trinh Girard DO; Pulmonology  Primary Care Provider: Roxanne Slater, " MD  Consultations:  Dr. Adrien Bean MD and Dr. Kingsley Morris MD; Vascular Surgery     Dr. Prieto Rey MD; Urology     Dr. Patrick Christine MD; Interventional Radiology    Admission Diagnosis: BPH with urinary obstruction     Discharge Diagnoses:   Active Hospital Problems    Diagnosis     **BPH with urinary obstruction     BPH with obstruction/lower urinary tract symptoms     Urinary retention     Stage 3a chronic kidney disease     Hypothyroidism     Mixed hyperlipidemia     Benign essential hypertension      Allergies:  Patient has no known allergies.    Code Status and Medical Interventions:   Ordered at: 06/21/24 1326     Level Of Support Discussed With:    Patient     Code Status (Patient has no pulse and is not breathing):    CPR (Attempt to Resuscitate)     Medical Interventions (Patient has pulse or is breathing):    Full Support       Procedures/Testing:  Procedure(s):  Aqua ablation for BPH  FEMORAL EMBELECTOMY RIGHT  Bilateral hypogastric artery embolization     History of Present Illness:  Nestor Nye is a 78 y.o. male with PMH BPH with urinary obstruction, HTN, dyslipidemia, hypothyroidism, and CKD stage IIIb (baseline sCr 1.4) who presents to [facility] on 6/21/24 for an elective aqua ablation per Dr Rey with Urology.      Patient has a history of chronic lower urinary tract symptoms requiring chronic indwelling catheter, with prior cystoscopy showing very large, occlusive lateral prostate lobes. He was seen in office on 3/14/24, at which time he elected to undergo ablation and completed 5 days of Bactrim for surgical prophylaxis, with last dose on 6/20/24.     Hospital Course:  Patient was admitted to Hospital Medicine post-ablation as the case took longer to perform due his large prostate. The case was complicated by hematuria and required bilateral hypogastric artery embolization by Dr. Christine with IR via right CGA access. An angioseal was placed post-procedure. Shortly after the case, poor  "perfusion was noted in the right lower extremity. An arterial duplex was performed which showed no SFA flow. Vascular Surgery was consulted and he was taken for emergent right groin exploration per Dr. Morris. An acute thrombus was retrieved near the aortic bifurcation with return of pulsatile bleeding. He was then transferred to the ICU.      CBI was stopped on 6/22/24 and he remained stable. He will complete 1 week of Augmentin for urologic prophylaxis.     [facility] at [place] and patient will be discharged today, 6/26/24, via wheelchair transport. Discharge medication, follow up appointments, and instructions below.    Vitals:  BP 99/68   Pulse 99   Temp 98.2 °F (36.8 °C) (Oral)   Resp 18   Ht 170.2 cm (67.01\")   Wt 77 kg (169 lb 12.1 oz)   SpO2 93%   BMI 26.58 kg/m²     Physical Exam:  Constitutional:  Appears well-developed and well-nourished. No distress.   HEENT:  Normocephalic and atraumatic. PERRL  Neck:  Neck supple. No JVD present.   CV: Normal rate, regular rhythm,  intact distal pulses.  No gallop, murmur or rub.  Pulmonary/Chest: Effort normal and breath sounds normal. No respiratory distress. No wheezes, rhonchi or rales.   Abdominal: Soft. +BS. No distension and no mass. There is no tenderness. Gillespie catheter to remain in place on discharge  Musculoskeletal: Normal muscle tone and strength  Neurological: Alert and oriented to person, place, and time.  No focal deficits  Skin: Skin is warm and dry. No rash noted.   Extremities:  No clubbing, edema or cyanosis  Psychiatric: Normal mood and affect. Behavior is normal.     Labs:  Results from last 7 days   Lab Units 06/26/24  0213   WBC 10*3/mm3 8.32   HEMOGLOBIN g/dL 8.1*   HEMATOCRIT % 24.7*   PLATELETS 10*3/mm3 291     Results from last 7 days   Lab Units 06/26/24  0213 06/25/24  0421 06/24/24  0559   SODIUM mmol/L 136   < > 138   POTASSIUM mmol/L 4.3   < > 4.6   CHLORIDE " mmol/L 102   < > 106   CO2 mmol/L 23.0   < > 26.0   BUN mg/dL 20   < > 15   CREATININE mg/dL 1.54*   < > 1.61*   CALCIUM mg/dL 8.4*   < > 8.6   BILIRUBIN mg/dL  --   --  0.7   ALK PHOS U/L  --   --  80   ALT (SGPT) U/L  --   --  <5   AST (SGOT) U/L  --   --  29   GLUCOSE mg/dL 95   < > 99    < > = values in this interval not displayed.         Magnesium   Date Value Ref Range Status   06/26/2024 2.0 1.6 - 2.4 mg/dL Final   06/25/2024 2.0 1.6 - 2.4 mg/dL Final   06/24/2024 2.0 1.6 - 2.4 mg/dL Final     Phosphorus   Date Value Ref Range Status   06/26/2024 3.3 2.5 - 4.5 mg/dL Final   06/25/2024 3.1 2.5 - 4.5 mg/dL Final   06/24/2024 3.2 2.5 - 4.5 mg/dL Final                    Discharge Medications        New Medications        Instructions Start Date   amoxicillin-clavulanate 500-125 MG per tablet  Commonly known as: AUGMENTIN   500 mg, Oral, 2 Times Daily      aspirin 81 MG chewable tablet   81 mg, Oral, Daily   Start Date: June 27, 2024     ipratropium-albuterol 0.5-2.5 mg/3 ml nebulizer  Commonly known as: DUO-NEB   3 mL, Nebulization, Every 4 Hours PRN             Changes to Medications        Instructions Start Date   Alcohol Wipes 70 % pads  What changed: additional instructions   1 pad , Does not apply, 3 Times Daily             Continue These Medications        Instructions Start Date   amLODIPine 2.5 MG tablet  Commonly known as: Norvasc   2.5 mg, Oral, Daily      Diclofenac Sodium 1 % gel gel  Commonly known as: VOLTAREN   4 g, Topical, 4 Times Daily PRN      ferrous sulfate 325 (65 FE) MG tablet   325 mg, Oral, Daily With Breakfast      finasteride 5 MG tablet  Commonly known as: PROSCAR   5 mg, Oral, Daily      levothyroxine 88 MCG tablet  Commonly known as: SYNTHROID, LEVOTHROID   88 mcg, Oral, Daily      melatonin 5 MG tablet tablet   5 mg, Oral             Stop These Medications      baclofen 10 MG tablet  Commonly known as: LIORESAL     methenamine 1 g tablet  Commonly known as: HIPREX      sulfamethoxazole-trimethoprim 800-160 MG per tablet  Commonly known as: Bactrim DS            Follow-up Appointments  Future Appointments   Date Time Provider Department Center   7/17/2024  2:45 PM Roxanne Slater MD [facility]       Discharge Instructions:  Discharge to [facility]& today at 1400  Transportation via Wheelchair transport  [facility] [place]  Discharge with indwelling catheter  Augmentin x 1 week  Follow up with Dr. Rey (Urology) in 1 week  Follow up with Dr. Morris (Vascular Surgery) in 2 weeks  PCP in 1 week  Return to the hospital or call 911 with recurrence of symptoms     Awa Flores, MSN, APRN, ACNPC-AG  Pulmonary and Critical Care Medicine  Electronically signed by LANNY Lugo, 06/26/24, 11:38 AM EDT.     Time: I spent 36 minutes on this discharge activity which included: face-to-face encounter with the patient, reviewing the data in the system, coordination of the care with the nursing staff as well as consultants, documentation, and entering orders.      CC: Roxanne Slater MD          Electronically signed by Cha Flores APRN at 06/26/24 2586

## 2024-06-26 NOTE — DISCHARGE SUMMARY
Discharge Summary    Patient name: Nestor Nye  CSN: [id number]  MRN: [id number]  : 1945  Today's date: 2024     Date of Admission: 2024  Date of Discharge:  2024    Admitting Physician:  Dr. Trinh Girard DO; Pulmonology  Primary Care Provider: Roxanne Slater MD  Consultations:  Dr. Adrien Bean MD and Dr. Kingsley Morris MD; Vascular Surgery     Dr. Prieto Rey MD; Urology     Dr. Patrick Christine MD; Interventional Radiology    Admission Diagnosis: BPH with urinary obstruction     Discharge Diagnoses:   Active Hospital Problems    Diagnosis     **BPH with urinary obstruction     BPH with obstruction/lower urinary tract symptoms     Urinary retention     Stage 3a chronic kidney disease     Hypothyroidism     Mixed hyperlipidemia     Benign essential hypertension      Allergies:  Patient has no known allergies.    Code Status and Medical Interventions:   Ordered at: 24 1326     Level Of Support Discussed With:    Patient     Code Status (Patient has no pulse and is not breathing):    CPR (Attempt to Resuscitate)     Medical Interventions (Patient has pulse or is breathing):    Full Support       Procedures/Testing:  Procedure(s):  Aqua ablation for BPH  FEMORAL EMBELECTOMY RIGHT  Bilateral hypogastric artery embolization     History of Present Illness:  Nestor Nye is a 78 y.o. male with PMH BPH with urinary obstruction, HTN, dyslipidemia, hypothyroidism, and CKD stage IIIb (baseline sCr 1.4) who presents to [facility] on 24 for an elective aqua ablation per Dr Rey with Urology.      Patient has a history of chronic lower urinary tract symptoms requiring chronic indwelling catheter, with prior cystoscopy showing very large, occlusive lateral prostate lobes. He was seen in office on 3/14/24, at which time he elected to undergo ablation and completed 5 days of Bactrim for surgical prophylaxis, with last dose on 24.     Hospital Course:  Patient was admitted to  "Hospital Medicine post-ablation as the case took longer to perform due his large prostate. The case was complicated by hematuria and required bilateral hypogastric artery embolization by Dr. Christine with IR via right CGA access. An angioseal was placed post-procedure. Shortly after the case, poor perfusion was noted in the right lower extremity. An arterial duplex was performed which showed no SFA flow. Vascular Surgery was consulted and he was taken for emergent right groin exploration per Dr. Morris. An acute thrombus was retrieved near the aortic bifurcation with return of pulsatile bleeding. He was then transferred to the ICU.      CBI was stopped on 6/22/24 and he remained stable. He will complete 1 week of Augmentin for urologic prophylaxis.     [facility] at [place] and patient will be discharged today, 6/26/24, via wheelchair transport. Discharge medication, follow up appointments, and instructions below.    Vitals:  BP 99/68   Pulse 99   Temp 98.2 °F (36.8 °C) (Oral)   Resp 18   Ht 170.2 cm (67.01\")   Wt 77 kg (169 lb 12.1 oz)   SpO2 93%   BMI 26.58 kg/m²     Physical Exam:  Constitutional:  Appears well-developed and well-nourished. No distress.   HEENT:  Normocephalic and atraumatic. PERRL  Neck:  Neck supple. No JVD present.   CV: Normal rate, regular rhythm,  intact distal pulses.  No gallop, murmur or rub.  Pulmonary/Chest: Effort normal and breath sounds normal. No respiratory distress. No wheezes, rhonchi or rales.   Abdominal: Soft. +BS. No distension and no mass. There is no tenderness. Gillespie catheter to remain in place on discharge  Musculoskeletal: Normal muscle tone and strength  Neurological: Alert and oriented to person, place, and time.  No focal deficits  Skin: Skin is warm and dry. No rash noted.   Extremities:  No clubbing, edema or cyanosis  Psychiatric: Normal mood and affect. Behavior is normal.     Labs:  Results " from last 7 days   Lab Units 06/26/24  0213   WBC 10*3/mm3 8.32   HEMOGLOBIN g/dL 8.1*   HEMATOCRIT % 24.7*   PLATELETS 10*3/mm3 291     Results from last 7 days   Lab Units 06/26/24  0213 06/25/24  0421 06/24/24  0559   SODIUM mmol/L 136   < > 138   POTASSIUM mmol/L 4.3   < > 4.6   CHLORIDE mmol/L 102   < > 106   CO2 mmol/L 23.0   < > 26.0   BUN mg/dL 20   < > 15   CREATININE mg/dL 1.54*   < > 1.61*   CALCIUM mg/dL 8.4*   < > 8.6   BILIRUBIN mg/dL  --   --  0.7   ALK PHOS U/L  --   --  80   ALT (SGPT) U/L  --   --  <5   AST (SGOT) U/L  --   --  29   GLUCOSE mg/dL 95   < > 99    < > = values in this interval not displayed.         Magnesium   Date Value Ref Range Status   06/26/2024 2.0 1.6 - 2.4 mg/dL Final   06/25/2024 2.0 1.6 - 2.4 mg/dL Final   06/24/2024 2.0 1.6 - 2.4 mg/dL Final     Phosphorus   Date Value Ref Range Status   06/26/2024 3.3 2.5 - 4.5 mg/dL Final   06/25/2024 3.1 2.5 - 4.5 mg/dL Final   06/24/2024 3.2 2.5 - 4.5 mg/dL Final                    Discharge Medications        New Medications        Instructions Start Date   amoxicillin-clavulanate 500-125 MG per tablet  Commonly known as: AUGMENTIN   500 mg, Oral, 2 Times Daily      aspirin 81 MG chewable tablet   81 mg, Oral, Daily   Start Date: June 27, 2024     ipratropium-albuterol 0.5-2.5 mg/3 ml nebulizer  Commonly known as: DUO-NEB   3 mL, Nebulization, Every 4 Hours PRN             Changes to Medications        Instructions Start Date   Alcohol Wipes 70 % pads  What changed: additional instructions   1 pad , Does not apply, 3 Times Daily             Continue These Medications        Instructions Start Date   amLODIPine 2.5 MG tablet  Commonly known as: Norvasc   2.5 mg, Oral, Daily      Diclofenac Sodium 1 % gel gel  Commonly known as: VOLTAREN   4 g, Topical, 4 Times Daily PRN      ferrous sulfate 325 (65 FE) MG tablet   325 mg, Oral, Daily With Breakfast      finasteride 5 MG tablet  Commonly known as: PROSCAR   5 mg, Oral, Daily       levothyroxine 88 MCG tablet  Commonly known as: SYNTHROID, LEVOTHROID   88 mcg, Oral, Daily      melatonin 5 MG tablet tablet   5 mg, Oral             Stop These Medications      baclofen 10 MG tablet  Commonly known as: LIORESAL     methenamine 1 g tablet  Commonly known as: HIPREX     sulfamethoxazole-trimethoprim 800-160 MG per tablet  Commonly known as: Bactrim DS            Follow-up Appointments  Future Appointments   Date Time Provider Department Center   7/17/2024  2:45 PM Roxanne Slater MD [facility]       Discharge Instructions:  Discharge to [facility]& today at 1400  Transportation via Wheelchair transport  [facility] [place]  Discharge with indwelling catheter  Augmentin x 1 week  Follow up with Dr. Rey (Urology) in 1 week  Follow up with Dr. Morris (Vascular Surgery) in 2 weeks  PCP in 1 week  Return to the hospital or call 911 with recurrence of symptoms     Awa Flores, MSN, APRN, ACNPC-AG  Pulmonary and Critical Care Medicine  Electronically signed by LANNY Lugo, 06/26/24, 11:38 AM EDT.     Time: I spent 36 minutes on this discharge activity which included: face-to-face encounter with the patient, reviewing the data in the system, coordination of the care with the nursing staff as well as consultants, documentation, and entering orders.      CC: Roxanne Slater MD

## 2024-07-03 ENCOUNTER — OFFICE VISIT (OUTPATIENT)
Dept: UROLOGY | Facility: CLINIC | Age: 79
End: 2024-07-03
Payer: MEDICARE

## 2024-07-03 ENCOUNTER — LAB (OUTPATIENT)
Dept: LAB | Facility: HOSPITAL | Age: 79
End: 2024-07-03
Payer: MEDICARE

## 2024-07-03 VITALS
WEIGHT: 169 LBS | HEART RATE: 97 BPM | OXYGEN SATURATION: 96 % | HEIGHT: 67 IN | BODY MASS INDEX: 26.53 KG/M2 | TEMPERATURE: 97.3 F | DIASTOLIC BLOOD PRESSURE: 54 MMHG | SYSTOLIC BLOOD PRESSURE: 104 MMHG

## 2024-07-03 DIAGNOSIS — N40.1 BPH WITH URINARY OBSTRUCTION: ICD-10-CM

## 2024-07-03 DIAGNOSIS — N13.8 BPH WITH URINARY OBSTRUCTION: ICD-10-CM

## 2024-07-03 DIAGNOSIS — N40.1 BPH WITH URINARY OBSTRUCTION: Primary | ICD-10-CM

## 2024-07-03 DIAGNOSIS — N18.30 STAGE 3 CHRONIC KIDNEY DISEASE, UNSPECIFIED WHETHER STAGE 3A OR 3B CKD: ICD-10-CM

## 2024-07-03 DIAGNOSIS — R31.0 GROSS HEMATURIA: ICD-10-CM

## 2024-07-03 DIAGNOSIS — N13.8 BPH WITH URINARY OBSTRUCTION: Primary | ICD-10-CM

## 2024-07-03 PROBLEM — M10.9 GOUTY ARTHROPATHY: Status: ACTIVE | Noted: 2022-01-20

## 2024-07-03 LAB
ANION GAP SERPL CALCULATED.3IONS-SCNC: 13 MMOL/L (ref 5–15)
BASOPHILS # BLD AUTO: 0.08 10*3/MM3 (ref 0–0.2)
BASOPHILS NFR BLD AUTO: 0.8 % (ref 0–1.5)
BUN SERPL-MCNC: 24 MG/DL (ref 8–23)
BUN/CREAT SERPL: 14 (ref 7–25)
CALCIUM SPEC-SCNC: 9.8 MG/DL (ref 8.6–10.5)
CHLORIDE SERPL-SCNC: 103 MMOL/L (ref 98–107)
CO2 SERPL-SCNC: 22 MMOL/L (ref 22–29)
CREAT SERPL-MCNC: 1.72 MG/DL (ref 0.76–1.27)
DEPRECATED RDW RBC AUTO: 44.3 FL (ref 37–54)
EGFRCR SERPLBLD CKD-EPI 2021: 40.4 ML/MIN/1.73
EOSINOPHIL # BLD AUTO: 0.06 10*3/MM3 (ref 0–0.4)
EOSINOPHIL NFR BLD AUTO: 0.6 % (ref 0.3–6.2)
ERYTHROCYTE [DISTWIDTH] IN BLOOD BY AUTOMATED COUNT: 12.3 % (ref 12.3–15.4)
GLUCOSE SERPL-MCNC: 92 MG/DL (ref 65–99)
HCT VFR BLD AUTO: 31.8 % (ref 37.5–51)
HGB BLD-MCNC: 10.2 G/DL (ref 13–17.7)
IMM GRANULOCYTES # BLD AUTO: 0.04 10*3/MM3 (ref 0–0.05)
IMM GRANULOCYTES NFR BLD AUTO: 0.4 % (ref 0–0.5)
LYMPHOCYTES # BLD AUTO: 1.99 10*3/MM3 (ref 0.7–3.1)
LYMPHOCYTES NFR BLD AUTO: 19.6 % (ref 19.6–45.3)
MCH RBC QN AUTO: 31.8 PG (ref 26.6–33)
MCHC RBC AUTO-ENTMCNC: 32.1 G/DL (ref 31.5–35.7)
MCV RBC AUTO: 99.1 FL (ref 79–97)
MONOCYTES # BLD AUTO: 0.76 10*3/MM3 (ref 0.1–0.9)
MONOCYTES NFR BLD AUTO: 7.5 % (ref 5–12)
NEUTROPHILS NFR BLD AUTO: 7.24 10*3/MM3 (ref 1.7–7)
NEUTROPHILS NFR BLD AUTO: 71.1 % (ref 42.7–76)
NRBC BLD AUTO-RTO: 0 /100 WBC (ref 0–0.2)
PLATELET # BLD AUTO: 585 10*3/MM3 (ref 140–450)
PMV BLD AUTO: 9.8 FL (ref 6–12)
POTASSIUM SERPL-SCNC: 5.1 MMOL/L (ref 3.5–5.2)
RBC # BLD AUTO: 3.21 10*6/MM3 (ref 4.14–5.8)
SODIUM SERPL-SCNC: 138 MMOL/L (ref 136–145)
WBC NRBC COR # BLD AUTO: 10.17 10*3/MM3 (ref 3.4–10.8)

## 2024-07-03 PROCEDURE — 80048 BASIC METABOLIC PNL TOTAL CA: CPT

## 2024-07-03 PROCEDURE — 36415 COLL VENOUS BLD VENIPUNCTURE: CPT

## 2024-07-03 PROCEDURE — 85025 COMPLETE CBC W/AUTO DIFF WBC: CPT

## 2024-07-03 RX ORDER — TRANEXAMIC ACID 650 MG/1
1300 TABLET ORAL 2 TIMES DAILY
Qty: 12 TABLET | Refills: 0 | Status: SHIPPED | OUTPATIENT
Start: 2024-07-03 | End: 2024-07-06

## 2024-07-03 NOTE — PROGRESS NOTES
Office Visit Established Male Patient     Patient Name: Nestor Nye  : 1945   MRN: [id number]     Chief Complaint:   Chief Complaint   Patient presents with    Follow-up     S/P Aquablation 24, still has catheter and still bleeding       History of Present Illness: Mr. Nestor Nye is a 78 y.o. male who presents today for follow up for BPH with obstruction s/p aquablation and PAE complicated by SFA occlusion on 24.  Patient reports he has had bleeding since surgery, no other complaints.         Subjective      Review of System:   As noted in HPI.    Past Medical History:   Past Medical History:   Diagnosis Date    Acute maxillary sinusitis     Arthritis     Disease of thyroid gland     Enlarged prostate     GERD (gastroesophageal reflux disease)     Gout     Hypertension     Impaired functional mobility, balance, gait, and endurance     PONV (postoperative nausea and vomiting)     Renal disorder     Rheumatoid arthritis     Right inguinal hernia 10/28/2021    Added automatically from request for surgery [id number]    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, changed his voice since then       Past Surgical History:   Past Surgical History:   Procedure Laterality Date    ABDOMINAL SURGERY      BACK SURGERY      BELPHAROPTOSIS REPAIR Bilateral     BRAIN SURGERY      COLON SURGERY      bowel resection x2 per pt    CRANIOTOMY FOR SUBDURAL HEMATOMA      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    DIAGNOSTIC LAPAROSCOPY N/A 2022    Procedure: DIAGNOSTIC LAPAROSCOPY CONVERTED TO EXPLORATORY LAPAROTOMY WITH SMALL BOWEL RESECTION;  Surgeon: Maximo Bennett MD;  Location: [facility];  Service: General;  Laterality: N/A;  CONVERTED TO EXPLORATORY LAPAROTOMY     EXPLORATORY LAPAROTOMY N/A 2022    Procedure: LAPAROTOMY EXPLORATORY WITH SMALL BOWEL OBSTRUCTION;  Surgeon: Maximo Bennett MD;  Location:   [facility];  Service: General;  Laterality: N/A;    EYE SURGERY      on upper etelids    FEMORAL ENDARTERECTOMY Right 6/21/2024    Procedure: FEMORAL EMBELECTOMY RIGHT;  Surgeon: Kingsley Morris MD;  Location:  [facility];  Service: Vascular;  Laterality: Right;    FOREIGN BODY REMOVAL Right     removal of metal fragments from RUQ after electrocution and fall.      HERNIA REPAIR      INGUINAL HERNIA REPAIR Right 11/01/2021    Procedure: INGUINAL HERNIA REPAIR WITH MESH AND EXCISION OF SPERMATIC CORD LIPOMA;  Surgeon: Rosey Glover MD;  Location:  [facility];  Service: General;  Laterality: Right;    LUMBAR SPINE SURGERY      PROSTATE AQUABLATION N/A 6/21/2024    Procedure: AQUABLATION OF PROSTATE;  Surgeon: Prieto Rey MD;  Location:  [facility];  Service: Robotics - Urology;  Laterality: N/A;    SKIN GRAFT Right     RUE skin grafting after electrocution    THORACIC SPINE SURGERY         Family History:   Family History   Problem Relation Age of Onset    Arthritis Mother     Prostate cancer Brother     Arthritis Father        Social History:   Social History     Socioeconomic History    Marital status:    Tobacco Use    Smoking status: Every Day     Types: Cigars     Passive exposure: Past    Smokeless tobacco: Never    Tobacco comments:     Smoked 1 ppd of small cigars for 50  years    Vaping Use    Vaping status: Former   Substance and Sexual Activity    Alcohol use: Not Currently    Drug use: No    Sexual activity: Defer       Medications:     Current Outpatient Medications:     Alcohol Swabs (ALCOHOL WIPES) 70 % pads, 1 pad 3 (Three) Times a Day. (Patient taking differently: Use 1 pad 3 (Three) Times a Day. Cleans around stacy catheter with them.), Disp: 100 each, Rfl: 11    amLODIPine (Norvasc) 2.5 MG tablet, Take 1 tablet by mouth Daily., Disp: 90 tablet, Rfl: 1    aspirin 81 MG chewable tablet, Chew 1 tablet Daily., Disp: 30 tablet, Rfl: 1    Diclofenac Sodium (VOLTAREN) 1 % gel gel,  "Apply 4 g topically to the appropriate area as directed 4 (Four) Times a Day As Needed (joint pain)., Disp: 100 g, Rfl: 5    ferrous sulfate 325 (65 FE) MG tablet, Take 1 tablet by mouth Daily With Breakfast., Disp: , Rfl:     finasteride (PROSCAR) 5 MG tablet, TAKE ONE TABLET BY MOUTH EVERY DAY, Disp: 90 tablet, Rfl: 0    ipratropium-albuterol (DUO-NEB) 0.5-2.5 mg/3 ml nebulizer, Take 3 mL by nebulization Every 4 (Four) Hours As Needed for Wheezing or Shortness of Air., Disp: 360 mL, Rfl: 1    levothyroxine (SYNTHROID, LEVOTHROID) 88 MCG tablet, Take 1 tablet by mouth Daily., Disp: 90 tablet, Rfl: 2    melatonin 5 MG tablet tablet, Take 1 tablet by mouth., Disp: , Rfl:     Tranexamic Acid 650 MG tablet, Take 2 tablets by mouth 2 (Two) Times a Day for 3 days., Disp: 12 tablet, Rfl: 0    Allergies:   No Known Allergies    Objective     Physical Exam:   Vital Signs:   Vitals:    07/03/24 1315   BP: 104/54   Pulse: 97   Temp: 97.3 °F (36.3 °C)   SpO2: 96%   Weight: 76.7 kg (169 lb)   Height: 170.2 cm (67.01\")     Body mass index is 26.46 kg/m².   Physical Exam  Vitals and nursing note reviewed. Exam conducted with a chaperone present.   Constitutional:       General: He is awake. He is not in acute distress.     Appearance: He is not ill-appearing.      Comments: Accompanied by nursing home staff   Pulmonary:      Effort: Pulmonary effort is normal.   Genitourinary:     Penis: Circumcised.       Comments: 24 FR 3-way catheter draining clear, red UOP to stacy bag.  No clots visualized.   Skin:     General: Skin is warm and dry.      Coloration: Skin is pale.   Neurological:      Mental Status: He is alert and oriented to person, place, and time. Mental status is at baseline.   Psychiatric:         Mood and Affect: Mood normal.         Behavior: Behavior is cooperative.              Labs  Brief Urine Lab Results  (Last result in the past 365 days)        Color   Clarity   Blood   Leuk Est   Nitrite   Protein   CREAT   " Urine HCG        06/23/24 1433 Red   Turbid   Large (3+)   Moderate (2+)   Negative   >=300 mg/dL (3+)                   Lab Results   Component Value Date    GLUCOSE 95 06/26/2024    CALCIUM 8.4 (L) 06/26/2024     06/26/2024    K 4.3 06/26/2024    CO2 23.0 06/26/2024     06/26/2024    BUN 20 06/26/2024    CREATININE 1.54 (H) 06/26/2024    EGFRIFAFRI 43 (L) 09/25/2017    EGFRIFNONA 35 (L) 09/14/2021    BCR 13.0 06/26/2024    ANIONGAP 11.0 06/26/2024       Lab Results   Component Value Date    WBC 8.32 06/26/2024    HGB 8.1 (L) 06/26/2024    HCT 24.7 (L) 06/26/2024    MCV 96.9 06/26/2024     06/26/2024            Lab Results   Component Value Date    PSA 5.0 (H) 07/25/2019       No results displayed because visit has over 200 results.      Pre-Admission Testing on 06/07/2024   Component Date Value Ref Range Status    Potassium 06/07/2024 4.7  3.5 - 5.2 mmol/L Final    Slight hemolysis detected by analyzer. Result may be falsely elevated.    WBC 06/07/2024 7.61  3.40 - 10.80 10*3/mm3 Final    RBC 06/07/2024 4.80  4.14 - 5.80 10*6/mm3 Final    Hemoglobin 06/07/2024 15.5  13.0 - 17.7 g/dL Final    Hematocrit 06/07/2024 46.7  37.5 - 51.0 % Final    MCV 06/07/2024 97.3 (H)  79.0 - 97.0 fL Final    MCH 06/07/2024 32.3  26.6 - 33.0 pg Final    MCHC 06/07/2024 33.2  31.5 - 35.7 g/dL Final    RDW 06/07/2024 12.9  12.3 - 15.4 % Final    RDW-SD 06/07/2024 46.3  37.0 - 54.0 fl Final    MPV 06/07/2024 9.7  6.0 - 12.0 fL Final    Platelets 06/07/2024 254  140 - 450 10*3/mm3 Final   Office Visit on 09/07/2023   Component Date Value Ref Range Status    Glucose 11/17/2023 85  65 - 99 mg/dL Final    BUN 11/17/2023 22  8 - 23 mg/dL Final    Creatinine 11/17/2023 1.46 (H)  0.76 - 1.27 mg/dL Final    Sodium 11/17/2023 138  136 - 145 mmol/L Final    Potassium 11/17/2023 4.8  3.5 - 5.2 mmol/L Final    Chloride 11/17/2023 106  98 - 107 mmol/L Final    CO2 11/17/2023 21.8 (L)  22.0 - 29.0 mmol/L Final    Calcium 11/17/2023  9.1  8.6 - 10.5 mg/dL Final    Total Protein 11/17/2023 6.5  6.0 - 8.5 g/dL Final    Albumin 11/17/2023 4.2  3.5 - 5.2 g/dL Final    ALT (SGPT) 11/17/2023 18  1 - 41 U/L Final    AST (SGOT) 11/17/2023 17  1 - 40 U/L Final    Alkaline Phosphatase 11/17/2023 79  39 - 117 U/L Final    Total Bilirubin 11/17/2023 0.7  0.0 - 1.2 mg/dL Final    Globulin 11/17/2023 2.3  gm/dL Final    A/G Ratio 11/17/2023 1.8  g/dL Final    BUN/Creatinine Ratio 11/17/2023 15.1  7.0 - 25.0 Final    Anion Gap 11/17/2023 10.2  5.0 - 15.0 mmol/L Final    eGFR 11/17/2023 49.2 (L)  >60.0 mL/min/1.73 Final    Total Cholesterol 11/17/2023 167  0 - 200 mg/dL Final    Triglycerides 11/17/2023 135  0 - 150 mg/dL Final    HDL Cholesterol 11/17/2023 43  40 - 60 mg/dL Final    LDL Cholesterol  11/17/2023 100  0 - 100 mg/dL Final    VLDL Cholesterol 11/17/2023 24  5 - 40 mg/dL Final    LDL/HDL Ratio 11/17/2023 2.26   Final    TSH 11/17/2023 3.310  0.270 - 4.200 uIU/mL Final    WBC 11/17/2023 7.29  3.40 - 10.80 10*3/mm3 Final    RBC 11/17/2023 4.40  4.14 - 5.80 10*6/mm3 Final    Hemoglobin 11/17/2023 14.5  13.0 - 17.7 g/dL Final    Hematocrit 11/17/2023 42.5  37.5 - 51.0 % Final    MCV 11/17/2023 96.6  79.0 - 97.0 fL Final    MCH 11/17/2023 33.0  26.6 - 33.0 pg Final    MCHC 11/17/2023 34.1  31.5 - 35.7 g/dL Final    RDW 11/17/2023 13.1  12.3 - 15.4 % Final    RDW-SD 11/17/2023 46.7  37.0 - 54.0 fl Final    MPV 11/17/2023 10.7  6.0 - 12.0 fL Final    Platelets 11/17/2023 242  140 - 450 10*3/mm3 Final    Neutrophil % 11/17/2023 54.2  42.7 - 76.0 % Final    Lymphocyte % 11/17/2023 28.8  19.6 - 45.3 % Final    Monocyte % 11/17/2023 11.5  5.0 - 12.0 % Final    Eosinophil % 11/17/2023 3.4  0.3 - 6.2 % Final    Basophil % 11/17/2023 1.4  0.0 - 1.5 % Final    Immature Grans % 11/17/2023 0.7 (H)  0.0 - 0.5 % Final    Neutrophils, Absolute 11/17/2023 3.95  1.70 - 7.00 10*3/mm3 Final    Lymphocytes, Absolute 11/17/2023 2.10  0.70 - 3.10 10*3/mm3 Final    Monocytes,  Absolute 11/17/2023 0.84  0.10 - 0.90 10*3/mm3 Final    Eosinophils, Absolute 11/17/2023 0.25  0.00 - 0.40 10*3/mm3 Final    Basophils, Absolute 11/17/2023 0.10  0.00 - 0.20 10*3/mm3 Final    Immature Grans, Absolute 11/17/2023 0.05  0.00 - 0.05 10*3/mm3 Final    nRBC 11/17/2023 0.0  0.0 - 0.2 /100 WBC Final       Radiographic Studies  IR Artery Embolization Occlusion    Result Date: 6/21/2024  Successful catheterization and angiography of the anterior divisions of the left and right hypogastric arteries with embolization using 500 um sized Gelfoam cubes successfully. The procedure was complicated by acute occlusion of the superficial femoral artery. Vascular surgery responded promptly to the request for assistance and the patient was taken to the OR for further management. Thank you for the opportunity to assist you in the care of your patient. Electronically Signed: Patrick Christine MD  6/21/2024 3:41 PM EDT  Workstation ID: [id number]      I have reviewed the above labs and imaging.         Assessment / Plan      Assessment:   Diagnoses and all orders for this visit:    1. BPH with urinary obstruction (Primary)  -     Tranexamic Acid 650 MG tablet; Take 2 tablets by mouth 2 (Two) Times a Day for 3 days.  Dispense: 12 tablet; Refill: 0  -     CBC & Differential; Future  -     Basic Metabolic Panel; Future    2. Gross hematuria  -     Tranexamic Acid 650 MG tablet; Take 2 tablets by mouth 2 (Two) Times a Day for 3 days.  Dispense: 12 tablet; Refill: 0  -     CBC & Differential; Future  -     Basic Metabolic Panel; Future    3. Stage 3 chronic kidney disease, unspecified whether stage 3a or 3b CKD  -     Basic Metabolic Panel; Future         78 y.o. male presents for for follow up for BPH with obstruction s/p aquablation and PAE complicated by SFA occlusion on 06/21/24.  Patient has history of indwelling catheter dependence for several years, 140 cc prostate.  Patient reports he has had bleeding since surgery, no other  complaints.  He is on ASA 81 mg, no other blood thinners.  Patient noted to have clear, red UOP in tubing connected to stacy bag.  Irrigated 24 FR 3 way stacy with 500 mL of sterile water to clear, light pink UOP, no clots.  Patient tolerated well.  Discussed case with Dr. Rey and recommend BMP and CBC today.  Will start on TXA 1300 mg BID x3 days based on renal function and see patient back on Monday.  Confirmed with nurse at facility that they can get this medication stat. Do not recommend removal of stacy catheter at this time.      Plan:    CBC and BMP today  TXA 1300 mg BID x3 days.    Follow up with me on Monday 07/08/24 for possible fill and void.       Follow Up:   Return in about 5 days (around 7/8/2024).      LANNY Hammonds  [facility]

## 2024-07-05 ENCOUNTER — NURSING HOME (OUTPATIENT)
Dept: FAMILY MEDICINE CLINIC | Facility: CLINIC | Age: 79
End: 2024-07-05
Payer: MEDICARE

## 2024-07-05 VITALS
OXYGEN SATURATION: 97 % | HEART RATE: 76 BPM | RESPIRATION RATE: 18 BRPM | TEMPERATURE: 97.7 F | DIASTOLIC BLOOD PRESSURE: 70 MMHG | SYSTOLIC BLOOD PRESSURE: 119 MMHG

## 2024-07-05 DIAGNOSIS — N40.1 BPH WITH OBSTRUCTION/LOWER URINARY TRACT SYMPTOMS: ICD-10-CM

## 2024-07-05 DIAGNOSIS — Z97.8 FOLEY CATHETER IN PLACE: Primary | ICD-10-CM

## 2024-07-05 DIAGNOSIS — N25.81 SECONDARY HYPERPARATHYROIDISM: ICD-10-CM

## 2024-07-05 DIAGNOSIS — M06.9 RHEUMATOID ARTHRITIS INVOLVING MULTIPLE SITES, UNSPECIFIED WHETHER RHEUMATOID FACTOR PRESENT: ICD-10-CM

## 2024-07-05 DIAGNOSIS — I10 BENIGN ESSENTIAL HYPERTENSION: ICD-10-CM

## 2024-07-05 DIAGNOSIS — E03.9 HYPOTHYROIDISM, UNSPECIFIED TYPE: ICD-10-CM

## 2024-07-05 DIAGNOSIS — N13.8 BPH WITH OBSTRUCTION/LOWER URINARY TRACT SYMPTOMS: ICD-10-CM

## 2024-07-05 DIAGNOSIS — E78.2 MIXED HYPERLIPIDEMIA: ICD-10-CM

## 2024-07-05 NOTE — LETTER
Nursing Home Follow Up Note      Cirilo Torres DO []   LANNY Monae [x]  [address] [place] [address]  Phone: [phone]  Fax: [phone] Lito Agarwal MD []    Dagoberto Bond DO []   [address] [place] [address]  Phone: [phone]  Fax: [phone]     PATIENT NAME: Nestor Nye                                                                          YOB: 1945           DATE OF SERVICE: 07/05/2024  FACILITY:  [x][facility]   [] [facility]   [] [facility]   [] [facility]   [] Other ______________________________________________________________________      CHIEF COMPLAINT:    Visit for discharge summary.     Medication and chart review.       HISTORY OF PRESENT ILLNESS:     78 y.o. male with PMH BPH with urinary obstruction, RA,  HTN, dyslipidemia, hypothyroidism, and CKD stage IIIb (baseline sCr 1.4). He initially presented to [facility] on 6/21/24 for an elective aqua ablation per Dr Rey with Urology, secondary to chronic lower urinary tract symptoms r/t enlarged prostate,  requiring chronic indwelling catheter.     He was admitted to Hospital Medicine post-ablation as the case took longer to perform due his large prostate. The case was complicated by hematuria and required bilateral hypogastric artery embolization by Dr. Christine. An angioseal was placed post-procedure. Shortly after the case, poor perfusion was noted in the right lower extremity. An arterial duplex was performed which showed no SFA flow. Vascular Surgery was consulted and he was taken for emergent right groin exploration per Dr. Morris. An acute thrombus was retrieved near the aortic bifurcation with return of pulsatile bleeding. He was then transferred to the ICU.      He required CBI until 6/22/24. He was admitted here for rehabilitation on 6/26.  He completed 1 week of Augmentin for urologic prophylaxis. He is discharging home tomorrow. He follows up with Dr Rey Monday.     PAST MEDICAL  & SURGICAL HISTORY:   Past Medical History:   Diagnosis Date    Acute maxillary sinusitis     Arthritis     Disease of thyroid gland     Enlarged prostate     GERD (gastroesophageal reflux disease)     Gout     Hypertension     Impaired functional mobility, balance, gait, and endurance     PONV (postoperative nausea and vomiting)     Renal disorder     Rheumatoid arthritis     Right inguinal hernia 10/28/2021    Added automatically from request for surgery [id number]    Vocal cord anomaly 2007    had problem with enlarge vocal cord after low back surgery, changed his voice since then      Past Surgical History:   Procedure Laterality Date    ABDOMINAL SURGERY      BACK SURGERY      BELPHAROPTOSIS REPAIR Bilateral     BRAIN SURGERY      COLON SURGERY      bowel resection x2 per pt    CRANIOTOMY FOR SUBDURAL HEMATOMA      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    DIAGNOSTIC LAPAROSCOPY N/A 05/04/2022    Procedure: DIAGNOSTIC LAPAROSCOPY CONVERTED TO EXPLORATORY LAPAROTOMY WITH SMALL BOWEL RESECTION;  Surgeon: Maximo Bennett MD;  Location: [facility];  Service: General;  Laterality: N/A;  CONVERTED TO EXPLORATORY LAPAROTOMY AT 1912    EXPLORATORY LAPAROTOMY N/A 04/16/2022    Procedure: LAPAROTOMY EXPLORATORY WITH SMALL BOWEL OBSTRUCTION;  Surgeon: Maximo Bennett MD;  Location:  [facility];  Service: General;  Laterality: N/A;    [facility]    FEMORAL ENDARTERECTOMY Right 6/21/2024    Procedure: FEMORAL EMBELECTOMY RIGHT;  Surgeon: Kingsley Morris MD;  Location: [facility];  Service: Vascular;  Laterality: Right;    FOREIGN BODY REMOVAL Right     removal of metal fragments from RUQ after electrocution and fall.      HERNIA REPAIR      INGUINAL HERNIA REPAIR Right 11/01/2021    Procedure: INGUINAL HERNIA REPAIR WITH MESH AND EXCISION OF SPERMATIC CORD LIPOMA;  Surgeon: Rosey Glover MD;  Location:  [facility];  Service: General;  Laterality: Right;    LUMBAR SPINE  SURGERY      PROSTATE AQUABLATION N/A 6/21/2024    Procedure: AQUABLATION OF PROSTATE;  Surgeon: Prieto Rey MD;  Location: [facility];  Service: Robotics - Urology;  Laterality: N/A;    SKIN GRAFT Right     RUE skin grafting after electrocution    THORACIC SPINE SURGERY           MEDICATIONS:  I have reviewed and reconciled the patients medication list in the patients chart at the skilled nursing facility today.      ALLERGIES:    No Known Allergies      SOCIAL HISTORY:    Social History     Socioeconomic History    Marital status:    Tobacco Use    Smoking status: Every Day     Types: Cigars     Passive exposure: Past    Smokeless tobacco: Never    Tobacco comments:     Smoked 1 ppd of small cigars for 50  years    Vaping Use    Vaping status: Former   Substance and Sexual Activity    Alcohol use: Not Currently    Drug use: No    Sexual activity: Defer       FAMILY HISTORY:    Family History   Problem Relation Age of Onset    Arthritis Mother     Prostate cancer Brother     Arthritis Father        REVIEW OF SYSTEMS:    Review of Systems   Constitutional:  Negative for activity change, appetite change, chills, diaphoresis, fatigue, fever, unexpected weight gain and unexpected weight loss.   HENT:  Negative for congestion, ear pain, mouth sores, nosebleeds, sore throat, swollen glands and trouble swallowing.    Eyes:  Negative for pain, discharge, redness and visual disturbance.   Respiratory:  Negative for apnea, cough, choking, chest tightness, shortness of breath, wheezing and stridor.    Cardiovascular:  Negative for chest pain, palpitations and leg swelling.   Gastrointestinal:  Negative for abdominal distention, abdominal pain, blood in stool, constipation, diarrhea, nausea, vomiting, GERD and indigestion.   Endocrine: Negative for polydipsia and polyuria.   Genitourinary:  Negative for decreased urine volume, difficulty urinating, dysuria, flank pain, hematuria and urgency.        Gillespie     Musculoskeletal:  Positive for arthralgias. Negative for back pain, gait problem, joint swelling and myalgias.   Skin:  Negative for color change and rash.   Allergic/Immunologic: Negative for environmental allergies.   Neurological:  Negative for dizziness, seizures, speech difficulty, weakness, memory problem and confusion.   Psychiatric/Behavioral:  Negative for behavioral problems, dysphoric mood, hallucinations, sleep disturbance and depressed mood. The patient is not nervous/anxious.          PHYSICAL EXAMINATION:   VITAL SIGNS:   Vitals:    07/05/24 1241   BP: 119/70   Pulse: 76   Resp: 18   Temp: 97.7 °F (36.5 °C)   SpO2: 97%        Physical Exam  Vitals and nursing note reviewed.   Constitutional:       Appearance: Normal appearance.   HENT:      Head: Normocephalic.   Eyes:      Conjunctiva/sclera: Conjunctivae normal.   Cardiovascular:      Rate and Rhythm: Normal rate and regular rhythm.   Pulmonary:      Effort: Pulmonary effort is normal. No respiratory distress.      Breath sounds: Normal breath sounds.   Abdominal:      General: Bowel sounds are normal. There is no distension.      Palpations: Abdomen is soft.      Tenderness: There is no abdominal tenderness.   Genitourinary:     Comments: Gillespie cath to BSD  Neurological:      General: No focal deficit present.      Mental Status: He is alert and oriented to person, place, and time.   Psychiatric:         Mood and Affect: Mood and affect normal.         Speech: Speech normal.         Behavior: Behavior normal.         Cognition and Memory: Cognition and memory normal.         RECORDS REVIEW:     I have reviewed records in Epic.    ASSESSMENT     Diagnoses and all orders for this visit:    1. Gillespie catheter in place (Primary)    2. BPH with obstruction/lower urinary tract symptoms    3. Mixed hyperlipidemia    4. Benign essential hypertension    5. Hypothyroidism, unspecified type    6. Secondary hyperparathyroidism    7. Rheumatoid arthritis  involving multiple sites, unspecified whether rheumatoid factor present      PLAN    Stacy catheter/BPH with obstruction/ablation  -Stable since admission. Finished one week course of Augmentin. Has follow up appointment with Urology Monday. He was advised to seek treatment at ED if any issues with stacy prior to appointment.    Chronic conditions have been controlled during admission. He will continue to follow up with PCP.     Nursing encouraged to keep me informed of any acute changes or any new concerning symptoms.     [x]  Discussed Patient in detail with nursing/staff, addressed all needs today.     [x]  Plan of Care Reviewed   []  PT/OT Reviewed   [x]  Order Changes  [x]  Discharge Plans Reviewed  [x]  Advance Directive on file with Nursing Home.   [x]  POA on file with Nursing Home.   [x]  Code Status listed: [x]  Full Code   []  DNR        “I confirm accuracy of unchanged data/findings which have been carried forward from previous visit, as well as I have updated appropriately those that have changed.”     I spent 40 minutes on discharge for Nestor on this date of service. This time includes time spent by me in the following activities:preparing for the visit, reviewing tests, obtaining and/or reviewing a separately obtained history, performing a medically appropriate examination and/or evaluation , counseling and educating the patient/family/caregiver, ordering medications, tests, or procedures, referring and communicating with other health care professionals , documenting information in the medical record, independently interpreting results and communicating that information with the patient/family/caregiver, and care coordination       LANNY Fletcher.

## 2024-07-07 PROBLEM — K56.609 SMALL BOWEL OBSTRUCTION: Status: RESOLVED | Noted: 2022-04-13 | Resolved: 2024-07-07

## 2024-07-07 PROBLEM — N13.8 BPH WITH URINARY OBSTRUCTION: Status: RESOLVED | Noted: 2024-03-14 | Resolved: 2024-07-07

## 2024-07-07 PROBLEM — N40.1 BPH WITH URINARY OBSTRUCTION: Status: RESOLVED | Noted: 2024-03-14 | Resolved: 2024-07-07

## 2024-07-07 PROBLEM — R33.9 URINARY RETENTION: Status: RESOLVED | Noted: 2021-05-27 | Resolved: 2024-07-07

## 2024-07-07 NOTE — PROGRESS NOTES
Nursing Home Follow Up Note      Cirilo Torres DO []   LANNY Monae [x]  [address] [place] [address]  Phone: [phone]  Fax: [phone] Lito Agarwal MD []    Dagoberto Bond DO []   [address] [place] [address]  Phone: [phone]  Fax: [phone]     PATIENT NAME: Nestor Nye                                                                          YOB: 1945           DATE OF SERVICE: 07/05/2024  FACILITY:  [x[facility]   [] [facility]   [] [facility]   [] [facility]   [] Other ______________________________________________________________________      CHIEF COMPLAINT:    Visit for discharge summary.     Medication and chart review.       HISTORY OF PRESENT ILLNESS:     78 y.o. male with PMH BPH with urinary obstruction, RA,  HTN, dyslipidemia, hypothyroidism, and CKD stage IIIb (baseline sCr 1.4). He initially presented to [facility] on 6/21/24 for an elective aqua ablation per Dr Rey with Urology, secondary to chronic lower urinary tract symptoms r/t enlarged prostate,  requiring chronic indwelling catheter.     He was admitted to Hospital Medicine post-ablation as the case took longer to perform due his large prostate. The case was complicated by hematuria and required bilateral hypogastric artery embolization by Dr. Christine. An angioseal was placed post-procedure. Shortly after the case, poor perfusion was noted in the right lower extremity. An arterial duplex was performed which showed no SFA flow. Vascular Surgery was consulted and he was taken for emergent right groin exploration per Dr. Morris. An acute thrombus was retrieved near the aortic bifurcation with return of pulsatile bleeding. He was then transferred to the ICU.      He required CBI until 6/22/24. He was admitted here for rehabilitation on 6/26.  He completed 1 week of Augmentin for urologic prophylaxis. He is discharging home tomorrow. He follows up with Dr Rey Monday.     PAST MEDICAL  & SURGICAL HISTORY:   Past Medical History:   Diagnosis Date    Acute maxillary sinusitis     Arthritis     Disease of thyroid gland     Enlarged prostate     GERD (gastroesophageal reflux disease)     Gout     Hypertension     Impaired functional mobility, balance, gait, and endurance     PONV (postoperative nausea and vomiting)     Renal disorder     Rheumatoid arthritis     Right inguinal hernia 10/28/2021    Added automatically from request for surgery [id number]    Vocal cord anomaly 2007    had problem with enlarge vocal cord after low back surgery, changed his voice since then      Past Surgical History:   Procedure Laterality Date    ABDOMINAL SURGERY      BACK SURGERY      BELPHAROPTOSIS REPAIR Bilateral     BRAIN SURGERY      COLON SURGERY      bowel resection x2 per pt    CRANIOTOMY FOR SUBDURAL HEMATOMA      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    DIAGNOSTIC LAPAROSCOPY N/A 05/04/2022    Procedure: DIAGNOSTIC LAPAROSCOPY CONVERTED TO EXPLORATORY LAPAROTOMY WITH SMALL BOWEL RESECTION;  Surgeon: Maximo Bennett MD;  Location: [facility] OR;  Service: General;  Laterality: N/A;  CONVERTED TO EXPLORATORY LAPAROTOMY AT 1912    EXPLORATORY LAPAROTOMY N/A 04/16/2022    Procedure: LAPAROTOMY EXPLORATORY WITH SMALL BOWEL OBSTRUCTION;  Surgeon: Maximo Bennett MD;  Location:  [facility] OR;  Service: General;  Laterality: N/A;    [facility]      [facility]    FEMORAL ENDARTERECTOMY Right 6/21/2024    Procedure: FEMORAL EMBELECTOMY RIGHT;  Surgeon: Kingsley Morris MD;  Location: [facility] HYBRID OR;  Service: Vascular;  Laterality: Right;    FOREIGN BODY REMOVAL Right     removal of metal fragments from RUQ after electrocution and fall.      HERNIA REPAIR      INGUINAL HERNIA REPAIR Right 11/01/2021    Procedure: INGUINAL HERNIA REPAIR WITH MESH AND EXCISION OF SPERMATIC CORD LIPOMA;  Surgeon: Rosey Glover MD;  Location:  [facility] OR;  Service: General;  Laterality: Right;    LUMBAR SPINE  SURGERY      PROSTATE AQUABLATION N/A 6/21/2024    Procedure: AQUABLATION OF PROSTATE;  Surgeon: Prieto Rey MD;  Location: [facility];  Service: [facility] - Urology;  Laterality: N/A;    SKIN GRAFT Right     RUE skin grafting after electrocution    THORACIC SPINE SURGERY           MEDICATIONS:  I have reviewed and reconciled the patients medication list in the patients chart at the skilled nursing facility today.      ALLERGIES:    No Known Allergies      SOCIAL HISTORY:    Social History     Socioeconomic History    Marital status:    Tobacco Use    Smoking status: Every Day     Types: Cigars     Passive exposure: Past    Smokeless tobacco: Never    Tobacco comments:     Smoked 1 ppd of small cigars for 50  years    Vaping Use    Vaping status: Former   Substance and Sexual Activity    Alcohol use: Not Currently    Drug use: No    Sexual activity: Defer       FAMILY HISTORY:    Family History   Problem Relation Age of Onset    Arthritis Mother     Prostate cancer Brother     Arthritis Father        REVIEW OF SYSTEMS:    Review of Systems   Constitutional:  Negative for activity change, appetite change, chills, diaphoresis, fatigue, fever, unexpected weight gain and unexpected weight loss.   HENT:  Negative for congestion, ear pain, mouth sores, nosebleeds, sore throat, swollen glands and trouble swallowing.    Eyes:  Negative for pain, discharge, redness and visual disturbance.   Respiratory:  Negative for apnea, cough, choking, chest tightness, shortness of breath, wheezing and stridor.    Cardiovascular:  Negative for chest pain, palpitations and leg swelling.   Gastrointestinal:  Negative for abdominal distention, abdominal pain, blood in stool, constipation, diarrhea, nausea, vomiting, GERD and indigestion.   Endocrine: Negative for polydipsia and polyuria.   Genitourinary:  Negative for decreased urine volume, difficulty urinating, dysuria, flank pain, hematuria and urgency.        Gillespie     Musculoskeletal:  Positive for arthralgias. Negative for back pain, gait problem, joint swelling and myalgias.   Skin:  Negative for color change and rash.   Allergic/Immunologic: Negative for environmental allergies.   Neurological:  Negative for dizziness, seizures, speech difficulty, weakness, memory problem and confusion.   Psychiatric/Behavioral:  Negative for behavioral problems, dysphoric mood, hallucinations, sleep disturbance and depressed mood. The patient is not nervous/anxious.          PHYSICAL EXAMINATION:   VITAL SIGNS:   Vitals:    07/05/24 1241   BP: 119/70   Pulse: 76   Resp: 18   Temp: 97.7 °F (36.5 °C)   SpO2: 97%        Physical Exam  Vitals and nursing note reviewed.   Constitutional:       Appearance: Normal appearance.   HENT:      Head: Normocephalic.   Eyes:      Conjunctiva/sclera: Conjunctivae normal.   Cardiovascular:      Rate and Rhythm: Normal rate and regular rhythm.   Pulmonary:      Effort: Pulmonary effort is normal. No respiratory distress.      Breath sounds: Normal breath sounds.   Abdominal:      General: Bowel sounds are normal. There is no distension.      Palpations: Abdomen is soft.      Tenderness: There is no abdominal tenderness.   Genitourinary:     Comments: Gillespie cath to BSD  Neurological:      General: No focal deficit present.      Mental Status: He is alert and oriented to person, place, and time.   Psychiatric:         Mood and Affect: Mood and affect normal.         Speech: Speech normal.         Behavior: Behavior normal.         Cognition and Memory: Cognition and memory normal.         RECORDS REVIEW:     I have reviewed records in Epic.    ASSESSMENT     Diagnoses and all orders for this visit:    1. Gillespie catheter in place (Primary)    2. BPH with obstruction/lower urinary tract symptoms    3. Mixed hyperlipidemia    4. Benign essential hypertension    5. Hypothyroidism, unspecified type    6. Secondary hyperparathyroidism    7. Rheumatoid arthritis  involving multiple sites, unspecified whether rheumatoid factor present      PLAN    Stacy catheter/BPH with obstruction/ablation  -Stable since admission. Finished one week course of Augmentin. Has follow up appointment with Urology Monday. He was advised to seek treatment at ED if any issues with stacy prior to appointment.    Chronic conditions have been controlled during admission. He will continue to follow up with PCP.     Nursing encouraged to keep me informed of any acute changes or any new concerning symptoms.     [x]  Discussed Patient in detail with nursing/staff, addressed all needs today.     [x]  Plan of Care Reviewed   []  PT/OT Reviewed   [x]  Order Changes  [x]  Discharge Plans Reviewed  [x]  Advance Directive on file with Nursing Home.   [x]  POA on file with Nursing Home.   [x]  Code Status listed: [x]  Full Code   []  DNR        “I confirm accuracy of unchanged data/findings which have been carried forward from previous visit, as well as I have updated appropriately those that have changed.”     I spent 40 minutes on discharge for Nestor on this date of service. This time includes time spent by me in the following activities:preparing for the visit, reviewing tests, obtaining and/or reviewing a separately obtained history, performing a medically appropriate examination and/or evaluation , counseling and educating the patient/family/caregiver, ordering medications, tests, or procedures, referring and communicating with other health care professionals , documenting information in the medical record, independently interpreting results and communicating that information with the patient/family/caregiver, and care coordination       LANNY Fletcher.

## 2024-07-08 ENCOUNTER — OFFICE VISIT (OUTPATIENT)
Dept: UROLOGY | Facility: CLINIC | Age: 79
End: 2024-07-08
Payer: MEDICARE

## 2024-07-08 VITALS
SYSTOLIC BLOOD PRESSURE: 108 MMHG | OXYGEN SATURATION: 97 % | WEIGHT: 169 LBS | HEART RATE: 103 BPM | HEIGHT: 67 IN | TEMPERATURE: 97.8 F | BODY MASS INDEX: 26.53 KG/M2 | DIASTOLIC BLOOD PRESSURE: 72 MMHG

## 2024-07-08 DIAGNOSIS — R33.9 URINARY RETENTION: ICD-10-CM

## 2024-07-08 DIAGNOSIS — N40.1 BPH WITH OBSTRUCTION/LOWER URINARY TRACT SYMPTOMS: Primary | ICD-10-CM

## 2024-07-08 DIAGNOSIS — N13.8 BPH WITH OBSTRUCTION/LOWER URINARY TRACT SYMPTOMS: Primary | ICD-10-CM

## 2024-07-08 PROBLEM — Z87.898 NO POST-OP COMPLICATIONS: Status: ACTIVE | Noted: 2024-07-08

## 2024-07-08 PROCEDURE — 1159F MED LIST DOCD IN RCRD: CPT | Performed by: NURSE PRACTITIONER

## 2024-07-08 PROCEDURE — 51798 US URINE CAPACITY MEASURE: CPT | Performed by: NURSE PRACTITIONER

## 2024-07-08 PROCEDURE — 3078F DIAST BP <80 MM HG: CPT | Performed by: NURSE PRACTITIONER

## 2024-07-08 PROCEDURE — 99214 OFFICE O/P EST MOD 30 MIN: CPT | Performed by: NURSE PRACTITIONER

## 2024-07-08 PROCEDURE — 3074F SYST BP LT 130 MM HG: CPT | Performed by: NURSE PRACTITIONER

## 2024-07-08 PROCEDURE — 1160F RVW MEDS BY RX/DR IN RCRD: CPT | Performed by: NURSE PRACTITIONER

## 2024-07-08 PROCEDURE — 51702 INSERT TEMP BLADDER CATH: CPT | Performed by: NURSE PRACTITIONER

## 2024-07-08 NOTE — PROGRESS NOTES
Fill and void was not successful.  180 CC sterile water was instilled through indwelling catheter.  He was unable to urinate post removal.  Indwelling catheter was replaced with a 16 [place] coude tip catheter. He will be following up again in a few weeks.  Peggy Stern MA

## 2024-07-08 NOTE — PROGRESS NOTES
Office Visit Established Male Patient     Patient Name: Nestor Nye  : 1945   MRN: [id number]     Chief Complaint:   Chief Complaint   Patient presents with    Follow-up     Checking for decreased bleeding in catheter       History of Present Illness: Mr. Nestor Nye is a 78 y.o. male who presents today for follow up for BPH with obstruction s/p aquablation and PAE complicated by SFA occlusion on 24.  Patient has been discharged from facility and is now home.  He reports taking 3 doses of TXA and urine cleared up to clear, yellow.           Subjective      Review of System:   As noted in HPI.    Past Medical History:   Past Medical History:   Diagnosis Date    Acute maxillary sinusitis     Arthritis     Disease of thyroid gland     Enlarged prostate     GERD (gastroesophageal reflux disease)     Gout     Hypertension     Impaired functional mobility, balance, gait, and endurance     PONV (postoperative nausea and vomiting)     Renal disorder     Rheumatoid arthritis     Right inguinal hernia 10/28/2021    Added automatically from request for surgery [id number]    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, changed his voice since then       Past Surgical History:   Past Surgical History:   Procedure Laterality Date    ABDOMINAL SURGERY      BACK SURGERY      BELPHAROPTOSIS REPAIR Bilateral     BRAIN SURGERY      COLON SURGERY      bowel resection x2 per pt    CRANIOTOMY FOR SUBDURAL HEMATOMA      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    DIAGNOSTIC LAPAROSCOPY N/A 2022    Procedure: DIAGNOSTIC LAPAROSCOPY CONVERTED TO EXPLORATORY LAPAROTOMY WITH SMALL BOWEL RESECTION;  Surgeon: Maximo Bennett MD;  Location: [facility];  Service: General;  Laterality: N/A;  CONVERTED TO EXPLORATORY LAPAROTOMY AT     EXPLORATORY LAPAROTOMY N/A 2022    Procedure: LAPAROTOMY EXPLORATORY WITH SMALL BOWEL OBSTRUCTION;   Surgeon: Maximo Bennett MD;  Location:  [facility];  Service: General;  Laterality: N/A;    EYE SURGERY      on upper etelids    FEMORAL ENDARTERECTOMY Right 6/21/2024    Procedure: FEMORAL EMBELECTOMY RIGHT;  Surgeon: Kingsley Morris MD;  Location:  [facility];  Service: Vascular;  Laterality: Right;    FOREIGN BODY REMOVAL Right     removal of metal fragments from RUQ after electrocution and fall.      HERNIA REPAIR      INGUINAL HERNIA REPAIR Right 11/01/2021    Procedure: INGUINAL HERNIA REPAIR WITH MESH AND EXCISION OF SPERMATIC CORD LIPOMA;  Surgeon: Rosey Glover MD;  Location:  [facility];  Service: General;  Laterality: Right;    LUMBAR SPINE SURGERY      PROSTATE AQUABLATION N/A 6/21/2024    Procedure: AQUABLATION OF PROSTATE;  Surgeon: Prieto Rey MD;  Location:  [facility];  Service: Robotics - Urology;  Laterality: N/A;    SKIN GRAFT Right     RUE skin grafting after electrocution    THORACIC SPINE SURGERY         Family History:   Family History   Problem Relation Age of Onset    Arthritis Mother     Prostate cancer Brother     Arthritis Father        Social History:   Social History     Socioeconomic History    Marital status:    Tobacco Use    Smoking status: Every Day     Types: Cigars     Passive exposure: Past    Smokeless tobacco: Never    Tobacco comments:     Smoked 1 ppd of small cigars for 50  years    Vaping Use    Vaping status: Former   Substance and Sexual Activity    Alcohol use: Not Currently    Drug use: No    Sexual activity: Defer       Medications:     Current Outpatient Medications:     Alcohol Swabs (ALCOHOL WIPES) 70 % pads, 1 pad 3 (Three) Times a Day. (Patient taking differently: Use 1 pad 3 (Three) Times a Day. Cleans around stacy catheter with them.), Disp: 100 each, Rfl: 11    amLODIPine (Norvasc) 2.5 MG tablet, Take 1 tablet by mouth Daily., Disp: 90 tablet, Rfl: 1    aspirin 81 MG chewable tablet, Chew 1 tablet Daily., Disp: 30 tablet, Rfl: 1     "Diclofenac Sodium (VOLTAREN) 1 % gel gel, Apply 4 g topically to the appropriate area as directed 4 (Four) Times a Day As Needed (joint pain)., Disp: 100 g, Rfl: 5    ferrous sulfate 325 (65 FE) MG tablet, Take 1 tablet by mouth Daily With Breakfast., Disp: , Rfl:     finasteride (PROSCAR) 5 MG tablet, TAKE ONE TABLET BY MOUTH EVERY DAY, Disp: 90 tablet, Rfl: 0    ipratropium-albuterol (DUO-NEB) 0.5-2.5 mg/3 ml nebulizer, Take 3 mL by nebulization Every 4 (Four) Hours As Needed for Wheezing or Shortness of Air., Disp: 360 mL, Rfl: 1    levothyroxine (SYNTHROID, LEVOTHROID) 88 MCG tablet, Take 1 tablet by mouth Daily., Disp: 90 tablet, Rfl: 2    melatonin 5 MG tablet tablet, Take 1 tablet by mouth., Disp: , Rfl:     Allergies:   No Known Allergies    Objective     Physical Exam:   Vital Signs:   Vitals:    07/08/24 1155   BP: 108/72   Pulse: 103   Temp: 97.8 °F (36.6 °C)   SpO2: 97%   Weight: 76.7 kg (169 lb)   Height: 170.2 cm (67.01\")     Body mass index is 26.46 kg/m².   Physical Exam  Vitals and nursing note reviewed. Exam conducted with a chaperone present.   Constitutional:       General: He is awake. He is not in acute distress.     Appearance: He is not ill-appearing.   Pulmonary:      Effort: Pulmonary effort is normal.   Genitourinary:     Comments:  24 FR 3-way catheter draining clear, clear yellow UOP to stacy bag.  No clots visualized.   Skin:     General: Skin is warm and dry.      Coloration: Skin is pale.   Neurological:      Mental Status: He is alert and oriented to person, place, and time. Mental status is at baseline.   Psychiatric:         Mood and Affect: Mood normal.         Behavior: Behavior is cooperative.            Labs  Brief Urine Lab Results  (Last result in the past 365 days)        Color   Clarity   Blood   Leuk Est   Nitrite   Protein   CREAT   Urine HCG        06/23/24 1433 Red   Turbid   Large (3+)   Moderate (2+)   Negative   >=300 mg/dL (3+)                   Lab Results "   Component Value Date    GLUCOSE 92 07/03/2024    CALCIUM 9.8 07/03/2024     07/03/2024    K 5.1 07/03/2024    CO2 22.0 07/03/2024     07/03/2024    BUN 24 (H) 07/03/2024    CREATININE 1.72 (H) 07/03/2024    EGFRIFAFRI 43 (L) 09/25/2017    EGFRIFNONA 35 (L) 09/14/2021    BCR 14.0 07/03/2024    ANIONGAP 13.0 07/03/2024       Lab Results   Component Value Date    WBC 10.17 07/03/2024    HGB 10.2 (L) 07/03/2024    HCT 31.8 (L) 07/03/2024    MCV 99.1 (H) 07/03/2024     (H) 07/03/2024            Lab Results   Component Value Date    PSA 5.0 (H) 07/25/2019       Lab on 07/03/2024   Component Date Value Ref Range Status    Glucose 07/03/2024 92  65 - 99 mg/dL Final    BUN 07/03/2024 24 (H)  8 - 23 mg/dL Final    Creatinine 07/03/2024 1.72 (H)  0.76 - 1.27 mg/dL Final    Sodium 07/03/2024 138  136 - 145 mmol/L Final    Potassium 07/03/2024 5.1  3.5 - 5.2 mmol/L Final    Chloride 07/03/2024 103  98 - 107 mmol/L Final    CO2 07/03/2024 22.0  22.0 - 29.0 mmol/L Final    Calcium 07/03/2024 9.8  8.6 - 10.5 mg/dL Final    BUN/Creatinine Ratio 07/03/2024 14.0  7.0 - 25.0 Final    Anion Gap 07/03/2024 13.0  5.0 - 15.0 mmol/L Final    eGFR 07/03/2024 40.4 (L)  >60.0 mL/min/1.73 Final    WBC 07/03/2024 10.17  3.40 - 10.80 10*3/mm3 Final    RBC 07/03/2024 3.21 (L)  4.14 - 5.80 10*6/mm3 Final    Hemoglobin 07/03/2024 10.2 (L)  13.0 - 17.7 g/dL Final    Hematocrit 07/03/2024 31.8 (L)  37.5 - 51.0 % Final    MCV 07/03/2024 99.1 (H)  79.0 - 97.0 fL Final    MCH 07/03/2024 31.8  26.6 - 33.0 pg Final    MCHC 07/03/2024 32.1  31.5 - 35.7 g/dL Final    RDW 07/03/2024 12.3  12.3 - 15.4 % Final    RDW-SD 07/03/2024 44.3  37.0 - 54.0 fl Final    MPV 07/03/2024 9.8  6.0 - 12.0 fL Final    Platelets 07/03/2024 585 (H)  140 - 450 10*3/mm3 Final    Neutrophil % 07/03/2024 71.1  42.7 - 76.0 % Final    Lymphocyte % 07/03/2024 19.6  19.6 - 45.3 % Final    Monocyte % 07/03/2024 7.5  5.0 - 12.0 % Final    Eosinophil % 07/03/2024 0.6   0.3 - 6.2 % Final    Basophil % 07/03/2024 0.8  0.0 - 1.5 % Final    Immature Grans % 07/03/2024 0.4  0.0 - 0.5 % Final    Neutrophils, Absolute 07/03/2024 7.24 (H)  1.70 - 7.00 10*3/mm3 Final    Lymphocytes, Absolute 07/03/2024 1.99  0.70 - 3.10 10*3/mm3 Final    Monocytes, Absolute 07/03/2024 0.76  0.10 - 0.90 10*3/mm3 Final    Eosinophils, Absolute 07/03/2024 0.06  0.00 - 0.40 10*3/mm3 Final    Basophils, Absolute 07/03/2024 0.08  0.00 - 0.20 10*3/mm3 Final    Immature Grans, Absolute 07/03/2024 0.04  0.00 - 0.05 10*3/mm3 Final    nRBC 07/03/2024 0.0  0.0 - 0.2 /100 WBC Final   No results displayed because visit has over 200 results.      Pre-Admission Testing on 06/07/2024   Component Date Value Ref Range Status    Potassium 06/07/2024 4.7  3.5 - 5.2 mmol/L Final    Slight hemolysis detected by analyzer. Result may be falsely elevated.    WBC 06/07/2024 7.61  3.40 - 10.80 10*3/mm3 Final    RBC 06/07/2024 4.80  4.14 - 5.80 10*6/mm3 Final    Hemoglobin 06/07/2024 15.5  13.0 - 17.7 g/dL Final    Hematocrit 06/07/2024 46.7  37.5 - 51.0 % Final    MCV 06/07/2024 97.3 (H)  79.0 - 97.0 fL Final    MCH 06/07/2024 32.3  26.6 - 33.0 pg Final    MCHC 06/07/2024 33.2  31.5 - 35.7 g/dL Final    RDW 06/07/2024 12.9  12.3 - 15.4 % Final    RDW-SD 06/07/2024 46.3  37.0 - 54.0 fl Final    MPV 06/07/2024 9.7  6.0 - 12.0 fL Final    Platelets 06/07/2024 254  140 - 450 10*3/mm3 Final   Office Visit on 09/07/2023   Component Date Value Ref Range Status    Glucose 11/17/2023 85  65 - 99 mg/dL Final    BUN 11/17/2023 22  8 - 23 mg/dL Final    Creatinine 11/17/2023 1.46 (H)  0.76 - 1.27 mg/dL Final    Sodium 11/17/2023 138  136 - 145 mmol/L Final    Potassium 11/17/2023 4.8  3.5 - 5.2 mmol/L Final    Chloride 11/17/2023 106  98 - 107 mmol/L Final    CO2 11/17/2023 21.8 (L)  22.0 - 29.0 mmol/L Final    Calcium 11/17/2023 9.1  8.6 - 10.5 mg/dL Final    Total Protein 11/17/2023 6.5  6.0 - 8.5 g/dL Final    Albumin 11/17/2023 4.2  3.5 -  5.2 g/dL Final    ALT (SGPT) 11/17/2023 18  1 - 41 U/L Final    AST (SGOT) 11/17/2023 17  1 - 40 U/L Final    Alkaline Phosphatase 11/17/2023 79  39 - 117 U/L Final    Total Bilirubin 11/17/2023 0.7  0.0 - 1.2 mg/dL Final    Globulin 11/17/2023 2.3  gm/dL Final    A/G Ratio 11/17/2023 1.8  g/dL Final    BUN/Creatinine Ratio 11/17/2023 15.1  7.0 - 25.0 Final    Anion Gap 11/17/2023 10.2  5.0 - 15.0 mmol/L Final    eGFR 11/17/2023 49.2 (L)  >60.0 mL/min/1.73 Final    Total Cholesterol 11/17/2023 167  0 - 200 mg/dL Final    Triglycerides 11/17/2023 135  0 - 150 mg/dL Final    HDL Cholesterol 11/17/2023 43  40 - 60 mg/dL Final    LDL Cholesterol  11/17/2023 100  0 - 100 mg/dL Final    VLDL Cholesterol 11/17/2023 24  5 - 40 mg/dL Final    LDL/HDL Ratio 11/17/2023 2.26   Final    TSH 11/17/2023 3.310  0.270 - 4.200 uIU/mL Final    WBC 11/17/2023 7.29  3.40 - 10.80 10*3/mm3 Final    RBC 11/17/2023 4.40  4.14 - 5.80 10*6/mm3 Final    Hemoglobin 11/17/2023 14.5  13.0 - 17.7 g/dL Final    Hematocrit 11/17/2023 42.5  37.5 - 51.0 % Final    MCV 11/17/2023 96.6  79.0 - 97.0 fL Final    MCH 11/17/2023 33.0  26.6 - 33.0 pg Final    MCHC 11/17/2023 34.1  31.5 - 35.7 g/dL Final    RDW 11/17/2023 13.1  12.3 - 15.4 % Final    RDW-SD 11/17/2023 46.7  37.0 - 54.0 fl Final    MPV 11/17/2023 10.7  6.0 - 12.0 fL Final    Platelets 11/17/2023 242  140 - 450 10*3/mm3 Final    Neutrophil % 11/17/2023 54.2  42.7 - 76.0 % Final    Lymphocyte % 11/17/2023 28.8  19.6 - 45.3 % Final    Monocyte % 11/17/2023 11.5  5.0 - 12.0 % Final    Eosinophil % 11/17/2023 3.4  0.3 - 6.2 % Final    Basophil % 11/17/2023 1.4  0.0 - 1.5 % Final    Immature Grans % 11/17/2023 0.7 (H)  0.0 - 0.5 % Final    Neutrophils, Absolute 11/17/2023 3.95  1.70 - 7.00 10*3/mm3 Final    Lymphocytes, Absolute 11/17/2023 2.10  0.70 - 3.10 10*3/mm3 Final    Monocytes, Absolute 11/17/2023 0.84  0.10 - 0.90 10*3/mm3 Final    Eosinophils, Absolute 11/17/2023 0.25  0.00 - 0.40 10*3/mm3  Final    Basophils, Absolute 11/17/2023 0.10  0.00 - 0.20 10*3/mm3 Final    Immature Grans, Absolute 11/17/2023 0.05  0.00 - 0.05 10*3/mm3 Final    nRBC 11/17/2023 0.0  0.0 - 0.2 /100 WBC Final       Radiographic Studies  IR Artery Embolization Occlusion    Result Date: 6/21/2024  Successful catheterization and angiography of the anterior divisions of the left and right hypogastric arteries with embolization using 500 um sized Gelfoam cubes successfully. The procedure was complicated by acute occlusion of the superficial femoral artery. Vascular surgery responded promptly to the request for assistance and the patient was taken to the OR for further management. Thank you for the opportunity to assist you in the care of your patient. Electronically Signed: Patrick Christine MD  6/21/2024 3:41 PM EDT  Workstation ID: [id number]      I have reviewed the above labs and imaging.     Assessment / Plan      Assessment:   Diagnoses and all orders for this visit:    1. BPH with obstruction/lower urinary tract symptoms (Primary)    2. Urinary retention         78 y.o. male presents for follow up for BPH with obstruction s/p aquablation and PAE complicated by SFA occlusion on 06/21/24.  Patient has history of indwelling catheter dependence for several years, 140 cc prostate.  Patient was bleeding since surgery on finasteride and ASA 81 mg.  Patient was seen on 07/03/24 with clear, red UOP from catheter.  Patient completed 3 days of TXA 1300 mg BID (renally dosed) and was manually irrigated at that time.  Repeat BMP and CBC stable.  UOP is now clear, yellow and patient denies seeing any clots since his visit.  Patient is no longer at nursing facility and is home.    Fill and void today.  180 mL of sterile water instilled into bladder.  Patient was able to void drops of light, red urine.  States he did not have sensation to urinate.  Discussed CIC vs. Indwelling stacy catheter.  Patient has done CIC in the past and is unable to do so.   He is aware of risk of infection, sepsis, and erosion and wishes to proceed with indwelling stacy catheter placement.  Catheter placed by MA, see her note.  Will plan for urodynamics and nurse visit for catheter change monthly.  Continue finasteride 5 mg x4 weeks and discontinue.  This was written down for patient.        Plan:    Continue finasteride 5 mg daily x4 weeks then discontinue.  Urodynamics with Denzel  Catheter change in 1 month    Follow Up:   Return for with Denzel for UDS.  Will need nurse visit for catheter exchange in 1 month.      LANNY Hammonds  [facility]

## 2024-07-17 ENCOUNTER — APPOINTMENT (OUTPATIENT)
Dept: CT IMAGING | Facility: HOSPITAL | Age: 79
DRG: 908 | End: 2024-07-17
Payer: MEDICARE

## 2024-07-17 ENCOUNTER — HOSPITAL ENCOUNTER (EMERGENCY)
Facility: HOSPITAL | Age: 79
Discharge: OTHER INSTITUTION | DRG: 908 | End: 2024-07-17
Attending: STUDENT IN AN ORGANIZED HEALTH CARE EDUCATION/TRAINING PROGRAM
Payer: MEDICARE

## 2024-07-17 ENCOUNTER — TELEPHONE (OUTPATIENT)
Dept: UROLOGY | Facility: CLINIC | Age: 79
End: 2024-07-17

## 2024-07-17 ENCOUNTER — HOSPITAL ENCOUNTER (INPATIENT)
Facility: HOSPITAL | Age: 79
LOS: 8 days | Discharge: NURSING, REHAB OR LONG-TERM CARE | DRG: 908 | End: 2024-07-26
Attending: INTERNAL MEDICINE | Admitting: INTERNAL MEDICINE
Payer: MEDICARE

## 2024-07-17 ENCOUNTER — APPOINTMENT (OUTPATIENT)
Dept: ULTRASOUND IMAGING | Facility: HOSPITAL | Age: 79
DRG: 908 | End: 2024-07-17
Payer: MEDICARE

## 2024-07-17 VITALS
DIASTOLIC BLOOD PRESSURE: 71 MMHG | HEART RATE: 99 BPM | TEMPERATURE: 98.3 F | OXYGEN SATURATION: 98 % | SYSTOLIC BLOOD PRESSURE: 116 MMHG | HEIGHT: 67 IN | BODY MASS INDEX: 25.9 KG/M2 | RESPIRATION RATE: 18 BRPM | WEIGHT: 165 LBS

## 2024-07-17 DIAGNOSIS — I70.209 SUPERFICIAL FEMORAL ARTERY OCCLUSION: ICD-10-CM

## 2024-07-17 DIAGNOSIS — T81.31XD DEHISCENCE OF OPERATIVE WOUND, SUBSEQUENT ENCOUNTER: ICD-10-CM

## 2024-07-17 DIAGNOSIS — T81.49XA WOUND INFECTION AFTER SURGERY: ICD-10-CM

## 2024-07-17 DIAGNOSIS — N30.01 ACUTE CYSTITIS WITH HEMATURIA: Primary | ICD-10-CM

## 2024-07-17 DIAGNOSIS — T81.31XA DEHISCENCE OF OPERATIVE WOUND, INITIAL ENCOUNTER: Primary | ICD-10-CM

## 2024-07-17 DIAGNOSIS — M06.9 RHEUMATOID ARTHRITIS INVOLVING MULTIPLE SITES, UNSPECIFIED WHETHER RHEUMATOID FACTOR PRESENT: ICD-10-CM

## 2024-07-17 DIAGNOSIS — R93.812 ABNORMAL FINDING ON DIAGNOSTIC IMAGING OF LEFT TESTICLE: ICD-10-CM

## 2024-07-17 PROBLEM — N45.1 BILATERAL EPIDIDYMITIS: Status: ACTIVE | Noted: 2024-07-17

## 2024-07-17 LAB
ALBUMIN SERPL-MCNC: 3.8 G/DL (ref 3.5–5.2)
ALBUMIN/GLOB SERPL: 1.2 G/DL
ALP SERPL-CCNC: 97 U/L (ref 39–117)
ALT SERPL W P-5'-P-CCNC: 6 U/L (ref 1–41)
ANION GAP SERPL CALCULATED.3IONS-SCNC: 15.8 MMOL/L (ref 5–15)
APTT PPP: 28.7 SECONDS (ref 60–90)
AST SERPL-CCNC: 9 U/L (ref 1–40)
BACTERIA UR QL AUTO: ABNORMAL /HPF
BASOPHILS # BLD AUTO: 0.06 10*3/MM3 (ref 0–0.2)
BASOPHILS NFR BLD AUTO: 0.6 % (ref 0–1.5)
BILIRUB SERPL-MCNC: 0.4 MG/DL (ref 0–1.2)
BILIRUB UR QL STRIP: NEGATIVE
BUN SERPL-MCNC: 23 MG/DL (ref 8–23)
BUN/CREAT SERPL: 13.9 (ref 7–25)
CALCIUM SPEC-SCNC: 8.8 MG/DL (ref 8.6–10.5)
CHLORIDE SERPL-SCNC: 104 MMOL/L (ref 98–107)
CLARITY UR: ABNORMAL
CO2 SERPL-SCNC: 20.2 MMOL/L (ref 22–29)
COLOR UR: ABNORMAL
CREAT SERPL-MCNC: 1.66 MG/DL (ref 0.76–1.27)
CRP SERPL-MCNC: 10.54 MG/DL (ref 0–0.5)
D-LACTATE SERPL-SCNC: 1.7 MMOL/L (ref 0.5–2)
DEPRECATED RDW RBC AUTO: 46.1 FL (ref 37–54)
EGFRCR SERPLBLD CKD-EPI 2021: 42.2 ML/MIN/1.73
EOSINOPHIL # BLD AUTO: 0.11 10*3/MM3 (ref 0–0.4)
EOSINOPHIL NFR BLD AUTO: 1.1 % (ref 0.3–6.2)
ERYTHROCYTE [DISTWIDTH] IN BLOOD BY AUTOMATED COUNT: 13.1 % (ref 12.3–15.4)
ERYTHROCYTE [SEDIMENTATION RATE] IN BLOOD: 92 MM/HR (ref 0–20)
GLOBULIN UR ELPH-MCNC: 3.2 GM/DL
GLUCOSE SERPL-MCNC: 97 MG/DL (ref 65–99)
GLUCOSE UR STRIP-MCNC: NEGATIVE MG/DL
HCT VFR BLD AUTO: 29.8 % (ref 37.5–51)
HGB BLD-MCNC: 9.9 G/DL (ref 13–17.7)
HGB UR QL STRIP.AUTO: ABNORMAL
HOLD SPECIMEN: NORMAL
HOLD SPECIMEN: NORMAL
HYALINE CASTS UR QL AUTO: ABNORMAL /LPF
IMM GRANULOCYTES # BLD AUTO: 0.05 10*3/MM3 (ref 0–0.05)
IMM GRANULOCYTES NFR BLD AUTO: 0.5 % (ref 0–0.5)
INR PPP: 1.14 (ref 0.89–1.12)
KETONES UR QL STRIP: NEGATIVE
LEUKOCYTE ESTERASE UR QL STRIP.AUTO: ABNORMAL
LIPASE SERPL-CCNC: 12 U/L (ref 13–60)
LYMPHOCYTES # BLD AUTO: 1.82 10*3/MM3 (ref 0.7–3.1)
LYMPHOCYTES NFR BLD AUTO: 18.4 % (ref 19.6–45.3)
MCH RBC QN AUTO: 31.9 PG (ref 26.6–33)
MCHC RBC AUTO-ENTMCNC: 33.2 G/DL (ref 31.5–35.7)
MCV RBC AUTO: 96.1 FL (ref 79–97)
MONOCYTES # BLD AUTO: 1.08 10*3/MM3 (ref 0.1–0.9)
MONOCYTES NFR BLD AUTO: 10.9 % (ref 5–12)
NEUTROPHILS NFR BLD AUTO: 6.76 10*3/MM3 (ref 1.7–7)
NEUTROPHILS NFR BLD AUTO: 68.5 % (ref 42.7–76)
NITRITE UR QL STRIP: POSITIVE
NRBC BLD AUTO-RTO: 0 /100 WBC (ref 0–0.2)
PH UR STRIP.AUTO: 5.5 [PH] (ref 5–8)
PLATELET # BLD AUTO: 394 10*3/MM3 (ref 140–450)
PMV BLD AUTO: 9.2 FL (ref 6–12)
POTASSIUM SERPL-SCNC: 4 MMOL/L (ref 3.5–5.2)
PROCALCITONIN SERPL-MCNC: 0.17 NG/ML (ref 0–0.25)
PROT SERPL-MCNC: 7 G/DL (ref 6–8.5)
PROT UR QL STRIP: ABNORMAL
PROTHROMBIN TIME: 14.7 SECONDS (ref 12.2–14.5)
RBC # BLD AUTO: 3.1 10*6/MM3 (ref 4.14–5.8)
RBC # UR STRIP: ABNORMAL /HPF
REF LAB TEST METHOD: ABNORMAL
SODIUM SERPL-SCNC: 140 MMOL/L (ref 136–145)
SP GR UR STRIP: >=1.03 (ref 1–1.03)
SQUAMOUS #/AREA URNS HPF: ABNORMAL /HPF
UFH PPP CHRO-ACNC: 0.1 IU/ML (ref 0.3–0.7)
UROBILINOGEN UR QL STRIP: ABNORMAL
WBC # UR STRIP: ABNORMAL /HPF
WBC NRBC COR # BLD AUTO: 9.88 10*3/MM3 (ref 3.4–10.8)
WHOLE BLOOD HOLD COAG: NORMAL
WHOLE BLOOD HOLD SPECIMEN: NORMAL

## 2024-07-17 PROCEDURE — 25010000002 HEPARIN (PORCINE) 25000-0.45 UT/250ML-% SOLUTION: Performed by: NURSE PRACTITIONER

## 2024-07-17 PROCEDURE — 25010000002 PIPERACILLIN SOD-TAZOBACTAM PER 1 G

## 2024-07-17 PROCEDURE — 25010000002 PIPERACILLIN SOD-TAZOBACTAM PER 1 G: Performed by: NURSE PRACTITIONER

## 2024-07-17 PROCEDURE — 87040 BLOOD CULTURE FOR BACTERIA: CPT | Performed by: STUDENT IN AN ORGANIZED HEALTH CARE EDUCATION/TRAINING PROGRAM

## 2024-07-17 PROCEDURE — 85730 THROMBOPLASTIN TIME PARTIAL: CPT | Performed by: NURSE PRACTITIONER

## 2024-07-17 PROCEDURE — 51702 INSERT TEMP BLADDER CATH: CPT

## 2024-07-17 PROCEDURE — 25010000002 PIPERACILLIN SOD-TAZOBACTAM PER 1 G: Performed by: INTERNAL MEDICINE

## 2024-07-17 PROCEDURE — 36415 COLL VENOUS BLD VENIPUNCTURE: CPT

## 2024-07-17 PROCEDURE — G0378 HOSPITAL OBSERVATION PER HR: HCPCS

## 2024-07-17 PROCEDURE — 87040 BLOOD CULTURE FOR BACTERIA: CPT | Performed by: NURSE PRACTITIONER

## 2024-07-17 PROCEDURE — 83690 ASSAY OF LIPASE: CPT

## 2024-07-17 PROCEDURE — 84145 PROCALCITONIN (PCT): CPT

## 2024-07-17 PROCEDURE — 81001 URINALYSIS AUTO W/SCOPE: CPT

## 2024-07-17 PROCEDURE — 87086 URINE CULTURE/COLONY COUNT: CPT

## 2024-07-17 PROCEDURE — 25510000001 IOPAMIDOL 61 % SOLUTION: Performed by: STUDENT IN AN ORGANIZED HEALTH CARE EDUCATION/TRAINING PROGRAM

## 2024-07-17 PROCEDURE — 25010000002 HEPARIN (PORCINE) PER 1000 UNITS: Performed by: NURSE PRACTITIONER

## 2024-07-17 PROCEDURE — 80053 COMPREHEN METABOLIC PANEL: CPT

## 2024-07-17 PROCEDURE — 87077 CULTURE AEROBIC IDENTIFY: CPT

## 2024-07-17 PROCEDURE — 85652 RBC SED RATE AUTOMATED: CPT | Performed by: NURSE PRACTITIONER

## 2024-07-17 PROCEDURE — 76870 US EXAM SCROTUM: CPT

## 2024-07-17 PROCEDURE — 25810000003 SODIUM CHLORIDE 0.9 % SOLUTION 500 ML FLEX CONT

## 2024-07-17 PROCEDURE — 99291 CRITICAL CARE FIRST HOUR: CPT

## 2024-07-17 PROCEDURE — 99222 1ST HOSP IP/OBS MODERATE 55: CPT | Performed by: STUDENT IN AN ORGANIZED HEALTH CARE EDUCATION/TRAINING PROGRAM

## 2024-07-17 PROCEDURE — 74177 CT ABD & PELVIS W/CONTRAST: CPT

## 2024-07-17 PROCEDURE — 83605 ASSAY OF LACTIC ACID: CPT | Performed by: STUDENT IN AN ORGANIZED HEALTH CARE EDUCATION/TRAINING PROGRAM

## 2024-07-17 PROCEDURE — 25010000002 HEPARIN (PORCINE) 25000-0.45 UT/250ML-% SOLUTION

## 2024-07-17 PROCEDURE — 85610 PROTHROMBIN TIME: CPT | Performed by: NURSE PRACTITIONER

## 2024-07-17 PROCEDURE — 87186 SC STD MICRODIL/AGAR DIL: CPT

## 2024-07-17 PROCEDURE — 96365 THER/PROPH/DIAG IV INF INIT: CPT

## 2024-07-17 PROCEDURE — 25010000002 VANCOMYCIN 1.5 G RECONSTITUTED SOLUTION 1 EACH VIAL

## 2024-07-17 PROCEDURE — 99285 EMERGENCY DEPT VISIT HI MDM: CPT

## 2024-07-17 PROCEDURE — 86140 C-REACTIVE PROTEIN: CPT

## 2024-07-17 PROCEDURE — 85520 HEPARIN ASSAY: CPT | Performed by: NURSE PRACTITIONER

## 2024-07-17 PROCEDURE — 85025 COMPLETE CBC W/AUTO DIFF WBC: CPT

## 2024-07-17 RX ORDER — HEPARIN SODIUM 10000 [USP'U]/100ML
24 INJECTION, SOLUTION INTRAVENOUS
Status: DISCONTINUED | OUTPATIENT
Start: 2024-07-17 | End: 2024-07-22

## 2024-07-17 RX ORDER — AMLODIPINE BESYLATE 2.5 MG/1
2.5 TABLET ORAL DAILY
Status: DISCONTINUED | OUTPATIENT
Start: 2024-07-18 | End: 2024-07-26 | Stop reason: HOSPADM

## 2024-07-17 RX ORDER — ONDANSETRON 2 MG/ML
4 INJECTION INTRAMUSCULAR; INTRAVENOUS EVERY 6 HOURS PRN
Status: DISCONTINUED | OUTPATIENT
Start: 2024-07-17 | End: 2024-07-26 | Stop reason: HOSPADM

## 2024-07-17 RX ORDER — HEPARIN SODIUM 1000 [USP'U]/ML
25 INJECTION, SOLUTION INTRAVENOUS; SUBCUTANEOUS AS NEEDED
Status: DISCONTINUED | OUTPATIENT
Start: 2024-07-17 | End: 2024-07-17

## 2024-07-17 RX ORDER — ACETAMINOPHEN 650 MG/1
650 SUPPOSITORY RECTAL EVERY 4 HOURS PRN
Status: DISCONTINUED | OUTPATIENT
Start: 2024-07-17 | End: 2024-07-26 | Stop reason: HOSPADM

## 2024-07-17 RX ORDER — SODIUM CHLORIDE 0.9 % (FLUSH) 0.9 %
10 SYRINGE (ML) INJECTION AS NEEDED
Status: DISCONTINUED | OUTPATIENT
Start: 2024-07-17 | End: 2024-07-17 | Stop reason: HOSPADM

## 2024-07-17 RX ORDER — LIDOCAINE HYDROCHLORIDE 20 MG/ML
JELLY TOPICAL AS NEEDED
Status: DISCONTINUED | OUTPATIENT
Start: 2024-07-17 | End: 2024-07-17 | Stop reason: HOSPADM

## 2024-07-17 RX ORDER — ACETAMINOPHEN 160 MG/5ML
650 SOLUTION ORAL EVERY 4 HOURS PRN
Status: DISCONTINUED | OUTPATIENT
Start: 2024-07-17 | End: 2024-07-26 | Stop reason: HOSPADM

## 2024-07-17 RX ORDER — SODIUM CHLORIDE 0.9 % (FLUSH) 0.9 %
10 SYRINGE (ML) INJECTION AS NEEDED
Status: DISCONTINUED | OUTPATIENT
Start: 2024-07-17 | End: 2024-07-26 | Stop reason: HOSPADM

## 2024-07-17 RX ORDER — AMOXICILLIN 250 MG
2 CAPSULE ORAL 2 TIMES DAILY PRN
Status: DISCONTINUED | OUTPATIENT
Start: 2024-07-17 | End: 2024-07-26 | Stop reason: HOSPADM

## 2024-07-17 RX ORDER — LEVOTHYROXINE SODIUM 88 UG/1
88 TABLET ORAL DAILY
Status: DISCONTINUED | OUTPATIENT
Start: 2024-07-18 | End: 2024-07-26 | Stop reason: HOSPADM

## 2024-07-17 RX ORDER — BISACODYL 5 MG/1
5 TABLET, DELAYED RELEASE ORAL DAILY PRN
Status: DISCONTINUED | OUTPATIENT
Start: 2024-07-17 | End: 2024-07-26 | Stop reason: HOSPADM

## 2024-07-17 RX ORDER — FERROUS SULFATE 325(65) MG
325 TABLET ORAL
Status: DISCONTINUED | OUTPATIENT
Start: 2024-07-18 | End: 2024-07-26 | Stop reason: HOSPADM

## 2024-07-17 RX ORDER — SODIUM CHLORIDE 9 MG/ML
40 INJECTION, SOLUTION INTRAVENOUS AS NEEDED
Status: DISCONTINUED | OUTPATIENT
Start: 2024-07-17 | End: 2024-07-26 | Stop reason: HOSPADM

## 2024-07-17 RX ORDER — SODIUM CHLORIDE 0.9 % (FLUSH) 0.9 %
10 SYRINGE (ML) INJECTION EVERY 12 HOURS SCHEDULED
Status: DISCONTINUED | OUTPATIENT
Start: 2024-07-17 | End: 2024-07-26 | Stop reason: HOSPADM

## 2024-07-17 RX ORDER — HEPARIN SODIUM 1000 [USP'U]/ML
6000 INJECTION, SOLUTION INTRAVENOUS; SUBCUTANEOUS ONCE
Status: COMPLETED | OUTPATIENT
Start: 2024-07-17 | End: 2024-07-17

## 2024-07-17 RX ORDER — FINASTERIDE 5 MG/1
5 TABLET, FILM COATED ORAL DAILY
Status: DISCONTINUED | OUTPATIENT
Start: 2024-07-18 | End: 2024-07-26 | Stop reason: HOSPADM

## 2024-07-17 RX ORDER — HEPARIN SODIUM 1000 [USP'U]/ML
50 INJECTION, SOLUTION INTRAVENOUS; SUBCUTANEOUS AS NEEDED
Status: DISCONTINUED | OUTPATIENT
Start: 2024-07-17 | End: 2024-07-17

## 2024-07-17 RX ORDER — IPRATROPIUM BROMIDE AND ALBUTEROL SULFATE 2.5; .5 MG/3ML; MG/3ML
3 SOLUTION RESPIRATORY (INHALATION) EVERY 4 HOURS PRN
Status: DISCONTINUED | OUTPATIENT
Start: 2024-07-17 | End: 2024-07-26 | Stop reason: HOSPADM

## 2024-07-17 RX ORDER — ACETAMINOPHEN 325 MG/1
650 TABLET ORAL EVERY 4 HOURS PRN
Status: DISCONTINUED | OUTPATIENT
Start: 2024-07-17 | End: 2024-07-26 | Stop reason: HOSPADM

## 2024-07-17 RX ORDER — POLYETHYLENE GLYCOL 3350 17 G/17G
17 POWDER, FOR SOLUTION ORAL DAILY PRN
Status: DISCONTINUED | OUTPATIENT
Start: 2024-07-17 | End: 2024-07-26 | Stop reason: HOSPADM

## 2024-07-17 RX ORDER — ONDANSETRON 4 MG/1
4 TABLET, ORALLY DISINTEGRATING ORAL EVERY 6 HOURS PRN
Status: DISCONTINUED | OUTPATIENT
Start: 2024-07-17 | End: 2024-07-26 | Stop reason: HOSPADM

## 2024-07-17 RX ORDER — HYDROCODONE BITARTRATE AND ACETAMINOPHEN 5; 325 MG/1; MG/1
1 TABLET ORAL EVERY 6 HOURS PRN
Status: DISPENSED | OUTPATIENT
Start: 2024-07-17 | End: 2024-07-22

## 2024-07-17 RX ORDER — ASPIRIN 81 MG/1
81 TABLET, CHEWABLE ORAL DAILY
Status: DISCONTINUED | OUTPATIENT
Start: 2024-07-17 | End: 2024-07-26 | Stop reason: HOSPADM

## 2024-07-17 RX ORDER — BISACODYL 10 MG
10 SUPPOSITORY, RECTAL RECTAL DAILY PRN
Status: DISCONTINUED | OUTPATIENT
Start: 2024-07-17 | End: 2024-07-26 | Stop reason: HOSPADM

## 2024-07-17 RX ADMIN — Medication 10 ML: at 20:12

## 2024-07-17 RX ADMIN — VANCOMYCIN HYDROCHLORIDE 1500 MG: 1.5 INJECTION, POWDER, LYOPHILIZED, FOR SOLUTION INTRAVENOUS at 14:28

## 2024-07-17 RX ADMIN — LIDOCAINE HYDROCHLORIDE: 20 JELLY TOPICAL at 12:53

## 2024-07-17 RX ADMIN — HEPARIN SODIUM 18 UNITS/KG/HR: 10000 INJECTION, SOLUTION INTRAVENOUS at 20:10

## 2024-07-17 RX ADMIN — PIPERACILLIN SODIUM AND TAZOBACTAM SODIUM 3.38 G: 3; .375 INJECTION, POWDER, LYOPHILIZED, FOR SOLUTION INTRAVENOUS at 14:01

## 2024-07-17 RX ADMIN — IOPAMIDOL 100 ML: 612 INJECTION, SOLUTION INTRAVENOUS at 12:08

## 2024-07-17 RX ADMIN — ASPIRIN 81 MG CHEWABLE TABLET 81 MG: 81 TABLET CHEWABLE at 20:12

## 2024-07-17 RX ADMIN — HEPARIN SODIUM 6000 UNITS: 1000 INJECTION INTRAVENOUS; SUBCUTANEOUS at 20:11

## 2024-07-17 RX ADMIN — PIPERACILLIN AND TAZOBACTAM 3.38 G: 3; .375 INJECTION, POWDER, LYOPHILIZED, FOR SOLUTION INTRAVENOUS at 20:47

## 2024-07-17 NOTE — PROGRESS NOTES
"Pharmacy Consult-Vancomycin Dosing  Nestor Nye is a  78 y.o. male receiving vancomycin therapy.     Indication: SSTI  Consulting Provider: hospitalist  ID Consult: yes    Goal AUC: 400 - 600 mg/L*hr    Current Antimicrobial Therapy  Anti-Infectives (From admission, onward)      Ordered     Dose/Rate Route Frequency Start Stop    07/17/24 1827  piperacillin-tazobactam (ZOSYN) 3.375 g IVPB in 100 mL NS MBP (CD)        Ordering Provider: Kimmy Toro APRN    3.375 g  over 4 Hours Intravenous Every 8 Hours 07/18/24 0115 07/23/24 0114    07/17/24 1827  piperacillin-tazobactam (ZOSYN) 3.375 g IVPB in 100 mL NS MBP (CD)        Ordering Provider: Kimmy Toro APRN    3.375 g  over 30 Minutes Intravenous Once 07/17/24 1915 07/17/24 1825  Pharmacy to dose vancomycin        Ordering Provider: Kimmy Toro APRN     Does not apply Continuous PRN 07/17/24 1825 07/22/24 1824            Allergies  Allergies as of 07/17/2024    (No Known Allergies)       Labs    Results from last 7 days   Lab Units 07/17/24  1110   BUN mg/dL 23   CREATININE mg/dL 1.66*       Results from last 7 days   Lab Units 07/17/24  1110   WBC 10*3/mm3 9.88       Evaluation of Dosing     Last Dose Received in the ED/Outside Facility: Yes  Is Patient on Dialysis or Renal Replacement: No    Ht - 170.2 cm (67\")  Wt - 74.8 kg (165 lb)    Estimated Creatinine Clearance: 39.4 mL/min (A) (by C-G formula based on SCr of 1.66 mg/dL (H)).    No intake/output data recorded.    Microbiology and Radiology  Microbiology Results (last 10 days)       ** No results found for the last 240 hours. **            Reported Vancomycin Levels                         [employer] AUC Calculation:    Current AUC: mg/L*hr    Predicted Steady State AUC on Current Dose: mg/L*hr  _________________________________    Predicted Steady State AUC on New Dose:   531 mg/L*hr    Assessment/Plan:   Pharmacy consulted to dose vancomycin for SSTI, goal -600 " mg/L*hr.  Patient loaded with vancomycin 1500 mg ( ~ 20 mg/kg)  BC x 2 in process.   Patient remains afebrile, serum creatinine is 1.66, BUN is 23, and WBC is 9.88.  Based on evaluation, initiate a maintenance regimen of vancomycin 1000 mg ( ~ 13 mg/kg) every 24 hours.  A vancomycin trough will be assessed on 7/19 @ 0600 (prior to the 3rd dose.)  Will continue to follow and adjust vancomycin dose as needed based on renal function, cultures, and patient clinical status.    Thank you,    Jessica Yeung [facility]  7/17/2024  18:36 EDT

## 2024-07-17 NOTE — TELEPHONE ENCOUNTER
Can we call the patient and ask about his incision?  Where is it and what surgery did he have?
Patient is going to his PCP 
The patient called stating that his surgery incision is looking infected he stated that he has been cleaning it 2-3 times a day and has a smell. He said it has gotten worse.  He is wondering what he should do.    Phillip RAMIREZ    
0 = independent

## 2024-07-17 NOTE — ED NOTES
Pt had recent incision to greater right trochanter region x 1 month ago. On arrival to ER, pt wound is foul smelling with localized redness and swelling that radiates into the testicles. Pt wound is warm to touch and incision is open approx. 1in and a half

## 2024-07-17 NOTE — H&P
[facility]  HISTORY AND PHYSICAL    Patient Name: Nesotr Nye  : 1945  MRN: [id number]  Primary Care Physician: Roxanne Slater MD  Date of admission: 2024      Subjective   Subjective     Chief Complaint:  Right groin wound dehiscence     HPI:  Nestor Nye is a 78 y.o. male with PMH of enlarged prostate, GERD, Gout, HTN, RA, craniotomy for subdural hematoma, right femoral endarterectomy 24, prostate aquablation 24. Patient was discharged from [facility] on 24 and went to rehab., he has been home a few weeks. Patient states about 3 days ago he noticed erythema around incision and groin. His incision started to smell and he was cleaning it daily with soap and water. Yesterday his incision opened up. He presented to [facility] ED and was transferred here for further care. He denies any soa, cp, fever, or chills.     OSH labs: creat  1.66, crp 10.54, procal 0.17, lactate 1.7, WBC 9.88, HGB 9.9,       Personal History     Past Medical History:   Diagnosis Date    Acute maxillary sinusitis     Arthritis     Disease of thyroid gland     Enlarged prostate     GERD (gastroesophageal reflux disease)     Gout     Hypertension     Impaired functional mobility, balance, gait, and endurance     PONV (postoperative nausea and vomiting)     Renal disorder     Rheumatoid arthritis     Right inguinal hernia 10/28/2021    Added automatically from request for surgery [id number]    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, changed his voice since then           Past Surgical History:   Procedure Laterality Date    ABDOMINAL SURGERY      BACK SURGERY      BELPHAROPTOSIS REPAIR Bilateral     BRAIN SURGERY      COLON SURGERY      bowel resection x2 per pt    CRANIOTOMY FOR SUBDURAL HEMATOMA      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    DIAGNOSTIC LAPAROSCOPY N/A 2022    Procedure: DIAGNOSTIC  LAPAROSCOPY CONVERTED TO EXPLORATORY LAPAROTOMY WITH SMALL BOWEL RESECTION;  Surgeon: Maximo Bennett MD;  Location:  [facility];  Service: General;  Laterality: N/A;  CONVERTED TO EXPLORATORY LAPAROTOMY AT 1912    EXPLORATORY LAPAROTOMY N/A 04/16/2022    Procedure: LAPAROTOMY EXPLORATORY WITH SMALL BOWEL OBSTRUCTION;  Surgeon: Maximo Bennett MD;  Location:  [facility];  Service: General;  Laterality: N/A;    EYE SURGERY      on upper etelids    FEMORAL ENDARTERECTOMY Right 6/21/2024    Procedure: FEMORAL EMBELECTOMY RIGHT;  Surgeon: Kingsley Morris MD;  Location:  [facility];  Service: Vascular;  Laterality: Right;    FOREIGN BODY REMOVAL Right     removal of metal fragments from RUQ after electrocution and fall.      HERNIA REPAIR      INGUINAL HERNIA REPAIR Right 11/01/2021    Procedure: INGUINAL HERNIA REPAIR WITH MESH AND EXCISION OF SPERMATIC CORD LIPOMA;  Surgeon: Rosey Glover MD;  Location:  [facility];  Service: General;  Laterality: Right;    LUMBAR SPINE SURGERY      PROSTATE AQUABLATION N/A 6/21/2024    Procedure: AQUABLATION OF PROSTATE;  Surgeon: Prieto Rey MD;  Location:  [facility];  Service: Robotics - Urology;  Laterality: N/A;    SKIN GRAFT Right     RUE skin grafting after electrocution    THORACIC SPINE SURGERY         Family History: family history includes Arthritis in his father and mother; Prostate cancer in his brother.     Social History:  reports that he has quit smoking. His smoking use included cigars. He has been exposed to tobacco smoke. He has never used smokeless tobacco. He reports that he does not currently use alcohol. He reports that he does not use drugs.  Social History     Social History Narrative    Not on file       Medications:  Available home medication information reviewed.  Alcohol Wipes, Diclofenac Sodium, amLODIPine, aspirin, ferrous sulfate, finasteride, ipratropium-albuterol, levothyroxine, and melatonin    No Known Allergies    Objective    Objective     Vital Signs:   Temp:  [98.2 °F (36.8 °C)-98.3 °F (36.8 °C)] 98.2 °F (36.8 °C)  Heart Rate:  [82-99] 82  Resp:  [18] 18  BP: (106-121)/(59-89) 121/59       Physical Exam   Constitutional: Awake, alert  Eyes: PERRLA, sclerae anicteric, no conjunctival injection  HENT: NCAT, mucous membranes moist  Neck: Supple, no thyromegaly, no lymphadenopathy, trachea midline  Respiratory: Clear to auscultation bilaterally, nonlabored respirations room air   Cardiovascular: RRR, no murmurs, rubs, or gallops, right posterior tibial pulse with doppler   Gastrointestinal: Positive bowel sounds, soft, nontender, nondistended  Musculoskeletal: No bilateral ankle edema, no clubbing or cyanosis to extremities  Psychiatric: Appropriate affect, cooperative  Neurologic: Oriented x 3, strength symmetric in all extremities, Cranial Nerves grossly intact to confrontation, speech clear  Skin: No rashes, right groin incision open with yellow drainage noted and erythema    stacy BSD, scrotum erythema and TTP      Result Review:  I have personally reviewed the results from the time of this admission to 7/17/2024 18:51 EDT and agree with these findings:  [x]  Laboratory list / accordion  []  Microbiology  [x]  Radiology  []  EKG/Telemetry   []  Cardiology/Vascular   []  Pathology  []  Old records  []  Other:  Most notable findings include:       LAB RESULTS:      Lab 07/17/24  1110   WBC 9.88   HEMOGLOBIN 9.9*   HEMATOCRIT 29.8*   PLATELETS 394   NEUTROS ABS 6.76   IMMATURE GRANS (ABS) 0.05   LYMPHS ABS 1.82   MONOS ABS 1.08*   EOS ABS 0.11   MCV 96.1   CRP 10.54*   PROCALCITONIN 0.17   LACTATE 1.7         Lab 07/17/24  1110   SODIUM 140   POTASSIUM 4.0   CHLORIDE 104   CO2 20.2*   ANION GAP 15.8*   BUN 23   CREATININE 1.66*   EGFR 42.2*   GLUCOSE 97   CALCIUM 8.8         Lab 07/17/24  1110   TOTAL PROTEIN 7.0   ALBUMIN 3.8   GLOBULIN 3.2   ALT (SGPT) 6   AST (SGOT) 9   BILIRUBIN 0.4   ALK PHOS 97   LIPASE 12*                      UA          6/23/2024    14:33 7/17/2024    13:00   Urinalysis   Squamous Epithelial Cells, UA 0-2  0-2    Specific Gravity, UA 1.009  >=1.030    Ketones, UA Negative  Negative    Blood, UA Large (3+)  Large (3+)    Leukocytes, UA Moderate (2+)  Large (3+)    Nitrite, UA Negative  Positive    RBC, UA Too Numerous to Count  Too Numerous to Count    WBC, UA 3-5  Too Numerous to Count    Bacteria, UA Trace  Trace        Microbiology Results (last 10 days)       ** No results found for the last 240 hours. **            CT Abdomen Pelvis With Contrast    Result Date: 7/17/2024  PROCEDURE: CT ABDOMEN PELVIS W CONTRAST-  HISTORY: rt inguinal surgical wound dihescense/pus, recent prostate surg, status post aqua ablation of the prostate 07/21/2024 as well as right femoral endarterectomy.  COMPARISON: 05/04/2022.  PROCEDURE: The patient was injected with IV contrast. Axial images were obtained from the lung bases to the pubic symphysis by computed tomography. This study was performed with techniques to keep radiation doses as low as reasonably achievable, (ALARA). Individualized dose reduction techniques using automated exposure control or adjustment of mA and/or kV according to the patient size were employed.  FINDINGS:  ABDOMEN: The lung bases are clear. There is a moderate hiatal hernia similar to the prior exam. Gallbladder contains dependent high attenuation material suggesting sludge or stone that is new from the prior exam. No wall thickening or infiltration of the surrounding fat identified. The heart is proper size. The liver is homogenous with no focal abnormality. The spleen is unremarkable. No adrenal mass is present.  The pancreas is unremarkable. The kidneys demonstrate bilateral circumscribed hypodense lesions consistent with cysts that are similar to the prior exam. No hydronephrosis identified. The aorta is ectatic but not aneurysmal. There is no free fluid or adenopathy.  PELVIS: The GI tract demonstrates  no obstruction. Small bowel anastomoses identified in the right side of the abdomen which are new from the prior exam and appear to be widely patent. The appendix is not identified. The urinary bladder is is collapsed around a Gillespie catheter. Tip of the catheter extends posteriorly. There is a small amount of air within the collapsed bladder. There is significant wall thickening. As was seen previously there is significant enlargement of the prostate but this now appears very inhomogeneous with areas of increased attenuation not seen previously. Given the recent procedure, this may represent hemorrhage. On the sagittal image series 4 image 39 the bulb of the Gillespie catheter appears to be located within the superior aspect of the prostate. No free fluid identified. Best seen series 2 image 71 and series 3 image 44 is complete occlusion of the right superficial femoral artery immediately distal to the takeoff of the profunda. There is infiltration of the surrounding fat. There is partial opacification of the proximal superficial femoral vein which appears similar to the left side.      Impression: Occlusion of the right SFA, Paolo Smith PA-C, notified at 12:45 p.m 07/17/2024.  Enlarged, inhomogeneous prostate with significant wall thickening of the bladder and position of the Gillespie catheter as described, consider postoperative change versus infection or postoperative hemorrhage.,  Possible gallbladder sludge or small stones.  CTDI: 5.39 mGy DLP:235.53 mGy.cm  This report was signed and finalized on 7/17/2024 1:28 PM by Kavya Pollock MD.      US Scrotum & Testicles    Result Date: 7/17/2024  PROCEDURE: US SCROTUM AND TESTICLES-  HISTORY: testicular swelling/pain, recent prostate surg & hypogastric art embolization  PROCEDURE: Ultrasound images of the testicles were obtained bilaterally. Color Doppler images were obtained.  FINDINGS: Right: There is no right intratesticular mass or torsion. There is normal color flow to  the right testicle. There are multiple large right epididymal cysts some of which contain debris. These measure up to 2.6 cm. There is increased vascularity in the right epididymis suggestive of epididymitis. There is a small right hydrocele.  Left: Blood flow seen to the left testicle. Half of the left testicle demonstrates decreased echogenicity, mass is not excluded. A urology consultation is recommended. There is increased echogenicity of the epididymis suggestive of epididymitis. There is a minimal left hydrocele.      Impression: 1. No evidence of right intratesticular mass or torsion. 2. Large right epididymal cysts as described. 3. Findings suggestive of bilateral epididymitis. 4. No left testicular torsion. A hypoechoic region in the left testicle could represent possible mass. Urologic consultation is recommended.      Images were reviewed, interpreted, and dictated by Dr. Kavya Pollock MD Transcribed by Radha Reeves PA-C.  This report was signed and finalized on 7/17/2024 12:50 PM by Kavya Pollock MD.           Assessment & Plan   Assessment & Plan       Hypothyroidism    Mixed hyperlipidemia    Stage 3a chronic kidney disease    Bilateral epididymitis    Wound dehiscence, surgical, initial encounter    Superficial femoral artery occlusion        Bilateral epididymitis   Right groin dehiscence   S/p Aquablation 6/21/24 complicated by SFA occlusion that required right femoral embolectomy by Dr Morris And hematuria requiring hilario hypogastric embolization.   Chronic urinary retention with stacy  -- consult urology  -- npo after midnight   -- blood cultures x 2   -- consult PT wound in am   -- cont zosyn and vancomycin  -- consult ID in am   -- CRP 10.54, lactate 1.7, procal 0.17, WBC 9.88, check ESR       Recurrent SFA occlusion   S/p Right Femoral embolectomy  on 6/21/24  -- CT A/P showed occlusion of Right SFA   -- consult Dr. Morris, he is aware patient is here  -- npo after midnight   -- heparin drip      HTN  HLD  -- cont norvasc     Hypothyroidism   -- cont home meds     CKDIII  -- baseline creat 1.4-1.6      VTE Prophylaxis:  Pharmacologic VTE prophylaxis orders are present.          CODE STATUS:    Code Status and Medical Interventions:   Ordered at: 07/17/24 1845     Level Of Support Discussed With:    Patient     Code Status (Patient has no pulse and is not breathing):    CPR (Attempt to Resuscitate)     Medical Interventions (Patient has pulse or is breathing):    Full Support       Expected Discharge   Expected Discharge Date: 7/22/2024; Expected Discharge Time:      Kimmy Toro, APRN  07/17/24        Attending   Admission Attestation       I have performed an independent face-to-face diagnostic evaluation including performing an independent physical examination.  I approve of the documented plan of care above that was reviewed and developed with the advanced practice clinician (APC) and take responsibility for that plan along with its associated risks.  I have updated the HPI as appropriate.    Brief HPI    Nestor Nye 78-year-old male with a history of enlarged prostate, recent femoral endarterectomy, recent prostate aqua ablation who presented at an outside facility due to increased erythema and drainage around groin incision.  He had been caring for this at home with soap and water however it had progressively gotten worse.  Imaging at the outside hospital was notable for concern of epididymitis and incidental finding of occlusion of the right SFA.  Case was discussed with vascular surgery here who recommended transfer.    Attending Physical Exam:  Temp:  [97.9 °F (36.6 °C)-98.4 °F (36.9 °C)] 98.4 °F (36.9 °C)  Heart Rate:  [71-99] 76  Resp:  [18-20] 18  BP: (106-130)/(59-89) 120/76  Constitutional: No acute distress, awake, alert  HENT: NCAT, mucous membranes moist  Respiratory: Clear to auscultation bilaterally, respiratory effort normal   Cardiovascular: RRR, no murmurs, rubs, or  gallops  Gastrointestinal: Positive bowel sounds, soft, nontender, nondistended  Musculoskeletal: No bilateral ankle edema  Psychiatric: Appropriate affect, cooperative  Neurologic: Oriented x 3, no focal deficit  Skin: Groin incision is open with notable drainage, scrotum is enlarged and tender      Result Review:  I have personally reviewed the results from the time of this admission to 7/17/2024 23:11 EDT and agree with these findings:  [x]  Laboratory list / accordion  [x]  Microbiology  [x]  Radiology  []  EKG/Telemetry   []  Cardiology/Vascular   []  Pathology  []  Old records  []  Other:  Most notable findings include:   Imaging as above    Assessment and Plan:    79 y/o M here w bilateral epididymitis and SFA occlusion. Placed on broad spectrum abx tonight with cultures in process and ID and urology consult for AM. D/w vascular surgery w possible plans for OR tmrw, tentative, keep NPO at midnight and placed on heparin drip    See assessment and plan documented by APC above and updated/edited by me as appropriate.    Aleida De La Cruz MD  07/17/24

## 2024-07-17 NOTE — PROGRESS NOTES
Pharmacokinetic Consult - Vancomycin Dosing  Nestor Nye is a 78 y.o. male who has been consulted to dose Vancomycin for complicated skin and soft tissue infection.    Current Antimicrobial Therapy    Anti-Infectives (From admission, onward)      Ordered     Dose/Rate Route Frequency Start Stop    07/17/24 1329  Pharmacy to dose vancomycin        Ordering Provider: Harman Rodriguez PA-C     Does not apply Once 07/17/24 1345      07/17/24 1329  Pharmacy to Dose Zosyn        Ordering Provider: Harman Rodriguez PA-C     Does not apply Once 07/17/24 1345              Microbiology Results (last 10 days)       ** No results found for the last 240 hours. **             Allergies  Patient has no known allergies.    Relevant clinical data and objective history reviewed:  Creatinine   Date Value Ref Range Status   07/17/2024 1.66 (H) 0.76 - 1.27 mg/dL Final     Estimated Creatinine Clearance: 39.4 mL/min (A) (by C-G formula based on SCr of 1.66 mg/dL (H)).  No intake/output data recorded.  Patient weight: 74.8 kg (165 lb)    Asessment/Plan  Initiate Vancomycin 20mg/kg IV for one dose.  Pharmacy will monitor Mr. Nye's renal function and clinical status and adjust the Vancomycin dose and/or frequency as needed.    Thanks,   Jess Astorga, PharmD  7/17/2024  13:43 EDT

## 2024-07-17 NOTE — PROGRESS NOTES
HEPARIN INFUSION  Nestor Nye is a  78 y.o. male receiving heparin infusion.     Therapy for (VTE/Cardiac): DVT  Patient Weight: 74.8 kg  Initial Bolus (Y/N):   Yes  Any Bolus (Y/N):   Yes        Signs or Symptoms of Bleeding: No    Recommend Xa every 6 hours.   VTE (PE/DVT)   Initial Bolus: 80 units/kg (Max 10,000 units)  Initial rate: 18 units/kg/hr (Max 1,500 units/hr)    Anti Xa Rebolus Infusion Hold time Change infusion Dose (Units/kg/hr) Next Anti Xa Level Due   < 0.11 50 Units/kg  (4000 Units Max) None Increase by  4 Units/kg/hr 6 hours   0.11 - 0.19 25 Units/kg  (2000 Units Max) None Increase by  3 Units/kg/hr 6 hours   0.2 - 0.29 0 None Increase by  2 Units/kg/hr 6 hours   0.3 - 0.7 0 None No Change 6 hours (after 2 consecutive levels in range check qAM)   0.71 - 0.8 0 None Decrease by  1 Units/kg/hr 6 hours   0.81 - 0.9 0 None Decrease by  2 Units/kg/hr 6 hours   0.91 - 1 0 60 Minutes Decrease by  3 Units/kg/hr 6 hours   >1 0 Hold  After Anti Xa less than 0.7 decrease previous rate by  4 Units/kg/hr  Every 2 hours until Anti Xa is less than 0.7 then when infusion restarts in 6 hours     Results from last 7 days   Lab Units 07/17/24  1110   HEMOGLOBIN g/dL 9.9*   HEMATOCRIT % 29.8*   PLATELETS 10*3/mm3 394          Date   Time   Anti-Xa Current Rate (Unit/kg/hr) Bolus   (Units) Rate Change   (Unit/kg/hr) New Rate (Unit/kg/hr) Next   Anti-Xa Comments  Pump Check Daily   7/17 18:42 <0.10 New Start 6,000 +18 18 7/18 @ 0200 Discussed with ELENA Yeung [facility]  7/17/2024  18:50 EDT

## 2024-07-17 NOTE — ED PROVIDER NOTES
EMERGENCY DEPARTMENT ENCOUNTER    Pt Name: Nestor Nye  MRN: [id number]  Pt :   1945  Room Number:  01SF/01  Date of encounter:  2024  PCP: Roxanne Slater MD  ED Provider: Harman Rodriguez PA-C    Historian: Patient, patient's brother at bedside, nursing notes      HPI:  Chief Complaint: Wound infection, testicular pain and swelling        Context: Nestor Nye is a 78 y.o. male who presents to the ED c/o foul-smelling pus draining from his wound of his right inguinal region for the last several days.  Patient states he had surgery last month due to a thrombus in his vein after having a Coblation of his prostate at [facility].  Patient also reports he has been having intermittent pains and swelling in his testicles worse on the left side of his scrotum.  Patient denies any fever or chills, nausea or vomiting, abdominal pain, leg pain, numbness or tingling, or any other complaint today.      PAST MEDICAL HISTORY  Past Medical History:   Diagnosis Date    Acute maxillary sinusitis     Arthritis     Disease of thyroid gland     Enlarged prostate     GERD (gastroesophageal reflux disease)     Gout     Hypertension     Impaired functional mobility, balance, gait, and endurance     PONV (postoperative nausea and vomiting)     Renal disorder     Rheumatoid arthritis     Right inguinal hernia 10/28/2021    Added automatically from request for surgery [id number]    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, changed his voice since then         PAST SURGICAL HISTORY  Past Surgical History:   Procedure Laterality Date    ABDOMINAL SURGERY      BACK SURGERY      BELPHAROPTOSIS REPAIR Bilateral     BRAIN SURGERY      COLON SURGERY      bowel resection x2 per pt    CRANIOTOMY FOR SUBDURAL HEMATOMA      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    DIAGNOSTIC LAPAROSCOPY N/A 2022    Procedure: DIAGNOSTIC LAPAROSCOPY  CONVERTED TO EXPLORATORY LAPAROTOMY WITH SMALL BOWEL RESECTION;  Surgeon: Maximo Bennett MD;  Location:  [facility];  Service: General;  Laterality: N/A;  CONVERTED TO EXPLORATORY LAPAROTOMY AT 1912    EXPLORATORY LAPAROTOMY N/A 04/16/2022    Procedure: LAPAROTOMY EXPLORATORY WITH SMALL BOWEL OBSTRUCTION;  Surgeon: Maximo Bennett MD;  Location:  [facility];  Service: General;  Laterality: N/A;    EYE SURGERY      on upper etelids    FEMORAL ENDARTERECTOMY Right 6/21/2024    Procedure: FEMORAL EMBELECTOMY RIGHT;  Surgeon: Kingsley Morris MD;  Location:  [facility];  Service: Vascular;  Laterality: Right;    FOREIGN BODY REMOVAL Right     removal of metal fragments from RUQ after electrocution and fall.      HERNIA REPAIR      INGUINAL HERNIA REPAIR Right 11/01/2021    Procedure: INGUINAL HERNIA REPAIR WITH MESH AND EXCISION OF SPERMATIC CORD LIPOMA;  Surgeon: Rosey Glover MD;  Location:  [facility];  Service: General;  Laterality: Right;    LUMBAR SPINE SURGERY      PROSTATE AQUABLATION N/A 6/21/2024    Procedure: AQUABLATION OF PROSTATE;  Surgeon: Prieto Rey MD;  Location:  [facility];  Service: Robotics - Urology;  Laterality: N/A;    SKIN GRAFT Right     RUE skin grafting after electrocution    THORACIC SPINE SURGERY           FAMILY HISTORY  Family History   Problem Relation Age of Onset    Arthritis Mother     Prostate cancer Brother     Arthritis Father          SOCIAL HISTORY  Social History     Socioeconomic History    Marital status:    Tobacco Use    Smoking status: Former     Types: Cigars     Passive exposure: Past    Smokeless tobacco: Never    Tobacco comments:     Smoked 1 ppd of small cigars for 50  years    Vaping Use    Vaping status: Former   Substance and Sexual Activity    Alcohol use: Not Currently    Drug use: No    Sexual activity: Defer         ALLERGIES  Patient has no known allergies.        REVIEW OF SYSTEMS  Review of Systems       All systems reviewed and  negative except for those discussed in HPI.       PHYSICAL EXAM    I have reviewed the triage vital signs and nursing notes.    ED Triage Vitals [07/17/24 1058]   Temp Heart Rate Resp BP SpO2   98.3 °F (36.8 °C) 99 18 114/76 98 %      Temp src Heart Rate Source Patient Position BP Location FiO2 (%)   Oral Monitor Sitting Left arm --       Physical Exam        LAB RESULTS  Recent Results (from the past 24 hour(s))   Green Top (Gel)    Collection Time: 07/17/24 11:10 AM   Result Value Ref Range    Extra Tube Hold for add-ons.    Lavender Top    Collection Time: 07/17/24 11:10 AM   Result Value Ref Range    Extra Tube hold for add-on    Gold Top - SST    Collection Time: 07/17/24 11:10 AM   Result Value Ref Range    Extra Tube Hold for add-ons.    Light Blue Top    Collection Time: 07/17/24 11:10 AM   Result Value Ref Range    Extra Tube Hold for add-ons.    Lactic Acid, Plasma    Collection Time: 07/17/24 11:10 AM    Specimen: Blood   Result Value Ref Range    Lactate 1.7 0.5 - 2.0 mmol/L   Comprehensive Metabolic Panel    Collection Time: 07/17/24 11:10 AM    Specimen: Blood   Result Value Ref Range    Glucose 97 65 - 99 mg/dL    BUN 23 8 - 23 mg/dL    Creatinine 1.66 (H) 0.76 - 1.27 mg/dL    Sodium 140 136 - 145 mmol/L    Potassium 4.0 3.5 - 5.2 mmol/L    Chloride 104 98 - 107 mmol/L    CO2 20.2 (L) 22.0 - 29.0 mmol/L    Calcium 8.8 8.6 - 10.5 mg/dL    Total Protein 7.0 6.0 - 8.5 g/dL    Albumin 3.8 3.5 - 5.2 g/dL    ALT (SGPT) 6 1 - 41 U/L    AST (SGOT) 9 1 - 40 U/L    Alkaline Phosphatase 97 39 - 117 U/L    Total Bilirubin 0.4 0.0 - 1.2 mg/dL    Globulin 3.2 gm/dL    A/G Ratio 1.2 g/dL    BUN/Creatinine Ratio 13.9 7.0 - 25.0    Anion Gap 15.8 (H) 5.0 - 15.0 mmol/L    eGFR 42.2 (L) >60.0 mL/min/1.73   Lipase    Collection Time: 07/17/24 11:10 AM    Specimen: Blood   Result Value Ref Range    Lipase 12 (L) 13 - 60 U/L   Procalcitonin    Collection Time: 07/17/24 11:10 AM    Specimen: Blood   Result Value Ref Range     Procalcitonin 0.17 0.00 - 0.25 ng/mL   C-reactive Protein    Collection Time: 07/17/24 11:10 AM    Specimen: Blood   Result Value Ref Range    C-Reactive Protein 10.54 (H) 0.00 - 0.50 mg/dL   CBC Auto Differential    Collection Time: 07/17/24 11:10 AM    Specimen: Blood   Result Value Ref Range    WBC 9.88 3.40 - 10.80 10*3/mm3    RBC 3.10 (L) 4.14 - 5.80 10*6/mm3    Hemoglobin 9.9 (L) 13.0 - 17.7 g/dL    Hematocrit 29.8 (L) 37.5 - 51.0 %    MCV 96.1 79.0 - 97.0 fL    MCH 31.9 26.6 - 33.0 pg    MCHC 33.2 31.5 - 35.7 g/dL    RDW 13.1 12.3 - 15.4 %    RDW-SD 46.1 37.0 - 54.0 fl    MPV 9.2 6.0 - 12.0 fL    Platelets 394 140 - 450 10*3/mm3    Neutrophil % 68.5 42.7 - 76.0 %    Lymphocyte % 18.4 (L) 19.6 - 45.3 %    Monocyte % 10.9 5.0 - 12.0 %    Eosinophil % 1.1 0.3 - 6.2 %    Basophil % 0.6 0.0 - 1.5 %    Immature Grans % 0.5 0.0 - 0.5 %    Neutrophils, Absolute 6.76 1.70 - 7.00 10*3/mm3    Lymphocytes, Absolute 1.82 0.70 - 3.10 10*3/mm3    Monocytes, Absolute 1.08 (H) 0.10 - 0.90 10*3/mm3    Eosinophils, Absolute 0.11 0.00 - 0.40 10*3/mm3    Basophils, Absolute 0.06 0.00 - 0.20 10*3/mm3    Immature Grans, Absolute 0.05 0.00 - 0.05 10*3/mm3    nRBC 0.0 0.0 - 0.2 /100 WBC   Urinalysis With Culture If Indicated - Indwelling Urethral Catheter    Collection Time: 07/17/24  1:00 PM    Specimen: Indwelling Urethral Catheter; Urine   Result Value Ref Range    Color, UA Orange (A) Yellow, Straw    Appearance, UA Turbid (A) Clear    pH, UA 5.5 5.0 - 8.0    Specific Gravity, UA >=1.030 1.005 - 1.030    Glucose, UA Negative Negative    Ketones, UA Negative Negative    Bilirubin, UA Negative Negative    Blood, UA Large (3+) (A) Negative    Protein,  mg/dL (2+) (A) Negative    Leuk Esterase, UA Large (3+) (A) Negative    Nitrite, UA Positive (A) Negative    Urobilinogen, UA 0.2 E.U./dL 0.2 - 1.0 E.U./dL   Urinalysis, Microscopic Only - Indwelling Urethral Catheter    Collection Time: 07/17/24  1:00 PM    Specimen:  Indwelling Urethral Catheter; Urine   Result Value Ref Range    RBC, UA Too Numerous to Count (A) None Seen, 0-2 /HPF    WBC, UA Too Numerous to Count (A) None Seen, 0-2 /HPF    Bacteria, UA Trace (A) None Seen /HPF    Squamous Epithelial Cells, UA 0-2 None Seen, 0-2 /HPF    Hyaline Casts, UA Unable to determine due to loaded field None Seen /LPF    Methodology Manual Light Microscopy    Protime-INR    Collection Time: 07/17/24  6:42 PM    Specimen: Blood   Result Value Ref Range    Protime 14.7 (H) 12.2 - 14.5 Seconds    INR 1.14 (H) 0.89 - 1.12   aPTT    Collection Time: 07/17/24  6:42 PM    Specimen: Blood   Result Value Ref Range    PTT 28.7 (L) 60.0 - 90.0 seconds   Sedimentation Rate    Collection Time: 07/17/24  6:42 PM    Specimen: Blood   Result Value Ref Range    Sed Rate 92 (H) 0 - 20 mm/hr       If labs were ordered, I independently reviewed the results and considered them in treating the patient.        RADIOLOGY  CT Abdomen Pelvis With Contrast    Result Date: 7/17/2024  PROCEDURE: CT ABDOMEN PELVIS W CONTRAST-  HISTORY: rt inguinal surgical wound dihescense/pus, recent prostate surg, status post aqua ablation of the prostate 07/21/2024 as well as right femoral endarterectomy.  COMPARISON: 05/04/2022.  PROCEDURE: The patient was injected with IV contrast. Axial images were obtained from the lung bases to the pubic symphysis by computed tomography. This study was performed with techniques to keep radiation doses as low as reasonably achievable, (ALARA). Individualized dose reduction techniques using automated exposure control or adjustment of mA and/or kV according to the patient size were employed.  FINDINGS:  ABDOMEN: The lung bases are clear. There is a moderate hiatal hernia similar to the prior exam. Gallbladder contains dependent high attenuation material suggesting sludge or stone that is new from the prior exam. No wall thickening or infiltration of the surrounding fat identified. The heart is  proper size. The liver is homogenous with no focal abnormality. The spleen is unremarkable. No adrenal mass is present.  The pancreas is unremarkable. The kidneys demonstrate bilateral circumscribed hypodense lesions consistent with cysts that are similar to the prior exam. No hydronephrosis identified. The aorta is ectatic but not aneurysmal. There is no free fluid or adenopathy.  PELVIS: The GI tract demonstrates no obstruction. Small bowel anastomoses identified in the right side of the abdomen which are new from the prior exam and appear to be widely patent. The appendix is not identified. The urinary bladder is is collapsed around a Gillespie catheter. Tip of the catheter extends posteriorly. There is a small amount of air within the collapsed bladder. There is significant wall thickening. As was seen previously there is significant enlargement of the prostate but this now appears very inhomogeneous with areas of increased attenuation not seen previously. Given the recent procedure, this may represent hemorrhage. On the sagittal image series 4 image 39 the bulb of the Gillespie catheter appears to be located within the superior aspect of the prostate. No free fluid identified. Best seen series 2 image 71 and series 3 image 44 is complete occlusion of the right superficial femoral artery immediately distal to the takeoff of the profunda. There is infiltration of the surrounding fat. There is partial opacification of the proximal superficial femoral vein which appears similar to the left side.      Occlusion of the right SFA, Paolo Smith PA-C, notified at 12:45 p.m 07/17/2024.  Enlarged, inhomogeneous prostate with significant wall thickening of the bladder and position of the Gillespie catheter as described, consider postoperative change versus infection or postoperative hemorrhage.,  Possible gallbladder sludge or small stones.  CTDI: 5.39 mGy DLP:235.53 mGy.cm  This report was signed and finalized on 7/17/2024 1:28 PM  by Kavya Pollock MD.      US Scrotum & Testicles    Result Date: 7/17/2024  PROCEDURE: US SCROTUM AND TESTICLES-  HISTORY: testicular swelling/pain, recent prostate surg & hypogastric art embolization  PROCEDURE: Ultrasound images of the testicles were obtained bilaterally. Color Doppler images were obtained.  FINDINGS: Right: There is no right intratesticular mass or torsion. There is normal color flow to the right testicle. There are multiple large right epididymal cysts some of which contain debris. These measure up to 2.6 cm. There is increased vascularity in the right epididymis suggestive of epididymitis. There is a small right hydrocele.  Left: Blood flow seen to the left testicle. Half of the left testicle demonstrates decreased echogenicity, mass is not excluded. A urology consultation is recommended. There is increased echogenicity of the epididymis suggestive of epididymitis. There is a minimal left hydrocele.      1. No evidence of right intratesticular mass or torsion. 2. Large right epididymal cysts as described. 3. Findings suggestive of bilateral epididymitis. 4. No left testicular torsion. A hypoechoic region in the left testicle could represent possible mass. Urologic consultation is recommended.      Images were reviewed, interpreted, and dictated by Dr. Kavya Pollock MD Transcribed by Radha Reeves PA-C.  This report was signed and finalized on 7/17/2024 12:50 PM by Kavya Pollock MD.       I ordered and independently reviewed the above noted radiographic studies.      I viewed images of CT abdomen and pelvis which showed occlusion of the right SFA per my independent interpretation.    I viewed images of the ultrasound of the scrotum and testes which did show enlargement of the left testicle and a hypoallergenic mass and swollen epididymis bilaterally per my independent interpretation    See radiologist's dictation for official interpretation.        PROCEDURES    Critical Care    Performed by:  Harman Rodriguez PA-C  Authorized by: Zen Miles MD    Critical care provider statement:     Critical care time (minutes):  45    Critical care time was exclusive of:  Separately billable procedures and treating other patients and teaching time    Critical care was necessary to treat or prevent imminent or life-threatening deterioration of the following conditions:  Circulatory failure    Critical care was time spent personally by me on the following activities:  Development of treatment plan with patient or surrogate, discussions with consultants, evaluation of patient's response to treatment, examination of patient, interpretation of cardiac output measurements, obtaining history from patient or surrogate, review of old charts, re-evaluation of patient's condition, pulse oximetry, ordering and review of radiographic studies, ordering and review of laboratory studies and ordering and performing treatments and interventions    Care discussed with: accepting provider at another facility        No orders to display       MEDICATIONS GIVEN IN ER    Medications   iopamidol (ISOVUE-300) 61 % injection 100 mL (100 mL Intravenous Given 7/17/24 1208)   vancomycin (VANCOCIN) 1,500 mg in sodium chloride 0.9 % 500 mL IVPB (1,500 mg Intravenous Given 7/17/24 1428)   piperacillin-tazobactam (ZOSYN) IVPB 3.375 g IVPB in 100 mL NS (VTB) (0 g Intravenous Transferred to External Facility 7/17/24 1533)         MEDICAL DECISION MAKING, PROGRESS, and CONSULTS    All labs, if obtained, have been independently reviewed by me.  All radiology studies, if obtained, have been reviewed by me and the radiologist dictating the report.  All EKG's, if obtained, have been independently viewed and interpreted by me/my attending physician.      Discussion below represents my analysis of pertinent findings related to patient's condition, differential diagnosis, treatment plan and final disposition.    78-year-old male presenting for  evaluation of wound dehiscence and foul-smelling discharge from the wound in his right inguinal region.  Patient also states he is having testicular pain and swelling worse on the left than right.  He is upright alert oriented in no acute distress on my exam.  His vital signs as interpreted by me are stable with a blood pressure of 114/76, temperature 98.3 °F, heart rate of 99 and an O2 saturation within normal limits at 98%.    On exam he does have dehiscence of approximately 1/2 inches on the medial aspect of a large wound in his inguinal region from his recent vascular surgical procedure performed in mid June.  The wound is foul-smelling with some discharge.  P catheter is in place with yellow-colored urine in the cath bag.  Chart review shows patient had a thrombus in his SFA that was surgically removed by vascular surgery in mid June after having a aqua ablation of his prostate performed by urologist Dr. Rey.  Extensive workup was initiated including a CT abdomen and pelvis to further elucidate possible postoperative complications.  Catheter was exchanged and urinalysis sent along with extensive laboratory workup and ultrasound of the scrotum and testicles    CBC showed no leukocytosis, lactic acid was normal, procalcitonin within normal limits, given these results I have no concern for sepsis at this time.  CRP is elevated at 10.54.  Urinalysis did show proteinuria, hematuria, leukocyte Estrace and nitrite positive and significant white blood cells confirming urinary tract infection.  Given concern for the patient's wound infection, blood cultures were ordered patient was started on vancomycin and Zosyn which should also cover appropriately against UTI pathogens.  CT abdomen and pelvis per radiology report showed patient had a new occlusion of his SFA.  Given these findings I contacted vascular surgeon Dr. Morris, he agreed to consult on the patient if the patient was transferred successfully to [facility].  No vascular surgery available at this current facility.    I consulted with urologist on-call Dr. Rey who reviewed the patient's ultrasound of the scrotum and testes which did show a hyper genic possible mass in the left testicle and concerning findings for epididymitis.  He stated the patient can follow-up with these findings on an outpatient basis with him he already has an appointment scheduled in early August    I consulted with hospital medicine Dr. De Jesus at [facility] who did agree to admit the patient and the patient was successfully transferred by ambulance to [facility].                     Differential diagnosis:    Differential diagnosis included but was not limited to wound infection, postoperative complication, sepsis, UTI, pyelonephritis, testicular cancer, testicular torsion, scrotal cellulitis, epididymitis, intra-abdominal infection, among others      Additional sources:    - Discussed/ obtained information from independent historians: Patient's brother at bedside in addition to patient when we discussed patient's surgical management back in June at [facility]    - External (non-ED) record review: Urology office visit note from 7/8/2024 with LANNY Hammonds regarding patient's BPH and urinary retention, indwelling catheter and management thereof    - Chronic or social conditions impacting care: None    Orders placed during this visit:  Orders Placed This Encounter   Procedures    Critical Care    Urine Culture - Urine,    Blood Culture With RAGHAV - Blood,    CT Abdomen Pelvis With Contrast    US Scrotum & Testicles    [place] Draw    Lactic Acid, Plasma    Comprehensive Metabolic Panel    Lipase    Urinalysis With Culture If Indicated - Urine, Catheter    Procalcitonin    C-reactive Protein    CBC Auto Differential    Urinalysis, Microscopic Only - Urine, Clean Catch    Replace Current Indwelling Urinary Catheter Prior  to Collecting Urine Specimen    Green Top (Gel)    Lavender Top    Gold Top - SST    Light Blue Top    CBC & Differential         Additional orders considered but not ordered: None      ED Course:    Consultants: Vascular surgery Dr. Morris, urologist Dr. Rey, hospital medicine Dr. De Jesus                Shared Decision Making:  After my consideration of clinical presentation and any laboratory/radiology studies obtained, I discussed the findings with the patient/patient representative who is in agreement with the treatment plan and the final disposition.   Risks and benefits of discharge and/or observation/admission were discussed.       AS OF 19:11 EDT VITALS:    BP - 116/71  HR - 99  TEMP - 98.3 °F (36.8 °C) (Oral)  O2 SATS - 98%                  DIAGNOSIS  Final diagnoses:   Acute cystitis with hematuria   Superficial femoral artery occlusion   Abnormal finding on diagnostic imaging of left testicle   Wound infection after surgery         DISPOSITION  Transfer to another facility-[facility]      Please note that portions of this document were completed with voice recognition software.      Harman Rodirguez PA-C  07/17/24 1911

## 2024-07-17 NOTE — PROGRESS NOTES
Pharmacokinetic Consult - Zosyn Dosing  Nestor Nye is a 78 y.o. male who has been consulted to dose Zosyn for complicated skin and soft tissue infection.    Current Antimicrobial Therapy    Anti-Infectives (From admission, onward)      Ordered     Dose/Rate Route Frequency Start Stop    07/17/24 1345  vancomycin (VANCOCIN) 1,500 mg in sodium chloride 0.9 % 500 mL IVPB        Ordering Provider: Harman Rodriguez PA-C    20 mg/kg × 74.8 kg  333.3 mL/hr over 90 Minutes Intravenous Once 07/17/24 1401      07/17/24 1329  Pharmacy to dose vancomycin        Ordering Provider: Harman Rodriguez PA-C     Does not apply Once 07/17/24 1345      07/17/24 1329  Pharmacy to Dose Zosyn        Ordering Provider: Harman Rodriguez PA-C     Does not apply Once 07/17/24 1345              Microbiology Results (last 10 days)       ** No results found for the last 240 hours. **             Allergies  Patient has no known allergies.    Relevant clinical data and objective history reviewed:  Creatinine   Date Value Ref Range Status   07/17/2024 1.66 (H) 0.76 - 1.27 mg/dL Final     Estimated Creatinine Clearance: 39.4 mL/min (A) (by C-G formula based on SCr of 1.66 mg/dL (H)).  No intake/output data recorded.  Patient weight: 74.8 kg (165 lb)    Asessment/Plan  Initiate Zosyn 3.375g IV for one time dose.  Pharmacy will monitor Mr. Nye's renal function and clinical status and adjust the Zosyn dose and/or frequency as needed.    Thanks,   Jess Astorga, PharmD  7/17/2024  13:49 EDT

## 2024-07-18 ENCOUNTER — TELEPHONE (OUTPATIENT)
Dept: UROLOGY | Facility: CLINIC | Age: 79
End: 2024-07-18
Payer: MEDICARE

## 2024-07-18 PROBLEM — E44.0 MODERATE MALNUTRITION: Status: ACTIVE | Noted: 2024-07-18

## 2024-07-18 PROBLEM — T81.31XA DEHISCENCE OF OPERATIVE WOUND: Status: ACTIVE | Noted: 2024-07-18

## 2024-07-18 LAB
ANION GAP SERPL CALCULATED.3IONS-SCNC: 12 MMOL/L (ref 5–15)
BASOPHILS # BLD AUTO: 0.07 10*3/MM3 (ref 0–0.2)
BASOPHILS NFR BLD AUTO: 1.1 % (ref 0–1.5)
BUN SERPL-MCNC: 20 MG/DL (ref 8–23)
BUN/CREAT SERPL: 13.2 (ref 7–25)
CALCIUM SPEC-SCNC: 8.5 MG/DL (ref 8.6–10.5)
CHLORIDE SERPL-SCNC: 105 MMOL/L (ref 98–107)
CO2 SERPL-SCNC: 21 MMOL/L (ref 22–29)
CREAT SERPL-MCNC: 1.51 MG/DL (ref 0.76–1.27)
DEPRECATED RDW RBC AUTO: 45.6 FL (ref 37–54)
EGFRCR SERPLBLD CKD-EPI 2021: 47.3 ML/MIN/1.73
EOSINOPHIL # BLD AUTO: 0.17 10*3/MM3 (ref 0–0.4)
EOSINOPHIL NFR BLD AUTO: 2.6 % (ref 0.3–6.2)
ERYTHROCYTE [DISTWIDTH] IN BLOOD BY AUTOMATED COUNT: 12.9 % (ref 12.3–15.4)
GLUCOSE SERPL-MCNC: 103 MG/DL (ref 65–99)
HCT VFR BLD AUTO: 28.8 % (ref 37.5–51)
HGB BLD-MCNC: 9.2 G/DL (ref 13–17.7)
IMM GRANULOCYTES # BLD AUTO: 0.01 10*3/MM3 (ref 0–0.05)
IMM GRANULOCYTES NFR BLD AUTO: 0.2 % (ref 0–0.5)
LYMPHOCYTES # BLD AUTO: 1.63 10*3/MM3 (ref 0.7–3.1)
LYMPHOCYTES NFR BLD AUTO: 25.2 % (ref 19.6–45.3)
MCH RBC QN AUTO: 30.9 PG (ref 26.6–33)
MCHC RBC AUTO-ENTMCNC: 31.9 G/DL (ref 31.5–35.7)
MCV RBC AUTO: 96.6 FL (ref 79–97)
MONOCYTES # BLD AUTO: 0.78 10*3/MM3 (ref 0.1–0.9)
MONOCYTES NFR BLD AUTO: 12.1 % (ref 5–12)
NEUTROPHILS NFR BLD AUTO: 3.81 10*3/MM3 (ref 1.7–7)
NEUTROPHILS NFR BLD AUTO: 58.8 % (ref 42.7–76)
NRBC BLD AUTO-RTO: 0 /100 WBC (ref 0–0.2)
PLATELET # BLD AUTO: 375 10*3/MM3 (ref 140–450)
PMV BLD AUTO: 9.4 FL (ref 6–12)
POTASSIUM SERPL-SCNC: 4.3 MMOL/L (ref 3.5–5.2)
RBC # BLD AUTO: 2.98 10*6/MM3 (ref 4.14–5.8)
SODIUM SERPL-SCNC: 138 MMOL/L (ref 136–145)
UFH PPP CHRO-ACNC: 0.24 IU/ML (ref 0.3–0.7)
UFH PPP CHRO-ACNC: 0.38 IU/ML (ref 0.3–0.7)
UFH PPP CHRO-ACNC: 0.41 IU/ML (ref 0.3–0.7)
WBC NRBC COR # BLD AUTO: 6.47 10*3/MM3 (ref 3.4–10.8)

## 2024-07-18 PROCEDURE — 97116 GAIT TRAINING THERAPY: CPT

## 2024-07-18 PROCEDURE — 97110 THERAPEUTIC EXERCISES: CPT

## 2024-07-18 PROCEDURE — 97530 THERAPEUTIC ACTIVITIES: CPT

## 2024-07-18 PROCEDURE — 25010000002 ONDANSETRON PER 1 MG: Performed by: NURSE PRACTITIONER

## 2024-07-18 PROCEDURE — 25010000002 PIPERACILLIN SOD-TAZOBACTAM PER 1 G: Performed by: INTERNAL MEDICINE

## 2024-07-18 PROCEDURE — 85025 COMPLETE CBC W/AUTO DIFF WBC: CPT | Performed by: NURSE PRACTITIONER

## 2024-07-18 PROCEDURE — 99232 SBSQ HOSP IP/OBS MODERATE 35: CPT | Performed by: INTERNAL MEDICINE

## 2024-07-18 PROCEDURE — 85520 HEPARIN ASSAY: CPT

## 2024-07-18 PROCEDURE — 25010000002 DAPTOMYCIN PER 1 MG: Performed by: INTERNAL MEDICINE

## 2024-07-18 PROCEDURE — 80048 BASIC METABOLIC PNL TOTAL CA: CPT | Performed by: NURSE PRACTITIONER

## 2024-07-18 PROCEDURE — 97535 SELF CARE MNGMENT TRAINING: CPT

## 2024-07-18 PROCEDURE — 25010000002 PIPERACILLIN SOD-TAZOBACTAM PER 1 G: Performed by: NURSE PRACTITIONER

## 2024-07-18 PROCEDURE — 97162 PT EVAL MOD COMPLEX 30 MIN: CPT

## 2024-07-18 PROCEDURE — 97165 OT EVAL LOW COMPLEX 30 MIN: CPT

## 2024-07-18 PROCEDURE — 25010000002 ONDANSETRON PER 1 MG: Performed by: SURGERY

## 2024-07-18 PROCEDURE — 25010000002 HEPARIN (PORCINE) 25000-0.45 UT/250ML-% SOLUTION

## 2024-07-18 RX ADMIN — DAPTOMYCIN 450 MG: 500 INJECTION, POWDER, LYOPHILIZED, FOR SOLUTION INTRAVENOUS at 10:29

## 2024-07-18 RX ADMIN — LEVOTHYROXINE SODIUM 88 MCG: 88 TABLET ORAL at 05:48

## 2024-07-18 RX ADMIN — PIPERACILLIN AND TAZOBACTAM 3.38 G: 3; .375 INJECTION, POWDER, LYOPHILIZED, FOR SOLUTION INTRAVENOUS at 03:24

## 2024-07-18 RX ADMIN — ACETAMINOPHEN 650 MG: 325 TABLET ORAL at 19:55

## 2024-07-18 RX ADMIN — PIPERACILLIN AND TAZOBACTAM 3.38 G: 3; .375 INJECTION, POWDER, LYOPHILIZED, FOR SOLUTION INTRAVENOUS at 11:55

## 2024-07-18 RX ADMIN — AMLODIPINE BESYLATE 2.5 MG: 2.5 TABLET ORAL at 09:31

## 2024-07-18 RX ADMIN — HEPARIN SODIUM 20 UNITS/KG/HR: 10000 INJECTION, SOLUTION INTRAVENOUS at 10:29

## 2024-07-18 RX ADMIN — FERROUS SULFATE TAB 325 MG (65 MG ELEMENTAL FE) 325 MG: 325 (65 FE) TAB at 09:31

## 2024-07-18 RX ADMIN — PIPERACILLIN AND TAZOBACTAM 3.38 G: 3; .375 INJECTION, POWDER, LYOPHILIZED, FOR SOLUTION INTRAVENOUS at 19:51

## 2024-07-18 RX ADMIN — FINASTERIDE 5 MG: 5 TABLET, FILM COATED ORAL at 09:31

## 2024-07-18 RX ADMIN — Medication 10 ML: at 19:52

## 2024-07-18 RX ADMIN — ONDANSETRON 4 MG: 2 INJECTION INTRAMUSCULAR; INTRAVENOUS at 00:52

## 2024-07-18 RX ADMIN — ASPIRIN 81 MG CHEWABLE TABLET 81 MG: 81 TABLET CHEWABLE at 09:31

## 2024-07-18 NOTE — CONSULTS
Nestor Nye  1945  [id number]    Date of Consult: 7/18/2024    Date of Admission: 7/17/2024      Requesting Provider: Dr. Bhagat  Evaluating Physician: Jose Jones MD    CC: right groin wound dehiscence    Reason for Consultation: right groin wound infection and UTI with h/o BPH    History of present illness:    Patient is a 78 y.o.  Yr old male with history of of peripheral arterial disease, GERD, hypertension, rheumatoid arthritis with recent right iliofemoral embolectomy surgical site was stable then had opening up of wound with dehiscence with drainage with some erythema and odor noted now admitted plans for wound revision and debridement in several days cultures obtained on empiric IV antibiotics and infectious disease consultation requested today.    Past Medical History:   Diagnosis Date    Acute maxillary sinusitis     Arthritis     Disease of thyroid gland     Enlarged prostate     GERD (gastroesophageal reflux disease)     Gout     Hypertension     Impaired functional mobility, balance, gait, and endurance     PONV (postoperative nausea and vomiting)     Renal disorder     Rheumatoid arthritis     Right inguinal hernia 10/28/2021    Added automatically from request for surgery [id number]    Vocal cord anomaly 2007    had problem with enlarge vocal cord after low back surgery, changed his voice since then       Past Surgical History:   Procedure Laterality Date    ABDOMINAL SURGERY      BACK SURGERY      BELPHAROPTOSIS REPAIR Bilateral     BRAIN SURGERY      COLON SURGERY      bowel resection x2 per pt    CRANIOTOMY FOR SUBDURAL HEMATOMA      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    DIAGNOSTIC LAPAROSCOPY N/A 05/04/2022    Procedure: DIAGNOSTIC LAPAROSCOPY CONVERTED TO EXPLORATORY LAPAROTOMY WITH SMALL BOWEL RESECTION;  Surgeon: Maximo Bennett MD;  Location: [facility];  Service: General;  Laterality: N/A;  CONVERTED TO EXPLORATORY LAPAROTOMY AT 1912    EXPLORATORY  LAPAROTOMY N/A 04/16/2022    Procedure: LAPAROTOMY EXPLORATORY WITH SMALL BOWEL OBSTRUCTION;  Surgeon: Maximo Bennett MD;  Location:  [facility];  Service: General;  Laterality: N/A;    EYE SURGERY      on upper etelids    FEMORAL ENDARTERECTOMY Right 6/21/2024    Procedure: FEMORAL EMBELECTOMY RIGHT;  Surgeon: Kingsley Morris MD;  Location:  [facility];  Service: Vascular;  Laterality: Right;    FOREIGN BODY REMOVAL Right     removal of metal fragments from RUQ after electrocution and fall.      HERNIA REPAIR      INGUINAL HERNIA REPAIR Right 11/01/2021    Procedure: INGUINAL HERNIA REPAIR WITH MESH AND EXCISION OF SPERMATIC CORD LIPOMA;  Surgeon: Rosey Glover MD;  Location:  [facility];  Service: General;  Laterality: Right;    LUMBAR SPINE SURGERY      PROSTATE AQUABLATION N/A 6/21/2024    Procedure: AQUABLATION OF PROSTATE;  Surgeon: Prieto Rey MD;  Location:  [facility];  Service: Robotics - Urology;  Laterality: N/A;    SKIN GRAFT Right     RUE skin grafting after electrocution    THORACIC SPINE SURGERY         Pediatric History   Patient Parents    Not on file     Other Topics Concern    Not on file   Social History Narrative    Not on file       family history includes Arthritis in his father and mother; Prostate cancer in his brother.    No Known Allergies    Medication:  Current Facility-Administered Medications   Medication Dose Route Frequency Provider Last Rate Last Admin    acetaminophen (TYLENOL) tablet 650 mg  650 mg Oral Q4H PRN Kimmy Toro APRN        Or    acetaminophen (TYLENOL) 160 MG/5ML oral solution 650 mg  650 mg Oral Q4H PRN Kimmy Toro APRN        Or    acetaminophen (TYLENOL) suppository 650 mg  650 mg Rectal Q4H PRN Kimmy Toro APRN        amLODIPine (NORVASC) tablet 2.5 mg  2.5 mg Oral Daily Kimmy Toro APRN   2.5 mg at 07/18/24 0931    aspirin chewable tablet 81 mg  81 mg Oral Daily Kimmy Toro APRN   81 mg at 07/18/24  0931    sennosides-docusate (PERICOLACE) 8.6-50 MG per tablet 2 tablet  2 tablet Oral BID PRN Kimmy Toro APRN        And    polyethylene glycol (MIRALAX) packet 17 g  17 g Oral Daily PRN Kimmy Toro APRN        And    bisacodyl (DULCOLAX) EC tablet 5 mg  5 mg Oral Daily PRN Kimmy Toro APRN        And    bisacodyl (DULCOLAX) suppository 10 mg  10 mg Rectal Daily PRN Kimmy Toro APRN        DAPTOmycin (CUBICIN) 450 mg in sodium chloride 0.9 % 50 mL IVPB  6 mg/kg Intravenous Q24H Jose Jones  mL/hr at 07/18/24 1029 450 mg at 07/18/24 1029    ferrous sulfate tablet 325 mg  325 mg Oral Daily With Breakfast Kimmy Toro APRN   325 mg at 07/18/24 0931    finasteride (PROSCAR) tablet 5 mg  5 mg Oral Daily Kimmy Toro APRN   5 mg at 07/18/24 0931    heparin 52353 units/250 mL (100 units/mL) in 0.45 % NaCl infusion  20 Units/kg/hr Intravenous Titrated Daniel Rose RPCANDIDA 14.96 mL/hr at 07/18/24 1029 20 Units/kg/hr at 07/18/24 1029    HYDROcodone-acetaminophen (NORCO) 5-325 MG per tablet 1 tablet  1 tablet Oral Q6H PRN Kimmy Toro APRN        ipratropium-albuterol (DUO-NEB) nebulizer solution 3 mL  3 mL Nebulization Q4H PRN Kimmy Toro APRN        levothyroxine (SYNTHROID, LEVOTHROID) tablet 88 mcg  88 mcg Oral Daily Kimmy Toro APRN   88 mcg at 07/18/24 0548    melatonin tablet 5 mg  5 mg Oral Nightly PRN Kimmy Toro APRN        ondansetron ODT (ZOFRAN-ODT) disintegrating tablet 4 mg  4 mg Oral Q6H PRN Kimmy Toro APRN        Or    ondansetron (ZOFRAN) injection 4 mg  4 mg Intravenous Q6H PRN Kimmy Toro APRN   4 mg at 07/18/24 0052    Pharmacy to Dose Heparin   Does not apply Continuous PRN Cortez, Kimmy D, APRN        piperacillin-tazobactam (ZOSYN) 3.375 g IVPB in 100 mL NS MBP (CD)  3.375 g Intravenous Q8H Kimmy Toro, APRN   3.375 g at 07/18/24 1155    sodium chloride 0.9 % flush 10 mL  10  "mL Intravenous Q12H Kimmy Toro APRN   10 mL at 24    sodium chloride 0.9 % flush 10 mL  10 mL Intravenous PRN Kimmy Toro APRN        sodium chloride 0.9 % infusion 40 mL  40 mL Intravenous PRN Kimmy Toro APRN           Antibiotics:  Vanco/zosyn      Review of Systems  Full 12 point review of systems reviewed and negative except for fatigue weakness malaise right groin wound with drainageChronic Gillespie    Physical Exam:   Vital Signs   /81 (BP Location: Right arm, Patient Position: Sitting)   Pulse 97   Temp 98.7 °F (37.1 °C) (Oral)   Resp 18   Ht 170.2 cm (67\")   Wt 74.8 kg (165 lb)   SpO2 96%   BMI 25.84 kg/m²   Temp (24hrs), Av.5 °F (36.9 °C), Min:97.9 °F (36.6 °C), Max:99.2 °F (37.3 °C)      GENERAL: Awake and alert, in no acute distress. Chronically ill-appearing  HEENT: Normocephalic, atraumatic.  PERRL. EOMI. No conjunctival injection. No icterus. Oropharynx clear without evidence of thrush or exudate. No evidence of periodontal disease.    NECK: Supple without nuchal rigidity  LYMPH: No cervical, axillary or inguinal lymphadenopathy. No neck masses  HEART: RRR; No murmur, rubs, gallops.   LUNGS: Clear to auscultation bilaterally without wheezing, rales, rhonchi. Normal respiratory effort.  ABDOMEN: Soft, nontender, nondistended. Positive bowel sounds. No rebound or guarding.   EXT:  No cyanosis, clubbing or edema  : Normal appearing genitalia with Gillespie catheter.  MSK: FROM without joint effusions noted    SKIN: Warm and dry without cutaneous eruptions.  Right groin wound with wound dehiscence with slough and some erythema and drainage with odor  NEURO: Oriented to PPT. No focal deficits.   PSYCHIATRIC: Normal insight and judgement. Cooperative with PE    Laboratory Data    Results from last 7 days   Lab Units 24  0215 24  1110   WBC 10*3/mm3 6.47 9.88   HEMOGLOBIN g/dL 9.2* 9.9*   HEMATOCRIT % 28.8* 29.8*   PLATELETS 10*3/mm3 375 394 "     Results from last 7 days   Lab Units 07/18/24  0215   SODIUM mmol/L 138   POTASSIUM mmol/L 4.3   CHLORIDE mmol/L 105   CO2 mmol/L 21.0*   BUN mg/dL 20   CREATININE mg/dL 1.51*   GLUCOSE mg/dL 103*   CALCIUM mg/dL 8.5*     Results from last 7 days   Lab Units 07/17/24  1110   ALK PHOS U/L 97   BILIRUBIN mg/dL 0.4   ALT (SGPT) U/L 6   AST (SGOT) U/L 9     Results from last 7 days   Lab Units 07/17/24  1842   SED RATE mm/hr 92*     Results from last 7 days   Lab Units 07/17/24  1110   CRP mg/dL 10.54*       Estimated Creatinine Clearance: 43.3 mL/min (A) (by C-G formula based on SCr of 1.51 mg/dL (H)).      Microbiology:  pending    Radiology:  Imaging Results (Last 24 Hours)       ** No results found for the last 24 hours. **              PROBLEM LIST:   Right groin wound dehiscence with cellulitis  Recent right iliofemoral embolectomy  PAD  H/o craniotomy with SDH  Recent prostate surgery with h/o BPH  Elevated ESR/CRP  ABENA    ASSESSMENT:  Patient is a 78-year-old male with history of peripheral arterial disease, GERD, hypertension, rheumatoid arthritis with recent right iliofemoral embolectomy surgical site was stable then had opening up of wound with dehiscence with drainage with some erythema and odor noted now admitted plans for wound revision and debridement in several days cultures obtained on empiric IV antibiotics and infectious disease consultation requested today.    PLAN:  DC vancomycin with history of elevated creatinineUse daptomycin  Continue Zosyn  Follow-up cultures  Follow-up vascular surgery plans    Jose Jones MD  7/18/2024

## 2024-07-18 NOTE — PLAN OF CARE
Goal Outcome Evaluation:  Plan of Care Reviewed With: patient        Progress: improving  Outcome Evaluation: PT eval completed. Presents w/ R groin wd. dehiscence s/p R SFA occlus.& fem embolect., w/ post op hematuria d/t prostate aquablation, low HGB 9.2, as well as decr.LE strength/endurance & impaired funct mobil. below baseline. Performed STS w/ SPC & min A, amb 110 ft w/ SPC on L, & R hand on IV pump, w/ min A to guide IV, and 5 stand.rests, + 5 min.sit rest EOB p/t transf to sup. for remaining ther exer.Noted elev. DBP, HR to 87, desat 95% on RA, & incr back & R Groin pain. Recommend home w/ brother's assist, & HHPT f/u at d/c.      Anticipated Discharge Disposition (PT): home with assist, home with home health

## 2024-07-18 NOTE — PROGRESS NOTES
"          Clinical Nutrition Assessment     Patient Name: Nestor Nye  YOB: 1945  MRN: [id number]  Date of Encounter: 07/18/24 14:58 EDT  Admission date: 7/17/2024  Reason for Visit: MST score 2+, Reduced oral intake, \"Unsure\" unintentional weight loss    Assessment   Nutrition Assessment   Admission Diagnosis:  Dehiscence of operative wound [T81.31XA]    Problem List:    Dehiscence of operative wound    Hypothyroidism    Mixed hyperlipidemia    Stage 3a chronic kidney disease    Bilateral epididymitis    Wound dehiscence, surgical, initial encounter    Superficial femoral artery occlusion      PMH:   He  has a past medical history of Acute maxillary sinusitis, Arthritis, Disease of thyroid gland, Enlarged prostate, GERD (gastroesophageal reflux disease), Gout, Hypertension, Impaired functional mobility, balance, gait, and endurance, PONV (postoperative nausea and vomiting), Renal disorder, Rheumatoid arthritis, Right inguinal hernia (10/28/2021), and Vocal cord anomaly (2007).    PSH:  He  has a past surgical history that includes Blepharoptosis repair (Bilateral); Craniotomy for Subdural Hematoma; Lumbar spine surgery; Thoracic spine surgery; Skin graft (Right); Foreign Body Removal (Right); Inguinal Hernia Repair (Right, 11/01/2021); Exploratory Laparotomy (N/A, 04/16/2022); Abdominal surgery; Colon surgery; Brain surgery; Back surgery; Eye surgery; Hernia repair; Laparoscopy (N/A, 05/04/2022); PROSTATE AQUABLATION (N/A, 6/21/2024); and Femoral Endarterectomy (Right, 6/21/2024).    Applicable Nutrition History:   6/21/24 right femoral endarterectomy  6/21/24 prostate aquablation    Anthropometrics     Height: Height: 170.2 cm (67\")  Last Filed Weight: Weight: 74.8 kg (165 lb) (07/17/24 1708)  Method: Weight Method: Stated  BMI: BMI (Calculated): 25.8    UBW:  165# per patient  Weight change: weight gain of 13 lbs (8.6%) over 1 year(s)    Significant?  No    Nutrition Focused Physical " Exam    Date:  7/18       Patient meets criteria for malnutrition diagnosis, see MSA note.     Subjective   Reported/Observed/Food/Nutrition Related History:     Patient reports his appetite was well PTA. States he may have lost some weight based on his appearance, noticed loss in his arms mostly. However EMR does not reflect weight loss. Reports chewing difficulty d/t only having one tooth but states he chews food very well. He dislikes soft-to-chew diet as he's had it previously, declines diet modification. States he drinks ensure on occasion, accepting of Boost Plus. Pt states he struggles with reflux, but manages it well by sitting up after meals. NKFA.     Current Nutrition Prescription   PO: Diet: Cardiac; Healthy Heart (2-3 Na+); Fluid Consistency: Thin (IDDSI 0)  NPO Diet NPO Type: Sips with Meds  Oral Nutrition Supplement: N/A  Intake: Insufficient data No intakes documented    Assessment & Plan   Nutrition Diagnosis   Date:  7/18            Updated:    Problem Malnutrition chronic non-severe   Etiology Chronic illness; CKD stage 3, rheumatoid arthritis   Signs/Symptoms severe muscle wasting and moderate fat loss        Goal:   Nutrition to support treatment and Establish PO      Nutrition Intervention      Follow treatment progress, Care plan reviewed, Advise alternate selection, Advised available snacks, Interview for preferences, Encourage intake, Supplement provided    Boost Plus daily w/ breakfast.    Follow and encourage PO intakes, snacks as desires    Monitoring/Evaluation:   Per protocol, I&O, PO intake, Supplement intake, Weight, Skin status, Symptoms, POC/GOC    Collette Petersen RD eligible  Time Spent: 30 min

## 2024-07-18 NOTE — PAYOR COMM NOTE
"Ervin Mackey (78 y.o. Male)     [id number]     Lula Goldberg, RN  Utilization Review  [id number]-[phone]  [phone]        Date of Birth   1945    Social Security Number       Address   [address] [place] [address]    Home Phone   [phone]    MRN   [id number]       Jain   Latter-day    Marital Status                               Admission Date   24    Admission Type   Urgent    Admitting Provider   Radha Bhagat MD    Attending Provider   Radha Bhagat MD    Department, Room/Bed   [facility] 4G, S452/1       Discharge Date       Discharge Disposition       Discharge Destination                                 Attending Provider: Radha Bhagat MD    Allergies: No Known Allergies    Isolation: Contact   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)   Code Status: CPR    Ht: 170.2 cm (67\")   Wt: 74.8 kg (165 lb)    Admission Cmt: None   Principal Problem: Dehiscence of operative wound [T81.31XA]                   Active Insurance as of 2024       Primary Coverage       Payor Plan Insurance Group Employer/Plan Group    ANTHEM MEDICARE REPLACEMENT ANTHEM MEDICARE ADVANTAGE [id number]       Payor Plan Address Payor Plan Phone Number Payor Plan Fax Number Effective Dates    [address] [phone]  2016 - None Entered    [facility] [address]         Subscriber Name Subscriber Birth Date Member ID       ERVIN MACKEY 1945 [id number]                     Emergency Contacts        (Rel.) Home Phone Work Phone Mobile Phone    Wesley Mackey (Brother) [phone] -- --                 History & Physical        Aleida De La Cruz MD at 24 [facility]5              [facility]  HISTORY AND PHYSICAL    Patient Name: Ervin Mackey  : 1945  MRN: [id number]  Primary Care Physician: Roxanne Slater MD  Date of admission: 2024      Subjective  Subjective     Chief " Complaint:  Right groin wound dehiscence     HPI:  Nestor Nye is a 78 y.o. male with PMH of enlarged prostate, GERD, Gout, HTN, RA, craniotomy for subdural hematoma, right femoral endarterectomy 6/21/24, prostate aquablation 6/21/24. Patient was discharged from [facility] on 6/26/24 and went to rehab., he has been home a few weeks. Patient states about 3 days ago he noticed erythema around incision and groin. His incision started to smell and he was cleaning it daily with soap and water. Yesterday his incision opened up. He presented to [facility] ED and was transferred here for further care. He denies any soa, cp, fever, or chills.     OSH labs: creat  1.66, crp 10.54, procal 0.17, lactate 1.7, WBC 9.88, HGB 9.9,       Personal History     Past Medical History:   Diagnosis Date    Acute maxillary sinusitis     Arthritis     Disease of thyroid gland     Enlarged prostate     GERD (gastroesophageal reflux disease)     Gout     Hypertension     Impaired functional mobility, balance, gait, and endurance     PONV (postoperative nausea and vomiting)     Renal disorder     Rheumatoid arthritis     Right inguinal hernia 10/28/2021    Added automatically from request for surgery [id number]    Vocal cord anomaly 2007    had problem with enlarge vocal cord after low back surgery, changed his voice since then           Past Surgical History:   Procedure Laterality Date    ABDOMINAL SURGERY      BACK SURGERY      BELPHAROPTOSIS REPAIR Bilateral     BRAIN SURGERY      COLON SURGERY      bowel resection x2 per pt    CRANIOTOMY FOR SUBDURAL HEMATOMA      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    DIAGNOSTIC LAPAROSCOPY N/A 05/04/2022    Procedure: DIAGNOSTIC LAPAROSCOPY CONVERTED TO EXPLORATORY LAPAROTOMY WITH SMALL BOWEL RESECTION;  Surgeon: Maximo Bennett MD;  Location: [facility];  Service: General;  Laterality: N/A;  CONVERTED TO EXPLORATORY LAPAROTOMY AT 1912    EXPLORATORY LAPAROTOMY N/A 04/16/2022     Procedure: LAPAROTOMY EXPLORATORY WITH SMALL BOWEL OBSTRUCTION;  Surgeon: Maximo Bennett MD;  Location:  [facility];  Service: General;  Laterality: N/A;    EYE SURGERY      on upper etelids    FEMORAL ENDARTERECTOMY Right 6/21/2024    Procedure: FEMORAL EMBELECTOMY RIGHT;  Surgeon: Kingsley Morris MD;  Location:  [facility];  Service: Vascular;  Laterality: Right;    FOREIGN BODY REMOVAL Right     removal of metal fragments from RUQ after electrocution and fall.      HERNIA REPAIR      INGUINAL HERNIA REPAIR Right 11/01/2021    Procedure: INGUINAL HERNIA REPAIR WITH MESH AND EXCISION OF SPERMATIC CORD LIPOMA;  Surgeon: Rosey Glover MD;  Location:  [facility];  Service: General;  Laterality: Right;    LUMBAR SPINE SURGERY      PROSTATE AQUABLATION N/A 6/21/2024    Procedure: AQUABLATION OF PROSTATE;  Surgeon: Prieto Rey MD;  Location:  [facility];  Service: Robotics - Urology;  Laterality: N/A;    SKIN GRAFT Right     RUE skin grafting after electrocution    THORACIC SPINE SURGERY         Family History: family history includes Arthritis in his father and mother; Prostate cancer in his brother.     Social History:  reports that he has quit smoking. His smoking use included cigars. He has been exposed to tobacco smoke. He has never used smokeless tobacco. He reports that he does not currently use alcohol. He reports that he does not use drugs.  Social History     Social History Narrative    Not on file       Medications:  Available home medication information reviewed.  Alcohol Wipes, Diclofenac Sodium, amLODIPine, aspirin, ferrous sulfate, finasteride, ipratropium-albuterol, levothyroxine, and melatonin    No Known Allergies    Objective  Objective     Vital Signs:   Temp:  [98.2 °F (36.8 °C)-98.3 °F (36.8 °C)] 98.2 °F (36.8 °C)  Heart Rate:  [82-99] 82  Resp:  [18] 18  BP: (106-121)/(59-89) 121/59       Physical Exam   Constitutional: Awake, alert  Eyes: PERRLA, sclerae anicteric, no  conjunctival injection  HENT: NCAT, mucous membranes moist  Neck: Supple, no thyromegaly, no lymphadenopathy, trachea midline  Respiratory: Clear to auscultation bilaterally, nonlabored respirations room air   Cardiovascular: RRR, no murmurs, rubs, or gallops, right posterior tibial pulse with doppler   Gastrointestinal: Positive bowel sounds, soft, nontender, nondistended  Musculoskeletal: No bilateral ankle edema, no clubbing or cyanosis to extremities  Psychiatric: Appropriate affect, cooperative  Neurologic: Oriented x 3, strength symmetric in all extremities, Cranial Nerves grossly intact to confrontation, speech clear  Skin: No rashes, right groin incision open with yellow drainage noted and erythema    stacy BSD, scrotum erythema and TTP      Result Review:  I have personally reviewed the results from the time of this admission to 7/17/2024 18:51 EDT and agree with these findings:  [x]  Laboratory list / accordion  []  Microbiology  [x]  Radiology  []  EKG/Telemetry   []  Cardiology/Vascular   []  Pathology  []  Old records  []  Other:  Most notable findings include:       LAB RESULTS:      Lab 07/17/24  1110   WBC 9.88   HEMOGLOBIN 9.9*   HEMATOCRIT 29.8*   PLATELETS 394   NEUTROS ABS 6.76   IMMATURE GRANS (ABS) 0.05   LYMPHS ABS 1.82   MONOS ABS 1.08*   EOS ABS 0.11   MCV 96.1   CRP 10.54*   PROCALCITONIN 0.17   LACTATE 1.7         Lab 07/17/24  1110   SODIUM 140   POTASSIUM 4.0   CHLORIDE 104   CO2 20.2*   ANION GAP 15.8*   BUN 23   CREATININE 1.66*   EGFR 42.2*   GLUCOSE 97   CALCIUM 8.8         Lab 07/17/24  1110   TOTAL PROTEIN 7.0   ALBUMIN 3.8   GLOBULIN 3.2   ALT (SGPT) 6   AST (SGOT) 9   BILIRUBIN 0.4   ALK PHOS 97   LIPASE 12*                     UA          6/23/2024    14:33 7/17/2024    13:00   Urinalysis   Squamous Epithelial Cells, UA 0-2  0-2    Specific Gravity, UA 1.009  >=1.030    Ketones, UA Negative  Negative    Blood, UA Large (3+)  Large (3+)    Leukocytes, UA Moderate (2+)  Large  (3+)    Nitrite, UA Negative  Positive    RBC, UA Too Numerous to Count  Too Numerous to Count    WBC, UA 3-5  Too Numerous to Count    Bacteria, UA Trace  Trace        Microbiology Results (last 10 days)       ** No results found for the last 240 hours. **            CT Abdomen Pelvis With Contrast    Result Date: 7/17/2024  PROCEDURE: CT ABDOMEN PELVIS W CONTRAST-  HISTORY: rt inguinal surgical wound dihescense/pus, recent prostate surg, status post aqua ablation of the prostate 07/21/2024 as well as right femoral endarterectomy.  COMPARISON: 05/04/2022.  PROCEDURE: The patient was injected with IV contrast. Axial images were obtained from the lung bases to the pubic symphysis by computed tomography. This study was performed with techniques to keep radiation doses as low as reasonably achievable, (ALARA). Individualized dose reduction techniques using automated exposure control or adjustment of mA and/or kV according to the patient size were employed.  FINDINGS:  ABDOMEN: The lung bases are clear. There is a moderate hiatal hernia similar to the prior exam. Gallbladder contains dependent high attenuation material suggesting sludge or stone that is new from the prior exam. No wall thickening or infiltration of the surrounding fat identified. The heart is proper size. The liver is homogenous with no focal abnormality. The spleen is unremarkable. No adrenal mass is present.  The pancreas is unremarkable. The kidneys demonstrate bilateral circumscribed hypodense lesions consistent with cysts that are similar to the prior exam. No hydronephrosis identified. The aorta is ectatic but not aneurysmal. There is no free fluid or adenopathy.  PELVIS: The GI tract demonstrates no obstruction. Small bowel anastomoses identified in the right side of the abdomen which are new from the prior exam and appear to be widely patent. The appendix is not identified. The urinary bladder is is collapsed around a Gillespie catheter. Tip of the  catheter extends posteriorly. There is a small amount of air within the collapsed bladder. There is significant wall thickening. As was seen previously there is significant enlargement of the prostate but this now appears very inhomogeneous with areas of increased attenuation not seen previously. Given the recent procedure, this may represent hemorrhage. On the sagittal image series 4 image 39 the bulb of the Gillespie catheter appears to be located within the superior aspect of the prostate. No free fluid identified. Best seen series 2 image 71 and series 3 image 44 is complete occlusion of the right superficial femoral artery immediately distal to the takeoff of the profunda. There is infiltration of the surrounding fat. There is partial opacification of the proximal superficial femoral vein which appears similar to the left side.      Impression: Occlusion of the right SFA, Paolo Smith PA-C, notified at 12:45 p.m 07/17/2024.  Enlarged, inhomogeneous prostate with significant wall thickening of the bladder and position of the Gillespie catheter as described, consider postoperative change versus infection or postoperative hemorrhage.,  Possible gallbladder sludge or small stones.  CTDI: 5.39 mGy DLP:235.53 mGy.cm  This report was signed and finalized on 7/17/2024 1:28 PM by Kavya Pollock MD.      US Scrotum & Testicles    Result Date: 7/17/2024  PROCEDURE: US SCROTUM AND TESTICLES-  HISTORY: testicular swelling/pain, recent prostate surg & hypogastric art embolization  PROCEDURE: Ultrasound images of the testicles were obtained bilaterally. Color Doppler images were obtained.  FINDINGS: Right: There is no right intratesticular mass or torsion. There is normal color flow to the right testicle. There are multiple large right epididymal cysts some of which contain debris. These measure up to 2.6 cm. There is increased vascularity in the right epididymis suggestive of epididymitis. There is a small right hydrocele.  Left:  Blood flow seen to the left testicle. Half of the left testicle demonstrates decreased echogenicity, mass is not excluded. A urology consultation is recommended. There is increased echogenicity of the epididymis suggestive of epididymitis. There is a minimal left hydrocele.      Impression: 1. No evidence of right intratesticular mass or torsion. 2. Large right epididymal cysts as described. 3. Findings suggestive of bilateral epididymitis. 4. No left testicular torsion. A hypoechoic region in the left testicle could represent possible mass. Urologic consultation is recommended.      Images were reviewed, interpreted, and dictated by Dr. Kavya Pollock MD Transcribed by Radha Reeves PA-C.  This report was signed and finalized on 7/17/2024 12:50 PM by Kavya Pollock MD.           Assessment & Plan  Assessment & Plan       Hypothyroidism    Mixed hyperlipidemia    Stage 3a chronic kidney disease    Bilateral epididymitis    Wound dehiscence, surgical, initial encounter    Superficial femoral artery occlusion        Bilateral epididymitis   Right groin dehiscence   S/p Aquablation 6/21/24 complicated by SFA occlusion that required right femoral embolectomy by Dr Morris And hematuria requiring hilario hypogastric embolization.   Chronic urinary retention with stacy  -- consult urology  -- npo after midnight   -- blood cultures x 2   -- consult PT wound in am   -- cont zosyn and vancomycin  -- consult ID in am   -- CRP 10.54, lactate 1.7, procal 0.17, WBC 9.88, check ESR       Recurrent SFA occlusion   S/p Right Femoral embolectomy  on 6/21/24  -- CT A/P showed occlusion of Right SFA   -- consult Dr. Morris, he is aware patient is here  -- npo after midnight   -- heparin drip     HTN  HLD  -- cont norvasc     Hypothyroidism   -- cont home meds     CKDIII  -- baseline creat 1.4-1.6      VTE Prophylaxis:  Pharmacologic VTE prophylaxis orders are present.          CODE STATUS:    Code Status and Medical Interventions:   Ordered  at: 07/17/24 2480     Level Of Support Discussed With:    Patient     Code Status (Patient has no pulse and is not breathing):    CPR (Attempt to Resuscitate)     Medical Interventions (Patient has pulse or is breathing):    Full Support       Expected Discharge   Expected Discharge Date: 7/22/2024; Expected Discharge Time:      Kimmy Toro, APRN  07/17/24        Attending   Admission Attestation       I have performed an independent face-to-face diagnostic evaluation including performing an independent physical examination.  I approve of the documented plan of care above that was reviewed and developed with the advanced practice clinician (APC) and take responsibility for that plan along with its associated risks.  I have updated the HPI as appropriate.    Brief HPI    Nestor Nye 78-year-old male with a history of enlarged prostate, recent femoral endarterectomy, recent prostate aqua ablation who presented at an outside facility due to increased erythema and drainage around groin incision.  He had been caring for this at home with soap and water however it had progressively gotten worse.  Imaging at the outside hospital was notable for concern of epididymitis and incidental finding of occlusion of the right SFA.  Case was discussed with vascular surgery here who recommended transfer.    Attending Physical Exam:  Temp:  [97.9 °F (36.6 °C)-98.4 °F (36.9 °C)] 98.4 °F (36.9 °C)  Heart Rate:  [71-99] 76  Resp:  [18-20] 18  BP: (106-130)/(59-89) 120/76  Constitutional: No acute distress, awake, alert  HENT: NCAT, mucous membranes moist  Respiratory: Clear to auscultation bilaterally, respiratory effort normal   Cardiovascular: RRR, no murmurs, rubs, or gallops  Gastrointestinal: Positive bowel sounds, soft, nontender, nondistended  Musculoskeletal: No bilateral ankle edema  Psychiatric: Appropriate affect, cooperative  Neurologic: Oriented x 3, no focal deficit  Skin: Groin incision is open with notable  drainage, scrotum is enlarged and tender      Result Review:  I have personally reviewed the results from the time of this admission to 7/17/2024 23:11 EDT and agree with these findings:  [x]  Laboratory list / accordion  [x]  Microbiology  [x]  Radiology  []  EKG/Telemetry   []  Cardiology/Vascular   []  Pathology  []  Old records  []  Other:  Most notable findings include:   Imaging as above    Assessment and Plan:    77 y/o M here w bilateral epididymitis and SFA occlusion. Placed on broad spectrum abx tonight with cultures in process and ID and urology consult for AM. D/w vascular surgery w possible plans for OR tmrw, tentative, keep NPO at midnight and placed on heparin drip    See assessment and plan documented by APC above and updated/edited by me as appropriate.    Aleida De La Cruz MD  07/17/24                Electronically signed by Aleida De La Cruz MD at 07/17/24 2322       Emergency Department Notes    No notes of this type exist for this encounter.       Vital Signs (last day)       Date/Time Temp Temp src Pulse Resp BP Patient Position SpO2    07/18/24 0715 98.7 (37.1) Oral 69 18 101/70 Lying 96    07/18/24 0714 -- -- 70 -- 99/78 -- 91    07/18/24 0600 -- -- 74 -- -- -- 97    07/18/24 0500 -- -- 75 -- -- -- 94    07/18/24 0400 -- -- 77 -- -- -- 90    07/18/24 0326 99.2 (37.3) Oral 82 18 122/76 Lying 94    07/18/24 0300 -- -- 77 -- -- -- 97    07/18/24 0200 -- -- 81 -- -- -- 94    07/18/24 0100 -- -- 78 -- -- -- 93    07/18/24 0000 -- -- 79 -- -- -- 94    07/17/24 2300 98.4 (36.9) Oral 76 18 120/76 Lying 94    07/17/24 2200 -- -- 71 -- -- -- 96    07/17/24 2000 -- -- 75 -- -- -- 99    07/17/24 1900 97.9 (36.6) Oral 77 20 130/70 Lying 98    07/17/24 1700 98.2 (36.8) Oral 82 18 121/59 Lying 94          Oxygen Therapy (last day)       Date/Time SpO2 Device (Oxygen Therapy) Flow (L/min) Oxygen Concentration (%) ETCO2 (mmHg)    07/18/24 0715 96 -- -- -- --    07/18/24 0714 91 -- -- -- --    07/18/24 0600 97 room  air -- -- --    07/18/24 0500 94 -- -- -- --    07/18/24 0400 90 room air -- -- --    07/18/24 0326 94 -- -- -- --    07/18/24 0300 97 -- -- -- --    07/18/24 0200 94 room air -- -- --    07/18/24 0100 93 -- -- -- --    07/18/24 0000 94 room air -- -- --    07/17/24 2300 94 -- -- -- --    07/17/24 2200 96 room air -- -- --    07/17/24 2000 99 room air -- -- --    07/17/24 1900 98 -- -- -- --    07/17/24 1700 94 -- -- -- --    07/17/24 1659 -- room air -- -- --          Lines, Drains & Airways       Active LDAs       Name Placement date Placement time Site Days    Peripheral IV 07/17/24 1110 Right Antecubital 07/17/24  1110  Antecubital  less than 1    Peripheral IV 07/17/24 2045 Left;Posterior Hand 07/17/24 2045  Hand  less than 1    Urethral Catheter Coude 16 Fr. 07/17/24  1303  -- less than 1                  Current Facility-Administered Medications   Medication Dose Route Frequency Provider Last Rate Last Admin    acetaminophen (TYLENOL) tablet 650 mg  650 mg Oral Q4H PRN Kimmy Toro APRN        Or    acetaminophen (TYLENOL) 160 MG/5ML oral solution 650 mg  650 mg Oral Q4H PRN Kimmy Toro APRN        Or    acetaminophen (TYLENOL) suppository 650 mg  650 mg Rectal Q4H PRN Kimmy Toro APRN        amLODIPine (NORVASC) tablet 2.5 mg  2.5 mg Oral Daily Kimmy Toro APRN   2.5 mg at 07/18/24 0931    aspirin chewable tablet 81 mg  81 mg Oral Daily Kimmy Toro APRN   81 mg at 07/18/24 0931    sennosides-docusate (PERICOLACE) 8.6-50 MG per tablet 2 tablet  2 tablet Oral BID PRN Kimmy Toro APRN        And    polyethylene glycol (MIRALAX) packet 17 g  17 g Oral Daily PRN Kimmy Toro APRN        And    bisacodyl (DULCOLAX) EC tablet 5 mg  5 mg Oral Daily PRN Kimmy Toro APRN        And    bisacodyl (DULCOLAX) suppository 10 mg  10 mg Rectal Daily PRN Kimmy Toro, APRN        DAPTOmycin (CUBICIN) 450 mg in sodium chloride 0.9 % 50 mL IVPB  6  mg/kg Intravenous Q24H Jose Jones MD        ferrous sulfate tablet 325 mg  325 mg Oral Daily With Breakfast Kimmy Toro APRN   325 mg at 07/18/24 0931    finasteride (PROSCAR) tablet 5 mg  5 mg Oral Daily Kimmy Toro APRN   5 mg at 07/18/24 0931    heparin 16612 units/250 mL (100 units/mL) in 0.45 % NaCl infusion  20 Units/kg/hr Intravenous Titrated Daniel Rose RPCANDIDA 13.46 mL/hr at 07/18/24 0549 18 Units/kg/hr at 07/18/24 0549    HYDROcodone-acetaminophen (NORCO) 5-325 MG per tablet 1 tablet  1 tablet Oral Q6H PRN Kimmy Toro APRN        ipratropium-albuterol (DUO-NEB) nebulizer solution 3 mL  3 mL Nebulization Q4H PRN Kimmy Toro APRN        levothyroxine (SYNTHROID, LEVOTHROID) tablet 88 mcg  88 mcg Oral Daily Kimmy Toro APRN   88 mcg at 07/18/24 0548    melatonin tablet 5 mg  5 mg Oral Nightly PRN Kimmy Toro APRN        ondansetron ODT (ZOFRAN-ODT) disintegrating tablet 4 mg  4 mg Oral Q6H PRN Kimmy Toro APRN        Or    ondansetron (ZOFRAN) injection 4 mg  4 mg Intravenous Q6H PRN Kimmy Toro APRN   4 mg at 07/18/24 0052    Pharmacy to Dose Heparin   Does not apply Continuous PRN Kimmy Toro APRN        piperacillin-tazobactam (ZOSYN) 3.375 g IVPB in 100 mL NS MBP (CD)  3.375 g Intravenous Q8H Kimmy Toro APRN   Currently Infusing at 07/18/24 0549    sodium chloride 0.9 % flush 10 mL  10 mL Intravenous Q12H Kimmy Toro APRN   10 mL at 07/17/24 2012    sodium chloride 0.9 % flush 10 mL  10 mL Intravenous PRN Kimmy Toro APRN        sodium chloride 0.9 % infusion 40 mL  40 mL Intravenous PRN Kimmy Toro APRN         Lab Results (last 24 hours)       Procedure Component Value Units Date/Time    Heparin Anti-Xa [[id number]]  (Abnormal) Collected: 07/18/24 0749    Specimen: Blood Updated: 07/18/24 0853     Heparin Anti-Xa (UFH) 0.24 IU/ml     Heparin Anti-Xa [[id number]]  (Normal) Collected:  07/18/24 0215    Specimen: Blood Updated: 07/18/24 0242     Heparin Anti-Xa (UFH) 0.38 IU/ml     Basic Metabolic Panel [[id number]]  (Abnormal) Collected: 07/18/24 0215    Specimen: Blood Updated: 07/18/24 0238     Glucose 103 mg/dL      BUN 20 mg/dL      Creatinine 1.51 mg/dL      Sodium 138 mmol/L      Potassium 4.3 mmol/L      Chloride 105 mmol/L      CO2 21.0 mmol/L      Calcium 8.5 mg/dL      BUN/Creatinine Ratio 13.2     Anion Gap 12.0 mmol/L      eGFR 47.3 mL/min/1.73     Narrative:      GFR Normal >60  Chronic Kidney Disease <60  Kidney Failure <15    The GFR formula is only valid for adults with stable renal function between ages 18 and 70.    CBC & Differential [[id number]]  (Abnormal) Collected: 07/18/24 0215    Specimen: Blood Updated: 07/18/24 0226    Narrative:      The following orders were created for panel order CBC & Differential.  Procedure                               Abnormality         Status                     ---------                               -----------         ------                     CBC Auto Differential[[id number]]        Abnormal            Final result                 Please view results for these tests on the individual orders.    CBC Auto Differential [[id number]]  (Abnormal) Collected: 07/18/24 0215    Specimen: Blood Updated: 07/18/24 0226     WBC 6.47 10*3/mm3      RBC 2.98 10*6/mm3      Hemoglobin 9.2 g/dL      Hematocrit 28.8 %      MCV 96.6 fL      MCH 30.9 pg      MCHC 31.9 g/dL      RDW 12.9 %      RDW-SD 45.6 fl      MPV 9.4 fL      Platelets 375 10*3/mm3      Neutrophil % 58.8 %      Lymphocyte % 25.2 %      Monocyte % 12.1 %      Eosinophil % 2.6 %      Basophil % 1.1 %      Immature Grans % 0.2 %      Neutrophils, Absolute 3.81 10*3/mm3      Lymphocytes, Absolute 1.63 10*3/mm3      Monocytes, Absolute 0.78 10*3/mm3      Eosinophils, Absolute 0.17 10*3/mm3      Basophils, Absolute 0.07 10*3/mm3      Immature Grans, Absolute 0.01 10*3/mm3      nRBC 0.0 /100 WBC      Blood Culture - Blood, Arm, Left [[id number]] Collected: 07/17/24 2007    Specimen: Blood from Arm, Left Updated: 07/17/24 2023    Blood Culture - Blood, Hand, Left [[id number]] Collected: 07/17/24 2008    Specimen: Blood from Hand, Left Updated: 07/17/24 2023    Heparin Anti-Xa [[id number]]  (Abnormal) Collected: 07/17/24 1842    Specimen: Blood Updated: 07/17/24 1913     Heparin Anti-Xa (UFH) 0.10 IU/ml     Protime-INR [[id number]]  (Abnormal) Collected: 07/17/24 1842    Specimen: Blood Updated: 07/17/24 1908     Protime 14.7 Seconds      INR 1.14    aPTT [[id number]]  (Abnormal) Collected: 07/17/24 1842    Specimen: Blood Updated: 07/17/24 1908     PTT 28.7 seconds     Narrative:      PTT = The equivalent PTT values for the therapeutic range of heparin levels at 0.3 to 0.5 U/ml are 60 to 70 seconds.    Sedimentation Rate [[id number]]  (Abnormal) Collected: 07/17/24 1842    Specimen: Blood Updated: 07/17/24 1856     Sed Rate 92 mm/hr           Imaging Results (Last 24 Hours)       ** No results found for the last 24 hours. **          ECG/EMG Results (last 24 hours)       Procedure Component Value Units Date/Time    Telemetry Scan [[id number]] Resulted: 07/17/24     Updated: 07/18/24 0509

## 2024-07-18 NOTE — THERAPY EVALUATION
Patient Name: Nestor Nye  : 1945    MRN: [id number]                              Today's Date: 2024       Admit Date: 2024    Visit Dx: No diagnosis found.  Patient Active Problem List   Diagnosis    Hypothyroidism    Mixed hyperlipidemia    Elevated PSA    Benign essential hypertension    Acute UTI    BPH with obstruction/lower urinary tract symptoms    Anemia of chronic disease    Rheumatoid arthritis    Secondary hyperparathyroidism    Stage 3a chronic kidney disease    Gouty arthropathy    No post-op complications    Bilateral epididymitis    Wound dehiscence, surgical, initial encounter    Superficial femoral artery occlusion     Past Medical History:   Diagnosis Date    Acute maxillary sinusitis     Arthritis     Disease of thyroid gland     Enlarged prostate     GERD (gastroesophageal reflux disease)     Gout     Hypertension     Impaired functional mobility, balance, gait, and endurance     PONV (postoperative nausea and vomiting)     Renal disorder     Rheumatoid arthritis     Right inguinal hernia 10/28/2021    Added automatically from request for surgery [id number]    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, changed his voice since then     Past Surgical History:   Procedure Laterality Date    ABDOMINAL SURGERY      BACK SURGERY      BELPHAROPTOSIS REPAIR Bilateral     BRAIN SURGERY      COLON SURGERY      bowel resection x2 per pt    CRANIOTOMY FOR SUBDURAL HEMATOMA      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    DIAGNOSTIC LAPAROSCOPY N/A 2022    Procedure: DIAGNOSTIC LAPAROSCOPY CONVERTED TO EXPLORATORY LAPAROTOMY WITH SMALL BOWEL RESECTION;  Surgeon: Maximo Bennett MD;  Location: [facility];  Service: General;  Laterality: N/A;  CONVERTED TO EXPLORATORY LAPAROTOMY AT     EXPLORATORY LAPAROTOMY N/A 2022    Procedure: LAPAROTOMY EXPLORATORY WITH SMALL BOWEL OBSTRUCTION;  Surgeon: Maximo Bennett  MD;  Location:  [facility];  Service: General;  Laterality: N/A;    EYE SURGERY      on upper etelids    FEMORAL ENDARTERECTOMY Right 6/21/2024    Procedure: FEMORAL EMBELECTOMY RIGHT;  Surgeon: Kingsley Morris MD;  Location:  [facility];  Service: Vascular;  Laterality: Right;    FOREIGN BODY REMOVAL Right     removal of metal fragments from RUQ after electrocution and fall.      HERNIA REPAIR      INGUINAL HERNIA REPAIR Right 11/01/2021    Procedure: INGUINAL HERNIA REPAIR WITH MESH AND EXCISION OF SPERMATIC CORD LIPOMA;  Surgeon: Rosey Glover MD;  Location:  [facility];  Service: General;  Laterality: Right;    LUMBAR SPINE SURGERY      PROSTATE AQUABLATION N/A 6/21/2024    Procedure: AQUABLATION OF PROSTATE;  Surgeon: Prieto Rey MD;  Location:  [facility];  Service: Robotics - Urology;  Laterality: N/A;    SKIN GRAFT Right     RUE skin grafting after electrocution    THORACIC SPINE SURGERY        General Information       Row Name 07/18/24 0810          OT Time and Intention    Document Type evaluation  -SW     Mode of Treatment occupational therapy  -       Row Name 07/18/24 0810          General Information    Patient Profile Reviewed yes  -SW     Prior Level of Function independent:;all household mobility;ADL's;home management  Has paid help with his laundry  -SW     Existing Precautions/Restrictions fall  -SW     Barriers to Rehab medically complex;previous functional deficit;visual deficit  -       Row Name 07/18/24 0810          Occupational Profile    Environmental Supports and Barriers (Occupational Profile) Pt has supportive brother, has a rw and a cane.  -SW       Row Name 07/18/24 0810          Living Environment    People in Home alone  -SW       Row Name 07/18/24 0810          Home Main Entrance    Number of Stairs, Main Entrance one  -       Row Name 07/18/24 0810          Stairs Within Home, Primary    Number of Stairs, Within Home, Primary none  -       Row Name  07/18/24 0810          Cognition    Orientation Status (Cognition) oriented x 3  -       Row Name 07/18/24 0810          Safety Issues, Functional Mobility    Safety Issues Affecting Function (Mobility) awareness of need for assistance;safety precaution awareness  -     Impairments Affecting Function (Mobility) balance;endurance/activity tolerance;strength  -               User Key  (r) = Recorded By, (t) = Taken By, (c) = Cosigned By      Initials Name Provider Type    Nena Randhawa OT Occupational Therapist                     Mobility/ADL's       Row Name 07/18/24 0810          Bed Mobility    Bed Mobility supine-sit  -SW     Supine-Sit [place] (Bed Mobility) standby assist  -     Assistive Device (Bed Mobility) head of bed elevated;bed rails  -       Row Name 07/18/24 0810          Transfers    Transfers bed-chair transfer;sit-stand transfer  -       Row Name 07/18/24 0810          Bed-Chair Transfer    Bed-Chair [place] (Transfers) contact guard;verbal cues  -     Assistive Device (Bed-Chair Transfers) other (see comments)  -     Comment, (Bed-Chair Transfer) UE support  -       Row Name 07/18/24 0810          Sit-Stand Transfer    Sit-Stand [place] (Transfers) standby assist  -     Comment, (Sit-Stand Transfer) no device  -       Row Name 07/18/24 0810          Functional Mobility    Functional Mobility- Ind. Level contact guard assist;verbal cues required  -     Functional Mobility-Distance (Feet) 8  -       Row Name 07/18/24 0810          Activities of Daily Living    BADL Assessment/Intervention lower body dressing;grooming  -       Row Name 07/18/24 0810          Lower Body Dressing Assessment/Training    [place] Level (Lower Body Dressing) lower body dressing skills;socks;set up  -     Position (Lower Body Dressing) edge of bed sitting  -       Row Name 07/18/24 0810          Grooming Assessment/Training    [place] Level (Grooming) grooming  skills;wash face, hands;set up  -     Position (Grooming) edge of bed sitting  -               User Key  (r) = Recorded By, (t) = Taken By, (c) = Cosigned By      Initials Name Provider Type    Nena Randhawa OT Occupational Therapist                   Obj/Interventions       Row Name 07/18/24 0810          Sensory Assessment (Somatosensory)    Sensory Assessment (Somatosensory) UE sensation intact  -SW       Row Name 07/18/24 0810          Vision Assessment/Intervention    Visual Impairment/Limitations blurry vision  -     Vision Assessment Comment Pt has impaired retina that impacts his vision - R moreso than L eye.  -       Row Name 07/18/24 0810          Range of Motion Comprehensive    General Range of Motion bilateral upper extremity ROM WFL  -[facility] Name 07/18/24 0810          Strength Comprehensive (MMT)    General Manual Muscle Testing (MMT) Assessment upper extremity strength deficits identified  -     Comment, General Manual Muscle Testing (MMT) Assessment BUEs 4+/5  -SW       [facility] Name 07/18/24 0810          Balance    Balance Assessment sitting static balance;sitting dynamic balance;sit to stand dynamic balance;standing static balance;standing dynamic balance  -SW     Static Sitting Balance independent  -SW     Dynamic Sitting Balance standby assist  -SW     Position, Sitting Balance unsupported  -SW     Sit to Stand Dynamic Balance standby assist  -SW     Static Standing Balance standby assist  -SW     Dynamic Standing Balance contact guard  -SW     Position/Device Used, Standing Balance unsupported;supported  -SW     Balance Interventions sitting;standing;sit to stand;supported;dynamic;static;minimal challenge;occupation based/functional task  -               User Key  (r) = Recorded By, (t) = Taken By, (c) = Cosigned By      Initials Name Provider Type    Nena Randhawa OT Occupational Therapist                   Goals/Plan       Row Name 07/18/24 0810          Transfer Goal 1 (OT)     Activity/Assistive Device (Transfer Goal 1, OT) toilet  -SW     [place] Level/Cues Needed (Transfer Goal 1, OT) independent  -SW     Time Frame (Transfer Goal 1, OT) long term goal (LTG);by discharge  -SW     Progress/Outcome (Transfer Goal 1, OT) goal ongoing  -       Row Name 07/18/24 0810          Problem Specific Goal 1 (OT)    Problem Specific Goal 1 (OT) Pt will demo improved act miriam to stand times 8 minutes while completing static and dyn adl tasks with no lob.  -SW     Time Frame (Problem Specific Goal 1, OT) short term goal (STG);by discharge  -SW     Progress/Outcome (Problem Specific Goal 1, OT) goal ongoing  -[facility] Name 07/18/24 0810          Therapy Assessment/Plan (OT)    Planned Therapy Interventions (OT) activity tolerance training;adaptive equipment training;BADL retraining;functional balance retraining;patient/caregiver education/training;transfer/mobility retraining;strengthening exercise  -SW               User Key  (r) = Recorded By, (t) = Taken By, (c) = Cosigned By      Initials Name Provider Type    Nena Randhawa, OT Occupational Therapist                   Clinical Impression       Row Name 07/18/24 0810          Pain Assessment    Pretreatment Pain Rating 0/10 - no pain  -SW     Posttreatment Pain Rating 0/10 - no pain  -SW     Pre/Posttreatment Pain Comment Pt reports 4-5 times a day he will have a urethral spasm that causes high amount of pain.  -       Row Name 07/18/24 0810          Plan of Care Review    Plan of Care Reviewed With patient  -SW     Outcome Evaluation OT eval complete. Pt presents with decreased act miriam and adl indep. He completed bed mob with sba, setup for grooming and donning socks, cga for sts and taking steps to chair in room with UE as needed using cane. Recommend iPOT and HHOT at d/c.  -       Row Name 07/18/24 0810          Therapy Assessment/Plan (OT)    Rehab Potential (OT) good, to achieve stated therapy goals  -     Criteria for  Skilled Therapeutic Interventions Met (OT) yes;meets criteria;skilled treatment is necessary  -     Therapy Frequency (OT) daily  -SW     Predicted Duration of Therapy Intervention (OT) 1 week  -       Row Name 07/18/24 0810          Therapy Plan Review/Discharge Plan (OT)    Anticipated Discharge Disposition (OT) home with home health  -       Row Name 07/18/24 0810          Vital Signs    Pre Systolic BP Rehab 101  -SW     Pre Treatment Diastolic BP 70  -SW     Post Systolic BP Rehab 105  -SW     Post Treatment Diastolic BP 68  -SW     Pretreatment Heart Rate (beats/min) 69  -SW     O2 Delivery Pre Treatment room air  -SW     O2 Delivery Intra Treatment room air  -SW     O2 Delivery Post Treatment room air  -SW     Pre Patient Position Supine  -SW     Intra Patient Position Standing  -SW     Post Patient Position Sitting  -SW       Row Name 07/18/24 0810          Positioning and Restraints    Pre-Treatment Position in bed  -SW     Post Treatment Position chair  -SW     In Chair notified nsg;reclined;sitting;call light within reach;encouraged to call for assist;exit alarm on;waffle cushion;legs elevated  -SW               User Key  (r) = Recorded By, (t) = Taken By, (c) = Cosigned By      Initials Name Provider Type    Nena Randhawa, WANDA Occupational Therapist                   Outcome Measures       Row Name 07/18/24 0917          How much help from another is currently needed...    Putting on and taking off regular lower body clothing? 3  -SW     Bathing (including washing, rinsing, and drying) 3  -SW     Toileting (which includes using toilet bed pan or urinal) 3  -SW     Putting on and taking off regular upper body clothing 3  -SW     Taking care of personal grooming (such as brushing teeth) 4  -SW     Eating meals 4  -SW     AM-PAC 6 Clicks Score (OT) 20  -SW       Row Name 07/18/24 0917          Functional Assessment    Outcome Measure Options AM-PAC 6 Clicks Daily Activity (OT)  -SW               User  Key  (r) = Recorded By, (t) = Taken By, (c) = Cosigned By      Initials Name Provider Type     Nena Martinez OT Occupational Therapist                    Occupational Therapy Education       Title: PT OT SLP Therapies (In Progress)       Topic: Occupational Therapy (In Progress)       Point: ADL training (Done)       Description:   Instruct learner(s) on proper safety adaptation and remediation techniques during self care or transfers.   Instruct in proper use of assistive devices.                  Learning Progress Summary             Patient Acceptance, E, VU by  at 7/18/2024 0917                         Point: Home exercise program (Not Started)       Description:   Instruct learner(s) on appropriate technique for monitoring, assisting and/or progressing therapeutic exercises/activities.                  Learner Progress:  Not documented in this visit.              Point: Precautions (Done)       Description:   Instruct learner(s) on prescribed precautions during self-care and functional transfers.                  Learning Progress Summary             Patient Acceptance, E, VU by  at 7/18/2024 0917                         Point: Body mechanics (Not Started)       Description:   Instruct learner(s) on proper positioning and spine alignment during self-care, functional mobility activities and/or exercises.                  Learner Progress:  Not documented in this visit.                              User Key       Initials Effective Dates Name Provider Type Discipline     06/16/21 -  Nena Martinez OT Occupational Therapist OT                  OT Recommendation and Plan  Planned Therapy Interventions (OT): activity tolerance training, adaptive equipment training, BADL retraining, functional balance retraining, patient/caregiver education/training, transfer/mobility retraining, strengthening exercise  Therapy Frequency (OT): daily  Plan of Care Review  Plan of Care Reviewed With: patient  Outcome Evaluation:  OT eval complete. Pt presents with decreased act miriam and adl indep. He completed bed mob with sba, setup for grooming and donning socks, cga for sts and taking steps to chair in room with UE as needed using cane. Recommend iPOT and HHOT at d/c.     Time Calculation:   Evaluation Complexity (OT)  Review Occupational Profile/Medical/Therapy History Complexity: brief/low complexity  Assessment, Occupational Performance/Identification of Deficit Complexity: 3-5 performance deficits  Clinical Decision Making Complexity (OT): problem focused assessment/low complexity  Overall Complexity of Evaluation (OT): low complexity     Time Calculation- OT       Row Name 07/18/24 0810             Time Calculation- OT    OT Start Time 0810  -SW      OT Received On 07/18/24  -SW      OT Goal Re-Cert Due Date 07/28/24  -SW         Timed Charges    31847 - OT Self Care/Mgmt Minutes 23  -SW         Untimed Charges    OT Eval/Re-eval Minutes 40  -SW         Total Minutes    Timed Charges Total Minutes 23  -SW      Untimed Charges Total Minutes 40  -SW       Total Minutes 63  -SW                User Key  (r) = Recorded By, (t) = Taken By, (c) = Cosigned By      Initials Name Provider Type    SW Nena Martinez OT Occupational Therapist                  Therapy Charges for Today       Code Description Service Date Service Provider Modifiers Qty    49933987499  OT SELF CARE/MGMT/TRAIN EA 15 MIN 7/18/2024 Nena Martinez OT GO 2    [id number] HC OT EVAL LOW COMPLEXITY 3 7/18/2024 Nena Martinez OT GO 1                 Nena Martinez OT  7/18/2024

## 2024-07-18 NOTE — PROGRESS NOTES
Malnutrition Severity Assessment    Patient Name:  Nestor Nye  YOB: 1945  MRN: [id number]  Admit Date:  7/17/2024    Patient meets criteria for : Moderate (non-severe) Malnutrition (Chronic non-severe malnutrition;severe muscle wasting and moderate fat loss)    Malnutrition Severity Assessment  Malnutrition Type: Acute Disease or Injury - Related Malnutrition  Malnutrition Type (Last 8 Hours)       Malnutrition Severity Assessment       Row Name 07/18/24 1541       Malnutrition Severity Assessment    Malnutrition Type Acute Disease or Injury - Related Malnutrition      Row Name 07/18/24 1541       Muscle Loss    Loss of Muscle Mass Findings Severe    [place] Region Severe - deep hollowing/scooping, lack of muscle to touch, facial bones well defined    Clavicle Bone Region Severe - protruding prominent bone    Acromion Bone Region Moderate - acromion may slightly protrude    Dorsal Hand Region Severe - prominent depression    Patellar Region Moderate - patella more prominent, less muscle definition around patella    Anterior Thigh Region Moderate - mild depression on inner thigh      Row Name 07/18/24 1541       Fat Loss    Subcutaneous Fat Loss Findings Moderate    Orbital Region  Severe - pronounced hollowness/depression, dark circles, loose saggy skin    Upper Arm Region Moderate - some fat tissue, not ample      Row Name 07/18/24 1541       Declining Functional Status    Declining Functional Status Findings N/A      Row Name 07/18/24 1541       Criteria Met (Must meet criteria for severity in at least 2 of these categories: M Wasting, Fat Loss, Fluid, Secondary Signs, Wt. Status, Intake)    Patient meets criteria for  Moderate (non-severe) Malnutrition  Chronic non-severe malnutrition;severe muscle wasting and moderate fat loss                    Electronically signed by:  Collette Petersen RD eligible  07/18/24 16:32 EDT

## 2024-07-18 NOTE — CONSULTS
Vascular Surgery Consult Note      Inpatient Vascular Surgery Consult  Consult performed by: Breanna Anaya PA-C  Consult ordered by: Kimmy Toro, LANNY  Reason for consult: RIGHT Groin incision dehiscence         HPI: This is a 78 year old male with a history of right iliofemoral embolectomy performed on 6/21/2024 by Dr. Morris. He was discharged from [facility] on 6/26/2024, and subsequently transferred to a rehabilitation center. After spending a few weeks at home, he began to notice erythema around his right groin incision, accompanied by slight discomfort and tenderness in the area. He also noted an odor from the incision, which he attempted to clean daily with soap and water. Yesterday, the incision partially opened, prompting him to seek evaluation at the [facility] emergency room. He reports no chills, recent fever, chest pain, or shortness of breath. While he hopes to avoid further surgery, he is willing to pursue any necessary measures for his recovery.     Review of Systems   Genitourinary:  Positive for difficulty urinating (catheter in place) and testicular pain.   Musculoskeletal:         + groin discomfort with complaints of oozing from incision   All other systems reviewed and are negative.       History  Past Medical History:   Diagnosis Date    Acute maxillary sinusitis     Arthritis     Disease of thyroid gland     Enlarged prostate     GERD (gastroesophageal reflux disease)     Gout     Hypertension     Impaired functional mobility, balance, gait, and endurance     PONV (postoperative nausea and vomiting)     Renal disorder     Rheumatoid arthritis     Right inguinal hernia 10/28/2021    Added automatically from request for surgery [id number]    Vocal cord anomaly 2007    had problem with enlarge vocal cord after low back surgery, changed his voice since then     Past Surgical History:   Procedure Laterality Date    ABDOMINAL SURGERY      BACK SURGERY       BELPHAROPTOSIS REPAIR Bilateral     BRAIN SURGERY      COLON SURGERY      bowel resection x2 per pt    CRANIOTOMY FOR SUBDURAL HEMATOMA      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    DIAGNOSTIC LAPAROSCOPY N/A 05/04/2022    Procedure: DIAGNOSTIC LAPAROSCOPY CONVERTED TO EXPLORATORY LAPAROTOMY WITH SMALL BOWEL RESECTION;  Surgeon: Maximo Bennett MD;  Location:  [facility];  Service: General;  Laterality: N/A;  CONVERTED TO EXPLORATORY LAPAROTOMY AT 1912    EXPLORATORY LAPAROTOMY N/A 04/16/2022    Procedure: LAPAROTOMY EXPLORATORY WITH SMALL BOWEL OBSTRUCTION;  Surgeon: Maximo Bennett MD;  Location:  [facility];  Service: General;  Laterality: N/A;    EYE SURGERY      on upper etelids    FEMORAL ENDARTERECTOMY Right 6/21/2024    Procedure: FEMORAL EMBELECTOMY RIGHT;  Surgeon: Kingsley Morris MD;  Location:  [facility];  Service: Vascular;  Laterality: Right;    FOREIGN BODY REMOVAL Right     removal of metal fragments from RUQ after electrocution and fall.      HERNIA REPAIR      INGUINAL HERNIA REPAIR Right 11/01/2021    Procedure: INGUINAL HERNIA REPAIR WITH MESH AND EXCISION OF SPERMATIC CORD LIPOMA;  Surgeon: Rosey Glover MD;  Location:  [facility];  Service: General;  Laterality: Right;    LUMBAR SPINE SURGERY      PROSTATE AQUABLATION N/A 6/21/2024    Procedure: AQUABLATION OF PROSTATE;  Surgeon: Prieto Rey MD;  Location:  [facility];  Service: Robotics - Urology;  Laterality: N/A;    SKIN GRAFT Right     RUE skin grafting after electrocution    THORACIC SPINE SURGERY       Family History   Problem Relation Age of Onset    Arthritis Mother     Prostate cancer Brother     Arthritis Father      Social History     Tobacco Use    Smoking status: Former     Types: Cigars     Passive exposure: Past    Smokeless tobacco: Never    Tobacco comments:     Smoked 1 ppd of small cigars for 50  years    Vaping Use    Vaping status: Former   Substance Use Topics    Alcohol use: Not  Currently    Drug use: No     Medications Prior to Admission   Medication Sig Dispense Refill Last Dose    Alcohol Swabs (ALCOHOL WIPES) 70 % pads 1 pad 3 (Three) Times a Day. (Patient taking differently: Use 1 pad 3 (Three) Times a Day. Cleans around stacy catheter with them.) 100 each 11     amLODIPine (Norvasc) 2.5 MG tablet Take 1 tablet by mouth Daily. 90 tablet 1     aspirin 81 MG chewable tablet Chew 1 tablet Daily. 30 tablet 1     Diclofenac Sodium (VOLTAREN) 1 % gel gel Apply 4 g topically to the appropriate area as directed 4 (Four) Times a Day As Needed (joint pain). 100 g 5     finasteride (PROSCAR) 5 MG tablet TAKE ONE TABLET BY MOUTH EVERY DAY 90 tablet 0     ipratropium-albuterol (DUO-NEB) 0.5-2.5 mg/3 ml nebulizer Take 3 mL by nebulization Every 4 (Four) Hours As Needed for Wheezing or Shortness of Air. 360 mL 1     levothyroxine (SYNTHROID, LEVOTHROID) 88 MCG tablet Take 1 tablet by mouth Daily. 90 tablet 2     melatonin 5 MG tablet tablet Take 1 tablet by mouth.       ferrous sulfate 325 (65 FE) MG tablet Take 1 tablet by mouth Daily With Breakfast.   Unknown     Allergies:  Patient has no known allergies.    Vital Signs  Temp:  [97.9 °F (36.6 °C)-99.2 °F (37.3 °C)] 98.7 °F (37.1 °C)  Heart Rate:  [69-82] 69  Resp:  [18-20] 18  BP: ()/(59-89) 101/70    Physical Exam  Constitutional:       Appearance: Normal appearance.   HENT:      Head: Normocephalic.   Eyes:      Pupils: Pupils are equal, round, and reactive to light.   Pulmonary:      Effort: Pulmonary effort is normal.      Breath sounds: Normal breath sounds.   Abdominal:      General: Abdomen is flat. There is no distension.      Palpations: Abdomen is soft.   Genitourinary:     Comments: + catheter in place  RIGHT groin: incision with medial dehiscence, presence of slough, slight erythema, minimal drainage, malodorous  Musculoskeletal:         General: Normal range of motion.   Skin:     General: Skin is warm and dry.   Neurological:       General: No focal deficit present.      Mental Status: He is alert and oriented to person, place, and time.   Psychiatric:         Mood and Affect: Mood normal.          Results Review:    I reviewed the patient's new clinical results.    Assessment and Plan:  RIGHT Groin Dehiscence with PVD  - 6/21 (Alexandra) RIGHT iliofemoral embolectomy  - pain control prn  - known occlusion of RIGHT SFA per US 6/21/24  - Plans for RIGHT Groin debridement with wound vac placement and right lower extremity angiogram.   - Scheduled with Dr. Morris Monday 7/22/2024 around 1:00PM  - NPO and Consent ordered      I discussed the patients findings and my recommendations with patient.       Breanna Anaya PA-C  07/18/24  09:00 AM

## 2024-07-18 NOTE — TELEPHONE ENCOUNTER
MARTHA SPOKE WITH [address], THEY CALLED FOR A  CONSULT ABOUT PATIENT DOCTOR SNOW PERFORMED SURGERY ON PATIENT AND PT IS NOT ESTABLISHED WITH OUR OFFICE, TOLD TO CALL ON CALL PROVIDER AS OUR OFFICE IS ON PRACTICE CALL.     [address] CALLED AGAIN WANTING TO CONSULT B/C DR. HINKLE TOLD WENDY FROM [facility] THAT PATIENT NEEDED TO SEE .      DOES NOT SEE PATIENTS AT [facility].    I CALLED [address] AGAIN TO LET THEM KNOW THAT AFTER CONSULTING WITH DR. HENRY NORMAN NEEDS TO SEE PATIENT, AND ADVISED MIRACLE TO CALL OFFICE BACK IF DR. HINKLE REFUSES CONSULT FOR PT.

## 2024-07-18 NOTE — PLAN OF CARE
Goal Outcome Evaluation:  Plan of Care Reviewed With: patient           Outcome Evaluation: OT eval complete. Pt presents with decreased act miriam and adl indep. He completed bed mob with sba, setup for grooming and donning socks, cga for sts and taking steps to chair in room with UE as needed using cane. Recommend iPOT and HHOT at d/c.      Anticipated Discharge Disposition (OT): home with home health

## 2024-07-18 NOTE — PROGRESS NOTES
[facility]  INPATIENT PROGRESS NOTE    Date of Admission: 7/17/2024  Primary Care Physician: Roxanne Slater MD    Subjective     Chief Complaint: groin dehiscence    HPI:patient is hungry and otherwise without complaint      Review Of Systems:   Patient denies headaches, fever, chills, shortness of breath, chest pain, cough, abdominal pain, nausea or vomiting, diarrhea, rash, itching or bleeding      Objective      Vitals:  Temp:  [97.9 °F (36.6 °C)-99.2 °F (37.3 °C)] 98.7 °F (37.1 °C)  Heart Rate:  [69-99] 69  Resp:  [18-20] 18  BP: ()/(59-89) 101/70    Patient is alert and talkative in no distress at rest  Neck is without mass or JVD  Heart is Reg wo murmur  Lungs are clear wo wheeze or crackle  Abd is soft without HSM or mass, not distended or tender to palpation  MAEW, no clubbing cyanosis or edema  Skin is without rash- he has a 1 cm purulent opening of his right groin, no surrounding erythema  Neurologic exam is nonfocal   Mood is appropriate      Results Review:    I have reviewed the labs, radiology results and diagnostic studies.    Results from last 7 days   Lab Units 07/18/24  0215 07/17/24  1842 07/17/24  1110   WBC 10*3/mm3 6.47  --  9.88   HEMOGLOBIN g/dL 9.2*  --  9.9*   HEMATOCRIT % 28.8*  --  29.8*   PLATELETS 10*3/mm3 375  --  394   INR   --  1.14*  --      Results from last 7 days   Lab Units 07/18/24  0215 07/17/24  1110   SODIUM mmol/L 138 140   POTASSIUM mmol/L 4.3 4.0   CHLORIDE mmol/L 105 104   CO2 mmol/L 21.0* 20.2*   BUN mg/dL 20 23   CREATININE mg/dL 1.51* 1.66*   GLUCOSE mg/dL 103* 97   CALCIUM mg/dL 8.5* 8.8   ALK PHOS U/L  --  97   ALT (SGPT) U/L  --  6   AST (SGOT) U/L  --  9       Microbiology Results Abnormal       None          CT Abdomen Pelvis With Contrast    Result Date: 7/17/2024  Occlusion of the right SFA, Paolo Greenly, PA-C, notified at 12:45 p.m 07/17/2024.  Enlarged, inhomogeneous prostate with significant wall thickening  of the bladder and position of the Stacy catheter as described, consider postoperative change versus infection or postoperative hemorrhage.,  Possible gallbladder sludge or small stones.  CTDI: 5.39 mGy DLP:235.53 mGy.cm  This report was signed and finalized on 7/17/2024 1:28 PM by Kavya Pollock MD.      US Scrotum & Testicles    Result Date: 7/17/2024  1. No evidence of right intratesticular mass or torsion. 2. Large right epididymal cysts as described. 3. Findings suggestive of bilateral epididymitis. 4. No left testicular torsion. A hypoechoic region in the left testicle could represent possible mass. Urologic consultation is recommended.      Images were reviewed, interpreted, and dictated by Dr. Kavya Pollock MD Transcribed by Radha Reeves PA-C.  This report was signed and finalized on 7/17/2024 12:50 PM by Kavya Pollock MD.         I have reviewed the medications.    Assessment/Plan     Assessment/Problem List    Hypothyroidism    Mixed hyperlipidemia    Stage 3a chronic kidney disease    Bilateral epididymitis    Wound dehiscence, surgical, initial encounter    Superficial femoral artery occlusion      These problems are new to me today     This patient's problems and plans were partially entered by my partner and updated as appropriate by me 07/18/24.      Plan  Nestor Nye is a 78 y.o. male with PMH of enlarged prostate, GERD, Gout, HTN, RA, craniotomy for subdural hematoma, right femoral endarterectomy 6/21/24, prostate aquablation 6/21/24. Patient was discharged from [facility] on 6/26/24 and went to rehab., He presented 7/17/24with 3 days of erythema and then foul smell and the incision opened on 7/17/24    Bilateral epididymitis   Right groin dehiscence   S/p Aquablation 6/21/24 complicated by SFA occlusion that required right femoral embolectomy by Dr Morris And hematuria requiring hilario hypogastric embolization.   Chronic urinary retention with stacy  -- consult urology  -- blood cultures x 2 NTD  --  consult PT wound in am   -- cont zosyn and stop vancomycin  -- appreciate [facility] seeing him   -- CRP 10.54, lactate 1.7, procal 0.17, WBC 9.88, check ESR         Recurrent SFA occlusion   S/p Right Femoral embolectomy  on 6/21/24  -- CT A/P showed occlusion of Right SFA   --  Dr. Morris plans for surgery 7.22  -- heparin drip      HTN  HLD  -- cont norvasc      Hypothyroidism   -- cont home meds      CKDIII  -- baseline creat 1.4-1.6       VTE Prophylaxis:  Pharmacologic VTE prophylaxis orders are present.        Expected Discharge  Expected Discharge Date: 7/24/2024; Expected Discharge Time:     Electronically signed by Radha Bhagat MD, 07/18/24

## 2024-07-18 NOTE — CASE MANAGEMENT/SOCIAL WORK
Discharge Planning Assessment  [facility]     Patient Name: Nestor Nye  MRN: [id number]  Today's Date: 7/18/2024    Admit Date: 7/17/2024    Plan: HOME   Discharge Needs Assessment    No documentation.                  Discharge Plan       Row Name 07/18/24 1336       Plan    Plan HOME    Patient/Family in Agreement with Plan yes    Plan Comments Met with pt at bedside for DCP.  He resides alone in [facility].  He is independent with ADLs.  Uses a cane or RW at baseline.  No current  services. He went to [facility] on 6/26/24 then DCd home.  Confirmed he has [employer] with Rx coverage.  Anticipate pt will have a wound vac at [place].  CM will cont to follow for any needs.                  Continued Care and Services - Admitted Since 7/17/2024    No active coordination exists for this encounter.       Selected Continued Care - Prior Encounters Includes continued care and service providers with selected services from prior encounters from 4/18/2024 to 7/18/2024      Discharged on 6/26/2024 Admission date: 6/21/2024 - Discharge disposition: Skilled Nursing Facility (DC - External)      Destination       Service Provider Selected Services Address Phone Fax Patient Preferred    [facility] [address] [facility] [address] [phone]                           Expected Discharge Date and Time       Expected Discharge Date Expected Discharge Time    Jul 22, 2024            Demographic Summary    No documentation.                  Functional Status    No documentation.                  Psychosocial    No documentation.                  Abuse/Neglect    No documentation.                  Legal    No documentation.                  Substance Abuse    No documentation.                  Patient Forms    No documentation.                     Sheila Marte RN

## 2024-07-18 NOTE — THERAPY EVALUATION
Patient Name: Nestor Nye  : 1945    MRN: [id number]                              Today's Date: 2024       Admit Date: 2024    Visit Dx:     ICD-10-CM ICD-9-CM   1. Dehiscence of operative wound, initial encounter  T81.31XA 998.32   2. Superficial femoral artery occlusion  I70.209 440.20     Patient Active Problem List   Diagnosis    Hypothyroidism    Mixed hyperlipidemia    Elevated PSA    Benign essential hypertension    Acute UTI    BPH with obstruction/lower urinary tract symptoms    Anemia of chronic disease    Rheumatoid arthritis    Secondary hyperparathyroidism    Stage 3a chronic kidney disease    Gouty arthropathy    No post-op complications    Bilateral epididymitis    Wound dehiscence, surgical, initial encounter    Superficial femoral artery occlusion    Dehiscence of operative wound     Past Medical History:   Diagnosis Date    Acute maxillary sinusitis     Arthritis     Disease of thyroid gland     Enlarged prostate     GERD (gastroesophageal reflux disease)     Gout     Hypertension     Impaired functional mobility, balance, gait, and endurance     PONV (postoperative nausea and vomiting)     Renal disorder     Rheumatoid arthritis     Right inguinal hernia 10/28/2021    Added automatically from request for surgery [id number]    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, changed his voice since then     Past Surgical History:   Procedure Laterality Date    ABDOMINAL SURGERY      BACK SURGERY      BELPHAROPTOSIS REPAIR Bilateral     BRAIN SURGERY      COLON SURGERY      bowel resection x2 per pt    CRANIOTOMY FOR SUBDURAL HEMATOMA      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    DIAGNOSTIC LAPAROSCOPY N/A 2022    Procedure: DIAGNOSTIC LAPAROSCOPY CONVERTED TO EXPLORATORY LAPAROTOMY WITH SMALL BOWEL RESECTION;  Surgeon: Maximo Bennett MD;  Location: [facility];  Service: General;  Laterality: N/A;  CONVERTED  TO EXPLORATORY LAPAROTOMY AT 1912    EXPLORATORY LAPAROTOMY N/A 04/16/2022    Procedure: LAPAROTOMY EXPLORATORY WITH SMALL BOWEL OBSTRUCTION;  Surgeon: Maximo Bennett MD;  Location:  [facility];  Service: General;  Laterality: N/A;    EYE SURGERY      on upper etelids    FEMORAL ENDARTERECTOMY Right 6/21/2024    Procedure: FEMORAL EMBELECTOMY RIGHT;  Surgeon: Kingsley Morris MD;  Location:  [facility];  Service: Vascular;  Laterality: Right;    FOREIGN BODY REMOVAL Right     removal of metal fragments from RUQ after electrocution and fall.      HERNIA REPAIR      INGUINAL HERNIA REPAIR Right 11/01/2021    Procedure: INGUINAL HERNIA REPAIR WITH MESH AND EXCISION OF SPERMATIC CORD LIPOMA;  Surgeon: Rosey Glover MD;  Location:  [facility];  Service: General;  Laterality: Right;    LUMBAR SPINE SURGERY      PROSTATE AQUABLATION N/A 6/21/2024    Procedure: AQUABLATION OF PROSTATE;  Surgeon: Prieto Rey MD;  Location:  [facility];  Service: [employer] - Urology;  Laterality: N/A;    SKIN GRAFT Right     RUE skin grafting after electrocution    THORACIC SPINE SURGERY        General Information       Row Name 07/18/24 1022          Physical Therapy Time and Intention    Document Type evaluation  -DM     Mode of Treatment physical therapy  -DM       Row Name 07/18/24 1022          General Information    Patient Profile Reviewed yes  -DM     Prior Level of Function independent:;all household mobility;gait;transfer;bed mobility;ADL's;home management  indep gt w/ SPC (has FWW, w/o use); has paid help w/ laundry; bro. avail. PRN (Lives close by)  -DM     Existing Precautions/Restrictions fall;other (see comments)  contact (CRE);R SFA occlus.(6-21:R fem.embolect.&aquablation B prostate,w/ subseq hematuria&B Hypogast. emboliz; David. epididymitis; wd. dehiscence R groin; h/o craniot. for SDH evac s/p 20 ft fall on job in past(causes persist.back pain);gouty arthr.  -DM     Barriers to Rehab medically  complex;previous functional deficit;visual deficit  -DM       Row Name 07/18/24 1022          Living Environment    People in Home alone  -DM       Row Name 07/18/24 1022          Home Main Entrance    Number of Stairs, Main Entrance one  -DM       Row Name 07/18/24 1022          Stairs Within Home, Primary    Stairs, Within Home, Primary 1-st.  -DM     Number of Stairs, Within Home, Primary none  -DM       Row Name 07/18/24 1022          Cognition    Orientation Status (Cognition) oriented x 3  -DM       Row Name 07/18/24 1022          Safety Issues, Functional Mobility    Safety Issues Affecting Function (Mobility) awareness of need for assistance;insight into deficits/self-awareness;safety precaution awareness;safety precautions follow-through/compliance;sequencing abilities  -DM     Impairments Affecting Function (Mobility) balance;endurance/activity tolerance;pain;postural/trunk control;range of motion (ROM);strength;visual/perceptual  -DM               User Key  (r) = Recorded By, (t) = Taken By, (c) = Cosigned By      Initials Name Provider Type    DM Emilie Arredondo, PT Physical Therapist                   Mobility       Row Name 07/18/24 1022          Bed Mobility    Bed Mobility rolling left;sit-supine;scooting/bridging;rolling right  -DM     Rolling Left [place] (Bed Mobility) modified independence  -DM     Rolling Right [place] (Bed Mobility) modified independence  -DM     Scooting/Bridging [place] (Bed Mobility) verbal cues;contact guard  Line mgmt while pt scooted to HOB  -DM     Sit-Supine [place] (Bed Mobility) verbal cues;minimum assist (75% patient effort)  -DM     Assistive Device (Bed Mobility) bed rails;head of bed elevated  -DM     Comment, (Bed Mobility) pt UIC s/p gt around bed w/ OT;has IV BUE, scrotal sling, & scuds BLE (doffed for mobil); nsg. placed gauze dsg.over R groin wd (s/p wd.dehiscence) & secured w/ tape brought by PT;Issued gt belt; instructed in PLB exer; IV  "RUE alarming x 2 (positional); pillow placed under RUE to maintain elbow ext.; took BP LUE; pt asking for pillow for LUE supp.(issued & placed),then performed w/u exer. (2 phone calls from sibling pre & s/p)  -DM       Row Name 07/18/24 1022          Transfers    Comment, (Transfers) Cues for HP, seq (prefers SPC L hand)  -DM       Row Name 07/18/24 1022          Sit-Stand Transfer    Sit-Stand [place] (Transfers) verbal cues;minimum assist (75% patient effort)  Pt req. \"boost\" to achieve full stance,then transf R hand from armrest to IV pump  -DM     Assistive Device (Sit-Stand Transfers) cane, straight  SPC LUE; R hand on IV  -DM       Row Name 07/18/24 1022          Gait/Stairs (Locomotion)    [place] Level (Gait) verbal cues;minimum assist (75% patient effort)  PT propelled IV; 5 stand.rests; HR 87; Desat 95%; c/o incr back pain & onset fatigue;decl bobby chair or recliner;rested EOB 5 min.;transf to sup. w/min A for RLE; scooted to HOB, & Scuds applied;brought new pillowcase for scrotal sling (prev. was soiled)  -DM     Assistive Device (Gait) cane, straight  -DM     Distance in Feet (Gait) 110  -DM     Deviations/Abnormal Patterns (Gait) bilateral deviations;base of support, narrow;rere decreased;stride length decreased;weight shifting decreased;antalgic;right sided deviations  R Groin wd.discomfort; decr step length  -DM     Bilateral Gait Deviations heel strike decreased  -DM     Comment, (Gait/Stairs) cues for incr step length, trunk ext/focusing ahead, & PLB; O2 sat.recovered to 97%; pt req deferring scrotal sling placement for now (will have nsg place later); R groin wd dsg intact  -DM               User Key  (r) = Recorded By, (t) = Taken By, (c) = Cosigned By      Initials Name Provider Type    DM Emilie Arredondo, PT Physical Therapist                   Obj/Interventions       Row Name 07/18/24 1022          Range of Motion Comprehensive    General Range of Motion lower extremity range of " motion deficits identified  -DM     Comment, General Range of Motion R hip b[place]. 50-60% d/t groin wd & gauze dsg  -DM       Row Name 07/18/24 1022          Strength Comprehensive (MMT)    General Manual Muscle Testing (MMT) Assessment lower extremity strength deficits identified  -DM     Comment, General Manual Muscle Testing (MMT) Assessment R Hip grossly 2-/5; R knee approx. 3 to3+/5; LLE grossly 4- to 4/5  -DM       Row Name 07/18/24 1022          Motor Skills    Therapeutic Exercise shoulder;hip;knee;ankle  issued HEP & instructed (modif. RLE d/t limited hip mvmt & groin wd.)  -DM       Row Name 07/18/24 1022          Shoulder (Therapeutic Exercise)    Shoulder (Therapeutic Exercise) AROM (active range of motion)  -DM     Shoulder AROM (Therapeutic Exercise) bilateral;flexion;extension;aBduction;aDduction;supine;10 repetitions;other (see comments);sitting  biceps curls; R elbow b[place]z. d/t IV positional  -DM       Row Name 07/18/24 1022          Hip (Therapeutic Exercise)    Hip (Therapeutic Exercise) AROM (active range of motion);isometric exercises  -DM     Hip AROM (Therapeutic Exercise) bilateral;flexion;extension;aBduction;aDduction;external rotation;internal rotation;sitting;supine;10 repetitions  deferred sitting R Hip hikes d/t groin wd. (limited mvmt w/ R hip abd)  -DM     Hip Isometrics (Therapeutic Exercise) bilateral;gluteal sets;supine;10 repetitions  -DM       Row Name 07/18/24 1022          Knee (Therapeutic Exercise)    Knee (Therapeutic Exercise) AROM (active range of motion);isometric exercises  -DM     Knee AROM (Therapeutic Exercise) bilateral;flexion;extension;SAQ (short arc quad);LAQ (long arc quad);sitting;supine;10 repetitions;other (see comments)  also L heel slides (def. w/ RLE d/t groin wd)  -DM     Knee Isometrics (Therapeutic Exercise) bilateral;hamstring sets;quad sets;supine;10 repetitions  -DM       Row Name 07/18/24 1022          Ankle (Therapeutic Exercise)    Ankle (Therapeutic  Exercise) AROM (active range of motion)  -DM     Ankle AROM (Therapeutic Exercise) bilateral;dorsiflexion;plantarflexion;supine;sitting;10 repetitions;other (see comments)  AC  -DM       Row Name 07/18/24 1022          Balance    Static Sitting Balance independent  -DM     Dynamic Sitting Balance supervision  -DM     Position, Sitting Balance unsupported;sitting in chair;sitting edge of bed  -DM     Static Standing Balance standby assist  -DM     Dynamic Standing Balance contact guard  -DM     Position/Device Used, Standing Balance supported;cane, straight  -DM     Balance Interventions sitting;standing;static;dynamic;weight shifting activity  -DM               User Key  (r) = Recorded By, (t) = Taken By, (c) = Cosigned By      Initials Name Provider Type    DM Emilie Arredondo, PT Physical Therapist                   Goals/Plan       Row Name 07/18/24 1022          Bed Mobility Goal 1 (PT)    Activity/Assistive Device (Bed Mobility Goal 1, PT) bed mobility activities, all  -DM     [place] Level/Cues Needed (Bed Mobility Goal 1, PT) contact guard required  -DM     Time Frame (Bed Mobility Goal 1, PT) short term goal (STG);3 days  -DM       Row Name 07/18/24 1022          Transfer Goal 1 (PT)    Activity/Assistive Device (Transfer Goal 1, PT) sit-to-stand/stand-to-sit;bed-to-chair/chair-to-bed;cane, straight  -DM     [place] Level/Cues Needed (Transfer Goal 1, PT) contact guard required  -DM     Time Frame (Transfer Goal 1, PT) long term goal (LTG);10 days  -DM       Row Name 07/18/24 1022          Gait Training Goal 1 (PT)    Activity/Assistive Device (Gait Training Goal 1, PT) gait (walking locomotion);cane, straight  Stable VS; O2 sat > 92%  -DM     [place] Level (Gait Training Goal 1, PT) standby assist  -DM     Distance (Gait Training Goal 1, PT) 150  -DM     Time Frame (Gait Training Goal 1, PT) long term goal (LTG);10 days  -DM       Row Name 07/18/24 1022          Stairs Goal 1 (PT)     Activity/Assistive Device (Stairs Goal 1, PT) ascending stairs;descending stairs;cane, straight  -DM     [place] Level/Cues Needed (Stairs Goal 1, PT) minimum assist (75% or more patient effort)  -DM     Number of Stairs (Stairs Goal 1, PT) 1  -DM     Time Frame (Stairs Goal 1, PT) long term goal (LTG);10 days  -DM       Row Name 07/18/24 1022          Patient Education Goal (PT)    Activity (Patient Education Goal, PT) HEP exer  -DM     [place]/Cues/Accuracy (Memory Goal 2, PT) demonstrates adequately;verbalizes understanding  -DM     Time Frame (Patient Education Goal, PT) long term goal (LTG);10 days  -DM       Row Name 07/18/24 1022          Therapy Assessment/Plan (PT)    Planned Therapy Interventions (PT) bed mobility training;gait training;home exercise program;patient/family education;stair training;strengthening;transfer training  -DM               User Key  (r) = Recorded By, (t) = Taken By, (c) = Cosigned By      Initials Name Provider Type    DM Emilie Arredondo, PT Physical Therapist                   Clinical Impression       Row Name 07/18/24 1022          Pain    Pretreatment Pain Rating 2/10  -DM     Posttreatment Pain Rating 3/10  -DM     Pain Location - Side/Orientation Bilateral  -DM     Pain Location - back;other (see comments)  & R groin wd  -DM     Pre/Posttreatment Pain Comment & onset R elbow discomf.@ IV site  -DM     Pain Intervention(s) Repositioned;Elevated;Rest  -DM     Additional Documentation Pain Scale: Numbers Pre/Post-Treatment (Group)  -DM       Row Name 07/18/24 1022          Plan of Care Review    Plan of Care Reviewed With patient  -DM     Progress improving  -DM     Outcome Evaluation PT eval completed. Presents w/ R groin wd. dehiscence s/p R SFA occlus.& fem embolect., w/ post op hematuria d/t prostate aquablation, low HGB 9.2, as well as decr.LE strength/endurance & impaired funct mobil. below baseline. Performed STS w/ SPC & min A, amb 110 ft w/ SPC on L, & R  hand on IV pump, w/ min A to guide IV, and 5 stand.rests, + 5 min.sit rest EOB p/t transf to sup. for remaining ther exer.Noted elev. DBP, HR to 87, desat 95% on RA, & incr back & R Groin pain. Recommend home w/ brother's assist, & HHPT f/u at d/c.  -DM       Row Name 07/18/24 1022          Therapy Assessment/Plan (PT)    Patient/Family Therapy Goals Statement (PT) improved funct mobil.  -DM     Rehab Potential (PT) good, to achieve stated therapy goals  -DM     Criteria for Skilled Interventions Met (PT) yes;meets criteria;skilled treatment is necessary  -DM     Therapy Frequency (PT) daily  -DM     Predicted Duration of Therapy Intervention (PT) 10 days  -DM       Row Name 07/18/24 1022          Vital Signs    Pre Systolic BP Rehab 105  -DM     Pre Treatment Diastolic BP 68  -DM     Intra Systolic BP Rehab 111  -DM     Intra Treatment Diastolic BP 65  -DM     Post Systolic BP Rehab 110  -DM     Post Treatment Diastolic BP 81  -DM     Pretreatment Heart Rate (beats/min) 69  -DM     Intratreatment Heart Rate (beats/min) 87  -DM     Posttreatment Heart Rate (beats/min) 84  -DM     Pre SpO2 (%) 96  -DM     O2 Delivery Pre Treatment room air  -DM     Intra SpO2 (%) 95  -DM     O2 Delivery Intra Treatment room air  -DM     Post SpO2 (%) 97  -DM     O2 Delivery Post Treatment room air  -DM     Pre Patient Position Sitting  -DM     Intra Patient Position Standing  -DM     Post Patient Position Supine  -DM     Rest Breaks  6  5 stand. + 1 sit  -DM       Row Name 07/18/24 1022          Positioning and Restraints    Pre-Treatment Position sitting in chair/recliner  -DM     Post Treatment Position bed  -DM     In Bed notified nsg;supine;call light within reach;encouraged to call for assist;exit alarm on;SCD pump applied;heels elevated;RUE elevated;LUE elevated  -DM               User Key  (r) = Recorded By, (t) = Taken By, (c) = Cosigned By      Initials Name Provider Type    Emilie Kruse, PT Physical Therapist                    Outcome Measures       Row Name 07/18/24 1022          How much help from another person do you currently need...    Turning from your back to your side while in flat bed without using bedrails? 3  -DM     Moving from lying on back to sitting on the side of a flat bed without bedrails? 3  -DM     Moving to and from a bed to a chair (including a wheelchair)? 3  -DM     Standing up from a chair using your arms (e.g., wheelchair, bedside chair)? 3  -DM     Climbing 3-5 steps with a railing? 3  -DM     To walk in hospital room? 3  -DM     AM-PAC 6 Clicks Score (PT) 18  -DM     Highest Level of Mobility Goal 6 --> Walk 10 steps or more  -DM       Row Name 07/18/24 1022 07/18/24 0917       Functional Assessment    Outcome Measure Options AM-PAC 6 Clicks Basic Mobility (PT)  -DM AM-PAC 6 Clicks Daily Activity (OT)  -SW              User Key  (r) = Recorded By, (t) = Taken By, (c) = Cosigned By      Initials Name Provider Type    DM Emilie Arredondo, PT Physical Therapist    Nena Randhawa, OT Occupational Therapist                                 Physical Therapy Education       Title: PT OT SLP Therapies (In Progress)       Topic: Physical Therapy (In Progress)       Point: Mobility training (In Progress)       Learning Progress Summary             Patient Eager, E,D,H, NR by DM at 7/18/2024 1222                         Point: Home exercise program (In Progress)       Learning Progress Summary             Patient Eager, E,D,H, NR by DM at 7/18/2024 1222                         Point: Body mechanics (In Progress)       Learning Progress Summary             Patient Eager, E,D,H, NR by DM at 7/18/2024 1222                         Point: Precautions (In Progress)       Learning Progress Summary             Patient Eager, E,D,H, NR by DM at 7/18/2024 1222                                         User Key       Initials Effective Dates Name Provider Type Discipline     02/03/23 -  Emilie Arredondo, PT Physical  Therapist PT                  PT Recommendation and Plan  Planned Therapy Interventions (PT): bed mobility training, gait training, home exercise program, patient/family education, stair training, strengthening, transfer training  Plan of Care Reviewed With: patient  Progress: improving  Outcome Evaluation: PT eval completed. Presents w/ R groin wd. dehiscence s/p R SFA occlus.& fem embolect., w/ post op hematuria d/t prostate aquablation, low HGB 9.2, as well as decr.LE strength/endurance & impaired funct mobil. below baseline. Performed STS w/ SPC & min A, amb 110 ft w/ SPC on L, & R hand on IV pump, w/ min A to guide IV, and 5 stand.rests, + 5 min.sit rest EOB p/t transf to sup. for remaining ther exer.Noted elev. DBP, HR to 87, desat 95% on RA, & incr back & R Groin pain. Recommend home w/ brother's assist, & HHPT f/u at d/c.     Time Calculation:   PT Evaluation Complexity  History, PT Evaluation Complexity: 3 or more personal factors and/or comorbidities  Examination of Body Systems (PT Eval Complexity): total of 3 or more elements  Clinical Presentation (PT Evaluation Complexity): evolving  Clinical Decision Making (PT Evaluation Complexity): moderate complexity  Overall Complexity (PT Evaluation Complexity): moderate complexity     PT Charges       Row Name 07/18/24 1223             Time Calculation    Start Time 1022  -DM      PT Received On 07/18/24  -DM      PT Goal Re-Cert Due Date 07/28/24  -DM         Timed Charges    26561 - PT Therapeutic Exercise Minutes 17  -DM      08487 - Gait Training Minutes  18  -DM      03749 - PT Therapeutic Activity Minutes 24  -DM         Untimed Charges    PT Eval/Re-eval Minutes 60  -DM         Total Minutes    Timed Charges Total Minutes 59  -DM      Untimed Charges Total Minutes 60  -DM       Total Minutes 119  -DM                User Key  (r) = Recorded By, (t) = Taken By, (c) = Cosigned By      Initials Name Provider Type    Emilie Kruse, PT Physical Therapist                   Therapy Charges for Today       Code Description Service Date Service Provider Modifiers Qty    41664613494 HC PT THER PROC EA 15 MIN 7/18/2024 Emilie Arredondo, PT GP 1    98077707224 HC GAIT TRAINING EA 15 MIN 7/18/2024 Emilie Arredondo, PT GP 1    82974596760 HC PT THERAPEUTIC ACT EA 15 MIN 7/18/2024 Emilie Arredondo, PT GP 2    20474701304 HC PT EVAL MOD COMPLEXITY 4 7/18/2024 Emilie Arredondo, PT GP 1            PT G-Codes  Outcome Measure Options: AM-PAC 6 Clicks Basic Mobility (PT)  AM-PAC 6 Clicks Score (PT): 18  AM-PAC 6 Clicks Score (OT): 20  PT Discharge Summary  Anticipated Discharge Disposition (PT): home with assist, home with home health    Emilie Arredondo, PT  7/18/2024

## 2024-07-19 LAB
BACTERIA SPEC AEROBE CULT: ABNORMAL
CK SERPL-CCNC: 21 U/L (ref 20–200)
CRP SERPL-MCNC: 17.87 MG/DL (ref 0–0.5)
ERYTHROCYTE [SEDIMENTATION RATE] IN BLOOD: 82 MM/HR (ref 0–20)
HCT VFR BLD AUTO: 28.3 % (ref 37.5–51)
HGB BLD-MCNC: 9.2 G/DL (ref 13–17.7)
UFH PPP CHRO-ACNC: 0.2 IU/ML (ref 0.3–0.7)
UFH PPP CHRO-ACNC: 0.36 IU/ML (ref 0.3–0.7)

## 2024-07-19 PROCEDURE — 25010000002 DAPTOMYCIN PER 1 MG: Performed by: INTERNAL MEDICINE

## 2024-07-19 PROCEDURE — 86140 C-REACTIVE PROTEIN: CPT | Performed by: INTERNAL MEDICINE

## 2024-07-19 PROCEDURE — 25010000002 HEPARIN (PORCINE) 25000-0.45 UT/250ML-% SOLUTION

## 2024-07-19 PROCEDURE — 85014 HEMATOCRIT: CPT | Performed by: NURSE PRACTITIONER

## 2024-07-19 PROCEDURE — 85018 HEMOGLOBIN: CPT | Performed by: NURSE PRACTITIONER

## 2024-07-19 PROCEDURE — 25010000002 PIPERACILLIN SOD-TAZOBACTAM PER 1 G: Performed by: NURSE PRACTITIONER

## 2024-07-19 PROCEDURE — 82550 ASSAY OF CK (CPK): CPT | Performed by: INTERNAL MEDICINE

## 2024-07-19 PROCEDURE — 85520 HEPARIN ASSAY: CPT

## 2024-07-19 PROCEDURE — 85652 RBC SED RATE AUTOMATED: CPT | Performed by: INTERNAL MEDICINE

## 2024-07-19 RX ADMIN — PIPERACILLIN AND TAZOBACTAM 3.38 G: 3; .375 INJECTION, POWDER, LYOPHILIZED, FOR SOLUTION INTRAVENOUS at 21:40

## 2024-07-19 RX ADMIN — PIPERACILLIN AND TAZOBACTAM 3.38 G: 3; .375 INJECTION, POWDER, LYOPHILIZED, FOR SOLUTION INTRAVENOUS at 12:00

## 2024-07-19 RX ADMIN — Medication 10 ML: at 21:40

## 2024-07-19 RX ADMIN — PIPERACILLIN AND TAZOBACTAM 3.38 G: 3; .375 INJECTION, POWDER, LYOPHILIZED, FOR SOLUTION INTRAVENOUS at 04:17

## 2024-07-19 RX ADMIN — HEPARIN SODIUM 20 UNITS/KG/HR: 10000 INJECTION, SOLUTION INTRAVENOUS at 04:17

## 2024-07-19 RX ADMIN — SENNOSIDES AND DOCUSATE SODIUM 2 TABLET: 50; 8.6 TABLET ORAL at 22:19

## 2024-07-19 RX ADMIN — AMLODIPINE BESYLATE 2.5 MG: 2.5 TABLET ORAL at 09:00

## 2024-07-19 RX ADMIN — FERROUS SULFATE TAB 325 MG (65 MG ELEMENTAL FE) 325 MG: 325 (65 FE) TAB at 08:00

## 2024-07-19 RX ADMIN — ASPIRIN 81 MG CHEWABLE TABLET 81 MG: 81 TABLET CHEWABLE at 09:00

## 2024-07-19 RX ADMIN — DAPTOMYCIN 450 MG: 500 INJECTION, POWDER, LYOPHILIZED, FOR SOLUTION INTRAVENOUS at 09:00

## 2024-07-19 RX ADMIN — ACETAMINOPHEN 650 MG: 325 TABLET ORAL at 22:19

## 2024-07-19 RX ADMIN — Medication 10 ML: at 09:00

## 2024-07-19 RX ADMIN — FINASTERIDE 5 MG: 5 TABLET, FILM COATED ORAL at 09:00

## 2024-07-19 RX ADMIN — LEVOTHYROXINE SODIUM 88 MCG: 88 TABLET ORAL at 04:17

## 2024-07-19 NOTE — CONSULTS
[facility]   Consult Note    Patient Name: Nestor Nye  : 1945  MRN: [id number]  Primary Care Physician:  Roxanne Slater MD  Referring Physician: No Known Provider  Date of admission: 2024    Consults  Subjective   Subjective     Reason for Consult/ Chief Complaint: Scrotal swelling urinary retention    History of Present Illness  Nestor Nye is a 78 y.o. male who underwent aqua ablation of the prostate by Dr. Rey in [place] 3 weeks ago.  Apparently had retention postoperatively but near immediately had occlusion vascular of the right lower extremity.  He had surgical intervention by Dr. Morris.  Patient also had fairly acute scrotal swelling in the postoperative period which actually has improved.  He has chronic right-sided scrotal swelling.  He has scrotal ultrasound yesterday showed a large epididymal cyst on the right.  He has an area of parenchymal hypoechoic wedge of the left testis.  3 days after his vascular procedure his right groin wound dehisced and he is here today for wound care.  He has a Gillespie catheter in place.  He states that he continues to have improvement of the swelling of the scrotum and his discomfort is improved over days ago.  Prior to his prostate intervention procedure he was told his prostate was a size of a softball.  His catheter has been draining without difficulty and without gross hematuria.  I reviewed his CT scan.  I also reviewed his scrotal ultrasound.    Review of Systems     Personal History     Past Medical History:   Diagnosis Date    Acute maxillary sinusitis     Arthritis     Disease of thyroid gland     Enlarged prostate     GERD (gastroesophageal reflux disease)     Gout     Hypertension     Impaired functional mobility, balance, gait, and endurance     PONV (postoperative nausea and vomiting)     Renal disorder     Rheumatoid arthritis     Right inguinal hernia 10/28/2021    Added automatically from request for surgery [id number]     Vocal cord anomaly 2007    had problem with enlarge vocal cord after low back surgery, changed his voice since then       Past Surgical History:   Procedure Laterality Date    ABDOMINAL SURGERY      BACK SURGERY      BELPHAROPTOSIS REPAIR Bilateral     BRAIN SURGERY      COLON SURGERY      bowel resection x2 per pt    CRANIOTOMY FOR SUBDURAL HEMATOMA      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    DIAGNOSTIC LAPAROSCOPY N/A 05/04/2022    Procedure: DIAGNOSTIC LAPAROSCOPY CONVERTED TO EXPLORATORY LAPAROTOMY WITH SMALL BOWEL RESECTION;  Surgeon: Maximo Bennett MD;  Location:  [facility];  Service: General;  Laterality: N/A;  CONVERTED TO EXPLORATORY LAPAROTOMY AT 1912    EXPLORATORY LAPAROTOMY N/A 04/16/2022    Procedure: LAPAROTOMY EXPLORATORY WITH SMALL BOWEL OBSTRUCTION;  Surgeon: Maximo Bennett MD;  Location:  [facility];  Service: General;  Laterality: N/A;    EYE SURGERY      on upper etelids    FEMORAL ENDARTERECTOMY Right 6/21/2024    Procedure: FEMORAL EMBELECTOMY RIGHT;  Surgeon: Kingsley Morris MD;  Location:  [facility];  Service: Vascular;  Laterality: Right;    FOREIGN BODY REMOVAL Right     removal of metal fragments from RUQ after electrocution and fall.      HERNIA REPAIR      INGUINAL HERNIA REPAIR Right 11/01/2021    Procedure: INGUINAL HERNIA REPAIR WITH MESH AND EXCISION OF SPERMATIC CORD LIPOMA;  Surgeon: Rosey Glover MD;  Location:  [facility];  Service: General;  Laterality: Right;    LUMBAR SPINE SURGERY      PROSTATE AQUABLATION N/A 6/21/2024    Procedure: AQUABLATION OF PROSTATE;  Surgeon: Prieto Rey MD;  Location:  [facility];  Service: Robotics - Urology;  Laterality: N/A;    SKIN GRAFT Right     RUE skin grafting after electrocution    THORACIC SPINE SURGERY         Family History: family history includes Arthritis in his father and mother; Prostate cancer in his brother. Otherwise pertinent FHx was reviewed and not pertinent to current  issue.    Social History:  reports that he has quit smoking. His smoking use included cigars. He has been exposed to tobacco smoke. He has never used smokeless tobacco. He reports that he does not currently use alcohol. He reports that he does not use drugs.    Home Medications:   Alcohol Wipes, Diclofenac Sodium, amLODIPine, aspirin, ferrous sulfate, finasteride, ipratropium-albuterol, levothyroxine, and melatonin    Allergies:  No Known Allergies    Objective    Objective     Vitals:  Temp:  [98.4 °F (36.9 °C)-99.2 °F (37.3 °C)] 98.4 °F (36.9 °C)  Heart Rate:  [68-97] 92  Resp:  [18] 18  BP: ()/(66-81) 109/79    Physical Exam  Mild induration in the lower pole left testis with tenderness moderate.  He has a obvious cystic structure above the right testis nontender probably 5 cm in diameter.  Result Review    Result Review:  I have personally reviewed the results from the time of this admission to 7/18/2024 20:18 EDT and agree with these findings:  []  Laboratory list / accordion  []  Microbiology  []  Radiology  []  EKG/Telemetry   []  Cardiology/Vascular   []  Pathology  []  Old records  []  Other:  Most notable findings include: Physical exam      Assessment & Plan   Assessment / Plan     Brief Patient Summary:  Nestor Nye is a 78 y.o. male who apparently had significant penoscrotal irritation and testicular irritation following his aqua ablation therapy.  The findings on his ultrasound may be completely related to inflammatory changes associated with that.  As his symptoms are improving as is his discomfort and swelling I would recommend observation with repeat ultrasound.  He has an appointment to see Dr. Rey in 2 weeks for catheter removal and repeat voiding trial.  I will see on an as-needed basis    Active Hospital Problems:  Active Hospital Problems    Diagnosis     **Dehiscence of operative wound     Moderate malnutrition     Bilateral epididymitis     Wound dehiscence, surgical, initial  encounter     Superficial femoral artery occlusion     Stage 3a chronic kidney disease     Hypothyroidism     Mixed hyperlipidemia      Plan:       Chaz Ortega MD

## 2024-07-19 NOTE — PROGRESS NOTES
LOS: 1 day   Patient Care Team:  Roxanne Slater MD as PCP - General (Internal Medicine)  Rosey Glover MD as Surgeon (General Surgery)  Sheila Hair APRN as Nurse Practitioner (Long Term Care Acute)  Arabella Vogel APRN as Nurse Practitioner (Urology)  Mitch Douglas MD as Consulting Physician (Cardiology)  Prieto Rey MD as Consulting Physician (Urology)  Clarke Gary APRN as Nurse Practitioner (Nephrology)      Subjective   Mr. Nye is asleep upon entering his room but easily awakened. He denies any new concerns or acute events overnight. Pain is controlled. Remains agreeable to proceed with intervention scheduled 7/22/24.       History taken from: patient    Objective     Vital Signs  Temp:  [98.4 °F (36.9 °C)-98.9 °F (37.2 °C)] 98.9 °F (37.2 °C)  Heart Rate:  [68-97] 81  Resp:  [18] 18  BP: ()/(66-81) 105/72     Physical Exam  Constitutional:       Appearance: Normal appearance.   HENT:      Head: Normocephalic.   Eyes:      Pupils: Pupils are equal, round, and reactive to light.   Pulmonary:      Effort: Pulmonary effort is normal.      Breath sounds: Normal breath sounds.   Abdominal:      General: Abdomen is flat. There is no distension.      Palpations: Abdomen is soft.   Genitourinary:     Comments: + catheter in place  RIGHT groin: incision with medial dehiscence, presence of slough, slight erythema, minimal drainage, malodorous    Musculoskeletal:         General: Normal range of motion.   Skin:     General: Skin is warm and dry.   Neurological:      General: No focal deficit present.      Mental Status: He is alert and oriented to person, place, and time.   Psychiatric:         Mood and Affect: Mood normal.          Results Review:     I reviewed the patient's new clinical results.  CBC    Results from last 7 days   Lab Units 07/18/24  0215 07/17/24  1110   WBC 10*3/mm3 6.47 9.88   HEMOGLOBIN g/dL 9.2* 9.9*   PLATELETS 10*3/mm3 375 394     BMP    Results from last 7 days   Lab Units 07/18/24  0215 07/17/24  1110   SODIUM mmol/L 138 140   POTASSIUM mmol/L 4.3 4.0   CHLORIDE mmol/L 105 104   CO2 mmol/L 21.0* 20.2*   BUN mg/dL 20 23   CREATININE mg/dL 1.51* 1.66*   GLUCOSE mg/dL 103* 97          Current Facility-Administered Medications:     acetaminophen (TYLENOL) tablet 650 mg, 650 mg, Oral, Q4H PRN, 650 mg at 07/18/24 1955 **OR** acetaminophen (TYLENOL) 160 MG/5ML oral solution 650 mg, 650 mg, Oral, Q4H PRN **OR** acetaminophen (TYLENOL) suppository 650 mg, 650 mg, Rectal, Q4H PRN, Kimmy Toro, APRN    amLODIPine (NORVASC) tablet 2.5 mg, 2.5 mg, Oral, Daily, Kimmy Toro APRKAREL, 2.5 mg at 07/18/24 0931    aspirin chewable tablet 81 mg, 81 mg, Oral, Daily, Kimmy Toro APRN, 81 mg at 07/18/24 0931    sennosides-docusate (PERICOLACE) 8.6-50 MG per tablet 2 tablet, 2 tablet, Oral, BID PRN **AND** polyethylene glycol (MIRALAX) packet 17 g, 17 g, Oral, Daily PRN **AND** bisacodyl (DULCOLAX) EC tablet 5 mg, 5 mg, Oral, Daily PRN **AND** bisacodyl (DULCOLAX) suppository 10 mg, 10 mg, Rectal, Daily PRN, Kimmy Toro APRN    DAPTOmycin (CUBICIN) 450 mg in sodium chloride 0.9 % 50 mL IVPB, 6 mg/kg, Intravenous, Q24H, Jose Jones MD, Last Rate: 100 mL/hr at 07/18/24 1029, 450 mg at 07/18/24 1029    ferrous sulfate tablet 325 mg, 325 mg, Oral, Daily With Breakfast, Kimmy Toro, LANNY, 325 mg at 07/18/24 0931    finasteride (PROSCAR) tablet 5 mg, 5 mg, Oral, Daily, Kimmy Toro APRN, 5 mg at 07/18/24 0931    heparin 16206 units/250 mL (100 units/mL) in 0.45 % NaCl infusion, 20 Units/kg/hr, Intravenous, Titrated, Daniel Rose, [facility], Last Rate: 14.96 mL/hr at 07/19/24 0417, 20 Units/kg/hr at 07/19/24 0417    HYDROcodone-acetaminophen (NORCO) 5-325 MG per tablet 1 tablet, 1 tablet, Oral, Q6H PRN, Kimmy Toro APRN    ipratropium-albuterol (DUO-NEB) nebulizer solution 3 mL, 3 mL, Nebulization, Q4H PRN, [place]  LANNY Higuera    levothyroxine (SYNTHROID, LEVOTHROID) tablet 88 mcg, 88 mcg, Oral, Daily, Kimmy Toro APRN, 88 mcg at 07/19/24 0417    melatonin tablet 5 mg, 5 mg, Oral, Nightly PRN, Kimmy Toro APRN    ondansetron ODT (ZOFRAN-ODT) disintegrating tablet 4 mg, 4 mg, Oral, Q6H PRN **OR** ondansetron (ZOFRAN) injection 4 mg, 4 mg, Intravenous, Q6H PRN, Kimmy Toro APRN, 4 mg at 07/18/24 0052    Pharmacy to Dose Heparin, , Does not apply, Continuous PRN, Kimmy Toro APRN    piperacillin-tazobactam (ZOSYN) 3.375 g IVPB in 100 mL NS MBP (CD), 3.375 g, Intravenous, Q8H, Kimmy Toro APRN, 3.375 g at 07/19/24 0417    sodium chloride 0.9 % flush 10 mL, 10 mL, Intravenous, Q12H, Kimmy Toro APRN, 10 mL at 07/18/24 1952    sodium chloride 0.9 % flush 10 mL, 10 mL, Intravenous, PRN, Kimmy Toro APRN    sodium chloride 0.9 % infusion 40 mL, 40 mL, Intravenous, PRN, Kimmy Toro APRN     Assessment:   (RIGHT Groin Dehiscence with PVD  - 6/21 (Alexandra) RIGHT iliofemoral embolectomy  - known occlusion of RIGHT SFA per US 6/21/24).     Plan:    (- pain control prn  - Plans for RIGHT Groin debridement with wound vac placement and right lower extremity angiogram.   - Scheduled with Dr. Morris Monday 7/22/2024 around 1:00PM  - NPO and Consent ordered    Patient is stable from a vascular standpoint and will not be seen over the weekend. Please contact the on-call physician for any questions or concerns. ).         Breanna Anaya PA-C  07/19/24  07:47 EDT

## 2024-07-20 LAB
ANION GAP SERPL CALCULATED.3IONS-SCNC: 17 MMOL/L (ref 5–15)
BASOPHILS # BLD AUTO: 0.07 10*3/MM3 (ref 0–0.2)
BASOPHILS NFR BLD AUTO: 0.3 % (ref 0–1.5)
BUN SERPL-MCNC: 18 MG/DL (ref 8–23)
BUN/CREAT SERPL: 10.7 (ref 7–25)
CALCIUM SPEC-SCNC: 8.6 MG/DL (ref 8.6–10.5)
CHLORIDE SERPL-SCNC: 103 MMOL/L (ref 98–107)
CK SERPL-CCNC: 23 U/L (ref 20–200)
CO2 SERPL-SCNC: 18 MMOL/L (ref 22–29)
CREAT SERPL-MCNC: 1.68 MG/DL (ref 0.76–1.27)
CRP SERPL-MCNC: 23.67 MG/DL (ref 0–0.5)
D-LACTATE SERPL-SCNC: 1.5 MMOL/L (ref 0.5–2)
DEPRECATED RDW RBC AUTO: 43.7 FL (ref 37–54)
EGFRCR SERPLBLD CKD-EPI 2021: 41.6 ML/MIN/1.73
EOSINOPHIL # BLD AUTO: 0.03 10*3/MM3 (ref 0–0.4)
EOSINOPHIL NFR BLD AUTO: 0.1 % (ref 0.3–6.2)
ERYTHROCYTE [DISTWIDTH] IN BLOOD BY AUTOMATED COUNT: 12.7 % (ref 12.3–15.4)
GLUCOSE SERPL-MCNC: 124 MG/DL (ref 65–99)
HCT VFR BLD AUTO: 29.7 % (ref 37.5–51)
HGB BLD-MCNC: 9.8 G/DL (ref 13–17.7)
IMM GRANULOCYTES # BLD AUTO: 0.14 10*3/MM3 (ref 0–0.05)
IMM GRANULOCYTES NFR BLD AUTO: 0.7 % (ref 0–0.5)
LYMPHOCYTES # BLD AUTO: 2.24 10*3/MM3 (ref 0.7–3.1)
LYMPHOCYTES NFR BLD AUTO: 10.5 % (ref 19.6–45.3)
MCH RBC QN AUTO: 30.9 PG (ref 26.6–33)
MCHC RBC AUTO-ENTMCNC: 33 G/DL (ref 31.5–35.7)
MCV RBC AUTO: 93.7 FL (ref 79–97)
MONOCYTES # BLD AUTO: 2.42 10*3/MM3 (ref 0.1–0.9)
MONOCYTES NFR BLD AUTO: 11.4 % (ref 5–12)
NEUTROPHILS NFR BLD AUTO: 16.34 10*3/MM3 (ref 1.7–7)
NEUTROPHILS NFR BLD AUTO: 77 % (ref 42.7–76)
NRBC BLD AUTO-RTO: 0 /100 WBC (ref 0–0.2)
PLATELET # BLD AUTO: 340 10*3/MM3 (ref 140–450)
PMV BLD AUTO: 9.8 FL (ref 6–12)
POTASSIUM SERPL-SCNC: 3.8 MMOL/L (ref 3.5–5.2)
PROCALCITONIN SERPL-MCNC: 0.45 NG/ML (ref 0–0.25)
RBC # BLD AUTO: 3.17 10*6/MM3 (ref 4.14–5.8)
SODIUM SERPL-SCNC: 138 MMOL/L (ref 136–145)
UFH PPP CHRO-ACNC: 0.34 IU/ML (ref 0.3–0.7)
UFH PPP CHRO-ACNC: 0.4 IU/ML (ref 0.3–0.7)
WBC NRBC COR # BLD AUTO: 21.24 10*3/MM3 (ref 3.4–10.8)

## 2024-07-20 PROCEDURE — 25010000002 PIPERACILLIN SOD-TAZOBACTAM PER 1 G: Performed by: INTERNAL MEDICINE

## 2024-07-20 PROCEDURE — 82550 ASSAY OF CK (CPK): CPT | Performed by: INTERNAL MEDICINE

## 2024-07-20 PROCEDURE — 85025 COMPLETE CBC W/AUTO DIFF WBC: CPT | Performed by: INTERNAL MEDICINE

## 2024-07-20 PROCEDURE — 84145 PROCALCITONIN (PCT): CPT | Performed by: INTERNAL MEDICINE

## 2024-07-20 PROCEDURE — 99232 SBSQ HOSP IP/OBS MODERATE 35: CPT | Performed by: INTERNAL MEDICINE

## 2024-07-20 PROCEDURE — 25010000002 HEPARIN (PORCINE) 25000-0.45 UT/250ML-% SOLUTION

## 2024-07-20 PROCEDURE — 80048 BASIC METABOLIC PNL TOTAL CA: CPT | Performed by: INTERNAL MEDICINE

## 2024-07-20 PROCEDURE — 25010000002 PIPERACILLIN SOD-TAZOBACTAM PER 1 G: Performed by: NURSE PRACTITIONER

## 2024-07-20 PROCEDURE — 85520 HEPARIN ASSAY: CPT

## 2024-07-20 PROCEDURE — 87040 BLOOD CULTURE FOR BACTERIA: CPT | Performed by: INTERNAL MEDICINE

## 2024-07-20 PROCEDURE — 83605 ASSAY OF LACTIC ACID: CPT | Performed by: INTERNAL MEDICINE

## 2024-07-20 PROCEDURE — 25010000002 DAPTOMYCIN PER 1 MG: Performed by: INTERNAL MEDICINE

## 2024-07-20 PROCEDURE — 86140 C-REACTIVE PROTEIN: CPT | Performed by: INTERNAL MEDICINE

## 2024-07-20 PROCEDURE — 25810000003 SODIUM CHLORIDE 0.9 % SOLUTION: Performed by: INTERNAL MEDICINE

## 2024-07-20 RX ADMIN — PIPERACILLIN AND TAZOBACTAM 3.38 G: 3; .375 INJECTION, POWDER, LYOPHILIZED, FOR SOLUTION INTRAVENOUS at 20:45

## 2024-07-20 RX ADMIN — PIPERACILLIN AND TAZOBACTAM 3.38 G: 3; .375 INJECTION, POWDER, LYOPHILIZED, FOR SOLUTION INTRAVENOUS at 04:32

## 2024-07-20 RX ADMIN — HYDROCODONE BITARTRATE AND ACETAMINOPHEN 1 TABLET: 5; 325 TABLET ORAL at 06:12

## 2024-07-20 RX ADMIN — Medication 10 ML: at 09:57

## 2024-07-20 RX ADMIN — HYDROCODONE BITARTRATE AND ACETAMINOPHEN 1 TABLET: 5; 325 TABLET ORAL at 20:55

## 2024-07-20 RX ADMIN — HEPARIN SODIUM 22 UNITS/KG/HR: 10000 INJECTION, SOLUTION INTRAVENOUS at 00:22

## 2024-07-20 RX ADMIN — FERROUS SULFATE TAB 325 MG (65 MG ELEMENTAL FE) 325 MG: 325 (65 FE) TAB at 09:56

## 2024-07-20 RX ADMIN — ASPIRIN 81 MG CHEWABLE TABLET 81 MG: 81 TABLET CHEWABLE at 08:40

## 2024-07-20 RX ADMIN — LEVOTHYROXINE SODIUM 88 MCG: 88 TABLET ORAL at 04:32

## 2024-07-20 RX ADMIN — Medication 10 ML: at 20:58

## 2024-07-20 RX ADMIN — FINASTERIDE 5 MG: 5 TABLET, FILM COATED ORAL at 08:40

## 2024-07-20 RX ADMIN — HEPARIN SODIUM 22 UNITS/KG/HR: 10000 INJECTION, SOLUTION INTRAVENOUS at 18:22

## 2024-07-20 RX ADMIN — PIPERACILLIN AND TAZOBACTAM 3.38 G: 3; .375 INJECTION, POWDER, LYOPHILIZED, FOR SOLUTION INTRAVENOUS at 12:27

## 2024-07-20 RX ADMIN — HYDROCODONE BITARTRATE AND ACETAMINOPHEN 1 TABLET: 5; 325 TABLET ORAL at 12:27

## 2024-07-20 RX ADMIN — SODIUM CHLORIDE 500 ML: 9 INJECTION, SOLUTION INTRAVENOUS at 09:57

## 2024-07-20 RX ADMIN — DAPTOMYCIN 450 MG: 500 INJECTION, POWDER, LYOPHILIZED, FOR SOLUTION INTRAVENOUS at 08:40

## 2024-07-20 NOTE — PROGRESS NOTES
Urology Progress Note     LOS: 2 days   Patient Care Team:  Roxanne Slater MD as PCP - General (Internal Medicine)  Rosey Glover MD as Surgeon (General Surgery)  Sheila Hair APRN as Nurse Practitioner (Long Term Care Acute)  Arabella Vogel APRN as Nurse Practitioner (Urology)  Mitch Douglas MD as Consulting Physician (Cardiology)  Prieto Rey MD as Consulting Physician (Urology)  Clarke Gary APRN as Nurse Practitioner (Nephrology)    Chief Complaint:  No chief complaint on file.      Subjective     Interval History:   Urology asked to reevaluate the patient due to gross hematuria in association with heparin infusion    Objective     Vital Signs  Temp:  [97.6 °F (36.4 °C)-98.9 °F (37.2 °C)] 97.6 °F (36.4 °C)  Heart Rate:  [77-97] 94  Resp:  [18-24] 20  BP: ()/(62-81) 94/62  I/O last 3 completed shifts:  In: 150 [P.O.:150]  Out: 1550 [Urine:1550]  No intake/output data recorded.    Physical Exam:   General Appearance alert, appears stated age, and cooperative  Male Genitalia catheter in place, urine clear without evidence of ongoing hematuria     Results Review:     I reviewed the patient's new clinical results.  Lab Results (last 24 hours)       Procedure Component Value Units Date/Time    Basic Metabolic Panel [[id number]]  (Abnormal) Collected: 07/20/24 0629    Specimen: Blood Updated: 07/20/24 0947     Glucose 124 mg/dL      BUN 18 mg/dL      Creatinine 1.68 mg/dL      Sodium 138 mmol/L      Potassium 3.8 mmol/L      Chloride 103 mmol/L      CO2 18.0 mmol/L      Calcium 8.6 mg/dL      BUN/Creatinine Ratio 10.7     Anion Gap 17.0 mmol/L      eGFR 41.6 mL/min/1.73     Narrative:      GFR Normal >60  Chronic Kidney Disease <60  Kidney Failure <15    The GFR formula is only valid for adults with stable renal function between ages 18 and 70.    CK [[id number]]  (Normal) Collected: 07/20/24 0629    Specimen: Blood Updated: 07/20/24 0947     Creatine Kinase 23 U/L      "C-reactive Protein [[id number]]  (Abnormal) Collected: 07/20/24 0629    Specimen: Blood Updated: 07/20/24 0947     C-Reactive Protein 23.67 mg/dL     Procalcitonin [[id number]]  (Abnormal) Collected: 07/20/24 0629    Specimen: Blood Updated: 07/20/24 0905     Procalcitonin 0.45 ng/mL     Narrative:      As a Marker for Sepsis (Non-Neonates):    1. <0.5 ng/mL represents a low risk of severe sepsis and/or septic shock.  2. >2 ng/mL represents a high risk of severe sepsis and/or septic shock.    As a Marker for Lower Respiratory Tract Infections that require antibiotic therapy:    PCT on Admission    Antibiotic Therapy       6-12 Hrs later    >0.5                Strongly Recommended  >0.25 - <0.5        Recommended   0.1 - 0.25          Discouraged              Remeasure/reassess PCT  <0.1                Strongly Discouraged     Remeasure/reassess PCT    As 28 day mortality risk marker: \"Change in Procalcitonin Result\" (>80% or <=80%) if Day 0 (or Day 1) and Day 4 values are available. Refer to http://www.[email or web address] [email or web address]pct[place]    Change in PCT <=80%  A decrease of PCT levels below or equal to 80% defines a positive change in PCT test result representing a higher risk for 28-day all-cause mortality of patients diagnosed with severe sepsis for septic shock.    Change in PCT >80%  A decrease of PCT levels of more than 80% defines a negative change in PCT result representing a lower risk for 28-day all-cause mortality of patients diagnosed with severe sepsis or septic shock.       Heparin Anti-Xa [[id number]]  (Normal) Collected: 07/20/24 0629    Specimen: Blood Updated: 07/20/24 0712     Heparin Anti-Xa (UFH) 0.34 IU/ml     CBC & Differential [[id number]]  (Abnormal) Collected: 07/20/24 0629    Specimen: Blood Updated: 07/20/24 0657    Narrative:      The following orders were created for panel order CBC & Differential.  Procedure                               Abnormality         Status                     ---------       "                         -----------         ------                     CBC Auto Differential[[id number]]        Abnormal            Final result                 Please view results for these tests on the individual orders.    CBC Auto Differential [[id number]]  (Abnormal) Collected: 07/20/24 0629    Specimen: Blood Updated: 07/20/24 0657     WBC 21.24 10*3/mm3      RBC 3.17 10*6/mm3      Hemoglobin 9.8 g/dL      Hematocrit 29.7 %      MCV 93.7 fL      MCH 30.9 pg      MCHC 33.0 g/dL      RDW 12.7 %      RDW-SD 43.7 fl      MPV 9.8 fL      Platelets 340 10*3/mm3      Neutrophil % 77.0 %      Lymphocyte % 10.5 %      Monocyte % 11.4 %      Eosinophil % 0.1 %      Basophil % 0.3 %      Immature Grans % 0.7 %      Neutrophils, Absolute 16.34 10*3/mm3      Lymphocytes, Absolute 2.24 10*3/mm3      Monocytes, Absolute 2.42 10*3/mm3      Eosinophils, Absolute 0.03 10*3/mm3      Basophils, Absolute 0.07 10*3/mm3      Immature Grans, Absolute 0.14 10*3/mm3      nRBC 0.0 /100 WBC     CK [[id number]]  (Normal) Collected: 07/19/24 2040    Specimen: Blood Updated: 07/19/24 2221     Creatine Kinase 21 U/L     C-reactive Protein [[id number]]  (Abnormal) Collected: 07/19/24 2040    Specimen: Blood Updated: 07/19/24 2221     C-Reactive Protein 17.87 mg/dL     Sedimentation Rate [[id number]]  (Abnormal) Collected: 07/19/24 2040    Specimen: Blood Updated: 07/19/24 2143     Sed Rate 82 mm/hr     Heparin Anti-Xa [[id number]]  (Abnormal) Collected: 07/19/24 2040    Specimen: Blood Updated: 07/19/24 2116     Heparin Anti-Xa (UFH) 0.20 IU/ml     Hemoglobin & Hematocrit, Blood [[id number]]  (Abnormal) Collected: 07/19/24 2040    Specimen: Blood Updated: 07/19/24 2058     Hemoglobin 9.2 g/dL      Hematocrit 28.3 %     Blood Culture - Blood, Arm, Left [[id number]]  (Normal) Collected: 07/17/24 2007    Specimen: Blood from Arm, Left Updated: 07/19/24 2030     Blood Culture No growth at 2 days    Blood Culture - Blood, Hand, Left [[id number]]  (Normal)  Collected: 07/17/24 2008    Specimen: Blood from Hand, Left Updated: 07/19/24 2030     Blood Culture No growth at 2 days            Medication Review:   Current Facility-Administered Medications   Medication Dose Route Frequency Provider Last Rate Last Admin    acetaminophen (TYLENOL) tablet 650 mg  650 mg Oral Q4H PRN Kimmy Toro APRN   650 mg at 07/19/24 2219    Or    acetaminophen (TYLENOL) 160 MG/5ML oral solution 650 mg  650 mg Oral Q4H PRN Kimmy Toro APRN        Or    acetaminophen (TYLENOL) suppository 650 mg  650 mg Rectal Q4H PRN Kimmy Toro APRN        amLODIPine (NORVASC) tablet 2.5 mg  2.5 mg Oral Daily Kimmy Toro APRN   2.5 mg at 07/19/24 0900    aspirin chewable tablet 81 mg  81 mg Oral Daily Kimmy Toro APRN   81 mg at 07/20/24 0840    sennosides-docusate (PERICOLACE) 8.6-50 MG per tablet 2 tablet  2 tablet Oral BID PRN Kimmy Toro APRN   2 tablet at 07/19/24 2219    And    polyethylene glycol (MIRALAX) packet 17 g  17 g Oral Daily PRN Kimmy Toro APRN        And    bisacodyl (DULCOLAX) EC tablet 5 mg  5 mg Oral Daily PRN Kimmy Toro APRN        And    bisacodyl (DULCOLAX) suppository 10 mg  10 mg Rectal Daily PRN Kimmy Toro APRN        DAPTOmycin (CUBICIN) 450 mg in sodium chloride 0.9 % 50 mL IVPB  6 mg/kg Intravenous Q24H Jose Jones  mL/hr at 07/20/24 0840 450 mg at 07/20/24 0840    ferrous sulfate tablet 325 mg  325 mg Oral Daily With Breakfast Kimmy Toro APRN   325 mg at 07/20/24 0956    finasteride (PROSCAR) tablet 5 mg  5 mg Oral Daily Kimmy Toro APRN   5 mg at 07/20/24 0840    heparin 76076 units/250 mL (100 units/mL) in 0.45 % NaCl infusion  22 Units/kg/hr Intravenous Titrated Christel Graff RPH 16.45 mL/hr at 07/20/24 0023 22 Units/kg/hr at 07/20/24 0023    HYDROcodone-acetaminophen (NORCO) 5-325 MG per tablet 1 tablet  1 tablet Oral Q6H PRN Kimmy Toro, APRN   1 tablet  at 07/20/24 0612    ipratropium-albuterol (DUO-NEB) nebulizer solution 3 mL  3 mL Nebulization Q4H PRN Kimmy Toro APRN        levothyroxine (SYNTHROID, LEVOTHROID) tablet 88 mcg  88 mcg Oral Daily Kimmy Toro APRN   88 mcg at 07/20/24 0432    melatonin tablet 5 mg  5 mg Oral Nightly PRN Kimmy Toro APRN        ondansetron ODT (ZOFRAN-ODT) disintegrating tablet 4 mg  4 mg Oral Q6H PRN Kimmy Toro APRN        Or    ondansetron (ZOFRAN) injection 4 mg  4 mg Intravenous Q6H PRN Kimmy Toro APRN   4 mg at 07/18/24 0052    Pharmacy to Dose Heparin   Does not apply Continuous PRN Kimmy Toro APRN        piperacillin-tazobactam (ZOSYN) 3.375 g IVPB in 100 mL NS MBP (CD)  3.375 g Intravenous Q8H Radha Bhagat MD   3.375 g at 07/20/24 0432    sodium chloride 0.9 % bolus 500 mL  500 mL Intravenous Once Radha Bhagat  mL/hr at 07/20/24 0957 500 mL at 07/20/24 0957    sodium chloride 0.9 % flush 10 mL  10 mL Intravenous Q12H Kimmy Toro APRN   10 mL at 07/20/24 0957    sodium chloride 0.9 % flush 10 mL  10 mL Intravenous PRN Kimmy Toro APRN        sodium chloride 0.9 % infusion 40 mL  40 mL Intravenous PRN Kimmy Toro APRN             Assessment & Plan       Dehiscence of operative wound    Hypothyroidism    Mixed hyperlipidemia    Acute UTI (urinary tract infection)    Stage 3a chronic kidney disease    Bilateral epididymitis    Wound dehiscence, surgical, initial encounter    Superficial femoral artery occlusion    Moderate malnutrition    Continue indwelling catheter  No acute intervention required      Sekou Ward MD  07/20/24  10:18 EDT

## 2024-07-20 NOTE — PLAN OF CARE
Goal Outcome Evaluation:  Plan of Care Reviewed With: patient        Progress: no change  Outcome Evaluation: RA, NSR, VSS. PRN tylenol and stool softeners administered. Dressing changed. Pt slept well.

## 2024-07-20 NOTE — PROGRESS NOTES
[facility] Medicine Services  INPATIENT PROGRESS NOTE    Date of Admission: 7/17/2024  Primary Care Physician: Roxanne Slater MD    Subjective     Chief Complaint: groin dehiscence    HPI:patient had a rough night, FC had to be replaced, hematuria has improved. Scrotum stil very painful      Review Of Systems:   Patient denies headaches, fever, chills, shortness of breath, chest pain, cough, abdominal pain, nausea or vomiting, diarrhea, rash, itching or bleeding      Objective      Vitals:  Temp:  [97.6 °F (36.4 °C)-98.9 °F (37.2 °C)] 97.6 °F (36.4 °C)  Heart Rate:  [77-97] 94  Resp:  [18-24] 20  BP: ()/(62-81) 94/62    Patient is alert and talkative in no distress at rest  Neck is without mass or JVD  Heart is Reg wo murmur  Lungs are clear wo wheeze or crackle  Abd is soft without HSM or mass, not distended or tender to palpation  MAEW, no clubbing cyanosis or edema  Skin is without rash- he has a 1 cm purulent opening of his right groin, no surrounding erythema  Swollen tender scrotum  Neurologic exam is nonfocal   Mood is appropriate      Results Review:    I have reviewed the labs, radiology results and diagnostic studies.    Results from last 7 days   Lab Units 07/20/24  0629 07/19/24  2040 07/18/24  0215 07/17/24  1842 07/17/24  1110   WBC 10*3/mm3 21.24*  --  6.47  --  9.88   HEMOGLOBIN g/dL 9.8* 9.2* 9.2*  --  9.9*   HEMATOCRIT % 29.7* 28.3* 28.8*  --  29.8*   PLATELETS 10*3/mm3 340  --  375  --  394   INR   --   --   --  1.14*  --      Results from last 7 days   Lab Units 07/18/24  0215 07/17/24  1110   SODIUM mmol/L 138 140   POTASSIUM mmol/L 4.3 4.0   CHLORIDE mmol/L 105 104   CO2 mmol/L 21.0* 20.2*   BUN mg/dL 20 23   CREATININE mg/dL 1.51* 1.66*   GLUCOSE mg/dL 103* 97   CALCIUM mg/dL 8.5* 8.8   ALK PHOS U/L  --  97   ALT (SGPT) U/L  --  6   AST (SGOT) U/L  --  9       Microbiology Results Abnormal       Procedure Component Value - Date/Time    Blood Culture - Blood,  Arm, Left [[id number]]  (Normal) Collected: 07/17/24 2007    Lab Status: Preliminary result Specimen: Blood from Arm, Left Updated: 07/19/24 2030     Blood Culture No growth at 2 days    Blood Culture - Blood, Hand, Left [[id number]]  (Normal) Collected: 07/17/24 2008    Lab Status: Preliminary result Specimen: Blood from Hand, Left Updated: 07/19/24 2030     Blood Culture No growth at 2 days          No radiology results for the last day      I have reviewed the medications.    Assessment/Plan     Assessment/Problem List    Dehiscence of operative wound    Hypothyroidism    Mixed hyperlipidemia    Acute UTI (urinary tract infection)    Stage 3a chronic kidney disease    Bilateral epididymitis    Wound dehiscence, surgical, initial encounter    Superficial femoral artery occlusion    Moderate malnutrition      These problems are new to me today     This patient's problems and plans were partially entered by my partner and updated as appropriate by me 07/20/24.      Plan  Nestor Nye is a 78 y.o. male with PMH of enlarged prostate, GERD, Gout, HTN, RA, craniotomy for subdural hematoma, right femoral endarterectomy 6/21/24, prostate aquablation 6/21/24. Patient was discharged from [facility] on 6/26/24 and went to rehab., He presented 7/17/24with 3 days of erythema and then foul smell and the incision opened on 7/17/24    Bilateral epididymitis   Right groin dehiscence   S/p Aquablation 6/21/24 complicated by SFA occlusion that required right femoral embolectomy by Dr Morris And hematuria requiring hilario hypogastric embolization.   Chronic urinary retention with stacy  -- consult urology  -- blood cultures x 2 NTD  -- consult PT wound in am   -- cont zosyn and stop vancomycin  -- appreciate [facility] seeing him   --increased in WBC 7/20 maybe stress, but procal remains elevated at 0.45, lactic acid reassuring, hypotensive so will give IVFs and monitor -repeat blood cultures     Recurrent SFA occlusion   S/p Right Femoral  embolectomy  on 6/21/24  -- CT A/P showed occlusion of Right SFA   --  Dr. Morris plans for surgery 7.22  -- heparin drip      HTN  HLD  -- cont norvasc with parameters     Hypothyroidism   -- cont home meds      CKDIII  -- baseline creat 1.4-1.6       VTE Prophylaxis:  Pharmacologic VTE prophylaxis orders are present.        Expected Discharge  Expected Discharge Date: 7/24/2024; Expected Discharge Time:     Electronically signed by Radha Bhagat MD, 07/20/24

## 2024-07-20 NOTE — PROGRESS NOTES
HEPARIN INFUSION  Nestor Nye is a  78 y.o. male receiving heparin infusion.     Therapy for (VTE/Cardiac): DVT  Patient Weight: 74.8 kg  Initial Bolus (Y/N): Yes  Any Bolus (Y/N): Yes        Signs or Symptoms of Bleeding: No    VTE (PE/DVT)   Initial Bolus: 80 units/kg (Max 10,000 units)  Initial rate: 18 units/kg/hr (Max 1,500 units/hr)    Anti Xa Rebolus Infusion Hold time Change infusion Dose (Units/kg/hr) Next Anti Xa Level Due   < 0.11 50 Units/kg  (4000 Units Max) None Increase by  4 Units/kg/hr 6 hours   0.11 - 0.19 25 Units/kg  (2000 Units Max) None Increase by  3 Units/kg/hr 6 hours   0.2 - 0.29 0 None Increase by  2 Units/kg/hr 6 hours   0.3 - 0.7 0 None No Change 6 hours (after 2 consecutive levels in range check qAM)   0.71 - 0.8 0 None Decrease by  1 Units/kg/hr 6 hours   0.81 - 0.9 0 None Decrease by  2 Units/kg/hr 6 hours   0.91 - 1 0 60 Minutes Decrease by  3 Units/kg/hr 6 hours   >1 0 Hold  After Anti Xa less than 0.7 decrease previous rate by  4 Units/kg/hr  Every 2 hours until Anti Xa is less than 0.7 then when infusion restarts in 6 hours     Results from last 7 days   Lab Units 07/19/24  2040 07/18/24  0215 07/17/24  1842 07/17/24  1110   INR   --   --  1.14*  --    HEMOGLOBIN g/dL 9.2* 9.2*  --  9.9*   HEMATOCRIT % 28.3* 28.8*  --  29.8*   PLATELETS 10*3/mm3  --  375  --  394          Date   Time   Anti-Xa Current Rate (Unit/kg/hr) Bolus   (Units) Rate Change   (Unit/kg/hr) New Rate (Unit/kg/hr) Next   Anti-Xa Comments  Pump Check Daily   7/17 1842 <0.10 New Start 6000 +18 18  0200 Discussed with RN.   7/18 0215 0.38 18 -- -- 18 0800 Discussed w/ nurse   7/18 0749 0.24 18 --  +2 20 1700 D/W RN.    7/18 17:10 0.41 20 --- --- --- 2300 Discussed with RN.   7/19 0036 0.36 20 -- -- 20 0800 D/w RN   7/19 20:40 0.20 20 -- +2 22 0600 D/w RN, pt has chronic stacy with h/o hematuria, patient's urine looked blood-tinged earlier, RN notifed urology, urologist said to cont heparin gtt unless  urine becomes bright red, h/h stable   7/20 0629 0.34 22 -- -- 22 1300 D/w LINDA Mcgee, PharmD  7/20/2024   07:40 EDT `

## 2024-07-20 NOTE — PROGRESS NOTES
"[facility] Progress Note    Date of Admission: 2024      Antibiotics: Daptomycin and Zosyn      CC: Right growing wound    S:   Patient reportedly with increased hematuria on heparin drip no fevers but creatinine up some with elevated white blood cell count today hemodynamically stable remains on IV antibiotics daptomycin and Zosyn  O:  BP 94/62 (BP Location: Left arm, Patient Position: Lying)   Pulse 94   Temp 98.6 °F (37 °C) (Oral)   Resp 18   Ht 170.2 cm (67\")   Wt 74.8 kg (165 lb)   SpO2 94%   BMI 25.84 kg/m²   Temp (24hrs), Av.5 °F (36.9 °C), Min:97.6 °F (36.4 °C), Max:98.9 °F (37.2 °C)      PE:      GENERAL: Awake and alert, in no acute distress. Chronically ill-appearing  HEENT: Normocephalic, atraumatic.  PERRL. EOMI. No conjunctival injection. No icterus. Oropharynx clear without evidence of thrush or exudate. Mild periodontal disease.    NECK: Supple without nuchal rigidity  LYMPH: No cervical, axillary or inguinal lymphadenopathy. No neck masses  HEART: RRR; No murmur, rubs, gallops.   LUNGS: Clear to auscultation bilaterally without wheezing, rales, rhonchi. Normal respiratory effort.  ABDOMEN: Soft, nontender, nondistended. Positive bowel sounds. No rebound or guarding.   EXT:  No cyanosis, clubbing or edema  : Normal appearing genitalia with Gillespie catheter.  MSK: FROM without joint effusions noted    SKIN: Warm and dry without cutaneous eruptions.  Right groin wound with wound dehiscence with slough and some erythema and drainage with odor  NEURO: Oriented to PPT. No focal deficits.   PSYCHIATRIC: Normal insight and judgement. Cooperative with PE.  Laboratory Data    Results from last 7 days   Lab Units 24  0629 24  2040 24  0215 24  1110   WBC 10*3/mm3 21.24*  --  6.47 9.88   HEMOGLOBIN g/dL 9.8* 9.2* 9.2* 9.9*   HEMATOCRIT % 29.7* 28.3* 28.8* 29.8*   PLATELETS 10*3/mm3 340  --  375 394     Results from last 7 days   Lab Units 24  0629   SODIUM mmol/L 138 "   POTASSIUM mmol/L 3.8   CHLORIDE mmol/L 103   CO2 mmol/L 18.0*   BUN mg/dL 18   CREATININE mg/dL 1.68*   GLUCOSE mg/dL 124*   CALCIUM mg/dL 8.6     Results from last 7 days   Lab Units 07/17/24  1110   ALK PHOS U/L 97   BILIRUBIN mg/dL 0.4   ALT (SGPT) U/L 6   AST (SGOT) U/L 9     Results from last 7 days   Lab Units 07/19/24  2040   SED RATE mm/hr 82*     Results from last 7 days   Lab Units 07/20/24  0629   CRP mg/dL 23.67*       Estimated Creatinine Clearance: 39 mL/min (A) (by C-G formula based on SCr of 1.68 mg/dL (H)).      Microbiology:  Urine culture with gram-negative rods identified as Klebsiella pneumoniae    Radiology:  Imaging Results (Last 24 Hours)       ** No results found for the last 24 hours. **            PROBLEM LIST:   Right groin wound dehiscence with cellulitis  Recent right iliofemoral embolectomy  PAD  H/o craniotomy with SDH  Recent prostate surgery with h/o BPH  Elevated ESR/CRP  ABENA     ASSESSMENT:  Patient is a 78-year-old male with history of peripheral arterial disease, GERD, hypertension, rheumatoid arthritis with recent right iliofemoral embolectomy surgical site was stable then had opening up of wound with dehiscence with drainage with some erythema and odor noted now admitted plans for wound revision and debridement in several days cultures obtained on empiric IV antibiotics and infectious disease consultation requested today.    Some increase hematuria today with bump in white blood cell count mild increase in creatinine but no fevers and hemodynamically stable will continue daptomycin and Zosyn and monitor   PLAN:  Continue daptomycin  Continue Zosyn  Follow-up vascular surgery plans  Monitor labs of CBC BMP CRP and CPK    Jose Jones MD  7/20/2024

## 2024-07-21 LAB
ANION GAP SERPL CALCULATED.3IONS-SCNC: 12 MMOL/L (ref 5–15)
BASOPHILS # BLD AUTO: 0.05 10*3/MM3 (ref 0–0.2)
BASOPHILS NFR BLD AUTO: 0.3 % (ref 0–1.5)
BUN SERPL-MCNC: 20 MG/DL (ref 8–23)
BUN/CREAT SERPL: 12 (ref 7–25)
CALCIUM SPEC-SCNC: 8.4 MG/DL (ref 8.6–10.5)
CHLORIDE SERPL-SCNC: 104 MMOL/L (ref 98–107)
CO2 SERPL-SCNC: 22 MMOL/L (ref 22–29)
CREAT SERPL-MCNC: 1.66 MG/DL (ref 0.76–1.27)
DEPRECATED RDW RBC AUTO: 44.9 FL (ref 37–54)
EGFRCR SERPLBLD CKD-EPI 2021: 42.2 ML/MIN/1.73
EOSINOPHIL # BLD AUTO: 0.08 10*3/MM3 (ref 0–0.4)
EOSINOPHIL NFR BLD AUTO: 0.4 % (ref 0.3–6.2)
ERYTHROCYTE [DISTWIDTH] IN BLOOD BY AUTOMATED COUNT: 12.9 % (ref 12.3–15.4)
GLUCOSE SERPL-MCNC: 132 MG/DL (ref 65–99)
HCT VFR BLD AUTO: 27.9 % (ref 37.5–51)
HGB BLD-MCNC: 9 G/DL (ref 13–17.7)
IMM GRANULOCYTES # BLD AUTO: 0.12 10*3/MM3 (ref 0–0.05)
IMM GRANULOCYTES NFR BLD AUTO: 0.6 % (ref 0–0.5)
LYMPHOCYTES # BLD AUTO: 1.5 10*3/MM3 (ref 0.7–3.1)
LYMPHOCYTES NFR BLD AUTO: 7.6 % (ref 19.6–45.3)
MCH RBC QN AUTO: 30.6 PG (ref 26.6–33)
MCHC RBC AUTO-ENTMCNC: 32.3 G/DL (ref 31.5–35.7)
MCV RBC AUTO: 94.9 FL (ref 79–97)
MONOCYTES # BLD AUTO: 1.28 10*3/MM3 (ref 0.1–0.9)
MONOCYTES NFR BLD AUTO: 6.5 % (ref 5–12)
NEUTROPHILS NFR BLD AUTO: 16.68 10*3/MM3 (ref 1.7–7)
NEUTROPHILS NFR BLD AUTO: 84.6 % (ref 42.7–76)
NRBC BLD AUTO-RTO: 0 /100 WBC (ref 0–0.2)
PLATELET # BLD AUTO: 314 10*3/MM3 (ref 140–450)
PMV BLD AUTO: 9.9 FL (ref 6–12)
POTASSIUM SERPL-SCNC: 4.1 MMOL/L (ref 3.5–5.2)
RBC # BLD AUTO: 2.94 10*6/MM3 (ref 4.14–5.8)
SODIUM SERPL-SCNC: 138 MMOL/L (ref 136–145)
UFH PPP CHRO-ACNC: 0.1 IU/ML (ref 0.3–0.7)
UFH PPP CHRO-ACNC: 0.4 IU/ML (ref 0.3–0.7)
WBC NRBC COR # BLD AUTO: 19.71 10*3/MM3 (ref 3.4–10.8)

## 2024-07-21 PROCEDURE — 99232 SBSQ HOSP IP/OBS MODERATE 35: CPT | Performed by: NURSE PRACTITIONER

## 2024-07-21 PROCEDURE — 25010000002 PIPERACILLIN SOD-TAZOBACTAM PER 1 G: Performed by: INTERNAL MEDICINE

## 2024-07-21 PROCEDURE — 85520 HEPARIN ASSAY: CPT

## 2024-07-21 PROCEDURE — 85025 COMPLETE CBC W/AUTO DIFF WBC: CPT | Performed by: NURSE PRACTITIONER

## 2024-07-21 PROCEDURE — 80048 BASIC METABOLIC PNL TOTAL CA: CPT | Performed by: INTERNAL MEDICINE

## 2024-07-21 PROCEDURE — 25010000002 DAPTOMYCIN PER 1 MG: Performed by: INTERNAL MEDICINE

## 2024-07-21 PROCEDURE — 25010000002 HEPARIN (PORCINE) 25000-0.45 UT/250ML-% SOLUTION

## 2024-07-21 PROCEDURE — 25010000002 HEPARIN (PORCINE) PER 1000 UNITS

## 2024-07-21 RX ORDER — HEPARIN SODIUM 1000 [USP'U]/ML
3500 INJECTION, SOLUTION INTRAVENOUS; SUBCUTANEOUS ONCE
Status: COMPLETED | OUTPATIENT
Start: 2024-07-21 | End: 2024-07-21

## 2024-07-21 RX ADMIN — HEPARIN SODIUM 25 UNITS/KG/HR: 10000 INJECTION, SOLUTION INTRAVENOUS at 22:52

## 2024-07-21 RX ADMIN — PIPERACILLIN AND TAZOBACTAM 3.38 G: 3; .375 INJECTION, POWDER, LYOPHILIZED, FOR SOLUTION INTRAVENOUS at 20:18

## 2024-07-21 RX ADMIN — HEPARIN SODIUM 22 UNITS/KG/HR: 10000 INJECTION, SOLUTION INTRAVENOUS at 08:05

## 2024-07-21 RX ADMIN — Medication 5 MG: at 20:18

## 2024-07-21 RX ADMIN — AMLODIPINE BESYLATE 2.5 MG: 2.5 TABLET ORAL at 08:07

## 2024-07-21 RX ADMIN — Medication 10 ML: at 08:08

## 2024-07-21 RX ADMIN — Medication 10 ML: at 20:19

## 2024-07-21 RX ADMIN — PIPERACILLIN AND TAZOBACTAM 3.38 G: 3; .375 INJECTION, POWDER, LYOPHILIZED, FOR SOLUTION INTRAVENOUS at 05:37

## 2024-07-21 RX ADMIN — FINASTERIDE 5 MG: 5 TABLET, FILM COATED ORAL at 08:07

## 2024-07-21 RX ADMIN — PIPERACILLIN AND TAZOBACTAM 3.38 G: 3; .375 INJECTION, POWDER, LYOPHILIZED, FOR SOLUTION INTRAVENOUS at 14:47

## 2024-07-21 RX ADMIN — HYDROCODONE BITARTRATE AND ACETAMINOPHEN 1 TABLET: 5; 325 TABLET ORAL at 14:40

## 2024-07-21 RX ADMIN — LEVOTHYROXINE SODIUM 88 MCG: 88 TABLET ORAL at 05:49

## 2024-07-21 RX ADMIN — FERROUS SULFATE TAB 325 MG (65 MG ELEMENTAL FE) 325 MG: 325 (65 FE) TAB at 08:07

## 2024-07-21 RX ADMIN — DAPTOMYCIN 450 MG: 500 INJECTION, POWDER, LYOPHILIZED, FOR SOLUTION INTRAVENOUS at 08:18

## 2024-07-21 RX ADMIN — HEPARIN SODIUM 3500 UNITS: 1000 INJECTION INTRAVENOUS; SUBCUTANEOUS at 14:46

## 2024-07-21 RX ADMIN — ASPIRIN 81 MG CHEWABLE TABLET 81 MG: 81 TABLET CHEWABLE at 08:07

## 2024-07-21 NOTE — PROGRESS NOTES
HEPARIN INFUSION  Nestor Nye is a  78 y.o. male receiving heparin infusion.     Therapy for (VTE/Cardiac): DVT  Patient Weight: 74.8 kg  Initial Bolus (Y/N): Yes  Any Bolus (Y/N): Yes        Signs or Symptoms of Bleeding: Urine     VTE (PE/DVT)   Initial Bolus: 80 units/kg (Max 10,000 units)  Initial rate: 18 units/kg/hr (Max 1,500 units/hr)    Anti Xa Rebolus Infusion Hold time Change infusion Dose (Units/kg/hr) Next Anti Xa Level Due   < 0.11 50 Units/kg  (4000 Units Max) None Increase by  4 Units/kg/hr 6 hours   0.11 - 0.19 25 Units/kg  (2000 Units Max) None Increase by  3 Units/kg/hr 6 hours   0.2 - 0.29 0 None Increase by  2 Units/kg/hr 6 hours   0.3 - 0.7 0 None No Change 6 hours (after 2 consecutive levels in range check qAM)   0.71 - 0.8 0 None Decrease by  1 Units/kg/hr 6 hours   0.81 - 0.9 0 None Decrease by  2 Units/kg/hr 6 hours   0.91 - 1 0 60 Minutes Decrease by  3 Units/kg/hr 6 hours   >1 0 Hold  After Anti Xa less than 0.7 decrease previous rate by  4 Units/kg/hr  Every 2 hours until Anti Xa is less than 0.7 then when infusion restarts in 6 hours     Results from last 7 days   Lab Units 07/19/24  2040 07/18/24  0215 07/17/24  1842 07/17/24  1110   INR   --   --  1.14*  --    HEMOGLOBIN g/dL 9.2* 9.2*  --  9.9*   HEMATOCRIT % 28.3* 28.8*  --  29.8*   PLATELETS 10*3/mm3  --  375  --  394          Date   Time   Anti-Xa Current Rate (Unit/kg/hr) Bolus   (Units) Rate Change   (Unit/kg/hr) New Rate (Unit/kg/hr) Next   Anti-Xa Comments  Pump Check Daily   7/17 1842 <0.10 New Start 6000 +18 18  0200 Discussed with RN.   7/18 0215 0.38 18 -- -- 18 0800 Discussed w/ nurse   7/18 0749 0.24 18 --  +2 20 1700 D/W RN.    7/18 17:10 0.41 20 --- --- --- 2300 Discussed with RN.   7/19 0036 0.36 20 -- -- 20 0800 D/w RN   7/19 20:40 0.20 20 -- +2 22 0600 D/w RN, pt has chronic stacy with h/o hematuria, patient's urine looked blood-tinged earlier, RN notifed urology, urologist said to cont heparin gtt unless  urine becomes bright red, h/h stable   7/20 0629 0.34 22 -- -- 22 1300 D/w Everardo, RN   7/20 1300 0.4 22 -- -- 22 0600 D/w Everardo, LINDA   7/21 0820 0.10 22 3500 -- 25 1800 D/w LINDA Mcgee, RN to check pump due to precautions                                                 Torin Potts PharmD, BCPS  7/21/2024  10:04 EDT

## 2024-07-21 NOTE — ACP (ADVANCE CARE PLANNING)
Pt completed his living will and designated his brother Wesley Nye as his healthcare surrogate.  A copy was faxed to HIM to be placed in his medical records.

## 2024-07-21 NOTE — PROGRESS NOTES
"[facility] Progress Note    Date of Admission: 2024      Antibiotics: Daptomycin and Zosyn      CC: Right groin wound    S:   Patient with no fevers wbc down some today and crt stable and HD stable today    O:  /76 (BP Location: Left arm, Patient Position: Lying)   Pulse 85   Temp 99.1 °F (37.3 °C) (Oral)   Resp 18   Ht 170.2 cm (67\")   Wt 74.8 kg (165 lb)   SpO2 91%   BMI 25.84 kg/m²   Temp (24hrs), Av.8 °F (37.1 °C), Min:98.5 °F (36.9 °C), Max:99.1 °F (37.3 °C)      PE:      GENERAL: Awake and alert, in no acute distress. Chronically ill-appearing  HEENT: Normocephalic, atraumatic.  PERRL. EOMI. No conjunctival injection. No icterus. Oropharynx clear without evidence of thrush or exudate. Mild periodontal disease.    NECK: Supple without nuchal rigidity  LYMPH: No cervical, axillary or inguinal lymphadenopathy. No neck masses  HEART: RRR; No murmur, rubs, gallops.   LUNGS: Clear to auscultation bilaterally without wheezing, rales, rhonchi. Normal respiratory effort.  ABDOMEN: Soft, nontender, nondistended. Positive bowel sounds. No rebound or guarding.   EXT:  No cyanosis, clubbing or edema  : Normal appearing genitalia with Gillespie catheter.  MSK: FROM without joint effusions noted    SKIN: Warm and dry without cutaneous eruptions.  Right groin wound with wound dehiscence with slough and some erythema and drainage with odor  NEURO: Oriented to PPT. No focal deficits.   PSYCHIATRIC: Normal insight and judgement. Cooperative with PE.  Laboratory Data    Results from last 7 days   Lab Units 24  0820 24  0629 24  2040 24  0215   WBC 10*3/mm3 19.71* 21.24*  --  6.47   HEMOGLOBIN g/dL 9.0* 9.8* 9.2* 9.2*   HEMATOCRIT % 27.9* 29.7* 28.3* 28.8*   PLATELETS 10*3/mm3 314 340  --  375     Results from last 7 days   Lab Units 24  0820   SODIUM mmol/L 138   POTASSIUM mmol/L 4.1   CHLORIDE mmol/L 104   CO2 mmol/L 22.0   BUN mg/dL 20   CREATININE mg/dL 1.66*   GLUCOSE mg/dL 132* "   CALCIUM mg/dL 8.4*     Results from last 7 days   Lab Units 07/17/24  1110   ALK PHOS U/L 97   BILIRUBIN mg/dL 0.4   ALT (SGPT) U/L 6   AST (SGOT) U/L 9     Results from last 7 days   Lab Units 07/19/24  2040   SED RATE mm/hr 82*     Results from last 7 days   Lab Units 07/20/24  0629   CRP mg/dL 23.67*       Estimated Creatinine Clearance: 39.4 mL/min (A) (by C-G formula based on SCr of 1.66 mg/dL (H)).      Microbiology:  Urine culture with gram-negative rods identified as Klebsiella pneumoniae    Radiology:  Imaging Results (Last 24 Hours)       ** No results found for the last 24 hours. **            PROBLEM LIST:   Right groin wound dehiscence with cellulitis  Recent right iliofemoral embolectomy  PAD  H/o craniotomy with SDH  Recent prostate surgery with h/o BPH  Elevated ESR/CRP  ABENA     ASSESSMENT:  Patient is a 78-year-old male with history of peripheral arterial disease, GERD, hypertension, rheumatoid arthritis with recent right iliofemoral embolectomy surgical site was stable then had opening up of wound with dehiscence with drainage with some erythema and odor noted now admitted plans for wound revision and debridement in several days cultures obtained on empiric IV antibiotics and infectious disease consultation requested today.    Some increase hematuria today with bump in white blood cell count mild increase in creatinine but no fevers and hemodynamically stable will continue daptomycin and Zosyn and monitor wbc down some today with crt stable h/h stable    PLAN:  Continue daptomycin monitor cpk  Continue Zosyn monitor crt  Follow-up vascular surgery plans  Monitor labs of CBC BMP CRP and CPK    Jose Jones MD  7/21/2024

## 2024-07-21 NOTE — PROGRESS NOTES
[facility] Medicine Services  INPATIENT PROGRESS NOTE    Date of Admission: 7/17/2024  Primary Care Physician: Roxanne Slater MD    Subjective     Chief Complaint: groin dehiscence    HPI:  Patient seen resting up in the chair awake and alert.  No acute distress.  Chronically ill appearing.  States his right groin pain is well-controlled.  Heparin drip continues infusing.  Chronic Stacy catheter with yellow urine.  Plans for surgery tomorrow.  No new issues.      Review Of Systems:   Gen- No fevers, chills  CV- No chest pain, palpitations  Resp- No cough, dyspnea  GI- No N/V/D, abd pain      Objective      Vitals:  Temp:  [98.5 °F (36.9 °C)-99.1 °F (37.3 °C)] 98.7 °F (37.1 °C)  Heart Rate:  [] 100  Resp:  [18] 18  BP: (100-112)/(67-81) 105/77    Physical exam:  Constitutional: No acute distress, awake, alert. Resting up in the chair.  Chronically ill appearing.  No visitors at bs.   HENT: NCAT, mucous membranes moist  Respiratory: Clear to auscultation bilaterally but decreased at bases, respiratory effort normal on RA with sats 94%  Cardiovascular: RRR, no murmurs, rubs, or gallops  Gastrointestinal: Positive bowel sounds, soft, nontender, nondistended  : stacy to BSD with yellow urine   Musculoskeletal: No bilateral ankle edema  Psychiatric: Appropriate affect, cooperative  Neurologic: Oriented x 3, strength symmetric in all extremities, Cranial Nerves grossly intact to confrontation, speech clear  Skin: No rashes. Rt groin drsg c/d/I.       Results Review:    I have reviewed the labs, radiology results and diagnostic studies.    Results from last 7 days   Lab Units 07/21/24  0820 07/20/24  0629 07/19/24  2040 07/18/24  0215 07/17/24  1842   WBC 10*3/mm3 19.71* 21.24*  --  6.47  --    HEMOGLOBIN g/dL 9.0* 9.8* 9.2* 9.2*  --    HEMATOCRIT % 27.9* 29.7* 28.3* 28.8*  --    PLATELETS 10*3/mm3 314 340  --  375  --    INR   --   --   --   --  1.14*     Results from last 7 days   Lab  Units 07/21/24  0820 07/20/24  0629 07/18/24  0215 07/17/24  1110   SODIUM mmol/L 138 138 138 140   POTASSIUM mmol/L 4.1 3.8 4.3 4.0   CHLORIDE mmol/L 104 103 105 104   CO2 mmol/L 22.0 18.0* 21.0* 20.2*   BUN mg/dL 20 18 20 23   CREATININE mg/dL 1.66* 1.68* 1.51* 1.66*   GLUCOSE mg/dL 132* 124* 103* 97   CALCIUM mg/dL 8.4* 8.6 8.5* 8.8   ALK PHOS U/L  --   --   --  97   ALT (SGPT) U/L  --   --   --  6   AST (SGOT) U/L  --   --   --  9       Microbiology Results Abnormal       Procedure Component Value - Date/Time    Blood Culture - Blood, Arm, Left [[id number]]  (Normal) Collected: 07/17/24 2007    Lab Status: Preliminary result Specimen: Blood from Arm, Left Updated: 07/20/24 2032     Blood Culture No growth at 3 days    Blood Culture - Blood, Hand, Left [[id number]]  (Normal) Collected: 07/17/24 2008    Lab Status: Preliminary result Specimen: Blood from Hand, Left Updated: 07/20/24 2032     Blood Culture No growth at 3 days          No radiology results for the last day      I have reviewed the medications.    Assessment/Plan     Assessment/Problem List    Dehiscence of operative wound    Hypothyroidism    Mixed hyperlipidemia    Acute UTI (urinary tract infection)    Stage 3a chronic kidney disease    Bilateral epididymitis    Wound dehiscence, surgical, initial encounter    Superficial femoral artery occlusion    Moderate malnutrition      Nestor Molinad Popeye is a 78 y.o. male with PMH of enlarged prostate, GERD, Gout, HTN, RA, craniotomy for subdural hematoma, right femoral endarterectomy 6/21/24, prostate aquablation 6/21/24. Patient was discharged from [facility] on 6/26/24 and went to rehab., He presented 7/17/24with 3 days of erythema and then foul smell and the incision opened on 7/17/24    This patient's problems and plans were partially entered by my partner and updated as appropriate by me 07/21/24.      Assessment/Plan:  Pt is new to me today     Bilateral epididymitis   Right groin dehiscence   S/p  Aquablation 6/21/24 complicated by SFA occlusion that required right femoral embolectomy by Dr Morris And hematuria requiring hilario hypogastric embolization.   Chronic urinary retention with stacy  -- consulted urology  -- blood cultures x 2 NTD  -- consult PT wound  -- cont zosyn and stop vancomycin  -- appreciate [facility]   --7/20:ncreased in WBC 7/20 maybe stress, but procal remains elevated at 0.45, lactic acid reassuring, hypotensive so will give IVFs and monitor -repeat blood cultures  --today wbc trending down, BP improved, BC pending.  Monitor.  Add respirex.    --plans for surgery tomorrow as below   --am labs      Recurrent SFA occlusion   S/p Right Femoral embolectomy  on 6/21/24  --  CT A/P showed occlusion of Right SFA   --  Dr. Morris plans for surgery tomorrow 7/22  --  heparin drip   --  NPO after MN tonight      HTN  HLD  -- cont norvasc with parameters  --stable today      Hypothyroidism   -- cont home meds      CKDIII  -- baseline creat 1.4-1.6       VTE Prophylaxis:  Pharmacologic VTE prophylaxis orders are present.        Expected Discharge  Expected Discharge Date: 7/24/2024; Expected Discharge Time:     Electronically signed by LANNY Pandya, 07/21/24

## 2024-07-22 ENCOUNTER — ANESTHESIA EVENT (OUTPATIENT)
Dept: PERIOP | Facility: HOSPITAL | Age: 79
End: 2024-07-22
Payer: MEDICARE

## 2024-07-22 ENCOUNTER — APPOINTMENT (OUTPATIENT)
Dept: GENERAL RADIOLOGY | Facility: HOSPITAL | Age: 79
DRG: 908 | End: 2024-07-22
Payer: MEDICARE

## 2024-07-22 ENCOUNTER — ANESTHESIA (OUTPATIENT)
Dept: PERIOP | Facility: HOSPITAL | Age: 79
End: 2024-07-22
Payer: MEDICARE

## 2024-07-22 LAB
ALBUMIN SERPL-MCNC: 2.9 G/DL (ref 3.5–5.2)
ALBUMIN/GLOB SERPL: 1.2 G/DL
ALP SERPL-CCNC: 75 U/L (ref 39–117)
ALT SERPL W P-5'-P-CCNC: 12 U/L (ref 1–41)
ANION GAP SERPL CALCULATED.3IONS-SCNC: 15 MMOL/L (ref 5–15)
AST SERPL-CCNC: 17 U/L (ref 1–40)
BACTERIA SPEC AEROBE CULT: NORMAL
BASOPHILS # BLD AUTO: 0.07 10*3/MM3 (ref 0–0.2)
BASOPHILS NFR BLD AUTO: 0.6 % (ref 0–1.5)
BILIRUB SERPL-MCNC: 0.3 MG/DL (ref 0–1.2)
BUN SERPL-MCNC: 21 MG/DL (ref 8–23)
BUN/CREAT SERPL: 12.7 (ref 7–25)
CALCIUM SPEC-SCNC: 8.4 MG/DL (ref 8.6–10.5)
CHLORIDE SERPL-SCNC: 106 MMOL/L (ref 98–107)
CK SERPL-CCNC: 20 U/L (ref 20–200)
CO2 SERPL-SCNC: 20 MMOL/L (ref 22–29)
CREAT SERPL-MCNC: 1.66 MG/DL (ref 0.76–1.27)
CRP SERPL-MCNC: 23.74 MG/DL (ref 0–0.5)
DEPRECATED RDW RBC AUTO: 44.6 FL (ref 37–54)
EGFRCR SERPLBLD CKD-EPI 2021: 42.2 ML/MIN/1.73
EOSINOPHIL # BLD AUTO: 0.17 10*3/MM3 (ref 0–0.4)
EOSINOPHIL NFR BLD AUTO: 1.4 % (ref 0.3–6.2)
ERYTHROCYTE [DISTWIDTH] IN BLOOD BY AUTOMATED COUNT: 12.9 % (ref 12.3–15.4)
GLOBULIN UR ELPH-MCNC: 2.5 GM/DL
GLUCOSE SERPL-MCNC: 102 MG/DL (ref 65–99)
HCT VFR BLD AUTO: 24.7 % (ref 37.5–51)
HGB BLD-MCNC: 8.2 G/DL (ref 13–17.7)
IMM GRANULOCYTES # BLD AUTO: 0.09 10*3/MM3 (ref 0–0.05)
IMM GRANULOCYTES NFR BLD AUTO: 0.7 % (ref 0–0.5)
LYMPHOCYTES # BLD AUTO: 1.49 10*3/MM3 (ref 0.7–3.1)
LYMPHOCYTES NFR BLD AUTO: 11.9 % (ref 19.6–45.3)
MCH RBC QN AUTO: 31.2 PG (ref 26.6–33)
MCHC RBC AUTO-ENTMCNC: 33.2 G/DL (ref 31.5–35.7)
MCV RBC AUTO: 93.9 FL (ref 79–97)
MONOCYTES # BLD AUTO: 1.04 10*3/MM3 (ref 0.1–0.9)
MONOCYTES NFR BLD AUTO: 8.3 % (ref 5–12)
NEUTROPHILS NFR BLD AUTO: 77.1 % (ref 42.7–76)
NEUTROPHILS NFR BLD AUTO: 9.71 10*3/MM3 (ref 1.7–7)
NRBC BLD AUTO-RTO: 0 /100 WBC (ref 0–0.2)
PLATELET # BLD AUTO: 333 10*3/MM3 (ref 140–450)
PMV BLD AUTO: 10.1 FL (ref 6–12)
POTASSIUM SERPL-SCNC: 4.1 MMOL/L (ref 3.5–5.2)
PROT SERPL-MCNC: 5.4 G/DL (ref 6–8.5)
RBC # BLD AUTO: 2.63 10*6/MM3 (ref 4.14–5.8)
SODIUM SERPL-SCNC: 141 MMOL/L (ref 136–145)
UFH PPP CHRO-ACNC: 0.56 IU/ML (ref 0.3–0.7)
UFH PPP CHRO-ACNC: 0.71 IU/ML (ref 0.3–0.7)
WBC NRBC COR # BLD AUTO: 12.57 10*3/MM3 (ref 3.4–10.8)

## 2024-07-22 PROCEDURE — B41F1ZZ FLUOROSCOPY OF RIGHT LOWER EXTREMITY ARTERIES USING LOW OSMOLAR CONTRAST: ICD-10-PCS | Performed by: SURGERY

## 2024-07-22 PROCEDURE — 25010000002 HEPARIN (PORCINE) PER 1000 UNITS: Performed by: NURSE ANESTHETIST, CERTIFIED REGISTERED

## 2024-07-22 PROCEDURE — 97530 THERAPEUTIC ACTIVITIES: CPT

## 2024-07-22 PROCEDURE — C1884 EMBOLIZATION PROTECT SYST: HCPCS | Performed by: SURGERY

## 2024-07-22 PROCEDURE — 82550 ASSAY OF CK (CPK): CPT | Performed by: INTERNAL MEDICINE

## 2024-07-22 PROCEDURE — 25010000002 PIPERACILLIN SOD-TAZOBACTAM PER 1 G: Performed by: INTERNAL MEDICINE

## 2024-07-22 PROCEDURE — C1725 CATH, TRANSLUMIN NON-LASER: HCPCS | Performed by: SURGERY

## 2024-07-22 PROCEDURE — 25010000002 ONDANSETRON PER 1 MG: Performed by: NURSE ANESTHETIST, CERTIFIED REGISTERED

## 2024-07-22 PROCEDURE — 25010000002 LIDOCAINE 1 % SOLUTION: Performed by: SURGERY

## 2024-07-22 PROCEDURE — 25810000003 LACTATED RINGERS PER 1000 ML: Performed by: ANESTHESIOLOGY

## 2024-07-22 PROCEDURE — 85520 HEPARIN ASSAY: CPT

## 2024-07-22 PROCEDURE — C2623 CATH, TRANSLUMIN, DRUG-COAT: HCPCS | Performed by: SURGERY

## 2024-07-22 PROCEDURE — C1769 GUIDE WIRE: HCPCS | Performed by: SURGERY

## 2024-07-22 PROCEDURE — 25010000002 PROPOFOL 10 MG/ML EMULSION: Performed by: NURSE ANESTHETIST, CERTIFIED REGISTERED

## 2024-07-22 PROCEDURE — 80053 COMPREHEN METABOLIC PANEL: CPT | Performed by: INTERNAL MEDICINE

## 2024-07-22 PROCEDURE — C1894 INTRO/SHEATH, NON-LASER: HCPCS | Performed by: SURGERY

## 2024-07-22 PROCEDURE — 88304 TISSUE EXAM BY PATHOLOGIST: CPT | Performed by: SURGERY

## 2024-07-22 PROCEDURE — C1887 CATHETER, GUIDING: HCPCS | Performed by: SURGERY

## 2024-07-22 PROCEDURE — 25010000002 FENTANYL CITRATE (PF) 100 MCG/2ML SOLUTION: Performed by: NURSE ANESTHETIST, CERTIFIED REGISTERED

## 2024-07-22 PROCEDURE — 99232 SBSQ HOSP IP/OBS MODERATE 35: CPT | Performed by: INTERNAL MEDICINE

## 2024-07-22 PROCEDURE — C1714 CATH, TRANS ATHERECTOMY, DIR: HCPCS | Performed by: SURGERY

## 2024-07-22 PROCEDURE — 86140 C-REACTIVE PROTEIN: CPT | Performed by: INTERNAL MEDICINE

## 2024-07-22 PROCEDURE — C1876 STENT, NON-COA/NON-COV W/DEL: HCPCS | Performed by: SURGERY

## 2024-07-22 PROCEDURE — 0 IODIXANOL PER 1 ML: Performed by: SURGERY

## 2024-07-22 PROCEDURE — 047K3DZ DILATION OF RIGHT FEMORAL ARTERY WITH INTRALUMINAL DEVICE, PERCUTANEOUS APPROACH: ICD-10-PCS | Performed by: SURGERY

## 2024-07-22 PROCEDURE — 25010000002 HEPARIN (PORCINE) PER 1000 UNITS: Performed by: SURGERY

## 2024-07-22 PROCEDURE — 85025 COMPLETE CBC W/AUTO DIFF WBC: CPT | Performed by: INTERNAL MEDICINE

## 2024-07-22 PROCEDURE — 04CK3ZZ EXTIRPATION OF MATTER FROM RIGHT FEMORAL ARTERY, PERCUTANEOUS APPROACH: ICD-10-PCS | Performed by: SURGERY

## 2024-07-22 PROCEDURE — C1760 CLOSURE DEV, VASC: HCPCS | Performed by: SURGERY

## 2024-07-22 PROCEDURE — 75710 ARTERY X-RAYS ARM/LEG: CPT

## 2024-07-22 PROCEDURE — 25010000002 DAPTOMYCIN PER 1 MG: Performed by: INTERNAL MEDICINE

## 2024-07-22 DEVICE — STENT PRB35-07-200-120 PROTEGE EF V07
Type: IMPLANTABLE DEVICE | Site: LEG | Status: FUNCTIONAL
Brand: EVERFLEX™ PROTÉGÉ™ EVERFLEX™

## 2024-07-22 DEVICE — HEMOST ABS SURGICEL SNOW 1X2IN: Type: IMPLANTABLE DEVICE | Site: ANKLE | Status: FUNCTIONAL

## 2024-07-22 RX ORDER — FAMOTIDINE 10 MG/ML
20 INJECTION, SOLUTION INTRAVENOUS ONCE
Status: COMPLETED | OUTPATIENT
Start: 2024-07-22 | End: 2024-07-22

## 2024-07-22 RX ORDER — PHENYLEPHRINE HCL IN 0.9% NACL 1 MG/10 ML
SYRINGE (ML) INTRAVENOUS AS NEEDED
Status: DISCONTINUED | OUTPATIENT
Start: 2024-07-22 | End: 2024-07-22 | Stop reason: SURG

## 2024-07-22 RX ORDER — LIDOCAINE HYDROCHLORIDE 10 MG/ML
INJECTION, SOLUTION INFILTRATION; PERINEURAL AS NEEDED
Status: DISCONTINUED | OUTPATIENT
Start: 2024-07-22 | End: 2024-07-22 | Stop reason: HOSPADM

## 2024-07-22 RX ORDER — HYDRALAZINE HYDROCHLORIDE 20 MG/ML
5 INJECTION INTRAMUSCULAR; INTRAVENOUS
Status: DISCONTINUED | OUTPATIENT
Start: 2024-07-22 | End: 2024-07-22 | Stop reason: HOSPADM

## 2024-07-22 RX ORDER — SODIUM CHLORIDE 0.9 % (FLUSH) 0.9 %
3-10 SYRINGE (ML) INJECTION AS NEEDED
Status: DISCONTINUED | OUTPATIENT
Start: 2024-07-22 | End: 2024-07-22 | Stop reason: HOSPADM

## 2024-07-22 RX ORDER — ONDANSETRON 2 MG/ML
4 INJECTION INTRAMUSCULAR; INTRAVENOUS ONCE AS NEEDED
Status: DISCONTINUED | OUTPATIENT
Start: 2024-07-22 | End: 2024-07-22 | Stop reason: HOSPADM

## 2024-07-22 RX ORDER — PROMETHAZINE HYDROCHLORIDE 25 MG/1
25 TABLET ORAL ONCE AS NEEDED
Status: DISCONTINUED | OUTPATIENT
Start: 2024-07-22 | End: 2024-07-22 | Stop reason: HOSPADM

## 2024-07-22 RX ORDER — SODIUM CHLORIDE 0.9 % (FLUSH) 0.9 %
3 SYRINGE (ML) INJECTION EVERY 12 HOURS SCHEDULED
Status: DISCONTINUED | OUTPATIENT
Start: 2024-07-22 | End: 2024-07-22 | Stop reason: HOSPADM

## 2024-07-22 RX ORDER — IODIXANOL 320 MG/ML
INJECTION, SOLUTION INTRAVASCULAR AS NEEDED
Status: DISCONTINUED | OUTPATIENT
Start: 2024-07-22 | End: 2024-07-22 | Stop reason: HOSPADM

## 2024-07-22 RX ORDER — FENTANYL CITRATE 50 UG/ML
INJECTION, SOLUTION INTRAMUSCULAR; INTRAVENOUS AS NEEDED
Status: DISCONTINUED | OUTPATIENT
Start: 2024-07-22 | End: 2024-07-22 | Stop reason: SURG

## 2024-07-22 RX ORDER — HYDROCODONE BITARTRATE AND ACETAMINOPHEN 5; 325 MG/1; MG/1
1 TABLET ORAL ONCE AS NEEDED
Status: DISCONTINUED | OUTPATIENT
Start: 2024-07-22 | End: 2024-07-22 | Stop reason: HOSPADM

## 2024-07-22 RX ORDER — SODIUM CHLORIDE 9 MG/ML
40 INJECTION, SOLUTION INTRAVENOUS AS NEEDED
Status: DISCONTINUED | OUTPATIENT
Start: 2024-07-22 | End: 2024-07-22 | Stop reason: HOSPADM

## 2024-07-22 RX ORDER — PROMETHAZINE HYDROCHLORIDE 25 MG/1
25 SUPPOSITORY RECTAL ONCE AS NEEDED
Status: DISCONTINUED | OUTPATIENT
Start: 2024-07-22 | End: 2024-07-22 | Stop reason: HOSPADM

## 2024-07-22 RX ORDER — HEPARIN SODIUM 1000 [USP'U]/ML
INJECTION, SOLUTION INTRAVENOUS; SUBCUTANEOUS AS NEEDED
Status: DISCONTINUED | OUTPATIENT
Start: 2024-07-22 | End: 2024-07-22 | Stop reason: SURG

## 2024-07-22 RX ORDER — FAMOTIDINE 20 MG/1
20 TABLET, FILM COATED ORAL ONCE
Status: COMPLETED | OUTPATIENT
Start: 2024-07-22 | End: 2024-07-22

## 2024-07-22 RX ORDER — FENTANYL CITRATE 50 UG/ML
50 INJECTION, SOLUTION INTRAMUSCULAR; INTRAVENOUS
Status: DISCONTINUED | OUTPATIENT
Start: 2024-07-22 | End: 2024-07-22 | Stop reason: HOSPADM

## 2024-07-22 RX ORDER — LABETALOL HYDROCHLORIDE 5 MG/ML
5 INJECTION, SOLUTION INTRAVENOUS
Status: DISCONTINUED | OUTPATIENT
Start: 2024-07-22 | End: 2024-07-22 | Stop reason: HOSPADM

## 2024-07-22 RX ORDER — DROPERIDOL 2.5 MG/ML
0.62 INJECTION, SOLUTION INTRAMUSCULAR; INTRAVENOUS ONCE AS NEEDED
Status: DISCONTINUED | OUTPATIENT
Start: 2024-07-22 | End: 2024-07-22 | Stop reason: HOSPADM

## 2024-07-22 RX ORDER — ONDANSETRON 2 MG/ML
INJECTION INTRAMUSCULAR; INTRAVENOUS AS NEEDED
Status: DISCONTINUED | OUTPATIENT
Start: 2024-07-22 | End: 2024-07-22 | Stop reason: SURG

## 2024-07-22 RX ORDER — DROPERIDOL 2.5 MG/ML
0.62 INJECTION, SOLUTION INTRAMUSCULAR; INTRAVENOUS
Status: DISCONTINUED | OUTPATIENT
Start: 2024-07-22 | End: 2024-07-22 | Stop reason: HOSPADM

## 2024-07-22 RX ORDER — IPRATROPIUM BROMIDE AND ALBUTEROL SULFATE 2.5; .5 MG/3ML; MG/3ML
3 SOLUTION RESPIRATORY (INHALATION) ONCE AS NEEDED
Status: DISCONTINUED | OUTPATIENT
Start: 2024-07-22 | End: 2024-07-22 | Stop reason: HOSPADM

## 2024-07-22 RX ORDER — PROPOFOL 10 MG/ML
VIAL (ML) INTRAVENOUS CONTINUOUS PRN
Status: DISCONTINUED | OUTPATIENT
Start: 2024-07-22 | End: 2024-07-22 | Stop reason: SURG

## 2024-07-22 RX ORDER — SODIUM CHLORIDE, SODIUM LACTATE, POTASSIUM CHLORIDE, CALCIUM CHLORIDE 600; 310; 30; 20 MG/100ML; MG/100ML; MG/100ML; MG/100ML
9 INJECTION, SOLUTION INTRAVENOUS CONTINUOUS
Status: DISCONTINUED | OUTPATIENT
Start: 2024-07-22 | End: 2024-07-26

## 2024-07-22 RX ORDER — EPHEDRINE SULFATE 50 MG/ML
INJECTION INTRAVENOUS AS NEEDED
Status: DISCONTINUED | OUTPATIENT
Start: 2024-07-22 | End: 2024-07-22 | Stop reason: SURG

## 2024-07-22 RX ORDER — NALOXONE HCL 0.4 MG/ML
0.4 VIAL (ML) INJECTION AS NEEDED
Status: DISCONTINUED | OUTPATIENT
Start: 2024-07-22 | End: 2024-07-22 | Stop reason: HOSPADM

## 2024-07-22 RX ORDER — HYDROMORPHONE HYDROCHLORIDE 1 MG/ML
0.2 INJECTION, SOLUTION INTRAMUSCULAR; INTRAVENOUS; SUBCUTANEOUS
Status: DISCONTINUED | OUTPATIENT
Start: 2024-07-22 | End: 2024-07-22 | Stop reason: HOSPADM

## 2024-07-22 RX ORDER — LIDOCAINE HYDROCHLORIDE 10 MG/ML
INJECTION, SOLUTION EPIDURAL; INFILTRATION; INTRACAUDAL; PERINEURAL AS NEEDED
Status: DISCONTINUED | OUTPATIENT
Start: 2024-07-22 | End: 2024-07-22 | Stop reason: SURG

## 2024-07-22 RX ADMIN — HEPARIN SODIUM 7000 UNITS: 1000 INJECTION INTRAVENOUS; SUBCUTANEOUS at 13:28

## 2024-07-22 RX ADMIN — Medication 100 MCG: at 13:40

## 2024-07-22 RX ADMIN — Medication 10 ML: at 20:10

## 2024-07-22 RX ADMIN — FAMOTIDINE 20 MG: 20 TABLET, FILM COATED ORAL at 11:31

## 2024-07-22 RX ADMIN — PIPERACILLIN AND TAZOBACTAM 3.38 G: 3; .375 INJECTION, POWDER, LYOPHILIZED, FOR SOLUTION INTRAVENOUS at 13:12

## 2024-07-22 RX ADMIN — PROPOFOL 80 MCG/KG/MIN: 10 INJECTION, EMULSION INTRAVENOUS at 13:12

## 2024-07-22 RX ADMIN — FERROUS SULFATE TAB 325 MG (65 MG ELEMENTAL FE) 325 MG: 325 (65 FE) TAB at 09:42

## 2024-07-22 RX ADMIN — PIPERACILLIN AND TAZOBACTAM 3.38 G: 3; .375 INJECTION, POWDER, LYOPHILIZED, FOR SOLUTION INTRAVENOUS at 03:55

## 2024-07-22 RX ADMIN — LIDOCAINE HYDROCHLORIDE 50 MG: 10 SOLUTION INTRAVENOUS at 13:10

## 2024-07-22 RX ADMIN — PROPOFOL 50 MG: 10 INJECTION, EMULSION INTRAVENOUS at 13:10

## 2024-07-22 RX ADMIN — ONDANSETRON 4 MG: 2 INJECTION INTRAMUSCULAR; INTRAVENOUS at 14:18

## 2024-07-22 RX ADMIN — Medication 100 MCG: at 13:35

## 2024-07-22 RX ADMIN — FINASTERIDE 5 MG: 5 TABLET, FILM COATED ORAL at 09:42

## 2024-07-22 RX ADMIN — ASPIRIN 81 MG CHEWABLE TABLET 81 MG: 81 TABLET CHEWABLE at 09:42

## 2024-07-22 RX ADMIN — Medication 100 MCG: at 13:28

## 2024-07-22 RX ADMIN — Medication 100 MCG: at 13:23

## 2024-07-22 RX ADMIN — DAPTOMYCIN 450 MG: 500 INJECTION, POWDER, LYOPHILIZED, FOR SOLUTION INTRAVENOUS at 11:30

## 2024-07-22 RX ADMIN — PIPERACILLIN AND TAZOBACTAM 3.38 G: 3; .375 INJECTION, POWDER, LYOPHILIZED, FOR SOLUTION INTRAVENOUS at 20:10

## 2024-07-22 RX ADMIN — Medication 100 MCG: at 14:21

## 2024-07-22 RX ADMIN — Medication 100 MCG: at 14:10

## 2024-07-22 RX ADMIN — LEVOTHYROXINE SODIUM 88 MCG: 88 TABLET ORAL at 05:05

## 2024-07-22 RX ADMIN — EPHEDRINE SULFATE 10 MG: 50 INJECTION INTRAVENOUS at 13:48

## 2024-07-22 RX ADMIN — FENTANYL CITRATE 100 MCG: 50 INJECTION, SOLUTION INTRAMUSCULAR; INTRAVENOUS at 13:10

## 2024-07-22 RX ADMIN — Medication 5 MG: at 20:10

## 2024-07-22 RX ADMIN — SODIUM CHLORIDE, POTASSIUM CHLORIDE, SODIUM LACTATE AND CALCIUM CHLORIDE 9 ML/HR: 600; 310; 30; 20 INJECTION, SOLUTION INTRAVENOUS at 12:31

## 2024-07-22 RX ADMIN — Medication 10 ML: at 09:42

## 2024-07-22 NOTE — PROGRESS NOTES
[facility] Medicine Services  INPATIENT PROGRESS NOTE    Date of Admission: 7/17/2024  Primary Care Physician: Roxanne Slater MD    Subjective     Chief Complaint: groin dehiscence    HPI:  Patient feels ready for OR. Trying to get some rest prior. No new complaints. No nursing issues reported.       Review Of Systems:   Gen- No fevers, chills  CV- No chest pain, palpitations  Resp- No cough, dyspnea  GI- No N/V/D, abd pain      Objective      Vitals:  Temp:  [97.5 °F (36.4 °C)-98.7 °F (37.1 °C)] 98 °F (36.7 °C)  Heart Rate:  [] 78  Resp:  [18] 18  BP: (102-116)/(67-77) 112/71    Physical exam:    GEN: NAD, resting in bed, awake, drowsy   HEENT: on room air, atraumatic, normocephalic  NECK: supple, no masses  RESP: on room air, normal effort  CV: on tele, sinus rhythm  PSYCH: normal affect, appropriate  NEURO: awake, alert, no focal deficits noted  MSK: no edema noted  SKIN: no rashes noted , groin drsg noted- c/d/i  : stacy in place cyu      Results Review:    I have reviewed the labs, radiology results and diagnostic studies.    Results from last 7 days   Lab Units 07/21/24  0820 07/20/24  0629 07/19/24 2040 07/18/24 0215 07/17/24  1842   WBC 10*3/mm3 19.71* 21.24*  --  6.47  --    HEMOGLOBIN g/dL 9.0* 9.8* 9.2* 9.2*  --    HEMATOCRIT % 27.9* 29.7* 28.3* 28.8*  --    PLATELETS 10*3/mm3 314 340  --  375  --    INR   --   --   --   --  1.14*     Results from last 7 days   Lab Units 07/21/24  0820 07/20/24  0629 07/18/24 0215 07/17/24  1110   SODIUM mmol/L 138 138 138 140   POTASSIUM mmol/L 4.1 3.8 4.3 4.0   CHLORIDE mmol/L 104 103 105 104   CO2 mmol/L 22.0 18.0* 21.0* 20.2*   BUN mg/dL 20 18 20 23   CREATININE mg/dL 1.66* 1.68* 1.51* 1.66*   GLUCOSE mg/dL 132* 124* 103* 97   CALCIUM mg/dL 8.4* 8.6 8.5* 8.8   ALK PHOS U/L  --   --   --  97   ALT (SGPT) U/L  --   --   --  6   AST (SGOT) U/L  --   --   --  9       Microbiology Results Abnormal       Procedure Component Value -  Date/Time    Blood Culture - Blood, Arm, Left [[id number]]  (Normal) Collected: 07/17/24 2007    Lab Status: Preliminary result Specimen: Blood from Arm, Left Updated: 07/21/24 2032     Blood Culture No growth at 4 days    Blood Culture - Blood, Hand, Left [[id number]]  (Normal) Collected: 07/17/24 2008    Lab Status: Preliminary result Specimen: Blood from Hand, Left Updated: 07/21/24 2032     Blood Culture No growth at 4 days    Blood Culture - Blood, Hand, Right [[id number]]  (Normal) Collected: 07/20/24 1201    Lab Status: Preliminary result Specimen: Blood from Hand, Right Updated: 07/21/24 1231     Blood Culture No growth at 24 hours    Narrative:      Less than seven (7) mL's of blood was collected.  Insufficient quantity may yield false negative results.    Blood Culture - Blood, Arm, Left [[id number]]  (Normal) Collected: 07/20/24 1201    Lab Status: Preliminary result Specimen: Blood from Arm, Left Updated: 07/21/24 1231     Blood Culture No growth at 24 hours          No radiology results for the last day      I have reviewed the medications.    Assessment/Plan     Assessment/Problem List    Dehiscence of operative wound    Hypothyroidism    Mixed hyperlipidemia    Acute UTI (urinary tract infection)    Stage 3a chronic kidney disease    Bilateral epididymitis    Wound dehiscence, surgical, initial encounter    Superficial femoral artery occlusion    Moderate malnutrition      Nestor Nye is a 78 y.o. male with PMH of enlarged prostate, GERD, Gout, HTN, RA, craniotomy for subdural hematoma, right femoral endarterectomy 6/21/24, prostate aquablation 6/21/24. Patient was discharged from [facility] on 6/26/24 and went to rehab., He presented 7/17/24with 3 days of erythema and then foul smell and the incision opened on 7/17/24    This patient's problems and plans were partially entered by my partner and updated as appropriate by me 07/21/24.      Assessment/Plan:  Pt is new to me today     Bilateral  epididymitis   Right groin dehiscence   S/p Aquablation 6/21/24 complicated by SFA occlusion that required right femoral embolectomy by Dr Morris And hematuria requiring hilario hypogastric embolization.   Chronic urinary retention with stacy  -- consulted urology, they have seen with no acute intervention planned   -- blood cultures x 2 NTD  -- consult PT wound  -- cont zosyn  -- appreciate [facility]   --7/20:ncreased in WBC 7/20 maybe stress, but procal remains elevated at 0.45, lactic acid reassuring, hypotensive so will give IVFs and monitor -repeat blood cultures  --labs pending today    --plans for surgery today with vascular   --am labs      Recurrent SFA occlusion   S/p Right Femoral embolectomy  on 6/21/24  --  CT A/P showed occlusion of Right SFA   --  Dr. Morris plans for surgery 7/22  --  heparin drip   --  NPO     HTN  HLD  -- cont norvasc with parameters  --stable today      Hypothyroidism   -- cont home meds      CKDIII  -- baseline creat 1.4-1.6       VTE Prophylaxis:  Pharmacologic VTE prophylaxis orders are present.        Expected Discharge  Expected Discharge Date: 7/24/2024; Expected Discharge Time:     Electronically signed by Danitza Prieto MD, 07/22/24

## 2024-07-22 NOTE — ANESTHESIA PREPROCEDURE EVALUATION
Anesthesia Evaluation     Patient summary reviewed and Nursing notes reviewed   history of anesthetic complications (vocal cord injury per patient):  PONV  NPO Solid Status: > 8 hours  NPO Liquid Status: > 2 hours           Airway   Mallampati: I  TM distance: >3 FB  Neck ROM: full  No difficulty expected  Dental      Pulmonary     breath sounds clear to auscultation  Cardiovascular     ECG reviewed  Rhythm: regular  Rate: normal    (+) hypertension, PVD, hyperlipidemia      Neuro/Psych  GI/Hepatic/Renal/Endo    (+) GERD, renal disease- CRI, thyroid problem hypothyroidism    Musculoskeletal     Abdominal    Substance History      OB/GYN          Other   arthritis,         Phys Exam Other: Single tooth              Anesthesia Plan    ASA 3     MAC     intravenous induction     Anesthetic plan, risks, benefits, and alternatives have been provided, discussed and informed consent has been obtained with: patient.    Plan discussed with CRNA.    CODE STATUS:    Level Of Support Discussed With: Patient  Code Status (Patient has no pulse and is not breathing): CPR (Attempt to Resuscitate)  Medical Interventions (Patient has pulse or is breathing): Full Support

## 2024-07-22 NOTE — ANESTHESIA POSTPROCEDURE EVALUATION
Patient: Nestor Nye    Procedure Summary       Date: 07/22/24 Room / Location:  [facility] / [facility]    Anesthesia Start: 1257 Anesthesia Stop: 1443    Procedures:       LOWER EXTREMITY ANGIOGRAM RIGHT (Right)      GROIN DEBRIDEMENT WITH WOUND VAC PLACEMENT  RIGHT (Right) Diagnosis:       Dehiscence of operative wound, initial encounter      Superficial femoral artery occlusion      (Dehiscence of operative wound, initial encounter [T81.31XA])      (Superficial femoral artery occlusion [I70.209])    Surgeons: Kingsley Morris MD Provider: Tulio Bob MD    Anesthesia Type: MAC ASA Status: 3            Anesthesia Type: MAC    Vitals  Vitals Value Taken Time   /99 07/22/24 1443   Temp 97.4 °F (36.3 °C) 07/22/24 1443   Pulse 96 07/22/24 1444   Resp 18 07/22/24 1443   SpO2 95 % 07/22/24 1444   Vitals shown include unfiled device data.        Post Anesthesia Care and Evaluation    Patient location during evaluation: PACU  Patient participation: complete - patient participated  Level of consciousness: awake and alert  Pain management: adequate    Airway patency: patent  Anesthetic complications: No anesthetic complications  PONV Status: none  Cardiovascular status: hemodynamically stable and acceptable  Respiratory status: nonlabored ventilation, acceptable and nasal cannula  Hydration status: acceptable

## 2024-07-22 NOTE — PROGRESS NOTES
HEPARIN INFUSION  Nestor Nye is a  78 y.o. male receiving heparin infusion.     Therapy for (VTE/Cardiac): DVT  Patient Weight: 74.8 kg  Initial Bolus (Y/N): Yes  Any Bolus (Y/N): Yes      Signs or Symptoms of Bleeding: Urine     VTE (PE/DVT)   Initial Bolus: 80 units/kg (Max 10,000 units)  Initial rate: 18 units/kg/hr (Max 1,500 units/hr)    Anti Xa Rebolus Infusion Hold time Change infusion Dose (Units/kg/hr) Next Anti Xa Level Due   < 0.11 50 Units/kg  (4000 Units Max) None Increase by  4 Units/kg/hr 6 hours   0.11 - 0.19 25 Units/kg  (2000 Units Max) None Increase by  3 Units/kg/hr 6 hours   0.2 - 0.29 0 None Increase by  2 Units/kg/hr 6 hours   0.3 - 0.7 0 None No Change 6 hours (after 2 consecutive levels in range check qAM)   0.71 - 0.8 0 None Decrease by  1 Units/kg/hr 6 hours   0.81 - 0.9 0 None Decrease by  2 Units/kg/hr 6 hours   0.91 - 1 0 60 Minutes Decrease by  3 Units/kg/hr 6 hours   >1 0 Hold  After Anti Xa less than 0.7 decrease previous rate by  4 Units/kg/hr  Every 2 hours until Anti Xa is less than 0.7 then when infusion restarts in 6 hours     Results from last 7 days   Lab Units 07/22/24  0952 07/21/24  0820 07/20/24  0629 07/18/24  0215 07/17/24 1842   INR   --   --   --   --  1.14*   HEMOGLOBIN g/dL 8.2* 9.0* 9.8*   < >  --    HEMATOCRIT % 24.7* 27.9* 29.7*   < >  --    PLATELETS 10*3/mm3 333 314 340   < >  --     < > = values in this interval not displayed.        Date   Time   Anti-Xa Current Rate (Unit/kg/hr) Bolus   (Units) Rate Change   (Unit/kg/hr) New Rate (Unit/kg/hr) Next   Anti-Xa Comments  Pump Check Daily   7/17 1842 <0.10 New Start 6000 +18 18  0200 Discussed with RN.   7/18 0215 0.38 18 -- -- 18 0800 Discussed w/ nurse   7/18 0749 0.24 18 --  +2 20 1700 D/W RN.    7/18 17:10 0.41 20 --- --- --- 2300 Discussed with RN.   7/19 0036 0.36 20 -- -- 20 0800 D/w RN   7/19 20:40 0.20 20 -- +2 22 0600 D/w RN, pt has chronic stacy with h/o hematuria, patient's urine looked  blood-tinged earlier, RN notifed urology, urologist said to cont heparin gtt unless urine becomes bright red, h/h stable   7/20 0629 0.34 22 -- -- 22 1300 D/w LINDA Mcgee   7/20 1300 0.4 22 -- -- 22 0600 D/w LINDA Mcgee   7/21 0820 0.10 22 3500 -- 25 1800 D/w LINDA Mcgee RN to check pump due to precautions   7/21 1806 0.40 25 -- -- 25 0000 Dw LINDA   7/21 2335 0.71 25 -- -1 24 0600 DW RN, lab collected opposite arm of heparin infusion   7/22 0952 0.56 24 -- -- 24 1700 Dw LINDA Crawford; to be stopped on way to procedure, will monitor                Christel Chawla, PharmD  7/22/2024  11:16 EDT

## 2024-07-22 NOTE — PLAN OF CARE
Goal Outcome Evaluation:  Plan of Care Reviewed With: patient        Progress: improving  Outcome Evaluation: Physical therapy treatment complete. The patient continues to require additional time to complete mobility due to increased pain. He was agreeable to take steps to chair however declined ambulation due to pain this date. Patient improved tolerance to mobility and with decreased subjective complaints of pain. Patient increased ambulation distance compared to previous treatment session. The patient continues to present below baseline for functional mobility. The patient would continue to benefit from skilled PT to address strength, balance and activity tolerance deficits. Continue to current PT POC      Anticipated Discharge Disposition (PT): inpatient rehabilitation facility

## 2024-07-22 NOTE — CASE MANAGEMENT/SOCIAL WORK
Discharge Planning Assessment  [facility]     Patient Name: Nestor Nye  MRN: [id number]  Today's Date: 7/22/2024    Admit Date: 7/17/2024    Plan: ONGOING   Discharge Needs Assessment    No documentation.                  Discharge Plan       Row Name 07/22/24 1225       Plan    Plan ONGOING    Plan Comments RIGHT groin debridement with wound vac placement and right lower extremity angiogram today.  Cont IV Dapto and Zosyn.  CM will cont to follow post-op course for any DC needs.    Final Discharge Disposition Code 30 - still a patient                  Continued Care and Services - Admitted Since 7/17/2024    No active coordination exists for this encounter.       Selected Continued Care - Prior Encounters Includes continued care and service providers with selected services from prior encounters from 4/18/2024 to 7/22/2024      Discharged on 6/26/2024 Admission date: 6/21/2024 - Discharge disposition: Skilled Nursing Facility (DC - External)      Destination       Service Provider Selected Services Address Phone Fax Patient Preferred    [facility] [address] [address] [phone]                           Expected Discharge Date and Time       Expected Discharge Date Expected Discharge Time    Jul 24, 2024            Demographic Summary    No documentation.                  Functional Status    No documentation.                  Psychosocial    No documentation.                  Abuse/Neglect    No documentation.                  Legal    No documentation.                  Substance Abuse    No documentation.                  Patient Forms    No documentation.                     Sheila Marte RN

## 2024-07-22 NOTE — PLAN OF CARE
Goal Outcome Evaluation:   VSS overnight. Heparin Gtt running. Pt NPO since MN in preparation for procedure today. Pt intermittently applies 1L of O2.

## 2024-07-22 NOTE — THERAPY TREATMENT NOTE
Patient Name: Nestor Nye  : 1945    MRN: [id number]                              Today's Date: 2024       Admit Date: 2024    Visit Dx:     ICD-10-CM ICD-9-CM   1. Dehiscence of operative wound, initial encounter  T81.31XA 998.32   2. Superficial femoral artery occlusion  I70.209 440.20   3. Rheumatoid arthritis involving multiple sites, unspecified whether rheumatoid factor present  M06.9 714.0     Patient Active Problem List   Diagnosis    Hypothyroidism    Mixed hyperlipidemia    Elevated PSA    Benign essential hypertension    Acute UTI (urinary tract infection)    BPH with obstruction/lower urinary tract symptoms    Anemia of chronic disease    Rheumatoid arthritis    Secondary hyperparathyroidism    Stage 3a chronic kidney disease    Gouty arthropathy    No post-op complications    Bilateral epididymitis    Wound dehiscence, surgical, initial encounter    Superficial femoral artery occlusion    Dehiscence of operative wound    Moderate malnutrition     Past Medical History:   Diagnosis Date    Acute maxillary sinusitis     Arthritis     Disease of thyroid gland     Enlarged prostate     GERD (gastroesophageal reflux disease)     Gout     Hypertension     Impaired functional mobility, balance, gait, and endurance     PONV (postoperative nausea and vomiting)     Renal disorder     Rheumatoid arthritis     Right inguinal hernia 10/28/2021    Added automatically from request for surgery [id number]    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, changed his voice since then     Past Surgical History:   Procedure Laterality Date    ABDOMINAL SURGERY      BACK SURGERY      BELPHAROPTOSIS REPAIR Bilateral     BRAIN SURGERY      COLON SURGERY      bowel resection x2 per pt    CRANIOTOMY FOR SUBDURAL HEMATOMA      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    DIAGNOSTIC LAPAROSCOPY N/A 2022    Procedure: DIAGNOSTIC LAPAROSCOPY  CONVERTED TO EXPLORATORY LAPAROTOMY WITH SMALL BOWEL RESECTION;  Surgeon: Maximo Bennett MD;  Location:  [facility];  Service: General;  Laterality: N/A;  CONVERTED TO EXPLORATORY LAPAROTOMY AT 1912    EXPLORATORY LAPAROTOMY N/A 04/16/2022    Procedure: LAPAROTOMY EXPLORATORY WITH SMALL BOWEL OBSTRUCTION;  Surgeon: Maximo Bennett MD;  Location:  [facility];  Service: General;  Laterality: N/A;    EYE SURGERY      on upper etelids    FEMORAL ENDARTERECTOMY Right 6/21/2024    Procedure: FEMORAL EMBELECTOMY RIGHT;  Surgeon: Kingsley Morris MD;  Location:  [facility];  Service: Vascular;  Laterality: Right;    FOREIGN BODY REMOVAL Right     removal of metal fragments from RUQ after electrocution and fall.      HERNIA REPAIR      INGUINAL HERNIA REPAIR Right 11/01/2021    Procedure: INGUINAL HERNIA REPAIR WITH MESH AND EXCISION OF SPERMATIC CORD LIPOMA;  Surgeon: Rosey Glover MD;  Location:  [facility];  Service: General;  Laterality: Right;    LUMBAR SPINE SURGERY      PROSTATE AQUABLATION N/A 6/21/2024    Procedure: AQUABLATION OF PROSTATE;  Surgeon: Prieto Rey MD;  Location:  [facility];  Service: Robotics - Urology;  Laterality: N/A;    SKIN GRAFT Right     RUE skin grafting after electrocution    THORACIC SPINE SURGERY        General Information       Row Name 07/22/24 0935          Physical Therapy Time and Intention    Document Type therapy note (daily note)  -KW     Mode of Treatment physical therapy  -       Row Name 07/22/24 0935          General Information    Patient Profile Reviewed yes  -KW     Existing Precautions/Restrictions fall;other (see comments)  R groin wound  -KW               User Key  (r) = Recorded By, (t) = Taken By, (c) = Cosigned By      Initials Name Provider Type    KW Alysia Limon PT Physical Therapist                   Mobility       Row Name 07/22/24 0935          Bed Mobility    Bed Mobility supine-sit  -KW     Supine-Sit [place] (Bed Mobility)  minimum assist (75% patient effort);verbal cues  -KW     Assistive Device (Bed Mobility) bed rails;head of bed elevated  -KW       Row Name 07/22/24 0935          Sit-Stand Transfer    Sit-Stand [place] (Transfers) contact guard;verbal cues  -     Assistive Device (Sit-Stand Transfers) walker, front-wheeled  -KW               User Key  (r) = Recorded By, (t) = Taken By, (c) = Cosigned By      Initials Name Provider Type    Alysia Sanches, PT Physical Therapist                   Obj/Interventions    No documentation.                  Goals/Plan    No documentation.                  Clinical Impression       Row Name 07/22/24 0935          Pain    Pretreatment Pain Rating 0/10 - no pain  -KW       [facility] Name 07/22/24 0935          Plan of Care Review    Plan of Care Reviewed With patient  -KW     Progress improving  -     Outcome Evaluation Physical therapy treatment complete. The patient continues to require additional time to complete mobility due to increased pain. He was agreeable to take steps to chair however declined ambulation due to pain this date. Patient improved tolerance to mobility and with decreased subjective complaints of pain. Patient increased ambulation distance compared to previous treatment session. The patient continues to present below baseline for functional mobility. The patient would continue to benefit from skilled PT to address strength, balance and activity tolerance deficits. Continue to current PT POC  -KW       [facility] Name 07/22/24 0935          Therapy Assessment/Plan (PT)    Patient/Family Therapy Goals Statement (PT) to return to baseline  -KW     Predicted Duration of Therapy Intervention (PT) 10 days  -KW       Row Name 07/22/24 0935          Positioning and Restraints    Pre-Treatment Position in bed  -KW     Post Treatment Position chair  -KW     In Chair reclined;call light within reach;encouraged to call for assist;legs elevated;exit alarm on  -KW                User Key  (r) = Recorded By, (t) = Taken By, (c) = Cosigned By      Initials Name Provider Type    Alysia Sanches PT Physical Therapist                   Outcome Measures       Row Name 07/22/24 0935          How much help from another person do you currently need...    Turning from your back to your side while in flat bed without using bedrails? 4  -KW     Moving from lying on back to sitting on the side of a flat bed without bedrails? 3  -KW     Moving to and from a bed to a chair (including a wheelchair)? 3  -KW     Standing up from a chair using your arms (e.g., wheelchair, bedside chair)? 3  -KW     Climbing 3-5 steps with a railing? 3  -KW     To walk in hospital room? 3  -KW     AM-PAC 6 Clicks Score (PT) 19  -KW     Highest Level of Mobility Goal 6 --> Walk 10 steps or more  -KW       Row Name 07/22/24 0935          Functional Assessment    Outcome Measure Options AM-PAC 6 Clicks Basic Mobility (PT)  -KW               User Key  (r) = Recorded By, (t) = Taken By, (c) = Cosigned By      Initials Name Provider Type    Alysia Sanches PT Physical Therapist                                 Physical Therapy Education       Title: PT OT SLP Therapies (In Progress)       Topic: Physical Therapy (In Progress)       Point: Mobility training (In Progress)       Learning Progress Summary             Patient Eager, E,D,H, NR by DM at 7/18/2024 1222                         Point: Home exercise program (In Progress)       Learning Progress Summary             Patient Eager, E,D,H, NR by DM at 7/18/2024 1222                         Point: Body mechanics (In Progress)       Learning Progress Summary             Patient Eager, E,D,H, NR by DM at 7/18/2024 1222                         Point: Precautions (In Progress)       Learning Progress Summary             Patient Eager, E,D,H, NR by DM at 7/18/2024 1222                                         User Key       Initials Effective Dates Name Provider Type  Discipline    DM 02/03/23 -  Emilie Arredondo, PT Physical Therapist PT                  PT Recommendation and Plan     Plan of Care Reviewed With: patient  Progress: improving  Outcome Evaluation: Physical therapy treatment complete. The patient continues to require additional time to complete mobility due to increased pain. He was agreeable to take steps to chair however declined ambulation due to pain this date. Patient improved tolerance to mobility and with decreased subjective complaints of pain. Patient increased ambulation distance compared to previous treatment session. The patient continues to present below baseline for functional mobility. The patient would continue to benefit from skilled PT to address strength, balance and activity tolerance deficits. Continue to current PT POC     Time Calculation:         PT Charges       Row Name 07/22/24 0935             Time Calculation    Start Time 0935  -KW      PT Received On 07/22/24  -KW         Timed Charges    23286 - PT Therapeutic Activity Minutes 28  -KW         Total Minutes    Timed Charges Total Minutes 28  -KW       Total Minutes 28  -KW                User Key  (r) = Recorded By, (t) = Taken By, (c) = Cosigned By      Initials Name Provider Type    Alysia Sanches PT Physical Therapist                  Therapy Charges for Today       Code Description Service Date Service Provider Modifiers Qty    [id number]  PT THERAPEUTIC ACT EA 15 MIN 7/22/2024 Alysia Limon PT GP 2            PT G-Codes  Outcome Measure Options: AM-PAC 6 Clicks Basic Mobility (PT)  AM-PAC 6 Clicks Score (PT): 19  AM-PAC 6 Clicks Score (OT): 20  PT Discharge Summary  Anticipated Discharge Disposition (PT): inpatient rehabilitation facility    Alysia Limon PT  7/22/2024

## 2024-07-22 NOTE — PROGRESS NOTES
"[facility] Progress Note    Date of Admission: 2024      Antibiotics: Daptomycin and Zosyn      CC: Right groin wound    S:   Patient with no fevers wbc down and NPO for surgery and HD stable.  O:  /76 (BP Location: Right arm, Patient Position: Lying)   Pulse 84   Temp 97.9 °F (36.6 °C) (Temporal)   Resp 16   Ht 170.2 cm (67\")   Wt 74.8 kg (165 lb)   SpO2 97%   BMI 25.84 kg/m²   Temp (24hrs), Av °F (36.7 °C), Min:97.5 °F (36.4 °C), Max:98.5 °F (36.9 °C)      PE:      GENERAL: Awake and alert, in no acute distress. Chronically ill-appearing  HEENT: Normocephalic, atraumatic.  PERRL. EOMI. No conjunctival injection. No icterus. Oropharynx clear without evidence of thrush or exudate. Mild periodontal disease.    NECK: Supple without nuchal rigidity  LYMPH: No cervical, axillary or inguinal lymphadenopathy. No neck masses  HEART: RRR; No murmur, rubs, gallops.   LUNGS: Clear to auscultation bilaterally without wheezing, rales, rhonchi. Normal respiratory effort.  ABDOMEN: Soft, nontender, nondistended. Positive bowel sounds. No rebound or guarding.   EXT:  No cyanosis, clubbing or edema  : Normal appearing genitalia with Gillespie catheter.  MSK: FROM without joint effusions noted    SKIN: Warm and dry without cutaneous eruptions.  Right groin wound with wound dehiscence with slough and some erythema and drainage with odor  NEURO: Oriented to PPT. No focal deficits.   PSYCHIATRIC: Normal insight and judgement. Cooperative with PE.  Laboratory Data    Results from last 7 days   Lab Units 24  0952 24  0820 24  0629   WBC 10*3/mm3 12.57* 19.71* 21.24*   HEMOGLOBIN g/dL 8.2* 9.0* 9.8*   HEMATOCRIT % 24.7* 27.9* 29.7*   PLATELETS 10*3/mm3 333 314 340     Results from last 7 days   Lab Units 24  0820   SODIUM mmol/L 138   POTASSIUM mmol/L 4.1   CHLORIDE mmol/L 104   CO2 mmol/L 22.0   BUN mg/dL 20   CREATININE mg/dL 1.66*   GLUCOSE mg/dL 132*   CALCIUM mg/dL 8.4*     Results from last 7 " days   Lab Units 07/17/24  1110   ALK PHOS U/L 97   BILIRUBIN mg/dL 0.4   ALT (SGPT) U/L 6   AST (SGOT) U/L 9     Results from last 7 days   Lab Units 07/19/24  2040   SED RATE mm/hr 82*     Results from last 7 days   Lab Units 07/20/24  0629   CRP mg/dL 23.67*       Estimated Creatinine Clearance: 39.4 mL/min (A) (by C-G formula based on SCr of 1.66 mg/dL (H)).      Microbiology:  Urine culture with gram-negative rods identified as Klebsiella pneumoniae    Radiology:  Imaging Results (Last 24 Hours)       ** No results found for the last 24 hours. **            PROBLEM LIST:   Right groin wound dehiscence with cellulitis  Recent right iliofemoral embolectomy  PAD  H/o craniotomy with SDH  Recent prostate surgery with h/o BPH  Elevated ESR/CRP  ABENA     ASSESSMENT:  Patient is a 78-year-old male with history of peripheral arterial disease, GERD, hypertension, rheumatoid arthritis with recent right iliofemoral embolectomy surgical site was stable then had opening up of wound with dehiscence with drainage with some erythema and odor noted now admitted plans for wound revision and debridement in several days cultures obtained on empiric IV antibiotics and infectious disease consultation requested today.    Some increase hematuria today with bump in white blood cell count mild increase in creatinine but no fevers and hemodynamically stable will continue daptomycin and Zosyn and monitor wbc down some today with crt stable h/h stable for surgery today    PLAN:  Continue daptomycin monitor cpk  Continue Zosyn monitor crt  Follow-up vascular surgery plans and surgery plans for today   Monitor labs of CBC BMP CRP and CPK    Jose Jones MD  7/22/2024

## 2024-07-22 NOTE — OP NOTE
ARTERIOGRAM, LOWER EXTREMITY DEBRIDEMENT  Procedure Report    Patient Name:  Nestor Nye  YOB: 1945    Date of Surgery:  7/22/2024     Indications:  78 year old  gentleman referred by Dr. Christine, a patient of Dr. Roxanne Slater, who underwent aquablation with Dr. Prieto Rey 6/21/24  which required BILATERAL hypogastric artery embolization which was further complicated by embolization of the RIGHT CFA/SFA.  He underwent iliofemoral thrombectomy 6/21/24 with restoration of flow to the PFA with a PT signal noted at the end of the case.  Following his discharge, he presented 7/17/24 to [facility] with partial dehiscence along the medial RIGHT groin incision.  Arterial duplex redemonstrated occlusion of the RIGHT SFA.  He presents electively at this time for RIGHT lower extremity arteriogram with RIGHT groin debridement.    Pre-op Diagnosis:   Dehiscence of operative wound, initial encounter [T81.31XA]  Superficial femoral artery occlusion [I70.209]       Post-Op Diagnosis Codes:     * Dehiscence of operative wound, initial encounter [T81.31XA]     * Superficial femoral artery occlusion [I70.209]     * RIGHT CFA thrombus     * RIGHT SFA occlusion    Procedure/CPT® Codes:  RIGHT PT access - ultrasound guided  Aortogram with RIGHT lower extremity run-off  RIGHT SFA atherectomy (HawkOne M)  RIGHT SFA angioplasty ([id number] Nanocross)  RIGHT SFA stenting ([id number] Everflex)  LEFT CFA access - ultrasound guided  Foreign body retrieval - captured pedal wire into femoral sheath  RIGHT CFA aspiration thrombectomy (Penumbra 16Fr Flash)  RIGHT CFA angioplasty (7x40mm InPACT)  RIGHT CFA closure (Perclose)  RIGHT groin wound debridement  Negative pressure wound therapy <50cm2    Staff:  Surgeon(s):  Kingsley Morris MD    Circulator: Jayna Millard RN; Donte Petersen RN  Scrub Person: Sherita Ortega       Anesthesia: Monitored Anesthesia Care    Estimated Blood Loss: minimal    Implants:    Nothing was  implanted during the procedure    Specimen:          None        Findings: Via retrograde arteriogram, the trifurcation is visibile with dominant run-off seen into the posterior tibial.  Within the mid superficial femoral artery, there is a complex occlusion which was crossed and true lumen access of RIGHT common femoral artery.  Following atherectomy and angioplasty, there is a thrombus noted within the distal common femoral artery and extending into the proximal superficial femoral which was extracted using the 16Fr Penumbra Flash via LEFT common femoral access.  Completion arteriogram demonstrates excellent flow through the common, profunda and superficial femoral arteries.    Complications: None    Description of Procedure: Patient was lying supine on the hybrid OR table when he was gently sedated.  The BILATERAL groins and circumferential RIGHT foot were then prepped and draped in usual sterile fashion.  The patient was identified as Nestor Nye per time-out protocol.    The RIGHT posterior tibial was visualized by ultrasound guidance and the 6Fr pedal sheath was installed in retrograde fashion.  An arteriogram was obtained demonstrating the tibial run-off.  The patient was then systemically heparinized using 7000 units.  Next, the 035/260 glide advantage wire (GAW) and trail blazer catheter were directed through a complex stenosis in the mid superficial femoral artery and true lumen access of the common femoral artery was confirmed by arteriogram.  Next, the 014/300 GAW was then advanced into the abdominal aorta and the HawkOne was used to atherectomize the superficial femoral artery in incremental, circumferential fashion with good cores of plaque extracted.  The 6mm balloon was used to angioplasty the superficial femoral artery and above-knee popliteal with significant waisting seen which resolved with angioplasty.  Interval arteriogram demonstrates a thrombus within the distal common femoral artery which  obscures the origin of the profunda femoral artery with flow seen into the superficial femoral artery which has thrombus within the proximal segment extending to the distal aspect.  Due to the thrombus within the superficial femoral artery, I elected to stent the superficial femoral artery so as to pin the thrombus and prevent further distal embolization.  The 7mm self-expanding stent was then advanced to the proximal superficial femoral artery and deployed.    To address the thrombus within the common femoral artery, the LEFT common femoral artery was accessed in retrograde fashion and the 6Fr sheath was installed.  The access site was then preclosed with Perclose devices deployed at the 10 and 2 o'clock positions.  The track was then dilated with the 10Fr sheath allowing the 16Fr [place] DrySeal sheath to advance to the LEFT FRITZ.  The flush catheter was then used to obtain an aortogram then the catheter was directed up-and-over into the RIGHT superficial femoral artery stent.  The dilator was then used to guide the sheath up-and-over into the RIGHT external iliac artery.  Next, the 7mm spider wire was deployed within the mid superficial femoral artery and the Penumbra device was used to aspirate the common femoral artery thrombus which was captured.  On examination, there is a chronic whitish component and a semi-solid, eraser head consistent subacute thrombus which was extracted.  The 035 GAW from the pedal access was then captured into the LEFT femoral sheath for body-floss access.  Interval arteriogram now demonstrates widely patent flow.  Due to the torsional forces of the common femoral artery which could not be stented, I elected to angioplasty the RIGHT common femoral artery with a drug-coated balloon which was accomplished with the 7mm drug-coated balloon.  Completion arteriogram demonstrates patent flow into the common femoral artery, superficial femoral artery and profunda femoral artery.  At the  trifurcation, dominant flow is seen into the posterior tibial.  The Perclose sutures were tightened at the LEFT common femoral artery access.  A pressure dressing was placed at the RIGHT posterior tibial access.      Attention was then turned to the RIGHT groin.  The wound measured [id number] with good granulation tissue throughout.  A wound vac was installed with good seal.  At the end of the case, the sponge and needle counts were reported as correct.      Kingsley Morris MD     Date: 7/22/2024  Time: 13:07 EDT

## 2024-07-23 LAB
ALBUMIN SERPL-MCNC: 3 G/DL (ref 3.5–5.2)
ALBUMIN/GLOB SERPL: 1.2 G/DL
ALP SERPL-CCNC: 75 U/L (ref 39–117)
ALT SERPL W P-5'-P-CCNC: 12 U/L (ref 1–41)
ANION GAP SERPL CALCULATED.3IONS-SCNC: 10 MMOL/L (ref 5–15)
AST SERPL-CCNC: 15 U/L (ref 1–40)
BASOPHILS # BLD AUTO: 0.06 10*3/MM3 (ref 0–0.2)
BASOPHILS NFR BLD AUTO: 0.6 % (ref 0–1.5)
BILIRUB SERPL-MCNC: 0.3 MG/DL (ref 0–1.2)
BUN SERPL-MCNC: 18 MG/DL (ref 8–23)
BUN/CREAT SERPL: 11.8 (ref 7–25)
CALCIUM SPEC-SCNC: 8.5 MG/DL (ref 8.6–10.5)
CHLORIDE SERPL-SCNC: 106 MMOL/L (ref 98–107)
CO2 SERPL-SCNC: 24 MMOL/L (ref 22–29)
CREAT SERPL-MCNC: 1.53 MG/DL (ref 0.76–1.27)
DEPRECATED RDW RBC AUTO: 45.1 FL (ref 37–54)
EGFRCR SERPLBLD CKD-EPI 2021: 46.5 ML/MIN/1.73
EOSINOPHIL # BLD AUTO: 0.16 10*3/MM3 (ref 0–0.4)
EOSINOPHIL NFR BLD AUTO: 1.5 % (ref 0.3–6.2)
ERYTHROCYTE [DISTWIDTH] IN BLOOD BY AUTOMATED COUNT: 13.1 % (ref 12.3–15.4)
GLOBULIN UR ELPH-MCNC: 2.6 GM/DL
GLUCOSE SERPL-MCNC: 118 MG/DL (ref 65–99)
HCT VFR BLD AUTO: 23 % (ref 37.5–51)
HGB BLD-MCNC: 7.5 G/DL (ref 13–17.7)
IMM GRANULOCYTES # BLD AUTO: 0.07 10*3/MM3 (ref 0–0.05)
IMM GRANULOCYTES NFR BLD AUTO: 0.7 % (ref 0–0.5)
LYMPHOCYTES # BLD AUTO: 1.3 10*3/MM3 (ref 0.7–3.1)
LYMPHOCYTES NFR BLD AUTO: 12.5 % (ref 19.6–45.3)
MCH RBC QN AUTO: 30.4 PG (ref 26.6–33)
MCHC RBC AUTO-ENTMCNC: 32.6 G/DL (ref 31.5–35.7)
MCV RBC AUTO: 93.1 FL (ref 79–97)
MONOCYTES # BLD AUTO: 0.94 10*3/MM3 (ref 0.1–0.9)
MONOCYTES NFR BLD AUTO: 9.1 % (ref 5–12)
NEUTROPHILS NFR BLD AUTO: 7.84 10*3/MM3 (ref 1.7–7)
NEUTROPHILS NFR BLD AUTO: 75.6 % (ref 42.7–76)
NRBC BLD AUTO-RTO: 0 /100 WBC (ref 0–0.2)
PLATELET # BLD AUTO: 348 10*3/MM3 (ref 140–450)
PMV BLD AUTO: 9.7 FL (ref 6–12)
POTASSIUM SERPL-SCNC: 4.3 MMOL/L (ref 3.5–5.2)
PROT SERPL-MCNC: 5.6 G/DL (ref 6–8.5)
RBC # BLD AUTO: 2.47 10*6/MM3 (ref 4.14–5.8)
SODIUM SERPL-SCNC: 140 MMOL/L (ref 136–145)
WBC NRBC COR # BLD AUTO: 10.37 10*3/MM3 (ref 3.4–10.8)

## 2024-07-23 PROCEDURE — 85025 COMPLETE CBC W/AUTO DIFF WBC: CPT | Performed by: SURGERY

## 2024-07-23 PROCEDURE — 80053 COMPREHEN METABOLIC PANEL: CPT | Performed by: SURGERY

## 2024-07-23 PROCEDURE — 99232 SBSQ HOSP IP/OBS MODERATE 35: CPT | Performed by: INTERNAL MEDICINE

## 2024-07-23 PROCEDURE — 25010000002 DAPTOMYCIN PER 1 MG: Performed by: SURGERY

## 2024-07-23 PROCEDURE — 25010000002 PIPERACILLIN SOD-TAZOBACTAM PER 1 G: Performed by: INTERNAL MEDICINE

## 2024-07-23 RX ORDER — HYDROCODONE BITARTRATE AND ACETAMINOPHEN 5; 325 MG/1; MG/1
1 TABLET ORAL EVERY 6 HOURS PRN
Status: DISCONTINUED | OUTPATIENT
Start: 2024-07-23 | End: 2024-07-26 | Stop reason: HOSPADM

## 2024-07-23 RX ADMIN — ASPIRIN 81 MG CHEWABLE TABLET 81 MG: 81 TABLET CHEWABLE at 08:37

## 2024-07-23 RX ADMIN — Medication 5 MG: at 20:45

## 2024-07-23 RX ADMIN — HYDROCODONE BITARTRATE AND ACETAMINOPHEN 1 TABLET: 5; 325 TABLET ORAL at 14:45

## 2024-07-23 RX ADMIN — LEVOTHYROXINE SODIUM 88 MCG: 88 TABLET ORAL at 05:35

## 2024-07-23 RX ADMIN — DAPTOMYCIN 450 MG: 500 INJECTION, POWDER, LYOPHILIZED, FOR SOLUTION INTRAVENOUS at 11:39

## 2024-07-23 RX ADMIN — FINASTERIDE 5 MG: 5 TABLET, FILM COATED ORAL at 08:37

## 2024-07-23 RX ADMIN — PIPERACILLIN AND TAZOBACTAM 3.38 G: 3; .375 INJECTION, POWDER, LYOPHILIZED, FOR SOLUTION INTRAVENOUS at 11:06

## 2024-07-23 RX ADMIN — FERROUS SULFATE TAB 325 MG (65 MG ELEMENTAL FE) 325 MG: 325 (65 FE) TAB at 08:37

## 2024-07-23 RX ADMIN — Medication 10 ML: at 08:38

## 2024-07-23 RX ADMIN — PIPERACILLIN AND TAZOBACTAM 3.38 G: 3; .375 INJECTION, POWDER, LYOPHILIZED, FOR SOLUTION INTRAVENOUS at 03:30

## 2024-07-23 RX ADMIN — PIPERACILLIN AND TAZOBACTAM 3.38 G: 3; .375 INJECTION, POWDER, LYOPHILIZED, FOR SOLUTION INTRAVENOUS at 20:44

## 2024-07-23 RX ADMIN — Medication 10 ML: at 20:45

## 2024-07-23 RX ADMIN — AMLODIPINE BESYLATE 2.5 MG: 2.5 TABLET ORAL at 08:37

## 2024-07-23 NOTE — PLAN OF CARE
Goal Outcome Evaluation:   VSS overnight with zero output from wound vac. Pt c/o mild pain in scrotum, but refusing pain medication at this time. Pt appears to have rested well overnight.

## 2024-07-23 NOTE — PROGRESS NOTES
"[facility] Progress Note    Date of Admission: 2024      Antibiotics: Daptomycin and Zosyn      CC: Right groin wound    S:   Patient with no fevers bp down some this am, H/H stable no fevers     O:  BP (!) 81/56 (BP Location: Left arm, Patient Position: Lying)   Pulse 78   Temp 98.3 °F (36.8 °C) (Oral)   Resp 18   Ht 170.2 cm (67\")   Wt 74.8 kg (165 lb)   SpO2 96%   BMI 25.84 kg/m²   Temp (24hrs), Av.7 °F (36.5 °C), Min:97.4 °F (36.3 °C), Max:98.5 °F (36.9 °C)      PE:      GENERAL: Awake and alert, in no acute distress. Chronically ill-appearing  HEENT: Normocephalic, atraumatic.  PERRL. EOMI. No conjunctival injection. No icterus. Oropharynx clear without evidence of thrush or exudate. Mild periodontal disease.    NECK: Supple without nuchal rigidity  LYMPH: No cervical, axillary or inguinal lymphadenopathy. No neck masses  HEART: RRR; No murmur, rubs, gallops.   LUNGS: Clear to auscultation bilaterally without wheezing, rales, rhonchi. Normal respiratory effort.  ABDOMEN: Soft, nontender, nondistended. Positive bowel sounds. No rebound or guarding.   EXT:  No cyanosis, clubbing or edema  : Normal appearing genitalia with Gillespie catheter.  MSK: FROM without joint effusions noted    SKIN: Warm and dry without cutaneous eruptions.  Right groin wound with wound dehiscence with slough and some erythema and drainage with odor  NEURO: Oriented to PPT. No focal deficits.   PSYCHIATRIC: Normal insight and judgement. Cooperative with PE.  Laboratory Data    Results from last 7 days   Lab Units 24  1003 24  0952 24  0820   WBC 10*3/mm3 10.37 12.57* 19.71*   HEMOGLOBIN g/dL 7.5* 8.2* 9.0*   HEMATOCRIT % 23.0* 24.7* 27.9*   PLATELETS 10*3/mm3 348 333 314     Results from last 7 days   Lab Units 24  1003   SODIUM mmol/L 140   POTASSIUM mmol/L 4.3   CHLORIDE mmol/L 106   CO2 mmol/L 24.0   BUN mg/dL 18   CREATININE mg/dL 1.53*   GLUCOSE mg/dL 118*   CALCIUM mg/dL 8.5*     Results from last 7 " days   Lab Units 07/23/24  1003   ALK PHOS U/L 75   BILIRUBIN mg/dL 0.3   ALT (SGPT) U/L 12   AST (SGOT) U/L 15     Results from last 7 days   Lab Units 07/19/24  2040   SED RATE mm/hr 82*     Results from last 7 days   Lab Units 07/22/24  0952   CRP mg/dL 23.74*       Estimated Creatinine Clearance: 42.8 mL/min (A) (by C-G formula based on SCr of 1.53 mg/dL (H)).      Microbiology:  Urine culture with gram-negative rods identified as Klebsiella pneumoniae    Radiology:  Imaging Results (Last 24 Hours)       Procedure Component Value Units Date/Time    [facility] [place] OR Procedure [[id number]] Resulted: 07/22/24 1504     Updated: 07/22/24 1504            PROBLEM LIST:   Right groin wound dehiscence with cellulitis  Recent right iliofemoral embolectomy  PAD  H/o craniotomy with SDH  Recent prostate surgery with h/o BPH  Elevated ESR/CRP  ABENA     ASSESSMENT:  Patient is a 78-year-old male with history of peripheral arterial disease, GERD, hypertension, rheumatoid arthritis with recent right iliofemoral embolectomy surgical site was stable then had opening up of wound with dehiscence with drainage with some erythema and odor noted now admitted plans for wound revision and debridement in several days cultures obtained on empiric IV antibiotics and infectious disease consultation requested today.    Some increase hematuria today with bump in white blood cell count mild increase in creatinine but no fevers and hemodynamically stable will continue daptomycin and Zosyn and monitor wbc down some today with crt stable h/h stable s/p surgery yesterday, wbc down and crt stable post-op.    PLAN:  Continue daptomycin monitor cpk  Continue Zosyn monitor crt  Follow-up cultures  Monitor labs of CBC BMP CRP and CPK    Jose Jones MD  7/23/2024

## 2024-07-23 NOTE — PROGRESS NOTES
[facility] Medicine Services  INPATIENT PROGRESS NOTE    Date of Admission: 7/17/2024  Primary Care Physician: Roxanne Slater MD    Subjective     Chief Complaint: groin dehiscence    HPI:  Doing ok but in some pain following OR yesterday. Now with wound vac in place.       Review Of Systems:   Gen- No fevers, chills  CV- No chest pain, palpitations  Resp- No cough, dyspnea  GI- No N/V/D, abd pain      Objective      Vitals:  Temp:  [97.4 °F (36.3 °C)-98.5 °F (36.9 °C)] 98.3 °F (36.8 °C)  Heart Rate:  [73-99] 78  Resp:  [12-18] 18  BP: ()/(54-99) 81/56  Flow (L/min):  [0-2] 0    Physical exam:    GEN: NAD, resting in bed, awake, drowsy   HEENT: on room air, atraumatic, normocephalic  NECK: supple, no masses  RESP: on room air, normal effort  CV: on tele, sinus rhythm  PSYCH: normal affect, appropriate  NEURO: awake, alert, no focal deficits noted  MSK: no edema noted  SKIN: no rashes noted , wound vac in place   : stacy in place cyu      Results Review:    I have reviewed the labs, radiology results and diagnostic studies.    Results from last 7 days   Lab Units 07/23/24  1003 07/22/24  0952 07/21/24  0820 07/18/24  0215 07/17/24  1842   WBC 10*3/mm3 10.37 12.57* 19.71*   < >  --    HEMOGLOBIN g/dL 7.5* 8.2* 9.0*   < >  --    HEMATOCRIT % 23.0* 24.7* 27.9*   < >  --    PLATELETS 10*3/mm3 348 333 314   < >  --    INR   --   --   --   --  1.14*    < > = values in this interval not displayed.     Results from last 7 days   Lab Units 07/23/24  1003 07/22/24  0952 07/21/24  0820 07/18/24  0215 07/17/24  1110   SODIUM mmol/L 140 141 138   < > 140   POTASSIUM mmol/L 4.3 4.1 4.1   < > 4.0   CHLORIDE mmol/L 106 106 104   < > 104   CO2 mmol/L 24.0 20.0* 22.0   < > 20.2*   BUN mg/dL 18 21 20   < > 23   CREATININE mg/dL 1.53* 1.66* 1.66*   < > 1.66*   GLUCOSE mg/dL 118* 102* 132*   < > 97   CALCIUM mg/dL 8.5* 8.4* 8.4*   < > 8.8   ALK PHOS U/L 75 75  --   --  97   ALT (SGPT) U/L 12 12  --   --   6   AST (SGOT) U/L 15 17  --   --  9    < > = values in this interval not displayed.       Microbiology Results Abnormal       Procedure Component Value - Date/Time    Blood Culture - Blood, Arm, Left [[id number]]  (Normal) Collected: 07/17/24 2007    Lab Status: Final result Specimen: Blood from Arm, Left Updated: 07/22/24 2030     Blood Culture No growth at 5 days    Blood Culture - Blood, Hand, Left [[id number]]  (Normal) Collected: 07/17/24 2008    Lab Status: Final result Specimen: Blood from Hand, Left Updated: 07/22/24 2030     Blood Culture No growth at 5 days    Blood Culture - Blood, Hand, Right [[id number]]  (Normal) Collected: 07/20/24 1201    Lab Status: Preliminary result Specimen: Blood from Hand, Right Updated: 07/22/24 1230     Blood Culture No growth at 2 days    Narrative:      Less than seven (7) mL's of blood was collected.  Insufficient quantity may yield false negative results.    Blood Culture - Blood, Arm, Left [[id number]]  (Normal) Collected: 07/20/24 1201    Lab Status: Preliminary result Specimen: Blood from Arm, Left Updated: 07/22/24 1230     Blood Culture No growth at 2 days          No radiology results for the last day      I have reviewed the medications.    Assessment/Plan     Assessment/Problem List    Dehiscence of operative wound    Hypothyroidism    Mixed hyperlipidemia    Acute UTI (urinary tract infection)    Stage 3a chronic kidney disease    Bilateral epididymitis    Wound dehiscence, surgical, initial encounter    Superficial femoral artery occlusion    Moderate malnutrition      Nestor Nye is a 78 y.o. male with PMH of enlarged prostate, GERD, Gout, HTN, RA, craniotomy for subdural hematoma, right femoral endarterectomy 6/21/24, prostate aquablation 6/21/24. Patient was discharged from [facility] on 6/26/24 and went to rehab., He presented 7/17/24with 3 days of erythema and then foul smell and the incision opened on 7/17/24    This patient's problems and plans were  partially entered by my partner and updated as appropriate by me 07/21/24.      Assessment/Plan:    Bilateral epididymitis   Right groin dehiscence   S/p Aquablation 6/21/24 complicated by SFA occlusion that required right femoral embolectomy by Dr Morris And hematuria requiring hilario hypogastric embolization.   Chronic urinary retention with stacy  -- consulted urology, they have seen with no acute intervention planned   -- blood cultures x 2 NTD  -- consult PT wound  -- cont zosyn  -- appreciate [facility]   --7/20:ncreased in WBC 7/20 maybe stress, but procal remains elevated at 0.45, lactic acid reassuring, hypotensive so will give IVFs and monitor -repeat blood cultures- thus far NGT  - patient s/p OR with Dr Morris 7/22- right SFA angioplasty/stenting, right CFA aspiration thrombectomy/angioplasty - patient with wound vac in place, now off heparin gtt     Recurrent SFA occlusion   S/p Right Femoral embolectomy  on 6/21/24  --  CT A/P showed occlusion of Right SFA   --  heparin drip now off post op, defer to vascular on timing and if resumption of ac is warranted  -- watch H/H closely- down to 7.5 today, transfuse <7     HTN  HLD  -- cont norvasc with parameters  --stable today      Hypothyroidism   -- cont home meds      CKDIII  -- baseline creat 1.4-1.6       VTE Prophylaxis:  No VTE prophylaxis order currently exists.        Expected Discharge  Expected Discharge Date: 7/25/2024; Expected Discharge Time:     Electronically signed by Danitza Prieto MD, 07/23/24

## 2024-07-23 NOTE — PROGRESS NOTES
LOS: 5 days   Patient Care Team:  Roxanne Slater MD as PCP - General (Internal Medicine)  Rosey Glover MD as Surgeon (General Surgery)  Sheila Hair APRN as Nurse Practitioner (Long Term Care Acute)  Arabella Vogel APRN as Nurse Practitioner (Urology)  Mitch Douglas MD as Consulting Physician (Cardiology)  Prieto Rey MD as Consulting Physician (Urology)  Clarke Gary APRN as Nurse Practitioner (Nephrology)      Subjective   No acute events overnight. POD #1 right lower extremity revascularization with right groin debridement and wound vac placement. No lower extremity pain this morning. Tolerating diet. Eager to work with PT/OT and get OOB. He continues to have testicular pain but no scrotal swelling.     History taken from: patient    Objective     Vital Signs  Temp:  [97.4 °F (36.3 °C)-98.5 °F (36.9 °C)] 98.3 °F (36.8 °C)  Heart Rate:  [73-99] 78  Resp:  [12-18] 18  BP: ()/(54-99) 81/56     Physical Exam  Constitutional:       General: He is not in acute distress.     Appearance: Normal appearance. He is not toxic-appearing or diaphoretic.   HENT:      Head: Normocephalic and atraumatic.      Right Ear: External ear normal.      Left Ear: External ear normal.      Nose: Nose normal.      Mouth/Throat:      Mouth: Mucous membranes are moist.   Eyes:      Extraocular Movements: Extraocular movements intact.      Conjunctiva/sclera: Conjunctivae normal.   Cardiovascular:      Rate and Rhythm: Normal rate.      Pulses:           Dorsalis pedis pulses are 1+ on the right side and 1+ on the left side.        Posterior tibial pulses are 1+ on the right side and 1+ on the left side.   Pulmonary:      Effort: Pulmonary effort is normal. No respiratory distress.   Abdominal:      General: Abdomen is flat. There is no distension.      Palpations: Abdomen is soft.      Tenderness: There is no abdominal tenderness.   Genitourinary:     Penis: Circumcised.       Testes:          Right: Tenderness present. Swelling not present.         Left: Tenderness present. Swelling not present.      Comments: + indwelling catheter in place  Musculoskeletal:         General: Normal range of motion.      Cervical back: Normal range of motion.   Feet:      Right foot:      Skin integrity: No skin breakdown.      Toenail Condition: Right toenails are abnormally thick.      Left foot:      Skin integrity: No skin breakdown.      Toenail Condition: Left toenails are abnormally thick.   Skin:     General: Skin is warm and dry.      Comments: RIGHT groin wound vac in place with good seal   Neurological:      General: No focal deficit present.      Mental Status: He is alert and oriented to person, place, and time.   Psychiatric:         Mood and Affect: Mood normal.          Results Review:     I reviewed the patient's new clinical results.  CBC    Results from last 7 days   Lab Units 07/23/24  1003 07/22/24  0952 07/21/24  0820 07/20/24  0629 07/19/24  2040 07/18/24  0215 07/17/24  1110   WBC 10*3/mm3 10.37 12.57* 19.71* 21.24*  --  6.47 9.88   HEMOGLOBIN g/dL 7.5* 8.2* 9.0* 9.8* 9.2* 9.2* 9.9*   PLATELETS 10*3/mm3 348 333 314 340  --  375 394     BMP   Results from last 7 days   Lab Units 07/23/24  1003 07/22/24  0952 07/21/24  0820 07/20/24  0629 07/18/24  0215 07/17/24  1110   SODIUM mmol/L 140 141 138 138 138 140   POTASSIUM mmol/L 4.3 4.1 4.1 3.8 4.3 4.0   CHLORIDE mmol/L 106 106 104 103 105 104   CO2 mmol/L 24.0 20.0* 22.0 18.0* 21.0* 20.2*   BUN mg/dL 18 21 20 18 20 23   CREATININE mg/dL 1.53* 1.66* 1.66* 1.68* 1.51* 1.66*   GLUCOSE mg/dL 118* 102* 132* 124* 103* 97          Current Facility-Administered Medications:     acetaminophen (TYLENOL) tablet 650 mg, 650 mg, Oral, Q4H PRN, 650 mg at 07/19/24 5379 **OR** acetaminophen (TYLENOL) 160 MG/5ML oral solution 650 mg, 650 mg, Oral, Q4H PRN **OR** acetaminophen (TYLENOL) suppository 650 mg, 650 mg, Rectal, Q4H PRN, Kingsley Morris MD     amLODIPine (NORVASC) tablet 2.5 mg, 2.5 mg, Oral, Daily, Kingsley Morris MD, 2.5 mg at 07/23/24 0837    aspirin chewable tablet 81 mg, 81 mg, Oral, Daily, Kingsley Morris MD, 81 mg at 07/23/24 0837    sennosides-docusate (PERICOLACE) 8.6-50 MG per tablet 2 tablet, 2 tablet, Oral, BID PRN, 2 tablet at 07/19/24 2219 **AND** polyethylene glycol (MIRALAX) packet 17 g, 17 g, Oral, Daily PRN **AND** bisacodyl (DULCOLAX) EC tablet 5 mg, 5 mg, Oral, Daily PRN **AND** bisacodyl (DULCOLAX) suppository 10 mg, 10 mg, Rectal, Daily PRN, Kingsley Morris MD    DAPTOmycin (CUBICIN) 450 mg in sodium chloride 0.9 % 50 mL IVPB, 6 mg/kg, Intravenous, Q24H, Kingsley Morris MD, Last Rate: 100 mL/hr at 07/23/24 1139, 450 mg at 07/23/24 1139    ferrous sulfate tablet 325 mg, 325 mg, Oral, Daily With Breakfast, Kingsley Morris MD, 325 mg at 07/23/24 0837    finasteride (PROSCAR) tablet 5 mg, 5 mg, Oral, Daily, Kingsley Morris MD, 5 mg at 07/23/24 0837    ipratropium-albuterol (DUO-NEB) nebulizer solution 3 mL, 3 mL, Nebulization, Q4H PRN, Kingsley Morris MD    lactated ringers infusion, 9 mL/hr, Intravenous, Continuous, Kingsley Morris MD, Last Rate: 9 mL/hr at 07/22/24 1307, Restarted at 07/22/24 1331    levothyroxine (SYNTHROID, LEVOTHROID) tablet 88 mcg, 88 mcg, Oral, Daily, Kingsley Morris MD, 88 mcg at 07/23/24 0535    melatonin tablet 5 mg, 5 mg, Oral, Nightly PRN, Kingsley Morris MD, 5 mg at 07/22/24 2010    ondansetron ODT (ZOFRAN-ODT) disintegrating tablet 4 mg, 4 mg, Oral, Q6H PRN **OR** ondansetron (ZOFRAN) injection 4 mg, 4 mg, Intravenous, Q6H PRN, Kingsley Morris MD, 4 mg at 07/18/24 0052    piperacillin-tazobactam (ZOSYN) 3.375 g IVPB in 100 mL NS MBP (CD), 3.375 g, Intravenous, Q8H, Radha Bhagat MD, 3.375 g at 07/23/24 1106    sodium chloride 0.9 % flush 10 mL, 10 mL, Intravenous, Q12H, Kingsley Morris MD, 10 mL at 07/23/24 0838    sodium chloride 0.9 % flush  10 mL, 10 mL, Intravenous, PRN, Kingsley Morris MD    sodium chloride 0.9 % infusion 40 mL, 40 mL, Intravenous, PRN, Kingsley Morris MD     Assessment & Plan    Assessment   RIGHT Groin Dehiscence with PVD  - 6/21/24 ([facility]) RIGHT iliofemoral embolectomy  - 7/22/24 ([facility]):   RIGHT SFA atherectomy, angioplasty and stenting  RIGHT CFA aspiration thrombectomy and angioplasty  Right groin debridement with wound vac placement     Plan   - pain control prn  - wound vac change MWF, will need wound vac at discharge. CM consulted.   - PT/OT to assess for dc planning, likely rehab vs home with home health (lives alone)   - cont ASA 81mg daily   - if H/H stable tomorrow, start DVT ppx   - abx per ID   - vascular to follow     Plan reviewed with the patient who verbalized his understanding and agreement.    Linda Aguayo PA-C  07/23/24  12:20 EDT

## 2024-07-24 LAB
ALBUMIN SERPL-MCNC: 3 G/DL (ref 3.5–5.2)
ALBUMIN/GLOB SERPL: 1.3 G/DL
ALP SERPL-CCNC: 71 U/L (ref 39–117)
ALT SERPL W P-5'-P-CCNC: 20 U/L (ref 1–41)
ANION GAP SERPL CALCULATED.3IONS-SCNC: 9 MMOL/L (ref 5–15)
AST SERPL-CCNC: 27 U/L (ref 1–40)
BASOPHILS # BLD AUTO: 0.07 10*3/MM3 (ref 0–0.2)
BASOPHILS NFR BLD AUTO: 0.6 % (ref 0–1.5)
BILIRUB SERPL-MCNC: 0.3 MG/DL (ref 0–1.2)
BUN SERPL-MCNC: 15 MG/DL (ref 8–23)
BUN/CREAT SERPL: 9.5 (ref 7–25)
CALCIUM SPEC-SCNC: 8.6 MG/DL (ref 8.6–10.5)
CHLORIDE SERPL-SCNC: 107 MMOL/L (ref 98–107)
CO2 SERPL-SCNC: 24 MMOL/L (ref 22–29)
CREAT SERPL-MCNC: 1.58 MG/DL (ref 0.76–1.27)
CYTO UR: NORMAL
DEPRECATED RDW RBC AUTO: 45.1 FL (ref 37–54)
EGFRCR SERPLBLD CKD-EPI 2021: 44.8 ML/MIN/1.73
EOSINOPHIL # BLD AUTO: 0.47 10*3/MM3 (ref 0–0.4)
EOSINOPHIL NFR BLD AUTO: 4.2 % (ref 0.3–6.2)
ERYTHROCYTE [DISTWIDTH] IN BLOOD BY AUTOMATED COUNT: 13.1 % (ref 12.3–15.4)
GLOBULIN UR ELPH-MCNC: 2.3 GM/DL
GLUCOSE SERPL-MCNC: 99 MG/DL (ref 65–99)
HCT VFR BLD AUTO: 24.1 % (ref 37.5–51)
HGB BLD-MCNC: 7.8 G/DL (ref 13–17.7)
IMM GRANULOCYTES # BLD AUTO: 0.07 10*3/MM3 (ref 0–0.05)
IMM GRANULOCYTES NFR BLD AUTO: 0.6 % (ref 0–0.5)
LAB AP CASE REPORT: NORMAL
LAB AP CLINICAL INFORMATION: NORMAL
LYMPHOCYTES # BLD AUTO: 1.94 10*3/MM3 (ref 0.7–3.1)
LYMPHOCYTES NFR BLD AUTO: 17.3 % (ref 19.6–45.3)
MCH RBC QN AUTO: 30.4 PG (ref 26.6–33)
MCHC RBC AUTO-ENTMCNC: 32.4 G/DL (ref 31.5–35.7)
MCV RBC AUTO: 93.8 FL (ref 79–97)
MONOCYTES # BLD AUTO: 0.97 10*3/MM3 (ref 0.1–0.9)
MONOCYTES NFR BLD AUTO: 8.6 % (ref 5–12)
NEUTROPHILS NFR BLD AUTO: 68.7 % (ref 42.7–76)
NEUTROPHILS NFR BLD AUTO: 7.71 10*3/MM3 (ref 1.7–7)
NRBC BLD AUTO-RTO: 0 /100 WBC (ref 0–0.2)
PATH REPORT.FINAL DX SPEC: NORMAL
PATH REPORT.GROSS SPEC: NORMAL
PLAT MORPH BLD: NORMAL
PLATELET # BLD AUTO: 376 10*3/MM3 (ref 140–450)
PMV BLD AUTO: 9.3 FL (ref 6–12)
POTASSIUM SERPL-SCNC: 4.4 MMOL/L (ref 3.5–5.2)
PROT SERPL-MCNC: 5.3 G/DL (ref 6–8.5)
RBC # BLD AUTO: 2.57 10*6/MM3 (ref 4.14–5.8)
RBC MORPH BLD: NORMAL
SODIUM SERPL-SCNC: 140 MMOL/L (ref 136–145)
WBC MORPH BLD: NORMAL
WBC NRBC COR # BLD AUTO: 11.23 10*3/MM3 (ref 3.4–10.8)

## 2024-07-24 PROCEDURE — 97530 THERAPEUTIC ACTIVITIES: CPT

## 2024-07-24 PROCEDURE — 25010000002 DAPTOMYCIN PER 1 MG: Performed by: SURGERY

## 2024-07-24 PROCEDURE — 85025 COMPLETE CBC W/AUTO DIFF WBC: CPT | Performed by: SURGERY

## 2024-07-24 PROCEDURE — 97605 NEG PRS WND THER DME<=50SQCM: CPT

## 2024-07-24 PROCEDURE — 25010000002 CEFTRIAXONE PER 250 MG: Performed by: INTERNAL MEDICINE

## 2024-07-24 PROCEDURE — 80053 COMPREHEN METABOLIC PANEL: CPT | Performed by: INTERNAL MEDICINE

## 2024-07-24 PROCEDURE — 85007 BL SMEAR W/DIFF WBC COUNT: CPT | Performed by: SURGERY

## 2024-07-24 PROCEDURE — 97116 GAIT TRAINING THERAPY: CPT

## 2024-07-24 PROCEDURE — 25010000002 HEPARIN (PORCINE) PER 1000 UNITS: Performed by: INTERNAL MEDICINE

## 2024-07-24 PROCEDURE — 97164 PT RE-EVAL EST PLAN CARE: CPT

## 2024-07-24 PROCEDURE — 99232 SBSQ HOSP IP/OBS MODERATE 35: CPT | Performed by: INTERNAL MEDICINE

## 2024-07-24 PROCEDURE — 25010000002 PIPERACILLIN SOD-TAZOBACTAM PER 1 G: Performed by: INTERNAL MEDICINE

## 2024-07-24 RX ORDER — HEPARIN SODIUM 5000 [USP'U]/ML
5000 INJECTION, SOLUTION INTRAVENOUS; SUBCUTANEOUS EVERY 8 HOURS SCHEDULED
Status: DISCONTINUED | OUTPATIENT
Start: 2024-07-24 | End: 2024-07-26 | Stop reason: HOSPADM

## 2024-07-24 RX ADMIN — Medication 10 ML: at 08:24

## 2024-07-24 RX ADMIN — FERROUS SULFATE TAB 325 MG (65 MG ELEMENTAL FE) 325 MG: 325 (65 FE) TAB at 08:23

## 2024-07-24 RX ADMIN — LEVOTHYROXINE SODIUM 88 MCG: 88 TABLET ORAL at 05:38

## 2024-07-24 RX ADMIN — DAPTOMYCIN 450 MG: 500 INJECTION, POWDER, LYOPHILIZED, FOR SOLUTION INTRAVENOUS at 09:22

## 2024-07-24 RX ADMIN — PIPERACILLIN AND TAZOBACTAM 3.38 G: 3; .375 INJECTION, POWDER, LYOPHILIZED, FOR SOLUTION INTRAVENOUS at 05:39

## 2024-07-24 RX ADMIN — PIPERACILLIN AND TAZOBACTAM 3.38 G: 3; .375 INJECTION, POWDER, LYOPHILIZED, FOR SOLUTION INTRAVENOUS at 11:09

## 2024-07-24 RX ADMIN — Medication 10 ML: at 22:35

## 2024-07-24 RX ADMIN — HYDROCODONE BITARTRATE AND ACETAMINOPHEN 1 TABLET: 5; 325 TABLET ORAL at 08:23

## 2024-07-24 RX ADMIN — HEPARIN SODIUM 5000 UNITS: 5000 INJECTION INTRAVENOUS; SUBCUTANEOUS at 22:35

## 2024-07-24 RX ADMIN — SODIUM CHLORIDE 2000 MG: 900 INJECTION INTRAVENOUS at 18:14

## 2024-07-24 RX ADMIN — AMLODIPINE BESYLATE 2.5 MG: 2.5 TABLET ORAL at 08:23

## 2024-07-24 RX ADMIN — FINASTERIDE 5 MG: 5 TABLET, FILM COATED ORAL at 08:23

## 2024-07-24 RX ADMIN — HEPARIN SODIUM 5000 UNITS: 5000 INJECTION INTRAVENOUS; SUBCUTANEOUS at 13:29

## 2024-07-24 RX ADMIN — ASPIRIN 81 MG CHEWABLE TABLET 81 MG: 81 TABLET CHEWABLE at 08:23

## 2024-07-24 NOTE — THERAPY TREATMENT NOTE
Patient Name: Nestor Nye  : 1945    MRN: [id number]                              Today's Date: 2024       Admit Date: 2024    Visit Dx:     ICD-10-CM ICD-9-CM   1. Dehiscence of operative wound, initial encounter  T81.31XA 998.32   2. Superficial femoral artery occlusion  I70.209 440.20   3. Rheumatoid arthritis involving multiple sites, unspecified whether rheumatoid factor present  M06.9 714.0     Patient Active Problem List   Diagnosis    Hypothyroidism    Mixed hyperlipidemia    Elevated PSA    Benign essential hypertension    Acute UTI (urinary tract infection)    BPH with obstruction/lower urinary tract symptoms    Anemia of chronic disease    Rheumatoid arthritis    Secondary hyperparathyroidism    Stage 3a chronic kidney disease    Gouty arthropathy    No post-op complications    Bilateral epididymitis    Wound dehiscence, surgical, initial encounter    Superficial femoral artery occlusion    Dehiscence of operative wound    Moderate malnutrition     Past Medical History:   Diagnosis Date    Acute maxillary sinusitis     Arthritis     Disease of thyroid gland     Enlarged prostate     GERD (gastroesophageal reflux disease)     Gout     Hypertension     Impaired functional mobility, balance, gait, and endurance     PONV (postoperative nausea and vomiting)     Renal disorder     Rheumatoid arthritis     Right inguinal hernia 10/28/2021    Added automatically from request for surgery [id number]    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, changed his voice since then     Past Surgical History:   Procedure Laterality Date    ABDOMINAL SURGERY      ARTERIOGRAM Right 2024    Procedure: LOWER EXTREMITY ANGIOGRAM RIGHT;  Surgeon: Kingsley Morris MD;  Location: [facility];  Service: Vascular;  Laterality: Right;  DOSE: 56 MGY; FLURO: 7 MIN 6 SEC; CONTRAST 40 ML    BACK SURGERY      BELPHAROPTOSIS REPAIR Bilateral     BRAIN SURGERY       COLON SURGERY      bowel resection x2 per pt    CRANIOTOMY FOR SUBDURAL HEMATOMA      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    DIAGNOSTIC LAPAROSCOPY N/A 05/04/2022    Procedure: DIAGNOSTIC LAPAROSCOPY CONVERTED TO EXPLORATORY LAPAROTOMY WITH SMALL BOWEL RESECTION;  Surgeon: Maximo Bennett MD;  Location:  [facility];  Service: General;  Laterality: N/A;  CONVERTED TO EXPLORATORY LAPAROTOMY AT 1912    EXPLORATORY LAPAROTOMY N/A 04/16/2022    Procedure: LAPAROTOMY EXPLORATORY WITH SMALL BOWEL OBSTRUCTION;  Surgeon: Maximo Bennett MD;  Location:  [facility];  Service: General;  Laterality: N/A;    [facility]    FEMORAL ENDARTERECTOMY Right 6/21/2024    Procedure: FEMORAL EMBELECTOMY RIGHT;  Surgeon: Kingsley Morris MD;  Location:  [facility];  Service: Vascular;  Laterality: Right;    FOREIGN BODY REMOVAL Right     removal of metal fragments from RUQ after electrocution and fall.      HERNIA REPAIR      INGUINAL HERNIA REPAIR Right 11/01/2021    Procedure: INGUINAL HERNIA REPAIR WITH MESH AND EXCISION OF SPERMATIC CORD LIPOMA;  Surgeon: Rosey Glover MD;  Location:  [facility];  Service: General;  Laterality: Right;    LEG DEBRIDEMENT Right 7/22/2024    Procedure: GROIN DEBRIDEMENT WITH WOUND VAC PLACEMENT  RIGHT;  Surgeon: Kingsley Morris MD;  Location:  [facility];  Service: Vascular;  Laterality: Right;    LUMBAR SPINE SURGERY      PROSTATE AQUABLATION N/A 6/21/2024    Procedure: AQUABLATION OF PROSTATE;  Surgeon: Prieto Rey MD;  Location:  [facility];  Service: [employer] - Urology;  Laterality: N/A;    SKIN GRAFT Right     RUE skin grafting after electrocution    THORACIC SPINE SURGERY        General Information       Row Name 07/24/24 0841          Physical Therapy Time and Intention    Document Type therapy note (daily note)  -AS     Mode of Treatment physical therapy  -AS       Row Name 07/24/24 0841          General Information     Patient Profile Reviewed yes  -AS     Existing Precautions/Restrictions fall;other (see comments)  R groin wound w/wound vac  -AS     Barriers to Rehab medically complex;previous functional deficit;visual deficit  -AS       Row Name 07/24/24 0841          Cognition    Orientation Status (Cognition) oriented x 3  -AS       Row Name 07/24/24 0841          Safety Issues, Functional Mobility    Safety Issues Affecting Function (Mobility) awareness of need for assistance;insight into deficits/self-awareness;safety precaution awareness;safety precautions follow-through/compliance;sequencing abilities  -AS     Impairments Affecting Function (Mobility) balance;endurance/activity tolerance;pain;postural/trunk control;range of motion (ROM);strength;visual/perceptual  -AS     Comment, Safety Issues/Impairments (Mobility) alert and following commands  -AS               User Key  (r) = Recorded By, (t) = Taken By, (c) = Cosigned By      Initials Name Provider Type    AS Sakshi Bentley PTA Physical Therapist Assistant                   Mobility       Row Name 07/24/24 0842          Bed Mobility    Supine-Sit [place] (Bed Mobility) verbal cues;contact guard;1 person assist  -AS     Assistive Device (Bed Mobility) head of bed elevated;bed rails  -AS     Comment, (Bed Mobility) increased time and effort to reach EOB due to scrotal pain  -AS       Row Name 07/24/24 0842          Transfers    Comment, (Transfers) cues for hand placement  -AS       Row Name 07/24/24 0842          Bed-Chair Transfer    Bed-Chair [place] (Transfers) verbal cues;minimum assist (75% patient effort);1 person assist  -AS     Assistive Device (Bed-Chair Transfers) walker, front-wheeled  -AS       Row Name 07/24/24 0842          Sit-Stand Transfer    Sit-Stand [place] (Transfers) verbal cues;contact guard;1 person assist  -AS     Assistive Device (Sit-Stand Transfers) walker, front-wheeled  -AS       Row Name 07/24/24 0842           Gait/Stairs (Locomotion)    [place] Level (Gait) verbal cues;minimum assist (75% patient effort);1 person assist  -AS     Assistive Device (Gait) walker, front-wheeled  -AS     Distance in Feet (Gait) 5  -AS     Deviations/Abnormal Patterns (Gait) bilateral deviations;base of support, narrow;rere decreased;stride length decreased;weight shifting decreased;antalgic;right sided deviations  -AS     Bilateral Gait Deviations heel strike decreased  -AS     Comment, (Gait/Stairs) patient took 5 steps to recliner with Min assist x1 and B UE support on walker, pt declined further mobility due to R groin and scrotal pain.  -AS               User Key  (r) = Recorded By, (t) = Taken By, (c) = Cosigned By      Initials Name Provider Type    AS Sakshi Bentley PTA Physical Therapist Assistant                   Obj/Interventions       Row Name 07/24/24 0844          Motor Skills    Therapeutic Exercise knee;ankle  -AS       [facility] Name 07/24/24 0844          Knee (Therapeutic Exercise)    Knee (Therapeutic Exercise) strengthening exercise  -AS     Knee Strengthening (Therapeutic Exercise) bilateral;LAQ (long arc quad);sitting;10 repetitions  -AS       Row Name 07/24/24 0844          Ankle (Therapeutic Exercise)    Ankle AROM (Therapeutic Exercise) bilateral;dorsiflexion;plantarflexion;sitting;10 repetitions  -AS       Row Name 07/24/24 0844          Balance    Dynamic Standing Balance verbal cues;minimal assist;1-person assist  -AS     Position/Device Used, Standing Balance supported;walker, front-wheeled  -AS     Comment, Balance min assist for safety  -AS               User Key  (r) = Recorded By, (t) = Taken By, (c) = Cosigned By      Initials Name Provider Type    AS Sakshi Bentley PTA Physical Therapist Assistant                   Goals/Plan    No documentation.                  Clinical Impression       Row Name 07/24/24 0844          Pain    Pretreatment Pain Rating 5/10  -AS     Posttreatment Pain Rating  5/10  -AS     Pain Location - Side/Orientation Right  -AS     Pain Location - groin  -AS     Pain Intervention(s) Repositioned;Ambulation/increased activity  -AS       Row Name 07/24/24 0844          Plan of Care Review    Plan of Care Reviewed With patient  -AS     Progress no change  -AS     Outcome Evaluation patient took 5 steps to recliner with Min assist x1 and B UE support on walker, pt declined further mobility due to R groin and scrotal pain. Recommend IRF at D/C for best functional outcome.  -AS       [facility] Name 07/24/24 0844          Positioning and Restraints    Pre-Treatment Position in bed  -AS     Post Treatment Position chair  -AS     In Chair reclined;call light within reach;encouraged to call for assist;exit alarm on;waffle cushion;legs elevated  -AS               User Key  (r) = Recorded By, (t) = Taken By, (c) = Cosigned By      Initials Name Provider Type    AS Sakshi Bentley PTA Physical Therapist Assistant                   Outcome Measures       Row Name 07/24/24 0845          How much help from another person do you currently need...    Turning from your back to your side while in flat bed without using bedrails? 4  -AS     Moving from lying on back to sitting on the side of a flat bed without bedrails? 3  -AS     Moving to and from a bed to a chair (including a wheelchair)? 3  -AS     Standing up from a chair using your arms (e.g., wheelchair, bedside chair)? 3  -AS     Climbing 3-5 steps with a railing? 2  -AS     To walk in hospital room? 3  -AS     AM-PAC 6 Clicks Score (PT) 18  -AS     Highest Level of Mobility Goal 6 --> Walk 10 steps or more  -AS       [facility] Name 07/24/24 0845          Functional Assessment    Outcome Measure Options AM-PAC 6 Clicks Basic Mobility (PT)  -AS               User Key  (r) = Recorded By, (t) = Taken By, (c) = Cosigned By      Initials Name Provider Type    AS Sakshi Bentley PTA Physical Therapist Assistant                                 Physical  Therapy Education       Title: PT OT SLP Therapies (In Progress)       Topic: Physical Therapy (In Progress)       Point: Mobility training (In Progress)       Learning Progress Summary             Patient Acceptance, E, NR by AS at 7/24/2024 0845    Eager, E,D,H, NR by DM at 7/18/2024 1222                         Point: Home exercise program (In Progress)       Learning Progress Summary             Patient Acceptance, E, NR by AS at 7/24/2024 0845    Eager, E,D,H, NR by DM at 7/18/2024 1222                         Point: Body mechanics (In Progress)       Learning Progress Summary             Patient Acceptance, E, NR by AS at 7/24/2024 0845    Eager, E,D,H, NR by DM at 7/18/2024 1222                         Point: Precautions (In Progress)       Learning Progress Summary             Patient Acceptance, E, NR by AS at 7/24/2024 0845    Eager, E,D,H, NR by DM at 7/18/2024 1222                                         User Key       Initials Effective Dates Name Provider Type Discipline    DM 02/03/23 -  Emilie Arredondo, PT Physical Therapist PT    AS 04/28/23 -  Sakshi Bentley, PTA Physical Therapist Assistant PT                  PT Recommendation and Plan     Plan of Care Reviewed With: patient  Progress: no change  Outcome Evaluation: patient took 5 steps to recliner with Min assist x1 and B UE support on walker, pt declined further mobility due to R groin and scrotal pain. Recommend IRF at D/C for best functional outcome.     Time Calculation:         PT Charges       Row Name 07/24/24 0846             Time Calculation    Start Time 0750  -AS      PT Received On 07/24/24  -AS      PT Goal Re-Cert Due Date 07/28/24  -AS         Timed Charges    92520 - Gait Training Minutes  8  -AS      74290 - PT Therapeutic Activity Minutes 15  -AS         Total Minutes    Timed Charges Total Minutes 23  -AS       Total Minutes 23  -AS                User Key  (r) = Recorded By, (t) = Taken By, (c) = Cosigned By       Initials Name Provider Type    AS Sakshi Bentley PTA Physical Therapist Assistant                  Therapy Charges for Today       Code Description Service Date Service Provider Modifiers Qty    49987632602 HC GAIT TRAINING EA 15 MIN 7/24/2024 Sakshi Bentley, MICAH GP 1    [id number] HC PT THERAPEUTIC ACT EA 15 MIN 7/24/2024 Sakshi Bentley PTA GP 1            PT G-Codes  Outcome Measure Options: AM-PAC 6 Clicks Basic Mobility (PT)  AM-PAC 6 Clicks Score (PT): 18  AM-PAC 6 Clicks Score (OT): 20       Sakshi Bentley PTA  7/24/2024

## 2024-07-24 NOTE — PLAN OF CARE
Goal Outcome Evaluation:  Plan of Care Reviewed With: patient        Progress: no change  Outcome Evaluation: Wound vac change complete. R groin wound presents with primarily red/pink base and small amounts of serosanguinous drainage noted. Pt will benefit from NPWT to promote healing/granulation. Anticipate change in 1-2 days.

## 2024-07-24 NOTE — PLAN OF CARE
Goal Outcome Evaluation:  Plan of Care Reviewed With: patient        Progress: no change  Outcome Evaluation: patient took 5 steps to recliner with Min assist x1 and B UE support on walker, pt declined further mobility due to R groin and scrotal pain. Recommend IRF at D/C for best functional outcome.

## 2024-07-24 NOTE — PROGRESS NOTES
[facility] Medicine Services  INPATIENT PROGRESS NOTE    Date of Admission: 7/17/2024  Primary Care Physician: Roxanne Slater MD    Subjective     Chief Complaint: groin dehiscence    HPI:  Seen walking in the hallway today and in room in chair. Doing well today. Pain improved       Review Of Systems:   Gen- No fevers, chills  CV- No chest pain, palpitations  Resp- No cough, dyspnea  GI- No N/V/D, abd pain      Objective      Vitals:  Temp:  [98.2 °F (36.8 °C)-98.6 °F (37 °C)] 98.2 °F (36.8 °C)  Heart Rate:  [73-94] 92  Resp:  [16-18] 18  BP: ()/(54-73) 110/72  Flow (L/min):  [0] 0    Physical exam:    GEN: NAD, resting in chair, looks more alert today   HEENT: on room air, atraumatic, normocephalic  NECK: supple, no masses  RESP: on room air, normal effort  CV: on tele, sinus rhythm  PSYCH: normal affect, appropriate  NEURO: awake, alert, no focal deficits noted  MSK: no edema noted  SKIN: no rashes noted , wound vac in place   : stacy in place cyu      Results Review:    I have reviewed the labs, radiology results and diagnostic studies.    Results from last 7 days   Lab Units 07/24/24  0630 07/23/24  1003 07/22/24  0952 07/18/24  0215 07/17/24  1842   WBC 10*3/mm3 11.23* 10.37 12.57*   < >  --    HEMOGLOBIN g/dL 7.8* 7.5* 8.2*   < >  --    HEMATOCRIT % 24.1* 23.0* 24.7*   < >  --    PLATELETS 10*3/mm3 376 348 333   < >  --    INR   --   --   --   --  1.14*    < > = values in this interval not displayed.     Results from last 7 days   Lab Units 07/24/24  0630 07/23/24  1003 07/22/24  0952   SODIUM mmol/L 140 140 141   POTASSIUM mmol/L 4.4 4.3 4.1   CHLORIDE mmol/L 107 106 106   CO2 mmol/L 24.0 24.0 20.0*   BUN mg/dL 15 18 21   CREATININE mg/dL 1.58* 1.53* 1.66*   GLUCOSE mg/dL 99 118* 102*   CALCIUM mg/dL 8.6 8.5* 8.4*   ALK PHOS U/L 71 75 75   ALT (SGPT) U/L 20 12 12   AST (SGOT) U/L 27 15 17       Microbiology Results Abnormal       Procedure Component Value - Date/Time     Blood Culture - Blood, Hand, Right [[id number]]  (Normal) Collected: 07/20/24 1201    Lab Status: Preliminary result Specimen: Blood from Hand, Right Updated: 07/23/24 1230     Blood Culture No growth at 3 days    Narrative:      Less than seven (7) mL's of blood was collected.  Insufficient quantity may yield false negative results.    Blood Culture - Blood, Arm, Left [[id number]]  (Normal) Collected: 07/20/24 1201    Lab Status: Preliminary result Specimen: Blood from Arm, Left Updated: 07/23/24 1230     Blood Culture No growth at 3 days    Blood Culture - Blood, Arm, Left [[id number]]  (Normal) Collected: 07/17/24 2007    Lab Status: Final result Specimen: Blood from Arm, Left Updated: 07/22/24 2030     Blood Culture No growth at 5 days    Blood Culture - Blood, Hand, Left [[id number]]  (Normal) Collected: 07/17/24 2008    Lab Status: Final result Specimen: Blood from Hand, Left Updated: 07/22/24 2030     Blood Culture No growth at 5 days          No radiology results for the last day      I have reviewed the medications.    Assessment/Plan     Assessment/Problem List    Dehiscence of operative wound    Hypothyroidism    Mixed hyperlipidemia    Acute UTI (urinary tract infection)    Stage 3a chronic kidney disease    Bilateral epididymitis    Wound dehiscence, surgical, initial encounter    Superficial femoral artery occlusion    Moderate malnutrition      Nestor Charles Popeye is a 78 y.o. male with PMH of enlarged prostate, GERD, Gout, HTN, RA, craniotomy for subdural hematoma, right femoral endarterectomy 6/21/24, prostate aquablation 6/21/24. Patient was discharged from [facility] on 6/26/24 and went to rehab., He presented 7/17/24with 3 days of erythema and then foul smell and the incision opened on 7/17/24    This patient's problems and plans were partially entered by my partner and updated as appropriate by me 07/21/24.      Assessment/Plan:    Bilateral epididymitis   Right groin dehiscence   S/p Aquablation 6/21/24  complicated by SFA occlusion that required right femoral embolectomy by Dr Morris And hematuria requiring hilario hypogastric embolization.   Chronic urinary retention with stacy  -- consulted urology, they have seen with no acute intervention planned   -- blood cultures x 2 NTD  -- consult PT wound  -- cont zosyn  -- appreciate [facility]   --7/20:ncreased in WBC 7/20 maybe stress, but procal remains elevated at 0.45, lactic acid reassuring, hypotensive so will give IVFs and monitor -repeat blood cultures- thus far NGT  - patient s/p OR with Dr Morris 7/22- right SFA angioplasty/stenting, right CFA aspiration thrombectomy/angioplasty - patient with wound vac in place, now off heparin gtt     Recurrent SFA occlusion   S/p Right Femoral embolectomy  on 6/21/24  --  CT A/P showed occlusion of Right SFA   --  heparin drip now off post op, per their note ok to place on dvt ppx today given h/h stable- order placed      HTN  HLD  -- cont norvasc with parameters  --stable today      Hypothyroidism   -- cont home meds      CKDIII  -- baseline creat 1.4-1.6       VTE Prophylaxis:  No VTE prophylaxis order currently exists.        Expected Discharge  Expected Discharge Date: 7/25/2024; Expected Discharge Time:     Electronically signed by Danitza Prieto MD, 07/24/24

## 2024-07-24 NOTE — PROGRESS NOTES
"[facility] Progress Note    Date of Admission: 2024      Antibiotics: Daptomycin and Zosyn      CC: Right groin wound    S:   Patient with no fevers bp down some this am, H/H stable no fevers     O:  /72   Pulse 92   Temp 98.2 °F (36.8 °C) (Oral)   Resp 18   Ht 170.2 cm (67\")   Wt 74.8 kg (165 lb)   SpO2 98%   BMI 25.84 kg/m²   Temp (24hrs), Av.5 °F (36.9 °C), Min:98.2 °F (36.8 °C), Max:98.6 °F (37 °C)      PE:      GENERAL: Awake and alert, in no acute distress. Chronically ill-appearing  HEENT: Normocephalic, atraumatic.  PERRL. EOMI. No conjunctival injection. No icterus. Oropharynx clear without evidence of thrush or exudate. Mild periodontal disease.    NECK: Supple without nuchal rigidity  LYMPH: No cervical, axillary or inguinal lymphadenopathy. No neck masses  HEART: RRR; No murmur, rubs, gallops.   LUNGS: Clear to auscultation bilaterally without wheezing, rales, rhonchi. Normal respiratory effort.  ABDOMEN: Soft, nontender, nondistended. Positive bowel sounds. No rebound or guarding.   EXT:  No cyanosis, clubbing or edema  : Normal appearing genitalia with Gillespie catheter.  MSK: FROM without joint effusions noted    SKIN: Warm and dry without cutaneous eruptions.  Right groin wound with wound dehiscence with slough and some erythema and drainage with odor  NEURO: Oriented to PPT. No focal deficits.   PSYCHIATRIC: Normal insight and judgement. Cooperative with PE.  Laboratory Data    Results from last 7 days   Lab Units 24  0630 24  1003 24  0952   WBC 10*3/mm3 11.23* 10.37 12.57*   HEMOGLOBIN g/dL 7.8* 7.5* 8.2*   HEMATOCRIT % 24.1* 23.0* 24.7*   PLATELETS 10*3/mm3 376 348 333     Results from last 7 days   Lab Units 24  0630   SODIUM mmol/L 140   POTASSIUM mmol/L 4.4   CHLORIDE mmol/L 107   CO2 mmol/L 24.0   BUN mg/dL 15   CREATININE mg/dL 1.58*   GLUCOSE mg/dL 99   CALCIUM mg/dL 8.6     Results from last 7 days   Lab Units 24  0630   ALK PHOS U/L 71 "   BILIRUBIN mg/dL 0.3   ALT (SGPT) U/L 20   AST (SGOT) U/L 27     Results from last 7 days   Lab Units 07/19/24  2040   SED RATE mm/hr 82*     Results from last 7 days   Lab Units 07/22/24  0952   CRP mg/dL 23.74*       Estimated Creatinine Clearance: 41.4 mL/min (A) (by C-G formula based on SCr of 1.58 mg/dL (H)).      Microbiology:  Urine culture with gram-negative rods identified as Klebsiella pneumoniae    Radiology:  Imaging Results (Last 24 Hours)       ** No results found for the last 24 hours. **            PROBLEM LIST:   Right groin wound dehiscence with cellulitis  Recent right iliofemoral embolectomy  PAD  H/o craniotomy with SDH  Recent prostate surgery with h/o BPH  Elevated ESR/CRP  ABENA     ASSESSMENT:  Patient is a 78-year-old male with history of peripheral arterial disease, GERD, hypertension, rheumatoid arthritis with recent right iliofemoral embolectomy surgical site was stable then had opening up of wound with dehiscence with drainage with some erythema and odor noted now admitted plans for wound revision and debridement in several days cultures obtained on empiric IV antibiotics and infectious disease consultation requested today.    Some increase hematuria today with bump in white blood cell count mild increase in creatinine but no fevers and hemodynamically stable will continue daptomycin and Zosyn and monitor wbc down some today with crt stable h/h stable s/p surgery yesterday, wbc down and crt stable post-op.    PLAN:  Continue daptomycin monitor cpk  NARROW zosyn to ceftriaxone  Follow-up cultures  Monitor labs of CBC BMP CRP and CPK  Plan po abx upon d/c    Jose Jones MD  7/24/2024

## 2024-07-24 NOTE — CASE MANAGEMENT/SOCIAL WORK
Discharge Planning Assessment  [facility]     Patient Name: Nestor Nye  MRN: [id number]  Today's Date: 7/24/2024    Admit Date: 7/17/2024    Plan: HOME with    Discharge Needs Assessment    No documentation.                  Discharge Plan       Row Name 07/24/24 1413       Plan    Plan HOME with     Patient/Family in Agreement with Plan yes    Plan Comments Met with pt at bedside to f/u DCP.  Plan is home with HH to assist with wound vac.  CM called referral to Jenny at [facility].  Final abx plans still pending.  CM will cont to follow for final DCP.    Final Discharge Disposition Code 06 - home with home health care                  Continued Care and Services - Admitted Since 7/17/2024       Home Medical Care       Service Provider Request Status Selected Services Address Phone Fax Patient Preferred    [facility]  Selected Home Health Services [address] [place] [address] [place], [place] [id number] [phone] --                  Selected Continued Care - Prior Encounters Includes continued care and service providers with selected services from prior encounters from 4/18/2024 to 7/24/2024      Discharged on 6/26/2024 Admission date: 6/21/2024 - Discharge disposition: Skilled Nursing Facility (DC - External)      Destination       Service Provider Selected Services Address Phone Fax Patient Preferred    [facility] [address] [facility] [address] [phone]                           Expected Discharge Date and Time       Expected Discharge Date Expected Discharge Time    Jul 25, 2024            Demographic Summary    No documentation.                  Functional Status    No documentation.                  Psychosocial    No documentation.                  Abuse/Neglect    No documentation.                  Legal    No documentation.                  Substance Abuse    No documentation.                   Patient Forms    No documentation.                     Sheila Marte RN

## 2024-07-24 NOTE — THERAPY TREATMENT NOTE
Patient Name: Nestor Nye  : 1945    MRN: [id number]                              Today's Date: 2024       Admit Date: 2024    Visit Dx:     ICD-10-CM ICD-9-CM   1. Dehiscence of operative wound, initial encounter  T81.31XA 998.32   2. Superficial femoral artery occlusion  I70.209 440.20   3. Rheumatoid arthritis involving multiple sites, unspecified whether rheumatoid factor present  M06.9 714.0     Patient Active Problem List   Diagnosis    Hypothyroidism    Mixed hyperlipidemia    Elevated PSA    Benign essential hypertension    Acute UTI (urinary tract infection)    BPH with obstruction/lower urinary tract symptoms    Anemia of chronic disease    Rheumatoid arthritis    Secondary hyperparathyroidism    Stage 3a chronic kidney disease    Gouty arthropathy    No post-op complications    Bilateral epididymitis    Wound dehiscence, surgical, initial encounter    Superficial femoral artery occlusion    Dehiscence of operative wound    Moderate malnutrition     Past Medical History:   Diagnosis Date    Acute maxillary sinusitis     Arthritis     Disease of thyroid gland     Enlarged prostate     GERD (gastroesophageal reflux disease)     Gout     Hypertension     Impaired functional mobility, balance, gait, and endurance     PONV (postoperative nausea and vomiting)     Renal disorder     Rheumatoid arthritis     Right inguinal hernia 10/28/2021    Added automatically from request for surgery [id number]    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, changed his voice since then     Past Surgical History:   Procedure Laterality Date    ABDOMINAL SURGERY      ARTERIOGRAM Right 2024    Procedure: LOWER EXTREMITY ANGIOGRAM RIGHT;  Surgeon: Kingsley Morris MD;  Location: [facility];  Service: Vascular;  Laterality: Right;  DOSE: 56 MGY; FLURO: 7 MIN 6 SEC; CONTRAST 40 ML    BACK SURGERY      BELPHAROPTOSIS REPAIR Bilateral     BRAIN SURGERY       COLON SURGERY      bowel resection x2 per pt    CRANIOTOMY FOR SUBDURAL HEMATOMA      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    DIAGNOSTIC LAPAROSCOPY N/A 05/04/2022    Procedure: DIAGNOSTIC LAPAROSCOPY CONVERTED TO EXPLORATORY LAPAROTOMY WITH SMALL BOWEL RESECTION;  Surgeon: Maximo Bennett MD;  Location:  [facility];  Service: General;  Laterality: N/A;  CONVERTED TO EXPLORATORY LAPAROTOMY AT 1912    EXPLORATORY LAPAROTOMY N/A 04/16/2022    Procedure: LAPAROTOMY EXPLORATORY WITH SMALL BOWEL OBSTRUCTION;  Surgeon: Maximo Bennett MD;  Location:  [facility];  Service: General;  Laterality: N/A;    EYE SURGERY      on upper etelids    FEMORAL ENDARTERECTOMY Right 6/21/2024    Procedure: FEMORAL EMBELECTOMY RIGHT;  Surgeon: Kingsley Morris MD;  Location:  [facility];  Service: Vascular;  Laterality: Right;    FOREIGN BODY REMOVAL Right     removal of metal fragments from RUQ after electrocution and fall.      HERNIA REPAIR      INGUINAL HERNIA REPAIR Right 11/01/2021    Procedure: INGUINAL HERNIA REPAIR WITH MESH AND EXCISION OF SPERMATIC CORD LIPOMA;  Surgeon: Rosey Glover MD;  Location:  [facility];  Service: General;  Laterality: Right;    LEG DEBRIDEMENT Right 7/22/2024    Procedure: GROIN DEBRIDEMENT WITH WOUND VAC PLACEMENT  RIGHT;  Surgeon: Kingsley Morris MD;  Location:  [facility];  Service: Vascular;  Laterality: Right;    LUMBAR SPINE SURGERY      PROSTATE AQUABLATION N/A 6/21/2024    Procedure: AQUABLATION OF PROSTATE;  Surgeon: Prieto Rey MD;  Location:  [facility];  Service: Robotics - Urology;  Laterality: N/A;    SKIN GRAFT Right     RUE skin grafting after electrocution    THORACIC SPINE SURGERY        General Information       Row Name 07/24/24 1529          OT Time and Intention    Document Type therapy note (daily note)  -LC     Mode of Treatment occupational therapy  -LC       Row Name 07/24/24 1529          General Information    Patient  Profile Reviewed yes  -     Prior Level of Function independent:;all household mobility;transfer;ADL's  See IE  -     Existing Precautions/Restrictions fall;other (see comments)  R groin wound w/wound vac  -     Barriers to Rehab medically complex  -       Row Name 07/24/24 1529          Cognition    Orientation Status (Cognition) oriented x 3  -       Row Name 07/24/24 1529          Safety Issues, Functional Mobility    Safety Issues Affecting Function (Mobility) awareness of need for assistance;insight into deficits/self-awareness;safety precaution awareness;safety precautions follow-through/compliance;sequencing abilities  -     Impairments Affecting Function (Mobility) balance;endurance/activity tolerance;pain;postural/trunk control;range of motion (ROM);strength;visual/perceptual  -               User Key  (r) = Recorded By, (t) = Taken By, (c) = Cosigned By      Initials Name Provider Type     Teresa Ramirez OT Occupational Therapist                     Mobility/ADL's       Row Name 07/24/24 1536          Bed Mobility    Bed Mobility supine-sit  -     Supine-Sit [place] (Bed Mobility) contact guard;verbal cues  -     Comment, (Bed Mobility) Assist to manage lines  -       Row Name 07/24/24 1536          Transfers    Transfers sit-stand transfer  -     Comment, (Transfers) VC's for hand placement, completed x 2.  -       Row Name 07/24/24 1536          Sit-Stand Transfer    Sit-Stand [place] (Transfers) contact guard;verbal cues  -     Assistive Device (Sit-Stand Transfers) walker, front-wheeled  -       Row Name 07/24/24 1536          Functional Mobility    Functional Mobility- Ind. Level contact guard assist;verbal cues required  -     Functional Mobility-Distance (Feet) 45  -     Functional Mobility- Comment Ambulated in room to simulate household distances needed for ADL participation. Slow pace.  -               User Key  (r) = Recorded By, (t) = Taken By, (c)  = Cosigned By      Initials Name Provider Type     Teresa Ramirez OT Occupational Therapist                   Obj/Interventions       Row Name 07/24/24 1538          Balance    Balance Assessment sitting static balance;sitting dynamic balance;standing static balance;standing dynamic balance  -     Static Sitting Balance independent  -     Dynamic Sitting Balance supervision  -     Position, Sitting Balance unsupported;sitting edge of bed;sitting in chair  -     Static Standing Balance standby assist  -     Dynamic Standing Balance contact guard  -     Position/Device Used, Standing Balance supported;walker, front-wheeled  -     Balance Interventions sitting;standing;sit to stand;supported;weight shifting activity  -     Comment, Balance CGA for safety  -               User Key  (r) = Recorded By, (t) = Taken By, (c) = Cosigned By      Initials Name Provider Type     Teresa Ramirez OT Occupational Therapist                   Goals/Plan    No documentation.                  Clinical Impression       Row Name 07/24/24 1538          Pain Assessment    Pretreatment Pain Rating 3/10  -     Posttreatment Pain Rating 3/10  -     Pain Location - Side/Orientation Right  -     Pain Location incisional  -     Pain Location - groin  -     Pain Intervention(s) Repositioned;Ambulation/increased activity  -     Additional Documentation Pain Scale: Word Pre/Post-Treatment (Group)  -       Row Name 07/24/24 1538          Plan of Care Review    Plan of Care Reviewed With patient  -     Progress no change  -     Outcome Evaluation Pt. progressing towards baseline with ADLs and functional mobility. Ambulated 45' with FWW and CGA to simulate household distances needed for ADL participation. Pt. completed STS x 2 with CGA and FWW. Will continue to progress as able.  -       Row Name 07/24/24 1538          Positioning and Restraints    Post Treatment Position bed  -     In Bed notified  nsg;fowlers;call light within reach;encouraged to call for assist;exit alarm on  -               User Key  (r) = Recorded By, (t) = Taken By, (c) = Cosigned By      Initials Name Provider Type    Teresa Saleh OT Occupational Therapist                   Outcome Measures       Row Name 07/24/24 1545          How much help from another is currently needed...    Putting on and taking off regular lower body clothing? 3  -LC     Bathing (including washing, rinsing, and drying) 3  -LC     Toileting (which includes using toilet bed pan or urinal) 3  -LC     Putting on and taking off regular upper body clothing 3  -LC     Taking care of personal grooming (such as brushing teeth) 4  -LC     Eating meals 4  -     AM-PAC 6 Clicks Score (OT) 20  -LC       Row Name 07/24/24 0845 07/24/24 0800       How much help from another person do you currently need...    Turning from your back to your side while in flat bed without using bedrails? 4  -AS 4  -MM    Moving from lying on back to sitting on the side of a flat bed without bedrails? 3  -AS 3  -MM    Moving to and from a bed to a chair (including a wheelchair)? 3  -AS 3  -MM    Standing up from a chair using your arms (e.g., wheelchair, bedside chair)? 3  -AS 3  -MM    Climbing 3-5 steps with a railing? 2  -AS 2  -MM    To walk in hospital room? 3  -AS 3  -MM    AM-PAC 6 Clicks Score (PT) 18  -AS 18  -MM    Highest Level of Mobility Goal 6 --> Walk 10 steps or more  -AS 6 --> Walk 10 steps or more  -MM      Row Name 07/24/24 1545 07/24/24 0845       Functional Assessment    Outcome Measure Options AM-PAC 6 Clicks Daily Activity (OT)  - AM-PAC 6 Clicks Basic Mobility (PT)  -AS              User Key  (r) = Recorded By, (t) = Taken By, (c) = Cosigned By      Initials Name Provider Type    AS Sakshi Bentley PTA Physical Therapist Assistant    Teresa Saleh OT Occupational Therapist    Linda Shepherd RN Registered Nurse                    Occupational  Therapy Education       Title: PT OT SLP Therapies (In Progress)       Topic: Occupational Therapy (In Progress)       Point: ADL training (Done)       Description:   Instruct learner(s) on proper safety adaptation and remediation techniques during self care or transfers.   Instruct in proper use of assistive devices.                  Learning Progress Summary             Patient Acceptance, E, VU by  at 7/24/2024 0943    Acceptance, E, VU by  at 7/18/2024 0917                         Point: Home exercise program (Done)       Description:   Instruct learner(s) on appropriate technique for monitoring, assisting and/or progressing therapeutic exercises/activities.                  Learning Progress Summary             Patient Acceptance, E, VU by MM at 7/24/2024 0943                         Point: Precautions (In Progress)       Description:   Instruct learner(s) on prescribed precautions during self-care and functional transfers.                  Learning Progress Summary             Patient Acceptance, E, NR by  at 7/24/2024 1418    Acceptance, E, VU by MM at 7/24/2024 0943    Acceptance, E, VU by  at 7/18/2024 0917                         Point: Body mechanics (In Progress)       Description:   Instruct learner(s) on proper positioning and spine alignment during self-care, functional mobility activities and/or exercises.                  Learning Progress Summary             Patient Acceptance, E, NR by  at 7/24/2024 1418    Acceptance, E, VU by MM at 7/24/2024 0943                                         User Key       Initials Effective Dates Name Provider Type Discipline     06/16/21 -  Teresa Ramirez OT Occupational Therapist OT    VANESA 06/16/21 -  Nena Martinez OT Occupational Therapist OT    ASHLEY 02/16/24 -  Linda Winston, RN Registered Nurse Nurse                  OT Recommendation and Plan     Plan of Care Review  Plan of Care Reviewed With: patient  Progress: no change  Outcome Evaluation: Pt.  progressing towards baseline with ADLs and functional mobility. Ambulated 45' with FWW and CGA to simulate household distances needed for ADL participation. Pt. completed STS x 2 with CGA and FWW. Will continue to progress as able.     Time Calculation:         Time Calculation- OT       Row Name 07/24/24 1546 07/24/24 0846          Time Calculation- OT    OT Start Time 1418  -LC --     OT Received On 07/24/24  - --     OT Goal Re-Cert Due Date 07/28/24  - --        Timed Charges    99142 - Gait Training Minutes  -- 8  -AS     51382 - OT Therapeutic Activity Minutes 17  -LC --        Total Minutes    Timed Charges Total Minutes 17  -LC 8  -AS      Total Minutes 17  -LC 8  -AS               User Key  (r) = Recorded By, (t) = Taken By, (c) = Cosigned By      Initials Name Provider Type    AS Sakshi Bentley, PTA Physical Therapist Assistant    Teresa Saelh OT Occupational Therapist                  Therapy Charges for Today       Code Description Service Date Service Provider Modifiers Qty    [id number] HC OT THERAPEUTIC ACT EA 15 MIN 7/24/2024 Teresa Ramirez OT GO 1                 Teresa Ramirez OT  7/24/2024

## 2024-07-24 NOTE — PLAN OF CARE
Goal Outcome Evaluation:  Plan of Care Reviewed With: patient        Progress: no change  Outcome Evaluation: Pt. progressing towards baseline with ADLs and functional mobility. Ambulated 45' with FWW and CGA to simulate household distances needed for ADL participation. Pt. completed STS x 2 with CGA and FWW. Will continue to progress as able.

## 2024-07-25 LAB
ALBUMIN SERPL-MCNC: 3.2 G/DL (ref 3.5–5.2)
ALBUMIN/GLOB SERPL: 1.3 G/DL
ALP SERPL-CCNC: 85 U/L (ref 39–117)
ALT SERPL W P-5'-P-CCNC: 32 U/L (ref 1–41)
ANION GAP SERPL CALCULATED.3IONS-SCNC: 9 MMOL/L (ref 5–15)
AST SERPL-CCNC: 41 U/L (ref 1–40)
BACTERIA SPEC AEROBE CULT: NORMAL
BACTERIA SPEC AEROBE CULT: NORMAL
BASOPHILS # BLD AUTO: 0.09 10*3/MM3 (ref 0–0.2)
BASOPHILS NFR BLD AUTO: 0.7 % (ref 0–1.5)
BILIRUB SERPL-MCNC: 0.2 MG/DL (ref 0–1.2)
BUN SERPL-MCNC: 15 MG/DL (ref 8–23)
BUN/CREAT SERPL: 10.4 (ref 7–25)
CALCIUM SPEC-SCNC: 8.5 MG/DL (ref 8.6–10.5)
CHLORIDE SERPL-SCNC: 107 MMOL/L (ref 98–107)
CK SERPL-CCNC: 27 U/L (ref 20–200)
CO2 SERPL-SCNC: 24 MMOL/L (ref 22–29)
CREAT SERPL-MCNC: 1.44 MG/DL (ref 0.76–1.27)
CRP SERPL-MCNC: 11.71 MG/DL (ref 0–0.5)
DEPRECATED RDW RBC AUTO: 44.3 FL (ref 37–54)
EGFRCR SERPLBLD CKD-EPI 2021: 50 ML/MIN/1.73
EOSINOPHIL # BLD AUTO: 0.48 10*3/MM3 (ref 0–0.4)
EOSINOPHIL NFR BLD AUTO: 3.6 % (ref 0.3–6.2)
ERYTHROCYTE [DISTWIDTH] IN BLOOD BY AUTOMATED COUNT: 13.1 % (ref 12.3–15.4)
GLOBULIN UR ELPH-MCNC: 2.4 GM/DL
GLUCOSE SERPL-MCNC: 99 MG/DL (ref 65–99)
HCT VFR BLD AUTO: 24 % (ref 37.5–51)
HGB BLD-MCNC: 7.9 G/DL (ref 13–17.7)
IMM GRANULOCYTES # BLD AUTO: 0.07 10*3/MM3 (ref 0–0.05)
IMM GRANULOCYTES NFR BLD AUTO: 0.5 % (ref 0–0.5)
LYMPHOCYTES # BLD AUTO: 2.45 10*3/MM3 (ref 0.7–3.1)
LYMPHOCYTES NFR BLD AUTO: 18.4 % (ref 19.6–45.3)
MCH RBC QN AUTO: 30.6 PG (ref 26.6–33)
MCHC RBC AUTO-ENTMCNC: 32.9 G/DL (ref 31.5–35.7)
MCV RBC AUTO: 93 FL (ref 79–97)
MONOCYTES # BLD AUTO: 1.19 10*3/MM3 (ref 0.1–0.9)
MONOCYTES NFR BLD AUTO: 9 % (ref 5–12)
NEUTROPHILS NFR BLD AUTO: 67.8 % (ref 42.7–76)
NEUTROPHILS NFR BLD AUTO: 9 10*3/MM3 (ref 1.7–7)
NRBC BLD AUTO-RTO: 0 /100 WBC (ref 0–0.2)
PLAT MORPH BLD: NORMAL
PLATELET # BLD AUTO: 428 10*3/MM3 (ref 140–450)
PMV BLD AUTO: 9.5 FL (ref 6–12)
POTASSIUM SERPL-SCNC: 4.5 MMOL/L (ref 3.5–5.2)
PROT SERPL-MCNC: 5.6 G/DL (ref 6–8.5)
RBC # BLD AUTO: 2.58 10*6/MM3 (ref 4.14–5.8)
RBC MORPH BLD: NORMAL
SODIUM SERPL-SCNC: 140 MMOL/L (ref 136–145)
WBC MORPH BLD: NORMAL
WBC NRBC COR # BLD AUTO: 13.28 10*3/MM3 (ref 3.4–10.8)

## 2024-07-25 PROCEDURE — 25010000002 DAPTOMYCIN PER 1 MG: Performed by: SURGERY

## 2024-07-25 PROCEDURE — 97605 NEG PRS WND THER DME<=50SQCM: CPT

## 2024-07-25 PROCEDURE — 85007 BL SMEAR W/DIFF WBC COUNT: CPT | Performed by: INTERNAL MEDICINE

## 2024-07-25 PROCEDURE — 25010000002 CEFTRIAXONE PER 250 MG: Performed by: INTERNAL MEDICINE

## 2024-07-25 PROCEDURE — 80053 COMPREHEN METABOLIC PANEL: CPT | Performed by: INTERNAL MEDICINE

## 2024-07-25 PROCEDURE — 25010000002 HEPARIN (PORCINE) PER 1000 UNITS: Performed by: INTERNAL MEDICINE

## 2024-07-25 PROCEDURE — 82550 ASSAY OF CK (CPK): CPT | Performed by: INTERNAL MEDICINE

## 2024-07-25 PROCEDURE — 85025 COMPLETE CBC W/AUTO DIFF WBC: CPT | Performed by: INTERNAL MEDICINE

## 2024-07-25 PROCEDURE — 99233 SBSQ HOSP IP/OBS HIGH 50: CPT | Performed by: INTERNAL MEDICINE

## 2024-07-25 PROCEDURE — 86140 C-REACTIVE PROTEIN: CPT | Performed by: INTERNAL MEDICINE

## 2024-07-25 RX ORDER — DOXYCYCLINE 100 MG/1
100 CAPSULE ORAL EVERY 12 HOURS SCHEDULED
Qty: 40 CAPSULE | Refills: 0 | Status: DISCONTINUED | OUTPATIENT
Start: 2024-07-25 | End: 2024-07-26 | Stop reason: HOSPADM

## 2024-07-25 RX ORDER — CEFUROXIME AXETIL 250 MG/1
500 TABLET ORAL EVERY 12 HOURS SCHEDULED
Status: DISCONTINUED | OUTPATIENT
Start: 2024-07-26 | End: 2024-07-26 | Stop reason: HOSPADM

## 2024-07-25 RX ADMIN — AMLODIPINE BESYLATE 2.5 MG: 2.5 TABLET ORAL at 10:01

## 2024-07-25 RX ADMIN — DOXYCYCLINE 100 MG: 100 CAPSULE ORAL at 21:11

## 2024-07-25 RX ADMIN — FERROUS SULFATE TAB 325 MG (65 MG ELEMENTAL FE) 325 MG: 325 (65 FE) TAB at 10:00

## 2024-07-25 RX ADMIN — Medication 10 ML: at 21:31

## 2024-07-25 RX ADMIN — SODIUM CHLORIDE 2000 MG: 900 INJECTION INTRAVENOUS at 18:03

## 2024-07-25 RX ADMIN — FINASTERIDE 5 MG: 5 TABLET, FILM COATED ORAL at 10:00

## 2024-07-25 RX ADMIN — HEPARIN SODIUM 5000 UNITS: 5000 INJECTION INTRAVENOUS; SUBCUTANEOUS at 05:42

## 2024-07-25 RX ADMIN — LEVOTHYROXINE SODIUM 88 MCG: 88 TABLET ORAL at 05:43

## 2024-07-25 RX ADMIN — HEPARIN SODIUM 5000 UNITS: 5000 INJECTION INTRAVENOUS; SUBCUTANEOUS at 21:11

## 2024-07-25 RX ADMIN — HEPARIN SODIUM 5000 UNITS: 5000 INJECTION INTRAVENOUS; SUBCUTANEOUS at 18:03

## 2024-07-25 RX ADMIN — ASPIRIN 81 MG CHEWABLE TABLET 81 MG: 81 TABLET CHEWABLE at 10:01

## 2024-07-25 RX ADMIN — DAPTOMYCIN 450 MG: 500 INJECTION, POWDER, LYOPHILIZED, FOR SOLUTION INTRAVENOUS at 10:00

## 2024-07-25 RX ADMIN — Medication 10 ML: at 10:01

## 2024-07-25 NOTE — THERAPY WOUND CARE TREATMENT
Acute Care - Wound/Debridement Treatment Note  [facility]     Patient Name: Nestor Nye  : 1945  MRN: [id number]  Today's Date: 2024                Admit Date: 2024    Visit Dx:    ICD-10-CM ICD-9-CM   1. Dehiscence of operative wound, initial encounter  T81.31XA 998.32   2. Superficial femoral artery occlusion  I70.209 440.20   3. Rheumatoid arthritis involving multiple sites, unspecified whether rheumatoid factor present  M06.9 714.0       Patient Active Problem List   Diagnosis    Hypothyroidism    Mixed hyperlipidemia    Elevated PSA    Benign essential hypertension    Acute UTI (urinary tract infection)    BPH with obstruction/lower urinary tract symptoms    Anemia of chronic disease    Rheumatoid arthritis    Secondary hyperparathyroidism    Stage 3a chronic kidney disease    Gouty arthropathy    No post-op complications    Bilateral epididymitis    Wound dehiscence, surgical, initial encounter    Superficial femoral artery occlusion    Dehiscence of operative wound    Moderate malnutrition        Past Medical History:   Diagnosis Date    Acute maxillary sinusitis     Arthritis     Disease of thyroid gland     Enlarged prostate     GERD (gastroesophageal reflux disease)     Gout     Hypertension     Impaired functional mobility, balance, gait, and endurance     PONV (postoperative nausea and vomiting)     Renal disorder     Rheumatoid arthritis     Right inguinal hernia 10/28/2021    Added automatically from request for surgery [id number]    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, changed his voice since then        Past Surgical History:   Procedure Laterality Date    ABDOMINAL SURGERY      ARTERIOGRAM Right 2024    Procedure: LOWER EXTREMITY ANGIOGRAM RIGHT;  Surgeon: Kingsley Morris MD;  Location: [facility];  Service: Vascular;  Laterality: Right;  DOSE: 56 MGY; FLURO: 7 MIN 6 SEC; CONTRAST 40 ML    BACK SURGERY       BELPHAROPTOSIS REPAIR Bilateral     BRAIN SURGERY      COLON SURGERY      bowel resection x2 per pt    CRANIOTOMY FOR SUBDURAL HEMATOMA      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    DIAGNOSTIC LAPAROSCOPY N/A 05/04/2022    Procedure: DIAGNOSTIC LAPAROSCOPY CONVERTED TO EXPLORATORY LAPAROTOMY WITH SMALL BOWEL RESECTION;  Surgeon: Maximo Bennett MD;  Location: [facility] OR;  Service: General;  Laterality: N/A;  CONVERTED TO EXPLORATORY LAPAROTOMY AT 1912    EXPLORATORY LAPAROTOMY N/A 04/16/2022    Procedure: LAPAROTOMY EXPLORATORY WITH SMALL BOWEL OBSTRUCTION;  Surgeon: Maximo Bennett MD;  Location:  [facility];  Service: General;  Laterality: N/A;    [facility]    FEMORAL ENDARTERECTOMY Right 6/21/2024    Procedure: FEMORAL EMBELECTOMY RIGHT;  Surgeon: Kingsley Morris MD;  Location:  [facility];  Service: Vascular;  Laterality: Right;    FOREIGN BODY REMOVAL Right     removal of metal fragments from RUQ after electrocution and fall.      HERNIA REPAIR      INGUINAL HERNIA REPAIR Right 11/01/2021    Procedure: INGUINAL HERNIA REPAIR WITH MESH AND EXCISION OF SPERMATIC CORD LIPOMA;  Surgeon: Rosey Glover MD;  Location:  [facility];  Service: General;  Laterality: Right;    LEG DEBRIDEMENT Right 7/22/2024    Procedure: GROIN DEBRIDEMENT WITH WOUND VAC PLACEMENT  RIGHT;  Surgeon: Kingsley Morris MD;  Location:  [facility];  Service: Vascular;  Laterality: Right;    LUMBAR SPINE SURGERY      PROSTATE AQUABLATION N/A 6/21/2024    Procedure: AQUABLATION OF PROSTATE;  Surgeon: Prieto Rey MD;  Location:  [facility];  Service: Robotics - Urology;  Laterality: N/A;    SKIN GRAFT Right     RUE skin grafting after electrocution    THORACIC SPINE SURGERY             Wound 06/21/24 1503 Right anterior greater trochanter Incision (Active)   Dressing Appearance dry;intact 07/25/24 1405   Closure Unable to assess 07/24/24 1934   Base dressing in place, unable  to visualize 07/25/24 1405   Periwound Temperature warm 07/24/24 1934   Periwound Skin Turgor soft 07/24/24 1934   Edges open 07/24/24 1934   Drainage Amount none 07/24/24 1934   Wound Output (mL) 0 07/25/24 1405       Wound 07/22/24 1329 Right medial ankle Puncture (Active)   Dressing Appearance dry;intact 07/25/24 0800   Closure Glue;Open to air 07/24/24 1934   Drainage Amount none 07/24/24 1934       Wound 07/22/24 1350 Left anterior groin Puncture (Active)   Closure Glue 07/25/24 0800   Drainage Amount none 07/24/24 1934       NPWT (Negative Pressure Wound Therapy) 07/22/24 1424 Right Groin (Active)   Therapy Setting continuous therapy 07/25/24 1405   Dressing foam, black 07/24/24 2000   Pressure Setting 125 mmHg 07/25/24 1405         WOUND DEBRIDEMENT                     PT Assessment (Last 12 Hours)       PT Evaluation and Treatment       Row Name 07/25/24 1405          Physical Therapy Time and Intention    Subjective Information complains of  -     Document Type therapy note (daily note);wound care  -     Mode of Treatment physical therapy;individual therapy  -       Row Name 07/25/24 1405          General Information    Patient Observations alert;cooperative;agree to therapy  -[facility] Name 07/25/24 1405          Pain    Additional Documentation Pain Scale: FACES Pre/Post-Treatment (Group)  -[facility] Name 07/25/24 1405          Pain Scale: FACES Pre/Post-Treatment    Pain: FACES Scale, Pretreatment 0-->no hurt  [facility]     Posttreatment Pain Rating 0-->no hurt  -       Row Name 07/25/24 1405          Cognition    Affect/Mental Status (Cognition) [facility]     Orientation Status (Cognition) oriented x 3  -       Row Name 07/25/24 1405          Wound 06/21/24 1503 Right anterior greater trochanter Incision    Wound - Properties Group Placement Date: 06/21/24  -DJ Placement Time: 1503  -DJ Side: Right  -DJ Orientation: anterior  -DJ Location: greater trochanter  -DJ Primary Wound Type: Incision   -DJ    Dressing Appearance dry;intact  -LH     Base dressing in place, unable to visualize  -LH     Wound Output (mL) 0  -LH     Retired Wound - Properties Group Placement Date: 06/21/24  -DJ Placement Time: 1503  -DJ Side: Right  -DJ Orientation: anterior  -DJ Location: greater trochanter  -DJ Primary Wound Type: Incision  -DJ    Retired Wound - Properties Group Date first assessed: 06/21/24  -DJ Time first assessed: 1503  -DJ Side: Right  -DJ Location: greater trochanter  -DJ Primary Wound Type: Incision  -DJ      Row Name             Wound 07/22/24 1329 Right medial ankle Puncture    Wound - Properties Group Placement Date: 07/22/24  -JL Placement Time: 1329  -JL Side: Right  -JL Orientation: medial  -JL Location: ankle  -JL Primary Wound Type: Puncture  -JL    Retired Wound - Properties Group Placement Date: 07/22/24  -JL Placement Time: 1329  -JL Side: Right  -JL Orientation: medial  -JL Location: ankle  -JL Primary Wound Type: Puncture  -JL    Retired Wound - Properties Group Date first assessed: 07/22/24  -JL Time first assessed: 1329  -JL Side: Right  -JL Location: ankle  -JL Primary Wound Type: Puncture  -JL      Row Name             Wound 07/22/24 1350 Left anterior groin Puncture    Wound - Properties Group Placement Date: 07/22/24  -JL Placement Time: 1350  -JL Side: Left  -JL Orientation: anterior  -JL Location: groin  -JL Primary Wound Type: Puncture  -JL    Retired Wound - Properties Group Placement Date: 07/22/24  -JL Placement Time: 1350  -JL Side: Left  -JL Orientation: anterior  -JL Location: groin  -JL Primary Wound Type: Puncture  -JL    Retired Wound - Properties Group Date first assessed: 07/22/24  -JL Time first assessed: 1350  -JL Side: Left  -JL Location: groin  -JL Primary Wound Type: Puncture  -JL      Row Name 07/25/24 1405          NPWT (Negative Pressure Wound Therapy) 07/22/24 1424 Right Groin    NPWT (Negative Pressure Wound Therapy) - Properties Group Placement Date: 07/22/24  -JL  Placement Time: 1424 -JL Location: Right Groin  -JL Additional Comments: Wound Vac placed  -JL    Therapy Setting continuous therapy  -     Pressure Setting 125 mmHg  -     Retired NPWT (Negative Pressure Wound Therapy) - Properties Group Placement Date: 07/22/24  -JL Placement Time: 1424 -JL Location: Right Groin  -JL Additional Comments: Wound Vac placed  -JL    Retired NPWT (Negative Pressure Wound Therapy) - Properties Group Placement Date: 07/22/24  -JL Placement Time: 1424 -JL Location: Right Groin  -JL Additional Comments: Wound Vac placed  -      Row Name 07/25/24 1405          Coping    Observed Emotional State calm;cooperative  -     Verbalized Emotional State acceptance  -     Trust Relationship/Rapport care explained;questions answered  -     Family/Support Persons family  -     Involvement in Care at bedside;attentive to patient  -       Row Name 07/25/24 1405          Plan of Care Review    Plan of Care Reviewed With patient  -     Progress no change  -     Outcome Evaluation Wound vac check complete. Vac remains intact with no issues noted. PT anticipating next wound vac dressing change in 1-2 days.  -       Row Name 07/25/24 1405          Positioning and Restraints    Pre-Treatment Position in bed  -     Post Treatment Position bed  -     In Bed supine;call light within reach;encouraged to call for assist;with family/caregiver  -               User Key  (r) = Recorded By, (t) = Taken By, (c) = Cosigned By      Initials Name Provider Type     Aleks Silva, PT Physical Therapist    Donte Pineda, RN Registered Nurse    Paolo Tran, RN Registered Nurse                  Physical Therapy Education       Title: PT OT SLP Therapies (In Progress)       Topic: Physical Therapy (Done)       Point: Mobility training (Done)       Learning Progress Summary             Patient Acceptance, E, VU by MM at 7/24/2024 0943    Acceptance, E, NR by AS at 7/24/2024 0845    Jeet,  E,D,H, NR by DM at 7/18/2024 1222                         Point: Home exercise program (Done)       Learning Progress Summary             Patient Acceptance, E, VU by MM at 7/24/2024 0943    Acceptance, E, NR by AS at 7/24/2024 0845    Eager, E,D,H, NR by DM at 7/18/2024 1222                         Point: Body mechanics (Done)       Learning Progress Summary             Patient Acceptance, E, VU by MM at 7/24/2024 0943    Acceptance, E, NR by AS at 7/24/2024 0845    Eager, E,D,H, NR by DM at 7/18/2024 1222                         Point: Precautions (Done)       Learning Progress Summary             Patient Acceptance, E, VU by MM at 7/24/2024 0943    Acceptance, E, NR by AS at 7/24/2024 0845    Eager, E,D,H, NR by DM at 7/18/2024 1222                                         User Key       Initials Effective Dates Name Provider Type Discipline    DM 02/03/23 -  Emilie Arredondo, PT Physical Therapist PT    AS 04/28/23 -  Sakshi Bentley, PTA Physical Therapist Assistant PT     02/16/24 -  Linda Winston RN Registered Nurse Nurse                    Recommendation and Plan  Anticipated Discharge Disposition (PT): inpatient rehabilitation facility  Planned Therapy Interventions (PT): wound care  Therapy Frequency (PT): daily  Plan of Care Reviewed With: patient   Progress: no change       Progress: no change  Outcome Evaluation: Wound vac check complete. Vac remains intact with no issues noted. PT anticipating next wound vac dressing change in 1-2 days.  Plan of Care Reviewed With: patient            Time Calculation   PT Charges       Row Name 07/25/24 1549             Time Calculation    Start Time 1405  -LH      PT Goal Re-Cert Due Date 07/28/24  -LH         Untimed Charges    88596-Qvh Pressure wound to 50 sqcm 10  -LH         Total Minutes    Untimed Charges Total Minutes 10  -LH       Total Minutes 10  -LH                User Key  (r) = Recorded By, (t) = Taken By, (c) = Cosigned By      Initials Name  Provider Type     Aleks Silva, PT Physical Therapist                      Therapy Charges for Today       Code Description Service Date Service Provider Modifiers Qty    [id number] HC PT NEG PRESS WOUND TO 50SQCM DME1 7/25/2024 Aleks Silva, PT  1              PT G-Codes  Outcome Measure Options: AM-PAC 6 Clicks Daily Activity (OT)  AM-PAC 6 Clicks Score (PT): 20  AM-PAC 6 Clicks Score (OT): 20       Aleks Silva, JOHNNY  7/25/2024

## 2024-07-25 NOTE — PROGRESS NOTES
"[facility] Progress Note    Date of Admission: 2024      Antibiotics: Daptomycin and Zosyn      CC: Right groin wound    S:   Patient with scrotal edema and no new changes wound vac in place, H/H stable no fevers     O:  /70 (BP Location: Right arm, Patient Position: Lying)   Pulse 89   Temp 98.3 °F (36.8 °C) (Oral)   Resp 18   Ht 170.2 cm (67\")   Wt 74.8 kg (165 lb)   SpO2 95%   BMI 25.84 kg/m²   Temp (24hrs), Av.4 °F (36.9 °C), Min:97.9 °F (36.6 °C), Max:98.9 °F (37.2 °C)      PE:      GENERAL: Awake and alert, in no acute distress. Chronically ill-appearing  HEENT: Normocephalic, atraumatic.  PERRL. EOMI. No conjunctival injection. No icterus. Oropharynx clear without evidence of thrush or exudate. Mild periodontal disease.    NECK: Supple without nuchal rigidity  LYMPH: No cervical, axillary or inguinal lymphadenopathy. No neck masses  HEART: RRR; No murmur, rubs, gallops.   LUNGS: Clear to auscultation bilaterally without wheezing, rales, rhonchi. Normal respiratory effort.  ABDOMEN: Soft, nontender, nondistended. Positive bowel sounds. No rebound or guarding.   EXT:  No cyanosis, clubbing   : Normal appearing genitalia with Gillespie catheter. With scrotal edema  MSK: FROM without joint effusions noted    SKIN: Warm and dry without cutaneous eruptions.  Right groin wound with wound dehiscence with slough and some erythema and drainage with odor  NEURO: Oriented to PPT. No focal deficits.   PSYCHIATRIC: Normal insight and judgement. Cooperative with PE.  Laboratory Data    Results from last 7 days   Lab Units 24  0449 24  0630 24  1003   WBC 10*3/mm3 13.28* 11.23* 10.37   HEMOGLOBIN g/dL 7.9* 7.8* 7.5*   HEMATOCRIT % 24.0* 24.1* 23.0*   PLATELETS 10*3/mm3 428 376 348     Results from last 7 days   Lab Units 24  0449   SODIUM mmol/L 140   POTASSIUM mmol/L 4.5   CHLORIDE mmol/L 107   CO2 mmol/L 24.0   BUN mg/dL 15   CREATININE mg/dL 1.44*   GLUCOSE mg/dL 99   CALCIUM mg/dL " 8.5*     Results from last 7 days   Lab Units 07/25/24  0449   ALK PHOS U/L 85   BILIRUBIN mg/dL 0.2   ALT (SGPT) U/L 32   AST (SGOT) U/L 41*     Results from last 7 days   Lab Units 07/19/24  2040   SED RATE mm/hr 82*     Results from last 7 days   Lab Units 07/25/24  0449   CRP mg/dL 11.71*       Estimated Creatinine Clearance: 45.5 mL/min (A) (by C-G formula based on SCr of 1.44 mg/dL (H)).      Microbiology:  Urine culture with gram-negative rods identified as Klebsiella pneumoniae    Radiology:  Imaging Results (Last 24 Hours)       ** No results found for the last 24 hours. **            PROBLEM LIST:   Right groin wound dehiscence with cellulitis  Recent right iliofemoral embolectomy  PAD  H/o craniotomy with SDH  Recent prostate surgery with h/o BPH  Elevated ESR/CRP  ABENA     ASSESSMENT:  Patient is a 78-year-old male with history of peripheral arterial disease, GERD, hypertension, rheumatoid arthritis with recent right iliofemoral embolectomy surgical site was stable then had opening up of wound with dehiscence with drainage with some erythema and odor noted now admitted plans for wound revision and debridement in several days cultures obtained on empiric IV antibiotics and infectious disease consultation requested today.    Stable on dapto/ceftriaxone with wound vac in place and monitor wbc down some today with crt stable h/h stable s/p surgery, wbc down and crt stable post-op.    PLAN:  Ok for d/c on po abx with doxy and cefuroxime x 2 weeks and f/u with me in office  D/w CM    I spent >35 min on case with more than %50 time in counseling/coordination of care with d/c planning.    Jose Jones MD  7/25/2024

## 2024-07-25 NOTE — PLAN OF CARE
Goal Outcome Evaluation:  Plan of Care Reviewed With: patient        Progress: no change  Outcome Evaluation: Wound vac check complete. Vac remains intact with no issues noted. PT anticipating next wound vac dressing change in 1-2 days.

## 2024-07-25 NOTE — CASE MANAGEMENT/SOCIAL WORK
Discharge Planning Assessment  [facility]     Patient Name: Nestor Nye  MRN: [id number]  Today's Date: 7/25/2024    Admit Date: 7/17/2024    Plan: IRF vs HH   Discharge Needs Assessment    No documentation.                  Discharge Plan       Row Name 07/25/24 7212       Plan    Plan IRF vs HH    Patient/Family in Agreement with Plan yes    Plan Comments Met with pt at bedside to f/u DCP.  He is now interested in rehab instead of HH.  I expect pt is likely in his co-pay days for SNF (recently at [facility]).  I left a VM for admissions at [facility] to see if they are able to verify this.  Also called a referral to Beny at [facility] to assess for possible IRF.  Jenny at [facility] is following for any HH needs.  CM will cont to follow.    UPDATE:  CM received call from Aidee at [facility].  Pt has at least 10 skilled days left before going into co-pay days.  They can offer a bed and will initiate insurance precert.  CM will cont to follow for determination.  SK      Final Discharge Disposition Code 30 - still a patient                  Continued Care and Services - Admitted Since 7/17/2024       Destination       Service Provider Request Status Selected Services Address Phone Fax Patient Preferred    [facility] Pending - Request Sent N/A [address] [address] [phone] --    [facility] Pending - Request Sent N/A [address] [address] [phone] --    [facility] Pending - Request Sent N/A [address] [address] [phone] --    [facility] Pending - No Request Sent N/A [address] [facility] [address] [phone] --              Home Medical Care       Service Provider Request Status Selected Services Address Phone Fax Patient Preferred    [facility] [place]   Selected Home Health Services [address] [address] [phone] --                  Selected Continued Care - Prior Encounters Includes continued care and service providers with selected services from prior encounters from 4/18/2024 to 7/25/2024      Discharged on 6/26/2024 Admission date: 6/21/2024 - Discharge disposition: Skilled Nursing Facility (DC - External)      Destination       Service Provider Selected Services Address Phone Fax Patient Preferred    [facility] [address] [address] [phone]                           Expected Discharge Date and Time       Expected Discharge Date Expected Discharge Time    Jul 25, 2024            Demographic Summary    No documentation.                  Functional Status    No documentation.                  Psychosocial    No documentation.                  Abuse/Neglect    No documentation.                  Legal    No documentation.                  Substance Abuse    No documentation.                  Patient Forms    No documentation.                     Sheila Marte RN

## 2024-07-25 NOTE — PLAN OF CARE
Goal Outcome Evaluation:  Plan of Care Reviewed With: patient      Resting quietly, SR, F/c for retention. Wound vac intact. Pt requests rehab

## 2024-07-25 NOTE — DISCHARGE PLACEMENT REQUEST
"KARIE CARSON RN    P:  [phone]  F:  [phone]                Ervin Nye (78 y.o. Male)       Date of Birth   1945    Social Security Number       Address   [address] [place] [address]    Home Phone   [phone]    MRN   [id number]       [place]   [facility]    Marital Status                               Admission Date   7/17/24    Admission Type   Urgent    Admitting Provider   Danitza Prieto MD    Attending Provider   Danitza Prieto MD    Department, Room/Bed   [address], S452/1       Discharge Date       Discharge Disposition       Discharge Destination                                 Attending Provider: Danitza Prieto MD    Allergies: No Known Allergies    Isolation: Contact   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)   Code Status: CPR    Ht: 170.2 cm (67\")   Wt: 74.8 kg (165 lb)    Admission Cmt: None   Principal Problem: Dehiscence of operative wound [T81.31XA]                   Active Insurance as of 7/17/2024       Primary Coverage       Payor Plan Insurance Group Employer/Plan Group    ANTHEM MEDICARE REPLACEMENT ANTHEM MEDICARE ADVANTAGE [id number]       Payor Plan Address Payor Plan Phone Number Payor Plan Fax Number Effective Dates    [address] [phone]  1/1/2016 - None Entered    [facility] [id number]         Subscriber Name Subscriber Birth Date Member ID       [id number] YEUNG 1945 [id number]                     Emergency Contacts        (Rel.) Home Phone Work Phone Mobile Phone    Wesley Nye (Brother) [phone] -- --              Insurance Information                  ANTHEM MEDICARE REPLACEMENT/ANTHEM MEDICARE ADVANTAGE Phone: [phone]    Subscriber: Ervin Nye Subscriber#: [id number]    Group#: [id number] Precert#: [id number]             History & Physical        Aleida De La Cruz MD at 07/17/24 [facility]              [facility] Medicine " Services  HISTORY AND PHYSICAL    Patient Name: Nestor Nye  : 1945  MRN: [id number]  Primary Care Physician: Roxanne Slater MD  Date of admission: 2024      Subjective  Subjective     Chief Complaint:  Right groin wound dehiscence     HPI:  Nestor Nye is a 78 y.o. male with PMH of enlarged prostate, GERD, Gout, HTN, RA, craniotomy for subdural hematoma, right femoral endarterectomy 24, prostate aquablation 24. Patient was discharged from [facility] on 24 and went to rehab., he has been home a few weeks. Patient states about 3 days ago he noticed erythema around incision and groin. His incision started to smell and he was cleaning it daily with soap and water. Yesterday his incision opened up. He presented to [facility] ED and was transferred here for further care. He denies any soa, cp, fever, or chills.     OSH labs: creat  1.66, crp 10.54, procal 0.17, lactate 1.7, WBC 9.88, HGB 9.9,       Personal History     Past Medical History:   Diagnosis Date    Acute maxillary sinusitis     Arthritis     Disease of thyroid gland     Enlarged prostate     GERD (gastroesophageal reflux disease)     Gout     Hypertension     Impaired functional mobility, balance, gait, and endurance     PONV (postoperative nausea and vomiting)     Renal disorder     Rheumatoid arthritis     Right inguinal hernia 10/28/2021    Added automatically from request for surgery [id number]    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, changed his voice since then           Past Surgical History:   Procedure Laterality Date    ABDOMINAL SURGERY      BACK SURGERY      BELPHAROPTOSIS REPAIR Bilateral     BRAIN SURGERY      COLON SURGERY      bowel resection x2 per pt    CRANIOTOMY FOR SUBDURAL HEMATOMA      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    DIAGNOSTIC LAPAROSCOPY N/A 2022    Procedure: DIAGNOSTIC LAPAROSCOPY CONVERTED TO EXPLORATORY LAPAROTOMY  WITH SMALL BOWEL RESECTION;  Surgeon: Maximo Bennett MD;  Location:  [facility];  Service: General;  Laterality: N/A;  CONVERTED TO EXPLORATORY LAPAROTOMY AT 1912    EXPLORATORY LAPAROTOMY N/A 04/16/2022    Procedure: LAPAROTOMY EXPLORATORY WITH SMALL BOWEL OBSTRUCTION;  Surgeon: Maximo Bennett MD;  Location:  [facility];  Service: General;  Laterality: N/A;    EYE SURGERY      on upper etelids    FEMORAL ENDARTERECTOMY Right 6/21/2024    Procedure: FEMORAL EMBELECTOMY RIGHT;  Surgeon: Kingsley Morris MD;  Location:  [facility];  Service: Vascular;  Laterality: Right;    FOREIGN BODY REMOVAL Right     removal of metal fragments from RUQ after electrocution and fall.      HERNIA REPAIR      INGUINAL HERNIA REPAIR Right 11/01/2021    Procedure: INGUINAL HERNIA REPAIR WITH MESH AND EXCISION OF SPERMATIC CORD LIPOMA;  Surgeon: Rosey Glover MD;  Location:  [facility];  Service: General;  Laterality: Right;    LUMBAR SPINE SURGERY      PROSTATE AQUABLATION N/A 6/21/2024    Procedure: AQUABLATION OF PROSTATE;  Surgeon: Prieto Rey MD;  Location:  [facility];  Service: Robotics - Urology;  Laterality: N/A;    SKIN GRAFT Right     RUE skin grafting after electrocution    THORACIC SPINE SURGERY         Family History: family history includes Arthritis in his father and mother; Prostate cancer in his brother.     Social History:  reports that he has quit smoking. His smoking use included cigars. He has been exposed to tobacco smoke. He has never used smokeless tobacco. He reports that he does not currently use alcohol. He reports that he does not use drugs.  Social History     Social History Narrative    Not on file       Medications:  Available home medication information reviewed.  Alcohol Wipes, Diclofenac Sodium, amLODIPine, aspirin, ferrous sulfate, finasteride, ipratropium-albuterol, levothyroxine, and melatonin    No Known Allergies    Objective  Objective     Vital Signs:   Temp:  [98.2 °F (36.8  °C)-98.3 °F (36.8 °C)] 98.2 °F (36.8 °C)  Heart Rate:  [82-99] 82  Resp:  [18] 18  BP: (106-121)/(59-89) 121/59       Physical Exam   Constitutional: Awake, alert  Eyes: PERRLA, sclerae anicteric, no conjunctival injection  HENT: NCAT, mucous membranes moist  Neck: Supple, no thyromegaly, no lymphadenopathy, trachea midline  Respiratory: Clear to auscultation bilaterally, nonlabored respirations room air   Cardiovascular: RRR, no murmurs, rubs, or gallops, right posterior tibial pulse with doppler   Gastrointestinal: Positive bowel sounds, soft, nontender, nondistended  Musculoskeletal: No bilateral ankle edema, no clubbing or cyanosis to extremities  Psychiatric: Appropriate affect, cooperative  Neurologic: Oriented x 3, strength symmetric in all extremities, Cranial Nerves grossly intact to confrontation, speech clear  Skin: No rashes, right groin incision open with yellow drainage noted and erythema    stacy BSD, scrotum erythema and TTP      Result Review:  I have personally reviewed the results from the time of this admission to 7/17/2024 18:51 EDT and agree with these findings:  [x]  Laboratory list / accordion  []  Microbiology  [x]  Radiology  []  EKG/Telemetry   []  Cardiology/Vascular   []  Pathology  []  Old records  []  Other:  Most notable findings include:       LAB RESULTS:      Lab 07/17/24  1110   WBC 9.88   HEMOGLOBIN 9.9*   HEMATOCRIT 29.8*   PLATELETS 394   NEUTROS ABS 6.76   IMMATURE GRANS (ABS) 0.05   LYMPHS ABS 1.82   MONOS ABS 1.08*   EOS ABS 0.11   MCV 96.1   CRP 10.54*   PROCALCITONIN 0.17   LACTATE 1.7         Lab 07/17/24  1110   SODIUM 140   POTASSIUM 4.0   CHLORIDE 104   CO2 20.2*   ANION GAP 15.8*   BUN 23   CREATININE 1.66*   EGFR 42.2*   GLUCOSE 97   CALCIUM 8.8         Lab 07/17/24  1110   TOTAL PROTEIN 7.0   ALBUMIN 3.8   GLOBULIN 3.2   ALT (SGPT) 6   AST (SGOT) 9   BILIRUBIN 0.4   ALK PHOS 97   LIPASE 12*                     UA          6/23/2024    14:33 7/17/2024    13:00    Urinalysis   Squamous Epithelial Cells, UA 0-2  0-2    Specific Gravity, UA 1.009  >=1.030    Ketones, UA Negative  Negative    Blood, UA Large (3+)  Large (3+)    Leukocytes, UA Moderate (2+)  Large (3+)    Nitrite, UA Negative  Positive    RBC, UA Too Numerous to Count  Too Numerous to Count    WBC, UA 3-5  Too Numerous to Count    Bacteria, UA Trace  Trace        Microbiology Results (last 10 days)       ** No results found for the last 240 hours. **            CT Abdomen Pelvis With Contrast    Result Date: 7/17/2024  PROCEDURE: CT ABDOMEN PELVIS W CONTRAST-  HISTORY: rt inguinal surgical wound dihescense/pus, recent prostate surg, status post aqua ablation of the prostate 07/21/2024 as well as right femoral endarterectomy.  COMPARISON: 05/04/2022.  PROCEDURE: The patient was injected with IV contrast. Axial images were obtained from the lung bases to the pubic symphysis by computed tomography. This study was performed with techniques to keep radiation doses as low as reasonably achievable, (ALARA). Individualized dose reduction techniques using automated exposure control or adjustment of mA and/or kV according to the patient size were employed.  FINDINGS:  ABDOMEN: The lung bases are clear. There is a moderate hiatal hernia similar to the prior exam. Gallbladder contains dependent high attenuation material suggesting sludge or stone that is new from the prior exam. No wall thickening or infiltration of the surrounding fat identified. The heart is proper size. The liver is homogenous with no focal abnormality. The spleen is unremarkable. No adrenal mass is present.  The pancreas is unremarkable. The kidneys demonstrate bilateral circumscribed hypodense lesions consistent with cysts that are similar to the prior exam. No hydronephrosis identified. The aorta is ectatic but not aneurysmal. There is no free fluid or adenopathy.  PELVIS: The GI tract demonstrates no obstruction. Small bowel anastomoses identified  in the right side of the abdomen which are new from the prior exam and appear to be widely patent. The appendix is not identified. The urinary bladder is is collapsed around a Gillespie catheter. Tip of the catheter extends posteriorly. There is a small amount of air within the collapsed bladder. There is significant wall thickening. As was seen previously there is significant enlargement of the prostate but this now appears very inhomogeneous with areas of increased attenuation not seen previously. Given the recent procedure, this may represent hemorrhage. On the sagittal image series 4 image 39 the bulb of the Gillespie catheter appears to be located within the superior aspect of the prostate. No free fluid identified. Best seen series 2 image 71 and series 3 image 44 is complete occlusion of the right superficial femoral artery immediately distal to the takeoff of the profunda. There is infiltration of the surrounding fat. There is partial opacification of the proximal superficial femoral vein which appears similar to the left side.      Impression: Occlusion of the right SFA, Paolo Smith PA-C, notified at 12:45 p.m 07/17/2024.  Enlarged, inhomogeneous prostate with significant wall thickening of the bladder and position of the Gillespie catheter as described, consider postoperative change versus infection or postoperative hemorrhage.,  Possible gallbladder sludge or small stones.  CTDI: 5.39 mGy DLP:235.53 mGy.cm  This report was signed and finalized on 7/17/2024 1:28 PM by Kavya Pollock MD.      US Scrotum & Testicles    Result Date: 7/17/2024  PROCEDURE: US SCROTUM AND TESTICLES-  HISTORY: testicular swelling/pain, recent prostate surg & hypogastric art embolization  PROCEDURE: Ultrasound images of the testicles were obtained bilaterally. Color Doppler images were obtained.  FINDINGS: Right: There is no right intratesticular mass or torsion. There is normal color flow to the right testicle. There are multiple large right  epididymal cysts some of which contain debris. These measure up to 2.6 cm. There is increased vascularity in the right epididymis suggestive of epididymitis. There is a small right hydrocele.  Left: Blood flow seen to the left testicle. Half of the left testicle demonstrates decreased echogenicity, mass is not excluded. A urology consultation is recommended. There is increased echogenicity of the epididymis suggestive of epididymitis. There is a minimal left hydrocele.      Impression: 1. No evidence of right intratesticular mass or torsion. 2. Large right epididymal cysts as described. 3. Findings suggestive of bilateral epididymitis. 4. No left testicular torsion. A hypoechoic region in the left testicle could represent possible mass. Urologic consultation is recommended.      Images were reviewed, interpreted, and dictated by Dr. Kavya Pollock MD Transcribed by Radha Reeves PA-C.  This report was signed and finalized on 7/17/2024 12:50 PM by Kavya Pollock MD.           Assessment & Plan  Assessment & Plan       Hypothyroidism    Mixed hyperlipidemia    Stage 3a chronic kidney disease    Bilateral epididymitis    Wound dehiscence, surgical, initial encounter    Superficial femoral artery occlusion        Bilateral epididymitis   Right groin dehiscence   S/p Aquablation 6/21/24 complicated by SFA occlusion that required right femoral embolectomy by Dr Morris And hematuria requiring hilario hypogastric embolization.   Chronic urinary retention with stacy  -- consult urology  -- npo after midnight   -- blood cultures x 2   -- consult PT wound in am   -- cont zosyn and vancomycin  -- consult ID in am   -- CRP 10.54, lactate 1.7, procal 0.17, WBC 9.88, check ESR       Recurrent SFA occlusion   S/p Right Femoral embolectomy  on 6/21/24  -- CT A/P showed occlusion of Right SFA   -- consult Dr. Morris, he is aware patient is here  -- npo after midnight   -- heparin drip     HTN  HLD  -- cont norvasc     Hypothyroidism   --  cont home meds     CKDIII  -- baseline creat 1.4-1.6      VTE Prophylaxis:  Pharmacologic VTE prophylaxis orders are present.          CODE STATUS:    Code Status and Medical Interventions:   Ordered at: 07/17/24 1845     Level Of Support Discussed With:    Patient     Code Status (Patient has no pulse and is not breathing):    CPR (Attempt to Resuscitate)     Medical Interventions (Patient has pulse or is breathing):    Full Support       Expected Discharge   Expected Discharge Date: 7/22/2024; Expected Discharge Time:      Kimmy Toro, LANNY  07/17/24        Attending   Admission Attestation       I have performed an independent face-to-face diagnostic evaluation including performing an independent physical examination.  I approve of the documented plan of care above that was reviewed and developed with the advanced practice clinician (APC) and take responsibility for that plan along with its associated risks.  I have updated the HPI as appropriate.    Brief HPI    Nestor Nye 78-year-old male with a history of enlarged prostate, recent femoral endarterectomy, recent prostate aqua ablation who presented at an outside facility due to increased erythema and drainage around groin incision.  He had been caring for this at home with soap and water however it had progressively gotten worse.  Imaging at the outside hospital was notable for concern of epididymitis and incidental finding of occlusion of the right SFA.  Case was discussed with vascular surgery here who recommended transfer.    Attending Physical Exam:  Temp:  [97.9 °F (36.6 °C)-98.4 °F (36.9 °C)] 98.4 °F (36.9 °C)  Heart Rate:  [71-99] 76  Resp:  [18-20] 18  BP: (106-130)/(59-89) 120/76  Constitutional: No acute distress, awake, alert  HENT: NCAT, mucous membranes moist  Respiratory: Clear to auscultation bilaterally, respiratory effort normal   Cardiovascular: RRR, no murmurs, rubs, or gallops  Gastrointestinal: Positive bowel sounds, soft,  nontender, nondistended  Musculoskeletal: No bilateral ankle edema  Psychiatric: Appropriate affect, cooperative  Neurologic: Oriented x 3, no focal deficit  Skin: Groin incision is open with notable drainage, scrotum is enlarged and tender      Result Review:  I have personally reviewed the results from the time of this admission to 7/17/2024 23:11 EDT and agree with these findings:  [x]  Laboratory list / accordion  [x]  Microbiology  [x]  Radiology  []  EKG/Telemetry   []  Cardiology/Vascular   []  Pathology  []  Old records  []  Other:  Most notable findings include:   Imaging as above    Assessment and Plan:    77 y/o M here w bilateral epididymitis and SFA occlusion. Placed on broad spectrum abx tonight with cultures in process and ID and urology consult for AM. D/w vascular surgery w possible plans for OR tmrw, tentative, keep NPO at midnight and placed on heparin drip    See assessment and plan documented by APC above and updated/edited by me as appropriate.    Aleida De La Cruz MD  07/17/24                Electronically signed by Aleida De La Cruz MD at 07/17/24 2322          Physician Progress Notes (most recent note)        Danitza Prieto MD at 07/25/24 1033              [facility] Medicine Services  INPATIENT PROGRESS NOTE    Date of Admission: 7/17/2024  Primary Care Physician: Roxanne Slater MD    Subjective     Chief Complaint: groin dehiscence    HPI:  Doing ok. Resting in bed. Reports swollen testicles is his biggest complaint. Getting up and ambulating oob.       Review Of Systems:   Gen- No fevers, chills  CV- No chest pain, palpitations  Resp- No cough, dyspnea  GI- No N/V/D, abd pain      Objective      Vitals:  Temp:  [97.9 °F (36.6 °C)-98.9 °F (37.2 °C)] 98.3 °F (36.8 °C)  Heart Rate:  [81-91] 81  Resp:  [16-18] 18  BP: (103-116)/(60-70) 109/70  Flow (L/min):  [0] 0    Physical exam:    GEN: NAD, resting in bed, doing well today   HEENT: on room air, atraumatic,  normocephalic  NECK: supple, no masses  RESP: on room air, normal effort  CV: on tele, sinus rhythm  PSYCH: normal affect, appropriate  NEURO: awake, alert, no focal deficits noted  MSK: no edema noted  SKIN: no rashes noted , wound vac in place   : stacy in place cyu      Results Review:    I have reviewed the labs, radiology results and diagnostic studies.    Results from last 7 days   Lab Units 07/25/24 0449 07/24/24  0630 07/23/24  1003   WBC 10*3/mm3 13.28* 11.23* 10.37   HEMOGLOBIN g/dL 7.9* 7.8* 7.5*   HEMATOCRIT % 24.0* 24.1* 23.0*   PLATELETS 10*3/mm3 428 376 348     Results from last 7 days   Lab Units 07/25/24 0449 07/24/24  0630 07/23/24  1003   SODIUM mmol/L 140 140 140   POTASSIUM mmol/L 4.5 4.4 4.3   CHLORIDE mmol/L 107 107 106   CO2 mmol/L 24.0 24.0 24.0   BUN mg/dL 15 15 18   CREATININE mg/dL 1.44* 1.58* 1.53*   GLUCOSE mg/dL 99 99 118*   CALCIUM mg/dL 8.5* 8.6 8.5*   ALK PHOS U/L 85 71 75   ALT (SGPT) U/L 32 20 12   AST (SGOT) U/L 41* 27 15       Microbiology Results Abnormal       Procedure Component Value - Date/Time    Blood Culture - Blood, Hand, Right [[id number]]  (Normal) Collected: 07/20/24 1201    Lab Status: Preliminary result Specimen: Blood from Hand, Right Updated: 07/24/24 1230     Blood Culture No growth at 4 days    Narrative:      Less than seven (7) mL's of blood was collected.  Insufficient quantity may yield false negative results.    Blood Culture - Blood, Arm, Left [[id number]]  (Normal) Collected: 07/20/24 1201    Lab Status: Preliminary result Specimen: Blood from Arm, Left Updated: 07/24/24 1230     Blood Culture No growth at 4 days    Blood Culture - Blood, Arm, Left [[id number]]  (Normal) Collected: 07/17/24 2007    Lab Status: Final result Specimen: Blood from Arm, Left Updated: 07/22/24 2030     Blood Culture No growth at 5 days    Blood Culture - Blood, Hand, Left [[id number]]  (Normal) Collected: 07/17/24 2008    Lab Status: Final result Specimen: Blood from Hand,  Left Updated: 07/22/24 2030     Blood Culture No growth at 5 days          No radiology results for the last day      I have reviewed the medications.    Assessment/Plan     Assessment/Problem List    Dehiscence of operative wound    Hypothyroidism    Mixed hyperlipidemia    Acute UTI (urinary tract infection)    Stage 3a chronic kidney disease    Bilateral epididymitis    Wound dehiscence, surgical, initial encounter    Superficial femoral artery occlusion    Moderate malnutrition      Nestor Nye is a 78 y.o. male with PMH of enlarged prostate, GERD, Gout, HTN, RA, craniotomy for subdural hematoma, right femoral endarterectomy 6/21/24, prostate aquablation 6/21/24. Patient was discharged from [facility] on 6/26/24 and went to rehab., He presented 7/17/24with 3 days of erythema and then foul smell and the incision opened on 7/17/24    This patient's problems and plans were partially entered by my partner and updated as appropriate by me 07/21/24.      Assessment/Plan:    Bilateral epididymitis   Right groin dehiscence   S/p Aquablation 6/21/24 complicated by SFA occlusion that required right femoral embolectomy by Dr Morris And hematuria requiring hilario hypogastric embolization.   Chronic urinary retention with stacy  -- consulted urology, they have seen with no acute intervention planned   -- blood cultures x 2 NTD  -- consult PT wound  -- cont zosyn  -- appreciate [facility]   --7/20:ncreased in WBC 7/20 maybe stress, but procal remains elevated at 0.45, lactic acid reassuring, hypotensive so will give IVFs and monitor -repeat blood cultures- thus far NGT  - patient s/p OR with Dr Morris 7/22- right SFA angioplasty/stenting, right CFA aspiration thrombectomy/angioplasty - patient with wound vac in place, now off heparin gtt     Recurrent SFA occlusion   S/p Right Femoral embolectomy  on 6/21/24  --  CT A/P showed occlusion of Right SFA   --  heparin drip now off post op, now on dvt ppx given H/H stable      HTN  HLD  --  cont norvasc with parameters  --stable today      Hypothyroidism   -- cont home meds      CKDIII  -- baseline creat 1.4-1.6    Penoscrotal irriation/swelling following aqua ablation therapy  --urology has seen this admission, recommended keeping appointment with Dr Rey in 2 weeks for catheter removal and repeat voiding trial, keep stacy until that time        VTE Prophylaxis:  Pharmacologic VTE prophylaxis orders are present.    Home vs rehab pending progress     Expected Discharge  Expected Discharge Date: 2024; Expected Discharge Time:     Electronically signed by Danitza Prieto MD, 24      Electronically signed by Danitza Prieto MD at 24 1036          Physical Therapy Notes (most recent note)        Matilde Coleman, PT at 24 1039  Version 1 of 1         Acute Care - Wound/Debridement Re-Evaluation   Ed[facility]     Patient Name: Nestor Nye  : 1945  MRN: [id number]  Today's Date: 2024                Admit Date: 2024    Visit Dx:    ICD-10-CM ICD-9-CM   1. Dehiscence of operative wound, initial encounter  T81.31XA 998.32   2. Superficial femoral artery occlusion  I70.209 440.20   3. Rheumatoid arthritis involving multiple sites, unspecified whether rheumatoid factor present  M06.9 714.0       Patient Active Problem List   Diagnosis    Hypothyroidism    Mixed hyperlipidemia    Elevated PSA    Benign essential hypertension    Acute UTI (urinary tract infection)    BPH with obstruction/lower urinary tract symptoms    Anemia of chronic disease    Rheumatoid arthritis    Secondary hyperparathyroidism    Stage 3a chronic kidney disease    Gouty arthropathy    No post-op complications    Bilateral epididymitis    Wound dehiscence, surgical, initial encounter    Superficial femoral artery occlusion    Dehiscence of operative wound    Moderate malnutrition        Past Medical History:   Diagnosis Date    Acute maxillary sinusitis     Arthritis     Disease of thyroid  gland     Enlarged prostate     GERD (gastroesophageal reflux disease)     Gout     Hypertension     Impaired functional mobility, balance, gait, and endurance     PONV (postoperative nausea and vomiting)     Renal disorder     Rheumatoid arthritis     Right inguinal hernia 10/28/2021    Added automatically from request for surgery [id number]    Vocal cord anomaly 2007    had problem with enlarge vocal cord after low back surgery, changed his voice since then        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    DIAGNOSTIC LAPAROSCOPY N/A 05/04/2022    Procedure: DIAGNOSTIC LAPAROSCOPY CONVERTED TO EXPLORATORY LAPAROTOMY WITH SMALL BOWEL RESECTION;  Surgeon: Maximo Bennett MD;  Location: [facility] OR;  Service: General;  Laterality: N/A;  CONVERTED TO EXPLORATORY LAPAROTOMY AT 1912    EXPLORATORY LAPAROTOMY N/A 04/16/2022    Procedure: LAPAROTOMY EXPLORATORY WITH SMALL BOWEL OBSTRUCTION;  Surgeon: Maximo Bennett MD;  Location:  [facility] OR;  Service: General;  Laterality: N/A;    EYE SURGERY      on upper etelids    FEMORAL ENDARTERECTOMY Right 6/21/2024    Procedure: FEMORAL EMBELECTOMY RIGHT;  Surgeon: Kingsley Morris MD;  Location:  [facility] OR;  Service: Vascular;  Laterality: Right;    FOREIGN BODY REMOVAL Right     removal of metal fragments from RUQ after electrocution and fall.      HERNIA REPAIR      INGUINAL HERNIA REPAIR Right 11/01/2021    Procedure: INGUINAL HERNIA REPAIR WITH MESH AND EXCISION OF SPERMATIC CORD LIPOMA;   Surgeon: Rosey Glover MD;  Location: [facility] OR;  Service: General;  Laterality: Right;    LEG DEBRIDEMENT Right 7/22/2024    Procedure: GROIN DEBRIDEMENT WITH WOUND VAC PLACEMENT  RIGHT;  Surgeon: Kingsley Morris MD;  Location: [facility]BR[facility] OR;  Service: Vascular;  Laterality: Right;    LUMBAR SPINE SURGERY      PROSTATE AQUABLATION N/A 6/21/2024    Procedure: AQUABLATION OF PROSTATE;  Surgeon: Prieto Rey MD;  Location: [facility] OR;  Service: [employer] - Urology;  Laterality: N/A;    SKIN GRAFT Right     RUE skin grafting after electrocution    THORACIC SPINE SURGERY             Wound 06/21/24 1503 Right anterior greater trochanter Incision (Active)   Wound Image   07/24/24 1039   Dressing Appearance dry;intact 07/24/24 0800   Closure Unable to assess 07/24/24 0800   Base clean;moist;red;pink 07/24/24 1039   Periwound intact;pink 07/24/24 1039   Periwound Temperature warm 07/24/24 1039   Periwound Skin Turgor soft 07/24/24 1039   Edges open 07/24/24 1039   Wound Length (cm) 1.1 cm 07/24/24 1039   Wound Width (cm) 2 cm 07/24/24 1039   Wound Depth (cm) 0.7 cm 07/24/24 1039   Wound Surface Area (cm^2) 2.2 cm^2 07/24/24 1039   Wound Volume (cm^3) 1.54 cm^3 07/24/24 1039   Drainage Characteristics/Odor serosanguineous 07/24/24 1039   Drainage Amount small 07/24/24 1039   Care, Wound cleansed with;wound cleanser;negative pressure wound therapy 07/24/24 1039   Dressing Care dressing applied;other (see comments) 07/24/24 1039   Periwound Care dry periwound area maintained 07/24/24 1039       Wound 07/22/24 1329 Right medial ankle Puncture (Active)   Dressing Appearance no drainage 07/24/24 0800   Closure Glue;Open to air 07/24/24 0800   Drainage Amount none 07/24/24 0800   Dressing Care open to air 07/24/24 0800   Periwound Care dry periwound area maintained 07/24/24 0800       Wound 07/22/24 1350 Left anterior groin Puncture (Active)   Dressing Appearance dry;intact 07/24/24 0800   Closure Glue 07/24/24  0800   Drainage Amount none 07/24/24 0800   Periwound Care dry periwound area maintained 07/24/24 0800       NPWT (Negative Pressure Wound Therapy) 07/22/24 1424 Right Groin (Active)   Therapy Setting continuous therapy 07/24/24 1039   Dressing foam, black 07/24/24 1039   Pressure Setting 125 mmHg 07/24/24 1039   Sponges Inserted 1 07/24/24 1039   Sponges Removed 1 07/24/24 1039   Finger sweep complete Yes 07/24/24 1039         WOUND DEBRIDEMENT                     PT Assessment (Last 12 Hours)       PT Evaluation and Treatment       Row Name 07/24/24 1039          Physical Therapy Time and Intention    Subjective Information no complaints  -ES     Document Type wound care;re-evaluation  -ES     Mode of Treatment physical therapy  -ES     Patient Effort good  -ES     Symptoms Noted During/After Treatment increased pain  -ES       Row Name 07/24/24 1039          General Information    Patient Profile Reviewed yes  -ES     Patient Observations alert;cooperative;agree to therapy  -ES     Risks Reviewed patient:;increased discomfort  -ES     Benefits Reviewed patient:;improve function;decrease pain  -ES     Barriers to Rehab medically complex  -ES       Row Name 07/24/24 1039          Pain    Pretreatment Pain Rating 3/10  -ES     Posttreatment Pain Rating 3/10  -ES     Pain Location - Side/Orientation Right  -ES     Pain Location incisional  -ES     Pain Location - groin  -ES     Pain Intervention(s) Repositioned  -ES       Row Name 07/24/24 1039          Cognition    Affect/Mental Status (Cognition) WFL  -ES       Row Name 07/24/24 1039          Wound 06/21/24 1503 Right anterior greater trochanter Incision    Wound - Properties Group Placement Date: 06/21/24  -DJ Placement Time: 1503  -DJ Side: Right  -DJ Orientation: anterior  -DJ Location: greater trochanter  -DJ Primary Wound Type: Incision  -DJ    Wound Image Images linked: 1  -ES     Base clean;moist;red;pink  -ES     Periwound intact;pink  -ES     Periwound  Temperature warm  -ES     Periwound Skin Turgor soft  -ES     Edges open  -ES     Wound Length (cm) 1.1 cm  -ES     Wound Width (cm) 2 cm  -ES     Wound Depth (cm) 0.7 cm  -ES     Wound Surface Area (cm^2) 2.2 cm^2  -ES     Wound Volume (cm^3) 1.54 cm^3  -ES     Drainage Characteristics/Odor serosanguineous  -ES     Drainage Amount small  -ES     Care, Wound cleansed with;wound cleanser;negative pressure wound therapy  -ES     Dressing Care dressing applied;other (see comments)  1 black  -ES     Periwound Care dry periwound area maintained  -ES     Retired Wound - Properties Group Placement Date: 06/21/24  -DJ Placement Time: 1503  -DJ Side: Right  -DJ Orientation: anterior  -DJ Location: greater trochanter  -DJ Primary Wound Type: Incision  -DJ    Retired Wound - Properties Group Date first assessed: 06/21/24  -DJ Time first assessed: 1503  -DJ Side: Right  -DJ Location: greater trochanter  -DJ Primary Wound Type: Incision  -DJ      Row Name             Wound 07/22/24 1329 Right medial ankle Puncture    Wound - Properties Group Placement Date: 07/22/24  -JL Placement Time: 1329  -JL Side: Right  -JL Orientation: medial  -JL Location: ankle  -JL Primary Wound Type: Puncture  -JL    Retired Wound - Properties Group Placement Date: 07/22/24  -JL Placement Time: 1329  -JL Side: Right  -JL Orientation: medial  -JL Location: ankle  -JL Primary Wound Type: Puncture  -JL    Retired Wound - Properties Group Date first assessed: 07/22/24  -JL Time first assessed: 1329  -JL Side: Right  -JL Location: ankle  -JL Primary Wound Type: Puncture  -JL      Row Name             Wound 07/22/24 1350 Left anterior groin Puncture    Wound - Properties Group Placement Date: 07/22/24  -JL Placement Time: 1350  -JL Side: Left  -JL Orientation: anterior  -JL Location: groin  -JL Primary Wound Type: Puncture  -JL    Retired Wound - Properties Group Placement Date: 07/22/24  -JL Placement Time: 1350  -JL Side: Left  -JL Orientation: anterior   -JL Location: groin  -JL Primary Wound Type: Puncture  -JL    Retired Wound - Properties Group Date first assessed: 07/22/24  -JL Time first assessed: 1350  -JL Side: Left  -JL Location: groin  -JL Primary Wound Type: Puncture  -JL      Row Name 07/24/24 1039          NPWT (Negative Pressure Wound Therapy) 07/22/24 1424 Right Groin    NPWT (Negative Pressure Wound Therapy) - Properties Group Placement Date: 07/22/24  -JL Placement Time: 1424 -JL Location: Right Groin  -JL Additional Comments: Wound Vac placed  -JL    Therapy Setting continuous therapy  -ES     Dressing foam, black  -ES     Pressure Setting 125 mmHg  -ES     Sponges Inserted 1  -ES     Sponges Removed 1  -ES     Finger sweep complete Yes  -ES     Retired NPWT (Negative Pressure Wound Therapy) - Properties Group Placement Date: 07/22/24  -JL Placement Time: 1424  -JL Location: Right Groin  -JL Additional Comments: Wound Vac placed  -JL    Retired NPWT (Negative Pressure Wound Therapy) - Properties Group Placement Date: 07/22/24  -JL Placement Time: 1424 -JL Location: Right Groin  -JL Additional Comments: Wound Vac placed  -JL      Row Name 07/24/24 1039          Coping    Observed Emotional State calm;cooperative  -ES     Verbalized Emotional State acceptance  -ES     Trust Relationship/Rapport care explained;choices provided  -ES     Family/Support Persons family  -ES     Involvement in Care not present at bedside  -ES       Row Name 07/24/24 1039          Plan of Care Review    Plan of Care Reviewed With patient  -ES     Progress no change  -ES     Outcome Evaluation Wound vac change complete. R groin wound presents with primarily red/pink base and small amounts of serosanguinous drainage noted. Pt will benefit from NPWT to promote healing/granulation. Anticipate change in 1-2 days.  -ES       Row Name 07/24/24 1039          Positioning and Restraints    Pre-Treatment Position sitting in chair/recliner  -ES     Post Treatment Position chair  -ES      In Chair notified nsg;reclined;sitting;call light within reach;encouraged to call for assist;exit alarm on  -ES       Row Name 07/24/24 1039          Physical Therapy Goals    Wound Care Goal Selection (PT) wound care, PT goal 1  -ES       Row Name 07/24/24 1039          Wound Care Goal 1 (PT)    Wound Care Goal 1 (PT) Pt will demonstrate 15% decreased wound size as evidence of healing  -ES     Time Frame (Wound Care Goal 1, PT) long term goal (LTG);10 days  -ES               User Key  (r) = Recorded By, (t) = Taken By, (c) = Cosigned By      Initials Name Provider Type    ES Matilde Coleman, PT Physical Therapist    Donte Pineda, RN Registered Nurse    Paolo Tran, RN Registered Nurse                  Physical Therapy Education       Title: PT OT SLP Therapies (Done)       Topic: Physical Therapy (Done)       Point: Mobility training (Done)       Learning Progress Summary             Patient Acceptance, E, VU by MM at 7/24/2024 0943    Acceptance, E, NR by AS at 7/24/2024 0845    Eager, E,D,H, NR by DM at 7/18/2024 1222                         Point: Home exercise program (Done)       Learning Progress Summary             Patient Acceptance, E, VU by MM at 7/24/2024 0943    Acceptance, E, NR by AS at 7/24/2024 0845    Eager, E,D,H, NR by DM at 7/18/2024 1222                         Point: Body mechanics (Done)       Learning Progress Summary             Patient Acceptance, E, VU by MM at 7/24/2024 0943    Acceptance, E, NR by AS at 7/24/2024 0845    Eager, E,D,H, NR by DM at 7/18/2024 1222                         Point: Precautions (Done)       Learning Progress Summary             Patient Acceptance, E, VU by MM at 7/24/2024 0943    Acceptance, E, NR by AS at 7/24/2024 0845    Eager, E,D,H, NR by DM at 7/18/2024 1222                                         User Key       Initials Effective Dates Name Provider Type Discipline    DM 02/03/23 -  Emilie Arredondo, PT Physical Therapist PT    AS 04/28/23 -   Sakshi Bentley PTA Physical Therapist Assistant PT    MM 02/16/24 -  Linda Winston RN Registered Nurse Nurse                    Recommendation and Plan  Anticipated Discharge Disposition (PT): inpatient rehabilitation facility  Planned Therapy Interventions (PT): wound care  Therapy Frequency (PT): daily  Plan of Care Reviewed With: patient   Progress: no change       Progress: no change  Outcome Evaluation: Wound vac change complete. R groin wound presents with primarily red/pink base and small amounts of serosanguinous drainage noted. Pt will benefit from NPWT to promote healing/granulation. Anticipate change in 1-2 days.  Plan of Care Reviewed With: patient            Time Calculation   PT Charges       Row Name 07/24/24 1306 07/24/24 0846          Time Calculation    Start Time 1039  -ES 0750  -AS     PT Received On -- 07/24/24  -AS     PT Goal Re-Cert Due Date 07/28/24  -ES 07/28/24  -AS        Timed Charges    40508 - Gait Training Minutes  -- 8  -AS     51985 - PT Therapeutic Activity Minutes -- 15  -AS        Untimed Charges    PT Eval/Re-eval Minutes 23  -ES --     Wound Care 58533 Neg Press (DME) wound TO 50 sqcm  -ES --     05201-Qpr Pressure wound to 50 sqcm 46  -ES --        Total Minutes    Timed Charges Total Minutes -- 23  -AS     Untimed Charges Total Minutes 69  -ES --      Total Minutes 69  -ES 23  -AS               User Key  (r) = Recorded By, (t) = Taken By, (c) = Cosigned By      Initials Name Provider Type    AS Sakshi Bentley, MICAH Physical Therapist Assistant    ES Matilde Coleman, PT Physical Therapist                      Therapy Charges for Today       Code Description Service Date Service Provider Modifiers Qty    16106840199 HC PT RE-EVAL ESTABLISHED PLAN 2 7/24/2024 Matilde Coleman, PT GP 1    34227528848 HC PT NEG PRESS WOUND TO 50SQCM DME4 7/24/2024 Matilde Coleman, PT  1              PT G-Codes  Outcome Measure Options: AM-PAC 6 Clicks Basic Mobility (PT)  AM-PAC 6  Clicks Score (PT): 18  AM-PAC 6 Clicks Score (OT): 20       Matilde Coleman, JOHNNY  2024      Electronically signed by Matilde Coleman, PT at 24 1307                    Sakshi Bentley PTA   Physical Therapist Assistant  Physical Therapy     Therapy Treatment Note     Signed     Date of Service: 24 0750  Creation Time: 24 0846     Signed       Expand All Collapse All    Patient Name: Nestor Nye              : 1945                      MRN: [id number]                              Today's Date: 2024                                   Admit Date: 2024                        Visit Dx:   Visit Diagnosis       ICD-10-CM ICD-9-CM   1. Dehiscence of operative wound, initial encounter  T81.31XA 998.32   2. Superficial femoral artery occlusion  I70.209 440.20   3. Rheumatoid arthritis involving multiple sites, unspecified whether rheumatoid factor present  M06.9 714.0         Problem List       Patient Active Problem List   Diagnosis    Hypothyroidism    Mixed hyperlipidemia    Elevated PSA    Benign essential hypertension    Acute UTI (urinary tract infection)    BPH with obstruction/lower urinary tract symptoms    Anemia of chronic disease    Rheumatoid arthritis    Secondary hyperparathyroidism    Stage 3a chronic kidney disease    Gouty arthropathy    No post-op complications    Bilateral epididymitis    Wound dehiscence, surgical, initial encounter    Superficial femoral artery occlusion    Dehiscence of operative wound    Moderate malnutrition         Medical History        Past Medical History:   Diagnosis Date    Acute maxillary sinusitis      Arthritis      Disease of thyroid gland      Enlarged prostate      GERD (gastroesophageal reflux disease)      Gout      Hypertension      Impaired functional mobility, balance, gait, and endurance      PONV (postoperative nausea and vomiting)      Renal disorder      Rheumatoid arthritis      Right inguinal hernia  10/28/2021     Added automatically from request for surgery [id number]    Vocal cord anomaly 2007     had problem with enlarge vocal cord after low back surgery, changed his voice since then         Surgical History         Past Surgical History:   Procedure Laterality Date    ABDOMINAL SURGERY        ARTERIOGRAM Right 7/22/2024     Procedure: LOWER EXTREMITY ANGIOGRAM RIGHT;  Surgeon: Kingsley Morris MD;  Location: [facility] OR;  Service: Vascular;  Laterality: Right;  DOSE: 56 MGY; FLURO: 7 MIN 6 SEC; CONTRAST 40 ML    BACK SURGERY        BELPHAROPTOSIS REPAIR Bilateral      BRAIN SURGERY        COLON SURGERY         bowel resection x2 per pt    CRANIOTOMY FOR SUBDURAL HEMATOMA         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    DIAGNOSTIC LAPAROSCOPY N/A 05/04/2022     Procedure: DIAGNOSTIC LAPAROSCOPY CONVERTED TO EXPLORATORY LAPAROTOMY WITH SMALL BOWEL RESECTION;  Surgeon: Maximo Bennett MD;  Location: [facility] OR;  Service: General;  Laterality: N/A;  CONVERTED TO EXPLORATORY LAPAROTOMY AT 1912    EXPLORATORY LAPAROTOMY N/A 04/16/2022     Procedure: LAPAROTOMY EXPLORATORY WITH SMALL BOWEL OBSTRUCTION;  Surgeon: Maximo Bennett MD;  Location: [facility] OR;  Service: General;  Laterality: N/A;    EYE SURGERY         on upper etelids    FEMORAL ENDARTERECTOMY Right 6/21/2024     Procedure: FEMORAL EMBELECTOMY RIGHT;  Surgeon: Kingsley Morris MD;  Location: [facility] HYBRID OR;  Service: Vascular;  Laterality: Right;    FOREIGN BODY REMOVAL Right       removal of metal fragments from RUQ after electrocution and fall.      HERNIA REPAIR        INGUINAL HERNIA REPAIR Right 11/01/2021     Procedure: INGUINAL HERNIA REPAIR WITH MESH AND EXCISION OF SPERMATIC CORD LIPOMA;  Surgeon: Rosey Glover MD;  Location: [facility] OR;  Service: General;  Laterality: Right;    LEG DEBRIDEMENT Right 7/22/2024     Procedure: GROIN DEBRIDEMENT WITH WOUND VAC PLACEMENT  RIGHT;  Surgeon: Kingsley Morris,  MD;  Location: [facility]BRID OR;  Service: Vascular;  Laterality: Right;    LUMBAR SPINE SURGERY        PROSTATE AQUABLATION N/A 6/21/2024     Procedure: AQUABLATION OF PROSTATE;  Surgeon: Prieto Rey MD;  Location: [facility] OR;  Service: Robotics - Urology;  Laterality: N/A;    SKIN GRAFT Right       RUE skin grafting after electrocution    THORACIC SPINE SURGERY               General Information         Row Name 07/24/24 0841                 Physical Therapy Time and Intention     Document Type therapy note (daily note)  -AS       Mode of Treatment physical therapy  -AS          Row Name 07/24/24 0841                 General Information     Patient Profile Reviewed yes  -AS       Existing Precautions/Restrictions fall;other (see comments)  R groin wound w/wound vac  -AS       Barriers to Rehab medically complex;previous functional deficit;visual deficit  -AS          Row Name 07/24/24 0841                 Cognition     Orientation Status (Cognition) oriented x 3  -AS          Row Name 07/24/24 0841                 Safety Issues, Functional Mobility     Safety Issues Affecting Function (Mobility) awareness of need for assistance;insight into deficits/self-awareness;safety precaution awareness;safety precautions follow-through/compliance;sequencing abilities  -AS       Impairments Affecting Function (Mobility) balance;endurance/activity tolerance;pain;postural/trunk control;range of motion (ROM);strength;visual/perceptual  -AS       Comment, Safety Issues/Impairments (Mobility) alert and following commands  -AS                       User Key  (r) = Recorded By, (t) = Taken By, (c) = Cosigned By        Initials Name Provider Type     AS Sakshi Bentley PTA Physical Therapist Assistant                            Mobility         Row Name 07/24/24 0842                 Bed Mobility     Supine-Sit [place] (Bed Mobility) verbal cues;contact guard;1 person assist  -AS       Assistive Device (Bed Mobility)  head of bed elevated;bed rails  -AS       Comment, (Bed Mobility) increased time and effort to reach EOB due to scrotal pain  -AS          Row Name 07/24/24 0842                 Transfers     Comment, (Transfers) cues for hand placement  -AS          Row Name 07/24/24 0842                 Bed-Chair Transfer     Bed-Chair [place] (Transfers) verbal cues;minimum assist (75% patient effort);1 person assist  -AS       Assistive Device (Bed-Chair Transfers) walker, front-wheeled  -AS          Row Name 07/24/24 0842                 Sit-Stand Transfer     Sit-Stand [place] (Transfers) verbal cues;contact guard;1 person assist  -AS       Assistive Device (Sit-Stand Transfers) walker, front-wheeled  -AS          Row Name 07/24/24 0842                 Gait/Stairs (Locomotion)     [place] Level (Gait) verbal cues;minimum assist (75% patient effort);1 person assist  -AS       Assistive Device (Gait) walker, front-wheeled  -AS       Distance in Feet (Gait) 5  -AS       Deviations/Abnormal Patterns (Gait) bilateral deviations;base of support, narrow;rere decreased;stride length decreased;weight shifting decreased;antalgic;right sided deviations  -AS       Bilateral Gait Deviations heel strike decreased  -AS       Comment, (Gait/Stairs) patient took 5 steps to recliner with Min assist x1 and B UE support on walker, pt declined further mobility due to R groin and scrotal pain.  -AS                       User Key  (r) = Recorded By, (t) = Taken By, (c) = Cosigned By        Initials Name Provider Type     AS Sakshi Bentley PTA Physical Therapist Assistant                            Obj/Interventions         Row Name 07/24/24 0844                 Motor Skills     Therapeutic Exercise knee;ankle  -AS          Row Name 07/24/24 0844                 Knee (Therapeutic Exercise)     Knee (Therapeutic Exercise) strengthening exercise  -AS       Knee Strengthening (Therapeutic Exercise) bilateral;LAQ (long arc  quad);sitting;10 repetitions  -AS          Row Name 07/24/24 0844                 Ankle (Therapeutic Exercise)     Ankle AROM (Therapeutic Exercise) bilateral;dorsiflexion;plantarflexion;sitting;10 repetitions  -AS          Row Name 07/24/24 0844                 Balance     Dynamic Standing Balance verbal cues;minimal assist;1-person assist  -AS       Position/Device Used, Standing Balance supported;walker, front-wheeled  -AS       Comment, Balance min assist for safety  -AS                       User Key  (r) = Recorded By, (t) = Taken By, (c) = Cosigned By        Initials Name Provider Type     AS Sakshi Bentley PTA Physical Therapist Assistant                            Goals/Plan    No documentation.                        Clinical Impression         Row Name 07/24/24 0844                 Pain     Pretreatment Pain Rating 5/10  -AS       Posttreatment Pain Rating 5/10  -AS       Pain Location - Side/Orientation Right  -AS       Pain Location - groin  -AS       Pain Intervention(s) Repositioned;Ambulation/increased activity  -AS          Row Name 07/24/24 0844                 Plan of Care Review     Plan of Care Reviewed With patient  -AS       Progress no change  -AS       Outcome Evaluation patient took 5 steps to recliner with Min assist x1 and B UE support on walker, pt declined further mobility due to R groin and scrotal pain. Recommend IRF at D/C for best functional outcome.  -AS          Row Name 07/24/24 0844                 Positioning and Restraints     Pre-Treatment Position in bed  -AS       Post Treatment Position chair  -AS       In Chair reclined;call light within reach;encouraged to call for assist;exit alarm on;waffle cushion;legs elevated  -AS                       User Key  (r) = Recorded By, (t) = Taken By, (c) = Cosigned By        Initials Name Provider Type     AS Sakshi Bentley PTA Physical Therapist Assistant                            Outcome Measures         Row Name 07/24/24  0845                 How much help from another person do you currently need...     Turning from your back to your side while in flat bed without using bedrails? 4  -AS       Moving from lying on back to sitting on the side of a flat bed without bedrails? 3  -AS       Moving to and from a bed to a chair (including a wheelchair)? 3  -AS       Standing up from a chair using your arms (e.g., wheelchair, bedside chair)? 3  -AS       Climbing 3-5 steps with a railing? 2  -AS       To walk in hospital room? 3  -AS       AM-PAC 6 Clicks Score (PT) 18  -AS       Highest Level of Mobility Goal 6 --> Walk 10 steps or more  -AS          Row Name 07/24/24 0845                 Functional Assessment     Outcome Measure Options AM-PAC 6 Clicks Basic Mobility (PT)  -AS                       User Key  (r) = Recorded By, (t) = Taken By, (c) = Cosigned By        Initials Name Provider Type     AS Sakshi Bentley PTA Physical Therapist Assistant                          Physical Therapy Education            Title: PT OT SLP Therapies (In Progress)         Topic: Physical Therapy (In Progress)         Point: Mobility training (In Progress)         Learning Progress Summary               Patient Acceptance, E, NR by AS at 7/24/2024 0845     Eager, E,D,H, NR by DM at 7/18/2024 1222                               Point: Home exercise program (In Progress)         Learning Progress Summary               Patient Acceptance, E, NR by AS at 7/24/2024 0845     Eager, E,D,H, NR by DM at 7/18/2024 1222                               Point: Body mechanics (In Progress)         Learning Progress Summary               Patient Acceptance, E, NR by AS at 7/24/2024 0845     Eager, E,D,H, NR by DM at 7/18/2024 1222                               Point: Precautions (In Progress)         Learning Progress Summary               Patient Acceptance, E, NR by AS at 7/24/2024 0845     Eager, E,D,H, NR by DM at 7/18/2024 1222                                                    User Key         Initials Effective Dates Name Provider Type Discipline     DM 23 -  Emilie Arredondo, PT Physical Therapist PT     AS 23 -  Sakshi Bentley PTA Physical Therapist Assistant PT                          PT Recommendation and Plan  Plan of Care Reviewed With: patient  Progress: no change  Outcome Evaluation: patient took 5 steps to recliner with Min assist x1 and B UE support on walker, pt declined further mobility due to R groin and scrotal pain. Recommend IRF at D/C for best functional outcome.      Time Calculation:        PT Charges         Row Name 24 0846                       Time Calculation     Start Time 0750  -AS         PT Received On 24  -AS         PT Goal Re-Cert Due Date 24  -AS                 Timed Charges     62314 - Gait Training Minutes  8  -AS         66189 - PT Therapeutic Activity Minutes 15  -AS                 Total Minutes     Timed Charges Total Minutes 23  -AS          Total Minutes 23  -AS                      User Key  (r) = Recorded By, (t) = Taken By, (c) = Cosigned By        Initials Name Provider Type     AS Sakshi Bentley PTA Physical Therapist Assistant                       Therapy Charges for Today         Code Description Service Date Service Provider Modifiers Qty     81995437680 HC GAIT TRAINING EA 15 MIN 2024 Sakshi Bentley PTA GP 1     93353964878 HC PT THERAPEUTIC ACT EA 15 MIN 2024 Sakshi Bentley PTA GP 1                PT G-Codes  Outcome Measure Options: AM-PAC 6 Clicks Basic Mobility (PT)  AM-PAC 6 Clicks Score (PT): 18  AM-PAC 6 Clicks Score (OT): 20     Sakshi Bentley PTA                     2024                                                          Occupational Therapy Notes (most recent note)        Teresa Ramirez, OT at 24 1418          Patient Name: Nestor Nye  : 1945    MRN: [id number]                              Today's Date:  7/24/2024       Admit Date: 7/17/2024    Visit Dx:     ICD-10-CM ICD-9-CM   1. Dehiscence of operative wound, initial encounter  T81.31XA 998.32   2. Superficial femoral artery occlusion  I70.209 440.20   3. Rheumatoid arthritis involving multiple sites, unspecified whether rheumatoid factor present  M06.9 714.0     Patient Active Problem List   Diagnosis    Hypothyroidism    Mixed hyperlipidemia    Elevated PSA    Benign essential hypertension    Acute UTI (urinary tract infection)    BPH with obstruction/lower urinary tract symptoms    Anemia of chronic disease    Rheumatoid arthritis    Secondary hyperparathyroidism    Stage 3a chronic kidney disease    Gouty arthropathy    No post-op complications    Bilateral epididymitis    Wound dehiscence, surgical, initial encounter    Superficial femoral artery occlusion    Dehiscence of operative wound    Moderate malnutrition     Past Medical History:   Diagnosis Date    Acute maxillary sinusitis     Arthritis     Disease of thyroid gland     Enlarged prostate     GERD (gastroesophageal reflux disease)     Gout     Hypertension     Impaired functional mobility, balance, gait, and endurance     PONV (postoperative nausea and vomiting)     Renal disorder     Rheumatoid arthritis     Right inguinal hernia 10/28/2021    Added automatically from request for surgery [id number]    Vocal cord anomaly 2007    had problem with enlarge vocal cord after low back surgery, changed his voice since then     Past Surgical History:   Procedure Laterality Date    ABDOMINAL SURGERY      ARTERIOGRAM Right 7/22/2024    Procedure: LOWER EXTREMITY ANGIOGRAM RIGHT;  Surgeon: Kingsley Morris MD;  Location: [facility];  Service: Vascular;  Laterality: Right;  DOSE: 56 MGY; FLURO: 7 MIN 6 SEC; CONTRAST 40 ML    BACK SURGERY      BELPHAROPTOSIS REPAIR Bilateral     BRAIN SURGERY      COLON SURGERY      bowel resection x2 per pt    CRANIOTOMY FOR SUBDURAL HEMATOMA      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    DIAGNOSTIC LAPAROSCOPY N/A 05/04/2022    Procedure: DIAGNOSTIC LAPAROSCOPY CONVERTED TO EXPLORATORY LAPAROTOMY WITH SMALL BOWEL RESECTION;  Surgeon: Maximo Bennett MD;  Location:  [facility];  Service: General;  Laterality: N/A;  CONVERTED TO EXPLORATORY LAPAROTOMY AT 1912    EXPLORATORY LAPAROTOMY N/A 04/16/2022    Procedure: LAPAROTOMY EXPLORATORY WITH SMALL BOWEL OBSTRUCTION;  Surgeon: Maximo Bennett MD;  Location:  [facility];  Service: General;  Laterality: N/A;    EYE SURGERY      on upper etelids    FEMORAL ENDARTERECTOMY Right 6/21/2024    Procedure: FEMORAL EMBELECTOMY RIGHT;  Surgeon: Kingsley Morris MD;  Location:  [facility];  Service: Vascular;  Laterality: Right;    FOREIGN BODY REMOVAL Right     removal of metal fragments from RUQ after electrocution and fall.      HERNIA REPAIR      INGUINAL HERNIA REPAIR Right 11/01/2021    Procedure: INGUINAL HERNIA REPAIR WITH MESH AND EXCISION OF SPERMATIC CORD LIPOMA;  Surgeon: Rosey Glover MD;  Location:  [facility];  Service: General;  Laterality: Right;    LEG DEBRIDEMENT Right 7/22/2024    Procedure: GROIN DEBRIDEMENT WITH WOUND VAC PLACEMENT  RIGHT;  Surgeon: Kingsley Morris MD;  Location:  [facility];  Service: Vascular;  Laterality: Right;    LUMBAR SPINE SURGERY      PROSTATE AQUABLATION N/A 6/21/2024    Procedure: AQUABLATION OF PROSTATE;  Surgeon: Prieto Rey MD;  Location:  [facility];  Service: [facility] - Urology;  Laterality: N/A;    SKIN GRAFT Right     RUE skin grafting after electrocution    THORACIC SPINE SURGERY        General Information       Row Name 07/24/24 1529          OT Time and Intention    Document Type therapy note (daily note)  -LC     Mode of Treatment occupational therapy  -       Row Name 07/24/24 1529          General Information    Patient Profile Reviewed yes  -LC     Prior Level of Function independent:;all household mobility;transfer;ADL's  See IE  -LC      Existing Precautions/Restrictions fall;other (see comments)  R groin wound w/wound vac  -     Barriers to Rehab medically complex  -       Row Name 07/24/24 1529          Cognition    Orientation Status (Cognition) oriented x 3  -       Row Name 07/24/24 1529          Safety Issues, Functional Mobility    Safety Issues Affecting Function (Mobility) awareness of need for assistance;insight into deficits/self-awareness;safety precaution awareness;safety precautions follow-through/compliance;sequencing abilities  -     Impairments Affecting Function (Mobility) balance;endurance/activity tolerance;pain;postural/trunk control;range of motion (ROM);strength;visual/perceptual  -               User Key  (r) = Recorded By, (t) = Taken By, (c) = Cosigned By      Initials Name Provider Type     Teresa Ramirez OT Occupational Therapist                     Mobility/ADL's       Row Name 07/24/24 1536          Bed Mobility    Bed Mobility supine-sit  -     Supine-Sit Taylor (Bed Mobility) contact guard;verbal cues  -     Comment, (Bed Mobility) Assist to manage lines  -       Row Name 07/24/24 1536          Transfers    Transfers sit-stand transfer  -     Comment, (Transfers) VC's for hand placement, completed x 2.  -       Row Name 07/24/24 1536          Sit-Stand Transfer    Sit-Stand Taylor (Transfers) contact guard;verbal cues  -     Assistive Device (Sit-Stand Transfers) walker, front-wheeled  -       Row Name 07/24/24 1536          Functional Mobility    Functional Mobility- Ind. Level contact guard assist;verbal cues required  -     Functional Mobility-Distance (Feet) 45  -     Functional Mobility- Comment Ambulated in room to simulate household distances needed for ADL participation. Slow pace.  -               User Key  (r) = Recorded By, (t) = Taken By, (c) = Cosigned By      Initials Name Provider Type    Teresa Saleh OT Occupational Therapist                    Obj/Interventions       Row Name 07/24/24 1538          Balance    Balance Assessment sitting static balance;sitting dynamic balance;standing static balance;standing dynamic balance  -     Static Sitting Balance independent  -     Dynamic Sitting Balance supervision  -     Position, Sitting Balance unsupported;sitting edge of bed;sitting in chair  -     Static Standing Balance standby assist  -     Dynamic Standing Balance contact guard  -     Position/Device Used, Standing Balance supported;walker, front-wheeled  -     Balance Interventions sitting;standing;sit to stand;supported;weight shifting activity  -     Comment, Balance CGA for safety  -               User Key  (r) = Recorded By, (t) = Taken By, (c) = Cosigned By      Initials Name Provider Type     Teresa Ramirez OT Occupational Therapist                   Goals/Plan    No documentation.                  Clinical Impression       [facility] Name 07/24/24 1538          Pain Assessment    Pretreatment Pain Rating 3/10  -     Posttreatment Pain Rating 3/10  -     Pain Location - Side/Orientation Right  -     Pain Location incisional  -     Pain Location - groin  -     Pain Intervention(s) Repositioned;Ambulation/increased activity  -     Additional Documentation Pain Scale: Word Pre/Post-Treatment (Group)  -       Row Name 07/24/24 1538          Plan of Care Review    Plan of Care Reviewed With patient  -     Progress no change  -     Outcome Evaluation Pt. progressing towards baseline with ADLs and functional mobility. Ambulated 45' with FWW and CGA to simulate household distances needed for ADL participation. Pt. completed STS x 2 with CGA and FWW. Will continue to progress as able.  -       Row Name 07/24/24 1538          Positioning and Restraints    Post Treatment Position bed  -     In Bed notified nsg;fowlers;call light within reach;encouraged to call for assist;exit alarm on  -               User Key  (r) =  Recorded By, (t) = Taken By, (c) = Cosigned By      Initials Name Provider Type    Teresa Saleh OT Occupational Therapist                   Outcome Measures       Row Name 07/24/24 1545          How much help from another is currently needed...    Putting on and taking off regular lower body clothing? 3  -LC     Bathing (including washing, rinsing, and drying) 3  -LC     Toileting (which includes using toilet bed pan or urinal) 3  -LC     Putting on and taking off regular upper body clothing 3  -LC     Taking care of personal grooming (such as brushing teeth) 4  -LC     Eating meals 4  -LC     AM-PAC 6 Clicks Score (OT) 20  -LC       Row Name 07/24/24 0845 07/24/24 0800       How much help from another person do you currently need...    Turning from your back to your side while in flat bed without using bedrails? 4  -AS 4  -MM    Moving from lying on back to sitting on the side of a flat bed without bedrails? 3  -AS 3  -MM    Moving to and from a bed to a chair (including a wheelchair)? 3  -AS 3  -MM    Standing up from a chair using your arms (e.g., wheelchair, bedside chair)? 3  -AS 3  -MM    Climbing 3-5 steps with a railing? 2  -AS 2  -MM    To walk in hospital room? 3  -AS 3  -MM    AM-PAC 6 Clicks Score (PT) 18  -AS 18  -MM    Highest Level of Mobility Goal 6 --> Walk 10 steps or more  -AS 6 --> Walk 10 steps or more  -MM      Row Name 07/24/24 1545 07/24/24 0845       Functional Assessment    Outcome Measure [facility] AM-PAC 6 Clicks Daily Activity (OT)  - AM-PAC 6 Clicks Basic Mobility (PT)  -AS              User Key  (r) = Recorded By, (t) = Taken By, (c) = Cosigned By      Initials Name Provider Type    AS Sakshi Bentley PTA Physical Therapist Assistant    Teresa Saleh OT Occupational Therapist    MM Linda Winston, RN Registered Nurse                    Occupational Therapy Education       Title: PT OT SLP Therapies (In Progress)       Topic: Occupational Therapy (In Progress)        Point: ADL training (Done)       Description:   Instruct learner(s) on proper safety adaptation and remediation techniques during self care or transfers.   Instruct in proper use of assistive devices.                  Learning Progress Summary             Patient Acceptance, E, VU by MM at 7/24/2024 0943    Acceptance, E, VU by VANESA at 7/18/2024 0917                         Point: Home exercise program (Done)       Description:   Instruct learner(s) on appropriate technique for monitoring, assisting and/or progressing therapeutic exercises/activities.                  Learning Progress Summary             Patient Acceptance, E, VU by MM at 7/24/2024 0943                         Point: Precautions (In Progress)       Description:   Instruct learner(s) on prescribed precautions during self-care and functional transfers.                  Learning Progress Summary             Patient Acceptance, E, NR by  at 7/24/2024 1418    Acceptance, E, VU by MM at 7/24/2024 0943    Acceptance, E, VU by VANESA at 7/18/2024 0917                         Point: Body mechanics (In Progress)       Description:   Instruct learner(s) on proper positioning and spine alignment during self-care, functional mobility activities and/or exercises.                  Learning Progress Summary             Patient Acceptance, E, NR by  at 7/24/2024 1418    Acceptance, E, VU by MM at 7/24/2024 0943                                         User Key       Initials Effective Dates Name Provider Type Discipline     06/16/21 -  Teresa Ramirez, OT Occupational Therapist OT    VANESA 06/16/21 -  Nena Martinez OT Occupational Therapist OT    MM 02/16/24 -  Linda Winston, RN Registered Nurse Nurse                  OT Recommendation and Plan     Plan of Care Review  Plan of Care Reviewed With: patient  Progress: no change  Outcome Evaluation: Pt. progressing towards baseline with ADLs and functional mobility. Ambulated 45' with FWW and CGA to simulate household  distances needed for ADL participation. Pt. completed STS x 2 with CGA and FWW. Will continue to progress as able.     Time Calculation:         Time Calculation- OT       Row Name 07/24/24 1546 07/24/24 0846          Time Calculation- OT    OT Start Time 1418  -LC --     OT Received On 07/24/24  - --     OT Goal Re-Cert Due Date 07/28/24  - --        Timed Charges    30681 - Gait Training Minutes  -- 8  -AS     23977 - OT Therapeutic Activity Minutes 17  -LC --        Total Minutes    Timed Charges Total Minutes 17  -LC 8  -AS      Total Minutes 17  -LC 8  -AS               User Key  (r) = Recorded By, (t) = Taken By, (c) = Cosigned By      Initials Name Provider Type    AS Sakshi Bentley, PTA Physical Therapist Assistant    Teresa Saleh OT Occupational Therapist                  Therapy Charges for Today       Code Description Service Date Service Provider Modifiers Qty    98228730419 HC OT THERAPEUTIC ACT EA 15 MIN 7/24/2024 Teresa Ramirez OT GO 1                 Teresa Ramirez OT  7/24/2024    Electronically signed by Teresa Ramirez OT at 07/24/24 1546

## 2024-07-25 NOTE — DISCHARGE PLACEMENT REQUEST
"KARIE CARSON RN    P:  [phone]  F:  [phone]                  Ervin Nye (78 y.o. Male)       Date of Birth   1945    Social Security Number       Address   [address] [place] [address]    Home Phone   [phone]    MRN   [id number]       Scientology   [facility]    Marital Status                               Admission Date   7/17/24    Admission Type   Urgent    Admitting Provider   Danitza Prieto MD    Attending Provider   Danitza Prieto MD    Department, Room/Bed   [address], S452/1       Discharge Date       Discharge Disposition       Discharge Destination                                 Attending Provider: Danitza Prieto MD    Allergies: No Known Allergies    Isolation: Contact   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)   Code Status: CPR    Ht: 170.2 cm (67\")   Wt: 74.8 kg (165 lb)    Admission Cmt: None   Principal Problem: Dehiscence of operative wound [T81.31XA]                   Active Insurance as of 7/17/2024       Primary Coverage       Payor Plan Insurance Group Employer/Plan Group    ANTHEM MEDICARE REPLACEMENT ANTHEM MEDICARE ADVANTAGE [id number]       Payor Plan Address Payor Plan Phone Number Payor Plan Fax Number Effective Dates    [address] [phone]  1/1/2016 - None Entered    [facility] [id number]         Subscriber Name Subscriber Birth Date Member ID       [id number] YEUNG 1945 [id number]                     Emergency Contacts        (Rel.) Home Phone Work Phone Mobile Phone    Wesley Nye (Brother) [phone] -- --              Insurance Information                  ANTHEM MEDICARE REPLACEMENT/ANTHEM MEDICARE ADVANTAGE Phone: [phone]    Subscriber: Ervin Nye Subscriber#: [id number]    Group#: [id number] Precert#: [id number]             History & Physical        Aleida De La Cruz MD at 07/17/24 [facility]5              [facility] Medicine " Services  HISTORY AND PHYSICAL    Patient Name: Nestor Nye  : 1945  MRN: [id number]  Primary Care Physician: Roxanne Slater MD  Date of admission: 2024      Subjective  Subjective     Chief Complaint:  Right groin wound dehiscence     HPI:  Nestor Nye is a 78 y.o. male with PMH of enlarged prostate, GERD, Gout, HTN, RA, craniotomy for subdural hematoma, right femoral endarterectomy 24, prostate aquablation 24. Patient was discharged from [facility] on 24 and went to rehab., he has been home a few weeks. Patient states about 3 days ago he noticed erythema around incision and groin. His incision started to smell and he was cleaning it daily with soap and water. Yesterday his incision opened up. He presented to [facility] ED and was transferred here for further care. He denies any soa, cp, fever, or chills.     OSH labs: creat  1.66, crp 10.54, procal 0.17, lactate 1.7, WBC 9.88, HGB 9.9,       Personal History     Past Medical History:   Diagnosis Date    Acute maxillary sinusitis     Arthritis     Disease of thyroid gland     Enlarged prostate     GERD (gastroesophageal reflux disease)     Gout     Hypertension     Impaired functional mobility, balance, gait, and endurance     PONV (postoperative nausea and vomiting)     Renal disorder     Rheumatoid arthritis     Right inguinal hernia 10/28/2021    Added automatically from request for surgery [id number]    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, changed his voice since then           Past Surgical History:   Procedure Laterality Date    ABDOMINAL SURGERY      BACK SURGERY      BELPHAROPTOSIS REPAIR Bilateral     BRAIN SURGERY      COLON SURGERY      bowel resection x2 per pt    CRANIOTOMY FOR SUBDURAL HEMATOMA      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    DIAGNOSTIC LAPAROSCOPY N/A 2022    Procedure: DIAGNOSTIC LAPAROSCOPY CONVERTED TO EXPLORATORY LAPAROTOMY  WITH SMALL BOWEL RESECTION;  Surgeon: Maximo Bennett MD;  Location:  [facility];  Service: General;  Laterality: N/A;  CONVERTED TO EXPLORATORY LAPAROTOMY AT 1912    EXPLORATORY LAPAROTOMY N/A 04/16/2022    Procedure: LAPAROTOMY EXPLORATORY WITH SMALL BOWEL OBSTRUCTION;  Surgeon: Maximo Bennett MD;  Location:  [facility];  Service: General;  Laterality: N/A;    EYE SURGERY      on upper etelids    FEMORAL ENDARTERECTOMY Right 6/21/2024    Procedure: FEMORAL EMBELECTOMY RIGHT;  Surgeon: Kingsley Morris MD;  Location:  [facility];  Service: Vascular;  Laterality: Right;    FOREIGN BODY REMOVAL Right     removal of metal fragments from RUQ after electrocution and fall.      HERNIA REPAIR      INGUINAL HERNIA REPAIR Right 11/01/2021    Procedure: INGUINAL HERNIA REPAIR WITH MESH AND EXCISION OF SPERMATIC CORD LIPOMA;  Surgeon: Rosey Glover MD;  Location:  [facility];  Service: General;  Laterality: Right;    LUMBAR SPINE SURGERY      PROSTATE AQUABLATION N/A 6/21/2024    Procedure: AQUABLATION OF PROSTATE;  Surgeon: Prieto Rey MD;  Location:  [facility];  Service: Robotics - Urology;  Laterality: N/A;    SKIN GRAFT Right     RUE skin grafting after electrocution    THORACIC SPINE SURGERY         Family History: family history includes Arthritis in his father and mother; Prostate cancer in his brother.     Social History:  reports that he has quit smoking. His smoking use included cigars. He has been exposed to tobacco smoke. He has never used smokeless tobacco. He reports that he does not currently use alcohol. He reports that he does not use drugs.  Social History     Social History Narrative    Not on file       Medications:  Available home medication information reviewed.  Alcohol Wipes, Diclofenac Sodium, amLODIPine, aspirin, ferrous sulfate, finasteride, ipratropium-albuterol, levothyroxine, and melatonin    No Known Allergies    Objective  Objective     Vital Signs:   Temp:  [98.2 °F (36.8  °C)-98.3 °F (36.8 °C)] 98.2 °F (36.8 °C)  Heart Rate:  [82-99] 82  Resp:  [18] 18  BP: (106-121)/(59-89) 121/59       Physical Exam   Constitutional: Awake, alert  Eyes: PERRLA, sclerae anicteric, no conjunctival injection  HENT: NCAT, mucous membranes moist  Neck: Supple, no thyromegaly, no lymphadenopathy, trachea midline  Respiratory: Clear to auscultation bilaterally, nonlabored respirations room air   Cardiovascular: RRR, no murmurs, rubs, or gallops, right posterior tibial pulse with doppler   Gastrointestinal: Positive bowel sounds, soft, nontender, nondistended  Musculoskeletal: No bilateral ankle edema, no clubbing or cyanosis to extremities  Psychiatric: Appropriate affect, cooperative  Neurologic: Oriented x 3, strength symmetric in all extremities, Cranial Nerves grossly intact to confrontation, speech clear  Skin: No rashes, right groin incision open with yellow drainage noted and erythema    stacy BSD, scrotum erythema and TTP      Result Review:  I have personally reviewed the results from the time of this admission to 7/17/2024 18:51 EDT and agree with these findings:  [x]  Laboratory list / accordion  []  Microbiology  [x]  Radiology  []  EKG/Telemetry   []  Cardiology/Vascular   []  Pathology  []  Old records  []  Other:  Most notable findings include:       LAB RESULTS:      Lab 07/17/24  1110   WBC 9.88   HEMOGLOBIN 9.9*   HEMATOCRIT 29.8*   PLATELETS 394   NEUTROS ABS 6.76   IMMATURE GRANS (ABS) 0.05   LYMPHS ABS 1.82   MONOS ABS 1.08*   EOS ABS 0.11   MCV 96.1   CRP 10.54*   PROCALCITONIN 0.17   LACTATE 1.7         Lab 07/17/24  1110   SODIUM 140   POTASSIUM 4.0   CHLORIDE 104   CO2 20.2*   ANION GAP 15.8*   BUN 23   CREATININE 1.66*   EGFR 42.2*   GLUCOSE 97   CALCIUM 8.8         Lab 07/17/24  1110   TOTAL PROTEIN 7.0   ALBUMIN 3.8   GLOBULIN 3.2   ALT (SGPT) 6   AST (SGOT) 9   BILIRUBIN 0.4   ALK PHOS 97   LIPASE 12*                     UA          6/23/2024    14:33 7/17/2024    13:00    Urinalysis   Squamous Epithelial Cells, UA 0-2  0-2    Specific Gravity, UA 1.009  >=1.030    Ketones, UA Negative  Negative    Blood, UA Large (3+)  Large (3+)    Leukocytes, UA Moderate (2+)  Large (3+)    Nitrite, UA Negative  Positive    RBC, UA Too Numerous to Count  Too Numerous to Count    WBC, UA 3-5  Too Numerous to Count    Bacteria, UA Trace  Trace        Microbiology Results (last 10 days)       ** No results found for the last 240 hours. **            CT Abdomen Pelvis With Contrast    Result Date: 7/17/2024  PROCEDURE: CT ABDOMEN PELVIS W CONTRAST-  HISTORY: rt inguinal surgical wound dihescense/pus, recent prostate surg, status post aqua ablation of the prostate 07/21/2024 as well as right femoral endarterectomy.  COMPARISON: 05/04/2022.  PROCEDURE: The patient was injected with IV contrast. Axial images were obtained from the lung bases to the pubic symphysis by computed tomography. This study was performed with techniques to keep radiation doses as low as reasonably achievable, (ALARA). Individualized dose reduction techniques using automated exposure control or adjustment of mA and/or kV according to the patient size were employed.  FINDINGS:  ABDOMEN: The lung bases are clear. There is a moderate hiatal hernia similar to the prior exam. Gallbladder contains dependent high attenuation material suggesting sludge or stone that is new from the prior exam. No wall thickening or infiltration of the surrounding fat identified. The heart is proper size. The liver is homogenous with no focal abnormality. The spleen is unremarkable. No adrenal mass is present.  The pancreas is unremarkable. The kidneys demonstrate bilateral circumscribed hypodense lesions consistent with cysts that are similar to the prior exam. No hydronephrosis identified. The aorta is ectatic but not aneurysmal. There is no free fluid or adenopathy.  PELVIS: The GI tract demonstrates no obstruction. Small bowel anastomoses identified  in the right side of the abdomen which are new from the prior exam and appear to be widely patent. The appendix is not identified. The urinary bladder is is collapsed around a Gillespie catheter. Tip of the catheter extends posteriorly. There is a small amount of air within the collapsed bladder. There is significant wall thickening. As was seen previously there is significant enlargement of the prostate but this now appears very inhomogeneous with areas of increased attenuation not seen previously. Given the recent procedure, this may represent hemorrhage. On the sagittal image series 4 image 39 the bulb of the Gillespie catheter appears to be located within the superior aspect of the prostate. No free fluid identified. Best seen series 2 image 71 and series 3 image 44 is complete occlusion of the right superficial femoral artery immediately distal to the takeoff of the profunda. There is infiltration of the surrounding fat. There is partial opacification of the proximal superficial femoral vein which appears similar to the left side.      Impression: Occlusion of the right SFA, Paolo Smith PA-C, notified at 12:45 p.m 07/17/2024.  Enlarged, inhomogeneous prostate with significant wall thickening of the bladder and position of the Gillespie catheter as described, consider postoperative change versus infection or postoperative hemorrhage.,  Possible gallbladder sludge or small stones.  CTDI: 5.39 mGy DLP:235.53 mGy.cm  This report was signed and finalized on 7/17/2024 1:28 PM by Kavya Pollock MD.      US Scrotum & Testicles    Result Date: 7/17/2024  PROCEDURE: US SCROTUM AND TESTICLES-  HISTORY: testicular swelling/pain, recent prostate surg & hypogastric art embolization  PROCEDURE: Ultrasound images of the testicles were obtained bilaterally. Color Doppler images were obtained.  FINDINGS: Right: There is no right intratesticular mass or torsion. There is normal color flow to the right testicle. There are multiple large right  epididymal cysts some of which contain debris. These measure up to 2.6 cm. There is increased vascularity in the right epididymis suggestive of epididymitis. There is a small right hydrocele.  Left: Blood flow seen to the left testicle. Half of the left testicle demonstrates decreased echogenicity, mass is not excluded. A urology consultation is recommended. There is increased echogenicity of the epididymis suggestive of epididymitis. There is a minimal left hydrocele.      Impression: 1. No evidence of right intratesticular mass or torsion. 2. Large right epididymal cysts as described. 3. Findings suggestive of bilateral epididymitis. 4. No left testicular torsion. A hypoechoic region in the left testicle could represent possible mass. Urologic consultation is recommended.      Images were reviewed, interpreted, and dictated by Dr. Kavya Pollock MD Transcribed by Radha Reeves PA-C.  This report was signed and finalized on 7/17/2024 12:50 PM by Kavya Pollock MD.           Assessment & Plan  Assessment & Plan       Hypothyroidism    Mixed hyperlipidemia    Stage 3a chronic kidney disease    Bilateral epididymitis    Wound dehiscence, surgical, initial encounter    Superficial femoral artery occlusion        Bilateral epididymitis   Right groin dehiscence   S/p Aquablation 6/21/24 complicated by SFA occlusion that required right femoral embolectomy by Dr Morris And hematuria requiring hilario hypogastric embolization.   Chronic urinary retention with stacy  -- consult urology  -- npo after midnight   -- blood cultures x 2   -- consult PT wound in am   -- cont zosyn and vancomycin  -- consult ID in am   -- CRP 10.54, lactate 1.7, procal 0.17, WBC 9.88, check ESR       Recurrent SFA occlusion   S/p Right Femoral embolectomy  on 6/21/24  -- CT A/P showed occlusion of Right SFA   -- consult Dr. Morris, he is aware patient is here  -- npo after midnight   -- heparin drip     HTN  HLD  -- cont norvasc     Hypothyroidism   --  cont home meds     CKDIII  -- baseline creat 1.4-1.6      VTE Prophylaxis:  Pharmacologic VTE prophylaxis orders are present.          CODE STATUS:    Code Status and Medical Interventions:   Ordered at: 07/17/24 1845     Level Of Support Discussed With:    Patient     Code Status (Patient has no pulse and is not breathing):    CPR (Attempt to Resuscitate)     Medical Interventions (Patient has pulse or is breathing):    Full Support       Expected Discharge   Expected Discharge Date: 7/22/2024; Expected Discharge Time:      Kimmy Toro, LANNY  07/17/24        Attending   Admission Attestation       I have performed an independent face-to-face diagnostic evaluation including performing an independent physical examination.  I approve of the documented plan of care above that was reviewed and developed with the advanced practice clinician (APC) and take responsibility for that plan along with its associated risks.  I have updated the HPI as appropriate.    Brief HPI    Nestor Nye 78-year-old male with a history of enlarged prostate, recent femoral endarterectomy, recent prostate aqua ablation who presented at an outside facility due to increased erythema and drainage around groin incision.  He had been caring for this at home with soap and water however it had progressively gotten worse.  Imaging at the outside hospital was notable for concern of epididymitis and incidental finding of occlusion of the right SFA.  Case was discussed with vascular surgery here who recommended transfer.    Attending Physical Exam:  Temp:  [97.9 °F (36.6 °C)-98.4 °F (36.9 °C)] 98.4 °F (36.9 °C)  Heart Rate:  [71-99] 76  Resp:  [18-20] 18  BP: (106-130)/(59-89) 120/76  Constitutional: No acute distress, awake, alert  HENT: NCAT, mucous membranes moist  Respiratory: Clear to auscultation bilaterally, respiratory effort normal   Cardiovascular: RRR, no murmurs, rubs, or gallops  Gastrointestinal: Positive bowel sounds, soft,  nontender, nondistended  Musculoskeletal: No bilateral ankle edema  Psychiatric: Appropriate affect, cooperative  Neurologic: Oriented x 3, no focal deficit  Skin: Groin incision is open with notable drainage, scrotum is enlarged and tender      Result Review:  I have personally reviewed the results from the time of this admission to 7/17/2024 23:11 EDT and agree with these findings:  [x]  Laboratory list / accordion  [x]  Microbiology  [x]  Radiology  []  EKG/Telemetry   []  Cardiology/Vascular   []  Pathology  []  Old records  []  Other:  Most notable findings include:   Imaging as above    Assessment and Plan:    79 y/o M here w bilateral epididymitis and SFA occlusion. Placed on broad spectrum abx tonight with cultures in process and ID and urology consult for AM. D/w vascular surgery w possible plans for OR tmrw, tentative, keep NPO at midnight and placed on heparin drip    See assessment and plan documented by APC above and updated/edited by me as appropriate.    Aleida De La Cruz MD  07/17/24                Electronically signed by Aleida De La Cruz MD at 07/17/24 2322          Physician Progress Notes (most recent note)        Danitza Prieto MD at 07/25/24 1033              [facility] Medicine Services  INPATIENT PROGRESS NOTE    Date of Admission: 7/17/2024  Primary Care Physician: Roxanne Slater MD    Subjective     Chief Complaint: groin dehiscence    HPI:  Doing ok. Resting in bed. Reports swollen testicles is his biggest complaint. Getting up and ambulating oob.       Review Of Systems:   Gen- No fevers, chills  CV- No chest pain, palpitations  Resp- No cough, dyspnea  GI- No N/V/D, abd pain      Objective      Vitals:  Temp:  [97.9 °F (36.6 °C)-98.9 °F (37.2 °C)] 98.3 °F (36.8 °C)  Heart Rate:  [81-91] 81  Resp:  [16-18] 18  BP: (103-116)/(60-70) 109/70  Flow (L/min):  [0] 0    Physical exam:    GEN: NAD, resting in bed, doing well today   HEENT: on room air, atraumatic,  normocephalic  NECK: supple, no masses  RESP: on room air, normal effort  CV: on tele, sinus rhythm  PSYCH: normal affect, appropriate  NEURO: awake, alert, no focal deficits noted  MSK: no edema noted  SKIN: no rashes noted , wound vac in place   : stacy in place cyu      Results Review:    I have reviewed the labs, radiology results and diagnostic studies.    Results from last 7 days   Lab Units 07/25/24 0449 07/24/24  0630 07/23/24  1003   WBC 10*3/mm3 13.28* 11.23* 10.37   HEMOGLOBIN g/dL 7.9* 7.8* 7.5*   HEMATOCRIT % 24.0* 24.1* 23.0*   PLATELETS 10*3/mm3 428 376 348     Results from last 7 days   Lab Units 07/25/24 0449 07/24/24  0630 07/23/24  1003   SODIUM mmol/L 140 140 140   POTASSIUM mmol/L 4.5 4.4 4.3   CHLORIDE mmol/L 107 107 106   CO2 mmol/L 24.0 24.0 24.0   BUN mg/dL 15 15 18   CREATININE mg/dL 1.44* 1.58* 1.53*   GLUCOSE mg/dL 99 99 118*   CALCIUM mg/dL 8.5* 8.6 8.5*   ALK PHOS U/L 85 71 75   ALT (SGPT) U/L 32 20 12   AST (SGOT) U/L 41* 27 15       Microbiology Results Abnormal       Procedure Component Value - Date/Time    Blood Culture - Blood, Hand, Right [[id number]]  (Normal) Collected: 07/20/24 1201    Lab Status: Preliminary result Specimen: Blood from Hand, Right Updated: 07/24/24 1230     Blood Culture No growth at 4 days    Narrative:      Less than seven (7) mL's of blood was collected.  Insufficient quantity may yield false negative results.    Blood Culture - Blood, Arm, Left [[id number]]  (Normal) Collected: 07/20/24 1201    Lab Status: Preliminary result Specimen: Blood from Arm, Left Updated: 07/24/24 1230     Blood Culture No growth at 4 days    Blood Culture - Blood, Arm, Left [[id number]]  (Normal) Collected: 07/17/24 2007    Lab Status: Final result Specimen: Blood from Arm, Left Updated: 07/22/24 2030     Blood Culture No growth at 5 days    Blood Culture - Blood, Hand, Left [[id number]]  (Normal) Collected: 07/17/24 2008    Lab Status: Final result Specimen: Blood from Hand,  Left Updated: 07/22/24 2030     Blood Culture No growth at 5 days          No radiology results for the last day      I have reviewed the medications.    Assessment/Plan     Assessment/Problem List    Dehiscence of operative wound    Hypothyroidism    Mixed hyperlipidemia    Acute UTI (urinary tract infection)    Stage 3a chronic kidney disease    Bilateral epididymitis    Wound dehiscence, surgical, initial encounter    Superficial femoral artery occlusion    Moderate malnutrition      Nestor Nye is a 78 y.o. male with PMH of enlarged prostate, GERD, Gout, HTN, RA, craniotomy for subdural hematoma, right femoral endarterectomy 6/21/24, prostate aquablation 6/21/24. Patient was discharged from [facility] on 6/26/24 and went to rehab., He presented 7/17/24with 3 days of erythema and then foul smell and the incision opened on 7/17/24    This patient's problems and plans were partially entered by my partner and updated as appropriate by me 07/21/24.      Assessment/Plan:    Bilateral epididymitis   Right groin dehiscence   S/p Aquablation 6/21/24 complicated by SFA occlusion that required right femoral embolectomy by Dr Morris And hematuria requiring hilario hypogastric embolization.   Chronic urinary retention with stacy  -- consulted urology, they have seen with no acute intervention planned   -- blood cultures x 2 NTD  -- consult PT wound  -- cont zosyn  -- appreciate [facility]   --7/20:ncreased in WBC 7/20 maybe stress, but procal remains elevated at 0.45, lactic acid reassuring, hypotensive so will give IVFs and monitor -repeat blood cultures- thus far NGT  - patient s/p OR with Dr Morris 7/22- right SFA angioplasty/stenting, right CFA aspiration thrombectomy/angioplasty - patient with wound vac in place, now off heparin gtt     Recurrent SFA occlusion   S/p Right Femoral embolectomy  on 6/21/24  --  CT A/P showed occlusion of Right SFA   --  heparin drip now off post op, now on dvt ppx given H/H stable      HTN  HLD  --  cont norvasc with parameters  --stable today      Hypothyroidism   -- cont home meds      CKDIII  -- baseline creat 1.4-1.6    Penoscrotal irriation/swelling following aqua ablation therapy  --urology has seen this admission, recommended keeping appointment with Dr Rey in 2 weeks for catheter removal and repeat voiding trial, keep stacy until that time        VTE Prophylaxis:  Pharmacologic VTE prophylaxis orders are present.    Home vs rehab pending progress     Expected Discharge  Expected Discharge Date: 2024; Expected Discharge Time:     Electronically signed by Danitza Prieto MD, 24      Electronically signed by Danitza Prieto MD at 24 1036          Physical Therapy Notes (most recent note)        Matilde Coleman, PT at 24 1039  Version 1 of 1         Acute Care - Wound/Debridement Re-Evaluation   Edw[facility]     Patient Name: Nestor Nye  : 1945  MRN: [id number]  Today's Date: 2024                Admit Date: 2024    Visit Dx:    ICD-10-CM ICD-9-CM   1. Dehiscence of operative wound, initial encounter  T81.31XA 998.32   2. Superficial femoral artery occlusion  I70.209 440.20   3. Rheumatoid arthritis involving multiple sites, unspecified whether rheumatoid factor present  M06.9 714.0       Patient Active Problem List   Diagnosis    Hypothyroidism    Mixed hyperlipidemia    Elevated PSA    Benign essential hypertension    Acute UTI (urinary tract infection)    BPH with obstruction/lower urinary tract symptoms    Anemia of chronic disease    Rheumatoid arthritis    Secondary hyperparathyroidism    Stage 3a chronic kidney disease    Gouty arthropathy    No post-op complications    Bilateral epididymitis    Wound dehiscence, surgical, initial encounter    Superficial femoral artery occlusion    Dehiscence of operative wound    Moderate malnutrition        Past Medical History:   Diagnosis Date    Acute maxillary sinusitis     Arthritis     Disease of thyroid  gland     Enlarged prostate     GERD (gastroesophageal reflux disease)     Gout     Hypertension     Impaired functional mobility, balance, gait, and endurance     PONV (postoperative nausea and vomiting)     Renal disorder     Rheumatoid arthritis     Right inguinal hernia 10/28/2021    Added automatically from request for surgery [id number]    Vocal cord anomaly 2007    had problem with enlarge vocal cord after low back surgery, changed his voice since then        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    DIAGNOSTIC LAPAROSCOPY N/A 05/04/2022    Procedure: DIAGNOSTIC LAPAROSCOPY CONVERTED TO EXPLORATORY LAPAROTOMY WITH SMALL BOWEL RESECTION;  Surgeon: Maximo Bennett MD;  Location: [facility] OR;  Service: General;  Laterality: N/A;  CONVERTED TO EXPLORATORY LAPAROTOMY AT 1912    EXPLORATORY LAPAROTOMY N/A 04/16/2022    Procedure: LAPAROTOMY EXPLORATORY WITH SMALL BOWEL OBSTRUCTION;  Surgeon: Maximo Bennett MD;  Location:  [facility] OR;  Service: General;  Laterality: N/A;    EYE SURGERY      on upper etelids    FEMORAL ENDARTERECTOMY Right 6/21/2024    Procedure: FEMORAL EMBELECTOMY RIGHT;  Surgeon: Kingsley Morris MD;  Location:  [facility];  Service: Vascular;  Laterality: Right;    FOREIGN BODY REMOVAL Right     removal of metal fragments from RUQ after electrocution and fall.      HERNIA REPAIR      INGUINAL HERNIA REPAIR Right 11/01/2021    Procedure: INGUINAL HERNIA REPAIR WITH MESH AND EXCISION OF SPERMATIC CORD LIPOMA;   Surgeon: Rosey Glover MD;  Location: [facility] OR;  Service: General;  Laterality: Right;    LEG DEBRIDEMENT Right 7/22/2024    Procedure: GROIN DEBRIDEMENT WITH WOUND VAC PLACEMENT  RIGHT;  Surgeon: Kingsley Morris MD;  [facility];  Service: Vascular;  Laterality: Right;    LUMBAR SPINE SURGERY      PROSTATE AQUABLATION N/A 6/21/2024    Procedure: AQUABLATION OF PROSTATE;  Surgeon: Prieto Rey MD;  [facility];  Service: Robotics - Urology;  Laterality: N/A;    SKIN GRAFT Right     RUE skin grafting after electrocution    THORACIC SPINE SURGERY             Wound 06/21/24 1503 Right anterior greater trochanter Incision (Active)   Wound Image   07/24/24 1039   Dressing Appearance dry;intact 07/24/24 0800   Closure Unable to assess 07/24/24 0800   Base clean;moist;red;pink 07/24/24 1039   Periwound intact;pink 07/24/24 1039   Periwound Temperature warm 07/24/24 1039   Periwound Skin Turgor soft 07/24/24 1039   Edges open 07/24/24 1039   Wound Length (cm) 1.1 cm 07/24/24 1039   Wound Width (cm) 2 cm 07/24/24 1039   Wound Depth (cm) 0.7 cm 07/24/24 1039   Wound Surface Area (cm^2) 2.2 cm^2 07/24/24 1039   Wound Volume (cm^3) 1.54 cm^3 07/24/24 1039   Drainage Characteristics/Odor serosanguineous 07/24/24 1039   Drainage Amount small 07/24/24 1039   Care, Wound cleansed with;wound cleanser;negative pressure wound therapy 07/24/24 1039   Dressing Care dressing applied;other (see comments) 07/24/24 1039   Periwound Care dry periwound area maintained 07/24/24 1039       Wound 07/22/24 1329 Right medial ankle Puncture (Active)   Dressing Appearance no drainage 07/24/24 0800   Closure Glue;Open to air 07/24/24 0800   Drainage Amount none 07/24/24 0800   Dressing Care open to air 07/24/24 0800   Periwound Care dry periwound area maintained 07/24/24 0800       Wound 07/22/24 1350 Left anterior groin Puncture (Active)   Dressing Appearance dry;intact 07/24/24 0800   Closure Glue 07/24/24  0800   Drainage Amount none 07/24/24 0800   Periwound Care dry periwound area maintained 07/24/24 0800       NPWT (Negative Pressure Wound Therapy) 07/22/24 1424 Right Groin (Active)   Therapy Setting continuous therapy 07/24/24 1039   Dressing foam, black 07/24/24 1039   Pressure Setting 125 mmHg 07/24/24 1039   Sponges Inserted 1 07/24/24 1039   Sponges Removed 1 07/24/24 1039   Finger sweep complete Yes 07/24/24 1039         WOUND DEBRIDEMENT                     PT Assessment (Last 12 Hours)       PT Evaluation and Treatment       Row Name 07/24/24 1039          Physical Therapy Time and Intention    Subjective Information no complaints  -ES     Document Type wound care;re-evaluation  -ES     Mode of Treatment physical therapy  -ES     Patient Effort good  -ES     Symptoms Noted During/After Treatment increased pain  -ES       Row Name 07/24/24 1039          General Information    Patient Profile Reviewed yes  -ES     Patient Observations alert;cooperative;agree to therapy  -ES     Risks Reviewed patient:;increased discomfort  -ES     Benefits Reviewed patient:;improve function;decrease pain  -ES     Barriers to Rehab medically complex  -ES       Row Name 07/24/24 1039          Pain    Pretreatment Pain Rating 3/10  -ES     Posttreatment Pain Rating 3/10  -ES     Pain Location - Side/Orientation Right  -ES     Pain Location incisional  -ES     Pain Location - groin  -ES     Pain Intervention(s) Repositioned  -ES       Row Name 07/24/24 1039          Cognition    Affect/Mental Status (Cognition) WFL  -ES       Row Name 07/24/24 1039          Wound 06/21/24 1503 Right anterior greater trochanter Incision    Wound - Properties Group Placement Date: 06/21/24  -DJ Placement Time: 1503  -DJ Side: Right  -DJ Orientation: anterior  -DJ Location: greater trochanter  -DJ Primary Wound Type: Incision  -DJ    Wound Image Images linked: 1  -ES     Base clean;moist;red;pink  -ES     Periwound intact;pink  -ES     Periwound  Temperature warm  -ES     Periwound Skin Turgor soft  -ES     Edges open  -ES     Wound Length (cm) 1.1 cm  -ES     Wound Width (cm) 2 cm  -ES     Wound Depth (cm) 0.7 cm  -ES     Wound Surface Area (cm^2) 2.2 cm^2  -ES     Wound Volume (cm^3) 1.54 cm^3  -ES     Drainage Characteristics/Odor serosanguineous  -ES     Drainage Amount small  -ES     Care, Wound cleansed with;wound cleanser;negative pressure wound therapy  -ES     Dressing Care dressing applied;other (see comments)  1 black  -ES     Periwound Care dry periwound area maintained  -ES     Retired Wound - Properties Group Placement Date: 06/21/24  -DJ Placement Time: 1503  -DJ Side: Right  -DJ Orientation: anterior  -DJ Location: greater trochanter  -DJ Primary Wound Type: Incision  -DJ    Retired Wound - Properties Group Date first assessed: 06/21/24  -DJ Time first assessed: 1503  -DJ Side: Right  -DJ Location: greater trochanter  -DJ Primary Wound Type: Incision  -DJ      Row Name             Wound 07/22/24 1329 Right medial ankle Puncture    Wound - Properties Group Placement Date: 07/22/24  -JL Placement Time: 1329  -JL Side: Right  -JL Orientation: medial  -JL Location: ankle  -JL Primary Wound Type: Puncture  -JL    Retired Wound - Properties Group Placement Date: 07/22/24  -JL Placement Time: 1329  -JL Side: Right  -JL Orientation: medial  -JL Location: ankle  -JL Primary Wound Type: Puncture  -JL    Retired Wound - Properties Group Date first assessed: 07/22/24  -JL Time first assessed: 1329  -JL Side: Right  -JL Location: ankle  -JL Primary Wound Type: Puncture  -JL      Row Name             Wound 07/22/24 1350 Left anterior groin Puncture    Wound - Properties Group Placement Date: 07/22/24  -JL Placement Time: 1350  -JL Side: Left  -JL Orientation: anterior  -JL Location: groin  -JL Primary Wound Type: Puncture  -JL    Retired Wound - Properties Group Placement Date: 07/22/24  -JL Placement Time: 1350  -JL Side: Left  -JL Orientation: anterior   -JL Location: groin  -JL Primary Wound Type: Puncture  -JL    Retired Wound - Properties Group Date first assessed: 07/22/24  -JL Time first assessed: 1350  -JL Side: Left  -JL Location: groin  -JL Primary Wound Type: Puncture  -JL      Row Name 07/24/24 1039          NPWT (Negative Pressure Wound Therapy) 07/22/24 1424 Right Groin    NPWT (Negative Pressure Wound Therapy) - Properties Group Placement Date: 07/22/24  -JL Placement Time: 1424 -JL Location: Right Groin  -JL Additional Comments: Wound Vac placed  -JL    Therapy Setting continuous therapy  -ES     Dressing foam, black  -ES     Pressure Setting 125 mmHg  -ES     Sponges Inserted 1  -ES     Sponges Removed 1  -ES     Finger sweep complete Yes  -ES     Retired NPWT (Negative Pressure Wound Therapy) - Properties Group Placement Date: 07/22/24  -JL Placement Time: 1424  -JL Location: Right Groin  -JL Additional Comments: Wound Vac placed  -JL    Retired NPWT (Negative Pressure Wound Therapy) - Properties Group Placement Date: 07/22/24  -JL Placement Time: 1424 -JL Location: Right Groin  -JL Additional Comments: Wound Vac placed  -JL      Row Name 07/24/24 1039          Coping    Observed Emotional State calm;cooperative  -ES     Verbalized Emotional State acceptance  -ES     Trust Relationship/Rapport care explained;choices provided  -ES     Family/Support Persons family  -ES     Involvement in Care not present at bedside  -ES       Row Name 07/24/24 1039          Plan of Care Review    Plan of Care Reviewed With patient  -ES     Progress no change  -ES     Outcome Evaluation Wound vac change complete. R groin wound presents with primarily red/pink base and small amounts of serosanguinous drainage noted. Pt will benefit from NPWT to promote healing/granulation. Anticipate change in 1-2 days.  -ES       Row Name 07/24/24 1039          Positioning and Restraints    Pre-Treatment Position sitting in chair/recliner  -ES     Post Treatment Position chair  -ES      In Chair notified nsg;reclined;sitting;call light within reach;encouraged to call for assist;exit alarm on  -ES       Row Name 07/24/24 1039          Physical Therapy Goals    Wound Care Goal Selection (PT) wound care, PT goal 1  -ES       Row Name 07/24/24 1039          Wound Care Goal 1 (PT)    Wound Care Goal 1 (PT) Pt will demonstrate 15% decreased wound size as evidence of healing  -ES     Time Frame (Wound Care Goal 1, PT) long term goal (LTG);10 days  -ES               User Key  (r) = Recorded By, (t) = Taken By, (c) = Cosigned By      Initials Name Provider Type    ES Matilde Coleman, PT Physical Therapist    Donte Pineda, RN Registered Nurse    Paolo Tran, RN Registered Nurse                  Physical Therapy Education       Title: PT OT SLP Therapies (Done)       Topic: Physical Therapy (Done)       Point: Mobility training (Done)       Learning Progress Summary             Patient Acceptance, E, VU by MM at 7/24/2024 0943    Acceptance, E, NR by AS at 7/24/2024 0845    Eager, E,D,H, NR by DM at 7/18/2024 1222                         Point: Home exercise program (Done)       Learning Progress Summary             Patient Acceptance, E, VU by MM at 7/24/2024 0943    Acceptance, E, NR by AS at 7/24/2024 0845    Eager, E,D,H, NR by DM at 7/18/2024 1222                         Point: Body mechanics (Done)       Learning Progress Summary             Patient Acceptance, E, VU by MM at 7/24/2024 0943    Acceptance, E, NR by AS at 7/24/2024 0845    Eager, E,D,H, NR by DM at 7/18/2024 1222                         Point: Precautions (Done)       Learning Progress Summary             Patient Acceptance, E, VU by MM at 7/24/2024 0943    Acceptance, E, NR by AS at 7/24/2024 0845    Eager, E,D,H, NR by DM at 7/18/2024 1222                                         User Key       Initials Effective Dates Name Provider Type Discipline    DM 02/03/23 -  Emilie Arredondo, PT Physical Therapist PT    AS 04/28/23 -   Sakshi Bentley PTA Physical Therapist Assistant PT    MM 02/16/24 -  Linda Winston RN Registered Nurse Nurse                    Recommendation and Plan  Anticipated Discharge Disposition (PT): inpatient rehabilitation facility  Planned Therapy Interventions (PT): wound care  Therapy Frequency (PT): daily  Plan of Care Reviewed With: patient   Progress: no change       Progress: no change  Outcome Evaluation: Wound vac change complete. R groin wound presents with primarily red/pink base and small amounts of serosanguinous drainage noted. Pt will benefit from NPWT to promote healing/granulation. Anticipate change in 1-2 days.  Plan of Care Reviewed With: patient            Time Calculation   PT Charges       Row Name 07/24/24 1306 07/24/24 0846          Time Calculation    Start Time 1039  -ES 0750  -AS     PT Received On -- 07/24/24  -AS     PT Goal Re-Cert Due Date 07/28/24  -ES 07/28/24  -AS        Timed Charges    95871 - Gait Training Minutes  -- 8  -AS     76550 - PT Therapeutic Activity Minutes -- 15  -AS        Untimed Charges    PT Eval/Re-eval Minutes 23  -ES --     Wound Care 94019 Neg Press (DME) wound TO 50 sqcm  -ES --     20896-Zpf Pressure wound to 50 sqcm 46  -ES --        Total Minutes    Timed Charges Total Minutes -- 23  -AS     Untimed Charges Total Minutes 69  -ES --      Total Minutes 69  -ES 23  -AS               User Key  (r) = Recorded By, (t) = Taken By, (c) = Cosigned By      Initials Name Provider Type    AS Sakshi Bentley, MICAH Physical Therapist Assistant    ES Matilde Coleman, PT Physical Therapist                      Therapy Charges for Today       Code Description Service Date Service Provider Modifiers Qty    09535239227 HC PT RE-EVAL ESTABLISHED PLAN 2 7/24/2024 Matilde Coleman, PT GP 1    32972961981 HC PT NEG PRESS WOUND TO 50SQCM DME4 7/24/2024 Matilde Coleman, PT  1              PT G-Codes  Outcome Measure Options: AM-PAC 6 Clicks Basic Mobility (PT)  AM-PAC 6  Clicks Score (PT): 18  AM-PAC 6 Clicks Score (OT): 20       Matilde Coleman, PT  2024      Electronically signed by Matilde Coleman, PT at 24 1307          Occupational Therapy Notes (most recent note)        Teresa Ramirez, OT at 24 1418          Patient Name: Nestor Nye  : 1945    MRN: [id number]                              Today's Date: 2024       Admit Date: 2024    Visit Dx:     ICD-10-CM ICD-9-CM   1. Dehiscence of operative wound, initial encounter  T81.31XA 998.32   2. Superficial femoral artery occlusion  I70.209 440.20   3. Rheumatoid arthritis involving multiple sites, unspecified whether rheumatoid factor present  M06.9 714.0     Patient Active Problem List   Diagnosis    Hypothyroidism    Mixed hyperlipidemia    Elevated PSA    Benign essential hypertension    Acute UTI (urinary tract infection)    BPH with obstruction/lower urinary tract symptoms    Anemia of chronic disease    Rheumatoid arthritis    Secondary hyperparathyroidism    Stage 3a chronic kidney disease    Gouty arthropathy    No post-op complications    Bilateral epididymitis    Wound dehiscence, surgical, initial encounter    Superficial femoral artery occlusion    Dehiscence of operative wound    Moderate malnutrition     Past Medical History:   Diagnosis Date    Acute maxillary sinusitis     Arthritis     Disease of thyroid gland     Enlarged prostate     GERD (gastroesophageal reflux disease)     Gout     Hypertension     Impaired functional mobility, balance, gait, and endurance     PONV (postoperative nausea and vomiting)     Renal disorder     Rheumatoid arthritis     Right inguinal hernia 10/28/2021    Added automatically from request for surgery [id number]    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, changed his voice since then     Past Surgical History:   Procedure Laterality Date    ABDOMINAL SURGERY      ARTERIOGRAM Right 2024     Procedure: LOWER EXTREMITY ANGIOGRAM RIGHT;  Surgeon: Kingsley Morris MD;  Location:  [facility];  Service: Vascular;  Laterality: Right;  DOSE: 56 MGY; FLURO: 7 MIN 6 SEC; CONTRAST 40 ML    BACK SURGERY      BELPHAROPTOSIS REPAIR Bilateral     BRAIN SURGERY      COLON SURGERY      bowel resection x2 per pt    CRANIOTOMY FOR SUBDURAL HEMATOMA      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    DIAGNOSTIC LAPAROSCOPY N/A 05/04/2022    Procedure: DIAGNOSTIC LAPAROSCOPY CONVERTED TO EXPLORATORY LAPAROTOMY WITH SMALL BOWEL RESECTION;  Surgeon: Maximo Bennett MD;  Location:  [facility];  Service: General;  Laterality: N/A;  CONVERTED TO EXPLORATORY LAPAROTOMY AT 1912    EXPLORATORY LAPAROTOMY N/A 04/16/2022    Procedure: LAPAROTOMY EXPLORATORY WITH SMALL BOWEL OBSTRUCTION;  Surgeon: Maximo Bennett MD;  Location:  [facility];  Service: General;  Laterality: N/A;    EYE SURGERY      on upper etelids    FEMORAL ENDARTERECTOMY Right 6/21/2024    Procedure: FEMORAL EMBELECTOMY RIGHT;  Surgeon: Kingsley Morris MD;  Location:  [facility];  Service: Vascular;  Laterality: Right;    FOREIGN BODY REMOVAL Right     removal of metal fragments from RUQ after electrocution and fall.      HERNIA REPAIR      INGUINAL HERNIA REPAIR Right 11/01/2021    Procedure: INGUINAL HERNIA REPAIR WITH MESH AND EXCISION OF SPERMATIC CORD LIPOMA;  Surgeon: Rsoey Glover MD;  Location:  [facility];  Service: General;  Laterality: Right;    LEG DEBRIDEMENT Right 7/22/2024    Procedure: GROIN DEBRIDEMENT WITH WOUND VAC PLACEMENT  RIGHT;  Surgeon: Kingsley Morris MD;  Location:  [facility];  Service: Vascular;  Laterality: Right;    LUMBAR SPINE SURGERY      PROSTATE AQUABLATION N/A 6/21/2024    Procedure: AQUABLATION OF PROSTATE;  Surgeon: Prieto Rey MD;  Location:  [facility];  Service: Robotics - Urology;  Laterality: N/A;    SKIN GRAFT Right     RUE skin grafting after electrocution    THORACIC  SPINE SURGERY        General Information       Row Name 07/24/24 1529          OT Time and Intention    Document Type therapy note (daily note)  -     Mode of Treatment occupational therapy  -       Row Name 07/24/24 1529          General Information    Patient Profile Reviewed yes  -     Prior Level of Function independent:;all household mobility;transfer;ADL's  See IE  -     Existing Precautions/Restrictions fall;other (see comments)  R groin wound w/wound vac  -     Barriers to Rehab medically complex  -       Row Name 07/24/24 1529          Cognition    Orientation Status (Cognition) oriented x 3  -[facility] Name 07/24/24 1529          Safety Issues, Functional Mobility    Safety Issues Affecting Function (Mobility) awareness of need for assistance;insight into deficits/self-awareness;safety precaution awareness;safety precautions follow-through/compliance;sequencing abilities  -     Impairments Affecting Function (Mobility) balance;endurance/activity tolerance;pain;postural/trunk control;range of motion (ROM);strength;visual/perceptual  -               User Key  (r) = Recorded By, (t) = Taken By, (c) = Cosigned By      Initials Name Provider Type     Teresa Ramirez, OT Occupational Therapist                     Mobility/ADL's       [facility] Name 07/24/24 1536          Bed Mobility    Bed Mobility supine-sit  -     Supine-Sit [place] (Bed Mobility) contact guard;verbal cues  -     Comment, (Bed Mobility) Assist to manage lines  -[facility] Name 07/24/24 1536          Transfers    Transfers sit-stand transfer  -     Comment, (Transfers) VC's for hand placement, completed x 2.  -[facility] Name 07/24/24 1536          Sit-Stand Transfer    Sit-Stand [place] (Transfers) contact guard;verbal cues  -     Assistive Device (Sit-Stand Transfers) walker, front-wheeled  -[facility] Name 07/24/24 1536          Functional Mobility    Functional Mobility- Ind. Level contact guard  assist;verbal cues required  -     Functional Mobility-Distance (Feet) 45  -     Functional Mobility- Comment Ambulated in room to simulate household distances needed for ADL participation. Slow pace.  -               User Key  (r) = Recorded By, (t) = Taken By, (c) = Cosigned By      Initials Name Provider Type    Teresa Saleh OT Occupational Therapist                   Obj/Interventions       Row Name 07/24/24 1538          Balance    Balance Assessment sitting static balance;sitting dynamic balance;standing static balance;standing dynamic balance  -     Static Sitting Balance independent  -     Dynamic Sitting Balance supervision  -     Position, Sitting Balance unsupported;sitting edge of bed;sitting in chair  -     Static Standing Balance standby assist  -     Dynamic Standing Balance contact guard  -     Position/Device Used, Standing Balance supported;walker, front-wheeled  -     Balance Interventions sitting;standing;sit to stand;supported;weight shifting activity  -     Comment, Balance CGA for safety  -               User Key  (r) = Recorded By, (t) = Taken By, (c) = Cosigned By      Initials Name Provider Type    Teresa Saleh OT Occupational Therapist                   Goals/Plan    No documentation.                  Clinical Impression       Row Name 07/24/24 1538          Pain Assessment    Pretreatment Pain Rating 3/10  -     Posttreatment Pain Rating 3/10  -     Pain Location - Side/Orientation Right  -     Pain Location incisional  -     Pain Location - groin  -     Pain Intervention(s) Repositioned;Ambulation/increased activity  -     Additional Documentation Pain Scale: Word Pre/Post-Treatment (Group)  -       Row Name 07/24/24 1538          Plan of Care Review    Plan of Care Reviewed With patient  -     Progress no change  -     Outcome Evaluation Pt. progressing towards baseline with ADLs and functional mobility. Ambulated 45' with FWW and  CGA to simulate household distances needed for ADL participation. Pt. completed STS x 2 with CGA and FWW. Will continue to progress as able.  -       Row Name 07/24/24 1538          Positioning and Restraints    Post Treatment Position bed  -LC     In Bed notified nsg;fowlers;call light within reach;encouraged to call for assist;exit alarm on  -               User Key  (r) = Recorded By, (t) = Taken By, (c) = Cosigned By      Initials Name Provider Type     Teresa Ramirez OT Occupational Therapist                   Outcome Measures       Row Name 07/24/24 1545          How much help from another is currently needed...    Putting on and taking off regular lower body clothing? 3  -LC     Bathing (including washing, rinsing, and drying) 3  -LC     Toileting (which includes using toilet bed pan or urinal) 3  -LC     Putting on and taking off regular upper body clothing 3  -LC     Taking care of personal grooming (such as brushing teeth) 4  -LC     Eating meals 4  -     AM-PAC 6 Clicks Score (OT) 20  -       Row Name 07/24/24 0845 07/24/24 0800       How much help from another person do you currently need...    Turning from your back to your side while in flat bed without using bedrails? 4  -AS 4  -MM    Moving from lying on back to sitting on the side of a flat bed without bedrails? 3  -AS 3  -MM    Moving to and from a bed to a chair (including a wheelchair)? 3  -AS 3  -MM    Standing up from a chair using your arms (e.g., wheelchair, bedside chair)? 3  -AS 3  -MM    Climbing 3-5 steps with a railing? 2  -AS 2  -MM    To walk in hospital room? 3  -AS 3  -MM    AM-PAC 6 Clicks Score (PT) 18  -AS 18  -MM    Highest Level of Mobility Goal 6 --> Walk 10 steps or more  -AS 6 --> Walk 10 steps or more  -MM      Row Name 07/24/24 1545 07/24/24 0845       Functional Assessment    Outcome Measure [facility] AM-PAC 6 Clicks Daily Activity (OT)  - AM-PAC 6 Clicks Basic Mobility (PT)  -AS              User Key  (r) =  Recorded By, (t) = Taken By, (c) = Cosigned By      Initials Name Provider Type    AS Sakshi Bentley PTA Physical Therapist Assistant    Teresa Saleh, OT Occupational Therapist    Linda Shepherd, RN Registered Nurse                    Occupational Therapy Education       Title: PT OT SLP Therapies (In Progress)       Topic: Occupational Therapy (In Progress)       Point: ADL training (Done)       Description:   Instruct learner(s) on proper safety adaptation and remediation techniques during self care or transfers.   Instruct in proper use of assistive devices.                  Learning Progress Summary             Patient Acceptance, E, VU by ASHLEY at 7/24/2024 0943    Acceptance, E, VU by VANESA at 7/18/2024 0917                         Point: Home exercise program (Done)       Description:   Instruct learner(s) on appropriate technique for monitoring, assisting and/or progressing therapeutic exercises/activities.                  Learning Progress Summary             Patient Acceptance, E, VU by ASHLEY at 7/24/2024 0943                         Point: Precautions (In Progress)       Description:   Instruct learner(s) on prescribed precautions during self-care and functional transfers.                  Learning Progress Summary             Patient Acceptance, E, NR by  at 7/24/2024 1418    Acceptance, E, VU by MM at 7/24/2024 0943    Acceptance, E, VU by VANESA at 7/18/2024 0917                         Point: Body mechanics (In Progress)       Description:   Instruct learner(s) on proper positioning and spine alignment during self-care, functional mobility activities and/or exercises.                  Learning Progress Summary             Patient Acceptance, E, NR by  at 7/24/2024 1418    Acceptance, E, VU by MM at 7/24/2024 0943                                         User Key       Initials Effective Dates Name Provider Type Discipline     06/16/21 -  Teresa Ramirez, OT Occupational Therapist OT    VANESA  21 -  Nena Martinez OT Occupational Therapist OT    MM 24 -  Linda Winston, RN Registered Nurse Nurse                  OT Recommendation and Plan     Plan of Care Review  Plan of Care Reviewed With: patient  Progress: no change  Outcome Evaluation: Pt. progressing towards baseline with ADLs and functional mobility. Ambulated 45' with FWW and CGA to simulate household distances needed for ADL participation. Pt. completed STS x 2 with CGA and FWW. Will continue to progress as able.     Time Calculation:         Time Calculation- OT       Row Name 24 1546 24 0846          Time Calculation- OT    OT Start Time 1418  -LC --     OT Received On 24  - --     OT Goal Re-Cert Due Date 24  - --        Timed Charges    44671 - Gait Training Minutes  -- 8  -AS     31433 - OT Therapeutic Activity Minutes 17  -LC --        Total Minutes    Timed Charges Total Minutes 17  -LC 8  -AS      Total Minutes 17  -LC 8  -AS               User Key  (r) = Recorded By, (t) = Taken By, (c) = Cosigned By      Initials Name Provider Type    AS Sakshi Bentley PTA Physical Therapist Assistant     Teresa Ramirez OT Occupational Therapist                  Therapy Charges for Today       Code Description Service Date Service Provider Modifiers Qty    50933928308 HC OT THERAPEUTIC ACT EA 15 MIN 2024 Teresa Ramirez OT GO 1                 Teresa Ramirez OT  2024    Electronically signed by Teresa Ramirez OT at 24 1547                    Sakshi Bentley PTA   Physical Therapist Assistant  Physical Therapy     Therapy Treatment Note     Signed     Date of Service: 24 0750  Creation Time: 24 0846     Signed       Expand All Collapse All    Patient Name: Nestor Nye              : 1945                      MRN: [id number]                              Today's Date: 2024                                   Admit Date: 2024                         Visit Dx:   Visit Diagnosis       ICD-10-CM ICD-9-CM   1. Dehiscence of operative wound, initial encounter  T81.31XA 998.32   2. Superficial femoral artery occlusion  I70.209 440.20   3. Rheumatoid arthritis involving multiple sites, unspecified whether rheumatoid factor present  M06.9 714.0         Problem List       Patient Active Problem List   Diagnosis    Hypothyroidism    Mixed hyperlipidemia    Elevated PSA    Benign essential hypertension    Acute UTI (urinary tract infection)    BPH with obstruction/lower urinary tract symptoms    Anemia of chronic disease    Rheumatoid arthritis    Secondary hyperparathyroidism    Stage 3a chronic kidney disease    Gouty arthropathy    No post-op complications    Bilateral epididymitis    Wound dehiscence, surgical, initial encounter    Superficial femoral artery occlusion    Dehiscence of operative wound    Moderate malnutrition         Medical History        Past Medical History:   Diagnosis Date    Acute maxillary sinusitis      Arthritis      Disease of thyroid gland      Enlarged prostate      GERD (gastroesophageal reflux disease)      Gout      Hypertension      Impaired functional mobility, balance, gait, and endurance      PONV (postoperative nausea and vomiting)      Renal disorder      Rheumatoid arthritis      Right inguinal hernia 10/28/2021     Added automatically from request for surgery [id number]    Vocal cord anomaly 2007     had problem with enlarge vocal cord after low back surgery, changed his voice since then         Surgical History         Past Surgical History:   Procedure Laterality Date    ABDOMINAL SURGERY        ARTERIOGRAM Right 7/22/2024     Procedure: LOWER EXTREMITY ANGIOGRAM RIGHT;  Surgeon: Kingsley Morris MD;  Location: [facility];  Service: Vascular;  Laterality: Right;  DOSE: 56 MGY; FLURO: 7 MIN 6 SEC; CONTRAST 40 ML    BACK SURGERY        BELPHAROPTOSIS REPAIR Bilateral      BRAIN SURGERY        COLON SURGERY         bowel  resection x2 per pt    CRANIOTOMY FOR SUBDURAL HEMATOMA         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    DIAGNOSTIC LAPAROSCOPY N/A 05/04/2022     Procedure: DIAGNOSTIC LAPAROSCOPY CONVERTED TO EXPLORATORY LAPAROTOMY WITH SMALL BOWEL RESECTION;  Surgeon: Maximo Bennett MD;  Location:  [facility];  Service: General;  Laterality: N/A;  CONVERTED TO EXPLORATORY LAPAROTOMY AT 1912    EXPLORATORY LAPAROTOMY N/A 04/16/2022     Procedure: LAPAROTOMY EXPLORATORY WITH SMALL BOWEL OBSTRUCTION;  Surgeon: Maximo Bennett MD;  Location:  [facility];  Service: General;  Laterality: N/A;    [facility]    FEMORAL ENDARTERECTOMY Right 6/21/2024     Procedure: FEMORAL EMBELECTOMY RIGHT;  Surgeon: Kingsley Morris MD;  Location:  [facility];  Service: Vascular;  Laterality: Right;    FOREIGN BODY REMOVAL Right       removal of metal fragments from RUQ after electrocution and fall.      HERNIA REPAIR        INGUINAL HERNIA REPAIR Right 11/01/2021     Procedure: INGUINAL HERNIA REPAIR WITH MESH AND EXCISION OF SPERMATIC CORD LIPOMA;  Surgeon: Rosey Glover MD;  Location:  [facility];  Service: General;  Laterality: Right;    LEG DEBRIDEMENT Right 7/22/2024     Procedure: GROIN DEBRIDEMENT WITH WOUND VAC PLACEMENT  RIGHT;  Surgeon: Kingsley Morris MD;  Location:  [facility];  Service: Vascular;  Laterality: Right;    LUMBAR SPINE SURGERY        PROSTATE AQUABLATION N/A 6/21/2024     Procedure: AQUABLATION OF PROSTATE;  Surgeon: Prieto Rey MD;  Location:  [facility];  Service: [employer] - Urology;  Laterality: N/A;    SKIN GRAFT Right       RUE skin grafting after electrocution    THORACIC SPINE SURGERY               General Information         Row Name 07/24/24 0841                 Physical Therapy Time and Intention     Document Type therapy note (daily note)  -AS       Mode of Treatment physical therapy  -AS          Row Name 07/24/24 0841                  General Information     Patient Profile Reviewed yes  -AS       Existing Precautions/Restrictions fall;other (see comments)  R groin wound w/wound vac  -AS       Barriers to Rehab medically complex;previous functional deficit;visual deficit  -AS          Row Name 07/24/24 0841                 Cognition     Orientation Status (Cognition) oriented x 3  -AS          Row Name 07/24/24 0841                 Safety Issues, Functional Mobility     Safety Issues Affecting Function (Mobility) awareness of need for assistance;insight into deficits/self-awareness;safety precaution awareness;safety precautions follow-through/compliance;sequencing abilities  -AS       Impairments Affecting Function (Mobility) balance;endurance/activity tolerance;pain;postural/trunk control;range of motion (ROM);strength;visual/perceptual  -AS       Comment, Safety Issues/Impairments (Mobility) alert and following commands  -AS                       User Key  (r) = Recorded By, (t) = Taken By, (c) = Cosigned By        Initials Name Provider Type     AS Sakshi Bentley PTA Physical Therapist Assistant                            Mobility         Row Name 07/24/24 0842                 Bed Mobility     Supine-Sit [place] (Bed Mobility) verbal cues;contact guard;1 person assist  -AS       Assistive Device (Bed Mobility) head of bed elevated;bed rails  -AS       Comment, (Bed Mobility) increased time and effort to reach EOB due to scrotal pain  -AS          Row Name 07/24/24 0842                 Transfers     Comment, (Transfers) cues for hand placement  -AS          Row Name 07/24/24 0842                 Bed-Chair Transfer     Bed-Chair [place] (Transfers) verbal cues;minimum assist (75% patient effort);1 person assist  -AS       Assistive Device (Bed-Chair Transfers) walker, front-wheeled  -AS          Row Name 07/24/24 0842                 Sit-Stand Transfer     Sit-Stand [place] (Transfers) verbal cues;contact guard;1 person  assist  -AS       Assistive Device (Sit-Stand Transfers) walker, front-wheeled  -AS          Row Name 07/24/24 0842                 Gait/Stairs (Locomotion)     [place] Level (Gait) verbal cues;minimum assist (75% patient effort);1 person assist  -AS       Assistive Device (Gait) walker, front-wheeled  -AS       Distance in Feet (Gait) 5  -AS       Deviations/Abnormal Patterns (Gait) bilateral deviations;base of support, narrow;rere decreased;stride length decreased;weight shifting decreased;antalgic;right sided deviations  -AS       Bilateral Gait Deviations heel strike decreased  -AS       Comment, (Gait/Stairs) patient took 5 steps to recliner with Min assist x1 and B UE support on walker, pt declined further mobility due to R groin and scrotal pain.  -AS                       User Key  (r) = Recorded By, (t) = Taken By, (c) = Cosigned By        Initials Name Provider Type     AS Sakshi Bentley PTA Physical Therapist Assistant                            Obj/Interventions         Row Name 07/24/24 0844                 Motor Skills     Therapeutic Exercise knee;ankle  -AS          Row Name 07/24/24 0844                 Knee (Therapeutic Exercise)     Knee (Therapeutic Exercise) strengthening exercise  -AS       Knee Strengthening (Therapeutic Exercise) bilateral;LAQ (long arc quad);sitting;10 repetitions  -AS          Row Name 07/24/24 0844                 Ankle (Therapeutic Exercise)     Ankle AROM (Therapeutic Exercise) bilateral;dorsiflexion;plantarflexion;sitting;10 repetitions  -AS          Row Name 07/24/24 0844                 Balance     Dynamic Standing Balance verbal cues;minimal assist;1-person assist  -AS       Position/Device Used, Standing Balance supported;walker, front-wheeled  -AS       Comment, Balance min assist for safety  -AS                       User Key  (r) = Recorded By, (t) = Taken By, (c) = Cosigned By        Initials Name Provider Type     AS Sakshi Bentley PTA  Physical Therapist Assistant                            Goals/Plan    No documentation.                        Clinical Impression         Row Name 07/24/24 0844                 Pain     Pretreatment Pain Rating 5/10  -AS       Posttreatment Pain Rating 5/10  -AS       Pain Location - Side/Orientation Right  -AS       Pain Location - groin  -AS       Pain Intervention(s) Repositioned;Ambulation/increased activity  -AS          Row Name 07/24/24 0844                 Plan of Care Review     Plan of Care Reviewed With patient  -AS       Progress no change  -AS       Outcome Evaluation patient took 5 steps to recliner with Min assist x1 and B UE support on walker, pt declined further mobility due to R groin and scrotal pain. Recommend IRF at D/C for best functional outcome.  -AS          Row Name 07/24/24 0844                 Positioning and Restraints     Pre-Treatment Position in bed  -AS       Post Treatment Position chair  -AS       In Chair reclined;call light within reach;encouraged to call for assist;exit alarm on;waffle cushion;legs elevated  -AS                       User Key  (r) = Recorded By, (t) = Taken By, (c) = Cosigned By        Initials Name Provider Type     AS Sakshi Bentley, MICAH Physical Therapist Assistant                            Outcome Measures         Row Name 07/24/24 0845                 How much help from another person do you currently need...     Turning from your back to your side while in flat bed without using bedrails? 4  -AS       Moving from lying on back to sitting on the side of a flat bed without bedrails? 3  -AS       Moving to and from a bed to a chair (including a wheelchair)? 3  -AS       Standing up from a chair using your arms (e.g., wheelchair, bedside chair)? 3  -AS       Climbing 3-5 steps with a railing? 2  -AS       To walk in hospital room? 3  -AS       AM-PAC 6 Clicks Score (PT) 18  -AS       Highest Level of Mobility Goal 6 --> Walk 10 steps or more  -AS           Row Name 07/24/24 0845                 Functional Assessment     Outcome Measure Options AM-PAC 6 Clicks Basic Mobility (PT)  -AS                       User Key  (r) = Recorded By, (t) = Taken By, (c) = Cosigned By        Initials Name Provider Type     AS Sakshi Bentley, MICAH Physical Therapist Assistant                          Physical Therapy Education            Title: PT OT SLP Therapies (In Progress)         Topic: Physical Therapy (In Progress)         Point: Mobility training (In Progress)         Learning Progress Summary               Patient Acceptance, E, NR by AS at 7/24/2024 0845     Eager, E,D,H, NR by DM at 7/18/2024 1222                               Point: Home exercise program (In Progress)         Learning Progress Summary               Patient Acceptance, E, NR by AS at 7/24/2024 0845     Eager, E,D,H, NR by DM at 7/18/2024 1222                               Point: Body mechanics (In Progress)         Learning Progress Summary               Patient Acceptance, E, NR by AS at 7/24/2024 0845     Eager, E,D,H, NR by DM at 7/18/2024 1222                               Point: Precautions (In Progress)         Learning Progress Summary               Patient Acceptance, E, NR by AS at 7/24/2024 0845     Eager, E,D,H, NR by DM at 7/18/2024 1222                                                   User Key         Initials Effective Dates Name Provider Type Discipline     DM 02/03/23 -  Emilie Arredondo, PT Physical Therapist PT     AS 04/28/23 -  Sakshi Bentley, MICAH Physical Therapist Assistant PT                          PT Recommendation and Plan  Plan of Care Reviewed With: patient  Progress: no change  Outcome Evaluation: patient took 5 steps to recliner with Min assist x1 and B UE support on walker, pt declined further mobility due to R groin and scrotal pain. Recommend IRF at D/C for best functional outcome.      Time Calculation:        PT Charges         Row Name 07/24/24 0846                        Time Calculation     Start Time 0750  -AS         PT Received On 07/24/24  -AS         PT Goal Re-Cert Due Date 07/28/24  -AS                 Timed Charges     25786 - Gait Training Minutes  8  -AS         22074 - PT Therapeutic Activity Minutes 15  -AS                 Total Minutes     Timed Charges Total Minutes 23  -AS          Total Minutes 23  -AS                      User Key  (r) = Recorded By, (t) = Taken By, (c) = Cosigned By        Initials Name Provider Type     AS Sakshi Bentley PTA Physical Therapist Assistant                       Therapy Charges for Today         Code Description Service Date Service Provider Modifiers Qty     63395485029 HC GAIT TRAINING EA 15 MIN 7/24/2024 Sakshi Bentley, MICAH GP 1     [id number] HC PT THERAPEUTIC ACT EA 15 MIN 7/24/2024 Sakshi Bentley, MICAH GP 1                PT G-Codes  Outcome Measure Options: AM-PAC 6 Clicks Basic Mobility (PT)  AM-PAC 6 Clicks Score (PT): 18  AM-PAC 6 Clicks Score (OT): 20     Sakshi Bentley PTA                     7/24/2024

## 2024-07-25 NOTE — PROGRESS NOTES
"          Clinical Nutrition Assessment     Patient Name: Nestor Nye  YOB: 1945  MRN: [id number]  Date of Encounter: 07/25/24 14:58 EDT  Admission date: 7/17/2024  Reason for Visit: Follow-up protocol    Assessment   Nutrition Assessment   Admission Diagnosis:  Dehiscence of operative wound [T81.31XA]    Problem List:    Dehiscence of operative wound    Hypothyroidism    Mixed hyperlipidemia    Acute UTI (urinary tract infection)    Stage 3a chronic kidney disease    Bilateral epididymitis    Wound dehiscence, surgical, initial encounter    Superficial femoral artery occlusion    Moderate malnutrition      PMH:   He  has a past medical history of Acute maxillary sinusitis, Arthritis, Disease of thyroid gland, Enlarged prostate, GERD (gastroesophageal reflux disease), Gout, Hypertension, Impaired functional mobility, balance, gait, and endurance, PONV (postoperative nausea and vomiting), Renal disorder, Rheumatoid arthritis, Right inguinal hernia (10/28/2021), and Vocal cord anomaly (2007).    PSH:  He  has a past surgical history that includes Blepharoptosis repair (Bilateral); Craniotomy for Subdural Hematoma; Lumbar spine surgery; Thoracic spine surgery; Skin graft (Right); Foreign Body Removal (Right); Inguinal Hernia Repair (Right, 11/01/2021); Exploratory Laparotomy (N/A, 04/16/2022); Abdominal surgery; Colon surgery; Brain surgery; Back surgery; Eye surgery; Hernia repair; Laparoscopy (N/A, 05/04/2022); PROSTATE AQUABLATION (N/A, 6/21/2024); Femoral Endarterectomy (Right, 6/21/2024); Arteriogram (Right, 7/22/2024); and Leg Debridement (Right, 7/22/2024).    Applicable Nutrition History:   6/21/24 right femoral endarterectomy  6/21/24 prostate aquablation    Anthropometrics     Height: Height: 170.2 cm (67\")  Last Filed Weight: Weight: 74.8 kg (165 lb) (07/17/24 1708)  Method: Weight Method: Stated  BMI: BMI (Calculated): 25.8    UBW:  165# per patient  Weight change: weight gain of " 13 lbs (8.6%) over 1 year(s)    Significant?  No    Nutrition Focused Physical Exam    Date:  7/18       Patient meets criteria for malnutrition diagnosis, see MSA note.     Subjective   Reported/Observed/Food/Nutrition Related History:     7/25) Pt reports not eating very well, states medication he takes affects taste of food. Pt enjoying the Boost with breakfast, wanting strawberry flavor if available. Pt states he does not like the pancakes as they are whole wheat, and eats sourdough pancakes at home. Pt dislikes oatmeal as he reports it causes him diarrhea. Willing to try Magic Cup with dinner for additional calories.    7/18)Patient reports his appetite was well PTA. States he may have lost some weight based on his appearance, noticed loss in his arms mostly. However EMR does not reflect weight loss. Reports chewing difficulty d/t only having one tooth but states he chews food very well. He dislikes soft-to-chew diet as he's had it previously, declines diet modification. States he drinks ensure on occasion, accepting of Boost Plus. Pt states he struggles with reflux, but manages it well by sitting up after meals. NKFA.     Current Nutrition Prescription   PO: Diet: Cardiac; Healthy Heart (2-3 Na+); Fluid Consistency: Thin (IDDSI 0)  Oral Nutrition Supplement: Boost Plus 1x/day  Intake: Insufficient data No intakes documented    Assessment & Plan   Nutrition Diagnosis   Date:  7/18            Updated:    Problem Malnutrition chronic non-severe   Etiology Decreased appetite with some wt loss   Signs/Symptoms severe muscle wasting and moderate fat loss        Goal:   Nutrition to support treatment and Establish PO      Nutrition Intervention      Follow treatment progress, Care plan reviewed, Advise alternate selection, Advised available snacks, Interview for preferences, Encourage intake, Supplement provided    Pt meets criteria for malnutrition, see MSA.  - Continue Boost Plus daily w/ breakfast and encourage PO  intakes  - Trial Magic Cup daily w/ dinner    Monitoring/Evaluation:   Per protocol, I&O, PO intake, Supplement intake, Weight, Skin status, Symptoms, POC/GOC    Collette Petersen RD eligible  Time Spent: 30 min

## 2024-07-25 NOTE — PROGRESS NOTES
[facility] Medicine Services  INPATIENT PROGRESS NOTE    Date of Admission: 7/17/2024  Primary Care Physician: Roxanne Slater MD    Subjective     Chief Complaint: groin dehiscence    HPI:  Doing ok. Resting in bed. Reports swollen testicles is his biggest complaint. Getting up and ambulating oob.       Review Of Systems:   Gen- No fevers, chills  CV- No chest pain, palpitations  Resp- No cough, dyspnea  GI- No N/V/D, abd pain      Objective      Vitals:  Temp:  [97.9 °F (36.6 °C)-98.9 °F (37.2 °C)] 98.3 °F (36.8 °C)  Heart Rate:  [81-91] 81  Resp:  [16-18] 18  BP: (103-116)/(60-70) 109/70  Flow (L/min):  [0] 0    Physical exam:    GEN: NAD, resting in bed, doing well today   HEENT: on room air, atraumatic, normocephalic  NECK: supple, no masses  RESP: on room air, normal effort  CV: on tele, sinus rhythm  PSYCH: normal affect, appropriate  NEURO: awake, alert, no focal deficits noted  MSK: no edema noted  SKIN: no rashes noted , wound vac in place   : stacy in place cyu      Results Review:    I have reviewed the labs, radiology results and diagnostic studies.    Results from last 7 days   Lab Units 07/25/24  0449 07/24/24  0630 07/23/24  1003   WBC 10*3/mm3 13.28* 11.23* 10.37   HEMOGLOBIN g/dL 7.9* 7.8* 7.5*   HEMATOCRIT % 24.0* 24.1* 23.0*   PLATELETS 10*3/mm3 428 376 348     Results from last 7 days   Lab Units 07/25/24  0449 07/24/24  0630 07/23/24  1003   SODIUM mmol/L 140 140 140   POTASSIUM mmol/L 4.5 4.4 4.3   CHLORIDE mmol/L 107 107 106   CO2 mmol/L 24.0 24.0 24.0   BUN mg/dL 15 15 18   CREATININE mg/dL 1.44* 1.58* 1.53*   GLUCOSE mg/dL 99 99 118*   CALCIUM mg/dL 8.5* 8.6 8.5*   ALK PHOS U/L 85 71 75   ALT (SGPT) U/L 32 20 12   AST (SGOT) U/L 41* 27 15       Microbiology Results Abnormal       Procedure Component Value - Date/Time    Blood Culture - Blood, Hand, Right [[id number]]  (Normal) Collected: 07/20/24 1201    Lab Status: Preliminary result Specimen: Blood from Hand,  Right Updated: 07/24/24 1230     Blood Culture No growth at 4 days    Narrative:      Less than seven (7) mL's of blood was collected.  Insufficient quantity may yield false negative results.    Blood Culture - Blood, Arm, Left [[id number]]  (Normal) Collected: 07/20/24 1201    Lab Status: Preliminary result Specimen: Blood from Arm, Left Updated: 07/24/24 1230     Blood Culture No growth at 4 days    Blood Culture - Blood, Arm, Left [[id number]]  (Normal) Collected: 07/17/24 2007    Lab Status: Final result Specimen: Blood from Arm, Left Updated: 07/22/24 2030     Blood Culture No growth at 5 days    Blood Culture - Blood, Hand, Left [[id number]]  (Normal) Collected: 07/17/24 2008    Lab Status: Final result Specimen: Blood from Hand, Left Updated: 07/22/24 2030     Blood Culture No growth at 5 days          No radiology results for the last day      I have reviewed the medications.    Assessment/Plan     Assessment/Problem List    Dehiscence of operative wound    Hypothyroidism    Mixed hyperlipidemia    Acute UTI (urinary tract infection)    Stage 3a chronic kidney disease    Bilateral epididymitis    Wound dehiscence, surgical, initial encounter    Superficial femoral artery occlusion    Moderate malnutrition      Nestor Nye is a 78 y.o. male with PMH of enlarged prostate, GERD, Gout, HTN, RA, craniotomy for subdural hematoma, right femoral endarterectomy 6/21/24, prostate aquablation 6/21/24. Patient was discharged from [facility] on 6/26/24 and went to rehab., He presented 7/17/24with 3 days of erythema and then foul smell and the incision opened on 7/17/24    This patient's problems and plans were partially entered by my partner and updated as appropriate by me 07/21/24.      Assessment/Plan:    Bilateral epididymitis   Right groin dehiscence   S/p Aquablation 6/21/24 complicated by SFA occlusion that required right femoral embolectomy by Dr Morris And hematuria requiring hilario hypogastric embolization.    Chronic urinary retention with stacy  -- consulted urology, they have seen with no acute intervention planned   -- blood cultures x 2 NTD  -- consult PT wound  -- cont zosyn  -- appreciate [facility]   --7/20:ncreased in WBC 7/20 maybe stress, but procal remains elevated at 0.45, lactic acid reassuring, hypotensive so will give IVFs and monitor -repeat blood cultures- thus far NGT  - patient s/p OR with Dr Morris 7/22- right SFA angioplasty/stenting, right CFA aspiration thrombectomy/angioplasty - patient with wound vac in place, now off heparin gtt     Recurrent SFA occlusion   S/p Right Femoral embolectomy  on 6/21/24  --  CT A/P showed occlusion of Right SFA   --  heparin drip now off post op, now on dvt ppx given H/H stable      HTN  HLD  -- cont norvasc with parameters  --stable today      Hypothyroidism   -- cont home meds      CKDIII  -- baseline creat 1.4-1.6    Penoscrotal irriation/swelling following aqua ablation therapy  --urology has seen this admission, recommended keeping appointment with Dr Rey in 2 weeks for catheter removal and repeat voiding trial, keep stacy until that time        VTE Prophylaxis:  Pharmacologic VTE prophylaxis orders are present.    Home vs rehab pending progress     Expected Discharge  Expected Discharge Date: 7/25/2024; Expected Discharge Time:     Electronically signed by Danitza Prieto MD, 07/25/24

## 2024-07-26 VITALS
TEMPERATURE: 98.4 F | DIASTOLIC BLOOD PRESSURE: 70 MMHG | RESPIRATION RATE: 18 BRPM | BODY MASS INDEX: 25.9 KG/M2 | OXYGEN SATURATION: 96 % | WEIGHT: 165 LBS | HEIGHT: 67 IN | HEART RATE: 92 BPM | SYSTOLIC BLOOD PRESSURE: 104 MMHG

## 2024-07-26 LAB
ALBUMIN SERPL-MCNC: 3.1 G/DL (ref 3.5–5.2)
ALBUMIN/GLOB SERPL: 0.8 G/DL
ALP SERPL-CCNC: 85 U/L (ref 39–117)
ALT SERPL W P-5'-P-CCNC: 31 U/L (ref 1–41)
ANION GAP SERPL CALCULATED.3IONS-SCNC: 11 MMOL/L (ref 5–15)
AST SERPL-CCNC: 25 U/L (ref 1–40)
BASOPHILS # BLD AUTO: 0.08 10*3/MM3 (ref 0–0.2)
BASOPHILS NFR BLD AUTO: 0.7 % (ref 0–1.5)
BILIRUB SERPL-MCNC: 0.2 MG/DL (ref 0–1.2)
BUN SERPL-MCNC: 15 MG/DL (ref 8–23)
BUN/CREAT SERPL: 9.6 (ref 7–25)
CALCIUM SPEC-SCNC: 9.1 MG/DL (ref 8.6–10.5)
CHLORIDE SERPL-SCNC: 103 MMOL/L (ref 98–107)
CO2 SERPL-SCNC: 24 MMOL/L (ref 22–29)
CREAT SERPL-MCNC: 1.57 MG/DL (ref 0.76–1.27)
DEPRECATED RDW RBC AUTO: 47.7 FL (ref 37–54)
EGFRCR SERPLBLD CKD-EPI 2021: 45.1 ML/MIN/1.73
EOSINOPHIL # BLD AUTO: 0.2 10*3/MM3 (ref 0–0.4)
EOSINOPHIL NFR BLD AUTO: 1.8 % (ref 0.3–6.2)
ERYTHROCYTE [DISTWIDTH] IN BLOOD BY AUTOMATED COUNT: 13.5 % (ref 12.3–15.4)
GLOBULIN UR ELPH-MCNC: 4 GM/DL
GLUCOSE SERPL-MCNC: 115 MG/DL (ref 65–99)
HCT VFR BLD AUTO: 26.8 % (ref 37.5–51)
HGB BLD-MCNC: 8.5 G/DL (ref 13–17.7)
IMM GRANULOCYTES # BLD AUTO: 0.06 10*3/MM3 (ref 0–0.05)
IMM GRANULOCYTES NFR BLD AUTO: 0.6 % (ref 0–0.5)
LYMPHOCYTES # BLD AUTO: 2.08 10*3/MM3 (ref 0.7–3.1)
LYMPHOCYTES NFR BLD AUTO: 19.1 % (ref 19.6–45.3)
MCH RBC QN AUTO: 30.6 PG (ref 26.6–33)
MCHC RBC AUTO-ENTMCNC: 31.7 G/DL (ref 31.5–35.7)
MCV RBC AUTO: 96.4 FL (ref 79–97)
MONOCYTES # BLD AUTO: 1.01 10*3/MM3 (ref 0.1–0.9)
MONOCYTES NFR BLD AUTO: 9.3 % (ref 5–12)
NEUTROPHILS NFR BLD AUTO: 68.5 % (ref 42.7–76)
NEUTROPHILS NFR BLD AUTO: 7.44 10*3/MM3 (ref 1.7–7)
NRBC BLD AUTO-RTO: 0 /100 WBC (ref 0–0.2)
PLATELET # BLD AUTO: 528 10*3/MM3 (ref 140–450)
PMV BLD AUTO: 9.6 FL (ref 6–12)
POTASSIUM SERPL-SCNC: 4.2 MMOL/L (ref 3.5–5.2)
PROT SERPL-MCNC: 7.1 G/DL (ref 6–8.5)
RBC # BLD AUTO: 2.78 10*6/MM3 (ref 4.14–5.8)
SODIUM SERPL-SCNC: 138 MMOL/L (ref 136–145)
WBC NRBC COR # BLD AUTO: 10.87 10*3/MM3 (ref 3.4–10.8)

## 2024-07-26 PROCEDURE — 97535 SELF CARE MNGMENT TRAINING: CPT

## 2024-07-26 PROCEDURE — 25010000002 HEPARIN (PORCINE) PER 1000 UNITS: Performed by: INTERNAL MEDICINE

## 2024-07-26 PROCEDURE — 99239 HOSP IP/OBS DSCHRG MGMT >30: CPT | Performed by: INTERNAL MEDICINE

## 2024-07-26 PROCEDURE — 80053 COMPREHEN METABOLIC PANEL: CPT | Performed by: INTERNAL MEDICINE

## 2024-07-26 PROCEDURE — 85025 COMPLETE CBC W/AUTO DIFF WBC: CPT | Performed by: INTERNAL MEDICINE

## 2024-07-26 PROCEDURE — 97597 DBRDMT OPN WND 1ST 20 CM/<: CPT

## 2024-07-26 RX ORDER — BISACODYL 5 MG/1
5 TABLET, DELAYED RELEASE ORAL DAILY PRN
Start: 2024-07-26 | End: 2024-08-12

## 2024-07-26 RX ORDER — DOXYCYCLINE 100 MG/1
100 CAPSULE ORAL EVERY 12 HOURS SCHEDULED
Qty: 40 CAPSULE | Refills: 0 | Status: SHIPPED | OUTPATIENT
Start: 2024-07-26 | End: 2024-08-12 | Stop reason: ALTCHOICE

## 2024-07-26 RX ORDER — CEFUROXIME AXETIL 500 MG/1
500 TABLET ORAL EVERY 12 HOURS SCHEDULED
Qty: 39 TABLET | Refills: 0 | Status: SHIPPED | OUTPATIENT
Start: 2024-07-26 | End: 2024-08-12

## 2024-07-26 RX ORDER — HYDROCODONE BITARTRATE AND ACETAMINOPHEN 5; 325 MG/1; MG/1
1 TABLET ORAL EVERY 6 HOURS PRN
Qty: 12 TABLET | Refills: 0 | Status: SHIPPED | OUTPATIENT
Start: 2024-07-26 | End: 2024-07-29

## 2024-07-26 RX ADMIN — FINASTERIDE 5 MG: 5 TABLET, FILM COATED ORAL at 08:20

## 2024-07-26 RX ADMIN — LEVOTHYROXINE SODIUM 88 MCG: 88 TABLET ORAL at 06:09

## 2024-07-26 RX ADMIN — DOXYCYCLINE 100 MG: 100 CAPSULE ORAL at 09:37

## 2024-07-26 RX ADMIN — Medication 10 ML: at 08:20

## 2024-07-26 RX ADMIN — ASPIRIN 81 MG CHEWABLE TABLET 81 MG: 81 TABLET CHEWABLE at 08:20

## 2024-07-26 RX ADMIN — HYDROCODONE BITARTRATE AND ACETAMINOPHEN 1 TABLET: 5; 325 TABLET ORAL at 09:36

## 2024-07-26 RX ADMIN — HEPARIN SODIUM 5000 UNITS: 5000 INJECTION INTRAVENOUS; SUBCUTANEOUS at 06:09

## 2024-07-26 RX ADMIN — CEFUROXIME AXETIL 500 MG: 250 TABLET, FILM COATED ORAL at 08:20

## 2024-07-26 RX ADMIN — FERROUS SULFATE TAB 325 MG (65 MG ELEMENTAL FE) 325 MG: 325 (65 FE) TAB at 08:20

## 2024-07-26 RX ADMIN — AMLODIPINE BESYLATE 2.5 MG: 2.5 TABLET ORAL at 08:20

## 2024-07-26 NOTE — PLAN OF CARE
Goal Outcome Evaluation:  Plan of Care Reviewed With: patient        Progress: improving  Outcome Evaluation: Pt continues to make progress towards all OT goals appropriately and is motivated to regain independence. Pt continues to present with impaired balance, decreased activity tolerance and impaired ADLs warranting skilled OT services. Cont POC.      Anticipated Discharge Disposition (OT): inpatient rehabilitation facility

## 2024-07-26 NOTE — DISCHARGE PLACEMENT REQUEST
"KARIE CARSON, RN    P:  [phone]  F:  [phone]                Ervin Mackey (78 y.o. Male)       Date of Birth   1945    Social Security Number       Address   [address] [place] [address]    Home Phone   [phone]    MRN   [id number]       [facility]    Marital Status                               Admission Date   24    Admission Type   Urgent    Admitting Provider   Danitza Prieto MD    Attending Provider   Danitza Prieto MD    Department, Room/Bed   [address], S452/1       Discharge Date       Discharge Disposition   Rehab Facility or Unit (DC - External)    Discharge Destination                                 Attending Provider: Danitza Prieto MD    Allergies: No Known Allergies    Isolation: Contact   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)   Code Status: CPR    Ht: 170.2 cm (67\")   Wt: 74.8 kg (165 lb)    Admission Cmt: None   Principal Problem: Dehiscence of operative wound [T81.31XA]                   Active Insurance as of 2024       Primary Coverage       Payor Plan Insurance Group Employer/Plan Group    ANTHEM MEDICARE REPLACEMENT ANTHEM MEDICARE ADVANTAGE [id number]       Payor Plan Address Payor Plan Phone Number Payor Plan Fax Number Effective Dates    [id number] [phone]  2016 - None Entered    [facility] [id number]         Subscriber Name Subscriber Birth Date Member ID       ERVIN MACKEY 1945 [id number]                     Emergency Contacts        (Rel.) Home Phone Work Phone Mobile Phone    [employer] (Brother) [phone] -- --                 Discharge Summary        Danitza Prieto MD at [address] [place] [address] Medicine Services  DISCHARGE SUMMARY    Patient Name: Ervin Mackey  : 1945  MRN: [id number]    Date of Admission: 2024  4:47 PM  Date of Discharge:  24  Primary Care Physician: " Roxanne Slater MD    Consults       Date and Time Order Name Status Description    7/17/2024  6:31 PM Inpatient Urology Consult      7/17/2024  6:29 PM Inpatient Vascular Surgery Consult Completed     7/17/2024  6:29 PM Inpatient Infectious Diseases Consult Completed             Hospital Course     Presenting Problem: dehiscence of operative wound     Active Hospital Problems    Diagnosis  POA    **Dehiscence of operative wound [T81.31XA]  Yes    Moderate malnutrition [E44.0]  Yes    Bilateral epididymitis [N45.1]  Yes    Wound dehiscence, surgical, initial encounter [T81.31XA]  Yes    Superficial femoral artery occlusion [I70.209]  Yes    Acute UTI (urinary tract infection) [N39.0]  Yes    Stage 3a chronic kidney disease [N18.31]  Yes    Hypothyroidism [E03.9]  Yes    Mixed hyperlipidemia [E78.2]  Yes      Resolved Hospital Problems   No resolved problems to display.          Hospital Course:  Nestor Nye is a 78 y.o. male with PMH of enlarged prostate, GERD, Gout, HTN, RA, craniotomy for subdural hematoma, right femoral endarterectomy 6/21/24, prostate aquablation 6/21/24. Patient was discharged from [facility] on 6/26/24 and went to rehab., He presented 7/17/24with 3 days of erythema and then foul smell and the incision opened on 7/17/24     This patient's problems and plans were partially entered by my partner and updated as appropriate by me 07/21/24.      Assessment/Plan:     Bilateral epididymitis   Right groin dehiscence   S/p Aquablation 6/21/24 complicated by SFA occlusion that required right femoral embolectomy by Dr Morris And hematuria requiring hilario hypogastric embolization.   Chronic urinary retention with stacy  -- consulted urology, they have seen with no acute intervention planned , see their recommendations as below  - patient s/p OR with Dr Morris 7/22- right SFA angioplasty/stenting, right CFA aspiration thrombectomy/angioplasty - patient with wound vac in place, to continue  --patient was seen  by [facility] during admission and was placed on IV abx course, was ultimately transitioned to PO abx doxy/cefuroxime x 2weeks and f/u in the office recommendation      Recurrent SFA occlusion   S/p Right Femoral embolectomy  on 6/21/24  --  OR as above with Dr Morris      HTN  HLD  -- cont norvasc with parameters  --stable     Hypothyroidism   -- cont home meds      CKDIII  -- baseline creat 1.4-1.6     Penoscrotal irriation/swelling following aqua ablation therapy  --urology has seen this admission, recommended keeping appointment with Dr Rey in 2 weeks for catheter removal and repeat voiding trial, keep stacy until that time          Discharge Follow Up Recommendations for outpatient labs/diagnostics:   PCP 1-2 weeks  Dr Rey urology 2 weeks (has an appointment 8/06)  [facility] Dr Jones 2 weeks  Vascular surgery f/u as they schedule     Day of Discharge     HPI:   Doing well. Ready to go to rehab. No complaints at this time     Review of Systems  Gen- No fevers, chills  CV- No chest pain, palpitations  Resp- No cough, dyspnea  GI- No N/V/D, abd pain      Vital Signs:   Temp:  [98.2 °F (36.8 °C)-98.6 °F (37 °C)] 98.6 °F (37 °C)  Heart Rate:  [74-99] 89  Resp:  [18] 18  BP: (115-119)/(67-78) 119/78      Physical Exam:    GEN: NAD, resting in bed, doing well today   HEENT: on room air, atraumatic, normocephalic  NECK: supple, no masses  RESP: on room air, normal effort  CV: on tele, sinus rhythm  PSYCH: normal affect, appropriate  NEURO: awake, alert, no focal deficits noted  MSK: no edema noted  SKIN: no rashes noted , wound vac in place   : stacy in place cyu, has scrotal swelling which patient notes is improved       Pertinent  and/or Most Recent Results     LAB RESULTS:      Lab 07/26/24  1005 07/25/24  0449 07/24/24  0630 07/23/24  1003 07/22/24  0952 07/21/24  2335 07/21/24  1806 07/21/24  0820 07/20/24  1300 07/20/24  1202 07/20/24  0629 07/19/24 2040 07/19/24 2040   WBC 10.87* 13.28* 11.23* 10.37 12.57*  --   --   19.71*  --   --  21.24*   < >  --    HEMOGLOBIN 8.5* 7.9* 7.8* 7.5* 8.2*  --   --  9.0*  --   --  9.8*  --  9.2*   HEMATOCRIT 26.8* 24.0* 24.1* 23.0* 24.7*  --   --  27.9*  --   --  29.7*  --  28.3*   PLATELETS 528* 428 376 348 333  --   --  314  --   --  340   < >  --    NEUTROS ABS 7.44* 9.00* 7.71* 7.84* 9.71*  --   --  16.68*  --   --  16.34*   < >  --    IMMATURE GRANS (ABS) 0.06* 0.07* 0.07* 0.07* 0.09*  --   --  0.12*  --   --  0.14*   < >  --    LYMPHS ABS 2.08 2.45 1.94 1.30 1.49  --   --  1.50  --   --  2.24   < >  --    MONOS ABS 1.01* 1.19* 0.97* 0.94* 1.04*  --   --  1.28*  --   --  2.42*   < >  --    EOS ABS 0.20 0.48* 0.47* 0.16 0.17  --   --  0.08  --   --  0.03   < >  --    MCV 96.4 93.0 93.8 93.1 93.9  --   --  94.9  --   --  93.7   < >  --    SED RATE  --   --   --   --   --   --   --   --   --   --   --   --  82*   CRP  --  11.71*  --   --  23.74*  --   --   --   --   --  23.67*  --  17.87*   PROCALCITONIN  --   --   --   --   --   --   --   --   --   --  0.45*  --   --    LACTATE  --   --   --   --   --   --   --   --   --  1.5  --   --   --    HEPARIN ANTI-XA  --   --   --   --  0.56 0.71* 0.40 0.10* 0.40  --  0.34  --  0.20*    < > = values in this interval not displayed.         Lab 07/25/24 0449 07/24/24  0630 07/23/24  1003 07/22/24  0952 07/21/24  0820   SODIUM 140 140 140 141 138   POTASSIUM 4.5 4.4 4.3 4.1 4.1   CHLORIDE 107 107 106 106 104   CO2 24.0 24.0 24.0 20.0* 22.0   ANION GAP 9.0 9.0 10.0 15.0 12.0   BUN 15 15 18 21 20   CREATININE 1.44* 1.58* 1.53* 1.66* 1.66*   EGFR 50.0* 44.8* 46.5* 42.2* 42.2*   GLUCOSE 99 99 118* 102* 132*   CALCIUM 8.5* 8.6 8.5* 8.4* 8.4*         Lab 07/25/24 0449 07/24/24  0630 07/23/24  1003 07/22/24  0952   TOTAL PROTEIN 5.6* 5.3* 5.6* 5.4*   ALBUMIN 3.2* 3.0* 3.0* 2.9*   GLOBULIN 2.4 2.3 2.6 2.5   ALT (SGPT) 32 20 12 12   AST (SGOT) 41* 27 15 17   BILIRUBIN 0.2 0.3 0.3 0.3   ALK PHOS 85 71 75 75                     Brief Urine Lab Results  (Last  result in the past 365 days)        Color   Clarity   Blood   Leuk Est   Nitrite   Protein   CREAT   Urine HCG        07/17/24 1300 Orange   Turbid   Large (3+)   Large (3+)   Positive   100 mg/dL (2+)                 Microbiology Results (last 10 days)       Procedure Component Value - Date/Time    Blood Culture - Blood, Hand, Right [[id number]]  (Normal) Collected: 07/20/24 1201    Lab Status: Final result Specimen: Blood from Hand, Right Updated: 07/25/24 1230     Blood Culture No growth at 5 days    Narrative:      Less than seven (7) mL's of blood was collected.  Insufficient quantity may yield false negative results.    Blood Culture - Blood, Arm, Left [[id number]]  (Normal) Collected: 07/20/24 1201    Lab Status: Final result Specimen: Blood from Arm, Left Updated: 07/25/24 1230     Blood Culture No growth at 5 days    Blood Culture - Blood, Hand, Left [[id number]]  (Normal) Collected: 07/17/24 2008    Lab Status: Final result Specimen: Blood from Hand, Left Updated: 07/22/24 2030     Blood Culture No growth at 5 days    Blood Culture - Blood, Arm, Left [[id number]]  (Normal) Collected: 07/17/24 2007    Lab Status: Final result Specimen: Blood from Arm, Left Updated: 07/22/24 2030     Blood Culture No growth at 5 days    Blood Culture With RAGHAV - Blood, Arm, Left [[id number]]  (Normal) Collected: 07/17/24 1359    Lab Status: Final result Specimen: Blood from Arm, Left Updated: 07/22/24 1415     Blood Culture No growth at 5 days    Urine Culture - Urine, Indwelling Urethral Catheter [[id number]]  (Abnormal)  (Susceptibility) Collected: 07/17/24 1300    Lab Status: Final result Specimen: Urine from Indwelling Urethral Catheter Updated: 07/19/24 1642     Urine Culture >100,000 CFU/mL Klebsiella pneumoniae ssp pneumoniae    Narrative:      Colonization of the urinary tract without infection is common. Treatment is discouraged unless the patient is symptomatic, pregnant, or undergoing an invasive urologic procedure.     Susceptibility        Klebsiella pneumoniae ssp pneumoniae      GEORGINA      Amoxicillin + Clavulanate Susceptible      Ampicillin Resistant      Ampicillin + Sulbactam Susceptible      Cefazolin Susceptible      Cefepime Susceptible      Ceftazidime Susceptible      Ceftriaxone Susceptible      Gentamicin Susceptible      Levofloxacin Intermediate      Nitrofurantoin Resistant      Piperacillin + Tazobactam Susceptible      Trimethoprim + Sulfamethoxazole Resistant                                   CT Abdomen Pelvis With Contrast    Result Date: 7/17/2024  PROCEDURE: CT ABDOMEN PELVIS W CONTRAST-  HISTORY: rt inguinal surgical wound dihescense/pus, recent prostate surg, status post aqua ablation of the prostate 07/21/2024 as well as right femoral endarterectomy.  COMPARISON: 05/04/2022.  PROCEDURE: The patient was injected with IV contrast. Axial images were obtained from the lung bases to the pubic symphysis by computed tomography. This study was performed with techniques to keep radiation doses as low as reasonably achievable, (ALARA). Individualized dose reduction techniques using automated exposure control or adjustment of mA and/or kV according to the patient size were employed.  FINDINGS:  ABDOMEN: The lung bases are clear. There is a moderate hiatal hernia similar to the prior exam. Gallbladder contains dependent high attenuation material suggesting sludge or stone that is new from the prior exam. No wall thickening or infiltration of the surrounding fat identified. The heart is proper size. The liver is homogenous with no focal abnormality. The spleen is unremarkable. No adrenal mass is present.  The pancreas is unremarkable. The kidneys demonstrate bilateral circumscribed hypodense lesions consistent with cysts that are similar to the prior exam. No hydronephrosis identified. The aorta is ectatic but not aneurysmal. There is no free fluid or adenopathy.  PELVIS: The GI tract demonstrates no obstruction. Small  bowel anastomoses identified in the right side of the abdomen which are new from the prior exam and appear to be widely patent. The appendix is not identified. The urinary bladder is is collapsed around a Gillespie catheter. Tip of the catheter extends posteriorly. There is a small amount of air within the collapsed bladder. There is significant wall thickening. As was seen previously there is significant enlargement of the prostate but this now appears very inhomogeneous with areas of increased attenuation not seen previously. Given the recent procedure, this may represent hemorrhage. On the sagittal image series 4 image 39 the bulb of the Gillespie catheter appears to be located within the superior aspect of the prostate. No free fluid identified. Best seen series 2 image 71 and series 3 image 44 is complete occlusion of the right superficial femoral artery immediately distal to the takeoff of the profunda. There is infiltration of the surrounding fat. There is partial opacification of the proximal superficial femoral vein which appears similar to the left side.      Occlusion of the right SFA, Paolo Smith PA-C, notified at 12:45 p.m 07/17/2024.  Enlarged, inhomogeneous prostate with significant wall thickening of the bladder and position of the Gillespie catheter as described, consider postoperative change versus infection or postoperative hemorrhage.,  Possible gallbladder sludge or small stones.  CTDI: 5.39 mGy DLP:235.53 mGy.cm  This report was signed and finalized on 7/17/2024 1:28 PM by Kavya Pollock MD.      US Scrotum & Testicles    Result Date: 7/17/2024  PROCEDURE: US SCROTUM AND TESTICLES-  HISTORY: testicular swelling/pain, recent prostate surg & hypogastric art embolization  PROCEDURE: Ultrasound images of the testicles were obtained bilaterally. Color Doppler images were obtained.  FINDINGS: Right: There is no right intratesticular mass or torsion. There is normal color flow to the right testicle. There are  multiple large right epididymal cysts some of which contain debris. These measure up to 2.6 cm. There is increased vascularity in the right epididymis suggestive of epididymitis. There is a small right hydrocele.  Left: Blood flow seen to the left testicle. Half of the left testicle demonstrates decreased echogenicity, mass is not excluded. A urology consultation is recommended. There is increased echogenicity of the epididymis suggestive of epididymitis. There is a minimal left hydrocele.      1. No evidence of right intratesticular mass or torsion. 2. Large right epididymal cysts as described. 3. Findings suggestive of bilateral epididymitis. 4. No left testicular torsion. A hypoechoic region in the left testicle could represent possible mass. Urologic consultation is recommended.      Images were reviewed, interpreted, and dictated by Dr. Kavya Pollock MD Transcribed by Radha Reeves PA-C.  This report was signed and finalized on 7/17/2024 12:50 PM by Kavya Pollock MD.       Results for orders placed during the hospital encounter of 06/21/24    Duplex Lower Extremity Art / Grafts - Right CAR    Interpretation Summary    Occlusion of the right proximal superficial femoral artery    Flow reconstitutes distally via collaterals.  There is significantly reduced monophasic flow present in the popliteal artery and calf vessels.      Results for orders placed during the hospital encounter of 06/21/24    Duplex Lower Extremity Art / Grafts - Right CAR    Interpretation Summary    Occlusion of the right proximal superficial femoral artery    Flow reconstitutes distally via collaterals.  There is significantly reduced monophasic flow present in the popliteal artery and calf vessels.          Plan for Follow-up of Pending Labs/Results: will be called with any abnormal results which require f/u    Pending Labs       Order Current Status    Comprehensive Metabolic Panel In process          Discharge Details        Discharge  Medications        New Medications        Instructions Start Date   bisacodyl 5 MG EC tablet  Commonly known as: DULCOLAX   5 mg, Oral, Daily PRN      cefuroxime 500 MG tablet  Commonly known as: CEFTIN   500 mg, Oral, Every 12 Hours Scheduled      doxycycline 100 MG capsule  Commonly known as: MONODOX   100 mg, Oral, Every 12 Hours Scheduled      HYDROcodone-acetaminophen 5-325 MG per tablet  Commonly known as: NORCO   1 tablet, Oral, Every 6 Hours PRN             Changes to Medications        Instructions Start Date   Alcohol Wipes 70 % pads  What changed: additional instructions   1 pad , Does not apply, 3 Times Daily             Continue These Medications        Instructions Start Date   amLODIPine 2.5 MG tablet  Commonly known as: Norvasc   2.5 mg, Oral, Daily      aspirin 81 MG chewable tablet   81 mg, Oral, Daily      Diclofenac Sodium 1 % gel gel  Commonly known as: VOLTAREN   4 g, Topical, 4 Times Daily PRN      ferrous sulfate 325 (65 FE) MG tablet   325 mg, Oral, Daily With Breakfast      finasteride 5 MG tablet  Commonly known as: PROSCAR   5 mg, Oral, Daily      ipratropium-albuterol 0.5-2.5 mg/3 ml nebulizer  Commonly known as: DUO-NEB   3 mL, Nebulization, Every 4 Hours PRN      levothyroxine 88 MCG tablet  Commonly known as: SYNTHROID, LEVOTHROID   88 mcg, Oral, Daily      melatonin 5 MG tablet tablet   5 mg, Oral               No Known Allergies      Discharge Disposition:  Rehab Facility or Unit (DC - External)    Diet:  Hospital:  Diet Order   Procedures    Diet: Cardiac; Healthy Heart (2-3 Na+); Fluid Consistency: Thin (IDDSI 0)            Activity:  as tolerated    Restrictions or Other Recommendations:  As tolerated       CODE STATUS:    Code Status and Medical Interventions:   Ordered at: 07/17/24 0289     Level Of Support Discussed With:    Patient     Code Status (Patient has no pulse and is not breathing):    CPR (Attempt to Resuscitate)     Medical Interventions (Patient has pulse or is  breathing):    Full Support       Future Appointments   Date Time Provider Department Center   8/6/2024  9:45 AM Denzel Felix APRN [facility]   8/7/2024 10:00 AM NURSE UROLOGY [facility]       Additional Instructions for the Follow-ups that You Need to Schedule       Discharge Follow-up with PCP   As directed       Currently Documented PCP:    Roxanne Slater MD    PCP Phone Number:    [phone]     Follow Up Details: 1-2 weeks        Discharge Follow-up with Specified Provider: Dr Jones; 2 Weeks   As directed      To: Dr Jones   Follow Up: 2 Weeks        Discharge Follow-up with Specified Provider: [facility] as they schedule   As directed      To: [facility] as they schedule        Discharge Follow-up with Specified Provider: Urology Dr Rey; 2 Weeks   As directed      To: Urology Dr Rey   Follow Up: 2 Weeks                      Danitza Prieto MD  07/26/24      Time Spent on Discharge:  I spent  35  minutes on this discharge activity which included: face-to-face encounter with the patient, reviewing the data in the system, coordination of the care with the nursing staff as well as consultants, documentation, and entering orders.            Electronically signed by Danitza Prieto MD at 07/26/24 1042

## 2024-07-26 NOTE — DISCHARGE SUMMARY
[facility] Medicine Services  DISCHARGE SUMMARY    Patient Name: Nestor Nye  : 1945  MRN: [id number]    Date of Admission: 2024  4:47 PM  Date of Discharge:  24  Primary Care Physician: Roxanne Slater MD    Consults       Date and Time Order Name Status Description    2024  6:31 PM Inpatient Urology Consult      2024  6:29 PM Inpatient Vascular Surgery Consult Completed     2024  6:29 PM Inpatient Infectious Diseases Consult Completed             Hospital Course     Presenting Problem: dehiscence of operative wound     Active Hospital Problems    Diagnosis  POA   • **Dehiscence of operative wound [T81.31XA]  Yes   • Moderate malnutrition [E44.0]  Yes   • Bilateral epididymitis [N45.1]  Yes   • Wound dehiscence, surgical, initial encounter [T81.31XA]  Yes   • Superficial femoral artery occlusion [I70.209]  Yes   • Acute UTI (urinary tract infection) [N39.0]  Yes   • Stage 3a chronic kidney disease [N18.31]  Yes   • Hypothyroidism [E03.9]  Yes   • Mixed hyperlipidemia [E78.2]  Yes      Resolved Hospital Problems   No resolved problems to display.          Hospital Course:  Nestor Nye is a 78 y.o. male with PMH of enlarged prostate, GERD, Gout, HTN, RA, craniotomy for subdural hematoma, right femoral endarterectomy 24, prostate aquablation 24. Patient was discharged from [facility] on 24 and went to rehab., He presented 24with 3 days of erythema and then foul smell and the incision opened on 24     This patient's problems and plans were partially entered by my partner and updated as appropriate by me 24.      Assessment/Plan:     Bilateral epididymitis   Right groin dehiscence   S/p Aquablation 24 complicated by SFA occlusion that required right femoral embolectomy by Dr Morris And hematuria requiring hilario hypogastric embolization.   Chronic urinary retention with stacy  -- consulted urology, they have seen  with no acute intervention planned , see their recommendations as below  - patient s/p OR with Dr Morris 7/22- right SFA angioplasty/stenting, right CFA aspiration thrombectomy/angioplasty - patient with wound vac in place, to continue  --patient was seen by [facility] during admission and was placed on IV abx course, was ultimately transitioned to PO abx doxy/cefuroxime x 2weeks and f/u in the office recommendation      Recurrent SFA occlusion   S/p Right Femoral embolectomy  on 6/21/24  --  OR as above with Dr Morris      HTN  HLD  -- cont norvasc with parameters  --stable     Hypothyroidism   -- cont home meds      CKDIII  -- baseline creat 1.4-1.6     Penoscrotal irriation/swelling following aqua ablation therapy  --urology has seen this admission, recommended keeping appointment with Dr Rey in 2 weeks for catheter removal and repeat voiding trial, keep stacy until that time          Discharge Follow Up Recommendations for outpatient labs/diagnostics:   PCP 1-2 weeks  Dr Rey urology 2 weeks (has an appointment 8/06)  [facility] Dr Jones 2 weeks  Vascular surgery f/u as they schedule     Day of Discharge     HPI:   Doing well. Ready to go to rehab. No complaints at this time     Review of Systems  Gen- No fevers, chills  CV- No chest pain, palpitations  Resp- No cough, dyspnea  GI- No N/V/D, abd pain      Vital Signs:   Temp:  [98.2 °F (36.8 °C)-98.6 °F (37 °C)] 98.6 °F (37 °C)  Heart Rate:  [74-99] 89  Resp:  [18] 18  BP: (115-119)/(67-78) 119/78      Physical Exam:    GEN: NAD, resting in bed, doing well today   HEENT: on room air, atraumatic, normocephalic  NECK: supple, no masses  RESP: on room air, normal effort  CV: on tele, sinus rhythm  PSYCH: normal affect, appropriate  NEURO: awake, alert, no focal deficits noted  MSK: no edema noted  SKIN: no rashes noted , wound vac in place   : stacy in place cyu, has scrotal swelling which patient notes is improved       Pertinent  and/or Most Recent Results     LAB  RESULTS:      Lab 07/26/24  1005 07/25/24  0449 07/24/24  0630 07/23/24  1003 07/22/24  0952 07/21/24  2335 07/21/24  1806 07/21/24  0820 07/20/24  1300 07/20/24  1202 07/20/24  0629 07/19/24 2040 07/19/24 2040   WBC 10.87* 13.28* 11.23* 10.37 12.57*  --   --  19.71*  --   --  21.24*   < >  --    HEMOGLOBIN 8.5* 7.9* 7.8* 7.5* 8.2*  --   --  9.0*  --   --  9.8*  --  9.2*   HEMATOCRIT 26.8* 24.0* 24.1* 23.0* 24.7*  --   --  27.9*  --   --  29.7*  --  28.3*   PLATELETS 528* 428 376 348 333  --   --  314  --   --  340   < >  --    NEUTROS ABS 7.44* 9.00* 7.71* 7.84* 9.71*  --   --  16.68*  --   --  16.34*   < >  --    IMMATURE GRANS (ABS) 0.06* 0.07* 0.07* 0.07* 0.09*  --   --  0.12*  --   --  0.14*   < >  --    LYMPHS ABS 2.08 2.45 1.94 1.30 1.49  --   --  1.50  --   --  2.24   < >  --    MONOS ABS 1.01* 1.19* 0.97* 0.94* 1.04*  --   --  1.28*  --   --  2.42*   < >  --    EOS ABS 0.20 0.48* 0.47* 0.16 0.17  --   --  0.08  --   --  0.03   < >  --    MCV 96.4 93.0 93.8 93.1 93.9  --   --  94.9  --   --  93.7   < >  --    SED RATE  --   --   --   --   --   --   --   --   --   --   --   --  82*   CRP  --  11.71*  --   --  23.74*  --   --   --   --   --  23.67*  --  17.87*   PROCALCITONIN  --   --   --   --   --   --   --   --   --   --  0.45*  --   --    LACTATE  --   --   --   --   --   --   --   --   --  1.5  --   --   --    HEPARIN ANTI-XA  --   --   --   --  0.56 0.71* 0.40 0.10* 0.40  --  0.34  --  0.20*    < > = values in this interval not displayed.         Lab 07/25/24 0449 07/24/24 0630 07/23/24  1003 07/22/24  0952 07/21/24  0820   SODIUM 140 140 140 141 138   POTASSIUM 4.5 4.4 4.3 4.1 4.1   CHLORIDE 107 107 106 106 104   CO2 24.0 24.0 24.0 20.0* 22.0   ANION GAP 9.0 9.0 10.0 15.0 12.0   BUN 15 15 18 21 20   CREATININE 1.44* 1.58* 1.53* 1.66* 1.66*   EGFR 50.0* 44.8* 46.5* 42.2* 42.2*   GLUCOSE 99 99 118* 102* 132*   CALCIUM 8.5* 8.6 8.5* 8.4* 8.4*         Lab 07/25/24 0449 07/24/24  0630 07/23/24  1003  07/22/24  0952   TOTAL PROTEIN 5.6* 5.3* 5.6* 5.4*   ALBUMIN 3.2* 3.0* 3.0* 2.9*   GLOBULIN 2.4 2.3 2.6 2.5   ALT (SGPT) 32 20 12 12   AST (SGOT) 41* 27 15 17   BILIRUBIN 0.2 0.3 0.3 0.3   ALK PHOS 85 71 75 75                     Brief Urine Lab Results  (Last result in the past 365 days)        Color   Clarity   Blood   Leuk Est   Nitrite   Protein   CREAT   Urine HCG        07/17/24 1300 Orange   Turbid   Large (3+)   Large (3+)   Positive   100 mg/dL (2+)                 Microbiology Results (last 10 days)       Procedure Component Value - Date/Time    Blood Culture - Blood, Hand, Right [[id number]]  (Normal) Collected: 07/20/24 1201    Lab Status: Final result Specimen: Blood from Hand, Right Updated: 07/25/24 1230     Blood Culture No growth at 5 days    Narrative:      Less than seven (7) mL's of blood was collected.  Insufficient quantity may yield false negative results.    Blood Culture - Blood, Arm, Left [[id number]]  (Normal) Collected: 07/20/24 1201    Lab Status: Final result Specimen: Blood from Arm, Left Updated: 07/25/24 1230     Blood Culture No growth at 5 days    Blood Culture - Blood, Hand, Left [[id number]]  (Normal) Collected: 07/17/24 2008    Lab Status: Final result Specimen: Blood from Hand, Left Updated: 07/22/24 2030     Blood Culture No growth at 5 days    Blood Culture - Blood, Arm, Left [[id number]]  (Normal) Collected: 07/17/24 2007    Lab Status: Final result Specimen: Blood from Arm, Left Updated: 07/22/24 2030     Blood Culture No growth at 5 days    Blood Culture With RAGHAV - Blood, Arm, Left [[id number]]  (Normal) Collected: 07/17/24 1359    Lab Status: Final result Specimen: Blood from Arm, Left Updated: 07/22/24 1415     Blood Culture No growth at 5 days    Urine Culture - Urine, Indwelling Urethral Catheter [[id number]]  (Abnormal)  (Susceptibility) Collected: 07/17/24 1300    Lab Status: Final result Specimen: Urine from Indwelling Urethral Catheter Updated: 07/19/24 1642     Urine  Culture >100,000 CFU/mL Klebsiella pneumoniae ssp pneumoniae    Narrative:      Colonization of the urinary tract without infection is common. Treatment is discouraged unless the patient is symptomatic, pregnant, or undergoing an invasive urologic procedure.    Susceptibility        Klebsiella pneumoniae ssp pneumoniae      GEORGINA      Amoxicillin + Clavulanate Susceptible      Ampicillin Resistant      Ampicillin + Sulbactam Susceptible      Cefazolin Susceptible      Cefepime Susceptible      Ceftazidime Susceptible      Ceftriaxone Susceptible      Gentamicin Susceptible      Levofloxacin Intermediate      Nitrofurantoin Resistant      Piperacillin + Tazobactam Susceptible      Trimethoprim + Sulfamethoxazole Resistant                                   CT Abdomen Pelvis With Contrast    Result Date: 7/17/2024  PROCEDURE: CT ABDOMEN PELVIS W CONTRAST-  HISTORY: rt inguinal surgical wound dihescense/pus, recent prostate surg, status post aqua ablation of the prostate 07/21/2024 as well as right femoral endarterectomy.  COMPARISON: 05/04/2022.  PROCEDURE: The patient was injected with IV contrast. Axial images were obtained from the lung bases to the pubic symphysis by computed tomography. This study was performed with techniques to keep radiation doses as low as reasonably achievable, (ALARA). Individualized dose reduction techniques using automated exposure control or adjustment of mA and/or kV according to the patient size were employed.  FINDINGS:  ABDOMEN: The lung bases are clear. There is a moderate hiatal hernia similar to the prior exam. Gallbladder contains dependent high attenuation material suggesting sludge or stone that is new from the prior exam. No wall thickening or infiltration of the surrounding fat identified. The heart is proper size. The liver is homogenous with no focal abnormality. The spleen is unremarkable. No adrenal mass is present.  The pancreas is unremarkable. The kidneys demonstrate  bilateral circumscribed hypodense lesions consistent with cysts that are similar to the prior exam. No hydronephrosis identified. The aorta is ectatic but not aneurysmal. There is no free fluid or adenopathy.  PELVIS: The GI tract demonstrates no obstruction. Small bowel anastomoses identified in the right side of the abdomen which are new from the prior exam and appear to be widely patent. The appendix is not identified. The urinary bladder is is collapsed around a Gillespie catheter. Tip of the catheter extends posteriorly. There is a small amount of air within the collapsed bladder. There is significant wall thickening. As was seen previously there is significant enlargement of the prostate but this now appears very inhomogeneous with areas of increased attenuation not seen previously. Given the recent procedure, this may represent hemorrhage. On the sagittal image series 4 image 39 the bulb of the Gillespie catheter appears to be located within the superior aspect of the prostate. No free fluid identified. Best seen series 2 image 71 and series 3 image 44 is complete occlusion of the right superficial femoral artery immediately distal to the takeoff of the profunda. There is infiltration of the surrounding fat. There is partial opacification of the proximal superficial femoral vein which appears similar to the left side.      Occlusion of the right SFA, Paolo AKIRA Smith, notified at 12:45 p.m 07/17/2024.  Enlarged, inhomogeneous prostate with significant wall thickening of the bladder and position of the Gillespie catheter as described, consider postoperative change versus infection or postoperative hemorrhage.,  Possible gallbladder sludge or small stones.  CTDI: 5.39 mGy DLP:235.53 mGy.cm  This report was signed and finalized on 7/17/2024 1:28 PM by Kavya Pollock MD.      US Scrotum & Testicles    Result Date: 7/17/2024  PROCEDURE: US SCROTUM AND TESTICLES-  HISTORY: testicular swelling/pain, recent prostate surg &  hypogastric art embolization  PROCEDURE: Ultrasound images of the testicles were obtained bilaterally. Color Doppler images were obtained.  FINDINGS: Right: There is no right intratesticular mass or torsion. There is normal color flow to the right testicle. There are multiple large right epididymal cysts some of which contain debris. These measure up to 2.6 cm. There is increased vascularity in the right epididymis suggestive of epididymitis. There is a small right hydrocele.  Left: Blood flow seen to the left testicle. Half of the left testicle demonstrates decreased echogenicity, mass is not excluded. A urology consultation is recommended. There is increased echogenicity of the epididymis suggestive of epididymitis. There is a minimal left hydrocele.      1. No evidence of right intratesticular mass or torsion. 2. Large right epididymal cysts as described. 3. Findings suggestive of bilateral epididymitis. 4. No left testicular torsion. A hypoechoic region in the left testicle could represent possible mass. Urologic consultation is recommended.      Images were reviewed, interpreted, and dictated by Dr. Kavya Pollock MD Transcribed by Radha Reeves PA-C.  This report was signed and finalized on 7/17/2024 12:50 PM by Kavya Pollock MD.       Results for orders placed during the hospital encounter of 06/21/24    Duplex Lower Extremity Art / Grafts - Right CAR    Interpretation Summary  •  Occlusion of the right proximal superficial femoral artery  •  Flow reconstitutes distally via collaterals.  There is significantly reduced monophasic flow present in the popliteal artery and calf vessels.      Results for orders placed during the hospital encounter of 06/21/24    Duplex Lower Extremity Art / Grafts - Right CAR    Interpretation Summary  •  Occlusion of the right proximal superficial femoral artery  •  Flow reconstitutes distally via collaterals.  There is significantly reduced monophasic flow present in the  popliteal artery and calf vessels.          Plan for Follow-up of Pending Labs/Results: will be called with any abnormal results which require f/u    Pending Labs       Order Current Status    Comprehensive Metabolic Panel In process          Discharge Details        Discharge Medications        New Medications        Instructions Start Date   bisacodyl 5 MG EC tablet  Commonly known as: DULCOLAX   5 mg, Oral, Daily PRN      cefuroxime 500 MG tablet  Commonly known as: CEFTIN   500 mg, Oral, Every 12 Hours Scheduled      doxycycline 100 MG capsule  Commonly known as: MONODOX   100 mg, Oral, Every 12 Hours Scheduled      HYDROcodone-acetaminophen 5-325 MG per tablet  Commonly known as: NORCO   1 tablet, Oral, Every 6 Hours PRN             Changes to Medications        Instructions Start Date   Alcohol Wipes 70 % pads  What changed: additional instructions   1 pad , Does not apply, 3 Times Daily             Continue These Medications        Instructions Start Date   amLODIPine 2.5 MG tablet  Commonly known as: Norvasc   2.5 mg, Oral, Daily      aspirin 81 MG chewable tablet   81 mg, Oral, Daily      Diclofenac Sodium 1 % gel gel  Commonly known as: VOLTAREN   4 g, Topical, 4 Times Daily PRN      ferrous sulfate 325 (65 FE) MG tablet   325 mg, Oral, Daily With Breakfast      finasteride 5 MG tablet  Commonly known as: PROSCAR   5 mg, Oral, Daily      ipratropium-albuterol 0.5-2.5 mg/3 ml nebulizer  Commonly known as: DUO-NEB   3 mL, Nebulization, Every 4 Hours PRN      levothyroxine 88 MCG tablet  Commonly known as: SYNTHROID, LEVOTHROID   88 mcg, Oral, Daily      melatonin 5 MG tablet tablet   5 mg, Oral               No Known Allergies      Discharge Disposition:  Rehab Facility or Unit (DC - External)    Diet:  Hospital:  Diet Order   Procedures   • Diet: Cardiac; Healthy Heart (2-3 Na+); Fluid Consistency: Thin (IDDSI 0)            Activity:  as tolerated    Restrictions or Other Recommendations:  As tolerated        CODE STATUS:    Code Status and Medical Interventions:   Ordered at: 07/17/24 1845     Level Of Support Discussed With:    Patient     Code Status (Patient has no pulse and is not breathing):    CPR (Attempt to Resuscitate)     Medical Interventions (Patient has pulse or is breathing):    Full Support       Future Appointments   Date Time Provider Department Center   8/6/2024  9:45 AM Denzel Felix APRN [facility]   8/7/2024 10:00 AM NURSE UROLOGY [facility]       Additional Instructions for the Follow-ups that You Need to Schedule       Discharge Follow-up with PCP   As directed       Currently Documented PCP:    Roxanne Slater MD    PCP Phone Number:    [phone]     Follow Up Details: 1-2 weeks        Discharge Follow-up with Specified Provider: Dr Jones; 2 Weeks   As directed      To: Dr Jones   Follow Up: 2 Weeks        Discharge Follow-up with Specified Provider: [facility] as they schedule   As directed      To: [facility] as they schedule        Discharge Follow-up with Specified Provider: Urology Dr Rey; 2 Weeks   As directed      To: Urology Dr Rey   Follow Up: 2 Weeks                      Danitza Prieto MD  07/26/24      Time Spent on Discharge:  I spent  35  minutes on this discharge activity which included: face-to-face encounter with the patient, reviewing the data in the system, coordination of the care with the nursing staff as well as consultants, documentation, and entering orders.

## 2024-07-26 NOTE — THERAPY TREATMENT NOTE
Patient Name: Nestor Nye  : 1945    MRN: [id number]                              Today's Date: 2024       Admit Date: 2024    Visit Dx:     ICD-10-CM ICD-9-CM   1. Dehiscence of operative wound, initial encounter  T81.31XA 998.32   2. Superficial femoral artery occlusion  I70.209 440.20   3. Rheumatoid arthritis involving multiple sites, unspecified whether rheumatoid factor present  M06.9 714.0   4. Dehiscence of operative wound, subsequent encounter  T81.31XD V58.89     998.32     Patient Active Problem List   Diagnosis    Hypothyroidism    Mixed hyperlipidemia    Elevated PSA    Benign essential hypertension    Acute UTI (urinary tract infection)    BPH with obstruction/lower urinary tract symptoms    Anemia of chronic disease    Rheumatoid arthritis    Secondary hyperparathyroidism    Stage 3a chronic kidney disease    Gouty arthropathy    No post-op complications    Bilateral epididymitis    Wound dehiscence, surgical, initial encounter    Superficial femoral artery occlusion    Dehiscence of operative wound    Moderate malnutrition     Past Medical History:   Diagnosis Date    Acute maxillary sinusitis     Arthritis     Disease of thyroid gland     Enlarged prostate     GERD (gastroesophageal reflux disease)     Gout     Hypertension     Impaired functional mobility, balance, gait, and endurance     PONV (postoperative nausea and vomiting)     Renal disorder     Rheumatoid arthritis     Right inguinal hernia 10/28/2021    Added automatically from request for surgery [id number]    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, changed his voice since then     Past Surgical History:   Procedure Laterality Date    ABDOMINAL SURGERY      ARTERIOGRAM Right 2024    Procedure: LOWER EXTREMITY ANGIOGRAM RIGHT;  Surgeon: Kingsley Morris MD;  Location: [facility];  Service: Vascular;  Laterality: Right;  DOSE: 56 MGY; FLURO: 7 MIN 6 SEC;  CONTRAST 40 ML    BACK SURGERY      BELPHAROPTOSIS REPAIR Bilateral     BRAIN SURGERY      COLON SURGERY      bowel resection x2 per pt    CRANIOTOMY FOR SUBDURAL HEMATOMA      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    DIAGNOSTIC LAPAROSCOPY N/A 05/04/2022    Procedure: DIAGNOSTIC LAPAROSCOPY CONVERTED TO EXPLORATORY LAPAROTOMY WITH SMALL BOWEL RESECTION;  Surgeon: Maximo Bennett MD;  Location: [facility];  Service: General;  Laterality: N/A;  CONVERTED TO EXPLORATORY LAPAROTOMY AT 1912    EXPLORATORY LAPAROTOMY N/A 04/16/2022    Procedure: LAPAROTOMY EXPLORATORY WITH SMALL BOWEL OBSTRUCTION;  Surgeon: Maximo Bennett MD;  Location: [facility];  Service: General;  Laterality: N/A;    EYE SURGERY      on upper etelids    FEMORAL ENDARTERECTOMY Right 6/21/2024    Procedure: FEMORAL EMBELECTOMY RIGHT;  Surgeon: Kingsley Morris MD;  Location:  [facility];  Service: Vascular;  Laterality: Right;    FOREIGN BODY REMOVAL Right     removal of metal fragments from RUQ after electrocution and fall.      HERNIA REPAIR      INGUINAL HERNIA REPAIR Right 11/01/2021    Procedure: INGUINAL HERNIA REPAIR WITH MESH AND EXCISION OF SPERMATIC CORD LIPOMA;  Surgeon: Rosey Glover MD;  Location:  [facility];  Service: General;  Laterality: Right;    LEG DEBRIDEMENT Right 7/22/2024    Procedure: GROIN DEBRIDEMENT WITH WOUND VAC PLACEMENT  RIGHT;  Surgeon: Kingsley Morris MD;  Location: [facility];  Service: Vascular;  Laterality: Right;    LUMBAR SPINE SURGERY      PROSTATE AQUABLATION N/A 6/21/2024    Procedure: AQUABLATION OF PROSTATE;  Surgeon: Prieto Rey MD;  Location: [facility];  Service: [employer] - Urology;  Laterality: N/A;    SKIN GRAFT Right     RUE skin grafting after electrocution    THORACIC SPINE SURGERY        General Information       Row Name 07/26/24 1029          OT Time and Intention    Document Type therapy note (daily note)  -AN     Mode of Treatment occupational  therapy  -AN       [facility] Name 07/26/24 1029          General Information    Patient Profile Reviewed yes  -AN     Existing Precautions/Restrictions fall;other (see comments)  R groin wound w/wound vac, stacy  -AN       Row Name 07/26/24 1029          Cognition    Orientation Status (Cognition) oriented x 4  -AN       Row Name 07/26/24 1029          Safety Issues, Functional Mobility    Safety Issues Affecting Function (Mobility) safety precaution awareness;safety precautions follow-through/compliance  -AN     Impairments Affecting Function (Mobility) balance;endurance/activity tolerance;pain;postural/trunk control;range of motion (ROM);strength;visual/perceptual  -AN               User Key  (r) = Recorded By, (t) = Taken By, (c) = Cosigned By      Initials Name Provider Type    Anali Kumar OT Occupational Therapist                     Mobility/ADL's       Row Name 07/26/24 1030          Bed Mobility    Bed Mobility supine-sit  -AN     Supine-Sit [place] (Bed Mobility) standby assist  -AN     Assistive Device (Bed Mobility) head of bed elevated  -AN       Row Name 07/26/24 1030          Transfers    Transfers stand-sit transfer;sit-stand transfer  -AN     Comment, (Transfers) Pt goldy'd good transfer technique using the RW  -AN       Row Name 07/26/24 1030          Sit-Stand Transfer    Sit-Stand [place] (Transfers) contact guard  -AN     Assistive Device (Sit-Stand Transfers) walker, front-wheeled  -AN       Row Name 07/26/24 1030          Stand-Sit Transfer    Stand-Sit [place] (Transfers) contact guard  -AN     Assistive Device (Stand-Sit Transfers) walker, front-wheeled  -AN       Row Name 07/26/24 1030          Functional Mobility    Functional Mobility- Ind. Level contact guard assist;verbal cues required  -AN     Functional Mobility- Device walker, front-wheeled  -AN     Functional Mobility-Distance (Feet) --  household distance  -AN     Functional Mobility- Safety Issues step length  decreased;balance decreased during turns  -AN     Functional Mobility- Comment Pt ambulated household distance in prep for ADLs using the RW with CGA for balance and safety.  -AN       Row Name 07/26/24 1030          Activities of Daily Living    BADL Assessment/Intervention upper body dressing;lower body dressing;grooming  -AN       Row Name 07/26/24 1030          Lower Body Dressing Assessment/Training    [place] Level (Lower Body Dressing) don;doff;socks;minimum assist (75% patient effort)  -AN     Position (Lower Body Dressing) supported sitting  -AN       Row Name 07/26/24 1030          Grooming Assessment/Training    [place] Level (Grooming) wash face, hands;contact guard assist  -AN     Position (Grooming) sink side  -AN       Row Name 07/26/24 1030          Upper Body Dressing Assessment/Training    [place] Level (Upper Body Dressing) don;pajama/robe;contact guard assist  -AN     Position (Upper Body Dressing) edge of bed sitting  -AN               User Key  (r) = Recorded By, (t) = Taken By, (c) = Cosigned By      Initials Name Provider Type    AN Anali Barraza OT Occupational Therapist                   Obj/Interventions       Row Name 07/26/24 1032          Balance    Balance Assessment sitting static balance;sitting dynamic balance;sit to stand dynamic balance;standing static balance;standing dynamic balance  -AN     Static Sitting Balance supervision  -AN     Dynamic Sitting Balance supervision  -AN     Position, Sitting Balance sitting edge of bed  -AN     Sit to Stand Dynamic Balance verbal cues;contact guard  -AN     Static Standing Balance standby assist  -AN     Dynamic Standing Balance contact guard  -AN     Position/Device Used, Standing Balance supported;walker, rolling  -AN     Balance Interventions standing;sit to stand;supported;dynamic;minimal challenge;occupation based/functional task  -AN               User Key  (r) = Recorded By, (t) = Taken By, (c) = Cosigned By       Initials Name Provider Type    Anali Kumar OT Occupational Therapist                   Goals/Plan       Row Name 07/26/24 1036          Transfer Goal 1 (OT)    Activity/Assistive Device (Transfer Goal 1, OT) toilet  -AN     [place] Level/Cues Needed (Transfer Goal 1, OT) standby assist  -AN     Time Frame (Transfer Goal 1, OT) short term goal (STG);4 days  -AN     Progress/Outcome (Transfer Goal 1, OT) goal revised this date  -AN       Row Name 07/26/24 1036          Dressing Goal 1 (OT)    Activity/Device (Dressing Goal 1, OT) upper body dressing;lower body dressing  -AN     [place]/Cues Needed (Dressing Goal 1, OT) contact guard required  -AN     Time Frame (Dressing Goal 1, OT) long term goal (LTG);1 week  -AN       Row Name 07/26/24 1036          Toileting Goal 1 (OT)    Activity/Device (Toileting Goal 1, OT) adjust/manage clothing;perform perineal hygiene;commode  -AN     [place] Level/Cues Needed (Toileting Goal 1, OT) contact guard required  -AN     Time Frame (Toileting Goal 1, OT) long term goal (LTG);1 week  -AN       Row Name 07/26/24 1036          Therapy Assessment/Plan (OT)    Planned Therapy Interventions (OT) activity tolerance training;adaptive equipment training;BADL retraining;functional balance retraining;occupation/activity based interventions;patient/caregiver education/training;transfer/mobility retraining;strengthening exercise  -AN               User Key  (r) = Recorded By, (t) = Taken By, (c) = Cosigned By      Initials Name Provider Type    Anali Kumar OT Occupational Therapist                   Clinical Impression       Row Name 07/26/24 1033          Pain Assessment    Additional Documentation Pain Scale: FACES Pre/Post-Treatment (Group)  -AN       Row Name 07/26/24 1033          Pain Scale: FACES Pre/Post-Treatment    Pain: FACES Scale, Pretreatment 2-->hurts little bit  -AN     Posttreatment Pain Rating 2-->hurts little bit  -AN     Pain Location -  other (see comments)  scrotum  -AN     Pre/Posttreatment Pain Comment tolerated  -AN       Row Name 07/26/24 1033          Plan of Care Review    Plan of Care Reviewed With patient  -AN     Progress improving  -AN     Outcome Evaluation Pt continues to make progress towards all OT goals appropriately and is motivated to regain independence. Pt continues to present with impaired balance, decreased activity tolerance and impaired ADLs warranting skilled OT services. Cont POC.  -AN       Row Name 07/26/24 1033          Therapy Plan Review/Discharge Plan (OT)    Anticipated Discharge Disposition (OT) inpatient rehabilitation facility  -AN       Row Name 07/26/24 1033          Vital Signs    O2 Delivery Pre Treatment room air  -AN     O2 Delivery Intra Treatment room air  -AN     O2 Delivery Post Treatment room air  -AN       Row Name 07/26/24 1033          Positioning and Restraints    Pre-Treatment Position in bed  -AN     Post Treatment Position chair  -AN     In Chair notified nsg;reclined;encouraged to call for assist;call light within reach;exit alarm on;legs elevated;waffle cushion  -AN               User Key  (r) = Recorded By, (t) = Taken By, (c) = Cosigned By      Initials Name Provider Type    AN Anali Barraza, OT Occupational Therapist                   Outcome Measures       Row Name 07/26/24 1037          How much help from another is currently needed...    Putting on and taking off regular lower body clothing? 3  -AN     Bathing (including washing, rinsing, and drying) 3  -AN     Toileting (which includes using toilet bed pan or urinal) 3  -AN     Putting on and taking off regular upper body clothing 3  -AN     Taking care of personal grooming (such as brushing teeth) 3  -AN     Eating meals 4  -AN     AM-PAC 6 Clicks Score (OT) 19  -AN       Row Name 07/26/24 [facility]          Functional Assessment    Outcome Measure Options AM-PAC 6 Clicks Daily Activity (OT)  -AN               User Key  (r) = Recorded  By, (t) = Taken By, (c) = Cosigned By      Initials Name Provider Type    Anali Kumar OT Occupational Therapist                    Occupational Therapy Education       Title: PT OT SLP Therapies (Done)       Topic: Occupational Therapy (Done)       Point: ADL training (Done)       Description:   Instruct learner(s) on proper safety adaptation and remediation techniques during self care or transfers.   Instruct in proper use of assistive devices.                  Learning Progress Summary             Patient Acceptance, E, VU by RYANNE at 7/26/2024 1037    Acceptance, E, VU by MM at 7/24/2024 0943    Acceptance, E, VU by VANESA at 7/18/2024 0917                         Point: Home exercise program (Done)       Description:   Instruct learner(s) on appropriate technique for monitoring, assisting and/or progressing therapeutic exercises/activities.                  Learning Progress Summary             Patient Acceptance, E, VU by MM at 7/24/2024 0943                         Point: Precautions (Done)       Description:   Instruct learner(s) on prescribed precautions during self-care and functional transfers.                  Learning Progress Summary             Patient Acceptance, E, VU by RYANNE at 7/26/2024 1037    Acceptance, E, NR by CONOR at 7/24/2024 1418    Acceptance, E, VU by ASHLEY at 7/24/2024 0943    Acceptance, E, VU by VANESA at 7/18/2024 0917                         Point: Body mechanics (Done)       Description:   Instruct learner(s) on proper positioning and spine alignment during self-care, functional mobility activities and/or exercises.                  Learning Progress Summary             Patient Acceptance, E, VU by RYANNE at 7/26/2024 1037    Acceptance, E, NR by CONOR at 7/24/2024 1418    Acceptance, E, VU by MM at 7/24/2024 0943                                         User Key       Initials Effective Dates Name Provider Type Samir     06/16/21 -  Teresa Ramirez OT Occupational Therapist OT    VANESA  06/16/21 -  Nena Martinez OT Occupational Therapist OT    AN 09/21/21 -  Anali Barraza OT Occupational Therapist OT     02/16/24 -  Linda Winston, RN Registered Nurse Nurse                  OT Recommendation and Plan  Planned Therapy Interventions (OT): activity tolerance training, adaptive equipment training, BADL retraining, functional balance retraining, occupation/activity based interventions, patient/caregiver education/training, transfer/mobility retraining, strengthening exercise  Plan of Care Review  Plan of Care Reviewed With: patient  Progress: improving  Outcome Evaluation: Pt continues to make progress towards all OT goals appropriately and is motivated to regain independence. Pt continues to present with impaired balance, decreased activity tolerance and impaired ADLs warranting skilled OT services. Cont POC.     Time Calculation:         Time Calculation- OT       Row Name 07/26/24 1038             Time Calculation- OT    OT Start Time 1000  -AN      OT Received On 07/26/24  -AN         Timed Charges    04119 - OT Self Care/Mgmt Minutes 15  -AN         Total Minutes    Timed Charges Total Minutes 15  -AN       Total Minutes 15  -AN                User Key  (r) = Recorded By, (t) = Taken By, (c) = Cosigned By      Initials Name Provider Type    AN Anali Barraza OT Occupational Therapist                  Therapy Charges for Today       Code Description Service Date Service Provider Modifiers Qty    [id number] HC OT SELF CARE/MGMT/TRAIN EA 15 MIN 7/26/2024 Anali Barraza OT GO 1                 Anali Barraza OT  7/26/2024

## 2024-07-26 NOTE — THERAPY WOUND CARE TREATMENT
Acute Care - Wound/Debridement Treatment Note  [facility]     Patient Name: Nestor Nye  : 1945  MRN: [id number]  Today's Date: 2024                Admit Date: 2024    Visit Dx:    ICD-10-CM ICD-9-CM   1. Dehiscence of operative wound, initial encounter  T81.31XA 998.32   2. Superficial femoral artery occlusion  I70.209 440.20   3. Rheumatoid arthritis involving multiple sites, unspecified whether rheumatoid factor present  M06.9 714.0   4. Dehiscence of operative wound, subsequent encounter  T81.31XD V58.89     998.32     R anterior groin      Patient Active Problem List   Diagnosis    Hypothyroidism    Mixed hyperlipidemia    Elevated PSA    Benign essential hypertension    Acute UTI (urinary tract infection)    BPH with obstruction/lower urinary tract symptoms    Anemia of chronic disease    Rheumatoid arthritis    Secondary hyperparathyroidism    Stage 3a chronic kidney disease    Gouty arthropathy    No post-op complications    Bilateral epididymitis    Wound dehiscence, surgical, initial encounter    Superficial femoral artery occlusion    Dehiscence of operative wound    Moderate malnutrition        Past Medical History:   Diagnosis Date    Acute maxillary sinusitis     Arthritis     Disease of thyroid gland     Enlarged prostate     GERD (gastroesophageal reflux disease)     Gout     Hypertension     Impaired functional mobility, balance, gait, and endurance     PONV (postoperative nausea and vomiting)     Renal disorder     Rheumatoid arthritis     Right inguinal hernia 10/28/2021    Added automatically from request for surgery [id number]    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, changed his voice since then        Past Surgical History:   Procedure Laterality Date    ABDOMINAL SURGERY      ARTERIOGRAM Right 2024    Procedure: LOWER EXTREMITY ANGIOGRAM RIGHT;  Surgeon: Kingsley Morris MD;  Location: [facility] OR;   Service: Vascular;  Laterality: Right;  DOSE: 56 MGY; FLURO: 7 MIN 6 SEC; CONTRAST 40 ML    BACK SURGERY      BELPHAROPTOSIS REPAIR Bilateral     BRAIN SURGERY      COLON SURGERY      bowel resection x2 per pt    CRANIOTOMY FOR SUBDURAL HEMATOMA      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    DIAGNOSTIC LAPAROSCOPY N/A 05/04/2022    Procedure: DIAGNOSTIC LAPAROSCOPY CONVERTED TO EXPLORATORY LAPAROTOMY WITH SMALL BOWEL RESECTION;  Surgeon: Maximo Bennett MD;  Location:  [facility];  Service: General;  Laterality: N/A;  CONVERTED TO EXPLORATORY LAPAROTOMY AT 1912    EXPLORATORY LAPAROTOMY N/A 04/16/2022    Procedure: LAPAROTOMY EXPLORATORY WITH SMALL BOWEL OBSTRUCTION;  Surgeon: Maximo Bennett MD;  Location:  [facility];  Service: General;  Laterality: N/A;    EYE SURGERY      on upper etelids    FEMORAL ENDARTERECTOMY Right 6/21/2024    Procedure: FEMORAL EMBELECTOMY RIGHT;  Surgeon: Kingsley Morris MD;  Location:  [facility];  Service: Vascular;  Laterality: Right;    FOREIGN BODY REMOVAL Right     removal of metal fragments from RUQ after electrocution and fall.      HERNIA REPAIR      INGUINAL HERNIA REPAIR Right 11/01/2021    Procedure: INGUINAL HERNIA REPAIR WITH MESH AND EXCISION OF SPERMATIC CORD LIPOMA;  Surgeon: Rosey Glover MD;  Location:  [facility];  Service: General;  Laterality: Right;    LEG DEBRIDEMENT Right 7/22/2024    Procedure: GROIN DEBRIDEMENT WITH WOUND VAC PLACEMENT  RIGHT;  Surgeon: Kingsley Morris MD;  Location:  [facility];  Service: Vascular;  Laterality: Right;    LUMBAR SPINE SURGERY      PROSTATE AQUABLATION N/A 6/21/2024    Procedure: AQUABLATION OF PROSTATE;  Surgeon: Prieto Rey MD;  Location:  [facility];  Service: Rob[employer] - Urology;  Laterality: N/A;    SKIN GRAFT Right     RUE skin grafting after electrocution    THORACIC SPINE SURGERY             Wound 06/21/24 1503 Right anterior greater trochanter Incision (Active)   Wound  Image   07/26/24 1021   Dressing Appearance dry;intact 07/26/24 1021   Closure Unable to assess 07/26/24 0815   Base moist;pink;red;granulating;slough 07/26/24 1021   Periwound intact;dry 07/26/24 1021   Periwound Temperature warm 07/26/24 1021   Periwound Skin Turgor soft 07/26/24 1021   Edges open 07/26/24 1021   Wound Length (cm) 0.9 cm 07/26/24 1021   Wound Width (cm) 2.1 cm 07/26/24 1021   Wound Depth (cm) 0.4 cm 07/26/24 1021   Wound Surface Area (cm^2) 1.89 cm^2 07/26/24 1021   Wound Volume (cm^3) 0.756 cm^3 07/26/24 1021   Drainage Characteristics/Odor serosanguineous 07/26/24 1021   Drainage Amount scant 07/26/24 1021   Care, Wound cleansed with;wound cleanser;debrided 07/26/24 1021   Dressing Care dressing applied;silver impregnated;collagen;antimicrobial agent applied;foam;low-adherent;border dressing 07/26/24 1021   Periwound Care dry periwound area maintained;cleansed with pH balanced cleanser 07/26/24 1021   Wound Output (mL) 0 07/26/24 1021       Wound 07/22/24 1329 Right medial ankle Puncture (Active)   Dressing Appearance dry;intact 07/26/24 0815   Dressing Care open to air 07/26/24 0815   Periwound Care dry periwound area maintained 07/26/24 0815       Wound 07/22/24 1350 Left anterior groin Puncture (Active)   Dressing Appearance dry;intact 07/26/24 0815   Closure Glue 07/26/24 0815       NPWT (Negative Pressure Wound Therapy) 07/22/24 1424 Right Groin (Active)   Therapy Setting vacuum off 07/26/24 1021   Dressing unable to visualize 07/26/24 0815   Pressure Setting 125 mmHg 07/25/24 2000   Sponges Removed 1 07/26/24 1021   Finger sweep complete Yes 07/26/24 1021         WOUND DEBRIDEMENT  Total area of Debridement: ~1cm2  Debridement Site 1  Location- Site 1: R groin  Selective Debridement- Site 1: Wound Surface <20cmsq  Instruments- Site 1: tweezers, scissors  Excised Tissue Description- Site 1: scant, slough  Bleeding- Site 1: none               PT Assessment (Last 12 Hours)       PT Evaluation  "and Treatment       Row Name 07/26/24 1021          Physical Therapy Time and Intention    Subjective Information complains of;weakness;pain  -     Document Type therapy note (daily note);wound care  -     Mode of Treatment physical therapy;individual therapy  -[facility] Name 07/26/24 1021          General Information    Patient Observations alert;cooperative;agree to therapy  -[facility] Name 07/26/24 1021          Pain Scale: FACES Pre/Post-Treatment    Pain: FACES Scale, Pretreatment 0-->no hurt  -     Posttreatment Pain Rating 0-->no hurt  -[facility] Name 07/26/24 1021          Cognition    Affect/Mental Status (Cognition) WNL  -     Orientation Status (Cognition) oriented to  -[facility] Name 07/26/24 1021          Wound 06/21/24 1503 Right anterior greater trochanter Incision    Wound - Properties Group Placement Date: 06/21/24  -DJ Placement Time: 1503  -DJ Side: Right  -DJ Orientation: anterior  -DJ Location: greater trochanter  -DJ Primary Wound Type: Incision  -DJ    Wound Image Images linked: 1  -     Dressing Appearance dry;intact  -     Base moist;pink;red;granulating;slough  -     Periwound intact;dry  scar tissue  -     Periwound Temperature warm  -     Periwound Skin Turgor soft  -     Edges open  -     Wound Length (cm) 0.9 cm  -     Wound Width (cm) 2.1 cm  -     Wound Depth (cm) 0.4 cm  -     Wound Surface Area (cm^2) 1.89 cm^2  -LH     Wound Volume (cm^3) 0.756 cm^3  -     Drainage Characteristics/Odor serosanguineous  -     Drainage Amount scant  -     Care, Wound cleansed with;wound cleanser;debrided  -     Dressing Care dressing applied;silver impregnated;collagen;antimicrobial agent applied;foam;low-adherent;border dressing  Miladis Ag, HFBt, 4\" optifoam  -     Periwound Care dry periwound area maintained;cleansed with pH balanced cleanser  nosting  -     Wound Output (mL) 0  -LH     Retired Wound - Properties Group Placement Date: 06/21/24  " -DJ Placement Time: 1503  -DJ Side: Right  -DJ Orientation: anterior  -DJ Location: greater trochanter  -DJ Primary Wound Type: Incision  -DJ    Retired Wound - Properties Group Date first assessed: 06/21/24  -DJ Time first assessed: 1503  -DJ Side: Right  -DJ Location: greater trochanter  -DJ Primary Wound Type: Incision  -DJ      Row Name             Wound 07/22/24 1329 Right medial ankle Puncture    Wound - Properties Group Placement Date: 07/22/24  -JL Placement Time: 1329  -JL Side: Right  -JL Orientation: medial  -JL Location: ankle  -JL Primary Wound Type: Puncture  -JL    Retired Wound - Properties Group Placement Date: 07/22/24  -JL Placement Time: 1329  -JL Side: Right  -JL Orientation: medial  -JL Location: ankle  -JL Primary Wound Type: Puncture  -JL    Retired Wound - Properties Group Date first assessed: 07/22/24  -JL Time first assessed: 1329  -JL Side: Right  -JL Location: ankle  -JL Primary Wound Type: Puncture  -JL      Row Name             Wound 07/22/24 1350 Left anterior groin Puncture    Wound - Properties Group Placement Date: 07/22/24  -JL Placement Time: 1350  -JL Side: Left  -JL Orientation: anterior  -JL Location: groin  -JL Primary Wound Type: Puncture  -JL    Retired Wound - Properties Group Placement Date: 07/22/24  -JL Placement Time: 1350  -JL Side: Left  -JL Orientation: anterior  -JL Location: groin  -JL Primary Wound Type: Puncture  -JL    Retired Wound - Properties Group Date first assessed: 07/22/24  -JL Time first assessed: 1350  -JL Side: Left  -JL Location: groin  -JL Primary Wound Type: Puncture  -JL      Row Name 07/26/24 1021          NPWT (Negative Pressure Wound Therapy) 07/22/24 1424 Right Groin    NPWT (Negative Pressure Wound Therapy) - Properties Group Placement Date: 07/22/24  -JL Placement Time: 1424  -JL Location: Right Groin  -JL Additional Comments: Wound Vac placed  -JL    Therapy Setting vacuum off  -LH     Sponges Removed 1  1 black  -LH     Finger sweep  "complete Yes  -     Retired NPWT (Negative Pressure Wound Therapy) - Properties Group Placement Date: 07/22/24  - Placement Time: 1424  -JL Location: Right Groin  -JL Additional Comments: Wound Vac placed  -JL    Retired NPWT (Negative Pressure Wound Therapy) - Properties Group Placement Date: 07/22/24  - Placement Time: 1424  -JL Location: Right Groin  -JL Additional Comments: Wound Vac placed  -      Row Name 07/26/24 1021          Coping    Observed Emotional State calm;cooperative;pleasant  -     Verbalized Emotional State acceptance  -     Trust Relationship/Rapport care explained;questions answered  -       Row Name 07/26/24 1021          Plan of Care Review    Plan of Care Reviewed With patient  -     Progress improving  -     Outcome Evaluation PT removed pt's R anterior groin wound vac dressing this date in anticipation of DC to facility. Pt's R anterior groin wound demonstrating notable improvements in granulation of wound base and reduction of wound depth. PT applied antibacterial dressings to allow for transport to facility including Miladis Ag, HFBt, and 4\" optifoam. Dressings may remain in place for 2-3 days. Pending progress with basic antibacterial dressings pt may be appropriate for discontinuation of NPWT.  -       Row Name 07/26/24 1021          Positioning and Restraints    Pre-Treatment Position sitting in chair/recliner  -     Post Treatment Position chair  -     In Chair reclined;call light within reach;encouraged to call for assist  -               User Key  (r) = Recorded By, (t) = Taken By, (c) = Cosigned By      Initials Name Provider Type     Aleks Silva, PT Physical Therapist    Donte Pineda, RN Registered Nurse    Paolo Tran, RN Registered Nurse                  Physical Therapy Education       Title: PT OT SLP Therapies (Done)       Topic: Physical Therapy (Done)       Point: Mobility training (Done)       Learning Progress Summary           " "  Patient Acceptance, E, VU by MM at 7/24/2024 0943    Acceptance, E, NR by AS at 7/24/2024 0845    Eager, E,D,H, NR by DM at 7/18/2024 1222                         Point: Home exercise program (Done)       Learning Progress Summary             Patient Acceptance, E, VU by MM at 7/24/2024 0943    Acceptance, E, NR by AS at 7/24/2024 0845    Eager, E,D,H, NR by DM at 7/18/2024 1222                         Point: Body mechanics (Done)       Learning Progress Summary             Patient Acceptance, E, VU by MM at 7/24/2024 0943    Acceptance, E, NR by AS at 7/24/2024 0845    Eager, E,D,H, NR by DM at 7/18/2024 1222                         Point: Precautions (Done)       Learning Progress Summary             Patient Acceptance, E, VU by MM at 7/24/2024 0943    Acceptance, E, NR by AS at 7/24/2024 0845    Eager, E,D,H, NR by DM at 7/18/2024 1222                                         User Key       Initials Effective Dates Name Provider Type Discipline    DM 02/03/23 -  Emilie Arredondo, PT Physical Therapist PT    AS 04/28/23 -  Sakshi Bentley PTA Physical Therapist Assistant PT     02/16/24 -  Linda Winston RN Registered Nurse Nurse                    Recommendation and Plan  Anticipated Discharge Disposition (PT): inpatient rehabilitation facility  Planned Therapy Interventions (PT): wound care  Therapy Frequency (PT): daily  Plan of Care Reviewed With: patient   Progress: improving       Progress: improving  Outcome Evaluation: PT removed pt's R anterior groin wound vac dressing this date in anticipation of DC to facility. Pt's R anterior groin wound demonstrating notable improvements in granulation of wound base and reduction of wound depth. PT applied antibacterial dressings to allow for transport to facility including Milaids Ag, HFBt, and 4\" optifoam. Dressings may remain in place for 2-3 days. Pending progress with basic antibacterial dressings pt may be appropriate for discontinuation of NPWT.  Plan " of Care Reviewed With: patient            Time Calculation   PT Charges       Row Name 07/26/24 1159             Time Calculation    Start Time 1021  -LH      PT Goal Re-Cert Due Date 07/28/24  -         Untimed Charges    Wound Care 91679 Selective debridement  -      [id number] debridement 20  -LH         Total Minutes    Untimed Charges Total Minutes 20  -LH       Total Minutes 20  -LH                User Key  (r) = Recorded By, (t) = Taken By, (c) = Cosigned By      Initials Name Provider Type     Aleks Silva, PT Physical Therapist                      Therapy Charges for Today       Code Description Service Date Service Provider Modifiers Qty    01856840641 HC PT NEG PRESS WOUND TO 50SQCM DME1 7/25/2024 Aleks Silva, PT  1    40712663255 HC JOSE L DEBRIDE OPEN WOUND UP TO 20CM 7/26/2024 Aleks Silva, PT GP 1              PT G-Codes  Outcome Measure Options: AM-PAC 6 Clicks Daily Activity (OT)  AM-PAC 6 Clicks Score (PT): 20  AM-PAC 6 Clicks Score (OT): 19       Aleks Silva PT  7/26/2024

## 2024-07-26 NOTE — PROGRESS NOTES
"[facility] Progress Note    Date of Admission: 2024      Antibiotics: doxy and cefuroxime      CC: Right groin wound    S:   Patient with scrotal edema and no new changes wound vac in place, H/H stable no fevers plans for possible SNF    O:  /78 (BP Location: Right arm, Patient Position: Lying)   Pulse 87   Temp 98.6 °F (37 °C) (Oral)   Resp 18   Ht 170.2 cm (67\")   Wt 74.8 kg (165 lb)   SpO2 96%   BMI 25.84 kg/m²   Temp (24hrs), Av.4 °F (36.9 °C), Min:98.2 °F (36.8 °C), Max:98.6 °F (37 °C)      PE:      GENERAL: Awake and alert, in no acute distress. Chronically ill-appearing  HEENT: Normocephalic, atraumatic.  PERRL. EOMI. No conjunctival injection. No icterus. Oropharynx clear without evidence of thrush or exudate. Mild periodontal disease.    NECK: Supple without nuchal rigidity  LYMPH: No cervical, axillary or inguinal lymphadenopathy. No neck masses  HEART: RRR; No murmur, rubs, gallops.   LUNGS: Clear to auscultation bilaterally without wheezing, rales, rhonchi. Normal respiratory effort.  ABDOMEN: Soft, nontender, nondistended. Positive bowel sounds. No rebound or guarding.   EXT:  No cyanosis, clubbing   : Normal appearing genitalia with Gillespie catheter. With scrotal edema  MSK: FROM without joint effusions noted    SKIN: Warm and dry without cutaneous eruptions.  Right groin wound with wound dehiscence with slough and some erythema and drainage with odor  NEURO: Oriented to PPT. No focal deficits.   PSYCHIATRIC: Normal insight and judgement. Cooperative with PE.  Laboratory Data    Results from last 7 days   Lab Units 24  0449 24  0630 24  1003   WBC 10*3/mm3 13.28* 11.23* 10.37   HEMOGLOBIN g/dL 7.9* 7.8* 7.5*   HEMATOCRIT % 24.0* 24.1* 23.0*   PLATELETS 10*3/mm3 428 376 348     Results from last 7 days   Lab Units 24  0449   SODIUM mmol/L 140   POTASSIUM mmol/L 4.5   CHLORIDE mmol/L 107   CO2 mmol/L 24.0   BUN mg/dL 15   CREATININE mg/dL 1.44*   GLUCOSE mg/dL 99 "   CALCIUM mg/dL 8.5*     Results from last 7 days   Lab Units 07/25/24  0449   ALK PHOS U/L 85   BILIRUBIN mg/dL 0.2   ALT (SGPT) U/L 32   AST (SGOT) U/L 41*     Results from last 7 days   Lab Units 07/19/24  2040   SED RATE mm/hr 82*     Results from last 7 days   Lab Units 07/25/24  0449   CRP mg/dL 11.71*       Estimated Creatinine Clearance: 45.5 mL/min (A) (by C-G formula based on SCr of 1.44 mg/dL (H)).      Microbiology:  Urine culture with gram-negative rods identified as Klebsiella pneumoniae    Radiology:  Imaging Results (Last 24 Hours)       ** No results found for the last 24 hours. **            PROBLEM LIST:   Right groin wound dehiscence with cellulitis  Recent right iliofemoral embolectomy  PAD  H/o craniotomy with SDH  Recent prostate surgery with h/o BPH  Elevated ESR/CRP  ABENA     ASSESSMENT:  Patient is a 78-year-old male with history of peripheral arterial disease, GERD, hypertension, rheumatoid arthritis with recent right iliofemoral embolectomy surgical site was stable then had opening up of wound with dehiscence with drainage with some erythema and odor noted now admitted plans for wound revision and debridement in several days cultures obtained on empiric IV antibiotics and infectious disease consultation requested today.    Stable on dapto/ceftriaxone with wound vac in place and monitor wbc down some today with crt stable h/h stable s/p surgery, wbc down and crt stable post-op.    PLAN:  Ok for d/c on po abx with doxy and cefuroxime x 2 weeks and f/u with me in office  D/w CM plans for SNF    I will sign off case today f/u in office, call if reeval needed.     I spent >35 min on case with more than %50 time in counseling/coordination of care with d/c planning.    Jose Jones MD  7/26/2024

## 2024-07-26 NOTE — CASE MANAGEMENT/SOCIAL WORK
Case Management Discharge Note      Final Note: CM spoke with Aidee at [facility].  They have offered a bed and insurance precert has been approved.  Pt can admit today.  RN to call report to [phone].  DC summary has been faxed.  Pt's brother unable to transport d/t car problems.  CM arranged for [employer] WC van today at 1400.     [facility] HH following for any HH needs after rehab.  SK        Selected Continued Care - Admitted Since 7/17/2024       Destination Coordination complete.      Service Provider Selected Services Address Phone Fax Patient Preferred    [facility] [address] [address] [phone] --              Durable Medical Equipment    No services have been selected for the patient.                Dialysis/Infusion    No services have been selected for the patient.                Home Medical Care       Service Provider Selected Services Address Phone Fax Patient Preferred    [facility] [address], [place] [id number] [phone] --              Therapy    No services have been selected for the patient.                Community Resources    No services have been selected for the patient.                Community & DME    No services have been selected for the patient.                    Selected Continued Care - Prior Encounters Includes continued care and service providers with selected services from prior encounters from 4/18/2024 to 7/26/2024      Discharged on 6/26/2024 Admission date: 6/21/2024 - Discharge disposition: Skilled Nursing Facility (DC - External)      Destination       Service Provider Selected Services Address Phone Fax Patient Preferred    [facility] [address] [address] [phone]                           Transportation Services  W/C Van:   ([facility])    Final Discharge Disposition Code: 03 - skilled nursing facility (SNF)

## 2024-07-26 NOTE — PLAN OF CARE
"Goal Outcome Evaluation:  Plan of Care Reviewed With: patient        Progress: improving  Outcome Evaluation: PT removed pt's R anterior groin wound vac dressing this date in anticipation of DC to facility. Pt's R anterior groin wound demonstrating notable improvements in granulation of wound base and reduction of wound depth. PT applied antibacterial dressings to allow for transport to facility including Imladis Ag, HFBt, and 4\" optifoam. Dressings may remain in place for 2-3 days. Pending progress with basic antibacterial dressings pt may be appropriate for discontinuation of NPWT.                               "

## 2024-08-02 ENCOUNTER — NURSING HOME (OUTPATIENT)
Dept: FAMILY MEDICINE CLINIC | Facility: CLINIC | Age: 79
End: 2024-08-02
Payer: MEDICARE

## 2024-08-02 VITALS
TEMPERATURE: 97.4 F | OXYGEN SATURATION: 94 % | HEART RATE: 72 BPM | SYSTOLIC BLOOD PRESSURE: 120 MMHG | DIASTOLIC BLOOD PRESSURE: 80 MMHG | RESPIRATION RATE: 16 BRPM

## 2024-08-02 DIAGNOSIS — I70.209 SUPERFICIAL FEMORAL ARTERY OCCLUSION: ICD-10-CM

## 2024-08-02 DIAGNOSIS — Z97.8 FOLEY CATHETER IN PLACE: ICD-10-CM

## 2024-08-02 DIAGNOSIS — T81.31XD DEHISCENCE OF OPERATIVE WOUND, SUBSEQUENT ENCOUNTER: Primary | ICD-10-CM

## 2024-08-02 DIAGNOSIS — Z95.820 STATUS POST ANGIOPLASTY WITH STENT: ICD-10-CM

## 2024-08-02 DIAGNOSIS — D63.8 ANEMIA OF CHRONIC DISEASE: ICD-10-CM

## 2024-08-02 DIAGNOSIS — E03.9 ACQUIRED HYPOTHYROIDISM: ICD-10-CM

## 2024-08-02 NOTE — LETTER
Nursing Home Follow Up Note      Cirilo Torres DO []   LANNY Monae [x]  [address] [place] [address]  Phone: [phone]  Fax: [phone] Lito Agarwal MD []    Dagoberto Bond DO []   [address] [place] [address]  Phone: [phone]  Fax: [phone]     PATIENT NAME: Nestor Nye                                                                          YOB: 1945           DATE OF SERVICE: 08/2/2024  FACILITY:  [x][facility]   [] [place]   [] [facility]   [] [facility]   [] Other ______________________________________________________________________      CHIEF COMPLAINT:    Visit for discharge summary.       HISTORY OF PRESENT ILLNESS:     78 y.o. male with PMH BPH with urinary obstruction, RA,  HTN, dyslipidemia, hypothyroidism, and CKD stage IIIb (baseline sCr 1.4). He initially presented to [facility] on 6/21/24 for an elective aqua ablation per Dr Rey with Urology, secondary to chronic lower urinary tract symptoms r/t enlarged prostate,  requiring chronic indwelling catheter. He was admitted to Hospital Medicine post-ablation as the case took longer to perform due his large prostate. The case was complicated by hematuria and required bilateral hypogastric artery embolization by Dr. Christine. An angioseal was placed post-procedure. Shortly after the case, poor perfusion was noted in the right lower extremity. An arterial duplex was performed which showed no SFA flow. Vascular Surgery was consulted and he was taken for emergent right groin exploration per Dr. Morris. He was admitted to facility here on 6/26 and discharged home on 7/5. He presented  back to hospital on 7/17/24 with 3 days of erythema, foul smell and then incision opened on 7/17/24.  He was taken to OR with Dr Morris 7/22- right SFA angioplasty/stenting, right CFA aspiration thrombectomy/angioplasty - and wound vac placed. Seen by [facility] during admission and was placed on IV abx course, then  transitioned to PO abx doxy/cefuroxime x 2 weeks.     He was readmitted here to facility on 7/26 for rehabilitation and monitoring. He followed up with Vascular yesterday and everything going well. He will be discharging home tomorrow. He will have HH at home and will continue to follow up with PCP and specialists. He has no complaints or concerns today.     Routine labs obtained earlier this week and TSH 5.2, Hgb 8.8. Has chronic anemia.     PAST MEDICAL & SURGICAL HISTORY:   Past Medical History:   Diagnosis Date    Acute maxillary sinusitis     Arthritis     Disease of thyroid gland     Enlarged prostate     GERD (gastroesophageal reflux disease)     Gout     Hypertension     Impaired functional mobility, balance, gait, and endurance     PONV (postoperative nausea and vomiting)     Renal disorder     Rheumatoid arthritis     Right inguinal hernia 10/28/2021    Added automatically from request for surgery [id number]    Vocal cord anomaly 2007    had problem with enlarge vocal cord after low back surgery, changed his voice since then      Past Surgical History:   Procedure Laterality Date    ABDOMINAL SURGERY      ARTERIOGRAM Right 7/22/2024    Procedure: LOWER EXTREMITY ANGIOGRAM RIGHT;  Surgeon: Kingsley Morris MD;  Location: [facility] HYBRID OR;  Service: Vascular;  Laterality: Right;  DOSE: 56 MGY; FLURO: 7 MIN 6 SEC; CONTRAST 40 ML    BACK SURGERY      BELPHAROPTOSIS REPAIR Bilateral     BRAIN SURGERY      COLON SURGERY      bowel resection x2 per pt    CRANIOTOMY FOR SUBDURAL HEMATOMA      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    DIAGNOSTIC LAPAROSCOPY N/A 05/04/2022    Procedure: DIAGNOSTIC LAPAROSCOPY CONVERTED TO EXPLORATORY LAPAROTOMY WITH SMALL BOWEL RESECTION;  Surgeon: Maximo Bennett MD;  Location: [facility] OR;  Service: General;  Laterality: N/A;  CONVERTED TO EXPLORATORY LAPAROTOMY AT 1912    EXPLORATORY LAPAROTOMY N/A 04/16/2022    Procedure: LAPAROTOMY EXPLORATORY WITH SMALL  BOWEL OBSTRUCTION;  Surgeon: Maximo Bennett MD;  Location:  [facility] OR;  Service: General;  Laterality: N/A;    EYE SURGERY      on upper etelids    FEMORAL ENDARTERECTOMY Right 6/21/2024    Procedure: FEMORAL EMBELECTOMY RIGHT;  Surgeon: Kingsley Morris MD;  Location:  [facility] HYBRID OR;  Service: Vascular;  Laterality: Right;    FOREIGN BODY REMOVAL Right     removal of metal fragments from RUQ after electrocution and fall.      HERNIA REPAIR      INGUINAL HERNIA REPAIR Right 11/01/2021    Procedure: INGUINAL HERNIA REPAIR WITH MESH AND EXCISION OF SPERMATIC CORD LIPOMA;  Surgeon: Rosey Glover MD;  Location:  [facility] OR;  Service: General;  Laterality: Right;    LEG DEBRIDEMENT Right 7/22/2024    Procedure: GROIN DEBRIDEMENT WITH WOUND VAC PLACEMENT  RIGHT;  Surgeon: Kingsley Morris MD;  Location:  [facility]BRID OR;  Service: Vascular;  Laterality: Right;    LUMBAR SPINE SURGERY      PROSTATE AQUABLATION N/A 6/21/2024    Procedure: AQUABLATION OF PROSTATE;  Surgeon: Prieto Rey MD;  Location:  [facility] OR;  Service: Rob[facility] - Urology;  Laterality: N/A;    SKIN GRAFT Right     RUE skin grafting after electrocution    THORACIC SPINE SURGERY           MEDICATIONS:  I have reviewed and reconciled the patients medication list in the patients chart at the skilled nursing facility today.      ALLERGIES:    No Known Allergies      SOCIAL HISTORY:    Social History     Socioeconomic History    Marital status:    Tobacco Use    Smoking status: Former     Types: Cigars     Passive exposure: Past    Smokeless tobacco: Never    Tobacco comments:     Smoked 1 ppd of small cigars for 50  years    Vaping Use    Vaping status: Former   Substance and Sexual Activity    Alcohol use: Not Currently    Drug use: No    Sexual activity: Defer       FAMILY HISTORY:    Family History   Problem Relation Age of Onset    Arthritis Mother     Prostate cancer Brother     Arthritis Father        REVIEW OF  SYSTEMS:    Review of Systems   Constitutional:  Negative for activity change, appetite change, chills, diaphoresis, fatigue, fever, unexpected weight gain and unexpected weight loss.   HENT:  Negative for congestion, ear pain, mouth sores, nosebleeds, sore throat, swollen glands and trouble swallowing.    Eyes:  Negative for visual disturbance.   Respiratory:  Negative for apnea, cough, choking, chest tightness, shortness of breath, wheezing and stridor.    Cardiovascular:  Negative for chest pain, palpitations and leg swelling.   Gastrointestinal:  Negative for abdominal distention, abdominal pain, blood in stool, constipation, diarrhea, nausea, vomiting, GERD and indigestion.   Endocrine: Negative for polydipsia and polyuria.   Genitourinary:  Negative for decreased urine volume, difficulty urinating, dysuria, flank pain, hematuria and urgency.        Gillespie    Musculoskeletal:  Positive for arthralgias (chronic). Negative for back pain, gait problem, joint swelling and myalgias.   Skin:  Negative for color change and rash.        Dressing intact right groin   Neurological:  Negative for dizziness, seizures, speech difficulty, weakness, memory problem and confusion.   Psychiatric/Behavioral:  Negative for behavioral problems, dysphoric mood, hallucinations, sleep disturbance and depressed mood. The patient is not nervous/anxious.          PHYSICAL EXAMINATION:   VITAL SIGNS:   Vitals:    08/02/24 1142   BP: 120/80   Pulse: 72   Resp: 16   Temp: 97.4 °F (36.3 °C)   SpO2: 94%        Physical Exam  Vitals and nursing note reviewed.   Constitutional:       Appearance: Normal appearance.   HENT:      Head: Normocephalic.   Eyes:      Conjunctiva/sclera: Conjunctivae normal.   Cardiovascular:      Rate and Rhythm: Normal rate and regular rhythm.   Pulmonary:      Effort: Pulmonary effort is normal. No respiratory distress.      Breath sounds: Normal breath sounds.   Abdominal:      General: Bowel sounds are normal.  There is no distension.      Palpations: Abdomen is soft.      Tenderness: There is no abdominal tenderness.   Genitourinary:     Comments: Gillespie cath to BSD  Skin:         Neurological:      General: No focal deficit present.      Mental Status: He is alert and oriented to person, place, and time.   Psychiatric:         Mood and Affect: Mood and affect normal.         Speech: Speech normal.         Behavior: Behavior normal.         Cognition and Memory: Cognition and memory normal.         RECORDS REVIEW:     I have reviewed records in Epic.    ASSESSMENT     Diagnoses and all orders for this visit:    1. Dehiscence of operative wound, subsequent encounter (Primary)    2. Superficial femoral artery occlusion    3. Status post angioplasty with stent    4. Gillespie catheter in place    5. Acquired hypothyroidism        PLAN    Gillespie catheter/status post femoral angioplasty with stent/dehiscence operative wound  -Stable since admission. He will continue to follow up with Vascular and Urology.     Hypothyroidism  -TSH 5.2.  Increase Levothyroxine to 100 mcg daily. Nursing to include in discharge instructions that patient needs to have TSH rechecked with PCP.     Anemia  -Nursing to give instructions in discharge summary to follow up with PCP for anemia.      He will continue to follow up with PCP for chronic conditions.     Nursing encouraged to keep me informed of any acute changes or any new concerning symptoms.     [x]  Discussed Patient in detail with nursing/staff, addressed all needs today.     [x]  Plan of Care Reviewed   []  PT/OT Reviewed   [x]  Order Changes  [x]  Discharge Plans Reviewed  [x]  Advance Directive on file with Nursing Home.   [x]  POA on file with Nursing Home.   [x]  Code Status listed: [x]  Full Code   []  DNR      I spent 40 minutes on discharge for Nestor on this date of service. This time includes time spent by me in the following activities:preparing for the visit, reviewing tests, obtaining  and/or reviewing a separately obtained history, performing a medically appropriate examination and/or evaluation , counseling and educating the patient/family/caregiver, ordering medications, tests, or procedures, referring and communicating with other health care professionals , documenting information in the medical record, independently interpreting results and communicating that information with the patient/family/caregiver, and care coordination    I confirm accuracy of unchanged data/findings which have been carried forward from previous visit, as well as I have updated appropriately those that have changed.       Sheila Hair, APRN.

## 2024-08-04 NOTE — PROGRESS NOTES
Nursing Home Follow Up Note      Cirilo Torres DO []   LANNY Monae [x]  [address] [place] [address]  Phone: [phone]  Fax: [phone] Lito Agarwal MD []    Dagoberto Bond DO []   [address] [place] [address]  Phone: [phone]  Fax: [phone]     PATIENT NAME: Nestor Nye                                                                          YOB: 1945           DATE OF SERVICE: 08/2/2024  FACILITY:  [x][facility]   [] [facility]   [] [facility]   [] [facility]   [] Other ______________________________________________________________________      CHIEF COMPLAINT:    Visit for discharge summary.       HISTORY OF PRESENT ILLNESS:     78 y.o. male with PMH BPH with urinary obstruction, RA,  HTN, dyslipidemia, hypothyroidism, and CKD stage IIIb (baseline sCr 1.4). He initially presented to [facility] on 6/21/24 for an elective aqua ablation per Dr Rey with Urology, secondary to chronic lower urinary tract symptoms r/t enlarged prostate,  requiring chronic indwelling catheter. He was admitted to Hospital Medicine post-ablation as the case took longer to perform due his large prostate. The case was complicated by hematuria and required bilateral hypogastric artery embolization by Dr. Christine. An angioseal was placed post-procedure. Shortly after the case, poor perfusion was noted in the right lower extremity. An arterial duplex was performed which showed no SFA flow. Vascular Surgery was consulted and he was taken for emergent right groin exploration per Dr. Morris. He was admitted to facility here on 6/26 and discharged home on 7/5. He presented  back to hospital on 7/17/24 with 3 days of erythema, foul smell and then incision opened on 7/17/24.  He was taken to OR with Dr Morris 7/22- right SFA angioplasty/stenting, right CFA aspiration thrombectomy/angioplasty - and wound vac placed. Seen by [facility] during admission and was placed on IV abx course, then  transitioned to PO abx doxy/cefuroxime x 2 weeks.     He was readmitted here to facility on 7/26 for rehabilitation and monitoring. He followed up with Vascular yesterday and everything going well. He will be discharging home tomorrow. He will have HH at home and will continue to follow up with PCP and specialists. He has no complaints or concerns today.     Routine labs obtained earlier this week and TSH 5.2, Hgb 8.8. Has chronic anemia.     PAST MEDICAL & SURGICAL HISTORY:   Past Medical History:   Diagnosis Date    Acute maxillary sinusitis     Arthritis     Disease of thyroid gland     Enlarged prostate     GERD (gastroesophageal reflux disease)     Gout     Hypertension     Impaired functional mobility, balance, gait, and endurance     PONV (postoperative nausea and vomiting)     Renal disorder     Rheumatoid arthritis     Right inguinal hernia 10/28/2021    Added automatically from request for surgery [id number]    Vocal cord anomaly 2007    had problem with enlarge vocal cord after low back surgery, changed his voice since then      Past Surgical History:   Procedure Laterality Date    ABDOMINAL SURGERY      ARTERIOGRAM Right 7/22/2024    Procedure: LOWER EXTREMITY ANGIOGRAM RIGHT;  Surgeon: Kingsley Morris MD;  Location: [facility] HYBRID OR;  Service: Vascular;  Laterality: Right;  DOSE: 56 MGY; FLURO: 7 MIN 6 SEC; CONTRAST 40 ML    BACK SURGERY      BELPHAROPTOSIS REPAIR Bilateral     BRAIN SURGERY      COLON SURGERY      bowel resection x2 per pt    CRANIOTOMY FOR SUBDURAL HEMATOMA      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    DIAGNOSTIC LAPAROSCOPY N/A 05/04/2022    Procedure: DIAGNOSTIC LAPAROSCOPY CONVERTED TO EXPLORATORY LAPAROTOMY WITH SMALL BOWEL RESECTION;  Surgeon: Maximo Bennett MD;  Location: [facility] OR;  Service: General;  Laterality: N/A;  CONVERTED TO EXPLORATORY LAPAROTOMY AT 1912    EXPLORATORY LAPAROTOMY N/A 04/16/2022    Procedure: LAPAROTOMY EXPLORATORY WITH SMALL  BOWEL OBSTRUCTION;  Surgeon: Maximo Bennett MD;  Location:  [facility] OR;  Service: General;  Laterality: N/A;    EYE SURGERY      on upper etelids    FEMORAL ENDARTERECTOMY Right 6/21/2024    Procedure: FEMORAL EMBELECTOMY RIGHT;  Surgeon: Kingsley Morris MD;  Location:  [facility];  Service: Vascular;  Laterality: Right;    FOREIGN BODY REMOVAL Right     removal of metal fragments from RUQ after electrocution and fall.      HERNIA REPAIR      INGUINAL HERNIA REPAIR Right 11/01/2021    Procedure: INGUINAL HERNIA REPAIR WITH MESH AND EXCISION OF SPERMATIC CORD LIPOMA;  Surgeon: Rosey Glover MD;  Location:  [facility] OR;  Service: General;  Laterality: Right;    LEG DEBRIDEMENT Right 7/22/2024    Procedure: GROIN DEBRIDEMENT WITH WOUND VAC PLACEMENT  RIGHT;  Surgeon: Kingsley Morris MD;  Location:  [facility] OR;  Service: Vascular;  Laterality: Right;    LUMBAR SPINE SURGERY      PROSTATE AQUABLATION N/A 6/21/2024    Procedure: AQUABLATION OF PROSTATE;  Surgeon: Prieto Rey MD;  Location:  [facility] OR;  Service: Robotics - Urology;  Laterality: N/A;    SKIN GRAFT Right     RUE skin grafting after electrocution    THORACIC SPINE SURGERY           MEDICATIONS:  I have reviewed and reconciled the patients medication list in the patients chart at the skilled nursing facility today.      ALLERGIES:    No Known Allergies      SOCIAL HISTORY:    Social History     Socioeconomic History    Marital status:    Tobacco Use    Smoking status: Former     Types: Cigars     Passive exposure: Past    Smokeless tobacco: Never    Tobacco comments:     Smoked 1 ppd of small cigars for 50  years    Vaping Use    Vaping status: Former   Substance and Sexual Activity    Alcohol use: Not Currently    Drug use: No    Sexual activity: Defer       FAMILY HISTORY:    Family History   Problem Relation Age of Onset    Arthritis Mother     Prostate cancer Brother     Arthritis Father        REVIEW OF  SYSTEMS:    Review of Systems   Constitutional:  Negative for activity change, appetite change, chills, diaphoresis, fatigue, fever, unexpected weight gain and unexpected weight loss.   HENT:  Negative for congestion, ear pain, mouth sores, nosebleeds, sore throat, swollen glands and trouble swallowing.    Eyes:  Negative for visual disturbance.   Respiratory:  Negative for apnea, cough, choking, chest tightness, shortness of breath, wheezing and stridor.    Cardiovascular:  Negative for chest pain, palpitations and leg swelling.   Gastrointestinal:  Negative for abdominal distention, abdominal pain, blood in stool, constipation, diarrhea, nausea, vomiting, GERD and indigestion.   Endocrine: Negative for polydipsia and polyuria.   Genitourinary:  Negative for decreased urine volume, difficulty urinating, dysuria, flank pain, hematuria and urgency.        Gillespie    Musculoskeletal:  Positive for arthralgias (chronic). Negative for back pain, gait problem, joint swelling and myalgias.   Skin:  Negative for color change and rash.        Dressing intact right groin   Neurological:  Negative for dizziness, seizures, speech difficulty, weakness, memory problem and confusion.   Psychiatric/Behavioral:  Negative for behavioral problems, dysphoric mood, hallucinations, sleep disturbance and depressed mood. The patient is not nervous/anxious.          PHYSICAL EXAMINATION:   VITAL SIGNS:   Vitals:    08/02/24 1142   BP: 120/80   Pulse: 72   Resp: 16   Temp: 97.4 °F (36.3 °C)   SpO2: 94%        Physical Exam  Vitals and nursing note reviewed.   Constitutional:       Appearance: Normal appearance.   HENT:      Head: Normocephalic.   Eyes:      Conjunctiva/sclera: Conjunctivae normal.   Cardiovascular:      Rate and Rhythm: Normal rate and regular rhythm.   Pulmonary:      Effort: Pulmonary effort is normal. No respiratory distress.      Breath sounds: Normal breath sounds.   Abdominal:      General: Bowel sounds are normal.  There is no distension.      Palpations: Abdomen is soft.      Tenderness: There is no abdominal tenderness.   Genitourinary:     Comments: Gillespie cath to BSD  Skin:         Neurological:      General: No focal deficit present.      Mental Status: He is alert and oriented to person, place, and time.   Psychiatric:         Mood and Affect: Mood and affect normal.         Speech: Speech normal.         Behavior: Behavior normal.         Cognition and Memory: Cognition and memory normal.         RECORDS REVIEW:     I have reviewed records in Epic.    ASSESSMENT     Diagnoses and all orders for this visit:    1. Dehiscence of operative wound, subsequent encounter (Primary)    2. Superficial femoral artery occlusion    3. Status post angioplasty with stent    4. Gillespie catheter in place    5. Acquired hypothyroidism        PLAN    Gillespie catheter/status post femoral angioplasty with stent/dehiscence operative wound  -Stable since admission. He will continue to follow up with Vascular and Urology.     Hypothyroidism  -TSH 5.2.  Increase Levothyroxine to 100 mcg daily. Nursing to include in discharge instructions that patient needs to have TSH rechecked with PCP.     Anemia  -Nursing to give instructions in discharge summary to follow up with PCP for anemia.      He will continue to follow up with PCP for chronic conditions.     Nursing encouraged to keep me informed of any acute changes or any new concerning symptoms.     [x]  Discussed Patient in detail with nursing/staff, addressed all needs today.     [x]  Plan of Care Reviewed   []  PT/OT Reviewed   [x]  Order Changes  [x]  Discharge Plans Reviewed  [x]  Advance Directive on file with Nursing Home.   [x]  POA on file with Nursing Home.   [x]  Code Status listed: [x]  Full Code   []  DNR      I spent 40 minutes on discharge for Nestor on this date of service. This time includes time spent by me in the following activities:preparing for the visit, reviewing tests, obtaining  and/or reviewing a separately obtained history, performing a medically appropriate examination and/or evaluation , counseling and educating the patient/family/caregiver, ordering medications, tests, or procedures, referring and communicating with other health care professionals , documenting information in the medical record, independently interpreting results and communicating that information with the patient/family/caregiver, and care coordination    I confirm accuracy of unchanged data/findings which have been carried forward from previous visit, as well as I have updated appropriately those that have changed.       Sheila Hair, APRN.

## 2024-08-05 ENCOUNTER — TELEPHONE (OUTPATIENT)
Dept: UROLOGY | Facility: CLINIC | Age: 79
End: 2024-08-05
Payer: MEDICARE

## 2024-08-05 ENCOUNTER — HOSPITAL ENCOUNTER (EMERGENCY)
Facility: HOSPITAL | Age: 79
Discharge: HOME | End: 2024-08-05
Attending: EMERGENCY MEDICINE | Admitting: EMERGENCY MEDICINE
Payer: MEDICARE

## 2024-08-05 VITALS
TEMPERATURE: 97.6 F | HEIGHT: 67 IN | BODY MASS INDEX: 24.33 KG/M2 | SYSTOLIC BLOOD PRESSURE: 90 MMHG | RESPIRATION RATE: 22 BRPM | WEIGHT: 155 LBS | DIASTOLIC BLOOD PRESSURE: 66 MMHG | HEART RATE: 110 BPM | OXYGEN SATURATION: 96 %

## 2024-08-05 DIAGNOSIS — T83.091A OBSTRUCTION OF FOLEY CATHETER, INITIAL ENCOUNTER: Primary | ICD-10-CM

## 2024-08-05 DIAGNOSIS — R33.9 URINARY RETENTION: ICD-10-CM

## 2024-08-05 PROCEDURE — 99283 EMERGENCY DEPT VISIT LOW MDM: CPT

## 2024-08-05 NOTE — TELEPHONE ENCOUNTER
I spoke to patient and he was able to void some urine in stacy approx 30 minutes ago. Patient was instructed to reach out to our office if he stops voiding all together. Patient understood. Patient states he has 16 FR cath anchored. Patient will keep [facility] appt tomorrow.

## 2024-08-05 NOTE — DISCHARGE INSTRUCTIONS
Patient should follow up with Urology as scheduled this week.  Patient should return to the emergency department before then for decreased urinary output, fever, vomiting, abdominal pain or for any concerning symptoms, worsening symptoms or new concerns.

## 2024-08-05 NOTE — ED PROVIDER NOTES
EMERGENCY DEPARTMENT ENCOUNTER    Pt Name: Nestor Nye  MRN: [id number]  Pt :   1945  Room Number:    Date of encounter:  2024  PCP: Roxanne Slater MD  ED Provider: Damaso Linares MD    Historian: Patient      HPI:  Chief Complaint: Blocked Gillespie catheter        Context: Nestor Nye is a 78 y.o. male who presents to the ED c/o blocked Gillespie catheter.  Patient has past medical history significant for enlarged prostate, gout, hypertension, rheumatoid arthritis, subdural hematoma, peripheral vascular disease requiring right femoral endarterectomy in  and prostate aquablation on .  He has an indwelling Gillespie catheter.  Patient says that since last night he has had a blockage of the Gillespie catheter with some leaking around the Gillespie catheter.      PAST MEDICAL HISTORY  Past Medical History:   Diagnosis Date    Acute maxillary sinusitis     Arthritis     Disease of thyroid gland     Enlarged prostate     GERD (gastroesophageal reflux disease)     Gout     Hypertension     Impaired functional mobility, balance, gait, and endurance     PONV (postoperative nausea and vomiting)     Renal disorder     Rheumatoid arthritis     Right inguinal hernia 10/28/2021    Added automatically from request for surgery [id number]    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, changed his voice since then         PAST SURGICAL HISTORY  Past Surgical History:   Procedure Laterality Date    ABDOMINAL SURGERY      ARTERIOGRAM Right 2024    Procedure: LOWER EXTREMITY ANGIOGRAM RIGHT;  Surgeon: Kingsley Morris MD;  Location: [facility];  Service: Vascular;  Laterality: Right;  DOSE: 56 MGY; FLURO: 7 MIN 6 SEC; CONTRAST 40 ML    BACK SURGERY      BELPHAROPTOSIS REPAIR Bilateral     BRAIN SURGERY      COLON SURGERY      bowel resection x2 per pt    CRANIOTOMY FOR SUBDURAL HEMATOMA      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     DIAGNOSTIC LAPAROSCOPY N/A 05/04/2022    Procedure: DIAGNOSTIC LAPAROSCOPY CONVERTED TO EXPLORATORY LAPAROTOMY WITH SMALL BOWEL RESECTION;  Surgeon: Maximo Bennett MD;  Location:  [facility];  Service: General;  Laterality: N/A;  CONVERTED TO EXPLORATORY LAPAROTOMY AT 1912    EXPLORATORY LAPAROTOMY N/A 04/16/2022    Procedure: LAPAROTOMY EXPLORATORY WITH SMALL BOWEL OBSTRUCTION;  Surgeon: Maximo Bennett MD;  Location:  [facility];  Service: General;  Laterality: N/A;    EYE SURGERY      on upper etelids    FEMORAL ENDARTERECTOMY Right 6/21/2024    Procedure: FEMORAL EMBELECTOMY RIGHT;  Surgeon: Kingsley Morris MD;  Location:  [facility];  Service: Vascular;  Laterality: Right;    FOREIGN BODY REMOVAL Right     removal of metal fragments from RUQ after electrocution and fall.      HERNIA REPAIR      INGUINAL HERNIA REPAIR Right 11/01/2021    Procedure: INGUINAL HERNIA REPAIR WITH MESH AND EXCISION OF SPERMATIC CORD LIPOMA;  Surgeon: Rosey Glover MD;  Location:  [facility];  Service: General;  Laterality: Right;    LEG DEBRIDEMENT Right 7/22/2024    Procedure: GROIN DEBRIDEMENT WITH WOUND VAC PLACEMENT  RIGHT;  Surgeon: Kingsley Morris MD;  Location:  [facility];  Service: Vascular;  Laterality: Right;    LUMBAR SPINE SURGERY      PROSTATE AQUABLATION N/A 6/21/2024    Procedure: AQUABLATION OF PROSTATE;  Surgeon: Prieto Rey MD;  Location:  [facility];  Service: Robotics - Urology;  Laterality: N/A;    SKIN GRAFT Right     RUE skin grafting after electrocution    THORACIC SPINE SURGERY           FAMILY HISTORY  Family History   Problem Relation Age of Onset    Arthritis Mother     Prostate cancer Brother     Arthritis Father          SOCIAL HISTORY  Social History     Socioeconomic History    Marital status:    Tobacco Use    Smoking status: Former     Types: Cigars     Passive exposure: Past    Smokeless tobacco: Never    Tobacco comments:     Smoked 1 ppd of small cigars for  50  years    Vaping Use    Vaping status: Former   Substance and Sexual Activity    Alcohol use: Not Currently    Drug use: No    Sexual activity: Defer         ALLERGIES  Patient has no known allergies.        REVIEW OF SYSTEMS    All systems reviewed and negative except for those discussed in HPI.       PHYSICAL EXAM    I have reviewed the triage vital signs and nursing notes.    ED Triage Vitals [08/05/24 0213]   Temp Heart Rate Resp BP SpO2   97.6 °F (36.4 °C) 110 22 (!) 150/130 94 %      Temp src Heart Rate Source Patient Position BP Location FiO2 (%)   Oral Monitor Sitting Left arm --         General: no acute distress, well-appearing, non-toxic  Skin: normal color, warm and dry  Head: normocephalic, atraumatic  Nose: normal nasal mucosa, no visible deformity.  Mouth: moist mucous membranes.  Neck: supple.  Chest: no retractions, no visible deformity  Cardiovascular: Regular rate and rhythm.  Abdomen: soft, supra-pubic fullness and tenderness to palpation, non-distended. No rebound tenderness, no guarding.  No peritonitis.  Neuro:  alert and oriented x3, no focal neurological deficits.  Psych:  appropriate mood and behavior.        LAB RESULTS  No results found for this or any previous visit (from the past 24 hour(s)).    If labs were ordered, I independently reviewed the results and considered them in treating the patient.  See medical decision making discussion section for my interpretation of lab results.        RADIOLOGY  No Radiology Exams Resulted Within Past 24 Hours      PROCEDURES    Procedures    No orders to display       MEDICATIONS GIVEN IN ER    Medications - No data to display      MEDICAL DECISION MAKING, PROGRESS, and CONSULTS    All labs, if obtained, have been independently reviewed by me.  All radiology studies, if obtained, have been reviewed by me and the radiologist dictating the report.  All EKG's, if obtained, have been independently viewed and interpreted by me/my attending physician.       Discussion below represents my analysis of pertinent findings related to patient's condition, differential diagnosis, treatment plan and final disposition.                         Differential diagnosis:    Differential occludes UTI, hematuria with Gillespie catheter obstruction from clot, Gillespie catheter displacement, other acute emergency.    Medical Decision Making Discussion:    Vitals are reviewed and are remarkable for hypertension and tachycardia.    Patient's bladder scan showed 555 mL.  Gillespie catheter was flushed and about 400 cc of jaun-colored urine was drained.  Repeat bladder scan showed 70 mL.    On repeat abdominal examination his abdomen is soft and nontender.    Patient has scheduled follow-up with Dr. Rey in 2 days.  He was instructed to follow-up as scheduled and to return to the emerged apartment before then for any concerning symptoms or new concerns      Additional sources:    - External (non-ED) record review: Patient physical from [facility] July 17, 2024 documenting history of enlarged prostate, gout, hypertension, rheumatoid arthritis, subdural hematoma, peripheral vascular disease requiring right femoral endarterectomy in June 21 and aquablation on June 21.  Patient hospitalized for bilateral epididymitis with right groin dehiscence.    Reviewed discharge summary from July 26, 2024: Patient underwent right SFA angioplasty/stenting on July 22.  Had wound VAC in place to the right groin.  Transition to p.o. antibiotics with doxycycline and cefuroxime for 2 weeks.    - Chronic or social conditions impacting care: Chronic urinary retention and Gillespie catheter    Shared Decision Making:  After my consideration of clinical presentation and any laboratory/radiology studies obtained, I discussed the findings with the patient/patient representative who is in agreement with the treatment plan and the final disposition.   Risks and benefits of discharge and/or observation/admission  were discussed.    Orders placed during this visit:  Orders Placed This Encounter   Procedures    Irrigate Catheter         AS OF 04:31 EDT VITALS:    BP - 90/66  HR - 110  TEMP - 97.6 °F (36.4 °C) (Oral)  O2 SATS - 96%                  DIAGNOSIS  Final diagnoses:   Obstruction of Gillespie catheter, initial encounter   Urinary retention         DISPOSITION  Discharge      Please note that portions of this document were completed with voice recognition software.        Damaso Linares MD  08/05/24 0439

## 2024-08-05 NOTE — TELEPHONE ENCOUNTER
[facility] is calling for this patient to say he went to ER lastnight & was unable to void/they flushed out a clot & put stacy back in but pt states this morning he isn't urinating as much/wants to see if he can come in today/is scheduled for UDS tomorrow.Please advise

## 2024-08-06 ENCOUNTER — PROCEDURE VISIT (OUTPATIENT)
Dept: UROLOGY | Facility: CLINIC | Age: 79
End: 2024-08-06
Payer: MEDICARE

## 2024-08-06 ENCOUNTER — OFFICE VISIT (OUTPATIENT)
Dept: UROLOGY | Facility: CLINIC | Age: 79
End: 2024-08-06
Payer: MEDICARE

## 2024-08-06 DIAGNOSIS — R33.9 URINARY RETENTION: Primary | ICD-10-CM

## 2024-08-06 NOTE — PROGRESS NOTES
Patient came in today stating that his cathter wasn't draining and I tried to irrigate the cathter and was unable so then I had Denzel come in and irrigate it

## 2024-08-06 NOTE — PROGRESS NOTES
Urodynamics     Patient Name: Nestor Nye  : 1945   MRN: [id number]     Labs  Brief Urine Lab Results  (Last result in the past 365 days)        Color   Clarity   Blood   Leuk Est   Nitrite   Protein   CREAT   Urine HCG        24 [address]   Turbid   Large (3+)   Large (3+)   Positive   100 mg/dL (2+)                   Assessment    Encounter Diagnosis   Name Primary?    Urinary retention Yes        Urodynamics attempted unable to perform    Patient was here for UDS nurse attempted to place ctheter and was unable to place. She did replace his indwelling catheter and patient returned shortly with no urine return. Ma attempted to irrigate and this caused patient discomfort. I deflated balloon and  inserted catheter until urine return was achieved and re inflated balloon, bladder was then irrigated with 1000ml of Sterile water small clots irrigated out and urine was clear pink tinged. Patient was instructed to return to office if blood in urine worsens or if catheter is not draining    I discussed with Dr. Rey he recommends patient do CIC due to patient's visual impairment and a traumatic experience he is not able to perform CIC.  I discussed with the patient his options of CIC versus chronic indwelling catheter he is not a candidate for SP tube due to prostate bulging into the bladder and bowel close to the bladder.  He has decided he will continue with chronic Gillespie catheter and is aware of risk for urethral erosion.  He does report today he is having some bleeding from his catheter placement and catheter is draining well.  Patient will give me a call if bleeding does not improve.    Plan  1.  Continue chronic indwelling Gillespie catheter with changes every 4-week  2.  Will refer to home health for catheter change      LANNY Guevara, NP-C  [facility]

## 2024-08-08 ENCOUNTER — TELEPHONE (OUTPATIENT)
Dept: INTERNAL MEDICINE | Facility: CLINIC | Age: 79
End: 2024-08-08
Payer: MEDICARE

## 2024-08-08 NOTE — TELEPHONE ENCOUNTER
Caller: MIRANDA    Relationship: [facility]     Best call back number: [phone]    What was the call regarding: MIRANDA CALLED ASKING IF DR BACK WILL FOLLOW  HOME HEALTH ORDERS.

## 2024-08-09 ENCOUNTER — HOSPITAL ENCOUNTER (EMERGENCY)
Facility: HOSPITAL | Age: 79
Discharge: HOME | End: 2024-08-09
Attending: EMERGENCY MEDICINE
Payer: MEDICARE

## 2024-08-09 VITALS
HEIGHT: 67 IN | TEMPERATURE: 97.8 F | WEIGHT: 145 LBS | SYSTOLIC BLOOD PRESSURE: 109 MMHG | BODY MASS INDEX: 22.76 KG/M2 | HEART RATE: 71 BPM | RESPIRATION RATE: 18 BRPM | DIASTOLIC BLOOD PRESSURE: 77 MMHG | OXYGEN SATURATION: 97 %

## 2024-08-09 DIAGNOSIS — T83.098A: Primary | ICD-10-CM

## 2024-08-09 DIAGNOSIS — D64.9 ANEMIA, UNSPECIFIED TYPE: ICD-10-CM

## 2024-08-09 DIAGNOSIS — N39.0 URINARY TRACT INFECTION WITH HEMATURIA, SITE UNSPECIFIED: ICD-10-CM

## 2024-08-09 DIAGNOSIS — R31.9 URINARY TRACT INFECTION WITH HEMATURIA, SITE UNSPECIFIED: ICD-10-CM

## 2024-08-09 LAB
ALBUMIN SERPL-MCNC: 3.7 G/DL (ref 3.5–5.2)
ALBUMIN/GLOB SERPL: 1.1 G/DL
ALP SERPL-CCNC: 88 U/L (ref 39–117)
ALT SERPL W P-5'-P-CCNC: 24 U/L (ref 1–41)
ANION GAP SERPL CALCULATED.3IONS-SCNC: 12.5 MMOL/L (ref 5–15)
AST SERPL-CCNC: 26 U/L (ref 1–40)
BACTERIA UR QL AUTO: ABNORMAL /HPF
BASOPHILS # BLD AUTO: 0.08 10*3/MM3 (ref 0–0.2)
BASOPHILS NFR BLD AUTO: 1.2 % (ref 0–1.5)
BILIRUB SERPL-MCNC: 0.3 MG/DL (ref 0–1.2)
BILIRUB UR QL STRIP: NEGATIVE
BUN SERPL-MCNC: 36 MG/DL (ref 8–23)
BUN/CREAT SERPL: 22.2 (ref 7–25)
CALCIUM SPEC-SCNC: 8.9 MG/DL (ref 8.6–10.5)
CHLORIDE SERPL-SCNC: 104 MMOL/L (ref 98–107)
CLARITY UR: ABNORMAL
CO2 SERPL-SCNC: 22.5 MMOL/L (ref 22–29)
COLOR UR: ABNORMAL
CREAT SERPL-MCNC: 1.62 MG/DL (ref 0.76–1.27)
DEPRECATED RDW RBC AUTO: 47.6 FL (ref 37–54)
EGFRCR SERPLBLD CKD-EPI 2021: 43.4 ML/MIN/1.73
EOSINOPHIL # BLD AUTO: 0.13 10*3/MM3 (ref 0–0.4)
EOSINOPHIL NFR BLD AUTO: 1.9 % (ref 0.3–6.2)
ERYTHROCYTE [DISTWIDTH] IN BLOOD BY AUTOMATED COUNT: 13.9 % (ref 12.3–15.4)
GLOBULIN UR ELPH-MCNC: 3.5 GM/DL
GLUCOSE SERPL-MCNC: 103 MG/DL (ref 65–99)
GLUCOSE UR STRIP-MCNC: NEGATIVE MG/DL
HCT VFR BLD AUTO: 26.2 % (ref 37.5–51)
HGB BLD-MCNC: 8.3 G/DL (ref 13–17.7)
HGB UR QL STRIP.AUTO: ABNORMAL
HYALINE CASTS UR QL AUTO: ABNORMAL /LPF
IMM GRANULOCYTES # BLD AUTO: 0.02 10*3/MM3 (ref 0–0.05)
IMM GRANULOCYTES NFR BLD AUTO: 0.3 % (ref 0–0.5)
KETONES UR QL STRIP: NEGATIVE
LEUKOCYTE ESTERASE UR QL STRIP.AUTO: ABNORMAL
LYMPHOCYTES # BLD AUTO: 1.6 10*3/MM3 (ref 0.7–3.1)
LYMPHOCYTES NFR BLD AUTO: 24 % (ref 19.6–45.3)
MCH RBC QN AUTO: 29.7 PG (ref 26.6–33)
MCHC RBC AUTO-ENTMCNC: 31.7 G/DL (ref 31.5–35.7)
MCV RBC AUTO: 93.9 FL (ref 79–97)
MONOCYTES # BLD AUTO: 0.7 10*3/MM3 (ref 0.1–0.9)
MONOCYTES NFR BLD AUTO: 10.5 % (ref 5–12)
NEUTROPHILS NFR BLD AUTO: 4.15 10*3/MM3 (ref 1.7–7)
NEUTROPHILS NFR BLD AUTO: 62.1 % (ref 42.7–76)
NITRITE UR QL STRIP: POSITIVE
NRBC BLD AUTO-RTO: 0 /100 WBC (ref 0–0.2)
PH UR STRIP.AUTO: >=9 [PH] (ref 5–8)
PLATELET # BLD AUTO: 484 10*3/MM3 (ref 140–450)
PMV BLD AUTO: 9.5 FL (ref 6–12)
POTASSIUM SERPL-SCNC: 4.3 MMOL/L (ref 3.5–5.2)
PROT SERPL-MCNC: 7.2 G/DL (ref 6–8.5)
PROT UR QL STRIP: ABNORMAL
RBC # BLD AUTO: 2.79 10*6/MM3 (ref 4.14–5.8)
RBC # UR STRIP: ABNORMAL /HPF
REF LAB TEST METHOD: ABNORMAL
SODIUM SERPL-SCNC: 139 MMOL/L (ref 136–145)
SP GR UR STRIP: 1.02 (ref 1–1.03)
SQUAMOUS #/AREA URNS HPF: ABNORMAL /HPF
TRI-PHOS CRY URNS QL MICRO: ABNORMAL /HPF
UROBILINOGEN UR QL STRIP: ABNORMAL
WBC # UR STRIP: ABNORMAL /HPF
WBC NRBC COR # BLD AUTO: 6.68 10*3/MM3 (ref 3.4–10.8)

## 2024-08-09 PROCEDURE — 96365 THER/PROPH/DIAG IV INF INIT: CPT

## 2024-08-09 PROCEDURE — 99283 EMERGENCY DEPT VISIT LOW MDM: CPT

## 2024-08-09 PROCEDURE — 87077 CULTURE AEROBIC IDENTIFY: CPT

## 2024-08-09 PROCEDURE — 80053 COMPREHEN METABOLIC PANEL: CPT

## 2024-08-09 PROCEDURE — 51702 INSERT TEMP BLADDER CATH: CPT

## 2024-08-09 PROCEDURE — 51798 US URINE CAPACITY MEASURE: CPT

## 2024-08-09 PROCEDURE — 85025 COMPLETE CBC W/AUTO DIFF WBC: CPT

## 2024-08-09 PROCEDURE — 25010000002 CEFTRIAXONE PER 250 MG

## 2024-08-09 PROCEDURE — 87086 URINE CULTURE/COLONY COUNT: CPT

## 2024-08-09 PROCEDURE — 81001 URINALYSIS AUTO W/SCOPE: CPT

## 2024-08-09 PROCEDURE — 87186 SC STD MICRODIL/AGAR DIL: CPT

## 2024-08-09 PROCEDURE — 36415 COLL VENOUS BLD VENIPUNCTURE: CPT

## 2024-08-09 RX ORDER — LIDOCAINE HYDROCHLORIDE 20 MG/ML
JELLY TOPICAL AS NEEDED
Status: DISCONTINUED | OUTPATIENT
Start: 2024-08-09 | End: 2024-08-09 | Stop reason: HOSPADM

## 2024-08-09 RX ORDER — CEPHALEXIN 500 MG/1
500 CAPSULE ORAL 2 TIMES DAILY
Qty: 14 CAPSULE | Refills: 0 | Status: SHIPPED | OUTPATIENT
Start: 2024-08-09 | End: 2024-08-10

## 2024-08-09 RX ADMIN — LIDOCAINE HYDROCHLORIDE: 20 JELLY TOPICAL at 15:18

## 2024-08-09 RX ADMIN — CEFTRIAXONE 1000 MG: 1 INJECTION, POWDER, FOR SOLUTION INTRAMUSCULAR; INTRAVENOUS at 15:17

## 2024-08-09 NOTE — DISCHARGE INSTRUCTIONS
Follow-up with urology as soon as possible, do not attempt to remove the catheter on your own.  Take antibiotics as directed for urinary tract infection.  If you are on cefuroxime/Ceftin as it shows in your med review list, which shows you should be on this from July 26 through August 15 at 500 mg twice a day, take this and do not take the antibiotic that was sent to your pharmacy (keflex).  These are very similar.  However if you have not been taking ceftin as you were prescribed, recommend starting the antibiotic that was sent to your pharmacy of Keflex.

## 2024-08-09 NOTE — ED PROVIDER NOTES
Subjective  History of Present Illness:    This is a 78-year-old male, history of acute maxillary sinusitis, GERD, gout, impaired functional mobility balance gait and endurance, renal disorder, status post recent trial of urodynamic testing on 8/6 presenting today for evaluation of difficulty urinating.  He has a history of enlarged prostate.  He has a Gillespie catheter in place believes it is 16 Setswana.  He on chart review appears to have had a blocked Gillespie catheter on 8 5, was replaced by office staff, and irrigated with small clots irrigated and draining of urine at that time.  He was instructed to give the office a call back if he had any further problems or bleeding from his catheter.  He presents today for evaluation of blood leaking around the Gillespie catheter after an office urologic urodynamic testing as well as no output from the Gillespie catheter since this morning.  He denies any anticoagulation use.  No fevers no nausea no vomiting.  No abdominal discomfort, does report a tightness at times in the suprapubic region.      Nurses Notes reviewed and agree, including vitals, allergies, social history and prior medical history.     REVIEW OF SYSTEMS: All systems reviewed and not pertinent unless noted.  Review of Systems   Constitutional:  Negative for fever.   Gastrointestinal:  Negative for abdominal pain, nausea and vomiting.   Genitourinary:  Positive for difficulty urinating.   All other systems reviewed and are negative.      Past Medical History:   Diagnosis Date    Acute maxillary sinusitis     Arthritis     Disease of thyroid gland     Enlarged prostate     GERD (gastroesophageal reflux disease)     Gout     Hypertension     Impaired functional mobility, balance, gait, and endurance     PONV (postoperative nausea and vomiting)     Renal disorder     Rheumatoid arthritis     Right inguinal hernia 10/28/2021    Added automatically from request for surgery [id number]    Vocal cord anomaly 2007    had problem  with enlarge vocal cord after low back surgery, changed his voice since then       Allergies:    Patient has no known allergies.      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    DIAGNOSTIC LAPAROSCOPY N/A 05/04/2022    Procedure: DIAGNOSTIC LAPAROSCOPY CONVERTED TO EXPLORATORY LAPAROTOMY WITH SMALL BOWEL RESECTION;  Surgeon: Maximo Bennett MD;  Location: [facility] OR;  Service: General;  Laterality: N/A;  CONVERTED TO EXPLORATORY LAPAROTOMY AT 1912    EXPLORATORY LAPAROTOMY N/A 04/16/2022    Procedure: LAPAROTOMY EXPLORATORY WITH SMALL BOWEL OBSTRUCTION;  Surgeon: Maximo Bennett MD;  Location:  [facility];  Service: General;  Laterality: N/A;    EYE SURGERY      on upper etelids    FEMORAL ENDARTERECTOMY Right 6/21/2024    Procedure: FEMORAL EMBELECTOMY RIGHT;  Surgeon: Kingsley Morris MD;  Location:  [facility];  Service: Vascular;  Laterality: Right;    FOREIGN BODY REMOVAL Right     removal of metal fragments from RUQ after electrocution and fall.      HERNIA REPAIR      INGUINAL HERNIA REPAIR Right 11/01/2021    Procedure: INGUINAL HERNIA REPAIR WITH MESH AND EXCISION OF SPERMATIC CORD LIPOMA;  Surgeon: Rosey Glover MD;  Location:  [facility];  Service: General;  Laterality: Right;    LEG DEBRIDEMENT Right 7/22/2024    Procedure: GROIN DEBRIDEMENT WITH WOUND VAC PLACEMENT  RIGHT;  Surgeon: Kingsley Morris MD;  Location:  [facility];  Service: Vascular;  Laterality: Right;    LUMBAR SPINE SURGERY      PROSTATE  "AQUABLATION N/A 6/21/2024    Procedure: AQUABLATION OF PROSTATE;  Surgeon: Prieto Rey MD;  Location: [facility];  Service: Robotics - Urology;  Laterality: N/A;    SKIN GRAFT Right     RUE skin grafting after electrocution    THORACIC SPINE SURGERY           Social History     Socioeconomic History    Marital status:    Tobacco Use    Smoking status: Former     Types: Cigars     Passive exposure: Past    Smokeless tobacco: Never    Tobacco comments:     Smoked 1 ppd of small cigars for 50  years    Vaping Use    Vaping status: Former   Substance and Sexual Activity    Alcohol use: Not Currently    Drug use: No    Sexual activity: Defer         Family History   Problem Relation Age of Onset    Arthritis Mother     Prostate cancer Brother     Arthritis Father        Objective  Physical Exam:  /70   Pulse 100   Temp 97.8 °F (36.6 °C) (Oral)   Resp 18   Ht 170.2 cm (67\")   Wt 65.8 kg (145 lb)   SpO2 96%   BMI 22.71 kg/m²      Physical Exam  Vitals and nursing note reviewed. Exam conducted with a chaperone present.   Constitutional:       General: He is not in acute distress.     Appearance: He is normal weight. He is not ill-appearing, toxic-appearing or diaphoretic.      Comments: Chronically ill-appearing   HENT:      Head: Normocephalic and atraumatic.      Nose: Nose normal.      Mouth/Throat:      Pharynx: Oropharynx is clear.   Eyes:      Extraocular Movements: Extraocular movements intact.   Cardiovascular:      Pulses: Normal pulses.      Comments: Appears well-perfused  Pulmonary:      Effort: Pulmonary effort is normal.   Abdominal:      General: There is no distension.      Palpations: Abdomen is soft.      Tenderness: There is no abdominal tenderness. There is no guarding.   Genitourinary:     Comments: Mild dried blood in the diaper, no dominique blood noted at the urethral orifice, indwelling Gillespie catheter in place  Musculoskeletal:         General: Normal range of motion.      " Cervical back: Normal range of motion.   Skin:     General: Skin is warm.      Capillary Refill: Capillary refill takes less than 2 seconds.   Neurological:      General: No focal deficit present.      Mental Status: He is alert and oriented to person, place, and time.   Psychiatric:         Mood and Affect: Mood normal.         Behavior: Behavior normal.         Thought Content: Thought content normal.         Judgment: Judgment normal.               Procedures    ED Course:    ED Course as of 08/09/24 1622   Fri Aug 09, 2024   1348 Bladder scan over 200 cc of urine.  Anemia appears stable.  Urinary catheter was able to be flushed, but no return into the Gillespie bag with over 200 cc of urine in the bladder consistent with continued retention.  Will have nursing staff attempt to replace Gillespie catheter. [JR]   1549 Catheter was able to be exchanged by nursing staff without difficulty, 16 Danish was used, flow of urine without difficulty noted.  Patient tolerated without difficulty [JR]   1551 Nursing staff noted no bleeding with catheter exchange. [JR]      ED Course User Index  [JR] Miguel Ángel Hanson PA-C       Lab Results (last 24 hours)       Procedure Component Value Units Date/Time    CBC Auto Differential [[id number]]  (Abnormal) Collected: 08/09/24 1323    Specimen: Blood Updated: 08/09/24 1332     WBC 6.68 10*3/mm3      RBC 2.79 10*6/mm3      Hemoglobin 8.3 g/dL      Hematocrit 26.2 %      MCV 93.9 fL      MCH 29.7 pg      MCHC 31.7 g/dL      RDW 13.9 %      RDW-SD 47.6 fl      MPV 9.5 fL      Platelets 484 10*3/mm3      Neutrophil % 62.1 %      Lymphocyte % 24.0 %      Monocyte % 10.5 %      Eosinophil % 1.9 %      Basophil % 1.2 %      Immature Grans % 0.3 %      Neutrophils, Absolute 4.15 10*3/mm3      Lymphocytes, Absolute 1.60 10*3/mm3      Monocytes, Absolute 0.70 10*3/mm3      Eosinophils, Absolute 0.13 10*3/mm3      Basophils, Absolute 0.08 10*3/mm3      Immature Grans, Absolute 0.02 10*3/mm3      nRBC  0.0 /100 WBC     Comprehensive Metabolic Panel [[id number]]  (Abnormal) Collected: 08/09/24 1323    Specimen: Blood Updated: 08/09/24 1355     Glucose 103 mg/dL      BUN 36 mg/dL      Creatinine 1.62 mg/dL      Sodium 139 mmol/L      Potassium 4.3 mmol/L      Chloride 104 mmol/L      CO2 22.5 mmol/L      Calcium 8.9 mg/dL      Total Protein 7.2 g/dL      Albumin 3.7 g/dL      ALT (SGPT) 24 U/L      AST (SGOT) 26 U/L      Alkaline Phosphatase 88 U/L      Total Bilirubin 0.3 mg/dL      Globulin 3.5 gm/dL      A/G Ratio 1.1 g/dL      BUN/Creatinine Ratio 22.2     Anion Gap 12.5 mmol/L      eGFR 43.4 mL/min/1.73     Narrative:      GFR Normal >60  Chronic Kidney Disease <60  Kidney Failure <15    The GFR formula is only valid for adults with stable renal function between ages 18 and 70.    Urinalysis With Culture If Indicated - Indwelling Urethral Catheter [[id number]]  (Abnormal) Collected: 08/09/24 1326    Specimen: Urine from Indwelling Urethral Catheter Updated: 08/09/24 1339     Color, UA Red     Appearance, UA Turbid     pH, UA >=9.0     Specific Gravity, UA 1.017     Glucose, UA Negative     Ketones, UA Negative     Bilirubin, UA Negative     Blood, UA Large (3+)     Protein, UA >=300 mg/dL (3+)     Leuk Esterase, UA Moderate (2+)     Nitrite, UA Positive     Urobilinogen, UA 0.2 E.U./dL    Narrative:      In absence of clinical symptoms, the presence of pyuria, bacteria, and/or nitrites on the urinalysis result does not correlate with infection.    Urinalysis, Microscopic Only - Indwelling Urethral Catheter [[id number]]  (Abnormal) Collected: 08/09/24 1326    Specimen: Urine from Indwelling Urethral Catheter Updated: 08/09/24 1405     RBC, UA       Unable to determine due to loaded field     /HPF     WBC, UA       Unable to determine due to loaded field     /HPF     Bacteria, UA 4+ /HPF      Squamous Epithelial Cells, UA 0-2 /HPF      Hyaline Casts, UA       Unable to determine due to loaded field     /LPF      Triple Phosphate Crystals, UA Moderate/2+ /HPF      Methodology Manual Light Microscopy    Urine Culture - Urine, Indwelling Urethral Catheter [[id number]] Collected: 08/09/24 1326    Specimen: Urine from Indwelling Urethral Catheter Updated: 08/09/24 1404             No radiology results from the last 24 hrs       MDM     Amount and/or Complexity of Data Reviewed  Decide to obtain previous medical records or to obtain history from someone other than the patient: yes        Initial impression of presenting illness: This is a 78-year-old male presenting today for difficulty urinating.    DDX: includes but is not limited to: Gillespie obstruction, UTI, anemia, post urodynamic complication, urethral abrasion,    Patient arrives hemodynamically stable afebrile nontachycardic nontachypneic nonhypoxic with vitals interpreted by myself.     Pertinent features from physical exam: Gillespie catheter is in place, no dominique blood noted around the urethral orifice but there is some dried blood in the diaper, no abdominal tenderness.  No surrounding erythema of the penis.  Alert and oriented x 4      Initial diagnostic plan: CBC CMP urinalysis bladder scan    Results from initial plan were reviewed and interpreted by me revealing bladder ultrasound with greater than 200 cc of urine, urinalysis pattern of infection, moderate leukocytes, 4+ bacteria, unable to determine white blood cells and red blood cells, large blood.  CBC stable anemia.  CMP with a creatinine up payout at baseline GFR of 43.4.    Diagnostic information from other sources: Prior record reviewed    Interventions / Re-evaluation: Had nurses attempt to irrigate the catheter for sedimentation blockage.  Unfortunately, the catheter was not able to be irrigated and remained obstructed with no urinary output, catheter was changed with a 16 Greek with good clear yellow output.  Patient notes that while the catheter was out he was actually able to void a little bit on his own  which is the first time he has been able to do that in years.  The catheter was replaced as noted above, patient with relief of symptoms.  Was given 1 g of Rocephin for urinary tract infection.    Results/clinical rationale were discussed with patient at bedside.  Will treat with antibiotics, urine culture pending.    Consultations/Discussion of results with other physicians: N/A    Disposition plan: Discharge, follow-up with urology.  Will place on Keflex.  Patient is unsure if he is taking any current antibiotics as listed in his chart, will prescribe Keflex twice daily for 7 days.  It appears he has been prescribed Ceftin 500 twice daily that he is on through the 15th of august, encouraged him to keep taking this if this is what he has been taking wit danica glover pending, however if he is not, he will have a prescription for Keflex at his pharmacy for his UTI.  Urine culture pending.  He was instructed to return for any worsening obstructive symptoms, of his catheter, or any further bleeding around the catheter site.  -----    Final diagnoses:   Obstruction of urinary catheter, initial encounter   Urinary tract infection with hematuria, site unspecified   Anemia, unspecified type          Miguel Ángel Hanson PA-C  08/09/24 3000

## 2024-08-10 ENCOUNTER — HOSPITAL ENCOUNTER (EMERGENCY)
Facility: HOSPITAL | Age: 79
Discharge: HOME | End: 2024-08-10
Attending: STUDENT IN AN ORGANIZED HEALTH CARE EDUCATION/TRAINING PROGRAM
Payer: MEDICARE

## 2024-08-10 VITALS
DIASTOLIC BLOOD PRESSURE: 71 MMHG | HEART RATE: 105 BPM | WEIGHT: 145.06 LBS | HEIGHT: 67 IN | SYSTOLIC BLOOD PRESSURE: 104 MMHG | BODY MASS INDEX: 22.77 KG/M2 | RESPIRATION RATE: 18 BRPM | OXYGEN SATURATION: 98 % | TEMPERATURE: 98.1 F

## 2024-08-10 DIAGNOSIS — R33.9 URINARY RETENTION: Primary | ICD-10-CM

## 2024-08-10 PROCEDURE — 99283 EMERGENCY DEPT VISIT LOW MDM: CPT

## 2024-08-10 PROCEDURE — 51798 US URINE CAPACITY MEASURE: CPT

## 2024-08-10 RX ORDER — CEPHALEXIN 250 MG/1
500 CAPSULE ORAL ONCE
Status: COMPLETED | OUTPATIENT
Start: 2024-08-10 | End: 2024-08-10

## 2024-08-10 RX ORDER — CEPHALEXIN 500 MG/1
500 CAPSULE ORAL 2 TIMES DAILY
Qty: 14 CAPSULE | Refills: 0 | Status: SHIPPED | OUTPATIENT
Start: 2024-08-10 | End: 2024-08-12

## 2024-08-10 RX ADMIN — CEPHALEXIN 500 MG: 250 CAPSULE ORAL at 08:55

## 2024-08-10 NOTE — ED PROVIDER NOTES
Subjective:  History of Present Illness:    Patient is a 78-year-old male history of BPH, hypertension, bladder dysfunction, rheumatoid arthritis who presents today with concerns for urinary retention.  He reports that he has a Gillespie catheter in place, has been evaluated several times due to concern for obstruction.  Recently followed up in urology clinic last week, had his Gillespie catheter placed and told to maintain.  Had a Gillespie replaced yesterday after it was unable to flush.  Now presents back today with similar symptoms.  He received IV antibiotics in emergency department.  Risks ports that he did not take his antibiotic at home.  Some concern for noncompliance with treatment for urinary tract infection on patient's last visit.  Patient does report that he does not have his Keflex as accessed and requests change in his pharmacy.  Denies any fevers prior to arrival.  Well-appearing on exam.  357 cc of urine in patient's bladder per bladder scan.      Nurses Notes reviewed and agree, including vitals, allergies, social history and prior medical history.     REVIEW OF SYSTEMS: All systems reviewed and not pertinent unless noted.  Review of Systems   Constitutional:  Positive for activity change. Negative for appetite change, chills, fatigue and fever.   HENT:  Negative for congestion, sinus pressure, sneezing and trouble swallowing.    Eyes:  Negative for discharge and itching.   Respiratory:  Negative for cough and shortness of breath.    Cardiovascular:  Negative for chest pain and palpitations.   Gastrointestinal:  Negative for abdominal distention, abdominal pain, diarrhea, nausea and vomiting.   Endocrine: Negative for cold intolerance and heat intolerance.   Genitourinary:  Positive for decreased urine volume and difficulty urinating. Negative for dysuria and urgency.   Musculoskeletal:  Negative for gait problem, neck pain and neck stiffness.   Skin:  Negative for color change and rash.    Allergic/Immunologic: Negative for immunocompromised state.   Neurological:  Negative for facial asymmetry and headaches.   Hematological:  Negative for adenopathy.   Psychiatric/Behavioral:  Negative for self-injury and suicidal ideas.        Past Medical History:   Diagnosis Date    Acute maxillary sinusitis     Arthritis     Disease of thyroid gland     Enlarged prostate     GERD (gastroesophageal reflux disease)     Gout     Hypertension     Impaired functional mobility, balance, gait, and endurance     PONV (postoperative nausea and vomiting)     Renal disorder     Rheumatoid arthritis     Right inguinal hernia 10/28/2021    Added automatically from request for surgery [id number]    Vocal cord anomaly 2007    had problem with enlarge vocal cord after low back surgery, changed his voice since then       Allergies:    Patient has no known allergies.      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    DIAGNOSTIC LAPAROSCOPY N/A 05/04/2022    Procedure: DIAGNOSTIC LAPAROSCOPY CONVERTED TO EXPLORATORY LAPAROTOMY WITH SMALL BOWEL RESECTION;  Surgeon: Maximo Bennett MD;  Location: [facility] OR;  Service: General;  Laterality: N/A;  CONVERTED TO EXPLORATORY LAPAROTOMY AT 1912    EXPLORATORY LAPAROTOMY N/A 04/16/2022    Procedure: LAPAROTOMY EXPLORATORY WITH SMALL BOWEL OBSTRUCTION;  Surgeon: Maximo Bennett MD;  Location: [facility];  Service: General;  Laterality: N/A;    EYE SURGERY      on upper etelids    FEMORAL ENDARTERECTOMY Right 6/21/2024  "   Procedure: FEMORAL EMBELECTOMY RIGHT;  Surgeon: Kingsley Morris MD;  Location:  [facility];  Service: Vascular;  Laterality: Right;    FOREIGN BODY REMOVAL Right     removal of metal fragments from RUQ after electrocution and fall.      HERNIA REPAIR      INGUINAL HERNIA REPAIR Right 11/01/2021    Procedure: INGUINAL HERNIA REPAIR WITH MESH AND EXCISION OF SPERMATIC CORD LIPOMA;  Surgeon: Rosey Glover MD;  Location:  [facility];  Service: General;  Laterality: Right;    LEG DEBRIDEMENT Right 7/22/2024    Procedure: GROIN DEBRIDEMENT WITH WOUND VAC PLACEMENT  RIGHT;  Surgeon: Kingsley Morris MD;  Location:  [facility];  Service: Vascular;  Laterality: Right;    LUMBAR SPINE SURGERY      PROSTATE AQUABLATION N/A 6/21/2024    Procedure: AQUABLATION OF PROSTATE;  Surgeon: Prieto Rey MD;  Location:  [facility];  Service: Robotics - Urology;  Laterality: N/A;    SKIN GRAFT Right     RUE skin grafting after electrocution    THORACIC SPINE SURGERY           Social History     Socioeconomic History    Marital status:    Tobacco Use    Smoking status: Former     Types: Cigars     Passive exposure: Past    Smokeless tobacco: Never    Tobacco comments:     Smoked 1 ppd of small cigars for 50  years    Vaping Use    Vaping status: Former   Substance and Sexual Activity    Alcohol use: Not Currently    Drug use: No    Sexual activity: Defer         Family History   Problem Relation Age of Onset    Arthritis Mother     Prostate cancer Brother     Arthritis Father        Objective  Physical Exam:  /71 (BP Location: Left arm, Patient Position: Sitting)   Pulse 105   Temp 98.1 °F (36.7 °C) (Oral)   Resp 18   Ht 170.2 cm (67\")   Wt 65.8 kg (145 lb 1 oz)   SpO2 98%   BMI 22.72 kg/m²      Physical Exam  Constitutional:       General: He is not in acute distress.     Appearance: Normal appearance. He is normal weight. He is not ill-appearing.   HENT:      Head: Normocephalic and " atraumatic.      Nose: Nose normal. No congestion or rhinorrhea.      Mouth/Throat:      Mouth: Mucous membranes are moist.      Pharynx: Oropharynx is clear.   Eyes:      Extraocular Movements: Extraocular movements intact.      Conjunctiva/sclera: Conjunctivae normal.      Pupils: Pupils are equal, round, and reactive to light.   Cardiovascular:      Rate and Rhythm: Normal rate and regular rhythm.      Pulses: Normal pulses.   Pulmonary:      Effort: Pulmonary effort is normal. No respiratory distress.      Breath sounds: Normal breath sounds.   Abdominal:      General: Abdomen is flat. Bowel sounds are normal. There is no distension.      Palpations: Abdomen is soft.      Tenderness: There is no abdominal tenderness. There is no guarding or rebound.   Genitourinary:     Comments: Gillespie catheter in place, little urine noted, blood-tinged urine on exam  Musculoskeletal:         General: No swelling or tenderness. Normal range of motion.      Cervical back: Normal range of motion and neck supple. No rigidity or tenderness.   Skin:     General: Skin is warm and dry.      Capillary Refill: Capillary refill takes less than 2 seconds.   Neurological:      General: No focal deficit present.      Mental Status: He is alert and oriented to person, place, and time. Mental status is at baseline.      Cranial Nerves: No cranial nerve deficit.      Sensory: No sensory deficit.      Motor: No weakness.   Psychiatric:         Mood and Affect: Mood normal.         Behavior: Behavior normal.         Thought Content: Thought content normal.         Judgment: Judgment normal.         Procedures    ED Course:         Lab Results (last 24 hours)       ** No results found for the last 24 hours. **             No radiology results from the last 24 hrs       MDM      Initial impression of presenting illness: Urinary retention    DDX: includes but is not limited to: Urinary tract infection, hematuria, pyelonephritis, UTI    Patient  arrives stable with vitals interpreted by myself.     Pertinent features from physical exam: Clear to auscultation, regular rate and rhythm, no murmur, nontender to abdominal palpation at the time my exam, bladder scan with 357 mL noted at the bedside.    Initial diagnostic plan: No any further workup, complete workup completed less than 24 hours prior    Results from initial plan were reviewed and interpreted by me revealing discussed with Hernandez member at bedside and reviewed past medical records    Diagnostic information from other sources: Reviewed medical records including patient's ER visit to less than 24 hours ago    Interventions / Re-evaluation: Will flush Gillespie catheter and reassess, if still retaining, will replace Gillespie catheter.  Patient's catheter was able to be flushed and with immediate return of urine after significant amount of sediment was cleared    Results/clinical rationale were discussed with patient at bedside    Consultations/Discussion of results with other physicians: Discussed negative workup in emergency department, no concern for urinary retention and patient is having good flow from his urinary catheter.  Encourage patient to follow-up closely with urology and continue antibiotics as given.  Have sent new course of antibiotics given patient is unable to obtain from yesterday and given strict return precaution for any fever, further urinary retention.    Disposition plan: Discharge  -----        Final diagnoses:   Urinary retention          Zen Miles MD  08/10/24 [id number]

## 2024-08-10 NOTE — DISCHARGE INSTRUCTIONS
Evaluated due to concerns for urinary catheter malfunction.  We were able to flush her catheter and got a significant amount of sediment out, your urinary catheter is now flowing and you are now stable for discharge.  Would continue to follow with your primary care doctor and urology for further evaluation and have recommended that you take your Keflex as prescribed.  I have sent a repeat prescription to [facility] as requested.  You are in a safe discharge.

## 2024-08-10 NOTE — Clinical Note
[facility] EMERGENCY DEPARTMENT  [address] [place] [address]  Phone: [phone]    Nestor Nye was seen and treated in our emergency department on 8/10/2024.  He may return to work on 08/13/2024.         Thank you for choosing [facility].    Zen Miles MD

## 2024-08-10 NOTE — Clinical Note
[facility]iatric EMERGENCY DEPARTMENT  [address] [address]  Phone: [phone]    Nestor Nye was seen and treated in our emergency department on 8/10/2024.  He may return to work on 08/13/2024.         Thank you for choosing [facility].    Zen Miles MD

## 2024-08-11 ENCOUNTER — TELEPHONE (OUTPATIENT)
Dept: EMERGENCY DEPT | Facility: HOSPITAL | Age: 79
End: 2024-08-11
Payer: MEDICARE

## 2024-08-11 LAB — BACTERIA SPEC AEROBE CULT: ABNORMAL

## 2024-08-11 RX ORDER — LEVOFLOXACIN 750 MG/1
750 TABLET, FILM COATED ORAL DAILY
Qty: 5 TABLET | Refills: 0 | Status: SHIPPED | OUTPATIENT
Start: 2024-08-11 | End: 2024-08-16

## 2024-08-11 RX ORDER — LEVOFLOXACIN 750 MG/1
750 TABLET, FILM COATED ORAL DAILY
Qty: 5 TABLET | Refills: 0 | Status: SHIPPED | OUTPATIENT
Start: 2024-08-11 | End: 2024-08-11

## 2024-08-11 NOTE — TELEPHONE ENCOUNTER
Attempted to contact patient, patient did not answer, voicemail left, patient will need antibiotics switched from Keflex to another class of antibiotic secondary to his urine culture.

## 2024-08-11 NOTE — TELEPHONE ENCOUNTER
Called and discussed with the patient's brother, who takes care of his medicines, we will send a fluoroquinolone based on culture to patient's pharmacy of choice to cover for his urine culture.  He was advised to discontinue all prior antibiotics to prevent risk of C. difficile.

## 2024-08-12 ENCOUNTER — HOSPITAL ENCOUNTER (EMERGENCY)
Facility: HOSPITAL | Age: 79
Discharge: HOME | End: 2024-08-12
Attending: STUDENT IN AN ORGANIZED HEALTH CARE EDUCATION/TRAINING PROGRAM | Admitting: STUDENT IN AN ORGANIZED HEALTH CARE EDUCATION/TRAINING PROGRAM
Payer: MEDICARE

## 2024-08-12 ENCOUNTER — OFFICE VISIT (OUTPATIENT)
Dept: UROLOGY | Facility: CLINIC | Age: 79
End: 2024-08-12
Payer: MEDICARE

## 2024-08-12 VITALS
TEMPERATURE: 97.3 F | WEIGHT: 150.2 LBS | SYSTOLIC BLOOD PRESSURE: 100 MMHG | BODY MASS INDEX: 23.52 KG/M2 | RESPIRATION RATE: 12 BRPM | OXYGEN SATURATION: 100 % | HEART RATE: 92 BPM | DIASTOLIC BLOOD PRESSURE: 70 MMHG

## 2024-08-12 VITALS
HEIGHT: 67 IN | RESPIRATION RATE: 18 BRPM | DIASTOLIC BLOOD PRESSURE: 56 MMHG | TEMPERATURE: 98.3 F | HEART RATE: 75 BPM | OXYGEN SATURATION: 97 % | BODY MASS INDEX: 24.33 KG/M2 | SYSTOLIC BLOOD PRESSURE: 123 MMHG | WEIGHT: 155 LBS

## 2024-08-12 DIAGNOSIS — T83.9XXA URINARY CATHETER COMPLICATION, INITIAL ENCOUNTER: Primary | ICD-10-CM

## 2024-08-12 DIAGNOSIS — R33.9 URINARY RETENTION: Primary | ICD-10-CM

## 2024-08-12 DIAGNOSIS — Z97.8 CHRONIC INDWELLING FOLEY CATHETER: ICD-10-CM

## 2024-08-12 PROCEDURE — 3074F SYST BP LT 130 MM HG: CPT | Performed by: NURSE PRACTITIONER

## 2024-08-12 PROCEDURE — 1159F MED LIST DOCD IN RCRD: CPT | Performed by: NURSE PRACTITIONER

## 2024-08-12 PROCEDURE — 1160F RVW MEDS BY RX/DR IN RCRD: CPT | Performed by: NURSE PRACTITIONER

## 2024-08-12 PROCEDURE — 3078F DIAST BP <80 MM HG: CPT | Performed by: NURSE PRACTITIONER

## 2024-08-12 PROCEDURE — 51798 US URINE CAPACITY MEASURE: CPT

## 2024-08-12 PROCEDURE — 51702 INSERT TEMP BLADDER CATH: CPT

## 2024-08-12 PROCEDURE — 99283 EMERGENCY DEPT VISIT LOW MDM: CPT

## 2024-08-12 PROCEDURE — 99214 OFFICE O/P EST MOD 30 MIN: CPT | Performed by: NURSE PRACTITIONER

## 2024-08-12 RX ORDER — LIDOCAINE HYDROCHLORIDE 20 MG/ML
JELLY TOPICAL AS NEEDED
Status: DISCONTINUED | OUTPATIENT
Start: 2024-08-12 | End: 2024-08-12 | Stop reason: HOSPADM

## 2024-08-12 RX ADMIN — LIDOCAINE HYDROCHLORIDE: 20 JELLY TOPICAL at 09:04

## 2024-08-12 NOTE — ED PROVIDER NOTES
[facility] EMERGENCY DEPARTMENT  Emergency Department Encounter  Emergency Medicine Physician Note       Pt Name: Nestor Nye  MRN: [id number]  Pt :   1945  Room Number:    Date of encounter:  2024  PCP: Roxanne Slater MD  ED Provider: Brennon Brooks MD    Historian: Patient      HPI:  Chief Complaint: Urinary catheter complication        Context: Nestor Nye is a 78 y.o. male who presents to the ED for urinary catheter complication.  Patient presents today for blocked catheter he states this has occurred multiple times this week.  He has been seen on the fifth ninth 10th and today.  He states he has the urge to urinate.  He has been leaking scant amount of urine around the catheter itself.  No abdominal pain.  No fevers or chills. Denies gross hematuria.      PAST MEDICAL HISTORY  Past Medical History:   Diagnosis Date    Acute maxillary sinusitis     Arthritis     Disease of thyroid gland     Enlarged prostate     GERD (gastroesophageal reflux disease)     Gout     Hypertension     Impaired functional mobility, balance, gait, and endurance     PONV (postoperative nausea and vomiting)     Renal disorder     Rheumatoid arthritis     Right inguinal hernia 10/28/2021    Added automatically from request for surgery [id number]    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, changed his voice since then         PAST SURGICAL HISTORY  Past Surgical History:   Procedure Laterality Date    ABDOMINAL SURGERY      ARTERIOGRAM Right 2024    Procedure: LOWER EXTREMITY ANGIOGRAM RIGHT;  Surgeon: Kingsley Morris MD;  Location: [facility];  Service: Vascular;  Laterality: Right;  DOSE: 56 MGY; FLURO: 7 MIN 6 SEC; CONTRAST 40 ML    BACK SURGERY      BELPHAROPTOSIS REPAIR Bilateral     BRAIN SURGERY      COLON SURGERY      bowel resection x2 per pt    CRANIOTOMY FOR SUBDURAL HEMATOMA      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 fall  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    DIAGNOSTIC LAPAROSCOPY N/A 05/04/2022    Procedure: DIAGNOSTIC LAPAROSCOPY CONVERTED TO EXPLORATORY LAPAROTOMY WITH SMALL BOWEL RESECTION;  Surgeon: Maximo Bennett MD;  Location:  [facility];  Service: General;  Laterality: N/A;  CONVERTED TO EXPLORATORY LAPAROTOMY AT 1912    EXPLORATORY LAPAROTOMY N/A 04/16/2022    Procedure: LAPAROTOMY EXPLORATORY WITH SMALL BOWEL OBSTRUCTION;  Surgeon: Maximo Bennett MD;  Location:  [facility];  Service: General;  Laterality: N/A;    EYE SURGERY      on upper etelids    FEMORAL ENDARTERECTOMY Right 6/21/2024    Procedure: FEMORAL EMBELECTOMY RIGHT;  Surgeon: Kingsley Morris MD;  Location:  [facility];  Service: Vascular;  Laterality: Right;    FOREIGN BODY REMOVAL Right     removal of metal fragments from RUQ after electrocution and fall.      HERNIA REPAIR      INGUINAL HERNIA REPAIR Right 11/01/2021    Procedure: INGUINAL HERNIA REPAIR WITH MESH AND EXCISION OF SPERMATIC CORD LIPOMA;  Surgeon: Rosey Glover MD;  Location:  [facility];  Service: General;  Laterality: Right;    LEG DEBRIDEMENT Right 7/22/2024    Procedure: GROIN DEBRIDEMENT WITH WOUND VAC PLACEMENT  RIGHT;  Surgeon: Kingsley Morris MD;  Location:  [facility];  Service: Vascular;  Laterality: Right;    LUMBAR SPINE SURGERY      PROSTATE AQUABLATION N/A 6/21/2024    Procedure: AQUABLATION OF PROSTATE;  Surgeon: Prieto Rey MD;  Location:  [facility];  Service: Robotics - Urology;  Laterality: N/A;    SKIN GRAFT Right     RUE skin grafting after electrocution    THORACIC SPINE SURGERY           FAMILY HISTORY  Family History   Problem Relation Age of Onset    Arthritis Mother     Prostate cancer Brother     Arthritis Father          SOCIAL HISTORY  Social History     Socioeconomic History    Marital status:    Tobacco Use    Smoking status: Former     Types: Cigars     Passive exposure: Past    Smokeless tobacco: Never    Tobacco comments:      Smoked 1 ppd of small cigars for 50  years    Vaping Use    Vaping status: Former   Substance and Sexual Activity    Alcohol use: Not Currently    Drug use: No    Sexual activity: Defer         ALLERGIES  Patient has no known allergies.        REVIEW OF SYSTEMS  Systems reviewed and negative      PHYSICAL EXAM    I have reviewed the triage vital signs and nursing notes.    ED Triage Vitals [08/12/24 0658]   Temp Heart Rate Resp BP SpO2   98.3 °F (36.8 °C) 107 16 94/82 98 %      Temp src Heart Rate Source Patient Position BP Location FiO2 (%)   Oral Monitor Sitting Left arm --       Physical Exam  Constitutional:       General: He is not in acute distress.  Abdominal:      General: There is no distension.      Palpations: Abdomen is soft.      Tenderness: There is no abdominal tenderness.   Genitourinary:     Comments: Urinary catheter in place.  Diaper is damp presumably from urine.  No gross hematuria in collecting bag.  Neurological:      Mental Status: He is alert.           LAB RESULTS  No results found for this or any previous visit (from the past 24 hour(s)).    If labs were ordered, I independently reviewed the results and considered them in treating the patient.        RADIOLOGY  No Radiology Exams Resulted Within Past 24 Hours    PROCEDURES    Procedures    No orders to display       MEDICATIONS GIVEN IN ER    Medications - No data to display      MEDICAL DECISION MAKING, PROGRESS, and CONSULTS    All labs, if obtained, have been independently reviewed by me.  All radiology studies, if obtained, have been reviewed by me and the radiologist dictating the report.  All EKG's, if obtained, have been independently viewed and interpreted by me.      Discussion below represents my analysis of pertinent findings related to patient's condition, differential diagnosis, treatment plan and final disposition.                         Differential diagnosis:    Acute urinary retention, UTI, Gillespie catheter complication,  obstruction, hematuria, others.      Additional sources:    - Discussed/ obtained information from independent historians:      - External (non-ED) record review: Outpatient urology note 8/6/2024    - Chronic or social conditions impacting care:      - Shared decision making:        Orders placed during this visit:  Orders Placed This Encounter   Procedures    Bladder scan    Replace Gillespie Catheter         Additional orders considered but not ordered:  CBC, BMP however no gross hematuria, no flank pain    ED Course/MDM Discussion:    Patient is a 78-year-old male with history of BPH, bladder dysfunction, chronic indwelling Gillespie catheter presents for catheter complication.  Patient reports he continues to leak urine around the catheter and has associated pain.  Bladder scan revealed 230 mL of urine.  His diaper is damp and there is minimal urine in collecting bag which does raise the possibility of obstructed catheter.  The catheter was replaced successfully with adequate evacuation of urine.  Repeat bladder scan showed 0 mL.  His symptoms had resolved.  No gross hematuria or clots in the collecting bag.  Recommendation for continued outpatient follow-up with urology.                    Consultants:      Shared Decision Making:  After my consideration of clinical presentation and any laboratory/radiology studies obtained, I discussed the findings with the patient/patient representative who is in agreement with the treatment plan and the final disposition.   Risks and benefits of discharge and/or observation/admission were discussed.         AS OF 15:02 EDT VITALS:    BP - 123/56  HR - 75  TEMP - 98.3 °F (36.8 °C)  O2 SATS - 97%                  DIAGNOSIS  Final diagnoses:   Urinary catheter complication, initial encounter         DISPOSITION  ED Disposition       ED Disposition   Discharge    Condition   Stable    Comment   --                   Please note that portions of this document were completed with voice  recognition software.        Brennon Brooks MD  08/12/24 5037

## 2024-08-13 ENCOUNTER — HOSPITAL ENCOUNTER (EMERGENCY)
Facility: HOSPITAL | Age: 79
Discharge: HOME | End: 2024-08-13
Attending: EMERGENCY MEDICINE
Payer: MEDICARE

## 2024-08-13 VITALS
RESPIRATION RATE: 18 BRPM | HEART RATE: 100 BPM | OXYGEN SATURATION: 97 % | BODY MASS INDEX: 23.54 KG/M2 | HEIGHT: 67 IN | WEIGHT: 150 LBS | SYSTOLIC BLOOD PRESSURE: 126 MMHG | TEMPERATURE: 98 F | DIASTOLIC BLOOD PRESSURE: 89 MMHG

## 2024-08-13 DIAGNOSIS — T83.9XXA PROBLEM WITH FOLEY CATHETER, INITIAL ENCOUNTER: Primary | ICD-10-CM

## 2024-08-13 PROBLEM — I74.09 OTHER ARTERIAL EMBOLISM AND THROMBOSIS OF ABDOMINAL AORTA: Status: ACTIVE | Noted: 2024-08-13

## 2024-08-13 PROBLEM — I73.9 PERIPHERAL VASCULAR DISEASE, UNSPECIFIED: Status: ACTIVE | Noted: 2024-08-07

## 2024-08-13 PROBLEM — I73.9 PAD (PERIPHERAL ARTERY DISEASE): Status: ACTIVE | Noted: 2024-08-13

## 2024-08-13 PROBLEM — A49.8 KLEBSIELLA PNEUMONIAE INFECTION: Status: ACTIVE | Noted: 2024-08-07

## 2024-08-13 PROCEDURE — 99283 EMERGENCY DEPT VISIT LOW MDM: CPT

## 2024-08-13 RX ORDER — LIDOCAINE HYDROCHLORIDE 20 MG/ML
JELLY TOPICAL ONCE
Status: COMPLETED | OUTPATIENT
Start: 2024-08-13 | End: 2024-08-13

## 2024-08-13 RX ADMIN — LIDOCAINE HYDROCHLORIDE: 20 JELLY TOPICAL at 20:59

## 2024-08-13 NOTE — PROGRESS NOTES
Office Visit Established Male Patient     Patient Name: Nestor Nye  : 1945   MRN: [id number]     Chief Complaint:   Chief Complaint   Patient presents with    catheter issue    Follow-up       History of Present Illness: Mr. Nestor Nye is a 78 y.o. male who presents today for c/o bleeding and decreased UOP from stacy catheter.  Patient is s/p aquablation and PAE complicated by SFA occlusion on 24.  Patient has been seen in the ED 3 times since 24 for same issue and was advised to come to us to further evaluation.  Patient presents today and states he has strawberry red UOP and has pulling of his catheter.  He reports that his catheter was upsized in the ED today.             Subjective      Review of System:   As noted in HPI.    Past Medical History:   Past Medical History:   Diagnosis Date    Acute maxillary sinusitis     Arthritis     Disease of thyroid gland     Enlarged prostate     GERD (gastroesophageal reflux disease)     Gout     Hypertension     Impaired functional mobility, balance, gait, and endurance     PONV (postoperative nausea and vomiting)     Renal disorder     Rheumatoid arthritis     Right inguinal hernia 10/28/2021    Added automatically from request for surgery [id number]    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, changed his voice since then       Past Surgical History:   Past Surgical History:   Procedure Laterality Date    ABDOMINAL SURGERY      ARTERIOGRAM Right 2024    Procedure: LOWER EXTREMITY ANGIOGRAM RIGHT;  Surgeon: Kingsley Morris MD;  Location: [facility];  Service: Vascular;  Laterality: Right;  DOSE: 56 MGY; FLURO: 7 MIN 6 SEC; CONTRAST 40 ML    BACK SURGERY      BELPHAROPTOSIS REPAIR Bilateral     BRAIN SURGERY      COLON SURGERY      bowel resection x2 per pt    CRANIOTOMY FOR SUBDURAL HEMATOMA      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     DIAGNOSTIC LAPAROSCOPY N/A 05/04/2022    Procedure: DIAGNOSTIC LAPAROSCOPY CONVERTED TO EXPLORATORY LAPAROTOMY WITH SMALL BOWEL RESECTION;  Surgeon: Maximo Bennett MD;  Location:  [facility];  Service: General;  Laterality: N/A;  CONVERTED TO EXPLORATORY LAPAROTOMY AT 1912    EXPLORATORY LAPAROTOMY N/A 04/16/2022    Procedure: LAPAROTOMY EXPLORATORY WITH SMALL BOWEL OBSTRUCTION;  Surgeon: Maximo Bennett MD;  Location:  [facility];  Service: General;  Laterality: N/A;    EYE SURGERY      on upper etelids    FEMORAL ENDARTERECTOMY Right 6/21/2024    Procedure: FEMORAL EMBELECTOMY RIGHT;  Surgeon: Kingsley Morris MD;  Location:  [facility];  Service: Vascular;  Laterality: Right;    FOREIGN BODY REMOVAL Right     removal of metal fragments from RUQ after electrocution and fall.      HERNIA REPAIR      INGUINAL HERNIA REPAIR Right 11/01/2021    Procedure: INGUINAL HERNIA REPAIR WITH MESH AND EXCISION OF SPERMATIC CORD LIPOMA;  Surgeon: Rosey Glover MD;  Location:  [facility];  Service: General;  Laterality: Right;    LEG DEBRIDEMENT Right 7/22/2024    Procedure: GROIN DEBRIDEMENT WITH WOUND VAC PLACEMENT  RIGHT;  Surgeon: Kingsley Morris MD;  Location:  [facility];  Service: Vascular;  Laterality: Right;    LUMBAR SPINE SURGERY      PROSTATE AQUABLATION N/A 6/21/2024    Procedure: AQUABLATION OF PROSTATE;  Surgeon: Prieto Rey MD;  Location:  [facility];  Service: Robotics - Urology;  Laterality: N/A;    SKIN GRAFT Right     RUE skin grafting after electrocution    THORACIC SPINE SURGERY         Family History:   Family History   Problem Relation Age of Onset    Arthritis Mother     Prostate cancer Brother     Arthritis Father        Social History:   Social History     Socioeconomic History    Marital status:    Tobacco Use    Smoking status: Former     Types: Cigars     Passive exposure: Past    Smokeless tobacco: Never    Tobacco comments:     Smoked 1 ppd of small cigars for 50   years    Vaping Use    Vaping status: Former   Substance and Sexual Activity    Alcohol use: Not Currently    Drug use: No    Sexual activity: Defer       Medications:     Current Outpatient Medications:     amLODIPine (Norvasc) 2.5 MG tablet, Take 1 tablet by mouth Daily., Disp: 90 tablet, Rfl: 1    aspirin 81 MG chewable tablet, Chew 1 tablet Daily., Disp: 30 tablet, Rfl: 1    Diclofenac Sodium (VOLTAREN) 1 % gel gel, Apply 4 g topically to the appropriate area as directed 4 (Four) Times a Day As Needed (joint pain)., Disp: 100 g, Rfl: 5    ferrous sulfate 325 (65 FE) MG tablet, Take 1 tablet by mouth Daily With Breakfast., Disp: , Rfl:     finasteride (PROSCAR) 5 MG tablet, TAKE ONE TABLET BY MOUTH EVERY DAY, Disp: 90 tablet, Rfl: 0    ipratropium-albuterol (DUO-NEB) 0.5-2.5 mg/3 ml nebulizer, Take 3 mL by nebulization Every 4 (Four) Hours As Needed for Wheezing or Shortness of Air., Disp: 360 mL, Rfl: 1    levoFLOXacin (LEVAQUIN) 750 MG tablet, Take 1 tablet by mouth Daily for 5 days., Disp: 5 tablet, Rfl: 0    levothyroxine (SYNTHROID, LEVOTHROID) 88 MCG tablet, Take 1 tablet by mouth Daily., Disp: 90 tablet, Rfl: 2    melatonin 5 MG tablet tablet, Take 1 tablet by mouth., Disp: , Rfl:   No current facility-administered medications for this visit.    Allergies:   No Known Allergies    Objective     Physical Exam:   Vital Signs:   Vitals:    08/12/24 1138   BP: 100/70   BP Location: Right arm   Patient Position: Sitting   Cuff Size: Adult   Pulse: 92   Resp: 12   Temp: 97.3 °F (36.3 °C)   TempSrc: Temporal   SpO2: 100%   Weight: 68.1 kg (150 lb 3.2 oz)     Body mass index is 23.52 kg/m².   Physical Exam  Vitals and nursing note reviewed. Exam conducted with a chaperone present.   Constitutional:       General: He is awake. He is not in acute distress.     Appearance: He is not ill-appearing.   Pulmonary:      Effort: Pulmonary effort is normal.   Genitourinary:     Comments: 18 FR indwelling stacy catheter to  bag with clear, red UOP  Skin:     General: Skin is warm and dry.   Neurological:      Mental Status: He is alert and oriented to person, place, and time. Mental status is at baseline.   Psychiatric:         Mood and Affect: Mood normal.         Behavior: Behavior is cooperative.          Labs  Brief Urine Lab Results  (Last result in the past 365 days)        Color   Clarity   Blood   Leuk Est   Nitrite   Protein   CREAT   Urine HCG        08/09/24 1326 Red   Turbid   Large (3+)   Moderate (2+)   Positive   >=300 mg/dL (3+)                   Lab Results   Component Value Date    GLUCOSE 103 (H) 08/09/2024    CALCIUM 8.9 08/09/2024     08/09/2024    K 4.3 08/09/2024    CO2 22.5 08/09/2024     08/09/2024    BUN 36 (H) 08/09/2024    CREATININE 1.62 (H) 08/09/2024    EGFRIFAFRI 43 (L) 09/25/2017    EGFRIFNONA 35 (L) 09/14/2021    BCR 22.2 08/09/2024    ANIONGAP 12.5 08/09/2024       Lab Results   Component Value Date    WBC 6.68 08/09/2024    HGB 8.3 (L) 08/09/2024    HCT 26.2 (L) 08/09/2024    MCV 93.9 08/09/2024     (H) 08/09/2024       Urine Culture          7/17/2024    13:00 8/9/2024    13:26   Urine Culture   Urine Culture >100,000 CFU/mL Klebsiella pneumoniae ssp pneumoniae  >100,000 CFU/mL Proteus hauseri         Lab Results   Component Value Date    PSA 5.0 (H) 07/25/2019       Admission on 08/09/2024, Discharged on 08/09/2024   Component Date Value Ref Range Status    WBC 08/09/2024 6.68  3.40 - 10.80 10*3/mm3 Final    RBC 08/09/2024 2.79 (L)  4.14 - 5.80 10*6/mm3 Final    Hemoglobin 08/09/2024 8.3 (L)  13.0 - 17.7 g/dL Final    Hematocrit 08/09/2024 26.2 (L)  37.5 - 51.0 % Final    MCV 08/09/2024 93.9  79.0 - 97.0 fL Final    MCH 08/09/2024 29.7  26.6 - 33.0 pg Final    MCHC 08/09/2024 31.7  31.5 - 35.7 g/dL Final    RDW 08/09/2024 13.9  12.3 - 15.4 % Final    RDW-SD 08/09/2024 47.6  37.0 - 54.0 fl Final    MPV 08/09/2024 9.5  6.0 - 12.0 fL Final    Platelets 08/09/2024 484 (H)  140 - 450  10*3/mm3 Final    Neutrophil % 08/09/2024 62.1  42.7 - 76.0 % Final    Lymphocyte % 08/09/2024 24.0  19.6 - 45.3 % Final    Monocyte % 08/09/2024 10.5  5.0 - 12.0 % Final    Eosinophil % 08/09/2024 1.9  0.3 - 6.2 % Final    Basophil % 08/09/2024 1.2  0.0 - 1.5 % Final    Immature Grans % 08/09/2024 0.3  0.0 - 0.5 % Final    Neutrophils, Absolute 08/09/2024 4.15  1.70 - 7.00 10*3/mm3 Final    Lymphocytes, Absolute 08/09/2024 1.60  0.70 - 3.10 10*3/mm3 Final    Monocytes, Absolute 08/09/2024 0.70  0.10 - 0.90 10*3/mm3 Final    Eosinophils, Absolute 08/09/2024 0.13  0.00 - 0.40 10*3/mm3 Final    Basophils, Absolute 08/09/2024 0.08  0.00 - 0.20 10*3/mm3 Final    Immature Grans, Absolute 08/09/2024 0.02  0.00 - 0.05 10*3/mm3 Final    nRBC 08/09/2024 0.0  0.0 - 0.2 /100 WBC Final    Glucose 08/09/2024 103 (H)  65 - 99 mg/dL Final    BUN 08/09/2024 36 (H)  8 - 23 mg/dL Final    Creatinine 08/09/2024 1.62 (H)  0.76 - 1.27 mg/dL Final    Sodium 08/09/2024 139  136 - 145 mmol/L Final    Potassium 08/09/2024 4.3  3.5 - 5.2 mmol/L Final    Chloride 08/09/2024 104  98 - 107 mmol/L Final    CO2 08/09/2024 22.5  22.0 - 29.0 mmol/L Final    Calcium 08/09/2024 8.9  8.6 - 10.5 mg/dL Final    Total Protein 08/09/2024 7.2  6.0 - 8.5 g/dL Final    Albumin 08/09/2024 3.7  3.5 - 5.2 g/dL Final    ALT (SGPT) 08/09/2024 24  1 - 41 U/L Final    AST (SGOT) 08/09/2024 26  1 - 40 U/L Final    Alkaline Phosphatase 08/09/2024 88  39 - 117 U/L Final    Total Bilirubin 08/09/2024 0.3  0.0 - 1.2 mg/dL Final    Globulin 08/09/2024 3.5  gm/dL Final    A/G Ratio 08/09/2024 1.1  g/dL Final    BUN/Creatinine Ratio 08/09/2024 22.2  7.0 - 25.0 Final    Anion Gap 08/09/2024 12.5  5.0 - 15.0 mmol/L Final    eGFR 08/09/2024 43.4 (L)  >60.0 mL/min/1.73 Final    Color, UA 08/09/2024 Red (A)  Yellow, Straw Final    Appearance, UA 08/09/2024 Turbid (A)  Clear Final    pH, UA 08/09/2024 >=9.0 (H)  5.0 - 8.0 Final    Specific Gravity, UA 08/09/2024 1.017  1.005 -  1.030 Final    Glucose, UA 08/09/2024 Negative  Negative Final    Ketones, UA 08/09/2024 Negative  Negative Final    Bilirubin, UA 08/09/2024 Negative  Negative Final    Blood, UA 08/09/2024 Large (3+) (A)  Negative Final    Protein, UA 08/09/2024 >=300 mg/dL (3+) (A)  Negative Final    Leuk Esterase, UA 08/09/2024 Moderate (2+) (A)  Negative Final    Nitrite, UA 08/09/2024 Positive (A)  Negative Final    Urobilinogen, UA 08/09/2024 0.2 E.U./dL  0.2 - 1.0 E.U./dL Final    RBC, UA 08/09/2024 Unable to determine due to loaded field (A)  None Seen, 0-2 /HPF Final    WBC, UA 08/09/2024 Unable to determine due to loaded field (A)  None Seen, 0-2 /HPF Final    Bacteria, UA 08/09/2024 4+ (A)  None Seen /HPF Final    Squamous Epithelial Cells, UA 08/09/2024 0-2  None Seen, 0-2 /HPF Final    Hyaline Casts, UA 08/09/2024 Unable to determine due to loaded field  None Seen /LPF Final    Triple Phosphate Crystals, UA 08/09/2024 Moderate/2+  None Seen /HPF Final    Methodology 08/09/2024 Manual Light Microscopy   Final    Urine Culture 08/09/2024 >100,000 CFU/mL Proteus hauseri (A)   Final   No results displayed because visit has over 200 results.      Admission on 07/17/2024, Discharged on 07/17/2024   Component Date Value Ref Range Status    Extra Tube 07/17/2024 Hold for add-ons.   Final    Auto resulted.    Extra Tube 07/17/2024 hold for add-on   Final    Auto resulted    Extra Tube 07/17/2024 Hold for add-ons.   Final    Auto resulted.    Extra Tube 07/17/2024 Hold for add-ons.   Final    Auto resulted    Lactate 07/17/2024 1.7  0.5 - 2.0 mmol/L Final    Glucose 07/17/2024 97  65 - 99 mg/dL Final    BUN 07/17/2024 23  8 - 23 mg/dL Final    Creatinine 07/17/2024 1.66 (H)  0.76 - 1.27 mg/dL Final    Sodium 07/17/2024 140  136 - 145 mmol/L Final    Potassium 07/17/2024 4.0  3.5 - 5.2 mmol/L Final    Chloride 07/17/2024 104  98 - 107 mmol/L Final    CO2 07/17/2024 20.2 (L)  22.0 - 29.0 mmol/L Final    Calcium 07/17/2024 8.8  8.6  - 10.5 mg/dL Final    Total Protein 07/17/2024 7.0  6.0 - 8.5 g/dL Final    Albumin 07/17/2024 3.8  3.5 - 5.2 g/dL Final    ALT (SGPT) 07/17/2024 6  1 - 41 U/L Final    AST (SGOT) 07/17/2024 9  1 - 40 U/L Final    Alkaline Phosphatase 07/17/2024 97  39 - 117 U/L Final    Total Bilirubin 07/17/2024 0.4  0.0 - 1.2 mg/dL Final    Globulin 07/17/2024 3.2  gm/dL Final    A/G Ratio 07/17/2024 1.2  g/dL Final    BUN/Creatinine Ratio 07/17/2024 13.9  7.0 - 25.0 Final    Anion Gap 07/17/2024 15.8 (H)  5.0 - 15.0 mmol/L Final    eGFR 07/17/2024 42.2 (L)  >60.0 mL/min/1.73 Final    Lipase 07/17/2024 12 (L)  13 - 60 U/L Final    Color, UA 07/17/2024 Orange (A)  Yellow, Straw Final    Appearance, UA 07/17/2024 Turbid (A)  Clear Final    pH, UA 07/17/2024 5.5  5.0 - 8.0 Final    Specific Gravity, UA 07/17/2024 >=1.030  1.005 - 1.030 Final    Glucose, UA 07/17/2024 Negative  Negative Final    Ketones, UA 07/17/2024 Negative  Negative Final    Bilirubin, UA 07/17/2024 Negative  Negative Final    Blood, UA 07/17/2024 Large (3+) (A)  Negative Final    Protein, UA 07/17/2024 100 mg/dL (2+) (A)  Negative Final    Leuk Esterase, UA 07/17/2024 Large (3+) (A)  Negative Final    Nitrite, UA 07/17/2024 Positive (A)  Negative Final    Urobilinogen, UA 07/17/2024 0.2 E.U./dL  0.2 - 1.0 E.U./dL Final    Procalcitonin 07/17/2024 0.17  0.00 - 0.25 ng/mL Final    C-Reactive Protein 07/17/2024 10.54 (H)  0.00 - 0.50 mg/dL Final    WBC 07/17/2024 9.88  3.40 - 10.80 10*3/mm3 Final    RBC 07/17/2024 3.10 (L)  4.14 - 5.80 10*6/mm3 Final    Hemoglobin 07/17/2024 9.9 (L)  13.0 - 17.7 g/dL Final    Hematocrit 07/17/2024 29.8 (L)  37.5 - 51.0 % Final    MCV 07/17/2024 96.1  79.0 - 97.0 fL Final    MCH 07/17/2024 31.9  26.6 - 33.0 pg Final    MCHC 07/17/2024 33.2  31.5 - 35.7 g/dL Final    RDW 07/17/2024 13.1  12.3 - 15.4 % Final    RDW-SD 07/17/2024 46.1  37.0 - 54.0 fl Final    MPV 07/17/2024 9.2  6.0 - 12.0 fL Final    Platelets 07/17/2024 394  140 -  450 10*3/mm3 Final    Neutrophil % 07/17/2024 68.5  42.7 - 76.0 % Final    Lymphocyte % 07/17/2024 18.4 (L)  19.6 - 45.3 % Final    Monocyte % 07/17/2024 10.9  5.0 - 12.0 % Final    Eosinophil % 07/17/2024 1.1  0.3 - 6.2 % Final    Basophil % 07/17/2024 0.6  0.0 - 1.5 % Final    Immature Grans % 07/17/2024 0.5  0.0 - 0.5 % Final    Neutrophils, Absolute 07/17/2024 6.76  1.70 - 7.00 10*3/mm3 Final    Lymphocytes, Absolute 07/17/2024 1.82  0.70 - 3.10 10*3/mm3 Final    Monocytes, Absolute 07/17/2024 1.08 (H)  0.10 - 0.90 10*3/mm3 Final    Eosinophils, Absolute 07/17/2024 0.11  0.00 - 0.40 10*3/mm3 Final    Basophils, Absolute 07/17/2024 0.06  0.00 - 0.20 10*3/mm3 Final    Immature Grans, Absolute 07/17/2024 0.05  0.00 - 0.05 10*3/mm3 Final    nRBC 07/17/2024 0.0  0.0 - 0.2 /100 WBC Final    RBC, UA 07/17/2024 Too Numerous to Count (A)  None Seen, 0-2 /HPF Final    WBC, UA 07/17/2024 Too Numerous to Count (A)  None Seen, 0-2 /HPF Final    Bacteria, UA 07/17/2024 Trace (A)  None Seen /HPF Final    Squamous Epithelial Cells, UA 07/17/2024 0-2  None Seen, 0-2 /HPF Final    Hyaline Casts, UA 07/17/2024 Unable to determine due to loaded field  None Seen /LPF Final    Methodology 07/17/2024 Manual Light Microscopy   Final    Urine Culture 07/17/2024 >100,000 CFU/mL Klebsiella pneumoniae ssp pneumoniae (A)   Final    Blood Culture 07/17/2024 No growth at 5 days   Final   Lab on 07/03/2024   Component Date Value Ref Range Status    Glucose 07/03/2024 92  65 - 99 mg/dL Final    BUN 07/03/2024 24 (H)  8 - 23 mg/dL Final    Creatinine 07/03/2024 1.72 (H)  0.76 - 1.27 mg/dL Final    Sodium 07/03/2024 138  136 - 145 mmol/L Final    Potassium 07/03/2024 5.1  3.5 - 5.2 mmol/L Final    Chloride 07/03/2024 103  98 - 107 mmol/L Final    CO2 07/03/2024 22.0  22.0 - 29.0 mmol/L Final    Calcium 07/03/2024 9.8  8.6 - 10.5 mg/dL Final    BUN/Creatinine Ratio 07/03/2024 14.0  7.0 - 25.0 Final    Anion Gap 07/03/2024 13.0  5.0 - 15.0 mmol/L  Final    eGFR 07/03/2024 40.4 (L)  >60.0 mL/min/1.73 Final    WBC 07/03/2024 10.17  3.40 - 10.80 10*3/mm3 Final    RBC 07/03/2024 3.21 (L)  4.14 - 5.80 10*6/mm3 Final    Hemoglobin 07/03/2024 10.2 (L)  13.0 - 17.7 g/dL Final    Hematocrit 07/03/2024 31.8 (L)  37.5 - 51.0 % Final    MCV 07/03/2024 99.1 (H)  79.0 - 97.0 fL Final    MCH 07/03/2024 31.8  26.6 - 33.0 pg Final    MCHC 07/03/2024 32.1  31.5 - 35.7 g/dL Final    RDW 07/03/2024 12.3  12.3 - 15.4 % Final    RDW-SD 07/03/2024 44.3  37.0 - 54.0 fl Final    MPV 07/03/2024 9.8  6.0 - 12.0 fL Final    Platelets 07/03/2024 585 (H)  140 - 450 10*3/mm3 Final    Neutrophil % 07/03/2024 71.1  42.7 - 76.0 % Final    Lymphocyte % 07/03/2024 19.6  19.6 - 45.3 % Final    Monocyte % 07/03/2024 7.5  5.0 - 12.0 % Final    Eosinophil % 07/03/2024 0.6  0.3 - 6.2 % Final    Basophil % 07/03/2024 0.8  0.0 - 1.5 % Final    Immature Grans % 07/03/2024 0.4  0.0 - 0.5 % Final    Neutrophils, Absolute 07/03/2024 7.24 (H)  1.70 - 7.00 10*3/mm3 Final    Lymphocytes, Absolute 07/03/2024 1.99  0.70 - 3.10 10*3/mm3 Final    Monocytes, Absolute 07/03/2024 0.76  0.10 - 0.90 10*3/mm3 Final    Eosinophils, Absolute 07/03/2024 0.06  0.00 - 0.40 10*3/mm3 Final    Basophils, Absolute 07/03/2024 0.08  0.00 - 0.20 10*3/mm3 Final    Immature Grans, Absolute 07/03/2024 0.04  0.00 - 0.05 10*3/mm3 Final    nRBC 07/03/2024 0.0  0.0 - 0.2 /100 WBC Final   No results displayed because visit has over 200 results.      Pre-Admission Testing on 06/07/2024   Component Date Value Ref Range Status    Potassium 06/07/2024 4.7  3.5 - 5.2 mmol/L Final    Slight hemolysis detected by analyzer. Result may be falsely elevated.    WBC 06/07/2024 7.61  3.40 - 10.80 10*3/mm3 Final    RBC 06/07/2024 4.80  4.14 - 5.80 10*6/mm3 Final    Hemoglobin 06/07/2024 15.5  13.0 - 17.7 g/dL Final    Hematocrit 06/07/2024 46.7  37.5 - 51.0 % Final    MCV 06/07/2024 97.3 (H)  79.0 - 97.0 fL Final    MCH 06/07/2024 32.3  26.6 - 33.0 pg  Final    MCHC 06/07/2024 33.2  31.5 - 35.7 g/dL Final    RDW 06/07/2024 12.9  12.3 - 15.4 % Final    RDW-SD 06/07/2024 46.3  37.0 - 54.0 fl Final    MPV 06/07/2024 9.7  6.0 - 12.0 fL Final    Platelets 06/07/2024 254  140 - 450 10*3/mm3 Final   Office Visit on 09/07/2023   Component Date Value Ref Range Status    Glucose 11/17/2023 85  65 - 99 mg/dL Final    BUN 11/17/2023 22  8 - 23 mg/dL Final    Creatinine 11/17/2023 1.46 (H)  0.76 - 1.27 mg/dL Final    Sodium 11/17/2023 138  136 - 145 mmol/L Final    Potassium 11/17/2023 4.8  3.5 - 5.2 mmol/L Final    Chloride 11/17/2023 106  98 - 107 mmol/L Final    CO2 11/17/2023 21.8 (L)  22.0 - 29.0 mmol/L Final    Calcium 11/17/2023 9.1  8.6 - 10.5 mg/dL Final    Total Protein 11/17/2023 6.5  6.0 - 8.5 g/dL Final    Albumin 11/17/2023 4.2  3.5 - 5.2 g/dL Final    ALT (SGPT) 11/17/2023 18  1 - 41 U/L Final    AST (SGOT) 11/17/2023 17  1 - 40 U/L Final    Alkaline Phosphatase 11/17/2023 79  39 - 117 U/L Final    Total Bilirubin 11/17/2023 0.7  0.0 - 1.2 mg/dL Final    Globulin 11/17/2023 2.3  gm/dL Final    A/G Ratio 11/17/2023 1.8  g/dL Final    BUN/Creatinine Ratio 11/17/2023 15.1  7.0 - 25.0 Final    Anion Gap 11/17/2023 10.2  5.0 - 15.0 mmol/L Final    eGFR 11/17/2023 49.2 (L)  >60.0 mL/min/1.73 Final    Total Cholesterol 11/17/2023 167  0 - 200 mg/dL Final    Triglycerides 11/17/2023 135  0 - 150 mg/dL Final    HDL Cholesterol 11/17/2023 43  40 - 60 mg/dL Final    LDL Cholesterol  11/17/2023 100  0 - 100 mg/dL Final    VLDL Cholesterol 11/17/2023 24  5 - 40 mg/dL Final    LDL/HDL Ratio 11/17/2023 2.26   Final    TSH 11/17/2023 3.310  0.270 - 4.200 uIU/mL Final    WBC 11/17/2023 7.29  3.40 - 10.80 10*3/mm3 Final    RBC 11/17/2023 4.40  4.14 - 5.80 10*6/mm3 Final    Hemoglobin 11/17/2023 14.5  13.0 - 17.7 g/dL Final    Hematocrit 11/17/2023 42.5  37.5 - 51.0 % Final    MCV 11/17/2023 96.6  79.0 - 97.0 fL Final    MCH 11/17/2023 33.0  26.6 - 33.0 pg Final    MCHC 11/17/2023  34.1  31.5 - 35.7 g/dL Final    RDW 11/17/2023 13.1  12.3 - 15.4 % Final    RDW-SD 11/17/2023 46.7  37.0 - 54.0 fl Final    MPV 11/17/2023 10.7  6.0 - 12.0 fL Final    Platelets 11/17/2023 242  140 - 450 10*3/mm3 Final    Neutrophil % 11/17/2023 54.2  42.7 - 76.0 % Final    Lymphocyte % 11/17/2023 28.8  19.6 - 45.3 % Final    Monocyte % 11/17/2023 11.5  5.0 - 12.0 % Final    Eosinophil % 11/17/2023 3.4  0.3 - 6.2 % Final    Basophil % 11/17/2023 1.4  0.0 - 1.5 % Final    Immature Grans % 11/17/2023 0.7 (H)  0.0 - 0.5 % Final    Neutrophils, Absolute 11/17/2023 3.95  1.70 - 7.00 10*3/mm3 Final    Lymphocytes, Absolute 11/17/2023 2.10  0.70 - 3.10 10*3/mm3 Final    Monocytes, Absolute 11/17/2023 0.84  0.10 - 0.90 10*3/mm3 Final    Eosinophils, Absolute 11/17/2023 0.25  0.00 - 0.40 10*3/mm3 Final    Basophils, Absolute 11/17/2023 0.10  0.00 - 0.20 10*3/mm3 Final    Immature Grans, Absolute 11/17/2023 0.05  0.00 - 0.05 10*3/mm3 Final    nRBC 11/17/2023 0.0  0.0 - 0.2 /100 WBC Final       Radiographic Studies  CT Abdomen Pelvis With Contrast    Result Date: 7/17/2024  Occlusion of the right SFA, Paolo Smith PA-C, notified at 12:45 p.m 07/17/2024.  Enlarged, inhomogeneous prostate with significant wall thickening of the bladder and position of the Gillespie catheter as described, consider postoperative change versus infection or postoperative hemorrhage.,  Possible gallbladder sludge or small stones.  CTDI: 5.39 mGy DLP:235.53 mGy.cm  This report was signed and finalized on 7/17/2024 1:28 PM by Kavya Pollock MD.      US Scrotum & Testicles    Result Date: 7/17/2024  1. No evidence of right intratesticular mass or torsion. 2. Large right epididymal cysts as described. 3. Findings suggestive of bilateral epididymitis. 4. No left testicular torsion. A hypoechoic region in the left testicle could represent possible mass. Urologic consultation is recommended.      Images were reviewed, interpreted, and dictated by Dr. Hoff  MD Maris Transcribed by Radha Reeves PA-C.  This report was signed and finalized on 7/17/2024 12:50 PM by Kavya Pollock MD.      IR Artery Embolization Occlusion    Result Date: 6/21/2024  Successful catheterization and angiography of the anterior divisions of the left and right hypogastric arteries with embolization using 500 um sized Gelfoam cubes successfully. The procedure was complicated by acute occlusion of the superficial femoral artery. Vascular surgery responded promptly to the request for assistance and the patient was taken to the OR for further management. Thank you for the opportunity to assist you in the care of your patient. Electronically Signed: Patrick Christine MD  6/21/2024 3:41 PM EDT  Workstation ID: [id number]      I have reviewed the above labs and imaging.       Assessment / Plan      Assessment:   Diagnoses and all orders for this visit:    1. Urinary retention (Primary)  -     Ambulatory Referral to Urology    2. Chronic indwelling Stacy catheter  -     Ambulatory Referral to Urology         78 y.o. male presents today for c/o bleeding and decreased UOP from stacy catheter.  Patient has been seen in the ED 3 times since 08/05/24 for same issue and was advised to come to us to further evaluation.  Patient presents today and states he has strawberry red UOP and has pulling of his catheter as it was attached to bag.  He reports that his catheter was upsized in the ED today.      Patient presents with 18 FR indwelling stacy catheter attached to bag.  Draining clear, red UOP.  Flushed with 300 mL of sterile water with no issues.  Patient tolerated well.  Switched to leg bag per patient request.  Patient will need catheter change monthly with home health.     Discussed with Dr. 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  Will refer to [facility] for second opinion.        Plan:    Referral to [facility] for second  opinion.  Home health to change catheter monthly  Will follow up after patient is seen by [facility] if needed    Follow Up:   Return after patient is seen by [facility] if needed.      LANNY Hammonds  [facility]

## 2024-08-14 NOTE — ED PROVIDER NOTES
EMERGENCY DEPARTMENT ENCOUNTER    Pt Name: Nestor Nye  MRN: [id number]  Pt :   1945  Room Number:    Date of encounter:  2024  PCP: Roxanne Slater MD  ED Provider: Frank Corley MD    Historian: Patient      HPI:  Chief Complaint   Patient presents with    Catheter issue          Context: Nestor Nye is a 78 y.o. male who presents to the ED c/o Gillespie catheter issue, patient reports when laying flat is not draining.  This is his fifth visit following a complicated urologic history for nondraining Gillespie catheter.  Patient denies blood in the urine, does reveal fullness and tightness in his lower abdomen      PAST MEDICAL HISTORY  Past Medical History:   Diagnosis Date    Acute maxillary sinusitis     Arthritis     Disease of thyroid gland     Enlarged prostate     GERD (gastroesophageal reflux disease)     Gout     Hypertension     Impaired functional mobility, balance, gait, and endurance     PONV (postoperative nausea and vomiting)     Renal disorder     Rheumatoid arthritis     Right inguinal hernia 10/28/2021    Added automatically from request for surgery [id number]    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, changed his voice since then         PAST SURGICAL HISTORY  Past Surgical History:   Procedure Laterality Date    ABDOMINAL SURGERY      ARTERIOGRAM Right 2024    Procedure: LOWER EXTREMITY ANGIOGRAM RIGHT;  Surgeon: Kingsley Morris MD;  Location: [facility];  Service: Vascular;  Laterality: Right;  DOSE: 56 MGY; FLURO: 7 MIN 6 SEC; CONTRAST 40 ML    BACK SURGERY      BELPHAROPTOSIS REPAIR Bilateral     BRAIN SURGERY      COLON SURGERY      bowel resection x2 per pt    CRANIOTOMY FOR SUBDURAL HEMATOMA      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    DIAGNOSTIC LAPAROSCOPY N/A 2022    Procedure: DIAGNOSTIC LAPAROSCOPY CONVERTED TO EXPLORATORY LAPAROTOMY WITH SMALL BOWEL  RESECTION;  Surgeon: Maximo Bennett MD;  Location:  [facility];  Service: General;  Laterality: N/A;  CONVERTED TO EXPLORATORY LAPAROTOMY AT 1912    EXPLORATORY LAPAROTOMY N/A 04/16/2022    Procedure: LAPAROTOMY EXPLORATORY WITH SMALL BOWEL OBSTRUCTION;  Surgeon: Maximo Bennett MD;  Location:  [facility];  Service: General;  Laterality: N/A;    EYE SURGERY      on upper etelids    FEMORAL ENDARTERECTOMY Right 6/21/2024    Procedure: FEMORAL EMBELECTOMY RIGHT;  Surgeon: Kingsley Morris MD;  Location:  [facility];  Service: Vascular;  Laterality: Right;    FOREIGN BODY REMOVAL Right     removal of metal fragments from RUQ after electrocution and fall.      HERNIA REPAIR      INGUINAL HERNIA REPAIR Right 11/01/2021    Procedure: INGUINAL HERNIA REPAIR WITH MESH AND EXCISION OF SPERMATIC CORD LIPOMA;  Surgeon: Rosey Glover MD;  Location:  [facility];  Service: General;  Laterality: Right;    LEG DEBRIDEMENT Right 7/22/2024    Procedure: GROIN DEBRIDEMENT WITH WOUND VAC PLACEMENT  RIGHT;  Surgeon: Kingsley Morris MD;  Location:  [facility];  Service: Vascular;  Laterality: Right;    LUMBAR SPINE SURGERY      PROSTATE AQUABLATION N/A 6/21/2024    Procedure: AQUABLATION OF PROSTATE;  Surgeon: Prieto Rey MD;  Location:  [facility];  Service: Robotics - Urology;  Laterality: N/A;    SKIN GRAFT Right     RUE skin grafting after electrocution    THORACIC SPINE SURGERY           FAMILY HISTORY  Family History   Problem Relation Age of Onset    Arthritis Mother     Prostate cancer Brother     Arthritis Father          SOCIAL HISTORY  Social History     Socioeconomic History    Marital status:    Tobacco Use    Smoking status: Former     Types: Cigars     Passive exposure: Past    Smokeless tobacco: Never    Tobacco comments:     Smoked 1 ppd of small cigars for 50  years    Vaping Use    Vaping status: Former   Substance and Sexual Activity    Alcohol use: Not Currently    Drug use: No     Sexual activity: Defer         ALLERGIES  Patient has no known allergies.        REVIEW OF SYSTEMS  Review of Systems   Constitutional:  Negative for chills and fever.   HENT:  Negative for sore throat and trouble swallowing.    Eyes:  Negative for pain and redness.   Respiratory:  Negative for cough and shortness of breath.    Cardiovascular:  Negative for chest pain and leg swelling.   Gastrointestinal:  Negative for abdominal pain, nausea and vomiting.   Genitourinary:  Negative for dysuria and urgency.        Catheter issue   Musculoskeletal:  Negative for back pain and neck pain.   Skin:  Negative for rash and wound.   Neurological:  Negative for dizziness and weakness.        All systems reviewed and negative except for those discussed in HPI.       PHYSICAL EXAM    I have reviewed the triage vital signs and nursing notes.    ED Triage Vitals [08/13/24 2010]   Temp Heart Rate Resp BP SpO2   98 °F (36.7 °C) 100 18 126/78 97 %      Temp src Heart Rate Source Patient Position BP Location FiO2 (%)   Oral Monitor Sitting Left arm --       Physical Exam  Constitutional:       Appearance: Normal appearance. He is not ill-appearing.   HENT:      Head: Normocephalic and atraumatic.      Right Ear: External ear normal.      Left Ear: External ear normal.      Nose: Nose normal.      Mouth/Throat:      Mouth: Mucous membranes are moist.      Pharynx: Oropharynx is clear.   Eyes:      Extraocular Movements: Extraocular movements intact.      Conjunctiva/sclera: Conjunctivae normal.      Pupils: Pupils are equal, round, and reactive to light.   Cardiovascular:      Rate and Rhythm: Normal rate and regular rhythm.      Pulses:           Radial pulses are 2+ on the right side and 2+ on the left side.      Heart sounds: No murmur heard.  Pulmonary:      Effort: Pulmonary effort is normal.      Breath sounds: Normal breath sounds.   Abdominal:      General: There is no distension.      Tenderness: There is no abdominal  tenderness. There is no guarding.   Genitourinary:     Comments: Gillespie catheter in place, no active urine draining, suprapubic tenderness  Musculoskeletal:         General: No swelling or deformity.      Cervical back: Normal range of motion and neck supple.   Skin:     General: Skin is warm and dry.      Capillary Refill: Capillary refill takes less than 2 seconds.      Findings: No rash.   Neurological:      General: No focal deficit present.      Mental Status: He is alert and oriented to person, place, and time.            LAB RESULTS  No results found for this or any previous visit (from the past 24 hour(s)).    If labs were ordered, I independently reviewed the results and considered them in treating the patient.        RADIOLOGY  No Radiology Exams Resulted Within Past 24 Hours        PROCEDURES    Procedures    Interpretations    O2 Sat: The patients oxygen saturation was 97% on Room Air.  This was independently interpreted by me as Normal      MEDICATIONS GIVEN IN ER    Medications   Lidocaine HCl gel (XYLOCAINE) urethral/mucosal syringe ( Topical Given 8/13/24 2059)         MEDICAL DECISION MAKING, PROGRESS, and CONSULTS    All labs, if obtained, have been independently reviewed by me.  All radiology studies, if obtained, have been reviewed by me and the radiologist dictating the report.  All EKG's, if obtained, have been independently viewed and interpreted by me      Discussion below represents my analysis of pertinent findings related to patient's condition, differential diagnosis, treatment plan and final disposition.      Differential diagnosis:    78-year-old male with a Gillespie catheter in place, is not currently draining, he has some suprapubic fullness and tenderness.  Appears it is clogged off once again.  The Gillespie catheter was removed, after Uro-Jet a 20 Slovak coudé catheter was placed, this was actively draining clear yellow urine.  Patient felt much relief.  Patient was discharged with  outpatient urology follow-up    Additional Sources:  External (non-ED) record review:  Urology clinic note 8/12/2024       Orders placed during this visit:  No orders of the defined types were placed in this encounter.        Additional orders considered but not ordered:  None    ED Course:    Consultants:  None             After my consideration of clinical presentation and any laboratory/radiology studies obtained, I discussed the findings with the patient/patient representative who is in agreement with the treatment plan and the final disposition. Risks and benefits of discharge were discussed.     AS OF 21:35 EDT VITALS:    BP - 126/89  HR - 100  TEMP - 98 °F (36.7 °C) (Oral)  O2 SATS - 97%    I reviewed the patients prescription monitoring report if available prior to discharge    DIAGNOSIS  Final diagnoses:   Problem with Gillespie catheter, initial encounter         DISPOSITION  ED Disposition       ED Disposition   Discharge    Condition   Stable    Comment   --                   Please note that portions of this document were completed with voice recognition software.        Frank Corley MD  08/13/24 6042

## 2024-08-21 ENCOUNTER — OUTSIDE FACILITY SERVICE (OUTPATIENT)
Dept: INTERNAL MEDICINE | Facility: CLINIC | Age: 79
End: 2024-08-21
Payer: MEDICARE

## 2024-09-09 ENCOUNTER — TELEPHONE (OUTPATIENT)
Dept: INTERNAL MEDICINE | Facility: CLINIC | Age: 79
End: 2024-09-09
Payer: MEDICARE

## 2024-09-09 NOTE — TELEPHONE ENCOUNTER
This encounter is being sent as an FYI.   Patient was scheduled for annual wellness visit with an APC who is not the patient’s PCP.   Please review and follow up with the patient in the event patient should be worked in with the PCP.    VISIT CURRENTLY SCHEDULED WITH: JOELLE GOLDEN  DATE OF SCHEDULED VISIT: 09/10/24

## 2024-09-09 NOTE — TELEPHONE ENCOUNTER
Caller: Nestor Nye    Relationship: Self    Best call back number: [phone]    Who is your current provider: DR BACK    Is your current provider offboarding? NO    Who would you like your new provider to be: JOELLE GOLDEN    What are your reasons for transferring care: HIS TIME IS LIMITED AND DR BACK DOES NOT HAVE THE SAME AVAILABILITY    Additional notes: PATIENT WILL DISCUSS AT VISIT 09/10/24 ABOUT CHANGING HIS PCP.

## 2024-09-10 ENCOUNTER — OFFICE VISIT (OUTPATIENT)
Dept: INTERNAL MEDICINE | Facility: CLINIC | Age: 79
End: 2024-09-10
Payer: MEDICARE

## 2024-09-10 VITALS
SYSTOLIC BLOOD PRESSURE: 128 MMHG | BODY MASS INDEX: 24.96 KG/M2 | WEIGHT: 159 LBS | OXYGEN SATURATION: 97 % | DIASTOLIC BLOOD PRESSURE: 82 MMHG | RESPIRATION RATE: 20 BRPM | HEIGHT: 67 IN | TEMPERATURE: 97.8 F | HEART RATE: 81 BPM

## 2024-09-10 DIAGNOSIS — I10 BENIGN ESSENTIAL HYPERTENSION: ICD-10-CM

## 2024-09-10 DIAGNOSIS — N18.31 STAGE 3A CHRONIC KIDNEY DISEASE: ICD-10-CM

## 2024-09-10 DIAGNOSIS — E03.9 ACQUIRED HYPOTHYROIDISM: ICD-10-CM

## 2024-09-10 DIAGNOSIS — Z00.00 ANNUAL PHYSICAL EXAM: ICD-10-CM

## 2024-09-10 DIAGNOSIS — Z00.00 MEDICARE ANNUAL WELLNESS VISIT, SUBSEQUENT: Primary | ICD-10-CM

## 2024-09-10 PROCEDURE — 3074F SYST BP LT 130 MM HG: CPT | Performed by: PHYSICIAN ASSISTANT

## 2024-09-10 PROCEDURE — 1126F AMNT PAIN NOTED NONE PRSNT: CPT | Performed by: PHYSICIAN ASSISTANT

## 2024-09-10 PROCEDURE — 1159F MED LIST DOCD IN RCRD: CPT | Performed by: PHYSICIAN ASSISTANT

## 2024-09-10 PROCEDURE — 1160F RVW MEDS BY RX/DR IN RCRD: CPT | Performed by: PHYSICIAN ASSISTANT

## 2024-09-10 PROCEDURE — 3079F DIAST BP 80-89 MM HG: CPT | Performed by: PHYSICIAN ASSISTANT

## 2024-09-10 PROCEDURE — 99397 PER PM REEVAL EST PAT 65+ YR: CPT | Performed by: PHYSICIAN ASSISTANT

## 2024-09-10 PROCEDURE — G0439 PPPS, SUBSEQ VISIT: HCPCS | Performed by: PHYSICIAN ASSISTANT

## 2024-09-10 PROCEDURE — 1170F FXNL STATUS ASSESSED: CPT | Performed by: PHYSICIAN ASSISTANT

## 2024-09-10 NOTE — PATIENT INSTRUCTIONS
Medicare Wellness  Personal Prevention Plan of Service     Date of Office Visit:    Encounter Provider:  Michelle Gillespie PA-C  Place of Service:  [facility]  Patient Name: Nestor Nye  :  1945    As part of the Medicare Wellness portion of your visit today, we are providing you with this personalized preventive plan of services (PPPS). This plan is based upon recommendations of the [place] Preventive Services Task Force (USPSTF) and the Advisory Committee on Immunization Practices (ACIP).    This lists the preventive care services that should be considered, and provides dates of when you are due. Items listed as completed are up-to-date and do not require any further intervention.    Health Maintenance   Topic Date Due    TDAP/TD VACCINES (1 - Tdap) Never done    HEPATITIS C SCREENING  Never done    COVID-19 Vaccine (6 - - season) 2024    INFLUENZA VACCINE  2024    ZOSTER VACCINE (1 of 2) 2024 (Originally 1996)    LIPID PANEL  2024    ANNUAL WELLNESS VISIT  09/10/2025    COLORECTAL CANCER SCREENING  2027    RSV Vaccine - Adults  Completed    Pneumococcal Vaccine 65+  Completed       Orders Placed This Encounter   Procedures    TSH     Order Specific Question:   Release to patient     Answer:   Routine Release [[id number]]    Comprehensive Metabolic Panel     Order Specific Question:   Release to patient     Answer:   Routine Release [[id number]]    CBC & Differential     Order Specific Question:   Manual Differential     Answer:   No     Order Specific Question:   Release to patient     Answer:   Routine Release [[id number]]       Return in about 6 months (around 3/10/2025) for Next scheduled follow up.

## 2024-09-10 NOTE — ASSESSMENT & PLAN NOTE
Renal condition is CKD managed by Dr. Snow. .  Continue current treatment regimen.  Renal condition will be reassessed in 6 months.

## 2024-09-10 NOTE — PROGRESS NOTES
Subjective   The ABCs of the Annual Wellness Visit  Medicare Wellness Visit      Nestor Nye is a 78 y.o. patient who presents for a Medicare Wellness Visit.    The following portions of the patient's history were reviewed and   updated as appropriate: allergies, current medications, past family history, past medical history, past social history, past surgical history, and problem list    Compared to one year ago, the patient's physical health is better. He has had 3 surgeries in the last year which have made some improvements.   Compared to one year ago, the patient's mental health is the same.    Recent Hospitalizations:  This patient has had a [facility] admission record on file within the last 365 days.  Current Medical Providers:  Patient Care Team:  Roxanne Slater MD as PCP - General (Internal Medicine)  Rosey Glover MD as Surgeon (General Surgery)  Sheila Hair APRN as Nurse Practitioner (Long Term Care Acute)  Arabella Vogel APRN as Nurse Practitioner (Urology)  Mitch Douglas MD as Consulting Physician (Cardiology)  Prieto Rey MD as Consulting Physician (Urology)  Clarke Gary APRN as Nurse Practitioner (Nephrology)    Outpatient Medications Prior to Visit   Medication Sig Dispense Refill    amLODIPine (Norvasc) 2.5 MG tablet Take 1 tablet by mouth Daily. 90 tablet 1    aspirin 81 MG chewable tablet Chew 1 tablet Daily. 30 tablet 1    ferrous sulfate 325 (65 FE) MG tablet Take 1 tablet by mouth Daily With Breakfast.      finasteride (PROSCAR) 5 MG tablet TAKE ONE TABLET BY MOUTH EVERY DAY 90 tablet 0    levothyroxine (SYNTHROID, LEVOTHROID) 88 MCG tablet Take 1 tablet by mouth Daily. 90 tablet 2    melatonin 5 MG tablet tablet Take 1 tablet by mouth.      Diclofenac Sodium (VOLTAREN) 1 % gel gel Apply 4 g topically to the appropriate area as directed 4 (Four) Times a Day As Needed (joint pain). (Patient not taking: Reported on 9/10/2024) 100 g 5  "   ipratropium-albuterol (DUO-NEB) 0.5-2.5 mg/3 ml nebulizer Take 3 mL by nebulization Every 4 (Four) Hours As Needed for Wheezing or Shortness of Air. 360 mL 1     No facility-administered medications prior to visit.     No opioid medication identified on active medication list. I have reviewed chart for other potential  high risk medication/s and harmful drug interactions in the elderly.      Aspirin is on active medication list. Aspirin use is indicated based on review of current medical condition/s. Pros and cons of this therapy have been discussed today. Benefits of this medication outweigh potential harm.  Patient has been encouraged to continue taking this medication.  .      Patient Active Problem List   Diagnosis    Hypothyroidism    Mixed hyperlipidemia    Elevated PSA    Benign essential hypertension    Acute UTI (urinary tract infection)    BPH with obstruction/lower urinary tract symptoms    Anemia of chronic disease    Rheumatoid arthritis    Secondary hyperparathyroidism    Stage 3a chronic kidney disease    Gouty arthropathy    No post-op complications    Bilateral epididymitis    Wound dehiscence, surgical, initial encounter    Superficial femoral artery occlusion    Dehiscence of operative wound    Moderate malnutrition    Klebsiella pneumoniae infection    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  ACP discussion was held with the patient during this visit. Patient has an advance directive in EMR which is still valid.             Objective   Vitals:    09/10/24 1303   BP: 128/82   Pulse: 81   Resp: 20   Temp: 97.8 °F (36.6 °C)   SpO2: 97%   Weight: 72.1 kg (159 lb)   Height: 170 cm (66.93\")   PainSc: 0-No pain       Estimated body mass index is 24.96 kg/m² as calculated from the following:    Height as of this encounter: 170 cm (66.93\").    Weight " as of this encounter: 72.1 kg (159 lb).    BMI is within normal parameters. No other follow-up for BMI required.       Does the patient have evidence of cognitive impairment? No  Lab Results   Component Value Date    HGBA1C 5.30 2024                                                                                                Health  Risk Assessment    Smoking Status:  Social History     Tobacco Use   Smoking Status Former    Types: Cigars    Passive exposure: Past   Smokeless Tobacco Never   Tobacco Comments    Smoked 1 ppd of small cigars for 50  years      Alcohol Consumption:  Social History     Substance and Sexual Activity   Alcohol Use Not Currently       Fall Risk Screen  STEADI Fall Risk Assessment was completed, and patient is at LOW risk for falls.Assessment completed on:9/10/2024    Depression Screenin/10/2024     1:02 PM   PHQ-2/PHQ-9 Depression Screening   Little Interest or Pleasure in Doing Things 1-->several days   Feeling Down, Depressed or Hopeless 0-->not at all   PHQ-9: Brief Depression Severity Measure Score 1     Health Habits and Functional and Cognitive Screenin/10/2024    12:59 PM   Functional & Cognitive Status   Do you have difficulty preparing food and eating? No   Do you have difficulty bathing yourself, getting dressed or grooming yourself? No   Do you have difficulty using the toilet? No   Do you have difficulty moving around from place to place? No   Do you have trouble with steps or getting out of a bed or a chair? No   Current Diet Well Balanced Diet   Dental Exam Not up to date   Eye Exam Not up to date   Exercise (times per week) 10 times per week   Current Exercises Include Aerobics   Do you need help using the phone?  No   Are you deaf or do you have serious difficulty hearing?  No   Do you need help to go to places out of walking distance? No   Do you need help shopping? No   Do you need help preparing meals?  No   Do you need help with housework?  No    Do you need help with laundry? No   Do you need help taking your medications? No   Do you need help managing money? No   Do you ever drive or ride in a car without wearing a seat belt? No   Have you felt unusual stress, anger or loneliness in the last month? No   Who do you live with? Alone   If you need help, do you have trouble finding someone available to you? No   Have you been bothered in the last four weeks by sexual problems? No   Do you have difficulty concentrating, remembering or making decisions? No           Age-appropriate Screening Schedule:  Refer to the list below for future screening recommendations based on patient's age, sex and/or medical conditions. Orders for these recommended tests are listed in the plan section. The patient has been provided with a written plan.    Health Maintenance List  Health Maintenance   Topic Date Due    TDAP/TD VACCINES (1 - Tdap) Never done    HEPATITIS C SCREENING  Never done    ANNUAL WELLNESS VISIT  07/12/2024    COVID-19 Vaccine (6 - 2023-24 season) 09/01/2024    INFLUENZA VACCINE  08/01/2024    ZOSTER VACCINE (1 of 2) 09/30/2024 (Originally 11/11/1996)    LIPID PANEL  11/17/2024    COLORECTAL CANCER SCREENING  04/04/2027    RSV Vaccine - Adults  Completed    Pneumococcal Vaccine 65+  Completed                                                                                                                                                CMS Preventative Services Quick Reference  Risk Factors Identified During Encounter  Fall Risk-High or Moderate: Discussed Fall Prevention in the home    The above risks/problems have been discussed with the patient.  Pertinent information has been shared with the patient in the After Visit Summary.  An After Visit Summary and PPPS were made available to the patient.    Follow Up:   Next Medicare Wellness visit to be scheduled in 1 year.         Additional E&M Note during same encounter follows:  Patient has additional,  "significant, and separately identifiable condition(s)/problem(s) that require work above and beyond the Medicare Wellness Visit     Chief Complaint  Medicare Wellness-subsequent    Subjective   HPI  Nestor is also being seen today for an annual adult preventative physical exam.                 Objective   Vital Signs:  /82   Pulse 81   Temp 97.8 °F (36.6 °C)   Resp 20   Ht 170 cm (66.93\")   Wt 72.1 kg (159 lb)   SpO2 97%   BMI 24.96 kg/m²   Physical Exam  Vitals and nursing note reviewed.   Constitutional:       General: He is not in acute distress.     Appearance: Normal appearance. He is well-developed and normal weight. He is not ill-appearing, toxic-appearing or diaphoretic.   HENT:      Head: Normocephalic and atraumatic.      Right Ear: External ear normal.      Left Ear: External ear normal.      Nose: Nose normal.      Mouth/Throat:      Pharynx: No oropharyngeal exudate.   Eyes:      General: No scleral icterus.     Conjunctiva/sclera: Conjunctivae normal.      Pupils: Pupils are equal, round, and reactive to light.      Comments: Ectropion bilaterally   Neck:      Thyroid: No thyromegaly.   Cardiovascular:      Rate and Rhythm: Normal rate and regular rhythm.      Heart sounds: Normal heart sounds. No murmur heard.     No friction rub. No gallop.   Pulmonary:      Effort: Pulmonary effort is normal. No respiratory distress.      Breath sounds: Normal breath sounds. No stridor. No wheezing or rales.   Chest:      Chest wall: No tenderness.   Abdominal:      General: Bowel sounds are normal. There is no distension.      Palpations: Abdomen is soft. There is no mass.      Tenderness: There is no abdominal tenderness. There is no right CVA tenderness, left CVA tenderness, guarding or rebound.      Hernia: No hernia is present.   Musculoskeletal:         General: No tenderness. Normal range of motion.      Cervical back: Normal range of motion and neck supple. No rigidity.      Right lower leg: No " edema.      Left lower leg: No edema.   Lymphadenopathy:      Cervical: No cervical adenopathy.   Skin:     General: Skin is warm and dry.      Capillary Refill: Capillary refill takes less than 2 seconds.      Coloration: Skin is not pale.      Findings: No rash.   Neurological:      Mental Status: He is alert and oriented to person, place, and time.      Cranial Nerves: No cranial nerve deficit.      Sensory: No sensory deficit.      Gait: Gait abnormal (cane in use).      Deep Tendon Reflexes: Reflexes normal.   Psychiatric:         Mood and Affect: Mood normal.         Behavior: Behavior normal.         Thought Content: Thought content normal.         Judgment: Judgment normal.         The following data was reviewed by: Michelle Gillespie PA-C on 09/10/2024:        Assessment and Plan             Medicare annual wellness visit, subsequent    Annual physical exam    Acquired hypothyroidism    Benign essential hypertension  Hypertension is stable and controlled  Continue current treatment regimen.  Blood pressure will be reassessed in 6 months.  Stage 3a chronic kidney disease  Renal condition is  CKD managed by Dr. Snow.  .  Continue current treatment regimen.  Renal condition will be reassessed in 6 months.    Orders Placed This Encounter   Procedures    TSH     Order Specific Question:   Release to patient     Answer:   Routine Release [[id number]]    Comprehensive Metabolic Panel     Order Specific Question:   Release to patient     Answer:   Routine Release [[id number]]    CBC & Differential     Order Specific Question:   Manual Differential     Answer:   No     Order Specific Question:   Release to patient     Answer:   Routine Release [[id number]]           Patient Counseling:  --Nutrition: Stressed importance of moderation in sodium/caffeine intake, saturated fat and cholesterol, caloric balance, sufficient intake of fresh fruits, vegetables, fiber, calcium and iron.  --Discussed the issue of calcium  supplement, and the daily use of baby aspirin if applicable.  --Exercise: Stressed the importance of regular exercise.   --Substance Abuse: Discussed cessation/primary prevention of tobacco (if applicable, alcohol, or other drug use (if applicable); driving or other dangerous activities under the influence; availability of treatment for abuse.    --Sexuality: Discussed sexually transmitted diseases, partner selection, use of condoms, avoidance of unintended pregnancy  and contraceptive alternatives.   --Injury prevention: Discussed safety belts, safety helmets, smoke detector, smoking near bedding or upholstery.   --Dental health: Discussed importance of regular tooth brushing, flossing, and dental visits.  --Immunizations reviewed.  --Discussed benefits of screening colonoscopy (if applicable).  --After hours service discussed with patient.              Follow Up   Return in about 6 months (around 3/10/2025) for Next scheduled follow up.  Patient was given instructions and counseling regarding his condition or for health maintenance advice. Please see specific information pulled into the AVS if appropriate.

## 2024-09-11 LAB
ALBUMIN SERPL-MCNC: 4.1 G/DL (ref 3.5–5.2)
ALBUMIN/GLOB SERPL: 1.7 G/DL
ALP SERPL-CCNC: 100 U/L (ref 39–117)
ALT SERPL-CCNC: 8 U/L (ref 1–41)
AST SERPL-CCNC: 14 U/L (ref 1–40)
BASOPHILS # BLD AUTO: 0.06 10*3/MM3 (ref 0–0.2)
BASOPHILS NFR BLD AUTO: 1 % (ref 0–1.5)
BILIRUB SERPL-MCNC: <0.2 MG/DL (ref 0–1.2)
BUN SERPL-MCNC: 18 MG/DL (ref 8–23)
BUN/CREAT SERPL: 12.6 (ref 7–25)
CALCIUM SERPL-MCNC: 9.2 MG/DL (ref 8.6–10.5)
CHLORIDE SERPL-SCNC: 103 MMOL/L (ref 98–107)
CO2 SERPL-SCNC: 26.1 MMOL/L (ref 22–29)
CREAT SERPL-MCNC: 1.43 MG/DL (ref 0.76–1.27)
EGFRCR SERPLBLD CKD-EPI 2021: 50.5 ML/MIN/1.73
EOSINOPHIL # BLD AUTO: 0.2 10*3/MM3 (ref 0–0.4)
EOSINOPHIL NFR BLD AUTO: 3.3 % (ref 0.3–6.2)
ERYTHROCYTE [DISTWIDTH] IN BLOOD BY AUTOMATED COUNT: 13.8 % (ref 12.3–15.4)
GLOBULIN SER CALC-MCNC: 2.4 GM/DL
GLUCOSE SERPL-MCNC: 86 MG/DL (ref 65–99)
HCT VFR BLD AUTO: 35.9 % (ref 37.5–51)
HGB BLD-MCNC: 11.2 G/DL (ref 13–17.7)
IMM GRANULOCYTES # BLD AUTO: 0.02 10*3/MM3 (ref 0–0.05)
IMM GRANULOCYTES NFR BLD AUTO: 0.3 % (ref 0–0.5)
LYMPHOCYTES # BLD AUTO: 1.96 10*3/MM3 (ref 0.7–3.1)
LYMPHOCYTES NFR BLD AUTO: 32.6 % (ref 19.6–45.3)
MCH RBC QN AUTO: 29.6 PG (ref 26.6–33)
MCHC RBC AUTO-ENTMCNC: 31.2 G/DL (ref 31.5–35.7)
MCV RBC AUTO: 95 FL (ref 79–97)
MONOCYTES # BLD AUTO: 0.64 10*3/MM3 (ref 0.1–0.9)
MONOCYTES NFR BLD AUTO: 10.6 % (ref 5–12)
NEUTROPHILS # BLD AUTO: 3.14 10*3/MM3 (ref 1.7–7)
NEUTROPHILS NFR BLD AUTO: 52.2 % (ref 42.7–76)
NRBC BLD AUTO-RTO: 0 /100 WBC (ref 0–0.2)
PLATELET # BLD AUTO: 362 10*3/MM3 (ref 140–450)
POTASSIUM SERPL-SCNC: 4.8 MMOL/L (ref 3.5–5.2)
PROT SERPL-MCNC: 6.5 G/DL (ref 6–8.5)
RBC # BLD AUTO: 3.78 10*6/MM3 (ref 4.14–5.8)
SODIUM SERPL-SCNC: 138 MMOL/L (ref 136–145)
TSH SERPL DL<=0.005 MIU/L-ACNC: 2.29 UIU/ML (ref 0.27–4.2)
WBC # BLD AUTO: 6.02 10*3/MM3 (ref 3.4–10.8)

## 2024-09-12 NOTE — PROGRESS NOTES
Kidney function is stable and anemia has improved. Thyroid function is normal, continue current dose of levothyroxine.

## 2024-09-16 DIAGNOSIS — E03.8 ADULT ONSET HYPOTHYROIDISM: ICD-10-CM

## 2024-09-16 RX ORDER — LEVOTHYROXINE SODIUM 88 UG/1
88 TABLET ORAL DAILY
Qty: 90 TABLET | Refills: 3 | Status: SHIPPED | OUTPATIENT
Start: 2024-09-16

## 2024-10-24 ENCOUNTER — OUTSIDE FACILITY SERVICE (OUTPATIENT)
Dept: INTERNAL MEDICINE | Facility: CLINIC | Age: 79
End: 2024-10-24
Payer: MEDICARE

## 2024-10-28 ENCOUNTER — OFFICE VISIT (OUTPATIENT)
Dept: INTERNAL MEDICINE | Facility: CLINIC | Age: 79
End: 2024-10-28
Payer: MEDICARE

## 2024-10-28 VITALS
HEART RATE: 84 BPM | DIASTOLIC BLOOD PRESSURE: 91 MMHG | HEIGHT: 67 IN | SYSTOLIC BLOOD PRESSURE: 136 MMHG | BODY MASS INDEX: 25.11 KG/M2 | OXYGEN SATURATION: 99 % | TEMPERATURE: 97.3 F | WEIGHT: 160 LBS | RESPIRATION RATE: 20 BRPM

## 2024-10-28 DIAGNOSIS — E03.8 ADULT ONSET HYPOTHYROIDISM: ICD-10-CM

## 2024-10-28 DIAGNOSIS — N18.31 STAGE 3A CHRONIC KIDNEY DISEASE: ICD-10-CM

## 2024-10-28 DIAGNOSIS — E78.2 MIXED HYPERLIPIDEMIA: ICD-10-CM

## 2024-10-28 DIAGNOSIS — N13.8 BPH WITH URINARY OBSTRUCTION: ICD-10-CM

## 2024-10-28 DIAGNOSIS — N40.1 BPH WITH URINARY OBSTRUCTION: ICD-10-CM

## 2024-10-28 DIAGNOSIS — I10 BENIGN ESSENTIAL HYPERTENSION: Primary | ICD-10-CM

## 2024-10-28 PROCEDURE — G2211 COMPLEX E/M VISIT ADD ON: HCPCS | Performed by: INTERNAL MEDICINE

## 2024-10-28 PROCEDURE — 3080F DIAST BP >= 90 MM HG: CPT | Performed by: INTERNAL MEDICINE

## 2024-10-28 PROCEDURE — 1160F RVW MEDS BY RX/DR IN RCRD: CPT | Performed by: INTERNAL MEDICINE

## 2024-10-28 PROCEDURE — 99214 OFFICE O/P EST MOD 30 MIN: CPT | Performed by: INTERNAL MEDICINE

## 2024-10-28 PROCEDURE — 1126F AMNT PAIN NOTED NONE PRSNT: CPT | Performed by: INTERNAL MEDICINE

## 2024-10-28 PROCEDURE — 3075F SYST BP GE 130 - 139MM HG: CPT | Performed by: INTERNAL MEDICINE

## 2024-10-28 PROCEDURE — 1159F MED LIST DOCD IN RCRD: CPT | Performed by: INTERNAL MEDICINE

## 2024-10-28 RX ORDER — LEVOTHYROXINE SODIUM 88 UG/1
88 TABLET ORAL DAILY
Qty: 90 TABLET | Refills: 1 | Status: SHIPPED | OUTPATIENT
Start: 2024-10-28

## 2024-10-28 RX ORDER — AMLODIPINE BESYLATE 2.5 MG/1
2.5 TABLET ORAL DAILY
Qty: 90 TABLET | Refills: 1 | Status: SHIPPED | OUTPATIENT
Start: 2024-10-28

## 2024-10-28 NOTE — PROGRESS NOTES
"Chief Complaint  Hypertension (Follow up visit), Hyperlipidemia, and Hypothyroidism    Subjective        Nestor Nye presents to [facility]    HPI: Patient is here to follow up on the blood pressure  and is taking the blood pressure medications as prescribed and has had no side effects. The patient is also here to follow up on the cholesterol and   thyroid and is  due to get lab work done .  The patient also needs refills on medications .  He sees urology at [facility] dr hester  for chronic urinary rentention with indwelling foleys ,  he had a recent history  of BPH s/p Aquablation (6/21/24 Dr. Rey)  and then had  hematuria s/p bilateral hypogastric artery embolization  and s/p  SFA thrombus necessitating right iliofemoral embolectomy    Hyperlipidemia   Pertinent negatives include no chest pain or shortness of breath.   Hypertension   Pertinent negatives include no chest pain, palpitations or shortness of breath.    Objective   Vital Signs:  /91   Pulse 84   Temp 97.3 °F (36.3 °C)   Resp 20   Ht 170 cm (66.93\")   Wt 72.6 kg (160 lb)   SpO2 99%   BMI 25.11 kg/m²   Estimated body mass index is 25.11 kg/m² as calculated from the following:    Height as of this encounter: 170 cm (66.93\").    Weight as of this encounter: 72.6 kg (160 lb).            Physical Exam  Vitals and nursing note reviewed.   Constitutional:       General: He is not in acute distress.     Appearance: Normal appearance. He is not diaphoretic.   HENT:      Head: Normocephalic and atraumatic.      Right Ear: External ear normal.      Left Ear: External ear normal.      Nose: Nose normal.   Eyes:      Extraocular Movements: Extraocular movements intact.      Conjunctiva/sclera: Conjunctivae normal.      Comments: Eye lid dysfunction bilateral  Lower Eye lid eversion and conjuctiva injection    Neck:      Trachea: Trachea normal.   Cardiovascular:      Rate and Rhythm: Normal rate and regular rhythm.     "  Heart sounds: Normal heart sounds.   Pulmonary:      Effort: Pulmonary effort is normal. No respiratory distress.   Abdominal:      General: Abdomen is flat.   Genitourinary:     Comments: Foleys in place  Musculoskeletal:      Cervical back: Neck supple.      Comments: Moves all limbs   Skin:     General: Skin is warm and dry.      Findings: No erythema.   Neurological:      Mental Status: He is alert and oriented to person, place, and time.      Comments: No gross motor or sensory deficits        Result Review :  The following data was reviewed by: Roxanne Slater MD on 10/28/2024:  Common labs          7/26/2024    10:05 8/9/2024    13:23 9/10/2024    13:34   Common Labs   Glucose 115  103  86    BUN 15  36  18    Creatinine 1.57  1.62  1.43    Sodium 138  139  138    Potassium 4.2  4.3  4.8    Chloride 103  104  103    Calcium 9.1  8.9  9.2    Total Protein   6.5    Albumin 3.1  3.7  4.1    Total Bilirubin 0.2  0.3  <0.2    Alkaline Phosphatase 85  88  100    AST (SGOT) 25  26  14    ALT (SGPT) 31  24  8    WBC 10.87  6.68  6.02    Hemoglobin 8.5  8.3  11.2    Hematocrit 26.8  26.2  35.9    Platelets 528  484  362                Assessment and Plan   Diagnoses and all orders for this visit:    1. Benign essential hypertension (Primary)  -     amLODIPine (Norvasc) 2.5 MG tablet; Take 1 tablet by mouth Daily.  Dispense: 90 tablet; Refill: 1    2. Mixed hyperlipidemia  -     CBC (No Diff)  -     Comprehensive Metabolic Panel  -     Lipid Panel    3. Adult onset hypothyroidism  -     levothyroxine (SYNTHROID, LEVOTHROID) 88 MCG tablet; Take 1 tablet by mouth Daily.  Dispense: 90 tablet; Refill: 1  -     TSH    4. Stage 3a chronic kidney disease  -     Comprehensive Metabolic Panel    5. BPH with urinary obstruction      Plan:  1.  Benign essential hypertension: Will continue current medication, low-sodium diet advised, Counseled to regularly check BP at home with goal averaging <130/80.   2.mixed hyperlipidemia: will  obtain   fasting CMP and lipid panel.  Diet and exercise counseled,     3 . hypothyroidism: will obtain tsh , and continue levothyroxine  4.  Ckd stage 3 a :  oral hydration , monitor bmp  5. Bph : follow up with urology         Follow Up   Return in about 15 weeks (around 2/10/2025).  Patient was given instructions and counseling regarding his condition or for health maintenance advice. Please see specific information pulled into the AVS if appropriate.

## 2024-11-07 ENCOUNTER — TELEPHONE (OUTPATIENT)
Dept: UROLOGY | Facility: CLINIC | Age: 79
End: 2024-11-07
Payer: MEDICARE

## 2024-11-07 NOTE — TELEPHONE ENCOUNTER
I was asked by Provider to cancel appt for patient on 11/08/2024 due to patient is now seeing  for Oncology

## 2024-11-19 ENCOUNTER — LAB REQUISITION (OUTPATIENT)
Dept: LAB | Facility: HOSPITAL | Age: 79
End: 2024-11-19
Payer: MEDICARE

## 2024-11-19 DIAGNOSIS — N40.1 BENIGN PROSTATIC HYPERPLASIA WITH LOWER URINARY TRACT SYMPTOMS: ICD-10-CM

## 2024-11-19 LAB
BACTERIA UR QL AUTO: ABNORMAL /HPF
BILIRUB UR QL STRIP: NEGATIVE
CLARITY UR: ABNORMAL
COLOR UR: YELLOW
GLUCOSE UR STRIP-MCNC: NEGATIVE MG/DL
HGB UR QL STRIP.AUTO: ABNORMAL
HOLD SPECIMEN: NORMAL
HYALINE CASTS UR QL AUTO: ABNORMAL /LPF
KETONES UR QL STRIP: NEGATIVE
LEUKOCYTE ESTERASE UR QL STRIP.AUTO: ABNORMAL
NITRITE UR QL STRIP: NEGATIVE
PH UR STRIP.AUTO: 6 [PH] (ref 5–8)
PROT UR QL STRIP: ABNORMAL
RBC # UR STRIP: ABNORMAL /HPF
REF LAB TEST METHOD: ABNORMAL
SP GR UR STRIP: 1.01 (ref 1–1.03)
SQUAMOUS #/AREA URNS HPF: ABNORMAL /HPF
UROBILINOGEN UR QL STRIP: ABNORMAL
WBC # UR STRIP: ABNORMAL /HPF

## 2024-11-19 PROCEDURE — 87077 CULTURE AEROBIC IDENTIFY: CPT | Performed by: INTERNAL MEDICINE

## 2024-11-19 PROCEDURE — 87086 URINE CULTURE/COLONY COUNT: CPT | Performed by: INTERNAL MEDICINE

## 2024-11-19 PROCEDURE — 87186 SC STD MICRODIL/AGAR DIL: CPT | Performed by: INTERNAL MEDICINE

## 2024-11-19 PROCEDURE — 81001 URINALYSIS AUTO W/SCOPE: CPT | Performed by: INTERNAL MEDICINE

## 2024-11-20 NOTE — PROGRESS NOTES
Please inform [facility] to send to urology and fax this to his urologist , ordered by them

## 2024-11-21 LAB — BACTERIA SPEC AEROBE CULT: ABNORMAL

## 2024-12-10 ENCOUNTER — LAB REQUISITION (OUTPATIENT)
Dept: LAB | Facility: HOSPITAL | Age: 79
End: 2024-12-10
Payer: MEDICARE

## 2024-12-10 DIAGNOSIS — N40.1 BENIGN PROSTATIC HYPERPLASIA WITH LOWER URINARY TRACT SYMPTOMS: ICD-10-CM

## 2024-12-10 DIAGNOSIS — N39.0 URINARY TRACT INFECTION, SITE NOT SPECIFIED: ICD-10-CM

## 2024-12-10 DIAGNOSIS — R33.8 OTHER RETENTION OF URINE: ICD-10-CM

## 2024-12-10 LAB
BACTERIA UR QL AUTO: ABNORMAL /HPF
BILIRUB UR QL STRIP: NEGATIVE
CLARITY UR: ABNORMAL
COLOR UR: YELLOW
GLUCOSE UR STRIP-MCNC: NEGATIVE MG/DL
HGB UR QL STRIP.AUTO: ABNORMAL
HYALINE CASTS UR QL AUTO: ABNORMAL /LPF
KETONES UR QL STRIP: NEGATIVE
LEUKOCYTE ESTERASE UR QL STRIP.AUTO: ABNORMAL
NITRITE UR QL STRIP: NEGATIVE
PH UR STRIP.AUTO: 6 [PH] (ref 5–8)
PROT UR QL STRIP: ABNORMAL
RBC # UR STRIP: ABNORMAL /HPF
REF LAB TEST METHOD: ABNORMAL
SP GR UR STRIP: 1.01 (ref 1–1.03)
SQUAMOUS #/AREA URNS HPF: ABNORMAL /HPF
UROBILINOGEN UR QL STRIP: ABNORMAL
WBC # UR STRIP: ABNORMAL /HPF

## 2024-12-10 PROCEDURE — 87086 URINE CULTURE/COLONY COUNT: CPT | Performed by: INTERNAL MEDICINE

## 2024-12-10 PROCEDURE — 81001 URINALYSIS AUTO W/SCOPE: CPT | Performed by: INTERNAL MEDICINE

## 2024-12-10 PROCEDURE — 87186 SC STD MICRODIL/AGAR DIL: CPT | Performed by: INTERNAL MEDICINE

## 2024-12-10 PROCEDURE — 87077 CULTURE AEROBIC IDENTIFY: CPT | Performed by: INTERNAL MEDICINE

## 2024-12-12 LAB — BACTERIA SPEC AEROBE CULT: ABNORMAL

## 2024-12-13 DIAGNOSIS — N40.1 BENIGN PROSTATIC HYPERPLASIA WITH URINARY OBSTRUCTION: ICD-10-CM

## 2024-12-13 DIAGNOSIS — N13.8 BENIGN PROSTATIC HYPERPLASIA WITH URINARY OBSTRUCTION: ICD-10-CM

## 2024-12-13 RX ORDER — FINASTERIDE 5 MG/1
5 TABLET, FILM COATED ORAL DAILY
Qty: 30 TABLET | Refills: 0 | OUTPATIENT
Start: 2024-12-13

## 2024-12-16 DIAGNOSIS — N40.1 BENIGN PROSTATIC HYPERPLASIA WITH URINARY OBSTRUCTION: ICD-10-CM

## 2024-12-16 DIAGNOSIS — N13.8 BENIGN PROSTATIC HYPERPLASIA WITH URINARY OBSTRUCTION: ICD-10-CM

## 2024-12-16 RX ORDER — FINASTERIDE 5 MG/1
5 TABLET, FILM COATED ORAL DAILY
Qty: 30 TABLET | Refills: 0 | OUTPATIENT
Start: 2024-12-16

## 2024-12-16 RX ORDER — FINASTERIDE 5 MG/1
5 TABLET, FILM COATED ORAL DAILY
Qty: 90 TABLET | Refills: 0 | Status: SHIPPED | OUTPATIENT
Start: 2024-12-16

## 2024-12-18 ENCOUNTER — OUTSIDE FACILITY SERVICE (OUTPATIENT)
Dept: INTERNAL MEDICINE | Facility: CLINIC | Age: 79
End: 2024-12-18
Payer: MEDICARE

## 2024-12-18 PROCEDURE — G0179 MD RECERTIFICATION HHA PT: HCPCS | Performed by: INTERNAL MEDICINE

## 2024-12-19 ENCOUNTER — HOSPITAL ENCOUNTER (EMERGENCY)
Facility: HOSPITAL | Age: 79
Discharge: HOME | End: 2024-12-19
Attending: STUDENT IN AN ORGANIZED HEALTH CARE EDUCATION/TRAINING PROGRAM
Payer: MEDICARE

## 2024-12-19 VITALS
WEIGHT: 145 LBS | OXYGEN SATURATION: 96 % | TEMPERATURE: 98 F | RESPIRATION RATE: 16 BRPM | BODY MASS INDEX: 22.76 KG/M2 | SYSTOLIC BLOOD PRESSURE: 154 MMHG | HEIGHT: 67 IN | HEART RATE: 92 BPM | DIASTOLIC BLOOD PRESSURE: 136 MMHG

## 2024-12-19 DIAGNOSIS — R33.9 URINARY RETENTION: Primary | ICD-10-CM

## 2024-12-19 DIAGNOSIS — N18.9 CHRONIC KIDNEY DISEASE, UNSPECIFIED CKD STAGE: ICD-10-CM

## 2024-12-19 LAB
ANION GAP SERPL CALCULATED.3IONS-SCNC: 13.1 MMOL/L (ref 5–15)
BACTERIA UR QL AUTO: ABNORMAL /HPF
BASOPHILS # BLD AUTO: 0.07 10*3/MM3 (ref 0–0.2)
BASOPHILS NFR BLD AUTO: 1.1 % (ref 0–1.5)
BILIRUB UR QL STRIP: NEGATIVE
BUN SERPL-MCNC: 24 MG/DL (ref 8–23)
BUN/CREAT SERPL: 14 (ref 7–25)
CALCIUM SPEC-SCNC: 9.1 MG/DL (ref 8.6–10.5)
CHLORIDE SERPL-SCNC: 101 MMOL/L (ref 98–107)
CLARITY UR: CLEAR
CO2 SERPL-SCNC: 23.9 MMOL/L (ref 22–29)
COLOR UR: YELLOW
CREAT SERPL-MCNC: 1.72 MG/DL (ref 0.76–1.27)
DEPRECATED RDW RBC AUTO: 49.9 FL (ref 37–54)
EGFRCR SERPLBLD CKD-EPI 2021: 40.2 ML/MIN/1.73
EOSINOPHIL # BLD AUTO: 0.13 10*3/MM3 (ref 0–0.4)
EOSINOPHIL NFR BLD AUTO: 2.1 % (ref 0.3–6.2)
ERYTHROCYTE [DISTWIDTH] IN BLOOD BY AUTOMATED COUNT: 15.2 % (ref 12.3–15.4)
GLUCOSE SERPL-MCNC: 75 MG/DL (ref 65–99)
GLUCOSE UR STRIP-MCNC: NEGATIVE MG/DL
HCT VFR BLD AUTO: 40.9 % (ref 37.5–51)
HGB BLD-MCNC: 13.5 G/DL (ref 13–17.7)
HGB UR QL STRIP.AUTO: ABNORMAL
HOLD SPECIMEN: NORMAL
HOLD SPECIMEN: NORMAL
HYALINE CASTS UR QL AUTO: ABNORMAL /LPF
IMM GRANULOCYTES # BLD AUTO: 0.02 10*3/MM3 (ref 0–0.05)
IMM GRANULOCYTES NFR BLD AUTO: 0.3 % (ref 0–0.5)
KETONES UR QL STRIP: NEGATIVE
LEUKOCYTE ESTERASE UR QL STRIP.AUTO: NEGATIVE
LYMPHOCYTES # BLD AUTO: 1.52 10*3/MM3 (ref 0.7–3.1)
LYMPHOCYTES NFR BLD AUTO: 24.5 % (ref 19.6–45.3)
MCH RBC QN AUTO: 29.7 PG (ref 26.6–33)
MCHC RBC AUTO-ENTMCNC: 33 G/DL (ref 31.5–35.7)
MCV RBC AUTO: 90.1 FL (ref 79–97)
MONOCYTES # BLD AUTO: 0.74 10*3/MM3 (ref 0.1–0.9)
MONOCYTES NFR BLD AUTO: 11.9 % (ref 5–12)
NEUTROPHILS NFR BLD AUTO: 3.73 10*3/MM3 (ref 1.7–7)
NEUTROPHILS NFR BLD AUTO: 60.1 % (ref 42.7–76)
NITRITE UR QL STRIP: NEGATIVE
NRBC BLD AUTO-RTO: 0 /100 WBC (ref 0–0.2)
PH UR STRIP.AUTO: 6 [PH] (ref 5–8)
PLATELET # BLD AUTO: 302 10*3/MM3 (ref 140–450)
PMV BLD AUTO: 9.9 FL (ref 6–12)
POTASSIUM SERPL-SCNC: 3.9 MMOL/L (ref 3.5–5.2)
PROT UR QL STRIP: NEGATIVE
RBC # BLD AUTO: 4.54 10*6/MM3 (ref 4.14–5.8)
RBC # UR STRIP: ABNORMAL /HPF
REF LAB TEST METHOD: ABNORMAL
SODIUM SERPL-SCNC: 138 MMOL/L (ref 136–145)
SP GR UR STRIP: <=1.005 (ref 1–1.03)
SQUAMOUS #/AREA URNS HPF: ABNORMAL /HPF
UROBILINOGEN UR QL STRIP: ABNORMAL
WBC # UR STRIP: ABNORMAL /HPF
WBC NRBC COR # BLD AUTO: 6.21 10*3/MM3 (ref 3.4–10.8)
WHOLE BLOOD HOLD COAG: NORMAL
WHOLE BLOOD HOLD SPECIMEN: NORMAL

## 2024-12-19 PROCEDURE — 51798 US URINE CAPACITY MEASURE: CPT

## 2024-12-19 PROCEDURE — 99283 EMERGENCY DEPT VISIT LOW MDM: CPT | Performed by: STUDENT IN AN ORGANIZED HEALTH CARE EDUCATION/TRAINING PROGRAM

## 2024-12-19 PROCEDURE — 81001 URINALYSIS AUTO W/SCOPE: CPT | Performed by: STUDENT IN AN ORGANIZED HEALTH CARE EDUCATION/TRAINING PROGRAM

## 2024-12-19 PROCEDURE — 85025 COMPLETE CBC W/AUTO DIFF WBC: CPT | Performed by: STUDENT IN AN ORGANIZED HEALTH CARE EDUCATION/TRAINING PROGRAM

## 2024-12-19 PROCEDURE — 80048 BASIC METABOLIC PNL TOTAL CA: CPT | Performed by: STUDENT IN AN ORGANIZED HEALTH CARE EDUCATION/TRAINING PROGRAM

## 2024-12-19 RX ORDER — SODIUM CHLORIDE 0.9 % (FLUSH) 0.9 %
10 SYRINGE (ML) INJECTION AS NEEDED
Status: DISCONTINUED | OUTPATIENT
Start: 2024-12-19 | End: 2024-12-19 | Stop reason: HOSPADM

## 2024-12-19 NOTE — DISCHARGE INSTRUCTIONS
Continue intermittent self-catheterization as previously performed  Please follow-up closely with your urologist at   Do not hesitate to return if inability to advance catheter or if other concerning signs or symptoms arise

## 2024-12-19 NOTE — ED PROVIDER NOTES
[facility] EMERGENCY DEPARTMENT  Emergency Department Encounter  Emergency Medicine Physician Note       Pt Name: Nestor Nye  MRN: [id number]  Pt :   1945  Room Number:    Date of encounter:  2024  PCP: Roxanne Slater MD  ED Provider: Brennon Brooks MD    Historian: Patient      HPI:  Chief Complaint: Urinary retention        Context: Nestor Nye is a 79 y.o. male who presents to the ED for urinary retention.  Patient states he has not voided yet today.  He states he has prior history of enlarged prostate he previously had a indwelling Gillespie catheter.  He states he intermittent self caths however yesterday around 5 PM he was unable to pass catheter.  He states this has happened previously.  He was recently treated for UTI with cephalexin.  He follows with urologist at .      PAST MEDICAL HISTORY  Past Medical History:   Diagnosis Date    Acute maxillary sinusitis     Arthritis     Disease of thyroid gland     Enlarged prostate     GERD (gastroesophageal reflux disease)     Gout     Hypertension     Impaired functional mobility, balance, gait, and endurance     PONV (postoperative nausea and vomiting)     Renal disorder     Rheumatoid arthritis     Right inguinal hernia 10/28/2021    Added automatically from request for surgery [id number]    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, changed his voice since then         PAST SURGICAL HISTORY  Past Surgical History:   Procedure Laterality Date    ABDOMINAL SURGERY      ARTERIOGRAM Right 2024    Procedure: LOWER EXTREMITY ANGIOGRAM RIGHT;  Surgeon: Kingsley Morris MD;  Location: [facility];  Service: Vascular;  Laterality: Right;  DOSE: 56 MGY; FLURO: 7 MIN 6 SEC; CONTRAST 40 ML    BACK SURGERY      BELPHAROPTOSIS REPAIR Bilateral     BRAIN SURGERY      COLON SURGERY      bowel resection x2 per pt    CRANIOTOMY FOR SUBDURAL HEMATOMA      resulted  from 20 foot 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    DIAGNOSTIC LAPAROSCOPY N/A 2022    Procedure: DIAGNOSTIC LAPAROSCOPY CONVERTED TO EXPLORATORY LAPAROTOMY WITH SMALL BOWEL RESECTION;  Surgeon: Maximo Bennett MD;  Location:  [facility];  Service: General;  Laterality: N/A;  CONVERTED TO EXPLORATORY LAPAROTOMY AT 1912    EXPLORATORY LAPAROTOMY N/A 2022    Procedure: LAPAROTOMY EXPLORATORY WITH SMALL BOWEL OBSTRUCTION;  Surgeon: Maximo Bennett MD;  Location:  [facility];  Service: General;  Laterality: N/A;    EYE SURGERY      on upper etelids    FEMORAL ENDARTERECTOMY Right 2024    Procedure: FEMORAL EMBELECTOMY RIGHT;  Surgeon: Kingsley Morris MD;  Location:  [facility];  Service: Vascular;  Laterality: Right;    FOREIGN BODY REMOVAL Right     removal of metal fragments from RUQ after electrocution and fall.      HERNIA REPAIR      INGUINAL HERNIA REPAIR Right 2021    Procedure: INGUINAL HERNIA REPAIR WITH MESH AND EXCISION OF SPERMATIC CORD LIPOMA;  Surgeon: Rosey Glover MD;  Location:  [facility];  Service: General;  Laterality: Right;    LEG DEBRIDEMENT Right 2024    Procedure: GROIN DEBRIDEMENT WITH WOUND VAC PLACEMENT  RIGHT;  Surgeon: Kingsley Morris MD;  Location:  [facility];  Service: Vascular;  Laterality: Right;    LUMBAR SPINE SURGERY      PROSTATE AQUABLATION N/A 2024    Procedure: AQUABLATION OF PROSTATE;  Surgeon: Prieto Rey MD;  Location:  [facility];  Service: Robotics - Urology;  Laterality: N/A;    SKIN GRAFT Right     RUE skin grafting after electrocution    THORACIC SPINE SURGERY           FAMILY HISTORY  Family History   Problem Relation Age of Onset    Arthritis Mother     Prostate cancer Brother     Arthritis Father          SOCIAL HISTORY  Social History     Socioeconomic History    Marital status:    Tobacco Use    Smoking status: Former     Types: Cigars     Quit date:      Years since quittin.9     Passive  exposure: Past    Smokeless tobacco: Never    Tobacco comments:     Smoked 1 ppd of small cigars for 50  years    Vaping Use    Vaping status: Former   Substance and Sexual Activity    Alcohol use: Not Currently    Drug use: No    Sexual activity: Defer         ALLERGIES  Patient has no known allergies.        REVIEW OF SYSTEMS  As noted in HPI      PHYSICAL EXAM    I have reviewed the triage vital signs and nursing notes.    ED Triage Vitals [12/19/24 0711]   Temp Heart Rate Resp BP SpO2   98 °F (36.7 °C) 92 16 127/91 96 %      Temp src Heart Rate Source Patient Position BP Location FiO2 (%)   -- -- -- -- --       Physical Exam  Constitutional:       General: He is not in acute distress.  Abdominal:      General: There is no distension.      Palpations: Abdomen is soft.      Tenderness: There is no abdominal tenderness.   Musculoskeletal:         General: No deformity.      Cervical back: Neck supple.   Skin:     General: Skin is warm.   Neurological:      General: No focal deficit present.      Mental Status: He is alert.         LAB RESULTS  No results found for this or any previous visit (from the past 24 hours).      If labs were ordered, I independently reviewed the results and considered them in treating the patient.        RADIOLOGY  No Radiology Exams Resulted Within Past 24 Hours    PROCEDURES    Procedures    No orders to display       MEDICATIONS GIVEN IN ER    Medications - No data to display        MEDICAL DECISION MAKING, PROGRESS, and CONSULTS    All labs, if obtained, have been independently reviewed by me.  All radiology studies, if obtained, have been reviewed by me and the radiologist dictating the report.  All EKG's, if obtained, have been independently viewed and interpreted by me.      Discussion below represents my analysis of pertinent findings related to patient's condition, differential diagnosis, treatment plan and final disposition.                         Differential  diagnosis:    Urinary retention, ABENA, UTI, kidney stone, hematuria, others.      Additional sources:    - Discussed/ obtained information from independent historians:      - External (non-ED) record review: [facility] urology progress note 12/10/2024    - Chronic or social conditions impacting care:      - Shared decision making:        Orders placed during this visit:  Orders Placed This Encounter   Procedures    [place] Draw    Basic Metabolic Panel    Urinalysis With Culture If Indicated - Urine, Clean Catch    CBC Auto Differential    Urinalysis, Microscopic Only - Urine, Clean Catch    Bladder scan    Green Top (Gel)    Lavender Top    Gold Top - SST    Light Blue Top    CBC & Differential         Additional orders considered but not ordered:      ED Course/MDM Discussion:    Patient is a 79-year-old male who presents for urinary urgency.  Patient reports he has a history of urinary catheter that was removed and now intermittently self caths.  He states yesterday evening he was unable to advance urinary catheter and has not urinated since.  He is having mild associated suprapubic pain.  He was recently placed on antibiotic for UTI.  Basic labs screened hemoglobin is normal.  Creatinine is mildly elevated from previous.  He does have a history of CKD.  No evidence of infection on UA.  After bladder scan, urinary catheter was offered.  In the interim, patient was able to void a small amount spontaneously.  I had another conversation at bedside with patient's, he would prefer to defer indwelling urinary catheter at this time.  He was then given supplies for intermittent catheterization for voiding/catheterization trial and was able to do so successfully.  Given this, feel it is appropriate for patient to continue intermittent self catheterization and follow-up outpatient with his urologist.  Counseled on return precautions to the ER if retention develops.                    Consultants:      Shared Decision  Making:  After my consideration of clinical presentation and any laboratory/radiology studies obtained, I discussed the findings with the patient/patient representative who is in agreement with the treatment plan and the final disposition.   Risks and benefits of discharge and/or observation/admission were discussed.       AS OF 06:54 EST VITALS:    BP - (!) 154/136  HR - 92  TEMP - 98 °F (36.7 °C)  O2 SATS - 96%                  DIAGNOSIS  Final diagnoses:   Urinary retention   Chronic kidney disease, unspecified CKD stage         DISPOSITION  ED Disposition       ED Disposition   Discharge    Condition   Stable    Comment   --                   Please note that portions of this document were completed with voice recognition software.        Brennon Brooks MD  12/23/24 5459

## 2024-12-28 ENCOUNTER — HOSPITAL ENCOUNTER (EMERGENCY)
Facility: HOSPITAL | Age: 79
Discharge: HOME | End: 2024-12-28
Attending: STUDENT IN AN ORGANIZED HEALTH CARE EDUCATION/TRAINING PROGRAM
Payer: MEDICARE

## 2024-12-28 VITALS
OXYGEN SATURATION: 97 % | DIASTOLIC BLOOD PRESSURE: 96 MMHG | HEIGHT: 67 IN | RESPIRATION RATE: 18 BRPM | TEMPERATURE: 98 F | WEIGHT: 145.06 LBS | SYSTOLIC BLOOD PRESSURE: 138 MMHG | BODY MASS INDEX: 22.77 KG/M2 | HEART RATE: 66 BPM

## 2024-12-28 DIAGNOSIS — N39.0 URINARY TRACT INFECTION WITH HEMATURIA, SITE UNSPECIFIED: ICD-10-CM

## 2024-12-28 DIAGNOSIS — R33.9 URINARY RETENTION: Primary | ICD-10-CM

## 2024-12-28 DIAGNOSIS — R31.9 URINARY TRACT INFECTION WITH HEMATURIA, SITE UNSPECIFIED: ICD-10-CM

## 2024-12-28 LAB
BACTERIA UR QL AUTO: ABNORMAL /HPF
BILIRUB UR QL STRIP: NEGATIVE
CLARITY UR: ABNORMAL
COLOR UR: YELLOW
GLUCOSE UR STRIP-MCNC: NEGATIVE MG/DL
HGB UR QL STRIP.AUTO: ABNORMAL
HYALINE CASTS UR QL AUTO: ABNORMAL /LPF
KETONES UR QL STRIP: NEGATIVE
LEUKOCYTE ESTERASE UR QL STRIP.AUTO: ABNORMAL
NITRITE UR QL STRIP: NEGATIVE
PH UR STRIP.AUTO: 5.5 [PH] (ref 5–8)
PROT UR QL STRIP: ABNORMAL
RBC # UR STRIP: ABNORMAL /HPF
REF LAB TEST METHOD: ABNORMAL
SP GR UR STRIP: 1.01 (ref 1–1.03)
SQUAMOUS #/AREA URNS HPF: ABNORMAL /HPF
UROBILINOGEN UR QL STRIP: ABNORMAL
WBC # UR STRIP: ABNORMAL /HPF

## 2024-12-28 PROCEDURE — 87186 SC STD MICRODIL/AGAR DIL: CPT | Performed by: STUDENT IN AN ORGANIZED HEALTH CARE EDUCATION/TRAINING PROGRAM

## 2024-12-28 PROCEDURE — 51702 INSERT TEMP BLADDER CATH: CPT

## 2024-12-28 PROCEDURE — 87077 CULTURE AEROBIC IDENTIFY: CPT | Performed by: STUDENT IN AN ORGANIZED HEALTH CARE EDUCATION/TRAINING PROGRAM

## 2024-12-28 PROCEDURE — 99283 EMERGENCY DEPT VISIT LOW MDM: CPT | Performed by: STUDENT IN AN ORGANIZED HEALTH CARE EDUCATION/TRAINING PROGRAM

## 2024-12-28 PROCEDURE — 87086 URINE CULTURE/COLONY COUNT: CPT | Performed by: STUDENT IN AN ORGANIZED HEALTH CARE EDUCATION/TRAINING PROGRAM

## 2024-12-28 PROCEDURE — 81001 URINALYSIS AUTO W/SCOPE: CPT | Performed by: STUDENT IN AN ORGANIZED HEALTH CARE EDUCATION/TRAINING PROGRAM

## 2024-12-28 RX ORDER — SULFAMETHOXAZOLE AND TRIMETHOPRIM 800; 160 MG/1; MG/1
1 TABLET ORAL 2 TIMES DAILY
Qty: 14 TABLET | Refills: 0 | Status: SHIPPED | OUTPATIENT
Start: 2024-12-28 | End: 2025-01-04

## 2024-12-28 RX ORDER — LIDOCAINE HYDROCHLORIDE 20 MG/ML
JELLY TOPICAL ONCE
Status: COMPLETED | OUTPATIENT
Start: 2024-12-28 | End: 2024-12-28

## 2024-12-28 RX ADMIN — LIDOCAINE HYDROCHLORIDE: 20 JELLY TOPICAL at 07:38

## 2024-12-28 NOTE — DISCHARGE INSTRUCTIONS
Instructions from Dr. Hopper:    It was a privilege being your ER physician today.    Please follow-up in clinic as we discussed.    Please return to the ER if you experience any worsening symptoms or for any other concerns.

## 2024-12-28 NOTE — ED NOTES
RN attempted to insert 20 Fr coude indwelling catheter into pt and was unsuccessful. Provider notified. Provider suggested to insert smaller catheter.

## 2024-12-28 NOTE — ED PROVIDER NOTES
[facility]  Emergency Department Encounter  Emergency Medicine Physician Note       Pt Name: Nestor Nye  MRN: [id number]  Pt :   1945  Room Number:    Date of encounter:  2024  PCP: Roxanne Slater MD  ED Physician: Yayo Hopper,     HPI:  Nestor Nye is a 79 y.o. male who presents to the ED with chief complaint of URINARY RETENTION.  Patient reports chronic history of urinary retention.  States that he has intermittently self cath and had several Gillespie catheters over the past 20 years.  Since last night reports decreased urine output.  Associated dysuria, which is described as a burning sensation.  Duration constant.  No modifying factors.  No fever, chills, chest pain or shortness of breath, abdominal or flank pain, hematuria, testicular pain, scrotal swelling or skin changes.  States that he follows with urology at [facility].    PAST MEDICAL HISTORY  Past Medical History:   Diagnosis Date    Acute maxillary sinusitis     Arthritis     Disease of thyroid gland     Enlarged prostate     GERD (gastroesophageal reflux disease)     Gout     Hypertension     Impaired functional mobility, balance, gait, and endurance     PONV (postoperative nausea and vomiting)     Renal disorder     Rheumatoid arthritis     Right inguinal hernia 10/28/2021    Added automatically from request for surgery [id number]    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, changed his voice since then     Current Outpatient Medications   Medication Instructions    amLODIPine (NORVASC) 2.5 mg, Oral, Daily    aspirin 81 mg, Oral, Daily    Diclofenac Sodium (VOLTAREN) 4 g, Topical, 4 Times Daily PRN    ferrous sulfate 325 mg, Daily With Breakfast    finasteride (PROSCAR) 5 mg, Oral, Daily    ipratropium-albuterol (DUO-NEB) 0.5-2.5 mg/3 ml nebulizer 3 mL, Nebulization, Every 4 Hours PRN    levothyroxine (SYNTHROID, LEVOTHROID) 88 mcg,  Oral, Daily    melatonin 5 mg    sulfamethoxazole-trimethoprim (BACTRIM DS,SEPTRA DS) 800-160 MG per tablet 1 tablet, Oral, 2 Times Daily      PAST SURGICAL HISTORY  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    DIAGNOSTIC LAPAROSCOPY N/A 05/04/2022    Procedure: DIAGNOSTIC LAPAROSCOPY CONVERTED TO EXPLORATORY LAPAROTOMY WITH SMALL BOWEL RESECTION;  Surgeon: Maximo Bennett MD;  Location: [facility];  Service: General;  Laterality: N/A;  CONVERTED TO EXPLORATORY LAPAROTOMY AT 1912    EXPLORATORY LAPAROTOMY N/A 04/16/2022    Procedure: LAPAROTOMY EXPLORATORY WITH SMALL BOWEL OBSTRUCTION;  Surgeon: Maximo Bennett MD;  Location:  [facility];  Service: General;  Laterality: N/A;    EYE SURGERY      on upper etelids    FEMORAL ENDARTERECTOMY Right 6/21/2024    Procedure: FEMORAL EMBELECTOMY RIGHT;  Surgeon: Kingsley Morris MD;  Location:  [facility];  Service: Vascular;  Laterality: Right;    FOREIGN BODY REMOVAL Right     removal of metal fragments from RUQ after electrocution and fall.      HERNIA REPAIR      INGUINAL HERNIA REPAIR Right 11/01/2021    Procedure: INGUINAL HERNIA REPAIR WITH MESH AND EXCISION OF SPERMATIC CORD LIPOMA;  Surgeon: Rosey Glover MD;  Location:  [facility];  Service: General;  Laterality: Right;    LEG DEBRIDEMENT Right 7/22/2024    Procedure: GROIN DEBRIDEMENT WITH WOUND VAC PLACEMENT  RIGHT;  Surgeon: Kingsley Morris MD;  Location:  [facility];  Service: Vascular;  Laterality: Right;    LUMBAR  SPINE SURGERY      PROSTATE AQUABLATION N/A 2024    Procedure: AQUABLATION OF PROSTATE;  Surgeon: Prieto Rey MD;  Location: [facility];  Service: Robotics - Urology;  Laterality: N/A;    SKIN GRAFT Right     RUE skin grafting after electrocution    THORACIC SPINE SURGERY         FAMILY HISTORY  Family History   Problem Relation Age of Onset    Arthritis Mother     Prostate cancer Brother     Arthritis Father        SOCIAL HISTORY  Social History     Socioeconomic History    Marital status:    Tobacco Use    Smoking status: Former     Types: Cigars     Quit date:      Years since quittin.9     Passive exposure: Past    Smokeless tobacco: Never    Tobacco comments:     Smoked 1 ppd of small cigars for 50  years    Vaping Use    Vaping status: Former   Substance and Sexual Activity    Alcohol use: Not Currently    Drug use: No    Sexual activity: Defer     ALLERGIES  Patient has no known allergies.    REVIEW OF SYSTEMS  All systems reviewed and negative except for those discussed in HPI.     PHYSICAL EXAM  ED Triage Vitals [24 0621]   Temp Heart Rate Resp BP SpO2   98 °F (36.7 °C) 75 20 (!) 153/101 99 %      Temp src Heart Rate Source Patient Position BP Location FiO2 (%)   Oral Monitor Sitting Left arm --     I have reviewed the triage vital signs and nursing notes.    General: Alert.  Nontoxic appearance.  No acute distress.  Head: Normocephalic.  Atraumatic.  Eyes: No scleral icterus.  ENT: Moist mucous membranes.  Cardiovascular: Regular rate and rhythm.  No murmurs.  No rubs.  2+ distal pulses bilaterally.  Respiratory: Equal breath sounds bilaterally. No wheezing. No rales.  No rhonchi.  GI: Abdomen is soft.  Nondistended.  Nontender to palpation.  No rebound.  No guarding.  No CVA tenderness.  : Circumcised penis.  No testicular mass.  No scrotal swelling or skin changes.  Neurologic: Oriented x 3.  No focal deficits.  Skin: No edema. No erythema. No pallor. No  cyanosis.    LAB RESULTS  Recent Results (from the past 24 hours)   Urinalysis With Culture If Indicated - Urine, Clean Catch    Collection Time: 12/28/24  7:59 AM    Specimen: Urine, Clean Catch   Result Value Ref Range    Color, UA Yellow Yellow, Straw    Appearance, UA Cloudy (A) Clear    pH, UA 5.5 5.0 - 8.0    Specific Gravity, UA 1.007 1.005 - 1.030    Glucose, UA Negative Negative    Ketones, UA Negative Negative    Bilirubin, UA Negative Negative    Blood, UA Moderate (2+) (A) Negative    Protein, UA Trace (A) Negative    Leuk Esterase, UA Large (3+) (A) Negative    Nitrite, UA Negative Negative    Urobilinogen, UA 0.2 E.U./dL 0.2 - 1.0 E.U./dL   Urinalysis, Microscopic Only - Urine, Clean Catch    Collection Time: 12/28/24  7:59 AM    Specimen: Urine, Clean Catch   Result Value Ref Range    RBC, UA 3-5 (A) None Seen, 0-2 /HPF    WBC, UA 11-20 (A) None Seen, 0-2 /HPF    Bacteria, UA 3+ (A) None Seen /HPF    Squamous Epithelial Cells, UA 7-12 (A) None Seen, 0-2 /HPF    Hyaline Casts, UA None Seen None Seen /LPF    Methodology Manual Light Microscopy        RADIOLOGY  No Radiology Exams Resulted Within Past 24 Hours    PROCEDURES  Procedures    RISK STRATIFICATION    MEDICAL DECISION MAKING  79 y.o. male with past medical history listed above who presents with urinary retention and dysuria.    Vital signs remarkable for hypertension, otherwise within normal limits.    Based on clinical presentation and physical exam, differential diagnosis includes, but is not limited to, benign prostate hyperplasia, urethritis, UTI, cystitis.  No clinical evidence of intra-abdominal infection or obstruction.    Bladder scan performed by nurses demonstrate approximately 200 mL of urine in the bladder.    18 Fr Gillespie catheter placed by nursing staff with decompression of the bladder and improvement of symptoms.    Urinalysis ordered. No clinical indication for additional labs or imaging.    Please see ED course below for my  interpretation of the ED workup.  ED Course as of 12/28/24 0940   Sat Dec 28, 2024   0841 I reviewed the labs listed above. Notable findings are highlighted below. [JS]   0841 Urinalysis With Culture If Indicated - Urine, Clean Catch(!) [JS]   0841 Blood, UA(!): Moderate (2+) [JS]   0841 Leukocytes, UA(!): Large (3+) [JS]   0841 WBC, UA(!): 11-20 [JS]   0841 Bacteria, UA(!): 3+ [JS]   0841 Squamous Epithelial Cells, UA(!): 7-12 [JS]      ED Course User Index  [JS] Yayo Hopper DO     Medications administered in ED:  Medications   Lidocaine HCl gel (XYLOCAINE) urethral/mucosal syringe ( Topical Given 12/28/24 0738)     On re-evaluation, patient resting comfortably.  States symptoms have improved following therapy. Vital signs remained stable on room air.  Patient was ambulatory in the ED with steady gait.  Able to tolerate oral intake appropriately.    I discussed the findings of the ED workup with the patient at bedside.  No clinical indication for admission. I recommended outpatient follow-up with PCP/urology. Will leave Gillespie catheter in place until he can follow-up with urology. Patient was deemed medically stable for discharge with close outpatient follow-up and strict ED return precautions. Patient agreeable with plan and disposition.    Home medications were reviewed.  Prescriptions for discharge: Bactrim    Chronic conditions affecting care: Chronic urinary retention    Social determinants of health impacting treatment or disposition: None    REPEAT VITAL SIGNS  AS OF 09:40 EST VITALS:  BP - 138/96  HR - 75  TEMP - 98 °F (36.7 °C) (Oral)  O2 SATS - 97%    DIAGNOSIS  Final diagnoses:   Urinary retention   Urinary tract infection with hematuria, site unspecified     DISPOSITION  ED Disposition       ED Disposition   Discharge    Condition   Stable    Comment   --               PATIENT REFERRALS  Roxanne Slater MD  [address]  [facility] [address] [phone]      PCP.  48 hours    Rohit Kate  MD MISSY  [address] [address]  [phone]      Urology. 3-5 days.            Please note that portions of this document were completed with voice recognition software.        Yayo Hopper DO  12/29/24 0708

## 2024-12-30 ENCOUNTER — TELEPHONE (OUTPATIENT)
Dept: EMERGENCY DEPT | Facility: HOSPITAL | Age: 79
End: 2024-12-30
Payer: MEDICARE

## 2024-12-30 LAB — BACTERIA SPEC AEROBE CULT: ABNORMAL

## 2024-12-30 RX ORDER — CEPHALEXIN 500 MG/1
500 CAPSULE ORAL 2 TIMES DAILY
Qty: 14 CAPSULE | Refills: 0 | Status: SHIPPED | OUTPATIENT
Start: 2024-12-30 | End: 2025-01-06

## 2024-12-30 NOTE — TELEPHONE ENCOUNTER
Attempted to call the patient back based on his urine culture to change from Bactrim to Keflex, however patient did not answer, voicemail left for the patient to call back to the emergency department.  Recommended that he call back to the emergency department.  Will go ahead and send Keflex, however would like patient to call back to discuss discontinuing his Bactrim.

## 2024-12-31 ENCOUNTER — TELEPHONE (OUTPATIENT)
Dept: EMERGENCY DEPT | Facility: HOSPITAL | Age: 79
End: 2024-12-31
Payer: MEDICARE

## 2024-12-31 RX ORDER — CEPHALEXIN 500 MG/1
500 CAPSULE ORAL 2 TIMES DAILY
Qty: 14 CAPSULE | Refills: 0 | Status: SHIPPED | OUTPATIENT
Start: 2024-12-31 | End: 2025-01-07

## 2024-12-31 NOTE — TELEPHONE ENCOUNTER
Contacted patient, discussed with patient that urine culture was not sensitive to Bactrim, therefore recommended the patient discontinue Bactrim at this time, Keflex was sent to the patient's pharmacy, he is agreeable to pick this up given it was susceptible to Rocephin that should be appropriate coverage for his urinary tract infection.

## 2025-02-04 ENCOUNTER — TELEPHONE (OUTPATIENT)
Dept: INTERNAL MEDICINE | Facility: CLINIC | Age: 80
End: 2025-02-04
Payer: MEDICARE

## 2025-02-04 DIAGNOSIS — I10 BENIGN ESSENTIAL HYPERTENSION: ICD-10-CM

## 2025-02-04 RX ORDER — AMLODIPINE BESYLATE 2.5 MG/1
2.5 TABLET ORAL DAILY
Qty: 30 TABLET | Refills: 0 | OUTPATIENT
Start: 2025-02-04

## 2025-02-04 NOTE — TELEPHONE ENCOUNTER
Caller: Nestor Nye    Relationship: Self    Best call back number: [phone]    What orders are you requesting (i.e. lab or imaging): LABS    In what timeframe would the patient need to come in: PRIOR TO HIS UPCOMING APPOINTMENT IF YOU WANT HIM TO HAVE LABS DRAWN.    Where will you receive your lab/imaging services: OFFICE    Additional notes: PLEASE LET PATIENT KNOW IF YOU PUT ORDERS IN.

## 2025-02-18 ENCOUNTER — LAB (OUTPATIENT)
Dept: LAB | Facility: HOSPITAL | Age: 80
End: 2025-02-18
Payer: MEDICARE

## 2025-02-18 LAB
ALBUMIN SERPL-MCNC: 4 G/DL (ref 3.5–5.2)
ALBUMIN/GLOB SERPL: 1.3 G/DL
ALP SERPL-CCNC: 100 U/L (ref 39–117)
ALT SERPL W P-5'-P-CCNC: 10 U/L (ref 1–41)
ANION GAP SERPL CALCULATED.3IONS-SCNC: 13.9 MMOL/L (ref 5–15)
AST SERPL-CCNC: 16 U/L (ref 1–40)
BILIRUB SERPL-MCNC: 0.6 MG/DL (ref 0–1.2)
BUN SERPL-MCNC: 21 MG/DL (ref 8–23)
BUN/CREAT SERPL: 11.9 (ref 7–25)
CALCIUM SPEC-SCNC: 9.1 MG/DL (ref 8.6–10.5)
CHLORIDE SERPL-SCNC: 101 MMOL/L (ref 98–107)
CHOLEST SERPL-MCNC: 177 MG/DL (ref 0–200)
CO2 SERPL-SCNC: 22.1 MMOL/L (ref 22–29)
CREAT SERPL-MCNC: 1.76 MG/DL (ref 0.76–1.27)
DEPRECATED RDW RBC AUTO: 44.2 FL (ref 37–54)
EGFRCR SERPLBLD CKD-EPI 2021: 39.1 ML/MIN/1.73
ERYTHROCYTE [DISTWIDTH] IN BLOOD BY AUTOMATED COUNT: 13.2 % (ref 12.3–15.4)
GLOBULIN UR ELPH-MCNC: 3.2 GM/DL
GLUCOSE SERPL-MCNC: 96 MG/DL (ref 65–99)
HCT VFR BLD AUTO: 41.4 % (ref 37.5–51)
HDLC SERPL-MCNC: 47 MG/DL (ref 40–60)
HGB BLD-MCNC: 14 G/DL (ref 13–17.7)
LDLC SERPL CALC-MCNC: 110 MG/DL (ref 0–100)
LDLC/HDLC SERPL: 2.29 {RATIO}
MCH RBC QN AUTO: 31.5 PG (ref 26.6–33)
MCHC RBC AUTO-ENTMCNC: 33.8 G/DL (ref 31.5–35.7)
MCV RBC AUTO: 93 FL (ref 79–97)
PLATELET # BLD AUTO: 278 10*3/MM3 (ref 140–450)
PMV BLD AUTO: 10.7 FL (ref 6–12)
POTASSIUM SERPL-SCNC: 4.6 MMOL/L (ref 3.5–5.2)
PROT SERPL-MCNC: 7.2 G/DL (ref 6–8.5)
RBC # BLD AUTO: 4.45 10*6/MM3 (ref 4.14–5.8)
SODIUM SERPL-SCNC: 137 MMOL/L (ref 136–145)
TRIGL SERPL-MCNC: 111 MG/DL (ref 0–150)
TSH SERPL DL<=0.05 MIU/L-ACNC: 5.18 UIU/ML (ref 0.27–4.2)
VLDLC SERPL-MCNC: 20 MG/DL (ref 5–40)
WBC NRBC COR # BLD AUTO: 7.87 10*3/MM3 (ref 3.4–10.8)

## 2025-02-18 PROCEDURE — 80061 LIPID PANEL: CPT | Performed by: INTERNAL MEDICINE

## 2025-02-18 PROCEDURE — 80053 COMPREHEN METABOLIC PANEL: CPT | Performed by: INTERNAL MEDICINE

## 2025-02-18 PROCEDURE — 84443 ASSAY THYROID STIM HORMONE: CPT | Performed by: INTERNAL MEDICINE

## 2025-02-18 PROCEDURE — 85027 COMPLETE CBC AUTOMATED: CPT | Performed by: INTERNAL MEDICINE

## 2025-02-19 DIAGNOSIS — E03.8 ADULT ONSET HYPOTHYROIDISM: ICD-10-CM

## 2025-02-19 RX ORDER — LEVOTHYROXINE SODIUM 100 UG/1
100 TABLET ORAL DAILY
Qty: 30 TABLET | Refills: 0 | Status: SHIPPED | OUTPATIENT
Start: 2025-02-19 | End: 2025-02-21 | Stop reason: SDUPTHER

## 2025-02-21 ENCOUNTER — TELEPHONE (OUTPATIENT)
Dept: INTERNAL MEDICINE | Facility: CLINIC | Age: 80
End: 2025-02-21
Payer: MEDICARE

## 2025-02-21 DIAGNOSIS — E03.8 ADULT ONSET HYPOTHYROIDISM: ICD-10-CM

## 2025-02-21 RX ORDER — LEVOTHYROXINE SODIUM 100 UG/1
100 TABLET ORAL DAILY
Qty: 30 TABLET | Refills: 0 | Status: SHIPPED | OUTPATIENT
Start: 2025-02-21

## 2025-02-21 NOTE — TELEPHONE ENCOUNTER
Pharmacy Name:  [facility]    Pharmacy representative name: [facility]    Pharmacy representative phone number: [phone]     What medication are you calling in regards to: LEVOTHYROXINE    What question does the pharmacy have: PATIENT STATES THAT DR BACK WAS TO CALL HIM IN A INCREASED DOSE. BUT THE PHARMACY HAS NOT GOTTEN THAT PRESCRIPTION.    Who is the provider that prescribed the medication: D;COSTA

## 2025-02-26 ENCOUNTER — OUTSIDE FACILITY SERVICE (OUTPATIENT)
Dept: INTERNAL MEDICINE | Facility: CLINIC | Age: 80
End: 2025-02-26
Payer: MEDICARE

## 2025-02-26 PROCEDURE — G0179 MD RECERTIFICATION HHA PT: HCPCS | Performed by: INTERNAL MEDICINE

## 2025-02-27 ENCOUNTER — OFFICE VISIT (OUTPATIENT)
Dept: INTERNAL MEDICINE | Facility: CLINIC | Age: 80
End: 2025-02-27
Payer: MEDICARE

## 2025-02-27 VITALS
BODY MASS INDEX: 26.39 KG/M2 | OXYGEN SATURATION: 98 % | HEIGHT: 67 IN | TEMPERATURE: 97 F | RESPIRATION RATE: 20 BRPM | WEIGHT: 168.12 LBS | SYSTOLIC BLOOD PRESSURE: 102 MMHG | DIASTOLIC BLOOD PRESSURE: 78 MMHG | HEART RATE: 65 BPM

## 2025-02-27 DIAGNOSIS — E78.2 MIXED HYPERLIPIDEMIA: ICD-10-CM

## 2025-02-27 DIAGNOSIS — I10 BENIGN ESSENTIAL HYPERTENSION: Primary | ICD-10-CM

## 2025-02-27 DIAGNOSIS — E03.8 ADULT ONSET HYPOTHYROIDISM: ICD-10-CM

## 2025-02-27 DIAGNOSIS — N18.31 STAGE 3A CHRONIC KIDNEY DISEASE: ICD-10-CM

## 2025-02-27 RX ORDER — AMLODIPINE BESYLATE 2.5 MG/1
2.5 TABLET ORAL DAILY
Qty: 90 TABLET | Refills: 1 | Status: SHIPPED | OUTPATIENT
Start: 2025-02-27

## 2025-02-27 NOTE — PROGRESS NOTES
"Chief Complaint  Hypertension, Hypothyroidism, and Hyperlipidemia    Subjective        Nestor Nye presents to [facility]  HPI: Patient is here to follow up on the blood pressure  The patient is taking the blood pressure medications as prescribed and has had no side effects. The patient is also here to follow up on the cholesterol and  on thyroid and ckd and had  lab work done .  The patient also needs refills on medications . He has a foleys in place and sees [facility] urology  Hyperlipidemia   Pertinent negatives include no chest pain or shortness of breath.   Hypertension   Pertinent negatives include no chest pain, palpitations or shortness of breath.      Objective   Vital Signs:  /78   Pulse 65   Temp 97 °F (36.1 °C) (Temporal)   Resp 20   Ht 170.2 cm (67\")   Wt 76.3 kg (168 lb 1.9 oz)   SpO2 98%   BMI 26.33 kg/m²   Estimated body mass index is 26.33 kg/m² as calculated from the following:    Height as of this encounter: 170.2 cm (67\").    Weight as of this encounter: 76.3 kg (168 lb 1.9 oz).            Physical Exam  Vitals and nursing note reviewed.   Constitutional:       General: He is not in acute distress.     Appearance: Normal appearance. He is not diaphoretic.   HENT:      Head: Normocephalic and atraumatic.      Right Ear: External ear normal.      Left Ear: External ear normal.      Nose: Nose normal.   Eyes:      Extraocular Movements: Extraocular movements intact.      Conjunctiva/sclera: Conjunctivae normal.      Comments:  Eye lid dysfunction bilateral  Lower Eye lid eversion and conjuctiva injection , bilateral   Neck:      Trachea: Trachea normal.   Cardiovascular:      Rate and Rhythm: Normal rate and regular rhythm.      Heart sounds: Normal heart sounds.   Pulmonary:      Effort: Pulmonary effort is normal. No respiratory distress.   Abdominal:      General: Abdomen is flat.   Genitourinary:     Comments: Foleys in place  Musculoskeletal:     "  Cervical back: Neck supple.      Comments: Moves all limbs   Skin:     General: Skin is warm and dry.      Findings: No erythema.   Neurological:      Mental Status: He is alert and oriented to person, place, and time.      Comments: No gross motor or sensory deficits        Result Review :  The following data was reviewed by: Roxanne Slater MD on 02/27/2025:  Common labs          9/10/2024    13:34 12/19/2024    07:43 2/18/2025    09:38   Common Labs   Glucose 86  75  96    BUN 18  24  21    Creatinine 1.43  1.72  1.76    Sodium 138  138  137    Potassium 4.8  3.9  4.6    Chloride 103  101  101    Calcium 9.2  9.1  9.1    Albumin 4.1   4.0    Total Bilirubin <0.2   0.6    Alkaline Phosphatase 100   100    AST (SGOT) 14   16    ALT (SGPT) 8   10    WBC 6.02  6.21  7.87    Hemoglobin 11.2  13.5  14.0    Hematocrit 35.9  40.9  41.4    Platelets 362  302  278    Total Cholesterol   177    Triglycerides   111    HDL Cholesterol   47    LDL Cholesterol    110                Assessment and Plan   Diagnoses and all orders for this visit:    1. Benign essential hypertension (Primary)  -     amLODIPine (Norvasc) 2.5 MG tablet; Take 1 tablet by mouth Daily.  Dispense: 90 tablet; Refill: 1    2. Mixed hyperlipidemia  -     CBC (No Diff)  -     Comprehensive Metabolic Panel  -     Lipid Panel    3. Adult onset hypothyroidism  -     TSH    4. Stage 3a chronic kidney disease  -     Comprehensive Metabolic Panel        Plan:  1.  Benign essential hypertension: Will continue current medication, low-sodium diet advised, Counseled to regularly check BP at home with goal averaging <130/80.   2.mixed hyperlipidemia:  reviewed  fasting CMP and lipid panel.  Diet and exercise counseled,  Will continue current medications  3. hypothyroidism:  reviewed  tsh , and continue levothyroxine  4. Ckd stage 3 a : oral hydration , monitor bmp       Follow Up   Return in about 13 weeks (around 5/29/2025).  Patient was given instructions and  counseling regarding his condition or for health maintenance advice. Please see specific information pulled into the AVS if appropriate.

## 2025-03-03 DIAGNOSIS — N40.1 BENIGN PROSTATIC HYPERPLASIA WITH URINARY OBSTRUCTION: ICD-10-CM

## 2025-03-03 DIAGNOSIS — E03.8 ADULT ONSET HYPOTHYROIDISM: ICD-10-CM

## 2025-03-03 DIAGNOSIS — N13.8 BENIGN PROSTATIC HYPERPLASIA WITH URINARY OBSTRUCTION: ICD-10-CM

## 2025-03-03 RX ORDER — LEVOTHYROXINE SODIUM 100 UG/1
100 TABLET ORAL DAILY
Qty: 30 TABLET | Refills: 1 | OUTPATIENT
Start: 2025-03-03

## 2025-03-04 RX ORDER — FINASTERIDE 5 MG/1
5 TABLET, FILM COATED ORAL DAILY
Qty: 90 TABLET | Refills: 1 | OUTPATIENT
Start: 2025-03-04

## 2025-03-10 ENCOUNTER — LAB REQUISITION (OUTPATIENT)
Dept: LAB | Facility: HOSPITAL | Age: 80
End: 2025-03-10
Payer: MEDICARE

## 2025-03-10 DIAGNOSIS — N39.0 URINARY TRACT INFECTION, SITE NOT SPECIFIED: ICD-10-CM

## 2025-03-10 LAB
BACTERIA UR QL AUTO: ABNORMAL /HPF
BILIRUB UR QL STRIP: NEGATIVE
CLARITY UR: CLEAR
COLOR UR: YELLOW
GLUCOSE UR STRIP-MCNC: NEGATIVE MG/DL
HGB UR QL STRIP.AUTO: ABNORMAL
HYALINE CASTS UR QL AUTO: ABNORMAL /LPF
KETONES UR QL STRIP: NEGATIVE
LEUKOCYTE ESTERASE UR QL STRIP.AUTO: ABNORMAL
NITRITE UR QL STRIP: NEGATIVE
PH UR STRIP.AUTO: 6.5 [PH] (ref 5–8)
PROT UR QL STRIP: NEGATIVE
RBC # UR STRIP: ABNORMAL /HPF
REF LAB TEST METHOD: ABNORMAL
SP GR UR STRIP: <=1.005 (ref 1–1.03)
SQUAMOUS #/AREA URNS HPF: ABNORMAL /HPF
UROBILINOGEN UR QL STRIP: ABNORMAL
WBC # UR STRIP: ABNORMAL /HPF

## 2025-03-10 PROCEDURE — 87186 SC STD MICRODIL/AGAR DIL: CPT | Performed by: INTERNAL MEDICINE

## 2025-03-10 PROCEDURE — 87086 URINE CULTURE/COLONY COUNT: CPT | Performed by: INTERNAL MEDICINE

## 2025-03-10 PROCEDURE — 81001 URINALYSIS AUTO W/SCOPE: CPT | Performed by: INTERNAL MEDICINE

## 2025-03-10 PROCEDURE — 87077 CULTURE AEROBIC IDENTIFY: CPT | Performed by: INTERNAL MEDICINE

## 2025-03-11 DIAGNOSIS — E03.8 ADULT ONSET HYPOTHYROIDISM: ICD-10-CM

## 2025-03-11 RX ORDER — LEVOTHYROXINE SODIUM 100 UG/1
100 TABLET ORAL DAILY
Qty: 30 TABLET | Refills: 1 | OUTPATIENT
Start: 2025-03-11

## 2025-03-12 LAB — BACTERIA SPEC AEROBE CULT: ABNORMAL

## 2025-03-17 ENCOUNTER — LAB (OUTPATIENT)
Dept: LAB | Facility: HOSPITAL | Age: 80
End: 2025-03-17
Payer: MEDICARE

## 2025-03-17 LAB
ALBUMIN SERPL-MCNC: 4.1 G/DL (ref 3.5–5.2)
ALBUMIN/GLOB SERPL: 1.3 G/DL
ALP SERPL-CCNC: 105 U/L (ref 39–117)
ALT SERPL W P-5'-P-CCNC: 9 U/L (ref 1–41)
ANION GAP SERPL CALCULATED.3IONS-SCNC: 12.1 MMOL/L (ref 5–15)
AST SERPL-CCNC: 16 U/L (ref 1–40)
BILIRUB SERPL-MCNC: 0.5 MG/DL (ref 0–1.2)
BUN SERPL-MCNC: 16 MG/DL (ref 8–23)
BUN/CREAT SERPL: 10 (ref 7–25)
CALCIUM SPEC-SCNC: 9 MG/DL (ref 8.6–10.5)
CHLORIDE SERPL-SCNC: 102 MMOL/L (ref 98–107)
CHOLEST SERPL-MCNC: 161 MG/DL (ref 0–200)
CO2 SERPL-SCNC: 23.9 MMOL/L (ref 22–29)
CREAT SERPL-MCNC: 1.6 MG/DL (ref 0.76–1.27)
DEPRECATED RDW RBC AUTO: 45 FL (ref 37–54)
EGFRCR SERPLBLD CKD-EPI 2021: 43.8 ML/MIN/1.73
ERYTHROCYTE [DISTWIDTH] IN BLOOD BY AUTOMATED COUNT: 13.1 % (ref 12.3–15.4)
GLOBULIN UR ELPH-MCNC: 3.1 GM/DL
GLUCOSE SERPL-MCNC: 87 MG/DL (ref 65–99)
HCT VFR BLD AUTO: 42.9 % (ref 37.5–51)
HDLC SERPL-MCNC: 47 MG/DL (ref 40–60)
HGB BLD-MCNC: 14.3 G/DL (ref 13–17.7)
LDLC SERPL CALC-MCNC: 90 MG/DL (ref 0–100)
LDLC/HDLC SERPL: 1.86 {RATIO}
MCH RBC QN AUTO: 31.4 PG (ref 26.6–33)
MCHC RBC AUTO-ENTMCNC: 33.3 G/DL (ref 31.5–35.7)
MCV RBC AUTO: 94.3 FL (ref 79–97)
PLATELET # BLD AUTO: 357 10*3/MM3 (ref 140–450)
PMV BLD AUTO: 10.6 FL (ref 6–12)
POTASSIUM SERPL-SCNC: 4.7 MMOL/L (ref 3.5–5.2)
PROT SERPL-MCNC: 7.2 G/DL (ref 6–8.5)
RBC # BLD AUTO: 4.55 10*6/MM3 (ref 4.14–5.8)
SODIUM SERPL-SCNC: 138 MMOL/L (ref 136–145)
TRIGL SERPL-MCNC: 134 MG/DL (ref 0–150)
TSH SERPL DL<=0.05 MIU/L-ACNC: 3.82 UIU/ML (ref 0.27–4.2)
VLDLC SERPL-MCNC: 24 MG/DL (ref 5–40)
WBC NRBC COR # BLD AUTO: 7.47 10*3/MM3 (ref 3.4–10.8)

## 2025-03-17 PROCEDURE — 80053 COMPREHEN METABOLIC PANEL: CPT | Performed by: INTERNAL MEDICINE

## 2025-03-17 PROCEDURE — 85027 COMPLETE CBC AUTOMATED: CPT | Performed by: INTERNAL MEDICINE

## 2025-03-17 PROCEDURE — 80061 LIPID PANEL: CPT | Performed by: INTERNAL MEDICINE

## 2025-03-17 PROCEDURE — 84443 ASSAY THYROID STIM HORMONE: CPT | Performed by: INTERNAL MEDICINE

## 2025-03-19 ENCOUNTER — TELEPHONE (OUTPATIENT)
Dept: INTERNAL MEDICINE | Facility: CLINIC | Age: 80
End: 2025-03-19
Payer: MEDICARE

## 2025-03-19 NOTE — TELEPHONE ENCOUNTER
----- Message from Roxanne Slater sent at 3/19/2025 11:50 AM EDT -----  Can u let his [facility] - I think [facility] - know not to order  UA/ urine cultures under me , they need to be ordered under his urologist

## 2025-03-28 DIAGNOSIS — N40.1 BENIGN PROSTATIC HYPERPLASIA WITH URINARY OBSTRUCTION: ICD-10-CM

## 2025-03-28 DIAGNOSIS — N13.8 BENIGN PROSTATIC HYPERPLASIA WITH URINARY OBSTRUCTION: ICD-10-CM

## 2025-03-28 RX ORDER — FINASTERIDE 5 MG/1
5 TABLET, FILM COATED ORAL DAILY
Qty: 90 TABLET | Refills: 1 | Status: SHIPPED | OUTPATIENT
Start: 2025-03-28

## 2025-04-03 ENCOUNTER — HOSPITAL ENCOUNTER (EMERGENCY)
Facility: HOSPITAL | Age: 80
Discharge: HOME | End: 2025-04-03
Attending: EMERGENCY MEDICINE
Payer: MEDICARE

## 2025-04-03 VITALS
TEMPERATURE: 98.2 F | DIASTOLIC BLOOD PRESSURE: 98 MMHG | RESPIRATION RATE: 19 BRPM | BODY MASS INDEX: 21.97 KG/M2 | HEART RATE: 97 BPM | SYSTOLIC BLOOD PRESSURE: 132 MMHG | WEIGHT: 139.99 LBS | HEIGHT: 67 IN | OXYGEN SATURATION: 97 %

## 2025-04-03 DIAGNOSIS — N39.0 URINARY TRACT INFECTION ASSOCIATED WITH INDWELLING URETHRAL CATHETER, INITIAL ENCOUNTER: Primary | ICD-10-CM

## 2025-04-03 DIAGNOSIS — T83.511A URINARY TRACT INFECTION ASSOCIATED WITH INDWELLING URETHRAL CATHETER, INITIAL ENCOUNTER: Primary | ICD-10-CM

## 2025-04-03 LAB
ANION GAP SERPL CALCULATED.3IONS-SCNC: 9.9 MMOL/L (ref 5–15)
BACTERIA UR QL AUTO: ABNORMAL /HPF
BASOPHILS # BLD AUTO: 0.06 10*3/MM3 (ref 0–0.2)
BASOPHILS NFR BLD AUTO: 1.1 % (ref 0–1.5)
BILIRUB UR QL STRIP: NEGATIVE
BUN SERPL-MCNC: 26 MG/DL (ref 8–23)
BUN/CREAT SERPL: 17.3 (ref 7–25)
CALCIUM SPEC-SCNC: 8.8 MG/DL (ref 8.6–10.5)
CHLORIDE SERPL-SCNC: 105 MMOL/L (ref 98–107)
CLARITY UR: ABNORMAL
CO2 SERPL-SCNC: 24.1 MMOL/L (ref 22–29)
COLOR UR: YELLOW
CREAT SERPL-MCNC: 1.5 MG/DL (ref 0.76–1.27)
DEPRECATED RDW RBC AUTO: 45.2 FL (ref 37–54)
EGFRCR SERPLBLD CKD-EPI 2021: 47.4 ML/MIN/1.73
EOSINOPHIL # BLD AUTO: 0.15 10*3/MM3 (ref 0–0.4)
EOSINOPHIL NFR BLD AUTO: 2.8 % (ref 0.3–6.2)
ERYTHROCYTE [DISTWIDTH] IN BLOOD BY AUTOMATED COUNT: 13.3 % (ref 12.3–15.4)
GLUCOSE SERPL-MCNC: 99 MG/DL (ref 65–99)
GLUCOSE UR STRIP-MCNC: NEGATIVE MG/DL
HCT VFR BLD AUTO: 39.9 % (ref 37.5–51)
HGB BLD-MCNC: 13.2 G/DL (ref 13–17.7)
HGB UR QL STRIP.AUTO: ABNORMAL
HYALINE CASTS UR QL AUTO: ABNORMAL /LPF
IMM GRANULOCYTES # BLD AUTO: 0.01 10*3/MM3 (ref 0–0.05)
IMM GRANULOCYTES NFR BLD AUTO: 0.2 % (ref 0–0.5)
KETONES UR QL STRIP: NEGATIVE
LEUKOCYTE ESTERASE UR QL STRIP.AUTO: ABNORMAL
LYMPHOCYTES # BLD AUTO: 1.41 10*3/MM3 (ref 0.7–3.1)
LYMPHOCYTES NFR BLD AUTO: 26.3 % (ref 19.6–45.3)
MCH RBC QN AUTO: 30.6 PG (ref 26.6–33)
MCHC RBC AUTO-ENTMCNC: 33.1 G/DL (ref 31.5–35.7)
MCV RBC AUTO: 92.4 FL (ref 79–97)
MONOCYTES # BLD AUTO: 0.62 10*3/MM3 (ref 0.1–0.9)
MONOCYTES NFR BLD AUTO: 11.5 % (ref 5–12)
NEUTROPHILS NFR BLD AUTO: 3.12 10*3/MM3 (ref 1.7–7)
NEUTROPHILS NFR BLD AUTO: 58.1 % (ref 42.7–76)
NITRITE UR QL STRIP: POSITIVE
NRBC BLD AUTO-RTO: 0 /100 WBC (ref 0–0.2)
PH UR STRIP.AUTO: 6 [PH] (ref 5–8)
PLATELET # BLD AUTO: 281 10*3/MM3 (ref 140–450)
PMV BLD AUTO: 9.8 FL (ref 6–12)
POTASSIUM SERPL-SCNC: 4.7 MMOL/L (ref 3.5–5.2)
PROT UR QL STRIP: ABNORMAL
RBC # BLD AUTO: 4.32 10*6/MM3 (ref 4.14–5.8)
RBC # UR STRIP: ABNORMAL /HPF
REF LAB TEST METHOD: ABNORMAL
SODIUM SERPL-SCNC: 139 MMOL/L (ref 136–145)
SP GR UR STRIP: 1.01 (ref 1–1.03)
SQUAMOUS #/AREA URNS HPF: ABNORMAL /HPF
UROBILINOGEN UR QL STRIP: ABNORMAL
WBC # UR STRIP: ABNORMAL /HPF
WBC NRBC COR # BLD AUTO: 5.37 10*3/MM3 (ref 3.4–10.8)

## 2025-04-03 PROCEDURE — 81001 URINALYSIS AUTO W/SCOPE: CPT | Performed by: EMERGENCY MEDICINE

## 2025-04-03 PROCEDURE — 87086 URINE CULTURE/COLONY COUNT: CPT | Performed by: EMERGENCY MEDICINE

## 2025-04-03 PROCEDURE — 87077 CULTURE AEROBIC IDENTIFY: CPT | Performed by: EMERGENCY MEDICINE

## 2025-04-03 PROCEDURE — 80048 BASIC METABOLIC PNL TOTAL CA: CPT | Performed by: EMERGENCY MEDICINE

## 2025-04-03 PROCEDURE — 87186 SC STD MICRODIL/AGAR DIL: CPT | Performed by: EMERGENCY MEDICINE

## 2025-04-03 PROCEDURE — 87088 URINE BACTERIA CULTURE: CPT | Performed by: EMERGENCY MEDICINE

## 2025-04-03 PROCEDURE — 85025 COMPLETE CBC W/AUTO DIFF WBC: CPT | Performed by: EMERGENCY MEDICINE

## 2025-04-03 PROCEDURE — 51702 INSERT TEMP BLADDER CATH: CPT

## 2025-04-03 PROCEDURE — 99283 EMERGENCY DEPT VISIT LOW MDM: CPT | Performed by: EMERGENCY MEDICINE

## 2025-04-03 PROCEDURE — 51798 US URINE CAPACITY MEASURE: CPT

## 2025-04-03 RX ORDER — CEPHALEXIN 500 MG/1
500 CAPSULE ORAL 2 TIMES DAILY
Qty: 14 CAPSULE | Refills: 0 | Status: SHIPPED | OUTPATIENT
Start: 2025-04-03 | End: 2025-04-10

## 2025-04-03 RX ADMIN — CEPHALEXIN 500 MG: 250 CAPSULE ORAL at 13:24

## 2025-04-03 NOTE — ED PROVIDER NOTES
[facility] EMERGENCY DEPARTMENT  Emergency Department Encounter  Emergency Medicine Physician Note     Pt Name:Nestor Nye  MRN: [id number]  Birthdate 1945  Date of evaluation: 4/3/2025  PCP:  Roxanne Slater MD  Note Started: 10:57 AM EDT      CHIEF COMPLAINT       Chief Complaint   Patient presents with    stacy catheter complaint       HISTORY OF PRESENT ILLNESS  (Location/Symptom, Timing/Onset, Context/Setting, Quality, Duration, Modifying Factors, Severity.)      Nestor Nye is a 79 y.o. male who presents with decreased urinary output complaint.  Patient describes having fever and feeling diaphoretic.  Patient has concerns of having a UTI.  Patient has permanent indwelling catheter, gets it changed regularly, states it was recently changed within the last couple weeks.  Patient denies any nausea or vomiting, patient describes any abdominal pain or chest pain.  Patient denies any change in bowel habits.    PAST MEDICAL / SURGICAL / SOCIAL / FAMILY HISTORY     Past Medical History:   Diagnosis Date    Acute maxillary sinusitis     Arthritis     Disease of thyroid gland     Enlarged prostate     GERD (gastroesophageal reflux disease)     Gout     Hypertension     Impaired functional mobility, balance, gait, and endurance     PONV (postoperative nausea and vomiting)     Renal disorder     Rheumatoid arthritis     Right inguinal hernia 10/28/2021    Added automatically from request for surgery [id number]    Vocal cord anomaly 2007    had problem with enlarge vocal cord after low back surgery, changed his voice since then     No additional pertinent       Past Surgical History:   Procedure Laterality Date    ABDOMINAL SURGERY      ARTERIOGRAM Right 7/22/2024    Procedure: LOWER EXTREMITY ANGIOGRAM RIGHT;  Surgeon: Kingsley Morris MD;  Location: [facility];  Service: Vascular;  Laterality: Right;  DOSE: 56 MGY; FLURO: 7 MIN 6 SEC; CONTRAST 40 ML    BACK SURGERY       BELPHAROPTOSIS REPAIR Bilateral     BRAIN SURGERY      COLON SURGERY      bowel resection x2 per pt    CRANIOTOMY FOR SUBDURAL HEMATOMA      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    DIAGNOSTIC LAPAROSCOPY N/A 05/04/2022    Procedure: DIAGNOSTIC LAPAROSCOPY CONVERTED TO EXPLORATORY LAPAROTOMY WITH SMALL BOWEL RESECTION;  Surgeon: Maximo Bennett MD;  Location:  [facility];  Service: General;  Laterality: N/A;  CONVERTED TO EXPLORATORY LAPAROTOMY AT 1912    EXPLORATORY LAPAROTOMY N/A 04/16/2022    Procedure: LAPAROTOMY EXPLORATORY WITH SMALL BOWEL OBSTRUCTION;  Surgeon: Maximo Bennett MD;  Location:  [facility];  Service: General;  Laterality: N/A;    EYE SURGERY      on upper etelids    FEMORAL ENDARTERECTOMY Right 6/21/2024    Procedure: FEMORAL EMBELECTOMY RIGHT;  Surgeon: Kingsley Morris MD;  Location:  [facility];  Service: Vascular;  Laterality: Right;    FOREIGN BODY REMOVAL Right     removal of metal fragments from RUQ after electrocution and fall.      HERNIA REPAIR      INGUINAL HERNIA REPAIR Right 11/01/2021    Procedure: INGUINAL HERNIA REPAIR WITH MESH AND EXCISION OF SPERMATIC CORD LIPOMA;  Surgeon: Rosey Glover MD;  Location:  [facility];  Service: General;  Laterality: Right;    LEG DEBRIDEMENT Right 7/22/2024    Procedure: GROIN DEBRIDEMENT WITH WOUND VAC PLACEMENT  RIGHT;  Surgeon: Kingsley Morris MD;  Location:  [facility];  Service: Vascular;  Laterality: Right;    LUMBAR SPINE SURGERY      PROSTATE AQUABLATION N/A 6/21/2024    Procedure: AQUABLATION OF PROSTATE;  Surgeon: Prieto Rey MD;  Location:  [facility];  Service: Robotics - Urology;  Laterality: N/A;    SKIN GRAFT Right     RUE skin grafting after electrocution    THORACIC SPINE SURGERY       No additional pertinent       Social History     Socioeconomic History    Marital status:    Tobacco Use    Smoking status: Former     Types: Cigars     Quit date: 2024     Years since  quittin.2     Passive exposure: Past    Smokeless tobacco: Never    Tobacco comments:     Smoked 1 ppd of small cigars for 50  years    Vaping Use    Vaping status: Former   Substance and Sexual Activity    Alcohol use: Not Currently    Drug use: No    Sexual activity: Defer       Family History   Problem Relation Age of Onset    Arthritis Mother     Prostate cancer Brother     Arthritis Father        Allergies:  Patient has no known allergies.    Home Medications:  Prior to Admission medications    Medication Sig Start Date End Date Taking? Authorizing Provider   amLODIPine (Norvasc) 2.5 MG tablet Take 1 tablet by mouth Daily. 25   Roxanne Slater MD   aspirin 81 MG chewable tablet Chew 1 tablet Daily. 24   Cha Flores APRN   bisacodyl (Bisacodyl Laxative) 10 MG suppository Insert 1 suppository into the rectum Daily As Needed for Constipation. 3/11/25   María Sam APRN   Efinaconazole 10 % solution Apply 1 Application topically Daily. Paint toe nail daily 3/11/25   María Sam APRN   ferrous sulfate 325 (65 FE) MG tablet Take 1 tablet by mouth Daily With Breakfast.    ProviderDenisse MD   finasteride (PROSCAR) 5 MG tablet TAKE ONE TABLET BY MOUTH EVERY DAY 3/28/25   Denzel Felix APRN   levothyroxine (SYNTHROID, LEVOTHROID) 100 MCG tablet Take 1 tablet by mouth Daily. 3/19/25   Roxanne Slater MD   melatonin 5 MG tablet tablet Take 1 tablet by mouth.    ProviderDenisse MD   polyethylene glycol (MIRALAX) 17 GM/SCOOP powder Take 17 g by mouth Daily As Needed (constipation). 3/11/25   María Sam APRN         REVIEW OF SYSTEMS       Review of Systems   Constitutional:  Positive for diaphoresis and fever. Negative for chills.   Respiratory:  Negative for chest tightness and shortness of breath.    Cardiovascular:  Negative for chest pain.   Gastrointestinal:  Negative for abdominal pain, diarrhea, nausea and vomiting.   Genitourinary:  Positive for decreased urine volume  "and difficulty urinating. Negative for flank pain.   Neurological:  Negative for dizziness and light-headedness.       PHYSICAL EXAM      INITIAL VITALS:   /98   Pulse 97   Temp 98.2 °F (36.8 °C) (Oral)   Resp 19   Ht 170.2 cm (67\")   Wt 63.5 kg (139 lb 15.9 oz)   SpO2 97%   BMI 21.93 kg/m²     Physical Exam  Constitutional:       Appearance: Normal appearance.   HENT:      Head: Normocephalic and atraumatic.   Eyes:      Extraocular Movements: Extraocular movements intact.   Cardiovascular:      Rate and Rhythm: Normal rate and regular rhythm.   Pulmonary:      Effort: Pulmonary effort is normal.      Breath sounds: Normal breath sounds.   Abdominal:      General: Abdomen is flat.      Palpations: Abdomen is soft.      Tenderness: There is no abdominal tenderness.   Genitourinary:     Comments: Gillespie catheter in place, 200 mL within the Gillespie bag, no signs of obvious obstruction or complication.  Musculoskeletal:      Right lower leg: No edema.      Left lower leg: No edema.   Skin:     General: Skin is warm and dry.   Neurological:      General: No focal deficit present.      Mental Status: He is alert and oriented to person, place, and time.   Psychiatric:         Mood and Affect: Mood normal.         Behavior: Behavior normal.           DDX/DIAGNOSTIC RESULTS / EMERGENCY DEPARTMENT COURSE / MDM     Differential Diagnosis included but not limited: Gillespie catheter complication, acute cystitis, acute urinary retention.     Diagnoses Considered but Do Not Suspect: Sepsis/urosepsis, severe infection    Decision Rules/Scores utilized: N/A     Tests considered but not ordered and why:  N/A     MIPS: N/A     Code Status Discussion:  Not Discussed    Additional Patient Education Provided: None     Medical Decision Making    Medical Decision Making  Patient presenting with decreased urinary output from his Gillespie catheter.  Bladder scan demonstrates 65 mL and low concern for dysfunctional Gillespie catheter.  " Patient afebrile, stable vitals and low concern for sepsis or complicated acute cystitis at this time.  Patient demonstrated concerns for acute cystitis on urine evaluation, noted to have Klebsiella previously on evaluation and do feel this is most likely a reinfection.  This is noted to be sensitive to ceftriaxone in the past and patient started on Keflex.  Patient appeared clinically well, Gillespie catheter continue to function here in the emergency department no indication for replacement.  Patient stable for discharge on Keflex for further follow-up by his urologist.  Patient agreeable to plan.  Patient struck to return for any worsening fevers, nausea vomiting, signs of worsening infection, decreased urinary output or any other concerns.    Problems Addressed:  Urinary tract infection associated with indwelling urethral catheter, initial encounter: complicated acute illness or injury    Amount and/or Complexity of Data Reviewed  External Data Reviewed: labs and notes.  Labs:  Decision-making details documented in ED Course.    Risk  Prescription drug management.        See ED COURSE for additional MDM statements    EKG  None Performed     All EKG's are interpreted by the Emergency Department Physician who either signs or Co-signs this chart in the absence of a cardiologist.    Additional Scores                   EMERGENCY DEPARTMENT COURSE:    ED Course as of 04/05/25 1121   Thu Apr 03, 2025   1030 Temp: 98.2 °F (36.8 °C) [CR]   1158 Creatinine(!): 1.50  At patient's baseline [CR]   1233 WBC, UA(!): 21-50 [CR]   1234 Nitrite, UA(!): Positive [CR]   1236 Previous culture evaluation demonstrates Klebsiella, this is noted to be susceptible to ceftriaxone, will start on Keflex. [CR]   1306 Patient's bladder scan to be 65.  Patient gets door delivery pharmacy, will give a dose of antibiotics at this time. [CR]      ED Course User Index  [CR] Frank Mayo, DO       PROCEDURES:  None Performed    Procedures    DATA FOR LAB AND RADIOLOGY TESTS ORDERED BELOW ARE REVIEWED BY THE ED CLINICIAN:    RADIOLOGY: All x-rays, CT, MRI, and formal ultrasound images (except ED bedside ultrasound) are read by the radiologist, see reports below, unless otherwise noted in MDM or here.  Reports below are reviewed by myself.  No orders to display       LABS: Lab orders shown below, the results are reviewed by myself, and all abnormals are listed below.  Labs Reviewed   URINE CULTURE - Abnormal; Notable for the following components:       Result Value    Urine Culture   (*)     Value: >100,000 CFU/mL Klebsiella pneumoniae ssp pneumoniae    All other components within normal limits    Narrative:     Colonization of the urinary tract without infection is common. Treatment is discouraged unless the patient is symptomatic, pregnant, or undergoing an invasive urologic procedure.   BASIC METABOLIC PANEL - Abnormal; Notable for the following components:    BUN 26 (*)     Creatinine 1.50 (*)     eGFR 47.4 (*)     All other components within normal limits    Narrative:     GFR Categories in Chronic Kidney Disease (CKD)      GFR Category          GFR (mL/min/1.73)    Interpretation  G1                     90 or greater         Normal or high (1)  G2                      60-89                Mild decrease (1)  G3a                   45-59                Mild to moderate decrease  G3b                   30-44                Moderate to severe decrease  G4                    15-29                Severe decrease  G5                    14 or less           Kidney failure          (1)In the absence of evidence of kidney disease, neither GFR category G1 or G2 fulfill the criteria for CKD.    eGFR calculation 2021 CKD-EPI creatinine equation, which does not include race as a factor   URINALYSIS WITHOUT MICROSCOPIC (NO CULTURE) - Abnormal; Notable for the following components:    Appearance, UA Cloudy (*)     Blood, UA Trace (*)     Protein, UA  "Trace (*)     Leuk Esterase, UA Large (3+) (*)     Nitrite, UA Positive (*)     All other components within normal limits   URINALYSIS, MICROSCOPIC ONLY - Abnormal; Notable for the following components:    WBC, UA 21-50 (*)     Bacteria, UA 2+ (*)     Squamous Epithelial Cells, UA 7-12 (*)     All other components within normal limits   CBC WITH AUTO DIFFERENTIAL - Normal   URINALYSIS AND MICROSCOPIC    Narrative:     The following orders were created for panel order Urinalysis With Microscopic - Indwelling Urethral Catheter.  Procedure                               Abnormality         Status                     ---------                               -----------         ------                     Urinalysis without micro...[[id number]]  Abnormal            Final result               Urinalysis, Microscopic ...[[id number]]  Abnormal            Final result                 Please view results for these tests on the individual orders.       Vitals Reviewed:    Vitals:    04/03/25 1001 04/03/25 1240 04/03/25 1300   BP: (!) 142/116 139/81 132/98   BP Location: Left arm     Patient Position: Sitting     Pulse: 97     Resp: 19     Temp: 98.2 °F (36.8 °C)     TempSrc: Oral     SpO2: 95% 96% 97%   Weight: 63.5 kg (139 lb 15.9 oz)     Height: 170.2 cm (67\")         MEDICATIONS GIVEN TO PATIENT THIS ENCOUNTER:  Medications   cephalexin (KEFLEX) capsule 500 mg (500 mg Oral Given 4/3/25 1324)       CONSULTS:  None    CRITICAL CARE:  There was significant risk of life threatening deterioration of patient's condition requiring my direct management. Critical care time 0 minutes, excluding any documented procedures.    FINAL IMPRESSION      1. Urinary tract infection associated with indwelling urethral catheter, initial encounter          DISPOSITION / PLAN     ED Disposition       ED Disposition   Discharge    Condition   Stable    Comment   --               PATIENT REFERRED TO:  Roxanne Slater MD  [address] [place] [address]" [address]  [place] [id number] [phone]    Schedule an appointment as soon as possible for a visit       David Bruno MD  [address] [facility]  [address]  [place] [address]  [phone]    Schedule an appointment as soon as possible for a visit         DISCHARGE MEDICATIONS:     Medication List        START taking these medications      cephalexin 500 MG capsule  Commonly known as: KEFLEX  Take 1 capsule by mouth 2 (Two) Times a Day for 7 days.            CONTINUE taking these medications      amLODIPine 2.5 MG tablet  Commonly known as: Norvasc  Take 1 tablet by mouth Daily.     aspirin 81 MG chewable tablet  Chew 1 tablet Daily.     bisacodyl 10 MG suppository  Commonly known as: Bisacodyl Laxative  Insert 1 suppository into the rectum Daily As Needed for Constipation.     Efinaconazole 10 % solution  Apply 1 Application topically Daily. Paint toe nail daily     ferrous sulfate 325 (65 FE) MG tablet     finasteride 5 MG tablet  Commonly known as: PROSCAR  TAKE ONE TABLET BY MOUTH EVERY DAY     levothyroxine 100 MCG tablet  Commonly known as: SYNTHROID, LEVOTHROID  Take 1 tablet by mouth Daily.     melatonin 5 MG tablet tablet     polyethylene glycol 17 GM/SCOOP powder  Commonly known as: MIRALAX  Take 17 g by mouth Daily As Needed (constipation).               Where to Get Your Medications        These medications were sent to [id number] - [place], [place] - [address] [place], [place] [id number] [place] [phone] [place] [address]      Phone: [phone]   cephalexin 500 MG capsule         Electronically signed by Frank Mayo DO, 04/03/25, 10:57 AM EDT.    Emergency Medicine Physician  [facility]  (Please note that portions of thisnote were completed with a voice recognition program.  Efforts were made to edit the dictations but occasionally words are mis-transcribed.)       Frank Mayo DO  04/05/25 1123

## 2025-04-03 NOTE — DISCHARGE INSTRUCTIONS
If you notice any concerning symptoms, please return to the ER immediately. These can include but are not limited to: worsening of you condition, fevers, chills, shortness of breath, vomiting, weakness of the extremities, changes in your mental status, numbness, pale extremities, or chest pain.     Take medications as prescribed, your pharmacist may have additional recommendations concerning these medications.    For pain use ibuprofen (Motrin) or acetaminophen (Tylenol), unless prescribed medications that also contain these medications.  You can take over the counter acetaminophen or ibuprofen, please follow the directions as dosages differ. Do not take ibuprofen if you have a history of peptic ulcers, kidney disease, bariatric surgery, or are currently pregnant.  Do not take Tylenol if you have a history of liver disease or alcohol use disorder.        THANK YOU!!! From [facility] Emergency Department    On behalf of the Emergency Department staff at [facility], I would like to thank you for giving us the opportunity to address your health care needs and concerns. We hope that during your visit, our service was delivered in a professional and caring manner. Please keep [facility] in mind as we walk with you down the path to your own personal wellness. Please expect follow-up phone calls concerning additional care and questions about your experience.      You have received additional information specific to your diagnosis in these discharge instructions, please read these fully.  Anytime you have been seen in the emergency department we recommend close follow up with your primary care provider or specialist, please follow these directions as indicated.      Please follow-up with your urologist for further evaluation and care.

## 2025-04-05 LAB — BACTERIA SPEC AEROBE CULT: ABNORMAL

## 2025-04-09 ENCOUNTER — TELEPHONE (OUTPATIENT)
Dept: INTERNAL MEDICINE | Facility: CLINIC | Age: 80
End: 2025-04-09

## 2025-04-09 NOTE — TELEPHONE ENCOUNTER
Caller: Nestor Nye    Relationship: Self    Best call back number: [phone]    What is the best time to reach you: ANY    Who are you requesting to speak with (clinical staff, provider,  specific staff member):     CLINICAL    What was the call regarding:     TROUBLE SWALLOWING, DRY THROAT  TRIED TO CLEAR THROAT AND FELT LIKE LOWER PART OF THROAT WAS COMING TO UPPER PART OF THROAT    PLEASE CALL TO DISCUSS    WANTS TO KNOW THROAT DOCTORS NAME

## 2025-04-17 ENCOUNTER — OUTSIDE FACILITY SERVICE (OUTPATIENT)
Dept: INTERNAL MEDICINE | Facility: CLINIC | Age: 80
End: 2025-04-17
Payer: MEDICARE

## 2025-04-17 PROCEDURE — G0179 MD RECERTIFICATION HHA PT: HCPCS | Performed by: INTERNAL MEDICINE

## 2025-04-18 ENCOUNTER — TELEPHONE (OUTPATIENT)
Dept: INTERNAL MEDICINE | Facility: CLINIC | Age: 80
End: 2025-04-18
Payer: MEDICARE

## 2025-04-18 NOTE — TELEPHONE ENCOUNTER
Caller: Nestor Nye    Relationship: Self    Best call back number: [phone]     What is the best time to reach you: ANY    Who are you requesting to speak with (clinical staff, provider,  specific staff member): NURSE    Do you know the name of the person who called: PATIENT    What was the call regarding: PROSTATE SURGERY MAY 6TH.      WOULD DR BACK LIKE TO DO LABS PRIOR TO NEXT APPT 5/28/25?    Is it okay if the provider responds through MyChart: PHONE CALL PLEASE

## 2025-04-21 ENCOUNTER — TRANSCRIBE ORDERS (OUTPATIENT)
Dept: LAB | Facility: HOSPITAL | Age: 80
End: 2025-04-21
Payer: MEDICARE

## 2025-04-21 ENCOUNTER — TRANSCRIBE ORDERS (OUTPATIENT)
Dept: GENERAL RADIOLOGY | Facility: HOSPITAL | Age: 80
End: 2025-04-21
Payer: MEDICARE

## 2025-04-21 ENCOUNTER — TRANSCRIBE ORDERS (OUTPATIENT)
Dept: CARDIOLOGY | Facility: HOSPITAL | Age: 80
End: 2025-04-21
Payer: MEDICARE

## 2025-04-21 ENCOUNTER — LAB (OUTPATIENT)
Dept: LAB | Facility: HOSPITAL | Age: 80
End: 2025-04-21
Payer: MEDICARE

## 2025-04-21 ENCOUNTER — HOSPITAL ENCOUNTER (OUTPATIENT)
Dept: CARDIOLOGY | Facility: HOSPITAL | Age: 80
Discharge: HOME | End: 2025-04-21
Payer: MEDICARE

## 2025-04-21 ENCOUNTER — HOSPITAL ENCOUNTER (OUTPATIENT)
Dept: GENERAL RADIOLOGY | Facility: HOSPITAL | Age: 80
Discharge: HOME | End: 2025-04-21
Payer: MEDICARE

## 2025-04-21 DIAGNOSIS — N13.8 ENLARGED PROSTATE WITH URINARY OBSTRUCTION: ICD-10-CM

## 2025-04-21 DIAGNOSIS — Z01.818 PRE-OP EXAM: Primary | ICD-10-CM

## 2025-04-21 DIAGNOSIS — N40.1 ENLARGED PROSTATE WITH URINARY OBSTRUCTION: ICD-10-CM

## 2025-04-21 DIAGNOSIS — N31.9 HIGH COMPLIANCE BLADDER: ICD-10-CM

## 2025-04-21 DIAGNOSIS — Z01.818 PRE-OP EXAM: ICD-10-CM

## 2025-04-21 DIAGNOSIS — N40.1 ENLARGED PROSTATE WITH URINARY OBSTRUCTION: Primary | ICD-10-CM

## 2025-04-21 DIAGNOSIS — N13.8 ENLARGED PROSTATE WITH URINARY OBSTRUCTION: Primary | ICD-10-CM

## 2025-04-21 LAB
ALBUMIN SERPL-MCNC: 4.2 G/DL (ref 3.5–5.2)
ALBUMIN/GLOB SERPL: 1.6 G/DL
ALP SERPL-CCNC: 102 U/L (ref 39–117)
ALT SERPL W P-5'-P-CCNC: 12 U/L (ref 1–41)
ANION GAP SERPL CALCULATED.3IONS-SCNC: 7.7 MMOL/L (ref 5–15)
AST SERPL-CCNC: 19 U/L (ref 1–40)
BASOPHILS # BLD AUTO: 0.08 10*3/MM3 (ref 0–0.2)
BASOPHILS NFR BLD AUTO: 1.1 % (ref 0–1.5)
BILIRUB SERPL-MCNC: 0.6 MG/DL (ref 0–1.2)
BUN SERPL-MCNC: 22 MG/DL (ref 8–23)
BUN/CREAT SERPL: 12 (ref 7–25)
CALCIUM SPEC-SCNC: 9.5 MG/DL (ref 8.6–10.5)
CHLORIDE SERPL-SCNC: 104 MMOL/L (ref 98–107)
CO2 SERPL-SCNC: 25.3 MMOL/L (ref 22–29)
CREAT SERPL-MCNC: 1.84 MG/DL (ref 0.76–1.27)
DEPRECATED RDW RBC AUTO: 44.6 FL (ref 37–54)
EGFRCR SERPLBLD CKD-EPI 2021: 37.1 ML/MIN/1.73
EOSINOPHIL # BLD AUTO: 0.09 10*3/MM3 (ref 0–0.4)
EOSINOPHIL NFR BLD AUTO: 1.3 % (ref 0.3–6.2)
ERYTHROCYTE [DISTWIDTH] IN BLOOD BY AUTOMATED COUNT: 13 % (ref 12.3–15.4)
GLOBULIN UR ELPH-MCNC: 2.6 GM/DL
GLUCOSE SERPL-MCNC: 103 MG/DL (ref 65–99)
HCT VFR BLD AUTO: 41 % (ref 37.5–51)
HGB BLD-MCNC: 13.5 G/DL (ref 13–17.7)
IMM GRANULOCYTES # BLD AUTO: 0.01 10*3/MM3 (ref 0–0.05)
IMM GRANULOCYTES NFR BLD AUTO: 0.1 % (ref 0–0.5)
INR PPP: 0.99 (ref 0.9–1.1)
LYMPHOCYTES # BLD AUTO: 1.47 10*3/MM3 (ref 0.7–3.1)
LYMPHOCYTES NFR BLD AUTO: 20.8 % (ref 19.6–45.3)
MCH RBC QN AUTO: 31 PG (ref 26.6–33)
MCHC RBC AUTO-ENTMCNC: 32.9 G/DL (ref 31.5–35.7)
MCV RBC AUTO: 94 FL (ref 79–97)
MONOCYTES # BLD AUTO: 0.71 10*3/MM3 (ref 0.1–0.9)
MONOCYTES NFR BLD AUTO: 10 % (ref 5–12)
NEUTROPHILS NFR BLD AUTO: 4.71 10*3/MM3 (ref 1.7–7)
NEUTROPHILS NFR BLD AUTO: 66.7 % (ref 42.7–76)
NRBC BLD AUTO-RTO: 0 /100 WBC (ref 0–0.2)
PLATELET # BLD AUTO: 266 10*3/MM3 (ref 140–450)
PMV BLD AUTO: 10.4 FL (ref 6–12)
POTASSIUM SERPL-SCNC: 4.7 MMOL/L (ref 3.5–5.2)
PROT SERPL-MCNC: 6.8 G/DL (ref 6–8.5)
PROTHROMBIN TIME: 13.8 SECONDS (ref 12.3–15.1)
RBC # BLD AUTO: 4.36 10*6/MM3 (ref 4.14–5.8)
SODIUM SERPL-SCNC: 137 MMOL/L (ref 136–145)
WBC NRBC COR # BLD AUTO: 7.07 10*3/MM3 (ref 3.4–10.8)

## 2025-04-21 PROCEDURE — 36415 COLL VENOUS BLD VENIPUNCTURE: CPT

## 2025-04-21 PROCEDURE — 85610 PROTHROMBIN TIME: CPT

## 2025-04-21 PROCEDURE — 71046 X-RAY EXAM CHEST 2 VIEWS: CPT

## 2025-04-21 PROCEDURE — 93005 ELECTROCARDIOGRAM TRACING: CPT | Performed by: UROLOGY

## 2025-04-21 PROCEDURE — 85025 COMPLETE CBC W/AUTO DIFF WBC: CPT

## 2025-04-21 PROCEDURE — 80053 COMPREHEN METABOLIC PANEL: CPT

## 2025-04-21 PROCEDURE — 74018 RADEX ABDOMEN 1 VIEW: CPT

## 2025-04-23 LAB
QT INTERVAL: 382 MS
QTC INTERVAL: 429 MS

## 2025-06-04 ENCOUNTER — HOSPITAL ENCOUNTER (EMERGENCY)
Facility: HOSPITAL | Age: 80
Discharge: HOME | End: 2025-06-04
Attending: STUDENT IN AN ORGANIZED HEALTH CARE EDUCATION/TRAINING PROGRAM | Admitting: STUDENT IN AN ORGANIZED HEALTH CARE EDUCATION/TRAINING PROGRAM
Payer: MEDICARE

## 2025-06-04 ENCOUNTER — APPOINTMENT (OUTPATIENT)
Dept: CT IMAGING | Facility: HOSPITAL | Age: 80
End: 2025-06-04
Payer: MEDICARE

## 2025-06-04 VITALS
HEART RATE: 90 BPM | SYSTOLIC BLOOD PRESSURE: 144 MMHG | WEIGHT: 160 LBS | HEIGHT: 67 IN | BODY MASS INDEX: 25.11 KG/M2 | OXYGEN SATURATION: 97 % | TEMPERATURE: 98 F | RESPIRATION RATE: 18 BRPM | DIASTOLIC BLOOD PRESSURE: 81 MMHG

## 2025-06-04 DIAGNOSIS — K59.00 CONSTIPATION, UNSPECIFIED CONSTIPATION TYPE: Primary | ICD-10-CM

## 2025-06-04 LAB
ALBUMIN SERPL-MCNC: 4.3 G/DL (ref 3.5–5.2)
ALBUMIN/GLOB SERPL: 1.3 G/DL
ALP SERPL-CCNC: 114 U/L (ref 39–117)
ALT SERPL W P-5'-P-CCNC: 8 U/L (ref 1–41)
ANION GAP SERPL CALCULATED.3IONS-SCNC: 12.4 MMOL/L (ref 5–15)
AST SERPL-CCNC: 12 U/L (ref 1–40)
BACTERIA UR QL AUTO: ABNORMAL /HPF
BASOPHILS # BLD AUTO: 0.08 10*3/MM3 (ref 0–0.2)
BASOPHILS NFR BLD AUTO: 0.9 % (ref 0–1.5)
BILIRUB SERPL-MCNC: 0.8 MG/DL (ref 0–1.2)
BILIRUB UR QL STRIP: NEGATIVE
BUN SERPL-MCNC: 22 MG/DL (ref 8–23)
BUN/CREAT SERPL: 10.1 (ref 7–25)
CALCIUM SPEC-SCNC: 9.1 MG/DL (ref 8.6–10.5)
CHLORIDE SERPL-SCNC: 101 MMOL/L (ref 98–107)
CLARITY UR: CLEAR
CO2 SERPL-SCNC: 24.6 MMOL/L (ref 22–29)
COLOR UR: ABNORMAL
CREAT SERPL-MCNC: 2.17 MG/DL (ref 0.76–1.27)
DEPRECATED RDW RBC AUTO: 46.5 FL (ref 37–54)
EGFRCR SERPLBLD CKD-EPI 2021: 30.4 ML/MIN/1.73
EOSINOPHIL # BLD AUTO: 0.68 10*3/MM3 (ref 0–0.4)
EOSINOPHIL NFR BLD AUTO: 7.7 % (ref 0.3–6.2)
ERYTHROCYTE [DISTWIDTH] IN BLOOD BY AUTOMATED COUNT: 13.4 % (ref 12.3–15.4)
GLOBULIN UR ELPH-MCNC: 3.3 GM/DL
GLUCOSE SERPL-MCNC: 107 MG/DL (ref 65–99)
GLUCOSE UR STRIP-MCNC: NEGATIVE MG/DL
HCT VFR BLD AUTO: 41.6 % (ref 37.5–51)
HGB BLD-MCNC: 13.6 G/DL (ref 13–17.7)
HGB UR QL STRIP.AUTO: ABNORMAL
HYALINE CASTS UR QL AUTO: ABNORMAL /LPF
IMM GRANULOCYTES # BLD AUTO: 0.04 10*3/MM3 (ref 0–0.05)
IMM GRANULOCYTES NFR BLD AUTO: 0.5 % (ref 0–0.5)
KETONES UR QL STRIP: NEGATIVE
LEUKOCYTE ESTERASE UR QL STRIP.AUTO: ABNORMAL
LIPASE SERPL-CCNC: 22 U/L (ref 13–60)
LYMPHOCYTES # BLD AUTO: 1.63 10*3/MM3 (ref 0.7–3.1)
LYMPHOCYTES NFR BLD AUTO: 18.5 % (ref 19.6–45.3)
MCH RBC QN AUTO: 30.6 PG (ref 26.6–33)
MCHC RBC AUTO-ENTMCNC: 32.7 G/DL (ref 31.5–35.7)
MCV RBC AUTO: 93.7 FL (ref 79–97)
MONOCYTES # BLD AUTO: 0.86 10*3/MM3 (ref 0.1–0.9)
MONOCYTES NFR BLD AUTO: 9.8 % (ref 5–12)
NEUTROPHILS NFR BLD AUTO: 5.5 10*3/MM3 (ref 1.7–7)
NEUTROPHILS NFR BLD AUTO: 62.6 % (ref 42.7–76)
NITRITE UR QL STRIP: NEGATIVE
NRBC BLD AUTO-RTO: 0 /100 WBC (ref 0–0.2)
PH UR STRIP.AUTO: 5.5 [PH] (ref 5–8)
PLATELET # BLD AUTO: 347 10*3/MM3 (ref 140–450)
PMV BLD AUTO: 9.8 FL (ref 6–12)
POTASSIUM SERPL-SCNC: 4.6 MMOL/L (ref 3.5–5.2)
PROT SERPL-MCNC: 7.6 G/DL (ref 6–8.5)
PROT UR QL STRIP: ABNORMAL
RBC # BLD AUTO: 4.44 10*6/MM3 (ref 4.14–5.8)
RBC # UR STRIP: ABNORMAL /HPF
REF LAB TEST METHOD: ABNORMAL
SODIUM SERPL-SCNC: 138 MMOL/L (ref 136–145)
SP GR UR STRIP: 1.01 (ref 1–1.03)
SQUAMOUS #/AREA URNS HPF: ABNORMAL /HPF
UROBILINOGEN UR QL STRIP: ABNORMAL
WBC # UR STRIP: ABNORMAL /HPF
WBC NRBC COR # BLD AUTO: 8.79 10*3/MM3 (ref 3.4–10.8)

## 2025-06-04 PROCEDURE — 81001 URINALYSIS AUTO W/SCOPE: CPT | Performed by: NURSE PRACTITIONER

## 2025-06-04 PROCEDURE — 83690 ASSAY OF LIPASE: CPT | Performed by: NURSE PRACTITIONER

## 2025-06-04 PROCEDURE — 99284 EMERGENCY DEPT VISIT MOD MDM: CPT | Performed by: STUDENT IN AN ORGANIZED HEALTH CARE EDUCATION/TRAINING PROGRAM

## 2025-06-04 PROCEDURE — 80053 COMPREHEN METABOLIC PANEL: CPT | Performed by: NURSE PRACTITIONER

## 2025-06-04 PROCEDURE — 85025 COMPLETE CBC W/AUTO DIFF WBC: CPT | Performed by: NURSE PRACTITIONER

## 2025-06-04 PROCEDURE — 74176 CT ABD & PELVIS W/O CONTRAST: CPT

## 2025-06-04 PROCEDURE — 36415 COLL VENOUS BLD VENIPUNCTURE: CPT

## 2025-06-04 RX ORDER — BETHANECHOL CHLORIDE 25 MG/1
25 TABLET ORAL 4 TIMES DAILY
COMMUNITY
Start: 2025-04-09

## 2025-06-04 RX ORDER — HYDROCODONE BITARTRATE AND ACETAMINOPHEN 10; 325 MG/1; MG/1
TABLET ORAL
COMMUNITY
Start: 2025-05-20

## 2025-06-04 RX ORDER — ALFUZOSIN HYDROCHLORIDE 10 MG/1
10 TABLET, EXTENDED RELEASE ORAL
COMMUNITY
Start: 2025-04-09

## 2025-06-04 NOTE — DISCHARGE INSTRUCTIONS
Please follow-up with your PCP within the week.  Have sent in prescription for GoLytely.  This is a colon prep.  Please plan on being in close proximity with bathroom after taking this medication.

## 2025-06-04 NOTE — ED PROVIDER NOTES
Subjective:  History of Present Illness:    Patient is a 79-year-old male with history of hernia repair, bowel resection x 2.  And constipation.  Presents to the ER today for constipation.  Reports that he has had several small bowel movements over the last 2 weeks however is still feeling constipated and bloated.  He reports generalized abdominal tenderness.  Recently had a suprapubic catheter placed.  Reports that he is passing gas.  Denies OTC medication or home remedy.  Denies alleviating or exacerbating factors    Nurses Notes reviewed and agree, including vitals, allergies, social history and prior medical history.     REVIEW OF SYSTEMS: All systems reviewed and not pertinent unless noted.  Review of Systems   Gastrointestinal:  Positive for constipation.   All other systems reviewed and are negative.      Past Medical History:   Diagnosis Date    Acute maxillary sinusitis     Arthritis     Disease of thyroid gland     Enlarged prostate     GERD (gastroesophageal reflux disease)     Gout     Hypertension     Impaired functional mobility, balance, gait, and endurance     PONV (postoperative nausea and vomiting)     Renal disorder     Rheumatoid arthritis     Right inguinal hernia 10/28/2021    Added automatically from request for surgery [id number]    Vocal cord anomaly 2007    had problem with enlarge vocal cord after low back surgery, changed his voice since then       Allergies:    Patient has no known allergies.      Past Surgical History:   Procedure Laterality Date    ABDOMINAL SURGERY      ARTERIOGRAM Right 07/22/2024    Procedure: LOWER EXTREMITY ANGIOGRAM RIGHT;  Surgeon: Kingsley Morris MD;  Location: [facility];  Service: Vascular;  Laterality: Right;  DOSE: 56 MGY; FLURO: 7 MIN 6 SEC; CONTRAST 40 ML    BACK SURGERY      BELPHAROPTOSIS REPAIR Bilateral     BRAIN SURGERY      COLON SURGERY      bowel resection x2 per pt    CRANIOTOMY FOR SUBDURAL HEMATOMA      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 fall after  electrocution on job site    DIAGNOSTIC LAPAROSCOPY N/A 2022    Procedure: DIAGNOSTIC LAPAROSCOPY CONVERTED TO EXPLORATORY LAPAROTOMY WITH SMALL BOWEL RESECTION;  Surgeon: Maximo Bennett MD;  Location:  [facility];  Service: General;  Laterality: N/A;  CONVERTED TO EXPLORATORY LAPAROTOMY AT 1912    EXPLORATORY LAPAROTOMY N/A 2022    Procedure: LAPAROTOMY EXPLORATORY WITH SMALL BOWEL OBSTRUCTION;  Surgeon: Maximo Bennett MD;  Location:  [facility];  Service: General;  Laterality: N/A;    EYE SURGERY      on upper etelids    FEMORAL ENDARTERECTOMY Right 2024    Procedure: FEMORAL EMBELECTOMY RIGHT;  Surgeon: Kingsley Morris MD;  Location:  [facility];  Service: Vascular;  Laterality: Right;    FOREIGN BODY REMOVAL Right     removal of metal fragments from RUQ after electrocution and fall.      HERNIA REPAIR      INGUINAL HERNIA REPAIR Right 2021    Procedure: INGUINAL HERNIA REPAIR WITH MESH AND EXCISION OF SPERMATIC CORD LIPOMA;  Surgeon: Rosey Glover MD;  Location:  [facility];  Service: General;  Laterality: Right;    LEG DEBRIDEMENT Right 2024    Procedure: GROIN DEBRIDEMENT WITH WOUND VAC PLACEMENT  RIGHT;  Surgeon: Kingsley Morris MD;  Location:  [facility];  Service: Vascular;  Laterality: Right;    LUMBAR SPINE SURGERY      PROSTATE AQUABLATION N/A 2024    Procedure: AQUABLATION OF PROSTATE;  Surgeon: Prieto Rey MD;  Location:  [facility];  Service: Robotics - Urology;  Laterality: N/A;    PROSTATE SURGERY      SKIN GRAFT Right     RUE skin grafting after electrocution    THORACIC SPINE SURGERY           Social History     Socioeconomic History    Marital status:    Tobacco Use    Smoking status: Former     Types: Cigars     Quit date:      Years since quittin.4     Passive exposure: Past    Smokeless tobacco: Never    Tobacco comments:     Smoked 1 ppd of small cigars for 50  years    Vaping Use    Vaping status: Former  "  Substance and Sexual Activity    Alcohol use: Not Currently    Drug use: No    Sexual activity: Defer         Family History   Problem Relation Age of Onset    Arthritis Mother     Prostate cancer Brother     Arthritis Father        Objective  Physical Exam:  /81   Pulse 90   Temp 98 °F (36.7 °C) (Oral)   Resp 18   Ht 170.2 cm (67\")   Wt 72.6 kg (160 lb)   SpO2 97%   BMI 25.06 kg/m²      Physical Exam  Vitals and nursing note reviewed.   Constitutional:       Appearance: He is well-developed and normal weight.   HENT:      Head: Normocephalic and atraumatic.      Mouth/Throat:      Mouth: Mucous membranes are moist.      Pharynx: Oropharynx is clear.   Eyes:      Extraocular Movements: Extraocular movements intact.      Pupils: Pupils are equal, round, and reactive to light.   Cardiovascular:      Rate and Rhythm: Normal rate and regular rhythm.      Heart sounds: Normal heart sounds.   Pulmonary:      Effort: Pulmonary effort is normal.      Breath sounds: Normal breath sounds.   Abdominal:      General: Abdomen is flat. Bowel sounds are normal.      Palpations: Abdomen is soft.      Tenderness: There is generalized abdominal tenderness.   Skin:     General: Skin is warm and dry.      Capillary Refill: Capillary refill takes less than 2 seconds.   Neurological:      General: No focal deficit present.      Mental Status: He is alert and oriented to person, place, and time.   Psychiatric:         Mood and Affect: Mood normal.         Behavior: Behavior normal.         Procedures    ED Course:         Lab Results (last 24 hours)       Procedure Component Value Units Date/Time    CBC Auto Differential [[id number]]  (Abnormal) Collected: 06/04/25 0926    Specimen: Blood Updated: 06/04/25 0940     WBC 8.79 10*3/mm3      RBC 4.44 10*6/mm3      Hemoglobin 13.6 g/dL      Hematocrit 41.6 %      MCV 93.7 fL      MCH 30.6 pg      MCHC 32.7 g/dL      RDW 13.4 %      RDW-SD 46.5 fl      MPV 9.8 fL      Platelets " 347 10*3/mm3      Neutrophil % 62.6 %      Lymphocyte % 18.5 %      Monocyte % 9.8 %      Eosinophil % 7.7 %      Basophil % 0.9 %      Immature Grans % 0.5 %      Neutrophils, Absolute 5.50 10*3/mm3      Lymphocytes, Absolute 1.63 10*3/mm3      Monocytes, Absolute 0.86 10*3/mm3      Eosinophils, Absolute 0.68 10*3/mm3      Basophils, Absolute 0.08 10*3/mm3      Immature Grans, Absolute 0.04 10*3/mm3      nRBC 0.0 /100 WBC     Comprehensive Metabolic Panel [[id number]]  (Abnormal) Collected: 06/04/25 0926    Specimen: Blood Updated: 06/04/25 1002     Glucose 107 mg/dL      BUN 22.0 mg/dL      Creatinine 2.17 mg/dL      Sodium 138 mmol/L      Potassium 4.6 mmol/L      Chloride 101 mmol/L      CO2 24.6 mmol/L      Calcium 9.1 mg/dL      Total Protein 7.6 g/dL      Albumin 4.3 g/dL      ALT (SGPT) 8 U/L      AST (SGOT) 12 U/L      Alkaline Phosphatase 114 U/L      Total Bilirubin 0.8 mg/dL      Globulin 3.3 gm/dL      A/G Ratio 1.3 g/dL      BUN/Creatinine Ratio 10.1     Anion Gap 12.4 mmol/L      eGFR 30.4 mL/min/1.73     Narrative:      GFR Categories in Chronic Kidney Disease (CKD)              GFR Category          GFR (mL/min/1.73)    Interpretation  G1                    90 or greater        Normal or high (1)  G2                    60-89                Mild decrease (1)  G3a                   45-59                Mild to moderate decrease  G3b                   30-44                Moderate to severe decrease  G4                    15-29                Severe decrease  G5                    14 or less           Kidney failure    (1)In the absence of evidence of kidney disease, neither GFR category G1 or G2 fulfill the criteria for CKD.    eGFR calculation 2021 CKD-EPI creatinine equation, which does not include race as a factor    Lipase [[id number]]  (Normal) Collected: 06/04/25 0926    Specimen: Blood Updated: 06/04/25 1002     Lipase 22 U/L     Urinalysis With Microscopic If Indicated (No Culture) - Urine,  Clean Catch [[id number]]  (Abnormal) Collected: 06/04/25 0942    Specimen: Urine, Clean Catch Updated: 06/04/25 1000     Color, UA [place]     Appearance, UA Clear     pH, UA 5.5     Specific Gravity, UA 1.012     Glucose, UA Negative     Ketones, UA Negative     Bilirubin, UA Negative     Blood, UA Large (3+)     Protein,  mg/dL (2+)     Leuk Esterase, UA Small (1+)     Nitrite, UA Negative     Urobilinogen, UA 1.0 E.U./dL    Urinalysis, Microscopic Only - Urine, Clean Catch [[id number]]  (Abnormal) Collected: 06/04/25 0942    Specimen: Urine, Clean Catch Updated: 06/04/25 1000     RBC, UA 11-20 /HPF      WBC, UA 3-5 /HPF      Bacteria, UA None Seen /HPF      Squamous Epithelial Cells, UA 0-2 /HPF      Hyaline Casts, UA None Seen /LPF      Methodology Manual Light Microscopy             CT Abdomen Pelvis Without Contrast  Result Date: 6/4/2025  CT SCAN OF THE ABDOMEN AND PELVIS WITHOUT CONTRAST     6/4/2025 10:49 AM   HISTORY: Rule out bowel obstruction Abdominal pain.  PROCEDURE: Axial CT images were obtained from the lung bases to the pubic symphysis without IV contrast. Reformatted images were generated from the axial data set and provided for interpretation. This study was performed with techniques to keep radiation doses as low as reasonably achievable (ALARA). Individualized dose reduction techniques using automated exposure control or adjustment of mA and/or kV according to the patient size were employed. 263 mGy*cm   COMPARISON: CT abdomen pelvis 7/17/2024.  FINDINGS: Lack of IV contrast limits evaluation of the abdominal and pelvic solid organs.  LOWER CHEST: The heart is normal in size. Moderate-sized hiatal hernia. Trace bilateral pleural effusions. Mild bilateral lower lobe dependent lung atelectasis.  ABDOMEN/PELVIS: Liver, gallbladder and bile ducts: Unenhanced liver is grossly unremarkable without suspicious focal abnormality. Cholelithiasis without evidence of cholecystitis. No definite biliary  ductal dilatation.  Adrenal glands: The adrenal glands are morphologically unremarkable without suspicious lesion.  Kidneys, ureters and urinary bladder: No nephrolithiasis. Bilateral double-J ureteral stents are in appropriate position. No hydronephrosis. Right renal superior pole 2.0 cm simple cyst. Left renal superior pole 2.3 cm simple cyst. Severe urinary bladder wall thickening. Suprapubic catheter in place. Mild left renal atrophy.  Spleen: The spleen is normal in size.  Pancreas: The pancreas is grossly unremarkable.  Gastrointestinal system and mesentery: There is no evidence of bowel obstruction. The appendix is visualized and unremarkable. No significant mesenteric inflammation. Colonic diverticulosis without evidence of acute diverticulitis. Moderate amount of colonic stool burden. No small bowel dilation. No large bowel dilation. Prior small bowel resection with anastomosis.  Lymph nodes: No definite pathologically enlarged abdominal or pelvic lymph nodes are present within the limits of a noncontrast enhanced examination.  Vessels: Severe calcific atherosclerotic disease of the aorta. Abdominal aortic and bilateral iliac tortuosity. Infrarenal abdominal aortic ectasia measuring up to 29 mm. The inferior vena cava is unremarkable. Right superficial femoral arterial stent in place.  Peritoneum: No free intraperitoneal fluid or pneumoperitoneum.  Pelvic viscera: No acute findings. Severe prostatomegaly.  Body wall: No acute findings. Tiny fat-containing left inguinal hernia.  Bones: No acute fracture. Moderate multilevel degenerative changes of the spine. Chronic L2 superior endplate compression fracture with moderate height loss. Chronic mild anterior wedge deformity of the L1 vertebral body with mild height loss. Chronic T11 anterior wedge deformity with moderate height loss. Vertebral body ankylosis of the T10 and T11.      Impression: No evidence of bowel obstruction.  No small bowel dilatation. No  large bowel dilatation. Moderate amount of colonic stool burden. Colon is without evidence of acute diverticulitis.  Cholelithiasis without evidence of acute cholecystitis.  Enlarged prostate. Diffuse urinary bladder wall thickening, cystitis or urinary bladder mass. Suprapubic cystostomy catheter in place. Bilateral double J ureteral stents are in appropriate position without evidence of hydronephrosis.  Trace bilateral pleural effusions with adjacent lung atelectasis.  Other chronic findings as above.    Images personally reviewed, interpreted and dictated by JANE Panda.         This report was signed and finalized on 6/4/2025 11:15 AM by JANE Panda.           MDM     Amount and/or Complexity of Data Reviewed  Decide to obtain previous medical records or to obtain history from someone other than the patient: yes        Initial impression of presenting illness: Patient is a 79-year-old male with history of hernia repair, bowel resection x 2.  And constipation.  Presents to the ER today for constipation.  Reports that he has had several small bowel movements over the last 2 weeks however is still feeling constipated and bloated.  He reports generalized abdominal tenderness.  Recently had a suprapubic catheter placed.  Reports that he is passing gas.  Denies OTC medication or home remedy.  Denies alleviating or exacerbating factors    DDX: includes but is not limited to: Bowel obstruction, constipation, colitis, or other    Patient arrives stable with vitals interpreted by myself.     Pertinent features from physical exam: Lung sounds clear bilaterally throughout.  Abdomen is soft there is generalized tenderness.  Bowel sounds are normal.  Heart sounds normal..    Initial diagnostic plan: CBC, CMP, lipase, urinalysis, CT abdomen pelvis    Results from initial plan were reviewed and interpreted by me revealing the BC is within appropriate range.  CMP is with mild elevation in serum creatinine  otherwise normal.  Lipase is negative.  Urinalysis positive for leukocytes protein blood and white blood cells.  CT abdomen pelvis with the following impression no evidence of bowel obstruction.  No small bowel dilation or large bowel dilation moderate amount of colonic stool burden: Is without evidence of acute diverticulitis.  Cholelithiasis without evidence of cholecystitis patient is with enlarged prostate.  Diffuse urinary bladder wall thickening cystitis for urinary bladder mass.  Suprapubic cystoscopy catheter in place bilateral double-J ureteral stents are in appropriate position without hydronephrosis.  Trace bilateral pleural effusions with adjacent lung atelectasis.  Other chronic findings as above.    Diagnostic information from other sources: Chart review    Interventions / Re-evaluation: Vital signs stable throughout encounter    Results/clinical rationale were discussed with patient    Consultations/Discussion of results with other physicians: N/A    Disposition plan: Patient is hemodynamically stable nontoxic-appearing appropriate discharge.  Will send patient home with [facility].  Have him follow-up with his PCP within the week.  Follow-up with ER for new or worse symptoms.  -----        Final diagnoses:   Constipation, unspecified constipation type          Norman Carmichael, APRN  06/04/25 0515

## 2025-06-20 ENCOUNTER — TELEPHONE (OUTPATIENT)
Dept: INTERNAL MEDICINE | Facility: CLINIC | Age: 80
End: 2025-06-20
Payer: MEDICARE

## 2025-06-20 NOTE — TELEPHONE ENCOUNTER
Caller: [facility]    Relationship:     Best call back number: [phone]     What is the best time to reach you: ANY    Who are you requesting to speak with (clinical staff, provider,  specific staff member): NURSE    Do you know the name of the person who called: ARTUR    What was the call regarding: UA DONE 6/13/25.  WAS ANTIBIOTIC TO BE CALLED IN?    Is it okay if the provider responds through MyChart: CALL IF NEEDED

## 2025-06-20 NOTE — TELEPHONE ENCOUNTER
Dr. Menon spoke with patient. He has minimal burning. Being treated by Dr. Bruno for recurrent UTI's. This should be forwarded to his office. Susanna with home health has been notified

## 2025-08-21 ENCOUNTER — TELEPHONE (OUTPATIENT)
Dept: INTERNAL MEDICINE | Facility: CLINIC | Age: 80
End: 2025-08-21
Payer: MEDICARE

## (undated) DEVICE — SYRINGE,TOOMEY,IRRIGATION,70CC,STERILE: Brand: MEDLINE

## (undated) DEVICE — GLV SURG BIOGELULTRATOUCH POLYISPRN PF LF SZ7 STRL

## (undated) DEVICE — PENCL ES MEGADINE EZ/CLEAN BUTN W/HOLSTR 10FT

## (undated) DEVICE — GLV SURG SENSICARE W/ALOE PF LF 6.5 STRL

## (undated) DEVICE — SYR LL TP 10ML STRL

## (undated) DEVICE — PINNACLE INTRODUCER SHEATH: Brand: PINNACLE

## (undated) DEVICE — BALN ADMIRAL INPACT 7F 7X40MM

## (undated) DEVICE — ENDOPATH XCEL BLADELESS TROCARS WITH STABILITY SLEEVES: Brand: ENDOPATH XCEL

## (undated) DEVICE — TOWEL,OR,DSP,ST,WHITE,DLX,XR,2/PK,40PK/C: Brand: MEDLINE

## (undated) DEVICE — CUTTING LOOP, BIPOLAR, 24/26 FR.: Brand: N.A.

## (undated) DEVICE — INFLATION DEVICE: Brand: ENCORE™ 26

## (undated) DEVICE — CATHETER,FOLEY,3-WAY,24FR,30ML,100%SILI: Brand: MEDLINE

## (undated) DEVICE — BNDG ELAS CO-FLEX SLF ADHR 2IN 5YD LF STRL

## (undated) DEVICE — CATH SUP PROC TRAILBLAZER .035IN 135CM PK/5

## (undated) DEVICE — GOWN,PREVENTION PLUS,XLARGE,STERILE: Brand: MEDLINE

## (undated) DEVICE — ANTIBACTERIAL UNDYED BRAIDED (POLYGLACTIN 910), SYNTHETIC ABSORBABLE SUTURE: Brand: COATED VICRYL

## (undated) DEVICE — SHEET,DRAPE,70X100,STERILE: Brand: MEDLINE

## (undated) DEVICE — SYR LUERLOK 30CC

## (undated) DEVICE — PROXIMATE SKIN STAPLERS (35 WIDE) CONTAINS 35 STAINLESS STEEL STAPLES (FIXED HEAD): Brand: PROXIMATE

## (undated) DEVICE — PERCLOSE™ PROSTYLE™ SUTURE-MEDIATED CLOSURE AND REPAIR SYSTEM: Brand: PERCLOSE™ PROSTYLE™

## (undated) DEVICE — BLD CLIP UNIV SURG GRY

## (undated) DEVICE — ADHS SKIN PREMIERPRO EXOFIN TOPICAL HI/VISC .5ML

## (undated) DEVICE — RICH GENERAL LAPAROSCOPY: Brand: MEDLINE INDUSTRIES, INC.

## (undated) DEVICE — TOTAL TRAY, 16FR 10ML SIL FOLEY, URN: Brand: MEDLINE

## (undated) DEVICE — GLV SURG SENSICARE W/ALOE PF LF 8.5 STRL

## (undated) DEVICE — SLV SCD CALF HEMOFORCE DVT THERP REPROC MD

## (undated) DEVICE — SYR LL BD 10CC

## (undated) DEVICE — Device

## (undated) DEVICE — SUT PROLN 7/0 BV1 D/A 24IN 8702H

## (undated) DEVICE — SPNG GZ STRL 2S 4X4 12PLY

## (undated) DEVICE — STRIP,CLOSURE,WOUND,MEDI-STRIP,1/2X4: Brand: MEDLINE

## (undated) DEVICE — CATH OMNI FLUSH 5FR

## (undated) DEVICE — RADIFOCUS GLIDEWIRE ADVANTAGE GUIDEWIRE: Brand: GLIDEWIRE ADVANTAGE

## (undated) DEVICE — SNAP KOVER: Brand: UNBRANDED

## (undated) DEVICE — 3M™ IOBAN™ 2 ANTIMICROBIAL INCISE DRAPE 6650EZ: Brand: IOBAN™ 2

## (undated) DEVICE — RICH MAJOR PROCEDURE: Brand: MEDLINE INDUSTRIES, INC.

## (undated) DEVICE — SYRINGE,CONTROL,LL,FINGER,GRIP: Brand: MEDLINE INDUSTRIES, INC.

## (undated) DEVICE — CANSTR COL ENGINE FOR INDIGO SYS

## (undated) DEVICE — PK ANGIO OR 10

## (undated) DEVICE — DIL VESL 10FR .038 20CM

## (undated) DEVICE — 24FR BIPLR COAG ELECTRDE, STERILE: Brand: N.A.

## (undated) DEVICE — ELECTRD BLD EZ CLN STD 6.5IN

## (undated) DEVICE — Device: Brand: AQUABEAM HANDPIECE

## (undated) DEVICE — GLV SURG ULTRATOUCH BIOGEL/COAT PF LF SZ6 STRL

## (undated) DEVICE — ROYALSILK SURGICAL GOWN, L: Brand: CONVERTORS

## (undated) DEVICE — SUT PROLN 0/0 CTX 30IN 8454H

## (undated) DEVICE — NDL HYPO ECLPS SFTY 22G 1 1/2IN

## (undated) DEVICE — ENDOPATH XCEL UNIVERSAL TROCAR STABLILITY SLEEVES: Brand: ENDOPATH XCEL

## (undated) DEVICE — SUT SILK 2/0 SUTUPAK TIES 24IN SA75H

## (undated) DEVICE — DRAPE,FEM ANGIO,2 WIN,STERILE: Brand: MEDLINE

## (undated) DEVICE — UNDYED BRAIDED (POLYGLACTIN 910), SYNTHETIC ABSORBABLE SUTURE: Brand: COATED VICRYL

## (undated) DEVICE — DRSNG WND GZ PAD BORDERED LF 4X5IN STRL

## (undated) DEVICE — COVER,MAYO STAND,STERILE: Brand: MEDLINE

## (undated) DEVICE — TBG PENCL TELESCP MEGADYNE SMOKE EVAC 10FT

## (undated) DEVICE — STERILE ULTRASOUND GEL, SAFEWRAP: Brand: ECOVUE

## (undated) DEVICE — COVER,MAYO STAND,XL,STERILE: Brand: MEDLINE

## (undated) DEVICE — DEV OPN LIGASURE CRV 180D 36MM 13.5CM  1P/U

## (undated) DEVICE — SPNG LAP 18X18IN LF STRL PK/5

## (undated) DEVICE — SUT VIC 3/0 SH 27IN J416H

## (undated) DEVICE — CATH GUIDE SOFTVU SELECT/V HT OMNI .035 4F 65CM

## (undated) DEVICE — SUT PROLN 0 MO6 30IN 8418H

## (undated) DEVICE — CVR PROB ULTRASND/TRANSD W/GEL 18X120CM STRL

## (undated) DEVICE — TUBING, SUCTION, 1/4" X 10', STRAIGHT: Brand: MEDLINE

## (undated) DEVICE — KITTNER SPONGE: Brand: DEROYAL

## (undated) DEVICE — SUT PROLN 6/0 C1 D/A 30IN 8706H

## (undated) DEVICE — DRN PENRS SIL 1/4X18IN LF STRL

## (undated) DEVICE — CATH SUP PROC TRAILBLAZER .035IN 150CM

## (undated) DEVICE — CVR HNDL LIGHT RIGID

## (undated) DEVICE — SUT SILK 3/0 SH CR8 18IN C013D

## (undated) DEVICE — DECANTER BAG 9": Brand: MEDLINE INDUSTRIES, INC.

## (undated) DEVICE — HYPODERMIC SAFETY NEEDLE: Brand: MONOJECT

## (undated) DEVICE — TBG INJ CONTRL EXCITE RA 1200PSI 48IN

## (undated) DEVICE — EVAC BLDR UROVAC W ADAPT

## (undated) DEVICE — PK VASC 10

## (undated) DEVICE — SUT VIC 3/0 TIES 18IN J110T

## (undated) DEVICE — RADIFOCUS GLIDEWIRE ADVANTAGE TRACK GUIDE WIRE: Brand: GLIDEWIRE ADVANTAGE TRACK

## (undated) DEVICE — SUT VIC 2/0 SH 27IN

## (undated) DEVICE — PK DRP AQUABEAM LITHO 65X69IN

## (undated) DEVICE — GLV SURG SIGNATURE TOUCH PF LTX 7 STRL

## (undated) DEVICE — CUTTING LOOP, BIPOLAR, 0.30MM, 24/26 FR.: Brand: N.A.

## (undated) DEVICE — FOGARTY EMBOLECTOMY CATHETER: Brand: FOGARTY

## (undated) DEVICE — TUBING, SUCTION, 1/4" X 12', STRAIGHT: Brand: MEDLINE

## (undated) DEVICE — TOWEL,OR,DSP,ST,BLUE,STD,4/PK,20PK/CS: Brand: MEDLINE

## (undated) DEVICE — DRAINBAG,ANTI-REFLUX TOWER,L/F,2000ML,LL: Brand: MEDLINE

## (undated) DEVICE — PENCL SMOKE/EVAC MEGADYNE TELESCP 10FT

## (undated) DEVICE — DEV EPS SPIDERFX 7MM OTW320/RX190CM

## (undated) DEVICE — PROXIMATE RH ROTATING HEAD SKIN STAPLERS (35 WIDE) CONTAINS 35 STAINLESS STEEL STAPLES: Brand: PROXIMATE

## (undated) DEVICE — GLV SURG SENSICARE GREEN W/ALOE PF LF 6 STRL

## (undated) DEVICE — CATH ATHRCTMY HAWK1 6F

## (undated) DEVICE — TRY SKINPREP PVP SCRB W PAINT

## (undated) DEVICE — SYR CATH/TIP 50ML 2OZ STRL 1P/U

## (undated) DEVICE — APPL CHLORAPREP 26ML CLR

## (undated) DEVICE — TRAP FLD MINIVAC MEGADYNE 100ML

## (undated) DEVICE — INTRO SHEATH DRYSEAL FLEX 16F 5.3TO6.1MM 33CM

## (undated) DEVICE — 1230 DOUBLE MAGNET NEEDLE COUNTER,BLACK: Brand: DEVON

## (undated) DEVICE — INTRO SHEATH PRELUDE IDEAL NITNL PALLADIUM 6F .018IN 7X40CM

## (undated) DEVICE — PK CYSTO-TUR BASIC 10

## (undated) DEVICE — TP SXN YANKR BULB STRL

## (undated) DEVICE — GOWN,PREVENTION PLUS,LARGE,STERILE: Brand: MEDLINE

## (undated) DEVICE — ADHS LIQ MASTISOL 2/3ML

## (undated) DEVICE — FOGARTY ARTERIAL EMBOLECTOMY CATHETER 4F 80CM: Brand: FOGARTY

## (undated) DEVICE — KT INTRO MINISTICK MAX W/GW NITNL/TUNG ECHO STFF 4F 21G 7CM

## (undated) DEVICE — SYRINGE, LOCKING, 10CC, STERILE: Brand: BABY GORILLA/GORILLA PLATING SYSTEM

## (undated) DEVICE — CANISTER, RIGID, 2000CC: Brand: MEDLINE INDUSTRIES, INC.

## (undated) DEVICE — MONOPOLAR METZENBAUM SCISSOR TIP, DISPOSABLE: Brand: MONOPOLAR METZENBAUM SCISSOR TIP, DISPOSABLE